# Patient Record
Sex: MALE | Race: WHITE | Employment: OTHER | URBAN - METROPOLITAN AREA
[De-identification: names, ages, dates, MRNs, and addresses within clinical notes are randomized per-mention and may not be internally consistent; named-entity substitution may affect disease eponyms.]

---

## 2021-06-29 NOTE — PROGRESS NOTES
Department of [facility] Oncology  Attending Consult Note    Reason for Visit: Consultation on a patient with Lymphoplasmacytic Lymphoma      Referring Physician: Rachel Willis MD    PCP:  Rachel Willis MD    History of Present Illness:  70year old male initially seen at [facility] hematology for anemia and abnormal TP/albumin ratio. SPEP IgG and IgM kapa paraprotein, M-spike 3.29 g/dL. UIFE: IgM protein. 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   -Extensive involvement by atypitcal CD5-/CD10-B-cell lymphoproliferative disorder, comprising over 70% of marrow cellularity  -Mildly hypercellular marrow for age (30-40%) with trilineage pan hypoplasia  -Normocytic anemia. -IHC staining highlighted extensive CD20+ B cell infiltrate with admixed + plasma cells     Started on Ibrutinib 420 mg/day 12/2020 with good response so far    On 01/06/2021:  SPEP:  Monoclonal protein 1.72 g/dL  Persistent double monoclonal bands in the beta/gamma and gamma globuin regions. Bands previously identified as IgM kappa and IgG kappa (8/12/2020). IgM kappa decreased by 1.22 g/dL and IgG kappa minimally changed since last exam on 10/14/2020. Free kappa light chains: 413.4    (3.3-19. 4)   Free lambda light chains: 2.8      (5.7-26. 3)   K/L ratio:                          147.64 (0.26-1.65)    On 03/25/2021  SPEP:  Monoclonal protein: 1.03 (0-0) g/dL  Persistent double monoclonal bands in the beta/gamma and gamma globuin regions. Bands previously identified as IgM kappa and IgG kappa (8/12/2020). IgM kappa decreased by 0.69 g/dL and IgG kappa unchanged since 01/06/2021. Free kappa light chains: 149.4  (3.3-19. 4)   Free lambda light chains: 2.6    (5.7-26. 3)   K/L ratio:                          57.46 (0.26-1.65)    Patient started on oral iron 08/2020 for LANDON and B12 deficiency but required 2 doses of Feraheme on 2021 and 2021 and again in 2021. Had recurrent iron deficiency with plan for Feraheme q3 months (next one scheduled on 2021). He does IM b12 injections at home. On 2021  Hb: 15.5 Hct 44.9  MCV 90.5    WBC 6.9  BUN 10  Creat 0.9  Ca 9.3  Albumin 3.6    Fe: 94 FeSat: 33% TIBIC: 288  Ferritin: 81    VitB12 370     Ig  (700-1700)   IgA: <8     ()   IgM: 1370 ()    Review of Systems;  CONSTITUTIONAL: No fever, chills. Fair appetite and energy level. ENMT: Eyes: No diplopia; Nose: No epistaxis. Mouth: No sore throat. RESPIRATORY: No hemoptysis, shortness of breath, cough. CARDIOVASCULAR: No chest pain, palpitations. GASTROINTESTINAL: No nausea/vomiting, abdominal pain. GENITOURINARY: No dysuria, urinary frequency, hematuria. NEURO: No syncope, presyncope, headache. Remainder:  ROS NEGATIVE    Past Medical History:      Diagnosis Date    Abscess of right thumb 4/15/2016    Amputation of right thumb 2014    Distal phalynx    Depression     PTSD    Diabetes mellitus ([address]     Hyperlipidemia     Hypertension     Obesity      Past Surgical History:      Procedure Laterality Date    HERNIA REPAIR      NECK SURGERY      PATELLA SURGERY      SHOULDER SURGERY      TONSILLECTOMY       Family History:  No family history on file. Medications:  Reviewed and reconciled.     Social History:  Social History     Socioeconomic History    Marital status:      Spouse name: Not on file    Number of children: Not on file    Years of education: Not on file    Highest education level: Not on file   Occupational History    Not on file   Tobacco Use    Smoking status: Current Every Day Smoker     Packs/day: 2.00     Types: Cigars    Tobacco comment: 2 cigars a day   Substance and Sexual Activity    Alcohol use: Yes     Comment: occasionally    Drug use: No    Sexual activity: Not on file   Other Topics Concern    Not on interim    Thank you for allowing us to participate in the care of Mr. Burke Khan, HUDSON - CNP      The patient was seen and examined, I agree with Dayron Lehman CNP H&P, assessment and plan as outlined.   06/30/2021  Fausto Brice MD

## 2021-06-30 ENCOUNTER — HOSPITAL ENCOUNTER (OUTPATIENT)
Dept: INFUSION THERAPY | Age: 76
Discharge: HOME | End: 2021-06-30
Payer: MEDICARE

## 2021-06-30 ENCOUNTER — OFFICE VISIT (OUTPATIENT)
Dept: ONCOLOGY | Age: 76
End: 2021-06-30
Payer: OTHER GOVERNMENT

## 2021-06-30 VITALS
RESPIRATION RATE: 16 BRPM | BODY MASS INDEX: 37.25 KG/M2 | HEART RATE: 76 BPM | WEIGHT: 275 LBS | DIASTOLIC BLOOD PRESSURE: 78 MMHG | HEIGHT: 72 IN | TEMPERATURE: 97.3 F | OXYGEN SATURATION: 97 % | SYSTOLIC BLOOD PRESSURE: 154 MMHG

## 2021-06-30 DIAGNOSIS — C83.00 MALIGNANT LYMPHOPLASMACYTIC LYMPHOMA (HCC): ICD-10-CM

## 2021-06-30 DIAGNOSIS — C83.00 MALIGNANT LYMPHOPLASMACYTIC LYMPHOMA (HCC): Primary | ICD-10-CM

## 2021-06-30 LAB
ALBUMIN SERPL-MCNC: 3.8 G/DL (ref 3.5–5.2)
ALP BLD-CCNC: 50 U/L (ref 40–129)
ALT SERPL-CCNC: 7 U/L (ref 0–40)
ANION GAP SERPL CALCULATED.3IONS-SCNC: 9 MMOL/L (ref 7–16)
AST SERPL-CCNC: 9 U/L (ref 0–39)
BASOPHILS ABSOLUTE: 0.04 E9/L (ref 0–0.2)
BASOPHILS RELATIVE PERCENT: 0.5 % (ref 0–2)
BILIRUB SERPL-MCNC: 0.6 MG/DL (ref 0–1.2)
BUN BLDV-MCNC: 8 MG/DL (ref 6–23)
CALCIUM SERPL-MCNC: 9.3 MG/DL (ref 8.6–10.2)
CHLORIDE BLD-SCNC: 99 MMOL/L (ref 98–107)
CO2: 30 MMOL/L (ref 22–29)
CREAT SERPL-MCNC: 0.8 MG/DL (ref 0.7–1.2)
EOSINOPHILS ABSOLUTE: 0.09 E9/L (ref 0.05–0.5)
EOSINOPHILS RELATIVE PERCENT: 1.2 % (ref 0–6)
FERRITIN: 73 NG/ML
FOLATE: 13.1 NG/ML (ref 4.8–24.2)
GFR AFRICAN AMERICAN: >60
GFR NON-AFRICAN AMERICAN: >60 ML/MIN/1.73
GLUCOSE BLD-MCNC: 118 MG/DL (ref 74–99)
HCT VFR BLD CALC: 44.4 % (ref 37–54)
HEMOGLOBIN: 14.8 G/DL (ref 12.5–16.5)
IMMATURE GRANULOCYTES #: 0.04 E9/L
IMMATURE GRANULOCYTES %: 0.5 % (ref 0–5)
IRON SATURATION: 24 % (ref 20–55)
IRON: 60 MCG/DL (ref 59–158)
LACTATE DEHYDROGENASE: 92 U/L (ref 135–225)
LYMPHOCYTES ABSOLUTE: 1.65 E9/L (ref 1.5–4)
LYMPHOCYTES RELATIVE PERCENT: 21.9 % (ref 20–42)
MCH RBC QN AUTO: 30.2 PG (ref 26–35)
MCHC RBC AUTO-ENTMCNC: 33.3 % (ref 32–34.5)
MCV RBC AUTO: 90.6 FL (ref 80–99.9)
MONOCYTES ABSOLUTE: 0.56 E9/L (ref 0.1–0.95)
MONOCYTES RELATIVE PERCENT: 7.4 % (ref 2–12)
NEUTROPHILS ABSOLUTE: 5.15 E9/L (ref 1.8–7.3)
NEUTROPHILS RELATIVE PERCENT: 68.5 % (ref 43–80)
PDW BLD-RTO: 13.8 FL (ref 11.5–15)
PLATELET # BLD: 179 E9/L (ref 130–450)
PMV BLD AUTO: 10.4 FL (ref 7–12)
POTASSIUM SERPL-SCNC: 4.3 MMOL/L (ref 3.5–5)
RBC # BLD: 4.9 E12/L (ref 3.8–5.8)
SODIUM BLD-SCNC: 138 MMOL/L (ref 132–146)
TOTAL IRON BINDING CAPACITY: 250 MCG/DL (ref 250–450)
VITAMIN B-12: 451 PG/ML (ref 211–946)
WBC # BLD: 7.5 E9/L (ref 4.5–11.5)

## 2021-06-30 PROCEDURE — G8417 CALC BMI ABV UP PARAM F/U: HCPCS | Performed by: INTERNAL MEDICINE

## 2021-06-30 PROCEDURE — 99214 OFFICE O/P EST MOD 30 MIN: CPT

## 2021-06-30 PROCEDURE — 99205 OFFICE O/P NEW HI 60 MIN: CPT | Performed by: INTERNAL MEDICINE

## 2021-06-30 PROCEDURE — 36415 COLL VENOUS BLD VENIPUNCTURE: CPT

## 2021-06-30 PROCEDURE — G8427 DOCREV CUR MEDS BY ELIG CLIN: HCPCS | Performed by: INTERNAL MEDICINE

## 2021-06-30 RX ORDER — CARBOXYMETHYLCELLULOSE SODIUM 5 MG/ML
SOLUTION/ DROPS OPHTHALMIC
COMMUNITY
Start: 2020-11-23

## 2021-06-30 RX ORDER — POTASSIUM CHLORIDE 20 MEQ/1
TABLET, EXTENDED RELEASE ORAL
COMMUNITY
Start: 2021-06-17

## 2021-06-30 RX ORDER — ALOGLIPTIN 25 MG/1
TABLET, FILM COATED ORAL
COMMUNITY
Start: 2021-06-17

## 2021-06-30 RX ORDER — PSEUDOEPHEDRINE HCL 30 MG
TABLET ORAL
COMMUNITY
Start: 2021-06-17

## 2021-06-30 RX ORDER — CYANOCOBALAMIN 1000 UG/ML
INJECTION INTRAMUSCULAR; SUBCUTANEOUS
COMMUNITY
Start: 2020-08-19

## 2021-06-30 RX ORDER — FERROUS SULFATE 325(65) MG
TABLET ORAL
COMMUNITY
Start: 2021-01-12

## 2021-06-30 RX ORDER — OMEPRAZOLE 20 MG/1
CAPSULE, DELAYED RELEASE ORAL
COMMUNITY
Start: 2020-09-30

## 2021-06-30 NOTE — PROGRESS NOTES
Mari   1945 70 y.o. Referring Physician:     PCP: Tuyet Nicolas MD    Vitals:    21 1317   BP: (!) 154/78   Pulse: 76   Resp: 16   Temp: 97.3 °F (36.3 °C)   SpO2: 97%        Wt Readings from Last 3 Encounters:   21 275 lb (124.7 kg)   04/15/16 285 lb (129.3 kg)   04/10/16 287 lb (130.2 kg)        Body mass index is 37.3 kg/m². Chief Complaint:   Chief Complaint   Patient presents with    New Patient         Cancer Staging  No matching staging information was found for the patient. Prior Radiation Therapy? NO    Concurrent Chemo/radiation? NO    Prior Chemotherapy? NO    Prior Hormonal Therapy? NO    Head and Neck Cancer? No, patient does NOT have HN cancer.       LMP:     Age at first Menses:     :     Para:           Current Outpatient Medications:     alogliptin (NESINA) 25 MG TABS tablet, TAKE ONE TABLET BY MOUTH EVERY DAY, Disp: , Rfl:     carboxymethylcellulose (REFRESH PLUS) 0.5 % SOLN ophthalmic solution, INSTILL ONE DROP IN EACH EYE FOUR TIMES A DAY FOR DRY AND/OR IRRITATED EYE(S), Disp: , Rfl:     vitamin D (CHOLECALCIFEROL) 25 MCG (1000 UT) TABS tablet, TAKE TWO TABLETS BY MOUTH EVERY DAY, Disp: , Rfl:     cyanocobalamin 1000 MCG/ML injection, INJECT 1ML (1000MCG) INTRAMUSCULARLY EVERY 4 WEEKS, Disp: , Rfl:     docusate (COLACE, DULCOLAX) 100 MG CAPS, TAKE ONE CAPSULE BY MOUTH TWICE A DAY -- STOOL SOFTENER, Disp: , Rfl:     ferrous sulfate (IRON 325) 325 (65 Fe) MG tablet, TAKE ONE TABLET BY MOUTH TWICE A DAY (AN HOUR BEFORE OR TWO HOURS AFTER A MEAL; TAKE WITH FOOD IF THIS UPSETS YOUR STOMACH)----  IRON PILL (AN HOUR BEFORE OR TWO HOURS AFTER A MEAL; TAKE WITH FOOD IF THIS UPSETS YOUR STOMACH)----  IRON PILL, Disp: , Rfl:     omeprazole (PRILOSEC) 20 MG delayed release capsule, TAKE 2 CAPSULES BY MOUTH EVERY MORNING, ON AN EMPTY STOMACH, Disp: , Rfl:     ibrutinib (IMBRUVICA) 140 MG chemo capsule, TAKE 3 CAPSULES BY MOUTH EVERY DAY SWALLOW CAPSULES WHOLE WITH A GLASS OF WATER. DO NOT OPEN, CRUSH, BREAK, OR CHEW., Disp: , Rfl:     potassium chloride (KLOR-CON M) 20 MEQ extended release tablet, TAKE ONE TABLET BY MOUTH EVERY DAY WITH FOOD, Disp: , Rfl:     traMADol (ULTRAM) 50 MG tablet, Take 50 mg by mouth every 6 hours as needed for Pain, Disp: , Rfl:     atenolol (TENORMIN) 100 MG tablet, Take 100 mg by mouth daily, Disp: , Rfl:     lisinopril (PRINIVIL;ZESTRIL) 20 MG tablet, Take 20 mg by mouth 2 times daily, Disp: , Rfl:     Potassium Chloride ER 20 MEQ TBCR, Take 1 tablet by mouth daily, Disp: , Rfl:     simvastatin (ZOCOR) 80 MG tablet, Take 80 mg by mouth nightly Patient takes 1/2 tablet at bedtime, Disp: , Rfl:     prazosin (MINIPRESS) 1 MG capsule, Take 1 mg by mouth nightly Patient takes (3) tablets at nighttime, Disp: , Rfl:     aspirin 325 MG tablet, Take 325 mg by mouth daily, Disp: , Rfl:     hydrochlorothiazide (HYDRODIURIL) 25 MG tablet, Take 25 mg by mouth daily. , Disp: , Rfl:     METFORMIN HCL PO, Take 500 mg by mouth 2 times daily Takes 1,000 mg (2 tablets) at breakfast, , & 1000 mg (2 tablets) at dinnertime, Disp: , Rfl:     GLIPIZIDE PO, Take 10 mg by mouth 2 times daily , Disp: , Rfl:     vitamin D (ERGOCALCIFEROL) 06716 UNITS CAPS capsule, Take 50,000 Units by mouth once a week., Disp: , Rfl:        Past Medical History:   Diagnosis Date    Abscess of right thumb 4/15/2016    Amputation of right thumb 6/18/2014    Distal phalynx    Depression     PTSD    Diabetes mellitus ([facility] [address]     Hyperlipidemia     Hypertension     Obesity        Past Surgical History:   Procedure Laterality Date    HERNIA REPAIR      NECK SURGERY      PATELLA SURGERY      SHOULDER SURGERY      TONSILLECTOMY         No family history on file.     Social History     Socioeconomic History    Marital status:      Spouse name: Not on file    Number of children: Not on file    Years of education: Not on file    Highest education incontinence, frequency No - 0       3. Ambulatory: use of assistive devices (walker, cane, off-loading devices), attached to equipment (IV pole, oxygen) No - 0     4. Sensory Limitations: dizziness, vertigo, impaired vision No - 0       5. Age 72 years or greater - 1       10. Medication: diuretics, strong analgesics, hypnotics, sedatives, antihypertensive agents   Yes - 3   7. Falls:  recent history of falls within the last 3 months (not to include slipping or tripping)   No - 0   TOTAL 4      If score of 4 or greater was education given? Yes       TABLE 2   Risk Score Risk Level Plan of Care   0-3 Little or  No Risk 1. Provide assistance as indicated for ambulation activities  2. Reorient confused/cognitively impaired patient  3. Call-light/bell within patient's reach  4. Chair/bed in low position, stretcher/bed with siderails up except when performing patient care activities  5. Educate patient/family/caregiver on falls prevention  6.  Reassess in 12 weeks or with any noted change in patient condition which places them at a risk for a fall   4-6 Moderate Risk 1. Provide assistance as indicated for ambulation activities  2. Reorient confused/cognitively impaired patient  3. Call-light/bell within patient's reach  4. Chair/bed in low position, stretcher/bed with siderails up except when performing patient care activities  5. Educate patient/family/caregiver on falls prevention  6. Falls risk precaution (Yellow sticker Level II) placed on patient chart   7 or   Higher High Risk 1. Place patient in easily observable treatment room  2. Patient attended at all times by family member or staff  3. Provide assistance as indicated for ambulation activities  4. Reorient confused/cognitively impaired patient  5. Call-light/bell within patient's reach  6. Chair/bed in low position, stretcher/bed with siderails up except when performing patient care activities  7.   Educate patient/family/caregiver on falls prevention  8. Falls risk precaution (Yellow sticker Level III) placed on patient chart           MALNUTRITION RISK SCREENING ASSESSMENT    Instructions:  Assess the patient and enter the appropriate indicators that are present for nutrition risk identification. Total the numbers entered and assign a risk score. Follow the appropriate action for total score listed below. Assessment   Date  6/30/2021     1. Have you lost weight without trying? 0- No     2. Have you been eating poorly because of a decreased appetite? 0- No   3. Do you have a diagnosis of head and neck cancer?       0- No                                                                                    TOTAL 0        Score of 0-1: No action  Score 2 or greater:  · For Non-Diabetic Patient: Recommend adding Ensure Enlive 2 x daily and provide patient with Ensure wellness bag with coupons  · For Diabetic Patient: Recommend adding Glucerna Shake 2 x daily and provide patient with Glucerna Wellness bag with coupons  · Route to the dietitian via Ashley Correa RN

## 2021-07-01 LAB — PATHOLOGIST REVIEW: NORMAL

## 2021-07-02 LAB
ALBUMIN SERPL-MCNC: 3.3 G/DL (ref 3.5–4.7)
ALPHA-1-GLOBULIN: 0.2 G/DL (ref 0.2–0.4)
ALPHA-2-GLOBULIN: 0.7 G/FL (ref 0.5–1)
BETA GLOBULIN: 0.7 G/DL (ref 0.8–1.3)
BETA-2 MICROGLOBULIN: 2.2 MG/L (ref 0.6–2.4)
ELECTROPHORESIS: ABNORMAL
GAMMA GLOBULIN: 1.2 G/DL (ref 0.7–1.6)
IGA: 7 MG/DL (ref 70–400)
IGG: 188 MG/DL (ref 700–1600)
IGM: 1295 MG/DL (ref 40–230)
IMMUNOFIXATION RESULT, SERUM: NORMAL
TOTAL PROTEIN: 6.1 G/DL (ref 6.4–8.3)

## 2021-07-03 LAB
KAPPA FREE LIGHT CHAINS QNT: 80.85 MG/L (ref 3.3–19.4)
KAPPA/LAMBDA FREE LIGHT CHAIN RATIO: 25.03 (ref 0.26–1.65)
LAMBDA FREE LIGHT CHAINS QNT: 3.23 MG/L (ref 5.71–26.3)
Lab: NORMAL
REPORT: NORMAL
THIS TEST SENT TO: NORMAL

## 2021-07-14 ENCOUNTER — TELEPHONE (OUTPATIENT)
Dept: INFUSION THERAPY | Age: 76
End: 2021-07-14

## 2021-07-14 PROBLEM — C83.00 MALIGNANT LYMPHOPLASMACYTIC LYMPHOMA (HCC): Status: ACTIVE | Noted: 2021-07-14

## 2021-07-14 RX ORDER — DIPHENHYDRAMINE HYDROCHLORIDE 50 MG/ML
50 INJECTION INTRAMUSCULAR; INTRAVENOUS ONCE
OUTPATIENT
Start: 2021-08-16 | End: 2021-07-19

## 2021-07-14 RX ORDER — HEPARIN SODIUM (PORCINE) LOCK FLUSH IV SOLN 100 UNIT/ML 100 UNIT/ML
500 SOLUTION INTRAVENOUS PRN
OUTPATIENT
Start: 2021-08-16

## 2021-07-14 RX ORDER — SODIUM CHLORIDE 0.9 % (FLUSH) 0.9 %
5-40 SYRINGE (ML) INJECTION PRN
OUTPATIENT
Start: 2021-08-16

## 2021-07-14 RX ORDER — SODIUM CHLORIDE 9 MG/ML
25 INJECTION, SOLUTION INTRAVENOUS PRN
OUTPATIENT
Start: 2021-08-16

## 2021-07-14 RX ORDER — SODIUM CHLORIDE 9 MG/ML
INJECTION, SOLUTION INTRAVENOUS CONTINUOUS
OUTPATIENT
Start: 2021-08-16

## 2021-07-14 RX ORDER — EPINEPHRINE 1 MG/ML
0.3 INJECTION, SOLUTION, CONCENTRATE INTRAVENOUS PRN
OUTPATIENT
Start: 2021-08-16

## 2021-07-14 RX ORDER — METHYLPREDNISOLONE SODIUM SUCCINATE 125 MG/2ML
125 INJECTION, POWDER, LYOPHILIZED, FOR SOLUTION INTRAMUSCULAR; INTRAVENOUS ONCE
OUTPATIENT
Start: 2021-08-16 | End: 2021-07-19

## 2021-07-19 ENCOUNTER — OFFICE VISIT (OUTPATIENT)
Dept: ONCOLOGY | Age: 76
End: 2021-07-19
Payer: OTHER GOVERNMENT

## 2021-07-19 ENCOUNTER — HOSPITAL ENCOUNTER (OUTPATIENT)
Dept: INFUSION THERAPY | Age: 76
Discharge: HOME | End: 2021-07-19
Payer: MEDICARE

## 2021-07-19 VITALS
BODY MASS INDEX: 36.84 KG/M2 | HEART RATE: 76 BPM | WEIGHT: 272 LBS | HEIGHT: 72 IN | OXYGEN SATURATION: 94 % | SYSTOLIC BLOOD PRESSURE: 190 MMHG | TEMPERATURE: 96.7 F | DIASTOLIC BLOOD PRESSURE: 98 MMHG

## 2021-07-19 DIAGNOSIS — C83.00 MALIGNANT LYMPHOPLASMACYTIC LYMPHOMA (HCC): Primary | ICD-10-CM

## 2021-07-19 PROCEDURE — 99214 OFFICE O/P EST MOD 30 MIN: CPT | Performed by: NURSE PRACTITIONER

## 2021-07-19 PROCEDURE — 4040F PNEUMOC VAC/ADMIN/RCVD: CPT | Performed by: NURSE PRACTITIONER

## 2021-07-19 PROCEDURE — 99212 OFFICE O/P EST SF 10 MIN: CPT

## 2021-07-19 PROCEDURE — G8417 CALC BMI ABV UP PARAM F/U: HCPCS | Performed by: NURSE PRACTITIONER

## 2021-07-19 PROCEDURE — 1123F ACP DISCUSS/DSCN MKR DOCD: CPT | Performed by: NURSE PRACTITIONER

## 2021-07-19 PROCEDURE — G8427 DOCREV CUR MEDS BY ELIG CLIN: HCPCS | Performed by: NURSE PRACTITIONER

## 2021-07-19 PROCEDURE — 4004F PT TOBACCO SCREEN RCVD TLK: CPT | Performed by: NURSE PRACTITIONER

## 2021-07-19 PROCEDURE — 3017F COLORECTAL CA SCREEN DOC REV: CPT | Performed by: NURSE PRACTITIONER

## 2021-07-19 NOTE — PROGRESS NOTES
Department of [facility] Oncology  Attending Consult Note    Reason for Visit: Consultation on a patient with Lymphoplasmacytic Lymphoma      Referring Physician: Danny Marie MD    PCP:  Danny Marie MD    History of Present Illness:  70year old male initially seen at [facility] hematology for anemia and abnormal TP/albumin ratio. SPEP IgG and IgM kapa paraprotein, M-spike 3.29 g/dL. UIFE: IgM protein. 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   -Extensive involvement by atypitcal CD5-/CD10-B-cell lymphoproliferative disorder, comprising over 70% of marrow cellularity  -Mildly hypercellular marrow for age (30-40%) with trilineage pan hypoplasia  -Normocytic anemia. -IHC staining highlighted extensive CD20+ B cell infiltrate with admixed + plasma cells     Started on Ibrutinib 420 mg/day 12/2020 with good response so far    On 01/06/2021:  SPEP:  Monoclonal protein 1.72 g/dL  Persistent double monoclonal bands in the beta/gamma and gamma globuin regions. Bands previously identified as IgM kappa and IgG kappa (8/12/2020). IgM kappa decreased by 1.22 g/dL and IgG kappa minimally changed since last exam on 10/14/2020. Free kappa light chains: 413.4    (3.3-19. 4)   Free lambda light chains: 2.8      (5.7-26. 3)   K/L ratio:                          147.64 (0.26-1.65)    On 03/25/2021  SPEP:  Monoclonal protein: 1.03 (0-0) g/dL  Persistent double monoclonal bands in the beta/gamma and gamma globuin regions. Bands previously identified as IgM kappa and IgG kappa (8/12/2020). IgM kappa decreased by 0.69 g/dL and IgG kappa unchanged since 01/06/2021. Free kappa light chains: 149.4  (3.3-19. 4)   Free lambda light chains: 2.6    (5.7-26. 3)   K/L ratio:                          57.46 (0.26-1.65)    Patient started on oral iron 08/2020 for LANDON and B12 deficiency but required 2 doses of Feraheme on 2021 and 2021 and again in 2021. Had recurrent iron deficiency with plan for Feraheme q3 months (next one scheduled on 2021). He does IM b12 injections at home. On 2021  Hb: 15.5 Hct 44.9  MCV 90.5    WBC 6.9  BUN 10  Creat 0.9  Ca 9.3  Albumin 3.6    Fe: 94 FeSat: 33% TIBIC: 288  Ferritin: 81    VitB12 370     Ig  (700-1700)   IgA: <8     ()   IgM: 1370 ()    Review of Systems;  CONSTITUTIONAL: No fever, chills. Fair appetite and energy level. ENMT: Eyes: No diplopia; Nose: No epistaxis. Mouth: No sore throat. RESPIRATORY: No hemoptysis, shortness of breath, cough. CARDIOVASCULAR: No chest pain, palpitations. GASTROINTESTINAL: No nausea/vomiting, abdominal pain. GENITOURINARY: No dysuria, urinary frequency, hematuria. NEURO: No syncope, presyncope, headache. Remainder:  ROS NEGATIVE    Past Medical History:      Diagnosis Date    Abscess of right thumb 4/15/2016    Amputation of right thumb 2014    Distal phalynx    Anemia     Depression     PTSD    Diabetes mellitus ([address]     Hyperlipidemia     Hypertension     Obesity      Past Surgical History:      Procedure Laterality Date    COLONOSCOPY      HERNIA REPAIR      NECK SURGERY      PATELLA SURGERY      SHOULDER SURGERY      TONSILLECTOMY      UPPER GASTROINTESTINAL ENDOSCOPY       Family History:  Family History   Problem Relation Age of Onset    Heart Attack Sister        Medications:  Reviewed and reconciled.     Social History:  Social History     Socioeconomic History    Marital status:      Spouse name: Not on file    Number of children: Not on file    Years of education: Not on file    Highest education level: Not on file   Occupational History    Not on file   Tobacco Use    Smoking status: Current Every Day Smoker     Packs/day: 2.00     Types: Cigars    Smokeless tobacco: Never Used    Tobacco comment: 2 cigars a day   Vaping Use  Vaping Use: Never assessed   Substance and Sexual Activity    Alcohol use: Yes     Comment: occasionally    Drug use: No    Sexual activity: Not Currently   Other Topics Concern    Not on file   Social History Narrative    Not on file     Social Determinants of Health     Financial Resource Strain:     Difficulty of Paying Living Expenses:    Food Insecurity:     Worried About Running Out of Food in the Last Year:     [address] in the Last Year:    Transportation Needs:     Lack of Transportation (Medical):  Lack of Transportation (Non-Medical):    Physical Activity:     Days of Exercise per Week:     Minutes of Exercise per Session:    Stress:     Feeling of Stress :    Social Connections:     Frequency of Communication with Friends and Family:     Frequency of Social Gatherings with Friends and Family:     Attends Church Services:     Active Member of Clubs or Organizations:     Attends Club or Organization Meetings:     Marital Status:    Intimate Partner Violence:     Fear of Current or Ex-Partner:     Emotionally Abused:     Physically Abused:     Sexually Abused: Allergies:  No Known Allergies    Physical Exam:  BP (!) 190/98 Comment: Patient states he did not take BP meds this morning. Pulse 76   Temp 96.7 °F (35.9 °C) (Temporal)   Ht 6' (1.829 m)   Wt 272 lb (123.4 kg)   SpO2 94%   BMI 36.89 kg/m²   GENERAL: Alert, oriented x 3, not in acute distress. HEENT: PERRLA; EOMI. Oropharynx clear. NECK: Supple. Without lymphadenopathy. LUNGS: Good air entry bilaterally. No wheezing, crackles or rhonchi. CARDIOVASCULAR: Regular rate. No murmurs, rubs or gallops. ABDOMEN: Soft. Non-tender, non-distended. Positive bowel sounds. EXTREMITIES: Without clubbing or cyanosis or edema. NEUROLOGIC: No focal deficits.    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 UIFE: IgM protein. 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   -Extensive involvement by atypitcal CD5-/CD10-B-cell lymphoproliferative disorder, comprising over 70% of marrow cellularity  -Mildly hypercellular marrow for age (30-40%) with trilineage pan hypoplasia  -Normocytic anemia. -IHC staining highlighted extensive CD20+ B cell infiltrate with admixed + plasma cells     Started on Ibrutinib 420 mg/day 12/2020 with good response so far    On 01/06/2021:  SPEP:  Monoclonal protein 1.72 g/dL  Persistent double monoclonal bands in the beta/gamma and gamma globuin regions. Bands previously identified as IgM kappa and IgG kappa (8/12/2020). IgM kappa decreased by 1.22 g/dL and IgG kappa minimally changed since last exam on 10/14/2020. Free kappa light chains: 413.4    (3.3-19. 4)   Free lambda light chains: 2.8      (5.7-26. 3)   K/L ratio:                          147.64 (0.26-1.65)    On 03/25/2021  SPEP:  Monoclonal protein: 1.03 (0-0) g/dL  Persistent double monoclonal bands in the beta/gamma and gamma globuin regions. Bands previously identified as IgM kappa and IgG kappa (8/12/2020). IgM kappa decreased by 0.69 g/dL and IgG kappa unchanged since 01/06/2021. Free kappa light chains: 149.4  (3.3-19. 4)   Free lambda light chains: 2.6    (5.7-26. 3)   K/L ratio:                          57.46 (0.26-1.65)    Patient started on oral iron 08/2020 for LANDON and B12 deficiency but required 2 doses of Feraheme on 01/21/2021 and 01/28/2021 and again in April 2021. Had recurrent iron deficiency with plan for Feraheme q3 months (next one scheduled on 07/14/2021). He does IM b12 injections at home (last one on 06/01/2021).      On 06/09/2021  Hb: 15.5 Hct 44.9  MCV 90.5    WBC 6.9  BUN 10  Creat 0.9  Ca 9.3  Albumin 3.6    Fe: 94 FeSat: 33% TIBIC: 288  Ferritin: 81    VitB12 370     Ig  (700-1700)   IgA: <8     ()   IgM: 1370 ()    Labs drawn 2021:    -Flow cytometry with small B-cell population with equivocal mild kappa light chain excess (1% of total cells). The degree of light chain excess is insufficient for definitive diagnosis. B cells show nonspecific immunophenotype. -Peripheral blood smear with all line counts and morphology WNL. Negative for increased/atypical lymphocytes   -Beta-2 microglobulin: 2.2  -SPEP  Prominent zone of restriction in the gamma region with decrease in remaining polyclonal immunoglobulins, suggestive of myeloma paraprotein   M-spike: 0.8 g/dl   --SIFE with IgM kappa monoclonal protein present   -IgA: 7 ( ) Ig (700-1600) IgM: 1295 ()  -Kenefic free light chains: 80.35 (3.3-19.4)  -Lambda free light chains: 3.23 (5.71-26.3)  - K;L ratio: 25.0.3 (0.26-1.65)     -Vitamin B 12: 451  -Folate: 13.1  -Iron: 60 TIBC: 250 Fe%: 24 ferritin: 73    Patient given urine specimen for Immunofixation urine and Protein urine to be obtained before next visit. He went to the [facility] 07/15/2021 for Feraheme infusion but labs were WNL so did not receive infusion. Reports some nausea controlled with Prilosec. Denies vomiting, decreased appetite, new or worsening joint pain, constipation or dizziness. Continue Ibrutinib  RTC in 1 month for labs and follow up.      Thank you for allowing us to participate in the care of Mr. Glynn Healy [address] [facility]   [phone]

## 2021-08-16 ENCOUNTER — OFFICE VISIT (OUTPATIENT)
Dept: ONCOLOGY | Age: 76
End: 2021-08-16
Payer: MEDICARE

## 2021-08-16 ENCOUNTER — HOSPITAL ENCOUNTER (OUTPATIENT)
Dept: INFUSION THERAPY | Age: 76
Discharge: HOME | End: 2021-08-16
Payer: MEDICARE

## 2021-08-16 VITALS
HEIGHT: 72 IN | OXYGEN SATURATION: 97 % | SYSTOLIC BLOOD PRESSURE: 143 MMHG | WEIGHT: 270 LBS | TEMPERATURE: 97 F | DIASTOLIC BLOOD PRESSURE: 65 MMHG | BODY MASS INDEX: 36.57 KG/M2 | HEART RATE: 86 BPM | RESPIRATION RATE: 18 BRPM

## 2021-08-16 DIAGNOSIS — C83.00 MALIGNANT LYMPHOPLASMACYTIC LYMPHOMA (HCC): Primary | ICD-10-CM

## 2021-08-16 DIAGNOSIS — C83.00 MALIGNANT LYMPHOPLASMACYTIC LYMPHOMA (HCC): ICD-10-CM

## 2021-08-16 LAB
ALBUMIN SERPL-MCNC: 4.2 G/DL (ref 3.5–5.2)
ALP BLD-CCNC: 55 U/L (ref 40–129)
ALT SERPL-CCNC: 11 U/L (ref 0–40)
ANION GAP SERPL CALCULATED.3IONS-SCNC: 13 MMOL/L (ref 7–16)
AST SERPL-CCNC: 11 U/L (ref 0–39)
BASOPHILS ABSOLUTE: 0.05 E9/L (ref 0–0.2)
BASOPHILS RELATIVE PERCENT: 0.7 % (ref 0–2)
BILIRUB SERPL-MCNC: 0.8 MG/DL (ref 0–1.2)
BUN BLDV-MCNC: 10 MG/DL (ref 6–23)
CALCIUM SERPL-MCNC: 9.9 MG/DL (ref 8.6–10.2)
CHLORIDE BLD-SCNC: 98 MMOL/L (ref 98–107)
CO2: 27 MMOL/L (ref 22–29)
CREAT SERPL-MCNC: 0.8 MG/DL (ref 0.7–1.2)
EOSINOPHILS ABSOLUTE: 0.08 E9/L (ref 0.05–0.5)
EOSINOPHILS RELATIVE PERCENT: 1 % (ref 0–6)
FERRITIN: 76 NG/ML
GFR AFRICAN AMERICAN: >60
GFR NON-AFRICAN AMERICAN: >60 ML/MIN/1.73
GLUCOSE BLD-MCNC: 199 MG/DL (ref 74–99)
HCT VFR BLD CALC: 47.7 % (ref 37–54)
HEMOGLOBIN: 16.2 G/DL (ref 12.5–16.5)
IMMATURE GRANULOCYTES #: 0.02 E9/L
IMMATURE GRANULOCYTES %: 0.3 % (ref 0–5)
IRON SATURATION: 38 % (ref 20–55)
IRON: 109 MCG/DL (ref 59–158)
LACTATE DEHYDROGENASE: 111 U/L (ref 135–225)
LYMPHOCYTES ABSOLUTE: 1.64 E9/L (ref 1.5–4)
LYMPHOCYTES RELATIVE PERCENT: 21.4 % (ref 20–42)
MCH RBC QN AUTO: 30.2 PG (ref 26–35)
MCHC RBC AUTO-ENTMCNC: 34 % (ref 32–34.5)
MCV RBC AUTO: 88.8 FL (ref 80–99.9)
MONOCYTES ABSOLUTE: 0.54 E9/L (ref 0.1–0.95)
MONOCYTES RELATIVE PERCENT: 7 % (ref 2–12)
NEUTROPHILS ABSOLUTE: 5.35 E9/L (ref 1.8–7.3)
NEUTROPHILS RELATIVE PERCENT: 69.6 % (ref 43–80)
PDW BLD-RTO: 13.5 FL (ref 11.5–15)
PLATELET # BLD: 203 E9/L (ref 130–450)
PMV BLD AUTO: 10.4 FL (ref 7–12)
POTASSIUM SERPL-SCNC: 4.1 MMOL/L (ref 3.5–5)
RBC # BLD: 5.37 E12/L (ref 3.8–5.8)
SODIUM BLD-SCNC: 138 MMOL/L (ref 132–146)
TOTAL IRON BINDING CAPACITY: 290 MCG/DL (ref 250–450)
WBC # BLD: 7.7 E9/L (ref 4.5–11.5)

## 2021-08-16 PROCEDURE — 83883 ASSAY NEPHELOMETRY NOT SPEC: CPT

## 2021-08-16 PROCEDURE — 84165 PROTEIN E-PHORESIS SERUM: CPT

## 2021-08-16 PROCEDURE — 82728 ASSAY OF FERRITIN: CPT

## 2021-08-16 PROCEDURE — 86334 IMMUNOFIX E-PHORESIS SERUM: CPT

## 2021-08-16 PROCEDURE — 83540 ASSAY OF IRON: CPT

## 2021-08-16 PROCEDURE — 99212 OFFICE O/P EST SF 10 MIN: CPT

## 2021-08-16 PROCEDURE — G8427 DOCREV CUR MEDS BY ELIG CLIN: HCPCS | Performed by: INTERNAL MEDICINE

## 2021-08-16 PROCEDURE — 83550 IRON BINDING TEST: CPT

## 2021-08-16 PROCEDURE — 99214 OFFICE O/P EST MOD 30 MIN: CPT | Performed by: INTERNAL MEDICINE

## 2021-08-16 PROCEDURE — 85025 COMPLETE CBC W/AUTO DIFF WBC: CPT

## 2021-08-16 PROCEDURE — 3017F COLORECTAL CA SCREEN DOC REV: CPT | Performed by: INTERNAL MEDICINE

## 2021-08-16 PROCEDURE — 4004F PT TOBACCO SCREEN RCVD TLK: CPT | Performed by: INTERNAL MEDICINE

## 2021-08-16 PROCEDURE — 82232 ASSAY OF BETA-2 PROTEIN: CPT

## 2021-08-16 PROCEDURE — G8417 CALC BMI ABV UP PARAM F/U: HCPCS | Performed by: INTERNAL MEDICINE

## 2021-08-16 PROCEDURE — 1123F ACP DISCUSS/DSCN MKR DOCD: CPT | Performed by: INTERNAL MEDICINE

## 2021-08-16 PROCEDURE — 82784 ASSAY IGA/IGD/IGG/IGM EACH: CPT

## 2021-08-16 PROCEDURE — 36415 COLL VENOUS BLD VENIPUNCTURE: CPT

## 2021-08-16 PROCEDURE — 80053 COMPREHEN METABOLIC PANEL: CPT

## 2021-08-16 PROCEDURE — 83615 LACTATE (LD) (LDH) ENZYME: CPT

## 2021-08-16 PROCEDURE — 4040F PNEUMOC VAC/ADMIN/RCVD: CPT | Performed by: INTERNAL MEDICINE

## 2021-08-16 NOTE — PROGRESS NOTES
Department of [facility] Oncology  Attending Clinic Note    Reason for Visit: Follow-up on a patient with Lymphoplasmacytic Lymphoma      PCP:  Anthony Izquierdo MD    History of Present Illness:  70year old male initially seen at [facility] hematology for anemia and abnormal TP/albumin ratio. SPEP IgG and IgM kapa paraprotein, M-spike 3.29 g/dL. UIFE: IgM protein. 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   -Extensive involvement by atypitcal CD5-/CD10-B-cell lymphoproliferative disorder, comprising over 70% of marrow cellularity  -Mildly hypercellular marrow for age (30-40%) with trilineage pan hypoplasia  -Normocytic anemia. -IHC staining highlighted extensive CD20+ B cell infiltrate with admixed + plasma cells     Started on Ibrutinib 420 mg/day 12/2020 with good response so far    On 01/06/2021:  SPEP:  Monoclonal protein 1.72 g/dL  Persistent double monoclonal bands in the beta/gamma and gamma globuin regions. Bands previously identified as IgM kappa and IgG kappa (8/12/2020). IgM kappa decreased by 1.22 g/dL and IgG kappa minimally changed since last exam on 10/14/2020. Free kappa light chains: 413.4    (3.3-19. 4)   Free lambda light chains: 2.8      (5.7-26. 3)   K/L ratio:                          147.64 (0.26-1.65)    On 03/25/2021  SPEP:  Monoclonal protein: 1.03 (0-0) g/dL  Persistent double monoclonal bands in the beta/gamma and gamma globuin regions. Bands previously identified as IgM kappa and IgG kappa (8/12/2020). IgM kappa decreased by 0.69 g/dL and IgG kappa unchanged since 01/06/2021. Free kappa light chains: 149.4  (3.3-19. 4)   Free lambda light chains: 2.6    (5.7-26. 3)   K/L ratio:                          57.46 (0.26-1.65)    Patient started on oral iron 08/2020 for LANDON and B12 deficiency but required 2 doses of Feraheme on 01/21/2021 and 01/28/2021 and again in 2021. Had recurrent iron deficiency with plan for Feraheme q3 months (next one scheduled on 2021). He does IM b12 injections at home. On 2021  Hb: 15.5 Hct 44.9  MCV 90.5    WBC 6.9  BUN 10  Creat 0.9  Ca 9.3  Albumin 3.6    Fe: 94 FeSat: 33% TIBIC: 288  Ferritin: 81    VitB12 370     Ig  (700-1700)   IgA: <8     ()   IgM: 1370 ()    On 2021:  Hb 14.8  Hct 44.8  MCV 90.6  WBC 7.5    All cell line counts and morphology within normal limits. Patient's history of lymphoplasmacytic lymphoma is noted.  The currently submitted blood smear is negative for circulating plasma cells, and negative for increased/atypical lymphocytes  Fe 60 TIBC 250 FeSat 24% Ferritin 73  BUN 8  Creat 0.8  LFTs wnl  Folate/B12 wnl  Beta-2 microglobulin 2.2    Peripheral blood flow cytometry noted small B-cell population with equivocal mild kappa light chain excess (1% of total cells). SPEP: M-spike 0.8 g/dl  SIFE: IgM kappa monoclonal protein is present  IgM 1295 ()    Free kappa light chains:   80.85  (3.3-19. 4)   Free lambda light chains: 3.23    (5.7-26. 3)   K/L ratio:                           25.03 (0.26-1.65)  Continued Ibrutinib    Review of Systems;  CONSTITUTIONAL: No fever, chills. Fair appetite and energy level. ENMT: Eyes: No diplopia; Nose: No epistaxis. Mouth: No sore throat. RESPIRATORY: No hemoptysis, shortness of breath, cough. CARDIOVASCULAR: No chest pain, palpitations. GASTROINTESTINAL: No nausea/vomiting, abdominal pain. GENITOURINARY: No dysuria, urinary frequency, hematuria. NEURO: No syncope, presyncope, headache.   Remainder:  ROS NEGATIVE    Past Medical History:      Diagnosis Date    Abscess of right thumb 4/15/2016    Amputation of right thumb 2014    Distal phalynx    Anemia     Depression     PTSD    Diabetes mellitus ([address]     Hyperlipidemia     Hypertension     Obesity      Medications:  Reviewed and reconciled. Allergies:  No Known Allergies    Physical Exam:  Ht 6' (1.829 m)   Wt 270 lb (122.5 kg)   BMI 36.62 kg/m²   GENERAL: Alert, oriented x 3, not in acute distress. HEENT: PERRLA; EOMI. Oropharynx clear. NECK: Supple. Without lymphadenopathy. LUNGS: Good air entry bilaterally. No wheezing, crackles or rhonchi. CARDIOVASCULAR: Regular rate. No murmurs, rubs or gallops. ABDOMEN: Soft. Non-tender, non-distended. Positive bowel sounds. EXTREMITIES: Without clubbing or cyanosis or edema. NEUROLOGIC: No focal deficits. 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 UIFE: IgM protein. 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   -Extensive involvement by atypitcal CD5-/CD10-B-cell lymphoproliferative disorder, comprising over 70% of marrow cellularity  -Mildly hypercellular marrow for age (30-40%) with trilineage pan hypoplasia  -Normocytic anemia. -IHC staining highlighted extensive CD20+ B cell infiltrate with admixed + plasma cells     Started on Ibrutinib 420 mg/day 12/2020 with good response so far    On 01/06/2021:  SPEP:  Monoclonal protein 1.72 g/dL  Persistent double monoclonal bands in the beta/gamma and gamma globuin regions. Bands previously identified as IgM kappa and IgG kappa (8/12/2020). IgM kappa decreased by 1.22 g/dL and IgG kappa minimally changed since last exam on 10/14/2020. Free kappa light chains: 413.4    (3.3-19. 4)   Free lambda light chains: 2.8      (5.7-26. 3)   K/L ratio:                          147.64 (0.26-1.65)    On 03/25/2021  SPEP:  Monoclonal protein: 1.03 (0-0) g/dL  Persistent double monoclonal bands in the beta/gamma and gamma globuin regions.    Bands previously identified as IgM kappa and IgG kappa (2020). IgM kappa decreased by 0.69 g/dL and IgG kappa unchanged since 2021. Free kappa light chains: 149.4  (3.3-19. 4)   Free lambda light chains: 2.6    (5.7-26. 3)   K/L ratio:                          57.46 (0.26-1.65)    Patient started on oral iron 2020 for LANDON and B12 deficiency but required 2 doses of Feraheme on 2021 and 2021 and again in 2021. Had recurrent iron deficiency with plan for Feraheme q3 months (next one scheduled on 2021). He does IM b12 injections at home (last one on 2021). On 2021  Hb: 15.5 Hct 44.9  MCV 90.5    WBC 6.9  BUN 10  Creat 0.9  Ca 9.3  Albumin 3.6    Fe: 94 FeSat: 33% TIBIC: 288  Ferritin: 81    VitB12 370     Ig  (700-1700)   IgA: <8     ()   IgM: 1370 ()    On 2021:  Hb 14.8  Hct 44.8  MCV 90.6  WBC 7.5    All cell line counts and morphology within normal limits. Patient's history of lymphoplasmacytic lymphoma is noted.  The currently submitted blood smear is negative for circulating plasma cells, and negative for increased/atypical lymphocytes  Fe 60 TIBC 250 FeSat 24% Ferritin 73  BUN 8  Creat 0.8  LFTs wnl  Folate/B12 wnl  Beta-2 microglobulin 2.2    Peripheral blood flow cytometry noted small B-cell population with equivocal mild kappa light chain excess (1% of total cells). SPEP: M-spike 0.8 g/dl  SIFE: IgM kappa monoclonal protein is present  IgM 1295 ()    Free kappa light chains:   80.85  (3.3-19. 4)   Free lambda light chains: 3.23    (5.7-26. 3)   K/L ratio:                           25.03 (0.26-1.65)    Labs drawn today 2021.    Continue Ibrutinib    RTC 4 weeks with prior labs    2021  Star Hurley MD

## 2021-08-18 LAB
ALBUMIN SERPL-MCNC: 3.3 G/DL (ref 3.5–4.7)
ALPHA-1-GLOBULIN: 0.2 G/DL (ref 0.2–0.4)
ALPHA-2-GLOBULIN: 0.8 G/FL (ref 0.5–1)
BETA GLOBULIN: 0.8 G/DL (ref 0.8–1.3)
BETA-2 MICROGLOBULIN: 2 MG/L (ref 0.6–2.4)
ELECTROPHORESIS: ABNORMAL
GAMMA GLOBULIN: 1.2 G/DL (ref 0.7–1.6)
IGA: 6 MG/DL (ref 70–400)
IGG: 185 MG/DL (ref 700–1600)
IGM: 1267 MG/DL (ref 40–230)
IMMUNOFIXATION RESULT, SERUM: NORMAL
KAPPA FREE LIGHT CHAINS QNT: 88.01 MG/L (ref 3.3–19.4)
KAPPA/LAMBDA FREE LIGHT CHAIN RATIO: 26.43 (ref 0.26–1.65)
LAMBDA FREE LIGHT CHAINS QNT: 3.33 MG/L (ref 5.71–26.3)
TOTAL PROTEIN: 6.7 G/DL (ref 6.4–8.3)

## 2021-09-20 ENCOUNTER — HOSPITAL ENCOUNTER (OUTPATIENT)
Dept: INFUSION THERAPY | Age: 76
Discharge: HOME | End: 2021-09-20
Payer: MEDICARE

## 2021-09-20 ENCOUNTER — OFFICE VISIT (OUTPATIENT)
Dept: ONCOLOGY | Age: 76
End: 2021-09-20
Payer: OTHER GOVERNMENT

## 2021-09-20 VITALS
SYSTOLIC BLOOD PRESSURE: 144 MMHG | DIASTOLIC BLOOD PRESSURE: 80 MMHG | BODY MASS INDEX: 35.92 KG/M2 | TEMPERATURE: 97.2 F | RESPIRATION RATE: 16 BRPM | OXYGEN SATURATION: 96 % | WEIGHT: 271 LBS | HEART RATE: 78 BPM | HEIGHT: 73 IN

## 2021-09-20 DIAGNOSIS — C83.00 MALIGNANT LYMPHOPLASMACYTIC LYMPHOMA (HCC): Primary | ICD-10-CM

## 2021-09-20 DIAGNOSIS — C83.00 MALIGNANT LYMPHOPLASMACYTIC LYMPHOMA (HCC): ICD-10-CM

## 2021-09-20 LAB
ALBUMIN SERPL-MCNC: 3.9 G/DL (ref 3.5–5.2)
ALP BLD-CCNC: 46 U/L (ref 40–129)
ALT SERPL-CCNC: 9 U/L (ref 0–40)
ANION GAP SERPL CALCULATED.3IONS-SCNC: 9 MMOL/L (ref 7–16)
AST SERPL-CCNC: 10 U/L (ref 0–39)
BASOPHILS ABSOLUTE: 0.03 E9/L (ref 0–0.2)
BASOPHILS RELATIVE PERCENT: 0.4 % (ref 0–2)
BILIRUB SERPL-MCNC: 0.8 MG/DL (ref 0–1.2)
BUN BLDV-MCNC: 7 MG/DL (ref 6–23)
CALCIUM SERPL-MCNC: 9.3 MG/DL (ref 8.6–10.2)
CHLORIDE BLD-SCNC: 100 MMOL/L (ref 98–107)
CO2: 29 MMOL/L (ref 22–29)
CREAT SERPL-MCNC: 0.9 MG/DL (ref 0.7–1.2)
EOSINOPHILS ABSOLUTE: 0.09 E9/L (ref 0.05–0.5)
EOSINOPHILS RELATIVE PERCENT: 1.3 % (ref 0–6)
FERRITIN: 109 NG/ML
GFR AFRICAN AMERICAN: >60
GFR NON-AFRICAN AMERICAN: >60 ML/MIN/1.73
GLUCOSE BLD-MCNC: 188 MG/DL (ref 74–99)
HCT VFR BLD CALC: 48.1 % (ref 37–54)
HEMOGLOBIN: 16.4 G/DL (ref 12.5–16.5)
IMMATURE GRANULOCYTES #: 0.03 E9/L
IMMATURE GRANULOCYTES %: 0.4 % (ref 0–5)
IRON SATURATION: 47 % (ref 20–55)
IRON: 132 MCG/DL (ref 59–158)
LACTATE DEHYDROGENASE: 94 U/L (ref 135–225)
LYMPHOCYTES ABSOLUTE: 1.39 E9/L (ref 1.5–4)
LYMPHOCYTES RELATIVE PERCENT: 20.2 % (ref 20–42)
MCH RBC QN AUTO: 29.8 PG (ref 26–35)
MCHC RBC AUTO-ENTMCNC: 34.1 % (ref 32–34.5)
MCV RBC AUTO: 87.5 FL (ref 80–99.9)
MONOCYTES ABSOLUTE: 0.52 E9/L (ref 0.1–0.95)
MONOCYTES RELATIVE PERCENT: 7.6 % (ref 2–12)
NEUTROPHILS ABSOLUTE: 4.82 E9/L (ref 1.8–7.3)
NEUTROPHILS RELATIVE PERCENT: 70.1 % (ref 43–80)
PDW BLD-RTO: 13.1 FL (ref 11.5–15)
PLATELET # BLD: 180 E9/L (ref 130–450)
PMV BLD AUTO: 10.6 FL (ref 7–12)
POTASSIUM SERPL-SCNC: 3.9 MMOL/L (ref 3.5–5)
RBC # BLD: 5.5 E12/L (ref 3.8–5.8)
SODIUM BLD-SCNC: 138 MMOL/L (ref 132–146)
TOTAL IRON BINDING CAPACITY: 281 MCG/DL (ref 250–450)
WBC # BLD: 6.9 E9/L (ref 4.5–11.5)

## 2021-09-20 PROCEDURE — 99212 OFFICE O/P EST SF 10 MIN: CPT

## 2021-09-20 PROCEDURE — 83615 LACTATE (LD) (LDH) ENZYME: CPT

## 2021-09-20 PROCEDURE — 4004F PT TOBACCO SCREEN RCVD TLK: CPT | Performed by: INTERNAL MEDICINE

## 2021-09-20 PROCEDURE — 82232 ASSAY OF BETA-2 PROTEIN: CPT

## 2021-09-20 PROCEDURE — G8427 DOCREV CUR MEDS BY ELIG CLIN: HCPCS | Performed by: INTERNAL MEDICINE

## 2021-09-20 PROCEDURE — 84165 PROTEIN E-PHORESIS SERUM: CPT

## 2021-09-20 PROCEDURE — 85025 COMPLETE CBC W/AUTO DIFF WBC: CPT

## 2021-09-20 PROCEDURE — 80053 COMPREHEN METABOLIC PANEL: CPT

## 2021-09-20 PROCEDURE — 3017F COLORECTAL CA SCREEN DOC REV: CPT | Performed by: INTERNAL MEDICINE

## 2021-09-20 PROCEDURE — 82728 ASSAY OF FERRITIN: CPT

## 2021-09-20 PROCEDURE — 83550 IRON BINDING TEST: CPT

## 2021-09-20 PROCEDURE — G8417 CALC BMI ABV UP PARAM F/U: HCPCS | Performed by: INTERNAL MEDICINE

## 2021-09-20 PROCEDURE — 86334 IMMUNOFIX E-PHORESIS SERUM: CPT

## 2021-09-20 PROCEDURE — 36415 COLL VENOUS BLD VENIPUNCTURE: CPT

## 2021-09-20 PROCEDURE — 4040F PNEUMOC VAC/ADMIN/RCVD: CPT | Performed by: INTERNAL MEDICINE

## 2021-09-20 PROCEDURE — 1123F ACP DISCUSS/DSCN MKR DOCD: CPT | Performed by: INTERNAL MEDICINE

## 2021-09-20 PROCEDURE — 99214 OFFICE O/P EST MOD 30 MIN: CPT | Performed by: INTERNAL MEDICINE

## 2021-09-20 PROCEDURE — 83540 ASSAY OF IRON: CPT

## 2021-09-20 PROCEDURE — 83883 ASSAY NEPHELOMETRY NOT SPEC: CPT

## 2021-09-20 PROCEDURE — 82784 ASSAY IGA/IGD/IGG/IGM EACH: CPT

## 2021-09-20 NOTE — PROGRESS NOTES
and again in 2021. Had recurrent iron deficiency with plan for Feraheme q3 months (next one scheduled on 2021). He does IM b12 injections at home. On 2021  Hb: 15.5 Hct 44.9  MCV 90.5    WBC 6.9  BUN 10  Creat 0.9  Ca 9.3  Albumin 3.6    Fe: 94 FeSat: 33% TIBIC: 288  Ferritin: 81    VitB12 370     Ig  (700-1700)   IgA: <8     ()   IgM: 1370 ()    On 2021:  Hb 14.8  Hct 44.8  MCV 90.6  WBC 7.5    All cell line counts and morphology within normal limits. Patient's history of lymphoplasmacytic lymphoma is noted.  The currently submitted blood smear is negative for circulating plasma cells, and negative for increased/atypical lymphocytes  Fe 60 TIBC 250 FeSat 24% Ferritin 73  BUN 8  Creat 0.8  LFTs wnl  Folate/B12 wnl  Beta-2 microglobulin 2.2    Peripheral blood flow cytometry noted small B-cell population with equivocal mild kappa light chain excess (1% of total cells). SPEP: M-spike 0.8 g/dl  SIFE: IgM kappa monoclonal protein is present  IgM 1295 ()    Free kappa light chains:   80.85  (3.3-19. 4)   Free lambda light chains: 3.23    (5.7-26. 3)   K/L ratio:                           25.03 (0.26-1.65)    On 2021. Hb 16.2  Hct 47.7  MCV 88.8  WBC 7.7     BUN 10 Creat 0.80  LFTs wnl  Beta-2 microglobulin 2.0  SPEP: M-spike 1 = 0.8 g/dl              M-spike 2 = 0.1 g/dl  SIFE: IgM kappa monoclonal protein is present, as previously described. IgM 1267 ()    Free kappa light chains:   88.01  (3.3-19. 4)   Free lambda light chains: 3.33    (5.7-26. 3)   K/L ratio:                           26.43 (0.26-1.65)    Continued Ibrutinib    Review of Systems;  CONSTITUTIONAL: No fever, chills. Fair appetite and energy level. ENMT: Eyes: No diplopia; Nose: No epistaxis. Mouth: No sore throat. RESPIRATORY: No hemoptysis, shortness of breath, cough. CARDIOVASCULAR: No chest pain, palpitations.   GASTROINTESTINAL: No nausea/vomiting, abdominal pain. GENITOURINARY: No dysuria, urinary frequency, hematuria. NEURO: No syncope, presyncope, headache. Remainder:  ROS NEGATIVE    Past Medical History:      Diagnosis Date    Abscess of right thumb 4/15/2016    Amputation of right thumb 6/18/2014    Distal phalynx    Anemia     Depression     PTSD    Diabetes mellitus ([facility] [address]     Hyperlipidemia     Hypertension     Obesity      Medications:  Reviewed and reconciled. Allergies:  No Known Allergies    Physical Exam:  BP (!) 144/80   Pulse 78   Temp 97.2 °F (36.2 °C)   Resp 16   Ht 6' 1\" (1.854 m)   Wt 271 lb (122.9 kg)   SpO2 96%   BMI 35.75 kg/m²   GENERAL: Alert, oriented x 3, not in acute distress. HEENT: PERRLA; EOMI. Oropharynx clear. NECK: Supple. Without lymphadenopathy. LUNGS: Good air entry bilaterally. No wheezing, crackles or rhonchi. CARDIOVASCULAR: Regular rate. No murmurs, rubs or gallops. ABDOMEN: Soft. Non-tender, non-distended. Positive bowel sounds. EXTREMITIES: Without clubbing or cyanosis or edema. easu bruising  NEUROLOGIC: No focal deficits. 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 UIFE: IgM protein. 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   -Extensive involvement by atypitcal CD5-/CD10-B-cell lymphoproliferative disorder, comprising over 70% of marrow cellularity  -Mildly hypercellular marrow for age (30-40%) with trilineage pan hypoplasia  -Normocytic anemia.   -IHC staining highlighted extensive CD20+ B cell infiltrate with admixed + plasma cells     Started on Ibrutinib 420 mg/day 12/2020 with good response so far    On 01/06/2021:  SPEP:  Monoclonal protein 1.72 g/dL  Persistent double monoclonal bands in the beta/gamma and gamma globuin regions. Bands previously identified as IgM kappa and IgG kappa (2020). IgM kappa decreased by 1.22 g/dL and IgG kappa minimally changed since last exam on 10/14/2020. Free kappa light chains: 413.4    (3.3-19. 4)   Free lambda light chains: 2.8      (5.7-26. 3)   K/L ratio:                          147.64 (0.26-1.65)    On 2021  SPEP:  Monoclonal protein: 1.03 (0-0) g/dL  Persistent double monoclonal bands in the beta/gamma and gamma globuin regions. Bands previously identified as IgM kappa and IgG kappa (2020). IgM kappa decreased by 0.69 g/dL and IgG kappa unchanged since 2021. Free kappa light chains: 149.4  (3.3-19. 4)   Free lambda light chains: 2.6    (5.7-26. 3)   K/L ratio:                          57.46 (0.26-1.65)    Patient started on oral iron 2020 for LANDON and B12 deficiency but required 2 doses of Feraheme on 2021 and 2021 and again in 2021. Had recurrent iron deficiency with plan for Feraheme q3 months (next one scheduled on 2021). He does IM b12 injections at home (last one on 2021). On 2021  Hb: 15.5 Hct 44.9  MCV 90.5    WBC 6.9  BUN 10  Creat 0.9  Ca 9.3  Albumin 3.6    Fe: 94 FeSat: 33% TIBIC: 288  Ferritin: 81    VitB12 370     Ig  (700-1700)   IgA: <8     ()   IgM: 1370 ()    On 2021:  Hb 14.8  Hct 44.8  MCV 90.6  WBC 7.5    All cell line counts and morphology within normal limits. Patient's history of lymphoplasmacytic lymphoma is noted.  The currently submitted blood smear is negative for circulating plasma cells, and negative for increased/atypical lymphocytes  Fe 60 TIBC 250 FeSat 24% Ferritin 73  BUN 8  Creat 0.8  LFTs wnl  Folate/B12 wnl  Beta-2 microglobulin 2.2    Peripheral blood flow cytometry noted small B-cell population with equivocal mild kappa light chain excess (1% of total cells).     SPEP: M-spike 0.8 g/dl  SIFE: IgM kappa monoclonal protein is present  IgM 1295 ()    Free kappa light chains:   80.85  (3.3-19. 4)   Free lambda light chains: 3.23    (5.7-26. 3)   K/L ratio:                           25.03 (0.26-1.65)    On 08/16/2021. Hb 16.2  Hct 47.7  MCV 88.8  WBC 7.7     BUN 10 Creat 0.80  LFTs wnl  Beta-2 microglobulin 2.0  SPEP: M-spike 1 = 0.8 g/dl              M-spike 2 = 0.1 g/dl  SIFE: IgM kappa monoclonal protein is present, as previously described. IgM 1267 ()    Free kappa light chains:   88.01  (3.3-19. 4)   Free lambda light chains: 3.33    (5.7-26. 3)   K/L ratio:                           26.43 (0.26-1.65)    On 09/20/2021:  Hb 16.4  Hct 48.1  MCV 87.5  WBC 6.9     BUN 7 Creat 0.90  LFTs wnl  Rest of markers pending    Continue Ibrutinib    RTC 4 weeks with prior labs    09/20/2021  Von George MD

## 2021-09-22 LAB
ALBUMIN SERPL-MCNC: 3.1 G/DL (ref 3.5–4.7)
ALPHA-1-GLOBULIN: 0.2 G/DL (ref 0.2–0.4)
ALPHA-2-GLOBULIN: 0.7 G/FL (ref 0.5–1)
BETA GLOBULIN: 0.7 G/DL (ref 0.8–1.3)
ELECTROPHORESIS: ABNORMAL
GAMMA GLOBULIN: 1 G/DL (ref 0.7–1.6)
IGA: 7 MG/DL (ref 70–400)
IGG: 180 MG/DL (ref 700–1600)
IGM: 1314 MG/DL (ref 40–230)
IMMUNOFIXATION RESULT, SERUM: NORMAL
TOTAL PROTEIN: 6.5 G/DL (ref 6.4–8.3)

## 2021-09-23 LAB
BETA-2 MICROGLOBULIN: 1.9 MG/L (ref 0.6–2.4)
KAPPA FREE LIGHT CHAINS QNT: 80.24 MG/L (ref 3.3–19.4)
KAPPA/LAMBDA FREE LIGHT CHAIN RATIO: 27.86 (ref 0.26–1.65)
LAMBDA FREE LIGHT CHAINS QNT: 2.88 MG/L (ref 5.71–26.3)

## 2021-10-18 ENCOUNTER — HOSPITAL ENCOUNTER (OUTPATIENT)
Dept: INFUSION THERAPY | Age: 76
Discharge: HOME | End: 2021-10-18
Payer: MEDICARE

## 2021-10-18 ENCOUNTER — OFFICE VISIT (OUTPATIENT)
Dept: ONCOLOGY | Age: 76
End: 2021-10-18
Payer: MEDICARE

## 2021-10-18 VITALS
BODY MASS INDEX: 35.75 KG/M2 | DIASTOLIC BLOOD PRESSURE: 72 MMHG | WEIGHT: 271 LBS | HEART RATE: 73 BPM | SYSTOLIC BLOOD PRESSURE: 142 MMHG | RESPIRATION RATE: 17 BRPM | TEMPERATURE: 97 F | OXYGEN SATURATION: 97 %

## 2021-10-18 DIAGNOSIS — C83.00 MALIGNANT LYMPHOPLASMACYTIC LYMPHOMA (HCC): Primary | ICD-10-CM

## 2021-10-18 DIAGNOSIS — C83.00 MALIGNANT LYMPHOPLASMACYTIC LYMPHOMA (HCC): ICD-10-CM

## 2021-10-18 LAB
ALBUMIN SERPL-MCNC: 4 G/DL (ref 3.5–5.2)
ALP BLD-CCNC: 52 U/L (ref 40–129)
ALT SERPL-CCNC: 13 U/L (ref 0–40)
ANION GAP SERPL CALCULATED.3IONS-SCNC: 12 MMOL/L (ref 7–16)
AST SERPL-CCNC: 13 U/L (ref 0–39)
BASOPHILS ABSOLUTE: 0.04 E9/L (ref 0–0.2)
BASOPHILS RELATIVE PERCENT: 0.4 % (ref 0–2)
BILIRUB SERPL-MCNC: 0.8 MG/DL (ref 0–1.2)
BUN BLDV-MCNC: 8 MG/DL (ref 6–23)
CALCIUM SERPL-MCNC: 10.3 MG/DL (ref 8.6–10.2)
CHLORIDE BLD-SCNC: 103 MMOL/L (ref 98–107)
CO2: 28 MMOL/L (ref 22–29)
CREAT SERPL-MCNC: 0.9 MG/DL (ref 0.7–1.2)
EOSINOPHILS ABSOLUTE: 0.07 E9/L (ref 0.05–0.5)
EOSINOPHILS RELATIVE PERCENT: 0.7 % (ref 0–6)
GFR AFRICAN AMERICAN: >60
GFR NON-AFRICAN AMERICAN: >60 ML/MIN/1.73
GLUCOSE BLD-MCNC: 141 MG/DL (ref 74–99)
HCT VFR BLD CALC: 48 % (ref 37–54)
HEMOGLOBIN: 15.9 G/DL (ref 12.5–16.5)
IMMATURE GRANULOCYTES #: 0.04 E9/L
IMMATURE GRANULOCYTES %: 0.4 % (ref 0–5)
LYMPHOCYTES ABSOLUTE: 1.64 E9/L (ref 1.5–4)
LYMPHOCYTES RELATIVE PERCENT: 17.6 % (ref 20–42)
MCH RBC QN AUTO: 29.5 PG (ref 26–35)
MCHC RBC AUTO-ENTMCNC: 33.1 % (ref 32–34.5)
MCV RBC AUTO: 89.1 FL (ref 80–99.9)
MONOCYTES ABSOLUTE: 0.73 E9/L (ref 0.1–0.95)
MONOCYTES RELATIVE PERCENT: 7.8 % (ref 2–12)
NEUTROPHILS ABSOLUTE: 6.82 E9/L (ref 1.8–7.3)
NEUTROPHILS RELATIVE PERCENT: 73.1 % (ref 43–80)
PDW BLD-RTO: 13.3 FL (ref 11.5–15)
PLATELET # BLD: 180 E9/L (ref 130–450)
PMV BLD AUTO: 11.2 FL (ref 7–12)
POTASSIUM SERPL-SCNC: 4.6 MMOL/L (ref 3.5–5)
RBC # BLD: 5.39 E12/L (ref 3.8–5.8)
SODIUM BLD-SCNC: 143 MMOL/L (ref 132–146)
WBC # BLD: 9.3 E9/L (ref 4.5–11.5)

## 2021-10-18 PROCEDURE — 4040F PNEUMOC VAC/ADMIN/RCVD: CPT | Performed by: INTERNAL MEDICINE

## 2021-10-18 PROCEDURE — 99214 OFFICE O/P EST MOD 30 MIN: CPT | Performed by: INTERNAL MEDICINE

## 2021-10-18 PROCEDURE — G8484 FLU IMMUNIZE NO ADMIN: HCPCS | Performed by: INTERNAL MEDICINE

## 2021-10-18 PROCEDURE — 99213 OFFICE O/P EST LOW 20 MIN: CPT

## 2021-10-18 PROCEDURE — G8427 DOCREV CUR MEDS BY ELIG CLIN: HCPCS | Performed by: INTERNAL MEDICINE

## 2021-10-18 PROCEDURE — 3017F COLORECTAL CA SCREEN DOC REV: CPT | Performed by: INTERNAL MEDICINE

## 2021-10-18 PROCEDURE — G8417 CALC BMI ABV UP PARAM F/U: HCPCS | Performed by: INTERNAL MEDICINE

## 2021-10-18 PROCEDURE — 1123F ACP DISCUSS/DSCN MKR DOCD: CPT | Performed by: INTERNAL MEDICINE

## 2021-10-18 PROCEDURE — 4004F PT TOBACCO SCREEN RCVD TLK: CPT | Performed by: INTERNAL MEDICINE

## 2021-10-18 PROCEDURE — 36415 COLL VENOUS BLD VENIPUNCTURE: CPT

## 2021-10-18 NOTE — PROGRESS NOTES
Department of [facility] Oncology  Attending Clinic Note    Reason for Visit: Follow-up on a patient with Lymphoplasmacytic Lymphoma      PCP:  Chris Pryor MD    History of Present Illness:  70year old male initially seen at [facility] hematology for anemia and abnormal TP/albumin ratio. SPEP IgG and IgM kapa paraprotein, M-spike 3.29 g/dL. UIFE: IgM protein. 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   -Extensive involvement by atypitcal CD5-/CD10-B-cell lymphoproliferative disorder, comprising over 70% of marrow cellularity  -Mildly hypercellular marrow for age (30-40%) with trilineage pan hypoplasia  -Normocytic anemia. -IHC staining highlighted extensive CD20+ B cell infiltrate with admixed + plasma cells     Started on Ibrutinib 420 mg/day 12/2020 with good response so far    On 01/06/2021:  SPEP:  Monoclonal protein 1.72 g/dL  Persistent double monoclonal bands in the beta/gamma and gamma globuin regions. Bands previously identified as IgM kappa and IgG kappa (8/12/2020). IgM kappa decreased by 1.22 g/dL and IgG kappa minimally changed since last exam on 10/14/2020. Free kappa light chains: 413.4    (3.3-19. 4)   Free lambda light chains: 2.8      (5.7-26. 3)   K/L ratio:                          147.64 (0.26-1.65)    On 03/25/2021  SPEP:  Monoclonal protein: 1.03 (0-0) g/dL  Persistent double monoclonal bands in the beta/gamma and gamma globuin regions. Bands previously identified as IgM kappa and IgG kappa (8/12/2020). IgM kappa decreased by 0.69 g/dL and IgG kappa unchanged since 01/06/2021. Free kappa light chains: 149.4  (3.3-19. 4)   Free lambda light chains: 2.6    (5.7-26. 3)   K/L ratio:                          57.46 (0.26-1.65)    Patient started on oral iron 08/2020 for LANDON and B12 deficiency but required 2 doses of Feraheme on 01/21/2021 and 01/28/2021 and again in 2021. Had recurrent iron deficiency with plan for Feraheme q3 months (next one scheduled on 2021). He does IM b12 injections at home. On 2021  Hb: 15.5 Hct 44.9  MCV 90.5    WBC 6.9  BUN 10  Creat 0.9  Ca 9.3  Albumin 3.6    Fe: 94 FeSat: 33% TIBIC: 288  Ferritin: 81    VitB12 370     Ig  (700-1700)   IgA: <8     ()   IgM: 1370 ()    On 2021:  Hb 14.8  Hct 44.8  MCV 90.6  WBC 7.5    All cell line counts and morphology within normal limits. Patient's history of lymphoplasmacytic lymphoma is noted.  The currently submitted blood smear is negative for circulating plasma cells, and negative for increased/atypical lymphocytes  Fe 60 TIBC 250 FeSat 24% Ferritin 73  BUN 8  Creat 0.8  LFTs wnl  Folate/B12 wnl  Beta-2 microglobulin 2.2    Peripheral blood flow cytometry noted small B-cell population with equivocal mild kappa light chain excess (1% of total cells). SPEP: M-spike 0.8 g/dl  SIFE: IgM kappa monoclonal protein is present  IgM 1295 ()    Free kappa light chains:   80.85  (3.3-19. 4)   Free lambda light chains: 3.23    (5.7-26. 3)   K/L ratio:                           25.03 (0.26-1.65)    On 2021. Hb 16.2  Hct 47.7  MCV 88.8  WBC 7.7     BUN 10 Creat 0.80  LFTs wnl  Beta-2 microglobulin 2.0  SPEP: M-spike 1 = 0.8 g/dl              M-spike 2 = 0.1 g/dl  SIFE: IgM kappa monoclonal protein is present, as previously described. IgM 1267 ()    Free kappa light chains:   88.01  (3.3-19. 4)   Free lambda light chains: 3.33    (5.7-26. 3)   K/L ratio:                           26.43 (0.26-1.65)    On 2021:  Hb 16.4  Hct 48.1  MCV 87.5  WBC 6.9     BUN 7 Creat 0.90  LFTs wnl  Beta-2 microglobulin 1.9  SPEP: M-spike 1 = 0.7 g/dl              M-spike 2 = 0.1 g/dl  SIFE: IgM kappa monoclonal protein is present, as previously described.    IgG kappa monoclonal protein is present, as previously described    IgM 1314 ()    Free kappa light chains:   80.24  (3.3-19. 4)   Free lambda light chains: 2.88    (5.7-26. 3)   K/L ratio:                           27.86 (0.26-1.65)    Continued Ibrutinib    Review of Systems;  CONSTITUTIONAL: No fever, chills. Fair appetite and energy level. ENMT: Eyes: No diplopia; Nose: No epistaxis. Mouth: No sore throat. RESPIRATORY: No hemoptysis, shortness of breath, cough. CARDIOVASCULAR: No chest pain, palpitations. GASTROINTESTINAL: No nausea/vomiting, abdominal pain. GENITOURINARY: No dysuria, urinary frequency, hematuria. NEURO: No syncope, presyncope, headache. 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 Allergies:  No Known Allergies    Physical Exam:  BP (!) 142/72   Pulse 73   Temp 97 °F (36.1 °C)   Resp 17   Wt 271 lb (122.9 kg)   SpO2 97%   BMI 35.75 kg/m²   GENERAL: Alert, oriented x 3, not in acute distress. HEENT: PERRLA; EOMI. Oropharynx clear. NECK: Supple. Without lymphadenopathy. LUNGS: Good air entry bilaterally. No wheezing, crackles or rhonchi. CARDIOVASCULAR: Regular rate. No murmurs, rubs or gallops. ABDOMEN: Soft. Non-tender, non-distended. Positive bowel sounds. EXTREMITIES: Without clubbing or cyanosis or edema. Skin lesions in both hands  NEUROLOGIC: No focal deficits. 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 UIFE: IgM protein. 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 -Extensive involvement by atypitcal CD5-/CD10-B-cell lymphoproliferative disorder, comprising over 70% of marrow cellularity  -Mildly hypercellular marrow for age (30-40%) with trilineage pan hypoplasia  -Normocytic anemia. -IHC staining highlighted extensive CD20+ B cell infiltrate with admixed + plasma cells     Started on Ibrutinib 420 mg/day 2020 with good response so far    On 2021:  SPEP:  Monoclonal protein 1.72 g/dL  Persistent double monoclonal bands in the beta/gamma and gamma globuin regions. Bands previously identified as IgM kappa and IgG kappa (2020). IgM kappa decreased by 1.22 g/dL and IgG kappa minimally changed since last exam on 10/14/2020. Free kappa light chains: 413.4    (3.3-19. 4)   Free lambda light chains: 2.8      (5.7-26. 3)   K/L ratio:                          147.64 (0.26-1.65)    On 2021  SPEP:  Monoclonal protein: 1.03 (0-0) g/dL  Persistent double monoclonal bands in the beta/gamma and gamma globuin regions. Bands previously identified as IgM kappa and IgG kappa (2020). IgM kappa decreased by 0.69 g/dL and IgG kappa unchanged since 2021. Free kappa light chains: 149.4  (3.3-19. 4)   Free lambda light chains: 2.6    (5.7-26. 3)   K/L ratio:                          57.46 (0.26-1.65)    Patient started on oral iron 2020 for LANDON and B12 deficiency but required 2 doses of Feraheme on 2021 and 2021 and again in 2021. Had recurrent iron deficiency with plan for Feraheme q3 months (next one scheduled on 2021). He does IM b12 injections at home (last one on 2021).      On 2021  Hb: 15.5 Hct 44.9  MCV 90.5    WBC 6.9  BUN 10  Creat 0.9  Ca 9.3  Albumin 3.6    Fe: 94 FeSat: 33% TIBIC: 288  Ferritin: 81    VitB12 370     Ig  (700-1700)   IgA: <8     ()   IgM: 1370 ()    On 2021:  Hb 14.8  Hct 44.8  MCV 90.6  WBC 7.5    All cell line counts and morphology within normal

## 2021-10-20 LAB
ALBUMIN SERPL-MCNC: 3 G/DL (ref 3.5–4.7)
ALPHA-1-GLOBULIN: 0.2 G/DL (ref 0.2–0.4)
ALPHA-2-GLOBULIN: 0.7 G/FL (ref 0.5–1)
BETA GLOBULIN: 0.7 G/DL (ref 0.8–1.3)
ELECTROPHORESIS: ABNORMAL
GAMMA GLOBULIN: 1.1 G/DL (ref 0.7–1.6)
IGA: 7 MG/DL (ref 70–400)
IGG: 192 MG/DL (ref 700–1600)
IGM: 1308 MG/DL (ref 40–230)
IMMUNOFIXATION RESULT, SERUM: NORMAL
TOTAL PROTEIN: 6.6 G/DL (ref 6.4–8.3)

## 2021-10-21 LAB
BETA-2 MICROGLOBULIN: 2 MG/L (ref 0.6–2.4)
KAPPA FREE LIGHT CHAINS QNT: 80.48 MG/L (ref 3.3–19.4)
KAPPA/LAMBDA FREE LIGHT CHAIN RATIO: 28.04 (ref 0.26–1.65)
LAMBDA FREE LIGHT CHAINS QNT: 2.87 MG/L (ref 5.71–26.3)

## 2021-10-29 ENCOUNTER — TELEPHONE (OUTPATIENT)
Dept: ONCOLOGY | Age: 76
End: 2021-10-29

## 2021-10-29 NOTE — TELEPHONE ENCOUNTER
Called Dr Hiram Guzman office for a referral for [facility]. They scheduled him for 11/01/21 @ 10:00 am.  He told me that I would need to cancel that appt as his wife was having surgery and he cannot make it. So I gave him their office number and he said he would call soon to follow up, when he knew his schedule better.

## 2021-11-09 ENCOUNTER — TELEPHONE (OUTPATIENT)
Dept: INFUSION THERAPY | Age: 76
End: 2021-11-09

## 2021-11-09 DIAGNOSIS — C83.00 MALIGNANT LYMPHOPLASMACYTIC LYMPHOMA (HCC): Primary | ICD-10-CM

## 2021-11-09 NOTE — TELEPHONE ENCOUNTER
Received message from Gini BERUMEN [address], stating that patient called needing [facility] authorization for Imbruvica. I stated that he is already approved, so I was not sure if he meant refills. I spoke to [facility], who sent in refills to [facility]. I called patient's cell phone after speaking to his wife and left a message with contact information and instructions to call back.

## 2021-11-10 ENCOUNTER — TELEPHONE (OUTPATIENT)
Dept: INFUSION THERAPY | Age: 76
End: 2021-11-10

## 2021-11-10 NOTE — TELEPHONE ENCOUNTER
Called the [facility] to follow up regarding patient's Imbruvica medication. Spoke to Bong Woodard ([phone]), who stated that they had to order the medication for the patient and that it is expedited and patient should receive within 48 hours. I called Hector Schilling to update what the [place] pharmacy said. Hector Lab stated that he only had today's dose left. Kiana Diaz was appreciative of phone call and will await medication from the [place].

## 2021-11-15 ENCOUNTER — HOSPITAL ENCOUNTER (OUTPATIENT)
Dept: INFUSION THERAPY | Age: 76
End: 2021-11-15
Payer: MEDICARE

## 2021-11-17 ENCOUNTER — OFFICE VISIT (OUTPATIENT)
Dept: ONCOLOGY | Age: 76
End: 2021-11-17
Payer: OTHER GOVERNMENT

## 2021-11-17 ENCOUNTER — HOSPITAL ENCOUNTER (OUTPATIENT)
Dept: INFUSION THERAPY | Age: 76
Discharge: HOME | End: 2021-11-17
Payer: MEDICARE

## 2021-11-17 VITALS
WEIGHT: 267.6 LBS | HEART RATE: 102 BPM | OXYGEN SATURATION: 99 % | DIASTOLIC BLOOD PRESSURE: 60 MMHG | HEIGHT: 73 IN | SYSTOLIC BLOOD PRESSURE: 102 MMHG | BODY MASS INDEX: 35.47 KG/M2 | TEMPERATURE: 97.3 F

## 2021-11-17 DIAGNOSIS — C83.00 MALIGNANT LYMPHOPLASMACYTIC LYMPHOMA (HCC): Primary | ICD-10-CM

## 2021-11-17 DIAGNOSIS — C83.00 MALIGNANT LYMPHOPLASMACYTIC LYMPHOMA (HCC): ICD-10-CM

## 2021-11-17 LAB
ALBUMIN SERPL-MCNC: 3.8 G/DL (ref 3.5–5.2)
ALP BLD-CCNC: 62 U/L (ref 40–129)
ALT SERPL-CCNC: 11 U/L (ref 0–40)
ANION GAP SERPL CALCULATED.3IONS-SCNC: 11 MMOL/L (ref 7–16)
AST SERPL-CCNC: 10 U/L (ref 0–39)
BASOPHILS ABSOLUTE: 0.02 E9/L (ref 0–0.2)
BASOPHILS RELATIVE PERCENT: 0.3 % (ref 0–2)
BILIRUB SERPL-MCNC: 0.5 MG/DL (ref 0–1.2)
BUN BLDV-MCNC: 13 MG/DL (ref 6–23)
CALCIUM SERPL-MCNC: 9.3 MG/DL (ref 8.6–10.2)
CHLORIDE BLD-SCNC: 97 MMOL/L (ref 98–107)
CO2: 27 MMOL/L (ref 22–29)
CREAT SERPL-MCNC: 1.2 MG/DL (ref 0.7–1.2)
EOSINOPHILS ABSOLUTE: 0.02 E9/L (ref 0.05–0.5)
EOSINOPHILS RELATIVE PERCENT: 0.3 % (ref 0–6)
FERRITIN: 178 NG/ML
GFR AFRICAN AMERICAN: >60
GFR NON-AFRICAN AMERICAN: 59 ML/MIN/1.73
GLUCOSE BLD-MCNC: 252 MG/DL (ref 74–99)
HCT VFR BLD CALC: 46.4 % (ref 37–54)
HEMOGLOBIN: 15.5 G/DL (ref 12.5–16.5)
IRON SATURATION: 27 % (ref 20–55)
IRON: 76 MCG/DL (ref 59–158)
LACTATE DEHYDROGENASE: 124 U/L (ref 135–225)
LYMPHOCYTES ABSOLUTE: 0.91 E9/L (ref 1.5–4)
LYMPHOCYTES RELATIVE PERCENT: 12.2 % (ref 20–42)
MCH RBC QN AUTO: 29.2 PG (ref 26–35)
MCHC RBC AUTO-ENTMCNC: 33.4 % (ref 32–34.5)
MCV RBC AUTO: 87.5 FL (ref 80–99.9)
MONOCYTES ABSOLUTE: 0.39 E9/L (ref 0.1–0.95)
MONOCYTES RELATIVE PERCENT: 5.2 % (ref 2–12)
NEUTROPHILS ABSOLUTE: 6.03 E9/L (ref 1.8–7.3)
NEUTROPHILS RELATIVE PERCENT: 81.2 % (ref 43–80)
PDW BLD-RTO: 13.3 FL (ref 11.5–15)
PLATELET # BLD: 219 E9/L (ref 130–450)
PMV BLD AUTO: 11.1 FL (ref 7–12)
POTASSIUM SERPL-SCNC: 3.6 MMOL/L (ref 3.5–5)
RBC # BLD: 5.3 E12/L (ref 3.8–5.8)
SODIUM BLD-SCNC: 135 MMOL/L (ref 132–146)
TOTAL IRON BINDING CAPACITY: 286 MCG/DL (ref 250–450)
WBC # BLD: 7.4 E9/L (ref 4.5–11.5)

## 2021-11-17 PROCEDURE — 83615 LACTATE (LD) (LDH) ENZYME: CPT

## 2021-11-17 PROCEDURE — 99212 OFFICE O/P EST SF 10 MIN: CPT

## 2021-11-17 PROCEDURE — 82784 ASSAY IGA/IGD/IGG/IGM EACH: CPT

## 2021-11-17 PROCEDURE — G8427 DOCREV CUR MEDS BY ELIG CLIN: HCPCS | Performed by: INTERNAL MEDICINE

## 2021-11-17 PROCEDURE — 83540 ASSAY OF IRON: CPT

## 2021-11-17 PROCEDURE — 84165 PROTEIN E-PHORESIS SERUM: CPT

## 2021-11-17 PROCEDURE — 82232 ASSAY OF BETA-2 PROTEIN: CPT

## 2021-11-17 PROCEDURE — 4040F PNEUMOC VAC/ADMIN/RCVD: CPT | Performed by: INTERNAL MEDICINE

## 2021-11-17 PROCEDURE — 80053 COMPREHEN METABOLIC PANEL: CPT

## 2021-11-17 PROCEDURE — 99214 OFFICE O/P EST MOD 30 MIN: CPT | Performed by: INTERNAL MEDICINE

## 2021-11-17 PROCEDURE — 83883 ASSAY NEPHELOMETRY NOT SPEC: CPT

## 2021-11-17 PROCEDURE — 4004F PT TOBACCO SCREEN RCVD TLK: CPT | Performed by: INTERNAL MEDICINE

## 2021-11-17 PROCEDURE — G8484 FLU IMMUNIZE NO ADMIN: HCPCS | Performed by: INTERNAL MEDICINE

## 2021-11-17 PROCEDURE — 85025 COMPLETE CBC W/AUTO DIFF WBC: CPT

## 2021-11-17 PROCEDURE — 86334 IMMUNOFIX E-PHORESIS SERUM: CPT

## 2021-11-17 PROCEDURE — G8417 CALC BMI ABV UP PARAM F/U: HCPCS | Performed by: INTERNAL MEDICINE

## 2021-11-17 PROCEDURE — 82728 ASSAY OF FERRITIN: CPT

## 2021-11-17 PROCEDURE — 3017F COLORECTAL CA SCREEN DOC REV: CPT | Performed by: INTERNAL MEDICINE

## 2021-11-17 PROCEDURE — 83550 IRON BINDING TEST: CPT

## 2021-11-17 PROCEDURE — 1123F ACP DISCUSS/DSCN MKR DOCD: CPT | Performed by: INTERNAL MEDICINE

## 2021-11-17 PROCEDURE — 36415 COLL VENOUS BLD VENIPUNCTURE: CPT

## 2021-11-17 NOTE — PROGRESS NOTES
Department of [address] [place] [address] Oncology  Attending Clinic Note    Reason for Visit: Follow-up on a patient with Lymphoplasmacytic Lymphoma      PCP:  Olga Ceron MD    History of Present Illness:  70year old male initially seen at [facility] hematology for anemia and abnormal TP/albumin ratio. SPEP IgG and IgM kapa paraprotein, M-spike 3.29 g/dL. UIFE: IgM protein. 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   -Extensive involvement by atypitcal CD5-/CD10-B-cell lymphoproliferative disorder, comprising over 70% of marrow cellularity  -Mildly hypercellular marrow for age (30-40%) with trilineage pan hypoplasia  -Normocytic anemia. -IHC staining highlighted extensive CD20+ B cell infiltrate with admixed + plasma cells     Started on Ibrutinib 420 mg/day 12/2020 with good response so far    On 01/06/2021:  SPEP:  Monoclonal protein 1.72 g/dL  Persistent double monoclonal bands in the beta/gamma and gamma globuin regions. Bands previously identified as IgM kappa and IgG kappa (8/12/2020). IgM kappa decreased by 1.22 g/dL and IgG kappa minimally changed since last exam on 10/14/2020. Free kappa light chains: 413.4    (3.3-19. 4)   Free lambda light chains: 2.8      (5.7-26. 3)   K/L ratio:                          147.64 (0.26-1.65)    On 03/25/2021  SPEP:  Monoclonal protein: 1.03 (0-0) g/dL  Persistent double monoclonal bands in the beta/gamma and gamma globuin regions. Bands previously identified as IgM kappa and IgG kappa (8/12/2020). IgM kappa decreased by 0.69 g/dL and IgG kappa unchanged since 01/06/2021. Free kappa light chains: 149.4  (3.3-19. 4)   Free lambda light chains: 2.6    (5.7-26. 3)   K/L ratio:                          57.46 (0.26-1.65)    Patient started on oral iron 08/2020 for LANDON and B12 deficiency but required 2 doses of Feraheme on 01/21/2021 and 01/28/2021 and again in 2021. Had recurrent iron deficiency with plan for Feraheme q3 months (next one scheduled on 2021). He does IM b12 injections at home. On 2021  Hb: 15.5 Hct 44.9  MCV 90.5    WBC 6.9  BUN 10  Creat 0.9  Ca 9.3  Albumin 3.6    Fe: 94 FeSat: 33% TIBIC: 288  Ferritin: 81    VitB12 370     Ig  (700-1700)   IgA: <8     ()   IgM: 1370 ()    On 2021:  Hb 14.8  Hct 44.8  MCV 90.6  WBC 7.5    All cell line counts and morphology within normal limits. Patient's history of lymphoplasmacytic lymphoma is noted.  The currently submitted blood smear is negative for circulating plasma cells, and negative for increased/atypical lymphocytes  Fe 60 TIBC 250 FeSat 24% Ferritin 73  BUN 8  Creat 0.8  LFTs wnl  Folate/B12 wnl  Beta-2 microglobulin 2.2    Peripheral blood flow cytometry noted small B-cell population with equivocal mild kappa light chain excess (1% of total cells). SPEP: M-spike 0.8 g/dl  SIFE: IgM kappa monoclonal protein is present  IgM 1295 ()    Free kappa light chains:   80.85  (3.3-19. 4)   Free lambda light chains: 3.23    (5.7-26. 3)   K/L ratio:                           25.03 (0.26-1.65)    On 2021. Hb 16.2  Hct 47.7  MCV 88.8  WBC 7.7     BUN 10 Creat 0.80  LFTs wnl  Beta-2 microglobulin 2.0  SPEP: M-spike 1 = 0.8 g/dl              M-spike 2 = 0.1 g/dl  SIFE: IgM kappa monoclonal protein is present, as previously described. IgM 1267 ()    Free kappa light chains:   88.01  (3.3-19. 4)   Free lambda light chains: 3.33    (5.7-26. 3)   K/L ratio:                           26.43 (0.26-1.65)    On 2021:  Hb 16.4  Hct 48.1  MCV 87.5  WBC 6.9     BUN 7 Creat 0.90  LFTs wnl  Beta-2 microglobulin 1.9  SPEP: M-spike 1 = 0.7 g/dl              M-spike 2 = 0.1 g/dl  SIFE: IgM kappa monoclonal protein is present, as previously described.    IgG kappa monoclonal protein is present, as previously described    IgM 1314 ()    Free kappa light chains:   80.24  (3.3-19. 4)   Free lambda light chains: 2.88    (5.7-26. 3)   K/L ratio:                           27.86 (0.26-1.65)    On 10/18/2021:  Hb 15.9   Hct 48.0  MCV 89.1  WBC 9.3     BUN 8 Creat 0.9  LFTs wnl  Beta-2 microglobulin 2.0  SPEP: M-spike 1 = 0.7 g/dl              M-spike 2 = 0.1 g/dl  SIFE: IgM kappa + faint IgG kappa monoclonal protein is present, as previously described. IgM 1308 ()    Free kappa light chains:   80.48  (3.3-19. 4)   Free lambda light chains: 2.87    (5.7-26. 3)   K/L ratio:                           28.04 (0.26-1.65)    Continued Ibrutinib    Review of Systems;  CONSTITUTIONAL: No fever, chills. Fair appetite and energy level. ENMT: Eyes: No diplopia; Nose: No epistaxis. Mouth: No sore throat. RESPIRATORY: No hemoptysis, shortness of breath, cough. CARDIOVASCULAR: No chest pain, palpitations. GASTROINTESTINAL: No nausea/vomiting, abdominal pain. GENITOURINARY: No dysuria, urinary frequency, hematuria. NEURO: No syncope, presyncope, headache. 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 Allergies:  No Known Allergies    Physical Exam:  /60   Pulse 102   Temp 97.3 °F (36.3 °C)   Ht 6' 1\" (1.854 m)   Wt 267 lb 9.6 oz (121.4 kg)   SpO2 99%   BMI 35.31 kg/m²   GENERAL: Alert, oriented x 3, not in acute distress. HEENT: PERRLA; EOMI. Oropharynx clear. NECK: Supple. Without lymphadenopathy. LUNGS: Good air entry bilaterally. No wheezing, crackles or rhonchi. CARDIOVASCULAR: Regular rate. No murmurs, rubs or gallops. ABDOMEN: Soft. Non-tender, non-distended. Positive bowel sounds.   EXTREMITIES: Without clubbing or cyanosis or edema. NEUROLOGIC: No focal deficits. 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 UIFE: IgM protein. 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   -Extensive involvement by atypitcal CD5-/CD10-B-cell lymphoproliferative disorder, comprising over 70% of marrow cellularity  -Mildly hypercellular marrow for age (30-40%) with trilineage pan hypoplasia  -Normocytic anemia. -IHC staining highlighted extensive CD20+ B cell infiltrate with admixed + plasma cells     Started on Ibrutinib 420 mg/day 12/2020 with good response so far    On 01/06/2021:  SPEP:  Monoclonal protein 1.72 g/dL  Persistent double monoclonal bands in the beta/gamma and gamma globuin regions. Bands previously identified as IgM kappa and IgG kappa (8/12/2020). IgM kappa decreased by 1.22 g/dL and IgG kappa minimally changed since last exam on 10/14/2020. Free kappa light chains: 413.4    (3.3-19. 4)   Free lambda light chains: 2.8      (5.7-26. 3)   K/L ratio:                          147.64 (0.26-1.65)    On 03/25/2021  SPEP:  Monoclonal protein: 1.03 (0-0) g/dL  Persistent double monoclonal bands in the beta/gamma and gamma globuin regions. Bands previously identified as IgM kappa and IgG kappa (8/12/2020). IgM kappa decreased by 0.69 g/dL and IgG kappa unchanged since 01/06/2021. Free kappa light chains: 149.4  (3.3-19. 4)   Free lambda light chains: 2.6    (5.7-26. 3)   K/L ratio:                          57.46 (0.26-1.65)    Patient started on oral iron 08/2020 for LANDON and B12 deficiency but required 2 doses of Feraheme on 01/21/2021 and 01/28/2021 and again in April 2021.   Had recurrent iron deficiency with plan for Feraheme q3 months (next one scheduled on 2021). He does IM b12 injections at home (last one on 2021). On 2021  Hb: 15.5 Hct 44.9  MCV 90.5    WBC 6.9  BUN 10  Creat 0.9  Ca 9.3  Albumin 3.6    Fe: 94 FeSat: 33% TIBIC: 288  Ferritin: 81    VitB12 370     Ig  (700-1700)   IgA: <8     ()   IgM: 1370 ()    On 2021:  Hb 14.8  Hct 44.8  MCV 90.6  WBC 7.5    All cell line counts and morphology within normal limits. Patient's history of lymphoplasmacytic lymphoma is noted.  The currently submitted blood smear is negative for circulating plasma cells, and negative for increased/atypical lymphocytes  Fe 60 TIBC 250 FeSat 24% Ferritin 73  BUN 8  Creat 0.8  LFTs wnl  Folate/B12 wnl  Beta-2 microglobulin 2.2    Peripheral blood flow cytometry noted small B-cell population with equivocal mild kappa light chain excess (1% of total cells). SPEP: M-spike 0.8 g/dl  SIFE: IgM kappa monoclonal protein is present  IgM 1295 ()    Free kappa light chains:   80.85  (3.3-19. 4)   Free lambda light chains: 3.23    (5.7-26. 3)   K/L ratio:                           25.03 (0.26-1.65)    On 2021. Hb 16.2  Hct 47.7  MCV 88.8  WBC 7.7     BUN 10 Creat 0.80  LFTs wnl  Beta-2 microglobulin 2.0  SPEP: M-spike 1 = 0.8 g/dl              M-spike 2 = 0.1 g/dl  SIFE: IgM kappa monoclonal protein is present, as previously described. IgM 1267 ()    Free kappa light chains:   88.01  (3.3-19. 4)   Free lambda light chains: 3.33    (5.7-26. 3)   K/L ratio:                           26.43 (0.26-1.65)    On 2021:  Hb 16.4  Hct 48.1  MCV 87.5  WBC 6.9     BUN 7 Creat 0.90  LFTs wnl  Beta-2 microglobulin 1.9  SPEP: M-spike 1 = 0.7 g/dl              M-spike 2 = 0.1 g/dl  SIFE: IgM kappa monoclonal protein is present, as previously described. IgG kappa monoclonal protein is present, as previously described    IgM 1314 ()    Free kappa light chains:   80.24  (3.3-19. 4)   Free lambda light chains: 2.88    (5.7-26. 3)   K/L ratio:                           27.86 (0.26-1.65)    On 10/18/2021:  Hb 15.9   Hct 48.0  MCV 89.1  WBC 9.3     BUN 8 Creat 0.9  LFTs wnl  Beta-2 microglobulin 2.0  SPEP: M-spike 1 = 0.7 g/dl              M-spike 2 = 0.1 g/dl  SIFE: IgM kappa + faint IgG kappa monoclonal protein is present, as previously described. IgM 1308 ()    Free kappa light chains:   80.48  (3.3-19. 4)   Free lambda light chains: 2.87    (5.7-26. 3)   K/L ratio:                           28.04 (0.26-1.65)    On 11/17/2021:  Hb 15.5   Hct 46.4  MCV 87.5  WBC 7.4     BUN 13 Creat 1.2  LFTs wnl  Continue Ibrutinib    RTC 4 weeks with prior labs.  Dermatology consult reviewed and appreciated    11/17/2021  Keanu Godoy MD

## 2021-11-19 LAB
ALBUMIN SERPL-MCNC: 3.4 G/DL (ref 3.5–4.7)
ALPHA-1-GLOBULIN: 0.2 G/DL (ref 0.2–0.4)
ALPHA-2-GLOBULIN: 0.8 G/FL (ref 0.5–1)
BETA GLOBULIN: 0.8 G/DL (ref 0.8–1.3)
ELECTROPHORESIS: ABNORMAL
GAMMA GLOBULIN: 1.3 G/DL (ref 0.7–1.6)
IGA: 14 MG/DL (ref 70–400)
IGG: 197 MG/DL (ref 700–1600)
IGM: 1347 MG/DL (ref 40–230)
IMMUNOFIXATION RESULT, SERUM: NORMAL
TOTAL PROTEIN: 6.6 G/DL (ref 6.4–8.3)

## 2021-11-20 LAB
KAPPA FREE LIGHT CHAINS QNT: 78.93 MG/L (ref 3.3–19.4)
KAPPA/LAMBDA FREE LIGHT CHAIN RATIO: 20.14 (ref 0.26–1.65)
LAMBDA FREE LIGHT CHAINS QNT: 3.92 MG/L (ref 5.71–26.3)

## 2021-11-22 LAB — BETA-2 MICROGLOBULIN: 2.3 MG/L (ref 0.6–2.4)

## 2021-12-15 ENCOUNTER — OFFICE VISIT (OUTPATIENT)
Dept: ONCOLOGY | Age: 76
End: 2021-12-15
Payer: MEDICARE

## 2021-12-15 ENCOUNTER — HOSPITAL ENCOUNTER (OUTPATIENT)
Dept: INFUSION THERAPY | Age: 76
Discharge: HOME | End: 2021-12-15
Payer: MEDICARE

## 2021-12-15 VITALS
HEART RATE: 88 BPM | HEIGHT: 73 IN | OXYGEN SATURATION: 95 % | WEIGHT: 272 LBS | SYSTOLIC BLOOD PRESSURE: 98 MMHG | DIASTOLIC BLOOD PRESSURE: 53 MMHG | TEMPERATURE: 97 F | BODY MASS INDEX: 36.05 KG/M2

## 2021-12-15 DIAGNOSIS — C83.00 MALIGNANT LYMPHOPLASMACYTIC LYMPHOMA (HCC): ICD-10-CM

## 2021-12-15 DIAGNOSIS — C83.00 MALIGNANT LYMPHOPLASMACYTIC LYMPHOMA (HCC): Primary | ICD-10-CM

## 2021-12-15 LAB
ALBUMIN SERPL-MCNC: 3.8 G/DL (ref 3.5–5.2)
ALP BLD-CCNC: 56 U/L (ref 40–129)
ALT SERPL-CCNC: 12 U/L (ref 0–40)
ANION GAP SERPL CALCULATED.3IONS-SCNC: 12 MMOL/L (ref 7–16)
AST SERPL-CCNC: 12 U/L (ref 0–39)
BASOPHILS ABSOLUTE: 0.06 E9/L (ref 0–0.2)
BASOPHILS RELATIVE PERCENT: 0.7 % (ref 0–2)
BILIRUB SERPL-MCNC: 0.5 MG/DL (ref 0–1.2)
BUN BLDV-MCNC: 10 MG/DL (ref 6–23)
CALCIUM SERPL-MCNC: 9.8 MG/DL (ref 8.6–10.2)
CHLORIDE BLD-SCNC: 99 MMOL/L (ref 98–107)
CO2: 28 MMOL/L (ref 22–29)
CREAT SERPL-MCNC: 0.9 MG/DL (ref 0.7–1.2)
EOSINOPHILS ABSOLUTE: 0.2 E9/L (ref 0.05–0.5)
EOSINOPHILS RELATIVE PERCENT: 2.2 % (ref 0–6)
FERRITIN: 181 NG/ML
GFR AFRICAN AMERICAN: >60
GFR NON-AFRICAN AMERICAN: >60 ML/MIN/1.73
GLUCOSE BLD-MCNC: 176 MG/DL (ref 74–99)
HCT VFR BLD CALC: 46.5 % (ref 37–54)
HEMOGLOBIN: 15.3 G/DL (ref 12.5–16.5)
IMMATURE GRANULOCYTES #: 0.11 E9/L
IMMATURE GRANULOCYTES %: 1.2 % (ref 0–5)
IRON SATURATION: 31 % (ref 20–55)
IRON: 85 MCG/DL (ref 59–158)
LACTATE DEHYDROGENASE: 109 U/L (ref 135–225)
LYMPHOCYTES ABSOLUTE: 1.74 E9/L (ref 1.5–4)
LYMPHOCYTES RELATIVE PERCENT: 19.1 % (ref 20–42)
MCH RBC QN AUTO: 29.7 PG (ref 26–35)
MCHC RBC AUTO-ENTMCNC: 32.9 % (ref 32–34.5)
MCV RBC AUTO: 90.3 FL (ref 80–99.9)
MONOCYTES ABSOLUTE: 0.68 E9/L (ref 0.1–0.95)
MONOCYTES RELATIVE PERCENT: 7.5 % (ref 2–12)
NEUTROPHILS ABSOLUTE: 6.31 E9/L (ref 1.8–7.3)
NEUTROPHILS RELATIVE PERCENT: 69.3 % (ref 43–80)
PDW BLD-RTO: 13.5 FL (ref 11.5–15)
PLATELET # BLD: 222 E9/L (ref 130–450)
PMV BLD AUTO: 10.8 FL (ref 7–12)
POTASSIUM SERPL-SCNC: 4.1 MMOL/L (ref 3.5–5)
RBC # BLD: 5.15 E12/L (ref 3.8–5.8)
SODIUM BLD-SCNC: 139 MMOL/L (ref 132–146)
TOTAL IRON BINDING CAPACITY: 270 MCG/DL (ref 250–450)
WBC # BLD: 9.1 E9/L (ref 4.5–11.5)

## 2021-12-15 PROCEDURE — G8484 FLU IMMUNIZE NO ADMIN: HCPCS | Performed by: INTERNAL MEDICINE

## 2021-12-15 PROCEDURE — 83615 LACTATE (LD) (LDH) ENZYME: CPT

## 2021-12-15 PROCEDURE — 82784 ASSAY IGA/IGD/IGG/IGM EACH: CPT

## 2021-12-15 PROCEDURE — 99213 OFFICE O/P EST LOW 20 MIN: CPT | Performed by: INTERNAL MEDICINE

## 2021-12-15 PROCEDURE — 82232 ASSAY OF BETA-2 PROTEIN: CPT

## 2021-12-15 PROCEDURE — 86334 IMMUNOFIX E-PHORESIS SERUM: CPT

## 2021-12-15 PROCEDURE — G8417 CALC BMI ABV UP PARAM F/U: HCPCS | Performed by: INTERNAL MEDICINE

## 2021-12-15 PROCEDURE — 83540 ASSAY OF IRON: CPT

## 2021-12-15 PROCEDURE — 36415 COLL VENOUS BLD VENIPUNCTURE: CPT

## 2021-12-15 PROCEDURE — G8427 DOCREV CUR MEDS BY ELIG CLIN: HCPCS | Performed by: INTERNAL MEDICINE

## 2021-12-15 PROCEDURE — 83550 IRON BINDING TEST: CPT

## 2021-12-15 PROCEDURE — 1123F ACP DISCUSS/DSCN MKR DOCD: CPT | Performed by: INTERNAL MEDICINE

## 2021-12-15 PROCEDURE — 3017F COLORECTAL CA SCREEN DOC REV: CPT | Performed by: INTERNAL MEDICINE

## 2021-12-15 PROCEDURE — 99212 OFFICE O/P EST SF 10 MIN: CPT

## 2021-12-15 PROCEDURE — 80053 COMPREHEN METABOLIC PANEL: CPT

## 2021-12-15 PROCEDURE — 82728 ASSAY OF FERRITIN: CPT

## 2021-12-15 PROCEDURE — 83883 ASSAY NEPHELOMETRY NOT SPEC: CPT

## 2021-12-15 PROCEDURE — 4040F PNEUMOC VAC/ADMIN/RCVD: CPT | Performed by: INTERNAL MEDICINE

## 2021-12-15 PROCEDURE — 4004F PT TOBACCO SCREEN RCVD TLK: CPT | Performed by: INTERNAL MEDICINE

## 2021-12-15 PROCEDURE — 84165 PROTEIN E-PHORESIS SERUM: CPT

## 2021-12-15 PROCEDURE — 85025 COMPLETE CBC W/AUTO DIFF WBC: CPT

## 2021-12-15 NOTE — PROGRESS NOTES
Department of [facility] Oncology  Attending Clinic Note    Reason for Visit: Follow-up on a patient with Lymphoplasmacytic Lymphoma      PCP:  Alphonso Parr MD    History of Present Illness:  70year old male initially seen at [facility] hematology for anemia and abnormal TP/albumin ratio. SPEP IgG and IgM kapa paraprotein, M-spike 3.29 g/dL. UIFE: IgM protein. 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   -Extensive involvement by atypitcal CD5-/CD10-B-cell lymphoproliferative disorder, comprising over 70% of marrow cellularity  -Mildly hypercellular marrow for age (30-40%) with trilineage pan hypoplasia  -Normocytic anemia. -IHC staining highlighted extensive CD20+ B cell infiltrate with admixed + plasma cells     Started on Ibrutinib 420 mg/day 12/2020 with good response so far    On 01/06/2021:  SPEP:  Monoclonal protein 1.72 g/dL  Persistent double monoclonal bands in the beta/gamma and gamma globuin regions. Bands previously identified as IgM kappa and IgG kappa (8/12/2020). IgM kappa decreased by 1.22 g/dL and IgG kappa minimally changed since last exam on 10/14/2020. Free kappa light chains: 413.4    (3.3-19. 4)   Free lambda light chains: 2.8      (5.7-26. 3)   K/L ratio:                          147.64 (0.26-1.65)    On 03/25/2021  SPEP:  Monoclonal protein: 1.03 (0-0) g/dL  Persistent double monoclonal bands in the beta/gamma and gamma globuin regions. Bands previously identified as IgM kappa and IgG kappa (8/12/2020). IgM kappa decreased by 0.69 g/dL and IgG kappa unchanged since 01/06/2021. Free kappa light chains: 149.4  (3.3-19. 4)   Free lambda light chains: 2.6    (5.7-26. 3)   K/L ratio:                          57.46 (0.26-1.65)    Patient started on oral iron 08/2020 for LANDON and B12 deficiency but required 2 doses of Feraheme on 01/21/2021 and 01/28/2021 1314 ()    Free kappa light chains:   80.24  (3.3-19. 4)   Free lambda light chains: 2.88    (5.7-26. 3)   K/L ratio:                           27.86 (0.26-1.65)    On 10/18/2021:  Hb 15.9   Hct 48.0  MCV 89.1  WBC 9.3     BUN 8 Creat 0.9  LFTs wnl  Beta-2 microglobulin 2.0  SPEP: M-spike 1 = 0.7 g/dl              M-spike 2 = 0.1 g/dl  SIFE: IgM kappa + faint IgG kappa monoclonal protein is present, as previously described. IgM 1308 ()    Free kappa light chains:   80.48  (3.3-19. 4)   Free lambda light chains: 2.87    (5.7-26. 3)   K/L ratio:                           28.04 (0.26-1.65)    Continued Ibrutinib    On 11/17/2021:  Hb 15.5   Hct 46.4  MCV 87.5  WBC 7.4     BUN 13 Creat 1.2  LFTs wnl  Beta-2 microglobulin 2.3  SPEP: M-spike 1 = 0.9 g/dl              M-spike 2 = 0.1 g/dl  SIFE: IgM kappa monoclonal protein is present, as previously described. A Faint IgG kappa monoclonal protein is present, as previously described. IgM 1347 ()    Free kappa light chains:   78.93  (3.3-19. 4)   Free lambda light chains: 3.92    (5.7-26. 3)   K/L ratio:                           20.14 (0.26-1.65)    Continued Ibrutinib    Review of Systems;  CONSTITUTIONAL: No fever, chills. Fair appetite and energy level. ENMT: Eyes: No diplopia; Nose: No epistaxis. Mouth: No sore throat. RESPIRATORY: No hemoptysis, shortness of breath, cough. CARDIOVASCULAR: No chest pain, palpitations. GASTROINTESTINAL: No nausea/vomiting, abdominal pain. GENITOURINARY: No dysuria, urinary frequency, hematuria. NEURO: No syncope, presyncope, headache.   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 Allergies:  No Known Allergies    Physical Exam:  BP (!) 98/53   Pulse 88   Temp 97 °F (36.1 °C)   Ht 6' 1\" (1.854 m)   Wt 272 lb (123.4 kg)   SpO2 95%   BMI 35.89 kg/m²   GENERAL: Alert, oriented x 3, not in acute distress. HEENT: PERRLA; EOMI. Oropharynx clear. NECK: Supple. Without lymphadenopathy. LUNGS: Good air entry bilaterally. No wheezing, crackles or rhonchi. CARDIOVASCULAR: Regular rate. No murmurs, rubs or gallops. ABDOMEN: Soft. Non-tender, non-distended. Positive bowel sounds. EXTREMITIES: Without clubbing or cyanosis or edema. NEUROLOGIC: No focal deficits. 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 UIFE: IgM protein. 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   -Extensive involvement by atypitcal CD5-/CD10-B-cell lymphoproliferative disorder, comprising over 70% of marrow cellularity  -Mildly hypercellular marrow for age (30-40%) with trilineage pan hypoplasia  -Normocytic anemia. -IHC staining highlighted extensive CD20+ B cell infiltrate with admixed + plasma cells     Started on Ibrutinib 420 mg/day 12/2020 with good response so far    On 01/06/2021:  SPEP:  Monoclonal protein 1.72 g/dL  Persistent double monoclonal bands in the beta/gamma and gamma globuin regions. Bands previously identified as IgM kappa and IgG kappa (8/12/2020). IgM kappa decreased by 1.22 g/dL and IgG kappa minimally changed since last exam on 10/14/2020. Free kappa light chains: 413.4    (3.3-19. 4)   Free lambda light chains: 2.8      (5.7-26. 3)   K/L ratio:                          147.64 (0.26-1.65)    On 03/25/2021  SPEP:  Monoclonal protein: 1.03 (0-0) g/dL  Persistent double monoclonal bands in the beta/gamma and gamma globuin regions. Bands previously identified as IgM kappa and IgG kappa (2020). IgM kappa decreased by 0.69 g/dL and IgG kappa unchanged since 2021. Free kappa light chains: 149.4  (3.3-19. 4)   Free lambda light chains: 2.6    (5.7-26. 3)   K/L ratio:                          57.46 (0.26-1.65)    Patient started on oral iron 2020 for LANDON and B12 deficiency but required 2 doses of Feraheme on 2021 and 2021 and again in 2021. Had recurrent iron deficiency with plan for Feraheme q3 months (next one scheduled on 2021). He does IM b12 injections at home (last one on 2021). On 2021  Hb: 15.5 Hct 44.9  MCV 90.5    WBC 6.9  BUN 10  Creat 0.9  Ca 9.3  Albumin 3.6    Fe: 94 FeSat: 33% TIBIC: 288  Ferritin: 81    VitB12 370     Ig  (700-1700)   IgA: <8     ()   IgM: 1370 ()    On 2021:  Hb 14.8  Hct 44.8  MCV 90.6  WBC 7.5    All cell line counts and morphology within normal limits. Patient's history of lymphoplasmacytic lymphoma is noted.  The currently submitted blood smear is negative for circulating plasma cells, and negative for increased/atypical lymphocytes  Fe 60 TIBC 250 FeSat 24% Ferritin 73  BUN 8  Creat 0.8  LFTs wnl  Folate/B12 wnl  Beta-2 microglobulin 2.2    Peripheral blood flow cytometry noted small B-cell population with equivocal mild kappa light chain excess (1% of total cells). SPEP: M-spike 0.8 g/dl  SIFE: IgM kappa monoclonal protein is present  IgM 1295 ()    Free kappa light chains:   80.85  (3.3-19. 4)   Free lambda light chains: 3.23    (5.7-26. 3)   K/L ratio:                           25.03 (0.26-1.65)    On 2021. Hb 16.2  Hct 47.7  MCV 88.8  WBC 7.7     BUN 10 Creat 0.80  LFTs wnl  Beta-2 microglobulin 2.0  SPEP: M-spike 1 = 0.8 g/dl              M-spike 2 = 0.1 g/dl  SIFE: IgM kappa monoclonal protein is present, as previously described.      IgM 1267 ()    Free kappa light chains:   88.01  (3.3-19. 4)   Free lambda light chains: 3.33    (5.7-26. 3)   K/L ratio:                           26.43 (0.26-1.65)    On 09/20/2021:  Hb 16.4  Hct 48.1  MCV 87.5  WBC 6.9     BUN 7 Creat 0.90  LFTs wnl  Beta-2 microglobulin 1.9  SPEP: M-spike 1 = 0.7 g/dl              M-spike 2 = 0.1 g/dl  SIFE: IgM kappa monoclonal protein is present, as previously described. IgG kappa monoclonal protein is present, as previously described    IgM 1314 ()    Free kappa light chains:   80.24  (3.3-19. 4)   Free lambda light chains: 2.88    (5.7-26. 3)   K/L ratio:                           27.86 (0.26-1.65)    On 10/18/2021:  Hb 15.9   Hct 48.0  MCV 89.1  WBC 9.3     BUN 8 Creat 0.9  LFTs wnl  Beta-2 microglobulin 2.0  SPEP: M-spike 1 = 0.7 g/dl              M-spike 2 = 0.1 g/dl  SIFE: IgM kappa + faint IgG kappa monoclonal protein is present, as previously described. IgM 1308 ()    Free kappa light chains:   80.48  (3.3-19. 4)   Free lambda light chains: 2.87    (5.7-26. 3)   K/L ratio:                           28.04 (0.26-1.65)    On 11/17/2021:  Hb 15.5   Hct 46.4  MCV 87.5  WBC 7.4     BUN 13 Creat 1.2  LFTs wnl  Beta-2 microglobulin 2.3  SPEP: M-spike 1 = 0.9 g/dl              M-spike 2 = 0.1 g/dl  SIFE: IgM kappa monoclonal protein is present, as previously described. A Faint IgG kappa monoclonal protein is present, as previously described. IgM 1347 ()    Free kappa light chains:   78.93  (3.3-19. 4)   Free lambda light chains: 3.92    (5.7-26. 3)   K/L ratio:                           20.14 (0.26-1.65)    Continue Ibrutinib    RTC 4 weeks with prior labs.     12/15/2021  Estela Harris MD

## 2021-12-17 LAB
ALBUMIN SERPL-MCNC: 3.3 G/DL (ref 3.5–4.7)
ALPHA-1-GLOBULIN: 0.2 G/DL (ref 0.2–0.4)
ALPHA-2-GLOBULIN: 0.7 G/DL (ref 0.5–1)
BETA GLOBULIN: 0.8 G/DL (ref 0.8–1.3)
BETA-2 MICROGLOBULIN: 2.5 MG/L (ref 0.6–2.4)
ELECTROPHORESIS: ABNORMAL
GAMMA GLOBULIN: 1.1 G/DL (ref 0.7–1.6)
IGA: 17 MG/DL (ref 70–400)
IGG: 201 MG/DL (ref 700–1600)
IGM: 1365 MG/DL (ref 40–230)
IMMUNOFIXATION RESULT, SERUM: NORMAL
TOTAL PROTEIN: 6.4 G/DL (ref 6.4–8.3)

## 2021-12-19 LAB
KAPPA FREE LIGHT CHAINS QNT: 84.08 MG/L (ref 3.3–19.4)
KAPPA/LAMBDA FREE LIGHT CHAIN RATIO: 18.89 (ref 0.26–1.65)
LAMBDA FREE LIGHT CHAINS QNT: 4.45 MG/L (ref 5.71–26.3)

## 2022-01-05 DIAGNOSIS — C83.00 MALIGNANT LYMPHOPLASMACYTIC LYMPHOMA (HCC): ICD-10-CM

## 2022-01-17 ENCOUNTER — HOSPITAL ENCOUNTER (OUTPATIENT)
Dept: INFUSION THERAPY | Age: 77
End: 2022-01-17

## 2022-01-20 ENCOUNTER — OFFICE VISIT (OUTPATIENT)
Dept: ONCOLOGY | Age: 77
End: 2022-01-20
Payer: OTHER GOVERNMENT

## 2022-01-20 ENCOUNTER — HOSPITAL ENCOUNTER (OUTPATIENT)
Dept: INFUSION THERAPY | Age: 77
Discharge: HOME | End: 2022-01-20
Payer: MEDICARE

## 2022-01-20 VITALS
SYSTOLIC BLOOD PRESSURE: 124 MMHG | BODY MASS INDEX: 36.45 KG/M2 | TEMPERATURE: 97.5 F | OXYGEN SATURATION: 94 % | RESPIRATION RATE: 16 BRPM | HEIGHT: 73 IN | DIASTOLIC BLOOD PRESSURE: 70 MMHG | WEIGHT: 275 LBS | HEART RATE: 88 BPM

## 2022-01-20 DIAGNOSIS — C83.00 MALIGNANT LYMPHOPLASMACYTIC LYMPHOMA (HCC): ICD-10-CM

## 2022-01-20 DIAGNOSIS — C83.00 MALIGNANT LYMPHOPLASMACYTIC LYMPHOMA (HCC): Primary | ICD-10-CM

## 2022-01-20 LAB
ALBUMIN SERPL-MCNC: 3.7 G/DL (ref 3.5–5.2)
ALP BLD-CCNC: 58 U/L (ref 40–129)
ALT SERPL-CCNC: 10 U/L (ref 0–40)
ANION GAP SERPL CALCULATED.3IONS-SCNC: 10 MMOL/L (ref 7–16)
AST SERPL-CCNC: 9 U/L (ref 0–39)
BASOPHILS ABSOLUTE: 0.04 E9/L (ref 0–0.2)
BASOPHILS RELATIVE PERCENT: 0.6 % (ref 0–2)
BILIRUB SERPL-MCNC: 0.8 MG/DL (ref 0–1.2)
BUN BLDV-MCNC: 7 MG/DL (ref 6–23)
CALCIUM SERPL-MCNC: 9.4 MG/DL (ref 8.6–10.2)
CHLORIDE BLD-SCNC: 97 MMOL/L (ref 98–107)
CO2: 28 MMOL/L (ref 22–29)
CREAT SERPL-MCNC: 0.8 MG/DL (ref 0.7–1.2)
EOSINOPHILS ABSOLUTE: 0.12 E9/L (ref 0.05–0.5)
EOSINOPHILS RELATIVE PERCENT: 1.9 % (ref 0–6)
FERRITIN: 254 NG/ML
GFR AFRICAN AMERICAN: >60
GFR NON-AFRICAN AMERICAN: >60 ML/MIN/1.73
GLUCOSE BLD-MCNC: 220 MG/DL (ref 74–99)
HCT VFR BLD CALC: 45.1 % (ref 37–54)
HEMOGLOBIN: 14.9 G/DL (ref 12.5–16.5)
IMMATURE GRANULOCYTES #: 0.07 E9/L
IMMATURE GRANULOCYTES %: 1.1 % (ref 0–5)
IRON SATURATION: 36 % (ref 20–55)
IRON: 99 MCG/DL (ref 59–158)
LACTATE DEHYDROGENASE: 113 U/L (ref 135–225)
LYMPHOCYTES ABSOLUTE: 1.77 E9/L (ref 1.5–4)
LYMPHOCYTES RELATIVE PERCENT: 28.3 % (ref 20–42)
MCH RBC QN AUTO: 29.1 PG (ref 26–35)
MCHC RBC AUTO-ENTMCNC: 33 % (ref 32–34.5)
MCV RBC AUTO: 88.1 FL (ref 80–99.9)
MONOCYTES ABSOLUTE: 0.55 E9/L (ref 0.1–0.95)
MONOCYTES RELATIVE PERCENT: 8.8 % (ref 2–12)
NEUTROPHILS ABSOLUTE: 3.71 E9/L (ref 1.8–7.3)
NEUTROPHILS RELATIVE PERCENT: 59.3 % (ref 43–80)
PDW BLD-RTO: 13.3 FL (ref 11.5–15)
PLATELET # BLD: 202 E9/L (ref 130–450)
PMV BLD AUTO: 10.4 FL (ref 7–12)
POTASSIUM SERPL-SCNC: 4 MMOL/L (ref 3.5–5)
RBC # BLD: 5.12 E12/L (ref 3.8–5.8)
SODIUM BLD-SCNC: 135 MMOL/L (ref 132–146)
TOTAL IRON BINDING CAPACITY: 276 MCG/DL (ref 250–450)
TOTAL PROTEIN: 6.3 G/DL (ref 6.4–8.3)
WBC # BLD: 6.3 E9/L (ref 4.5–11.5)

## 2022-01-20 PROCEDURE — 80053 COMPREHEN METABOLIC PANEL: CPT

## 2022-01-20 PROCEDURE — 4040F PNEUMOC VAC/ADMIN/RCVD: CPT | Performed by: INTERNAL MEDICINE

## 2022-01-20 PROCEDURE — 1123F ACP DISCUSS/DSCN MKR DOCD: CPT | Performed by: INTERNAL MEDICINE

## 2022-01-20 PROCEDURE — 82784 ASSAY IGA/IGD/IGG/IGM EACH: CPT

## 2022-01-20 PROCEDURE — 82728 ASSAY OF FERRITIN: CPT

## 2022-01-20 PROCEDURE — 82232 ASSAY OF BETA-2 PROTEIN: CPT

## 2022-01-20 PROCEDURE — 36415 COLL VENOUS BLD VENIPUNCTURE: CPT

## 2022-01-20 PROCEDURE — 84165 PROTEIN E-PHORESIS SERUM: CPT

## 2022-01-20 PROCEDURE — G8484 FLU IMMUNIZE NO ADMIN: HCPCS | Performed by: INTERNAL MEDICINE

## 2022-01-20 PROCEDURE — 3017F COLORECTAL CA SCREEN DOC REV: CPT | Performed by: INTERNAL MEDICINE

## 2022-01-20 PROCEDURE — G8427 DOCREV CUR MEDS BY ELIG CLIN: HCPCS | Performed by: INTERNAL MEDICINE

## 2022-01-20 PROCEDURE — 4004F PT TOBACCO SCREEN RCVD TLK: CPT | Performed by: INTERNAL MEDICINE

## 2022-01-20 PROCEDURE — 99213 OFFICE O/P EST LOW 20 MIN: CPT | Performed by: INTERNAL MEDICINE

## 2022-01-20 PROCEDURE — 83883 ASSAY NEPHELOMETRY NOT SPEC: CPT

## 2022-01-20 PROCEDURE — 85025 COMPLETE CBC W/AUTO DIFF WBC: CPT

## 2022-01-20 PROCEDURE — 99212 OFFICE O/P EST SF 10 MIN: CPT

## 2022-01-20 PROCEDURE — 83615 LACTATE (LD) (LDH) ENZYME: CPT

## 2022-01-20 PROCEDURE — 83540 ASSAY OF IRON: CPT

## 2022-01-20 PROCEDURE — 86334 IMMUNOFIX E-PHORESIS SERUM: CPT

## 2022-01-20 PROCEDURE — 83550 IRON BINDING TEST: CPT

## 2022-01-20 PROCEDURE — G8417 CALC BMI ABV UP PARAM F/U: HCPCS | Performed by: INTERNAL MEDICINE

## 2022-01-20 NOTE — PROGRESS NOTES
Department of [facility] Oncology  Attending Clinic Note    Reason for Visit: Follow-up on a patient with Lymphoplasmacytic Lymphoma      PCP:  Forrest Coronado MD    History of Present Illness:  78year old male initially seen at [facility] hematology for anemia and abnormal TP/albumin ratio. SPEP IgG and IgM kapa paraprotein, M-spike 3.29 g/dL. UIFE: IgM protein. 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   -Extensive involvement by atypitcal CD5-/CD10-B-cell lymphoproliferative disorder, comprising over 70% of marrow cellularity  -Mildly hypercellular marrow for age (30-40%) with trilineage pan hypoplasia  -Normocytic anemia. -IHC staining highlighted extensive CD20+ B cell infiltrate with admixed + plasma cells     Started on Ibrutinib 420 mg/day 12/2020 with good response so far    On 01/06/2021:  SPEP:  Monoclonal protein 1.72 g/dL  Persistent double monoclonal bands in the beta/gamma and gamma globuin regions. Bands previously identified as IgM kappa and IgG kappa (8/12/2020). IgM kappa decreased by 1.22 g/dL and IgG kappa minimally changed since last exam on 10/14/2020. Free kappa light chains: 413.4    (3.3-19. 4)   Free lambda light chains: 2.8      (5.7-26. 3)   K/L ratio:                          147.64 (0.26-1.65)    On 03/25/2021  SPEP:  Monoclonal protein: 1.03 (0-0) g/dL  Persistent double monoclonal bands in the beta/gamma and gamma globuin regions. Bands previously identified as IgM kappa and IgG kappa (8/12/2020). IgM kappa decreased by 0.69 g/dL and IgG kappa unchanged since 01/06/2021. Free kappa light chains: 149.4  (3.3-19. 4)   Free lambda light chains: 2.6    (5.7-26. 3)   K/L ratio:                          57.46 (0.26-1.65)    Patient started on oral iron 08/2020 for LANDON and B12 deficiency but required 2 doses of Feraheme on 01/21/2021 and 01/28/2021 and again in 2021. Had recurrent iron deficiency with plan for Feraheme q3 months (next one scheduled on 2021). He does IM b12 injections at home. On 2021  Hb: 15.5 Hct 44.9  MCV 90.5    WBC 6.9  BUN 10  Creat 0.9  Ca 9.3  Albumin 3.6    Fe: 94 FeSat: 33% TIBIC: 288  Ferritin: 81    VitB12 370     Ig  (700-1700)   IgA: <8     ()   IgM: 1370 ()    On 2021:  Hb 14.8  Hct 44.8  MCV 90.6  WBC 7.5    All cell line counts and morphology within normal limits. Patient's history of lymphoplasmacytic lymphoma is noted.  The currently submitted blood smear is negative for circulating plasma cells, and negative for increased/atypical lymphocytes  Fe 60 TIBC 250 FeSat 24% Ferritin 73  BUN 8  Creat 0.8  LFTs wnl  Folate/B12 wnl  Beta-2 microglobulin 2.2    Peripheral blood flow cytometry noted small B-cell population with equivocal mild kappa light chain excess (1% of total cells). SPEP: M-spike 0.8 g/dl  SIFE: IgM kappa monoclonal protein is present  IgM 1295 ()    Free kappa light chains:   80.85  (3.3-19. 4)   Free lambda light chains: 3.23    (5.7-26. 3)   K/L ratio:                           25.03 (0.26-1.65)    On 2021. Hb 16.2  Hct 47.7  MCV 88.8  WBC 7.7     BUN 10 Creat 0.80  LFTs wnl  Beta-2 microglobulin 2.0  SPEP: M-spike 1 = 0.8 g/dl              M-spike 2 = 0.1 g/dl  SIFE: IgM kappa monoclonal protein is present, as previously described. IgM 1267 ()    Free kappa light chains:   88.01  (3.3-19. 4)   Free lambda light chains: 3.33    (5.7-26. 3)   K/L ratio:                           26.43 (0.26-1.65)    On 2021:  Hb 16.4  Hct 48.1  MCV 87.5  WBC 6.9     BUN 7 Creat 0.90  LFTs wnl  Beta-2 microglobulin 1.9  SPEP: M-spike 1 = 0.7 g/dl              M-spike 2 = 0.1 g/dl  SIFE: IgM kappa monoclonal protein is present, as previously described.    IgG kappa monoclonal protein is present, as previously described    IgM 1314 ()    Free kappa light chains:   80.24  (3.3-19. 4)   Free lambda light chains: 2.88    (5.7-26. 3)   K/L ratio:                           27.86 (0.26-1.65)    On 10/18/2021:  Hb 15.9   Hct 48.0  MCV 89.1  WBC 9.3     BUN 8 Creat 0.9  LFTs wnl  Beta-2 microglobulin 2.0  SPEP: M-spike 1 = 0.7 g/dl              M-spike 2 = 0.1 g/dl  SIFE: IgM kappa + faint IgG kappa monoclonal protein is present, as previously described. IgM 1308 ()    Free kappa light chains:   80.48  (3.3-19. 4)   Free lambda light chains: 2.87    (5.7-26. 3)   K/L ratio:                           28.04 (0.26-1.65)    Continued Ibrutinib    On 11/17/2021:  Hb 15.5   Hct 46.4  MCV 87.5  WBC 7.4     BUN 13 Creat 1.2  LFTs wnl  Beta-2 microglobulin 2.3  SPEP: M-spike 1 = 0.9 g/dl              M-spike 2 = 0.1 g/dl  SIFE: IgM kappa monoclonal protein is present, as previously described. A Faint IgG kappa monoclonal protein is present, as previously described. IgM 1347 ()    Free kappa light chains:   78.93  (3.3-19. 4)   Free lambda light chains: 3.92    (5.7-26. 3)   K/L ratio:                           20.14 (0.26-1.65)    On 12/15/2021:  Hb 15.3   Hct 46.5  MCV 90.3  WBC 9.1     BUN 10 Creat 0.9  LFTs wnl  Beta-2 microglobulin 2.5  SPEP: M-spike 1 = 0.8 g/dl              M-spike 2 = 0.1 g/dl  SIFE: IgM kappa + IgG kappa monoclonal protein is present, as previously described. IgM 1365 ()    Free kappa light chains:   84.08  (3.3-19. 4)   Free lambda light chains: 4.45    (5.7-26. 3)   K/L ratio:                           18.89 (0.26-1.65)    Continued Ibrutinib  Today 01/20/2022; he continues to do well. No fever. No nausea. No diarrhea. Review of Systems;  CONSTITUTIONAL: No fever, chills. Fair appetite and energy level. ENMT: Eyes: No diplopia; Nose: No epistaxis. Mouth: No sore throat. RESPIRATORY: No hemoptysis, shortness of breath, cough.    CARDIOVASCULAR: No chest pain, palpitations. GASTROINTESTINAL: No nausea/vomiting, abdominal pain. GENITOURINARY: No dysuria, urinary frequency, hematuria. NEURO: No syncope, presyncope, headache. 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 Allergies:  No Known Allergies    Physical Exam:  /70 (Site: Left Upper Arm, Position: Sitting, Cuff Size: Medium Adult)   Pulse 88   Temp 97.5 °F (36.4 °C) (Infrared)   Resp 16   Ht 6' 1\" (1.854 m)   Wt 275 lb (124.7 kg)   SpO2 94%   BMI 36.28 kg/m²   GENERAL: Alert, oriented x 3, not in acute distress. HEENT: PERRLA; EOMI. Oropharynx clear. NECK: Supple. Without lymphadenopathy. LUNGS: Good air entry bilaterally. No wheezing, crackles or rhonchi. CARDIOVASCULAR: Regular rate. No murmurs, rubs or gallops. ABDOMEN: Soft. Non-tender, non-distended. Positive bowel sounds. EXTREMITIES: Without clubbing or cyanosis or edema. SKIN: skin rash  NEUROLOGIC: No focal deficits. ECOG PS 1    Impression/Plan:  79 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 UIFE: IgM protein. 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   -Extensive involvement by atypitcal CD5-/CD10-B-cell lymphoproliferative disorder, comprising over 70% of marrow cellularity  -Mildly hypercellular marrow for age (30-40%) with trilineage pan hypoplasia  -Normocytic anemia.   -IHC staining highlighted extensive CD20+ B cell infiltrate with admixed + plasma cells     Started on Ibrutinib 420 mg/day 2020 with good response so far    On 2021:  SPEP:  Monoclonal protein 1.72 g/dL  Persistent double monoclonal bands in the beta/gamma and gamma globuin regions. Bands previously identified as IgM kappa and IgG kappa (2020). IgM kappa decreased by 1.22 g/dL and IgG kappa minimally changed since last exam on 10/14/2020. Free kappa light chains: 413.4    (3.3-19. 4)   Free lambda light chains: 2.8      (5.7-26. 3)   K/L ratio:                          147.64 (0.26-1.65)    On 2021  SPEP:  Monoclonal protein: 1.03 (0-0) g/dL  Persistent double monoclonal bands in the beta/gamma and gamma globuin regions. Bands previously identified as IgM kappa and IgG kappa (2020). IgM kappa decreased by 0.69 g/dL and IgG kappa unchanged since 2021. Free kappa light chains: 149.4  (3.3-19. 4)   Free lambda light chains: 2.6    (5.7-26. 3)   K/L ratio:                          57.46 (0.26-1.65)    Patient started on oral iron 2020 for LANDON and B12 deficiency but required 2 doses of Feraheme on 2021 and 2021 and again in 2021. Had recurrent iron deficiency with plan for Feraheme q3 months (next one scheduled on 2021). He does IM b12 injections at home (last one on 2021). On 2021  Hb: 15.5 Hct 44.9  MCV 90.5    WBC 6.9  BUN 10  Creat 0.9  Ca 9.3  Albumin 3.6    Fe: 94 FeSat: 33% TIBIC: 288  Ferritin: 81    VitB12 370     Ig  (700-1700)   IgA: <8     ()   IgM: 1370 ()    On 2021:  Hb 14.8  Hct 44.8  MCV 90.6  WBC 7.5    All cell line counts and morphology within normal limits.    Patient's history of lymphoplasmacytic lymphoma is noted.  The currently submitted blood smear is negative for circulating plasma cells, and negative for increased/atypical lymphocytes  Fe 60 TIBC 250 FeSat 24% Ferritin 73  BUN 8  Creat 0.8  LFTs wnl  Folate/B12 wnl  Beta-2 microglobulin 2.2    Peripheral blood flow cytometry noted small B-cell population with equivocal mild kappa light chain excess (1% of total cells). SPEP: M-spike 0.8 g/dl  SIFE: IgM kappa monoclonal protein is present  IgM 1295 ()    Free kappa light chains:   80.85  (3.3-19. 4)   Free lambda light chains: 3.23    (5.7-26. 3)   K/L ratio:                           25.03 (0.26-1.65)    On 08/16/2021. Hb 16.2  Hct 47.7  MCV 88.8  WBC 7.7     BUN 10 Creat 0.80  LFTs wnl  Beta-2 microglobulin 2.0  SPEP: M-spike 1 = 0.8 g/dl              M-spike 2 = 0.1 g/dl  SIFE: IgM kappa monoclonal protein is present, as previously described. IgM 1267 ()    Free kappa light chains:   88.01  (3.3-19. 4)   Free lambda light chains: 3.33    (5.7-26. 3)   K/L ratio:                           26.43 (0.26-1.65)    On 09/20/2021:  Hb 16.4  Hct 48.1  MCV 87.5  WBC 6.9     BUN 7 Creat 0.90  LFTs wnl  Beta-2 microglobulin 1.9  SPEP: M-spike 1 = 0.7 g/dl              M-spike 2 = 0.1 g/dl  SIFE: IgM kappa monoclonal protein is present, as previously described. IgG kappa monoclonal protein is present, as previously described    IgM 1314 ()    Free kappa light chains:   80.24  (3.3-19. 4)   Free lambda light chains: 2.88    (5.7-26. 3)   K/L ratio:                           27.86 (0.26-1.65)    On 10/18/2021:  Hb 15.9   Hct 48.0  MCV 89.1  WBC 9.3     BUN 8 Creat 0.9  LFTs wnl  Beta-2 microglobulin 2.0  SPEP: M-spike 1 = 0.7 g/dl              M-spike 2 = 0.1 g/dl  SIFE: IgM kappa + faint IgG kappa monoclonal protein is present, as previously described. IgM 1308 ()    Free kappa light chains:   80.48  (3.3-19. 4)   Free lambda light chains: 2.87    (5.7-26. 3)   K/L ratio:                           28.04 (0.26-1.65)    On 11/17/2021:  Hb 15.5   Hct 46.4  MCV 87.5  WBC 7.4     BUN 13 Creat 1.2  LFTs wnl  Beta-2 microglobulin 2.3  SPEP: M-spike 1 = 0.9 g/dl              M-spike 2 = 0.1 g/dl  SIFE: IgM kappa monoclonal protein is present, as previously described. A Faint IgG kappa monoclonal protein is present, as previously described. IgM 1347 ()    Free kappa light chains:   78.93  (3.3-19. 4)   Free lambda light chains: 3.92    (5.7-26. 3)   K/L ratio:                           20.14 (0.26-1.65)    On 12/15/2021:  Hb 15.3   Hct 46.5  MCV 90.3  WBC 9.1     BUN 10 Creat 0.9  LFTs wnl  Beta-2 microglobulin 2.5  SPEP: M-spike 1 = 0.8 g/dl              M-spike 2 = 0.1 g/dl  SIFE: IgM kappa + IgG kappa monoclonal protein is present, as previously described. IgM 1365 ()    Free kappa light chains:   84.08  (3.3-19. 4)   Free lambda light chains: 4.45    (5.7-26. 3)   K/L ratio:                           18.89 (0.26-1.65)    On 01/20/2022:   Hb 14.9   Hct 45.1  MCV 88.1  WBC 6.3     BUN 7 Creat 0.8  LFTs wnl    Continue Ibrutinib    RTC 4 weeks with prior labs.     01/20/2022  Jemma Jimenez MD

## 2022-01-21 LAB
ALBUMIN SERPL-MCNC: 3.2 G/DL (ref 3.5–4.7)
ALPHA-1-GLOBULIN: 0.2 G/DL (ref 0.2–0.4)
ALPHA-2-GLOBULIN: 0.8 G/DL (ref 0.5–1)
BETA GLOBULIN: 0.8 G/DL (ref 0.8–1.3)
ELECTROPHORESIS: ABNORMAL
GAMMA GLOBULIN: 1 G/DL (ref 0.7–1.6)
IGA: 21 MG/DL (ref 70–400)
IGG: 242 MG/DL (ref 700–1600)
IGM: 1142 MG/DL (ref 40–230)
IMMUNOFIXATION RESULT, SERUM: NORMAL

## 2022-01-23 LAB
KAPPA FREE LIGHT CHAINS QNT: 136.21 MG/L (ref 3.3–19.4)
KAPPA/LAMBDA FREE LIGHT CHAIN RATIO: 25.75 (ref 0.26–1.65)
LAMBDA FREE LIGHT CHAINS QNT: 5.29 MG/L (ref 5.71–26.3)

## 2022-01-24 LAB — BETA-2 MICROGLOBULIN: 2.3 MG/L (ref 0.6–2.4)

## 2022-02-17 ENCOUNTER — HOSPITAL ENCOUNTER (OUTPATIENT)
Dept: INFUSION THERAPY | Age: 77
End: 2022-02-17

## 2022-02-24 ENCOUNTER — OFFICE VISIT (OUTPATIENT)
Dept: ONCOLOGY | Age: 77
End: 2022-02-24
Payer: MEDICARE

## 2022-02-24 ENCOUNTER — HOSPITAL ENCOUNTER (OUTPATIENT)
Dept: INFUSION THERAPY | Age: 77
Discharge: HOME | End: 2022-02-24
Payer: OTHER GOVERNMENT

## 2022-02-24 VITALS
RESPIRATION RATE: 22 BRPM | TEMPERATURE: 97 F | WEIGHT: 274 LBS | HEIGHT: 73 IN | OXYGEN SATURATION: 94 % | HEART RATE: 91 BPM | BODY MASS INDEX: 36.31 KG/M2 | DIASTOLIC BLOOD PRESSURE: 60 MMHG | SYSTOLIC BLOOD PRESSURE: 126 MMHG

## 2022-02-24 DIAGNOSIS — C83.00 MALIGNANT LYMPHOPLASMACYTIC LYMPHOMA (HCC): Primary | ICD-10-CM

## 2022-02-24 DIAGNOSIS — C83.00 MALIGNANT LYMPHOPLASMACYTIC LYMPHOMA (HCC): ICD-10-CM

## 2022-02-24 DIAGNOSIS — R94.5 ABNORMAL RESULTS OF LIVER FUNCTION STUDIES: ICD-10-CM

## 2022-02-24 LAB
ALBUMIN SERPL-MCNC: 4 G/DL (ref 3.5–5.2)
ALP BLD-CCNC: 50 U/L (ref 40–129)
ALT SERPL-CCNC: 14 U/L (ref 0–40)
ANION GAP SERPL CALCULATED.3IONS-SCNC: 12 MMOL/L (ref 7–16)
AST SERPL-CCNC: 14 U/L (ref 0–39)
BASOPHILS ABSOLUTE: 0.05 E9/L (ref 0–0.2)
BASOPHILS RELATIVE PERCENT: 0.7 % (ref 0–2)
BILIRUB SERPL-MCNC: 0.6 MG/DL (ref 0–1.2)
BUN BLDV-MCNC: 12 MG/DL (ref 6–23)
CALCIUM SERPL-MCNC: 9.7 MG/DL (ref 8.6–10.2)
CHLORIDE BLD-SCNC: 97 MMOL/L (ref 98–107)
CO2: 28 MMOL/L (ref 22–29)
CREAT SERPL-MCNC: 0.9 MG/DL (ref 0.7–1.2)
EOSINOPHILS ABSOLUTE: 0.12 E9/L (ref 0.05–0.5)
EOSINOPHILS RELATIVE PERCENT: 1.6 % (ref 0–6)
FERRITIN: 172 NG/ML
GFR AFRICAN AMERICAN: >60
GFR NON-AFRICAN AMERICAN: >60 ML/MIN/1.73
GLUCOSE BLD-MCNC: 144 MG/DL (ref 74–99)
HAV IGM SER IA-ACNC: NORMAL
HCT VFR BLD CALC: 46.7 % (ref 37–54)
HEMOGLOBIN: 15.4 G/DL (ref 12.5–16.5)
HEPATITIS B CORE IGM ANTIBODY: NORMAL
HEPATITIS B SURFACE ANTIGEN INTERPRETATION: NORMAL
HEPATITIS C ANTIBODY INTERPRETATION: NORMAL
IMMATURE GRANULOCYTES #: 0.07 E9/L
IMMATURE GRANULOCYTES %: 0.9 % (ref 0–5)
IRON SATURATION: 27 % (ref 20–55)
IRON: 80 MCG/DL (ref 59–158)
LACTATE DEHYDROGENASE: 112 U/L (ref 135–225)
LYMPHOCYTES ABSOLUTE: 1.99 E9/L (ref 1.5–4)
LYMPHOCYTES RELATIVE PERCENT: 26.4 % (ref 20–42)
MCH RBC QN AUTO: 29 PG (ref 26–35)
MCHC RBC AUTO-ENTMCNC: 33 % (ref 32–34.5)
MCV RBC AUTO: 87.9 FL (ref 80–99.9)
MONOCYTES ABSOLUTE: 0.63 E9/L (ref 0.1–0.95)
MONOCYTES RELATIVE PERCENT: 8.4 % (ref 2–12)
NEUTROPHILS ABSOLUTE: 4.68 E9/L (ref 1.8–7.3)
NEUTROPHILS RELATIVE PERCENT: 62 % (ref 43–80)
PDW BLD-RTO: 13.4 FL (ref 11.5–15)
PLATELET # BLD: 223 E9/L (ref 130–450)
PMV BLD AUTO: 10.3 FL (ref 7–12)
POTASSIUM SERPL-SCNC: 4 MMOL/L (ref 3.5–5)
RBC # BLD: 5.31 E12/L (ref 3.8–5.8)
SODIUM BLD-SCNC: 137 MMOL/L (ref 132–146)
TOTAL IRON BINDING CAPACITY: 294 MCG/DL (ref 250–450)
WBC # BLD: 7.5 E9/L (ref 4.5–11.5)

## 2022-02-24 PROCEDURE — 4040F PNEUMOC VAC/ADMIN/RCVD: CPT | Performed by: INTERNAL MEDICINE

## 2022-02-24 PROCEDURE — 99214 OFFICE O/P EST MOD 30 MIN: CPT | Performed by: INTERNAL MEDICINE

## 2022-02-24 PROCEDURE — 82784 ASSAY IGA/IGD/IGG/IGM EACH: CPT

## 2022-02-24 PROCEDURE — 80053 COMPREHEN METABOLIC PANEL: CPT

## 2022-02-24 PROCEDURE — 82232 ASSAY OF BETA-2 PROTEIN: CPT

## 2022-02-24 PROCEDURE — G8484 FLU IMMUNIZE NO ADMIN: HCPCS | Performed by: INTERNAL MEDICINE

## 2022-02-24 PROCEDURE — 85025 COMPLETE CBC W/AUTO DIFF WBC: CPT

## 2022-02-24 PROCEDURE — G8427 DOCREV CUR MEDS BY ELIG CLIN: HCPCS | Performed by: INTERNAL MEDICINE

## 2022-02-24 PROCEDURE — 83883 ASSAY NEPHELOMETRY NOT SPEC: CPT

## 2022-02-24 PROCEDURE — 3017F COLORECTAL CA SCREEN DOC REV: CPT | Performed by: INTERNAL MEDICINE

## 2022-02-24 PROCEDURE — 83615 LACTATE (LD) (LDH) ENZYME: CPT

## 2022-02-24 PROCEDURE — G8417 CALC BMI ABV UP PARAM F/U: HCPCS | Performed by: INTERNAL MEDICINE

## 2022-02-24 PROCEDURE — 99212 OFFICE O/P EST SF 10 MIN: CPT

## 2022-02-24 PROCEDURE — 80074 ACUTE HEPATITIS PANEL: CPT

## 2022-02-24 PROCEDURE — 36415 COLL VENOUS BLD VENIPUNCTURE: CPT

## 2022-02-24 PROCEDURE — 84165 PROTEIN E-PHORESIS SERUM: CPT

## 2022-02-24 PROCEDURE — 83540 ASSAY OF IRON: CPT

## 2022-02-24 PROCEDURE — 86334 IMMUNOFIX E-PHORESIS SERUM: CPT

## 2022-02-24 PROCEDURE — 1123F ACP DISCUSS/DSCN MKR DOCD: CPT | Performed by: INTERNAL MEDICINE

## 2022-02-24 PROCEDURE — 83550 IRON BINDING TEST: CPT

## 2022-02-24 PROCEDURE — 4004F PT TOBACCO SCREEN RCVD TLK: CPT | Performed by: INTERNAL MEDICINE

## 2022-02-24 PROCEDURE — 82728 ASSAY OF FERRITIN: CPT

## 2022-02-24 NOTE — PROGRESS NOTES
Department of [facility] Oncology  Attending Clinic Note    Reason for Visit: Follow-up on a patient with Lymphoplasmacytic Lymphoma      PCP:  Tere Pichardo MD    History of Present Illness:  78year old male initially seen at [facility] hematology for anemia and abnormal TP/albumin ratio. SPEP IgG and IgM kapa paraprotein, M-spike 3.29 g/dL. UIFE: IgM protein. 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   -Extensive involvement by atypitcal CD5-/CD10-B-cell lymphoproliferative disorder, comprising over 70% of marrow cellularity  -Mildly hypercellular marrow for age (30-40%) with trilineage pan hypoplasia  -Normocytic anemia. -IHC staining highlighted extensive CD20+ B cell infiltrate with admixed + plasma cells     Started on Ibrutinib 420 mg/day 12/2020 with good response so far    On 01/06/2021:  SPEP:  Monoclonal protein 1.72 g/dL  Persistent double monoclonal bands in the beta/gamma and gamma globuin regions. Bands previously identified as IgM kappa and IgG kappa (8/12/2020). IgM kappa decreased by 1.22 g/dL and IgG kappa minimally changed since last exam on 10/14/2020. Free kappa light chains: 413.4    (3.3-19. 4)   Free lambda light chains: 2.8      (5.7-26. 3)   K/L ratio:                          147.64 (0.26-1.65)    On 03/25/2021  SPEP:  Monoclonal protein: 1.03 (0-0) g/dL  Persistent double monoclonal bands in the beta/gamma and gamma globuin regions. Bands previously identified as IgM kappa and IgG kappa (8/12/2020). IgM kappa decreased by 0.69 g/dL and IgG kappa unchanged since 01/06/2021. Free kappa light chains: 149.4  (3.3-19. 4)   Free lambda light chains: 2.6    (5.7-26. 3)   K/L ratio:                          57.46 (0.26-1.65)    Patient started on oral iron 08/2020 for LANDON and B12 deficiency but required 2 doses of Feraheme on 01/21/2021 and 01/28/2021 and again in 2021. Had recurrent iron deficiency with plan for Feraheme q3 months (next one scheduled on 2021). He does IM b12 injections at home. On 2021  Hb: 15.5 Hct 44.9  MCV 90.5    WBC 6.9  BUN 10  Creat 0.9  Ca 9.3  Albumin 3.6    Fe: 94 FeSat: 33% TIBIC: 288  Ferritin: 81    VitB12 370     Ig  (700-1700)   IgA: <8     ()   IgM: 1370 ()    On 2021:  Hb 14.8  Hct 44.8  MCV 90.6  WBC 7.5    All cell line counts and morphology within normal limits. Patient's history of lymphoplasmacytic lymphoma is noted.  The currently submitted blood smear is negative for circulating plasma cells, and negative for increased/atypical lymphocytes  Fe 60 TIBC 250 FeSat 24% Ferritin 73  BUN 8  Creat 0.8  LFTs wnl  Folate/B12 wnl  Beta-2 microglobulin 2.2    Peripheral blood flow cytometry noted small B-cell population with equivocal mild kappa light chain excess (1% of total cells). SPEP: M-spike 0.8 g/dl  SIFE: IgM kappa monoclonal protein is present  IgM 1295 ()    Free kappa light chains:   80.85  (3.3-19. 4)   Free lambda light chains: 3.23    (5.7-26. 3)   K/L ratio:                           25.03 (0.26-1.65)    On 2021. Hb 16.2  Hct 47.7  MCV 88.8  WBC 7.7     BUN 10 Creat 0.80  LFTs wnl  Beta-2 microglobulin 2.0  SPEP: M-spike 1 = 0.8 g/dl              M-spike 2 = 0.1 g/dl  SIFE: IgM kappa monoclonal protein is present, as previously described. IgM 1267 ()    Free kappa light chains:   88.01  (3.3-19. 4)   Free lambda light chains: 3.33    (5.7-26. 3)   K/L ratio:                           26.43 (0.26-1.65)    On 2021:  Hb 16.4  Hct 48.1  MCV 87.5  WBC 6.9     BUN 7 Creat 0.90  LFTs wnl  Beta-2 microglobulin 1.9  SPEP: M-spike 1 = 0.7 g/dl              M-spike 2 = 0.1 g/dl  SIFE: IgM kappa monoclonal protein is present, as previously described.    IgG kappa monoclonal protein is present, as previously described    IgM 1314 ()    Free kappa light chains:   80.24  (3.3-19. 4)   Free lambda light chains: 2.88    (5.7-26. 3)   K/L ratio:                           27.86 (0.26-1.65)    On 10/18/2021:  Hb 15.9   Hct 48.0  MCV 89.1  WBC 9.3     BUN 8 Creat 0.9  LFTs wnl  Beta-2 microglobulin 2.0  SPEP: M-spike 1 = 0.7 g/dl              M-spike 2 = 0.1 g/dl  SIFE: IgM kappa + faint IgG kappa monoclonal protein is present, as previously described. IgM 1308 ()    Free kappa light chains:   80.48  (3.3-19. 4)   Free lambda light chains: 2.87    (5.7-26. 3)   K/L ratio:                           28.04 (0.26-1.65)    Continued Ibrutinib    On 11/17/2021:  Hb 15.5   Hct 46.4  MCV 87.5  WBC 7.4     BUN 13 Creat 1.2  LFTs wnl  Beta-2 microglobulin 2.3  SPEP: M-spike 1 = 0.9 g/dl              M-spike 2 = 0.1 g/dl  SIFE: IgM kappa monoclonal protein is present, as previously described. A Faint IgG kappa monoclonal protein is present, as previously described. IgM 1347 ()    Free kappa light chains:   78.93  (3.3-19. 4)   Free lambda light chains: 3.92    (5.7-26. 3)   K/L ratio:                           20.14 (0.26-1.65)    On 12/15/2021:  Hb 15.3   Hct 46.5  MCV 90.3  WBC 9.1     BUN 10 Creat 0.9  LFTs wnl  Beta-2 microglobulin 2.5  SPEP: M-spike 1 = 0.8 g/dl              M-spike 2 = 0.1 g/dl  SIFE: IgM kappa + IgG kappa monoclonal protein is present, as previously described. IgM 1365 ()    Free kappa light chains:   84.08  (3.3-19. 4)   Free lambda light chains: 4.45    (5.7-26. 3)   K/L ratio:                           18.89 (0.26-1.65)    Continued Ibrutinib    On 01/20/2022:   Hb 14.9   Hct 45.1  MCV 88.1  WBC 6.3     BUN 7 Creat 0.8  LFTs wnl  Beta-2 microglobulin 2.3  SPEP: M-spike 1 = 0.7 g/dl              M-spike 2 = 0.1 g/dl  SIFE: IgM kappa + IgG kappa monoclonal protein is present, as previously described. IgM 1142 ()    Free kappa light chains:   136.21  (3.3-19. 4) Free lambda light chains: 5.29    (5.7-26. 3)   K/L ratio:                           25.75 (0.26-1.65)    Continued Ibrutinib    Today 02/24/2022; he continues to do well. No fever. No nausea. No diarrhea. Review of Systems;  CONSTITUTIONAL: No fever, chills. Fair appetite and energy level. ENMT: Eyes: No diplopia; Nose: No epistaxis. Mouth: No sore throat. RESPIRATORY: No hemoptysis, shortness of breath, cough. CARDIOVASCULAR: No chest pain, palpitations. GASTROINTESTINAL: No nausea/vomiting, abdominal pain. GENITOURINARY: No dysuria, urinary frequency, hematuria. NEURO: No syncope, presyncope, headache. 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 Allergies:  No Known Allergies    Physical Exam:  /60 (Site: Left Upper Arm, Position: Sitting, Cuff Size: Medium Adult)   Pulse 91   Temp 97 °F (36.1 °C) (Infrared)   Resp 22   Ht 6' 1\" (1.854 m)   Wt 274 lb (124.3 kg)   SpO2 94%   BMI 36.15 kg/m²   GENERAL: Alert, oriented x 3, not in acute distress. HEENT: PERRLA; EOMI. Oropharynx clear. NECK: Supple. Without lymphadenopathy. LUNGS: Good air entry bilaterally. No wheezing, crackles or rhonchi. CARDIOVASCULAR: Regular rate. No murmurs, rubs or gallops. ABDOMEN: Soft. Non-tender, non-distended. Positive bowel sounds. EXTREMITIES: Without clubbing or cyanosis or edema. NEUROLOGIC: No focal deficits.    ECOG PS 1    Lab Results   Component Value Date    WBC 7.5 02/24/2022    HGB 15.4 02/24/2022    HCT 46.7 02/24/2022    MCV 87.9 02/24/2022     02/24/2022     Impression/Plan:  79 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 UIFE: IgM protein. 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   -Extensive involvement by atypitcal CD5-/CD10-B-cell lymphoproliferative disorder, comprising over 70% of marrow cellularity  -Mildly hypercellular marrow for age (30-40%) with trilineage pan hypoplasia  -Normocytic anemia. -IHC staining highlighted extensive CD20+ B cell infiltrate with admixed + plasma cells     Started on Ibrutinib 420 mg/day 12/2020 with good response so far    On 01/06/2021:  SPEP:  Monoclonal protein 1.72 g/dL  Persistent double monoclonal bands in the beta/gamma and gamma globuin regions. Bands previously identified as IgM kappa and IgG kappa (8/12/2020). IgM kappa decreased by 1.22 g/dL and IgG kappa minimally changed since last exam on 10/14/2020. Free kappa light chains: 413.4    (3.3-19. 4)   Free lambda light chains: 2.8      (5.7-26. 3)   K/L ratio:                          147.64 (0.26-1.65)    On 03/25/2021  SPEP:  Monoclonal protein: 1.03 (0-0) g/dL  Persistent double monoclonal bands in the beta/gamma and gamma globuin regions. Bands previously identified as IgM kappa and IgG kappa (8/12/2020). IgM kappa decreased by 0.69 g/dL and IgG kappa unchanged since 01/06/2021. Free kappa light chains: 149.4  (3.3-19. 4)   Free lambda light chains: 2.6    (5.7-26. 3)   K/L ratio:                          57.46 (0.26-1.65)    Patient started on oral iron 08/2020 for LANDON and B12 deficiency but required 2 doses of Feraheme on 01/21/2021 and 01/28/2021 and again in April 2021. Had recurrent iron deficiency with plan for Feraheme q3 months (next one scheduled on 07/14/2021). He does IM b12 injections at home (last one on 06/01/2021).      On 06/09/2021  Hb: 15.5 Hct 44.9  MCV 90.5    WBC 6.9  BUN 10  Creat 0.9  Ca 9.3  Albumin 3.6    Fe: 94 FeSat: 33% TIBIC: 288  Ferritin: 81    VitB12 370     Ig  (700-1700)   IgA: <8     ()   IgM: 1370 ()    On 2021:  Hb 14.8  Hct 44.8  MCV 90.6  WBC 7.5    All cell line counts and morphology within normal limits. Patient's history of lymphoplasmacytic lymphoma is noted.  The currently submitted blood smear is negative for circulating plasma cells, and negative for increased/atypical lymphocytes  Fe 60 TIBC 250 FeSat 24% Ferritin 73  BUN 8  Creat 0.8  LFTs wnl  Folate/B12 wnl  Beta-2 microglobulin 2.2    Peripheral blood flow cytometry noted small B-cell population with equivocal mild kappa light chain excess (1% of total cells). SPEP: M-spike 0.8 g/dl  SIFE: IgM kappa monoclonal protein is present  IgM 1295 ()    Free kappa light chains:   80.85  (3.3-19. 4)   Free lambda light chains: 3.23    (5.7-26. 3)   K/L ratio:                           25.03 (0.26-1.65)    On 2021. Hb 16.2  Hct 47.7  MCV 88.8  WBC 7.7     BUN 10 Creat 0.80  LFTs wnl  Beta-2 microglobulin 2.0  SPEP: M-spike 1 = 0.8 g/dl              M-spike 2 = 0.1 g/dl  SIFE: IgM kappa monoclonal protein is present, as previously described. IgM 1267 ()    Free kappa light chains:   88.01  (3.3-19. 4)   Free lambda light chains: 3.33    (5.7-26. 3)   K/L ratio:                           26.43 (0.26-1.65)    On 2021:  Hb 16.4  Hct 48.1  MCV 87.5  WBC 6.9     BUN 7 Creat 0.90  LFTs wnl  Beta-2 microglobulin 1.9  SPEP: M-spike 1 = 0.7 g/dl              M-spike 2 = 0.1 g/dl  SIFE: IgM kappa monoclonal protein is present, as previously described. IgG kappa monoclonal protein is present, as previously described    IgM 1314 ()    Free kappa light chains:   80.24  (3.3-19. 4)   Free lambda light chains: 2.88    (5.7-26. 3)   K/L ratio:                           27.86 (0.26-1.65)    On 10/18/2021:  Hb 15.9   Hct 48.0  MCV 89.1  WBC 9.3     BUN 8 Creat 0.9  LFTs wnl  Beta-2 microglobulin 2.0  SPEP: M-spike 1 = 0.7 g/dl              M-spike 2 = 0.1 g/dl  SIFE: IgM kappa + faint IgG kappa monoclonal protein is present, as previously described. IgM 1308 ()    Free kappa light chains:   80.48  (3.3-19. 4)   Free lambda light chains: 2.87    (5.7-26. 3)   K/L ratio:                           28.04 (0.26-1.65)    On 11/17/2021:  Hb 15.5   Hct 46.4  MCV 87.5  WBC 7.4     BUN 13 Creat 1.2  LFTs wnl  Beta-2 microglobulin 2.3  SPEP: M-spike 1 = 0.9 g/dl              M-spike 2 = 0.1 g/dl  SIFE: IgM kappa monoclonal protein is present, as previously described. A Faint IgG kappa monoclonal protein is present, as previously described. IgM 1347 ()    Free kappa light chains:   78.93  (3.3-19. 4)   Free lambda light chains: 3.92    (5.7-26. 3)   K/L ratio:                           20.14 (0.26-1.65)    On 12/15/2021:  Hb 15.3   Hct 46.5  MCV 90.3  WBC 9.1     BUN 10 Creat 0.9  LFTs wnl  Beta-2 microglobulin 2.5  SPEP: M-spike 1 = 0.8 g/dl              M-spike 2 = 0.1 g/dl  SIFE: IgM kappa + IgG kappa monoclonal protein is present, as previously described. IgM 1365 ()    Free kappa light chains:   84.08  (3.3-19. 4)   Free lambda light chains: 4.45    (5.7-26. 3)   K/L ratio:                           18.89 (0.26-1.65)    On 01/20/2022:   Hb 14.9   Hct 45.1  MCV 88.1  WBC 6.3     BUN 7 Creat 0.8  LFTs wnl  Beta-2 microglobulin 2.3  SPEP: M-spike 1 = 0.7 g/dl              M-spike 2 = 0.1 g/dl  SIFE: IgM kappa + IgG kappa monoclonal protein is present, as previously described. IgM 1142 ()    Free kappa light chains:   136.21  (3.3-19. 4)   Free lambda light chains: 5.29    (5.7-26. 3)   K/L ratio:                           25.75 (0.26-1.65)    Continued Ibrutinib    On 02/24/2022:   Hb 15.4   Hct 46.7  MCV 87.8  WBC 7.5     BUN 7 Creat 0.8  LFTs wnl  Rest of markers including K/L light chains pending.   I explained to patient if significant increase in kappa light chain is noted then we will add weekly rituximab x4 in addition to ibrutinib. Side effects of rituximab reviewed with patient. Acute hepatitis panel drawn today.     RTC next week to review labs and possible Dose # 1 weekly Rituximab    02/24/2022  Jeramie Beach MD

## 2022-02-25 LAB
ALBUMIN SERPL-MCNC: 3.5 G/DL (ref 3.5–4.7)
ALPHA-1-GLOBULIN: 0.2 G/DL (ref 0.2–0.4)
ALPHA-2-GLOBULIN: 0.9 G/DL (ref 0.5–1)
BETA GLOBULIN: 0.8 G/DL (ref 0.8–1.3)
BETA-2 MICROGLOBULIN: 2 MG/L (ref 0.6–2.4)
ELECTROPHORESIS: NORMAL
GAMMA GLOBULIN: 1.2 G/DL (ref 0.7–1.6)
IGA: 25 MG/DL (ref 70–400)
IGG: 319 MG/DL (ref 700–1600)
IGM: 1279 MG/DL (ref 40–230)
IMMUNOFIXATION RESULT, SERUM: NORMAL
TOTAL PROTEIN: 6.6 G/DL (ref 6.4–8.3)

## 2022-02-26 LAB
KAPPA FREE LIGHT CHAINS QNT: 69.11 MG/L (ref 3.3–19.4)
KAPPA/LAMBDA FREE LIGHT CHAIN RATIO: 15.36 (ref 0.26–1.65)
LAMBDA FREE LIGHT CHAINS QNT: 4.5 MG/L (ref 5.71–26.3)

## 2022-03-02 ENCOUNTER — HOSPITAL ENCOUNTER (OUTPATIENT)
Dept: INFUSION THERAPY | Age: 77
Discharge: HOME | End: 2022-03-02
Payer: OTHER GOVERNMENT

## 2022-03-02 DIAGNOSIS — C83.00 MALIGNANT LYMPHOPLASMACYTIC LYMPHOMA (HCC): ICD-10-CM

## 2022-03-02 DIAGNOSIS — R94.5 ABNORMAL RESULTS OF LIVER FUNCTION STUDIES: ICD-10-CM

## 2022-03-02 LAB
ALBUMIN SERPL-MCNC: 4 G/DL (ref 3.5–5.2)
ALP BLD-CCNC: 58 U/L (ref 40–129)
ALT SERPL-CCNC: 15 U/L (ref 0–40)
ANION GAP SERPL CALCULATED.3IONS-SCNC: 11 MMOL/L (ref 7–16)
AST SERPL-CCNC: 13 U/L (ref 0–39)
BASOPHILS ABSOLUTE: 0.04 E9/L (ref 0–0.2)
BASOPHILS RELATIVE PERCENT: 0.4 % (ref 0–2)
BILIRUB SERPL-MCNC: 0.5 MG/DL (ref 0–1.2)
BUN BLDV-MCNC: 9 MG/DL (ref 6–23)
CALCIUM SERPL-MCNC: 9.9 MG/DL (ref 8.6–10.2)
CHLORIDE BLD-SCNC: 98 MMOL/L (ref 98–107)
CO2: 29 MMOL/L (ref 22–29)
CREAT SERPL-MCNC: 0.7 MG/DL (ref 0.7–1.2)
EOSINOPHILS ABSOLUTE: 0.05 E9/L (ref 0.05–0.5)
EOSINOPHILS RELATIVE PERCENT: 0.5 % (ref 0–6)
FERRITIN: 148 NG/ML
GFR AFRICAN AMERICAN: >60
GFR NON-AFRICAN AMERICAN: >60 ML/MIN/1.73
GLUCOSE BLD-MCNC: 190 MG/DL (ref 74–99)
HCT VFR BLD CALC: 46.9 % (ref 37–54)
HEMOGLOBIN: 15.5 G/DL (ref 12.5–16.5)
IMMATURE GRANULOCYTES #: 0.06 E9/L
IMMATURE GRANULOCYTES %: 0.6 % (ref 0–5)
IRON SATURATION: 31 % (ref 20–55)
IRON: 94 MCG/DL (ref 59–158)
LACTATE DEHYDROGENASE: 120 U/L (ref 135–225)
LYMPHOCYTES ABSOLUTE: 1.58 E9/L (ref 1.5–4)
LYMPHOCYTES RELATIVE PERCENT: 16.1 % (ref 20–42)
MCH RBC QN AUTO: 29.8 PG (ref 26–35)
MCHC RBC AUTO-ENTMCNC: 33 % (ref 32–34.5)
MCV RBC AUTO: 90 FL (ref 80–99.9)
MONOCYTES ABSOLUTE: 0.62 E9/L (ref 0.1–0.95)
MONOCYTES RELATIVE PERCENT: 6.3 % (ref 2–12)
NEUTROPHILS ABSOLUTE: 7.48 E9/L (ref 1.8–7.3)
NEUTROPHILS RELATIVE PERCENT: 76.1 % (ref 43–80)
PDW BLD-RTO: 13.4 FL (ref 11.5–15)
PLATELET # BLD: 224 E9/L (ref 130–450)
PMV BLD AUTO: 10.6 FL (ref 7–12)
POTASSIUM SERPL-SCNC: 4.2 MMOL/L (ref 3.5–5)
RBC # BLD: 5.21 E12/L (ref 3.8–5.8)
SODIUM BLD-SCNC: 138 MMOL/L (ref 132–146)
TOTAL IRON BINDING CAPACITY: 301 MCG/DL (ref 250–450)
WBC # BLD: 9.8 E9/L (ref 4.5–11.5)

## 2022-03-02 PROCEDURE — 85025 COMPLETE CBC W/AUTO DIFF WBC: CPT

## 2022-03-02 PROCEDURE — 83883 ASSAY NEPHELOMETRY NOT SPEC: CPT

## 2022-03-02 PROCEDURE — 83540 ASSAY OF IRON: CPT

## 2022-03-02 PROCEDURE — 84165 PROTEIN E-PHORESIS SERUM: CPT

## 2022-03-02 PROCEDURE — 83615 LACTATE (LD) (LDH) ENZYME: CPT

## 2022-03-02 PROCEDURE — 83550 IRON BINDING TEST: CPT

## 2022-03-02 PROCEDURE — 82728 ASSAY OF FERRITIN: CPT

## 2022-03-02 PROCEDURE — 82232 ASSAY OF BETA-2 PROTEIN: CPT

## 2022-03-02 PROCEDURE — 99213 OFFICE O/P EST LOW 20 MIN: CPT

## 2022-03-02 PROCEDURE — 86334 IMMUNOFIX E-PHORESIS SERUM: CPT

## 2022-03-02 PROCEDURE — 82784 ASSAY IGA/IGD/IGG/IGM EACH: CPT

## 2022-03-02 PROCEDURE — 80053 COMPREHEN METABOLIC PANEL: CPT

## 2022-03-02 PROCEDURE — 80074 ACUTE HEPATITIS PANEL: CPT

## 2022-03-02 NOTE — PROGRESS NOTES
Per [place] treatment is authorized and patient is good for treatment despite question julio next to ruxience order.

## 2022-03-03 ENCOUNTER — HOSPITAL ENCOUNTER (OUTPATIENT)
Dept: INFUSION THERAPY | Age: 77
Discharge: HOME | End: 2022-03-03

## 2022-03-03 ENCOUNTER — OFFICE VISIT (OUTPATIENT)
Dept: ONCOLOGY | Age: 77
End: 2022-03-03
Payer: MEDICARE

## 2022-03-03 VITALS
HEIGHT: 73 IN | SYSTOLIC BLOOD PRESSURE: 170 MMHG | OXYGEN SATURATION: 96 % | DIASTOLIC BLOOD PRESSURE: 80 MMHG | BODY MASS INDEX: 36.18 KG/M2 | TEMPERATURE: 96.6 F | HEART RATE: 81 BPM | WEIGHT: 273 LBS | RESPIRATION RATE: 18 BRPM

## 2022-03-03 DIAGNOSIS — C83.00 MALIGNANT LYMPHOPLASMACYTIC LYMPHOMA (HCC): Primary | ICD-10-CM

## 2022-03-03 LAB
HAV IGM SER IA-ACNC: NORMAL
HEPATITIS B CORE IGM ANTIBODY: NORMAL
HEPATITIS B SURFACE ANTIGEN INTERPRETATION: NORMAL
HEPATITIS C ANTIBODY INTERPRETATION: NORMAL

## 2022-03-03 PROCEDURE — 1123F ACP DISCUSS/DSCN MKR DOCD: CPT | Performed by: INTERNAL MEDICINE

## 2022-03-03 PROCEDURE — 3017F COLORECTAL CA SCREEN DOC REV: CPT | Performed by: INTERNAL MEDICINE

## 2022-03-03 PROCEDURE — 99212 OFFICE O/P EST SF 10 MIN: CPT

## 2022-03-03 PROCEDURE — 4004F PT TOBACCO SCREEN RCVD TLK: CPT | Performed by: INTERNAL MEDICINE

## 2022-03-03 PROCEDURE — G8417 CALC BMI ABV UP PARAM F/U: HCPCS | Performed by: INTERNAL MEDICINE

## 2022-03-03 PROCEDURE — G8484 FLU IMMUNIZE NO ADMIN: HCPCS | Performed by: INTERNAL MEDICINE

## 2022-03-03 PROCEDURE — 99214 OFFICE O/P EST MOD 30 MIN: CPT | Performed by: INTERNAL MEDICINE

## 2022-03-03 PROCEDURE — G8427 DOCREV CUR MEDS BY ELIG CLIN: HCPCS | Performed by: INTERNAL MEDICINE

## 2022-03-03 PROCEDURE — 4040F PNEUMOC VAC/ADMIN/RCVD: CPT | Performed by: INTERNAL MEDICINE

## 2022-03-03 NOTE — PROGRESS NOTES
Department of [facility] Oncology  Attending Clinic Note    Reason for Visit: Follow-up on a patient with Lymphoplasmacytic Lymphoma      PCP:  Rachel Willis MD    History of Present Illness:  78year old male initially seen at [facility] hematology for anemia and abnormal TP/albumin ratio. SPEP IgG and IgM kapa paraprotein, M-spike 3.29 g/dL. UIFE: IgM protein. 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   -Extensive involvement by atypitcal CD5-/CD10-B-cell lymphoproliferative disorder, comprising over 70% of marrow cellularity  -Mildly hypercellular marrow for age (30-40%) with trilineage pan hypoplasia  -Normocytic anemia. -IHC staining highlighted extensive CD20+ B cell infiltrate with admixed + plasma cells     Started on Ibrutinib 420 mg/day 12/2020 with good response so far    On 01/06/2021:  SPEP:  Monoclonal protein 1.72 g/dL  Persistent double monoclonal bands in the beta/gamma and gamma globuin regions. Bands previously identified as IgM kappa and IgG kappa (8/12/2020). IgM kappa decreased by 1.22 g/dL and IgG kappa minimally changed since last exam on 10/14/2020. Free kappa light chains: 413.4    (3.3-19. 4)   Free lambda light chains: 2.8      (5.7-26. 3)   K/L ratio:                          147.64 (0.26-1.65)    On 03/25/2021  SPEP:  Monoclonal protein: 1.03 (0-0) g/dL  Persistent double monoclonal bands in the beta/gamma and gamma globuin regions. Bands previously identified as IgM kappa and IgG kappa (8/12/2020). IgM kappa decreased by 0.69 g/dL and IgG kappa unchanged since 01/06/2021. Free kappa light chains: 149.4  (3.3-19. 4)   Free lambda light chains: 2.6    (5.7-26. 3)   K/L ratio:                          57.46 (0.26-1.65)    Patient started on oral iron 08/2020 for LANDON and B12 deficiency but required 2 doses of Feraheme on 01/21/2021 and 01/28/2021 and again in 2021. Had recurrent iron deficiency with plan for Feraheme q3 months (next one scheduled on 2021). He does IM b12 injections at home. On 2021  Hb: 15.5 Hct 44.9  MCV 90.5    WBC 6.9  BUN 10  Creat 0.9  Ca 9.3  Albumin 3.6    Fe: 94 FeSat: 33% TIBIC: 288  Ferritin: 81    VitB12 370     Ig  (700-1700)   IgA: <8     ()   IgM: 1370 ()    On 2021:  Hb 14.8  Hct 44.8  MCV 90.6  WBC 7.5    All cell line counts and morphology within normal limits. Patient's history of lymphoplasmacytic lymphoma is noted.  The currently submitted blood smear is negative for circulating plasma cells, and negative for increased/atypical lymphocytes  Fe 60 TIBC 250 FeSat 24% Ferritin 73  BUN 8  Creat 0.8  LFTs wnl  Folate/B12 wnl  Beta-2 microglobulin 2.2    Peripheral blood flow cytometry noted small B-cell population with equivocal mild kappa light chain excess (1% of total cells). SPEP: M-spike 0.8 g/dl  SIFE: IgM kappa monoclonal protein is present  IgM 1295 ()    Free kappa light chains:   80.85  (3.3-19. 4)   Free lambda light chains: 3.23    (5.7-26. 3)   K/L ratio:                           25.03 (0.26-1.65)    On 2021. Hb 16.2  Hct 47.7  MCV 88.8  WBC 7.7     BUN 10 Creat 0.80  LFTs wnl  Beta-2 microglobulin 2.0  SPEP: M-spike 1 = 0.8 g/dl              M-spike 2 = 0.1 g/dl  SIFE: IgM kappa monoclonal protein is present, as previously described. IgM 1267 ()    Free kappa light chains:   88.01  (3.3-19. 4)   Free lambda light chains: 3.33    (5.7-26. 3)   K/L ratio:                           26.43 (0.26-1.65)    On 2021:  Hb 16.4  Hct 48.1  MCV 87.5  WBC 6.9     BUN 7 Creat 0.90  LFTs wnl  Beta-2 microglobulin 1.9  SPEP: M-spike 1 = 0.7 g/dl              M-spike 2 = 0.1 g/dl  SIFE: IgM kappa monoclonal protein is present, as previously described.    IgG kappa monoclonal protein is present, as previously described    IgM Free lambda light chains: 5.29    (5.7-26. 3)   K/L ratio:                           25.75 (0.26-1.65)    Continued Ibrutinib    On 02/24/2022:   Hb 15.4   Hct 46.7  MCV 87.8  WBC 7.5     BUN 7 Creat 0.8  LFTs wnl  Beta-2 microglobulin 2.0  SPEP: M-spike = 0.8 g/dl    SIFE: IgM kappa monoclonal protein is present, as previously described  IgM 1279 ()    Free kappa light chains:   69.11  (3.3-19. 4)   Free lambda light chains: 4.50    (5.7-26. 3)   K/L ratio:                           15.36 (0.26-1.65)    Today 03/03/2022; he continues to do well. No fever. No nausea. No diarrhea. Review of Systems;  CONSTITUTIONAL: No fever, chills. Fair appetite and energy level. ENMT: Eyes: No diplopia; Nose: No epistaxis. Mouth: No sore throat. RESPIRATORY: No hemoptysis, shortness of breath, cough. CARDIOVASCULAR: No chest pain, palpitations. GASTROINTESTINAL: No nausea/vomiting, abdominal pain. GENITOURINARY: No dysuria, urinary frequency, hematuria. NEURO: No syncope, presyncope, headache. 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 Allergies:  No Known Allergies    Physical Exam:  BP (!) 170/80 (Site: Left Upper Arm, Position: Sitting, Cuff Size: Medium Adult)   Pulse 81   Temp 96.6 °F (35.9 °C) (Infrared)   Resp 18   Ht 6' 1\" (1.854 m)   Wt 273 lb (123.8 kg)   SpO2 96%   BMI 36.02 kg/m²   GENERAL: Alert, oriented x 3, not in acute distress. HEENT: PERRLA; EOMI. Oropharynx clear. NECK: Supple. Without lymphadenopathy. LUNGS: Good air entry bilaterally. No wheezing, crackles or rhonchi. CARDIOVASCULAR: Regular rate. No murmurs, rubs or gallops. ABDOMEN: Soft.  Non-tender, non-distended. Positive bowel sounds. EXTREMITIES: Without clubbing or cyanosis or edema. NEUROLOGIC: No focal deficits. ECOG PS 1    Lab Results   Component Value Date    WBC 9.8 03/02/2022    HGB 15.5 03/02/2022    HCT 46.9 03/02/2022    MCV 90.0 03/02/2022     03/02/2022     Lab Results   Component Value Date     03/02/2022    K 4.2 03/02/2022    CL 98 03/02/2022    CO2 29 03/02/2022    BUN 9 03/02/2022    CREATININE 0.7 03/02/2022    GLUCOSE 190 (H) 03/02/2022    CALCIUM 9.9 03/02/2022    PROT 7.1 03/02/2022    LABALBU 4.0 03/02/2022    BILITOT 0.5 03/02/2022    ALKPHOS 58 03/02/2022    AST 13 03/02/2022    ALT 15 03/02/2022    LABGLOM >60 03/02/2022    GFRAA >60 03/02/2022     Impression/Plan:  77 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 UIFE: IgM protein. 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   -Extensive involvement by atypitcal CD5-/CD10-B-cell lymphoproliferative disorder, comprising over 70% of marrow cellularity  -Mildly hypercellular marrow for age (30-40%) with trilineage pan hypoplasia  -Normocytic anemia. -IHC staining highlighted extensive CD20+ B cell infiltrate with admixed + plasma cells     Started on Ibrutinib 420 mg/day 12/2020 with good response so far    On 01/06/2021:  SPEP:  Monoclonal protein 1.72 g/dL  Persistent double monoclonal bands in the beta/gamma and gamma globuin regions. Bands previously identified as IgM kappa and IgG kappa (8/12/2020). IgM kappa decreased by 1.22 g/dL and IgG kappa minimally changed since last exam on 10/14/2020. Free kappa light chains: 413.4    (3.3-19. 4)   Free lambda light chains: 2.8      (5.7-26. 3)   K/L ratio:                          147.64 (0.26-1.65)    On 03/25/2021  SPEP:  Monoclonal protein: 1.03 (0-0) g/dL  Persistent double monoclonal bands in the beta/gamma and gamma globuin regions. Bands previously identified as IgM kappa and IgG kappa (2020). IgM kappa decreased by 0.69 g/dL and IgG kappa unchanged since 2021. Free kappa light chains: 149.4  (3.3-19. 4)   Free lambda light chains: 2.6    (5.7-26. 3)   K/L ratio:                          57.46 (0.26-1.65)    Patient started on oral iron 2020 for ALNDON and B12 deficiency but required 2 doses of Feraheme on 2021 and 2021 and again in 2021. Had recurrent iron deficiency with plan for Feraheme q3 months (next one scheduled on 2021). He does IM b12 injections at home (last one on 2021). On 2021  Hb: 15.5 Hct 44.9  MCV 90.5    WBC 6.9  BUN 10  Creat 0.9  Ca 9.3  Albumin 3.6    Fe: 94 FeSat: 33% TIBIC: 288  Ferritin: 81    VitB12 370     Ig  (700-1700)   IgA: <8     ()   IgM: 1370 ()    On 2021:  Hb 14.8  Hct 44.8  MCV 90.6  WBC 7.5    All cell line counts and morphology within normal limits. Patient's history of lymphoplasmacytic lymphoma is noted.  The currently submitted blood smear is negative for circulating plasma cells, and negative for increased/atypical lymphocytes  Fe 60 TIBC 250 FeSat 24% Ferritin 73  BUN 8  Creat 0.8  LFTs wnl  Folate/B12 wnl  Beta-2 microglobulin 2.2    Peripheral blood flow cytometry noted small B-cell population with equivocal mild kappa light chain excess (1% of total cells). SPEP: M-spike 0.8 g/dl  SIFE: IgM kappa monoclonal protein is present  IgM 1295 ()    Free kappa light chains:   80.85  (3.3-19. 4)   Free lambda light chains: 3.23    (5.7-26. 3)   K/L ratio:                           25.03 (0.26-1.65)    On 2021.    Hb 16.2  Hct 47.7  MCV 88.8  WBC 7.7     BUN 10 Creat 0.80  LFTs wnl  Beta-2 microglobulin 2.0  SPEP: M-spike 1 = 0.8 g/dl              M-spike 2 = 0.1 g/dl  SIFE: IgM kappa monoclonal protein is present, as previously described. IgM 1267 ()    Free kappa light chains:   88.01  (3.3-19. 4)   Free lambda light chains: 3.33    (5.7-26. 3)   K/L ratio:                           26.43 (0.26-1.65)    On 09/20/2021:  Hb 16.4  Hct 48.1  MCV 87.5  WBC 6.9     BUN 7 Creat 0.90  LFTs wnl  Beta-2 microglobulin 1.9  SPEP: M-spike 1 = 0.7 g/dl              M-spike 2 = 0.1 g/dl  SIFE: IgM kappa monoclonal protein is present, as previously described. IgG kappa monoclonal protein is present, as previously described    IgM 1314 ()    Free kappa light chains:   80.24  (3.3-19. 4)   Free lambda light chains: 2.88    (5.7-26. 3)   K/L ratio:                           27.86 (0.26-1.65)    On 10/18/2021:  Hb 15.9   Hct 48.0  MCV 89.1  WBC 9.3     BUN 8 Creat 0.9  LFTs wnl  Beta-2 microglobulin 2.0  SPEP: M-spike 1 = 0.7 g/dl              M-spike 2 = 0.1 g/dl  SIFE: IgM kappa + faint IgG kappa monoclonal protein is present, as previously described. IgM 1308 ()    Free kappa light chains:   80.48  (3.3-19. 4)   Free lambda light chains: 2.87    (5.7-26. 3)   K/L ratio:                           28.04 (0.26-1.65)    On 11/17/2021:  Hb 15.5   Hct 46.4  MCV 87.5  WBC 7.4     BUN 13 Creat 1.2  LFTs wnl  Beta-2 microglobulin 2.3  SPEP: M-spike 1 = 0.9 g/dl              M-spike 2 = 0.1 g/dl  SIFE: IgM kappa monoclonal protein is present, as previously described. A Faint IgG kappa monoclonal protein is present, as previously described. IgM 1347 ()    Free kappa light chains:   78.93  (3.3-19. 4)   Free lambda light chains: 3.92    (5.7-26. 3)   K/L ratio:                           20.14 (0.26-1.65)    On 12/15/2021:  Hb 15.3   Hct 46.5  MCV 90.3  WBC 9.1     BUN 10 Creat 0.9  LFTs wnl  Beta-2 microglobulin 2.5  SPEP: M-spike 1 = 0.8 g/dl              M-spike 2 = 0.1 g/dl  SIFE: IgM kappa + IgG kappa monoclonal protein is present, as previously described.     IgM 1365 ()    Free kappa light chains:   84.08  (3.3-19. 4)   Free lambda light chains: 4.45    (5.7-26. 3)   K/L ratio:                           18.89 (0.26-1.65)    On 01/20/2022:   Hb 14.9   Hct 45.1  MCV 88.1  WBC 6.3     BUN 7 Creat 0.8  LFTs wnl  Beta-2 microglobulin 2.3  SPEP: M-spike 1 = 0.7 g/dl              M-spike 2 = 0.1 g/dl  SIFE: IgM kappa + IgG kappa monoclonal protein is present, as previously described. IgM 1142 ()    Free kappa light chains:   136.21  (3.3-19. 4)   Free lambda light chains: 5.29    (5.7-26. 3)   K/L ratio:                           25.75 (0.26-1.65)    Continued Ibrutinib    On 02/24/2022:   Hb 15.4   Hct 46.7  MCV 87.8  WBC 7.5     BUN 7 Creat 0.8  LFTs wnl  Beta-2 microglobulin 2.0  SPEP: M-spike = 0.8 g/dl    SIFE: IgM kappa monoclonal protein is present, as previously described  IgM 1279 ()    Free kappa light chains:   69.11  (3.3-19. 4)   Free lambda light chains: 4.50    (5.7-26. 3)   K/L ratio:                           15.36 (0.26-1.65)    Will hold on weekly Rituximab x4 at this time given decline in [place] free light chains  Continue Ibrutinib only at this time    RTC 1 month with prior labs     03/03/2022  Monisha Seo MD

## 2022-03-03 NOTE — PROGRESS NOTES
Patient arrived to unit for chemo teaching. This nurse provided patient with a chemo basics video. After the video was complete, this nurse explained the typical day on the infusion unit and what his first day would be like. This nurse went over the contents of the education packet, and the after chemo tip sheet. Labs were drawn at this time. Patient was instructed to write down any questions that he may have to be answered on treatment day. Patient verbalized understanding.

## 2022-03-03 NOTE — PROGRESS NOTES
Completed education on rituximab with patient on 3-2-22. Reviewed dosing, side effects, self care tips and precautions. Gave patient [facility] handouts on the medication and answered all questions.   Electronically signed by HORACIO Davis [facility] on 3/3/2022 at 9:15 AM

## 2022-03-04 LAB
ALBUMIN SERPL-MCNC: 3.4 G/DL (ref 3.5–4.7)
ALPHA-1-GLOBULIN: 0.2 G/DL (ref 0.2–0.4)
ALPHA-2-GLOBULIN: 0.8 G/DL (ref 0.5–1)
BETA GLOBULIN: 0.8 G/DL (ref 0.8–1.3)
ELECTROPHORESIS: ABNORMAL
GAMMA GLOBULIN: 1.3 G/DL (ref 0.7–1.6)
IGA: <5 MG/DL (ref 70–400)
IGG: 333 MG/DL (ref 700–1600)
IGM: 1367 MG/DL (ref 40–230)
IMMUNOFIXATION RESULT, SERUM: NORMAL
TOTAL PROTEIN: 7.1 G/DL (ref 6.4–8.3)

## 2022-03-05 LAB
KAPPA FREE LIGHT CHAINS QNT: 55.56 MG/L (ref 3.3–19.4)
KAPPA/LAMBDA FREE LIGHT CHAIN RATIO: 8.35 (ref 0.26–1.65)
LAMBDA FREE LIGHT CHAINS QNT: 6.65 MG/L (ref 5.71–26.3)

## 2022-03-07 LAB — BETA-2 MICROGLOBULIN: 1.6 MG/L (ref 0.6–2.4)

## 2022-03-24 ENCOUNTER — HOSPITAL ENCOUNTER (OUTPATIENT)
Dept: CT IMAGING | Age: 77
Discharge: HOME | End: 2022-03-26
Payer: MEDICARE

## 2022-03-24 DIAGNOSIS — R91.1 SOLITARY PULMONARY NODULE: ICD-10-CM

## 2022-03-24 PROCEDURE — 71250 CT THORAX DX C-: CPT

## 2022-03-31 ENCOUNTER — HOSPITAL ENCOUNTER (OUTPATIENT)
Dept: INFUSION THERAPY | Age: 77
Discharge: HOME | End: 2022-03-31
Payer: OTHER GOVERNMENT

## 2022-03-31 ENCOUNTER — OFFICE VISIT (OUTPATIENT)
Dept: ONCOLOGY | Age: 77
End: 2022-03-31
Payer: MEDICARE

## 2022-03-31 VITALS
TEMPERATURE: 97 F | WEIGHT: 281 LBS | DIASTOLIC BLOOD PRESSURE: 67 MMHG | BODY MASS INDEX: 37.24 KG/M2 | RESPIRATION RATE: 18 BRPM | OXYGEN SATURATION: 95 % | HEIGHT: 73 IN | HEART RATE: 88 BPM | SYSTOLIC BLOOD PRESSURE: 130 MMHG

## 2022-03-31 DIAGNOSIS — C83.00 MALIGNANT LYMPHOPLASMACYTIC LYMPHOMA (HCC): Primary | ICD-10-CM

## 2022-03-31 DIAGNOSIS — C83.00 MALIGNANT LYMPHOPLASMACYTIC LYMPHOMA (HCC): ICD-10-CM

## 2022-03-31 LAB
ALBUMIN SERPL-MCNC: 3.7 G/DL (ref 3.5–5.2)
ALP BLD-CCNC: 54 U/L (ref 40–129)
ALT SERPL-CCNC: 11 U/L (ref 0–40)
ANION GAP SERPL CALCULATED.3IONS-SCNC: 13 MMOL/L (ref 7–16)
AST SERPL-CCNC: 11 U/L (ref 0–39)
BASOPHILS ABSOLUTE: 0.02 E9/L (ref 0–0.2)
BASOPHILS RELATIVE PERCENT: 0.3 % (ref 0–2)
BILIRUB SERPL-MCNC: 0.7 MG/DL (ref 0–1.2)
BUN BLDV-MCNC: 8 MG/DL (ref 6–23)
CALCIUM SERPL-MCNC: 9.6 MG/DL (ref 8.6–10.2)
CHLORIDE BLD-SCNC: 101 MMOL/L (ref 98–107)
CO2: 26 MMOL/L (ref 22–29)
CREAT SERPL-MCNC: 0.7 MG/DL (ref 0.7–1.2)
EOSINOPHILS ABSOLUTE: 0.15 E9/L (ref 0.05–0.5)
EOSINOPHILS RELATIVE PERCENT: 2.4 % (ref 0–6)
FERRITIN: 127 NG/ML
GFR AFRICAN AMERICAN: >60
GFR NON-AFRICAN AMERICAN: >60 ML/MIN/1.73
GLUCOSE BLD-MCNC: 208 MG/DL (ref 74–99)
HCT VFR BLD CALC: 43.8 % (ref 37–54)
HEMOGLOBIN: 14.5 G/DL (ref 12.5–16.5)
IMMATURE GRANULOCYTES #: 0.04 E9/L
IMMATURE GRANULOCYTES %: 0.6 % (ref 0–5)
IRON SATURATION: 37 % (ref 20–55)
IRON: 108 MCG/DL (ref 59–158)
LACTATE DEHYDROGENASE: 120 U/L (ref 135–225)
LYMPHOCYTES ABSOLUTE: 1.83 E9/L (ref 1.5–4)
LYMPHOCYTES RELATIVE PERCENT: 29.7 % (ref 20–42)
MCH RBC QN AUTO: 29.5 PG (ref 26–35)
MCHC RBC AUTO-ENTMCNC: 33.1 % (ref 32–34.5)
MCV RBC AUTO: 89 FL (ref 80–99.9)
MONOCYTES ABSOLUTE: 0.46 E9/L (ref 0.1–0.95)
MONOCYTES RELATIVE PERCENT: 7.5 % (ref 2–12)
NEUTROPHILS ABSOLUTE: 3.67 E9/L (ref 1.8–7.3)
NEUTROPHILS RELATIVE PERCENT: 59.5 % (ref 43–80)
PDW BLD-RTO: 13.6 FL (ref 11.5–15)
PLATELET # BLD: 178 E9/L (ref 130–450)
PMV BLD AUTO: 10.7 FL (ref 7–12)
POTASSIUM SERPL-SCNC: 3.9 MMOL/L (ref 3.5–5)
RBC # BLD: 4.92 E12/L (ref 3.8–5.8)
SODIUM BLD-SCNC: 140 MMOL/L (ref 132–146)
TOTAL IRON BINDING CAPACITY: 289 MCG/DL (ref 250–450)
WBC # BLD: 6.2 E9/L (ref 4.5–11.5)

## 2022-03-31 PROCEDURE — 83550 IRON BINDING TEST: CPT

## 2022-03-31 PROCEDURE — 4040F PNEUMOC VAC/ADMIN/RCVD: CPT | Performed by: INTERNAL MEDICINE

## 2022-03-31 PROCEDURE — G8484 FLU IMMUNIZE NO ADMIN: HCPCS | Performed by: INTERNAL MEDICINE

## 2022-03-31 PROCEDURE — G8427 DOCREV CUR MEDS BY ELIG CLIN: HCPCS | Performed by: INTERNAL MEDICINE

## 2022-03-31 PROCEDURE — G8417 CALC BMI ABV UP PARAM F/U: HCPCS | Performed by: INTERNAL MEDICINE

## 2022-03-31 PROCEDURE — 83615 LACTATE (LD) (LDH) ENZYME: CPT

## 2022-03-31 PROCEDURE — 3017F COLORECTAL CA SCREEN DOC REV: CPT | Performed by: INTERNAL MEDICINE

## 2022-03-31 PROCEDURE — 86334 IMMUNOFIX E-PHORESIS SERUM: CPT

## 2022-03-31 PROCEDURE — 80053 COMPREHEN METABOLIC PANEL: CPT

## 2022-03-31 PROCEDURE — 99212 OFFICE O/P EST SF 10 MIN: CPT

## 2022-03-31 PROCEDURE — 83883 ASSAY NEPHELOMETRY NOT SPEC: CPT

## 2022-03-31 PROCEDURE — 84165 PROTEIN E-PHORESIS SERUM: CPT

## 2022-03-31 PROCEDURE — 82784 ASSAY IGA/IGD/IGG/IGM EACH: CPT

## 2022-03-31 PROCEDURE — 82728 ASSAY OF FERRITIN: CPT

## 2022-03-31 PROCEDURE — 83540 ASSAY OF IRON: CPT

## 2022-03-31 PROCEDURE — 99214 OFFICE O/P EST MOD 30 MIN: CPT | Performed by: INTERNAL MEDICINE

## 2022-03-31 PROCEDURE — 1123F ACP DISCUSS/DSCN MKR DOCD: CPT | Performed by: INTERNAL MEDICINE

## 2022-03-31 PROCEDURE — 4004F PT TOBACCO SCREEN RCVD TLK: CPT | Performed by: INTERNAL MEDICINE

## 2022-03-31 PROCEDURE — 82232 ASSAY OF BETA-2 PROTEIN: CPT

## 2022-03-31 PROCEDURE — 36415 COLL VENOUS BLD VENIPUNCTURE: CPT

## 2022-03-31 PROCEDURE — 85025 COMPLETE CBC W/AUTO DIFF WBC: CPT

## 2022-03-31 NOTE — PROGRESS NOTES
Department of [facility]  Attending Clinic Note    Reason for Visit: Follow-up on a patient with Lymphoplasmacytic Lymphoma      PCP:  Alecia Patino MD    History of Present Illness:  78year old male initially seen at [facility] hematology for anemia and abnormal TP/albumin ratio. SPEP IgG and IgM kapa paraprotein, M-spike 3.29 g/dL. UIFE: IgM protein. 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   -Extensive involvement by atypitcal CD5-/CD10-B-cell lymphoproliferative disorder, comprising over 70% of marrow cellularity  -Mildly hypercellular marrow for age (30-40%) with trilineage pan hypoplasia  -Normocytic anemia. -IHC staining highlighted extensive CD20+ B cell infiltrate with admixed + plasma cells     Started on Ibrutinib 420 mg/day 12/2020 with good response so far    On 01/06/2021:  SPEP:  Monoclonal protein 1.72 g/dL  Persistent double monoclonal bands in the beta/gamma and gamma globuin regions. Bands previously identified as IgM kappa and IgG kappa (8/12/2020). IgM kappa decreased by 1.22 g/dL and IgG kappa minimally changed since last exam on 10/14/2020. Free kappa light chains: 413.4    (3.3-19. 4)   Free lambda light chains: 2.8      (5.7-26. 3)   K/L ratio:                          147.64 (0.26-1.65)    On 03/25/2021  SPEP:  Monoclonal protein: 1.03 (0-0) g/dL  Persistent double monoclonal bands in the beta/gamma and gamma globuin regions. Bands previously identified as IgM kappa and IgG kappa (8/12/2020). IgM kappa decreased by 0.69 g/dL and IgG kappa unchanged since 01/06/2021. Free kappa light chains: 149.4  (3.3-19. 4)   Free lambda light chains: 2.6    (5.7-26. 3)   K/L ratio:                          57.46 (0.26-1.65)    Patient started on oral iron 08/2020 for LANDON and B12 deficiency but required 2 doses of Feraheme on 01/21/2021 and 01/28/2021 and again in 2021. Had recurrent iron deficiency with plan for Feraheme q3 months (next one scheduled on 2021). He does IM b12 injections at home. On 2021  Hb: 15.5 Hct 44.9  MCV 90.5    WBC 6.9  BUN 10  Creat 0.9  Ca 9.3  Albumin 3.6    Fe: 94 FeSat: 33% TIBIC: 288  Ferritin: 81    VitB12 370     Ig  (700-1700)   IgA: <8     ()   IgM: 1370 ()    On 2021:  Hb 14.8  Hct 44.8  MCV 90.6  WBC 7.5    All cell line counts and morphology within normal limits. Patient's history of lymphoplasmacytic lymphoma is noted.  The currently submitted blood smear is negative for circulating plasma cells, and negative for increased/atypical lymphocytes  Fe 60 TIBC 250 FeSat 24% Ferritin 73  BUN 8  Creat 0.8  LFTs wnl  Folate/B12 wnl  Beta-2 microglobulin 2.2    Peripheral blood flow cytometry noted small B-cell population with equivocal mild kappa light chain excess (1% of total cells). SPEP: M-spike 0.8 g/dl  SIFE: IgM kappa monoclonal protein is present  IgM 1295 ()    Free kappa light chains:   80.85  (3.3-19. 4)   Free lambda light chains: 3.23    (5.7-26. 3)   K/L ratio:                           25.03 (0.26-1.65)    On 2021. Hb 16.2  Hct 47.7  MCV 88.8  WBC 7.7     BUN 10 Creat 0.80  LFTs wnl  Beta-2 microglobulin 2.0  SPEP: M-spike 1 = 0.8 g/dl              M-spike 2 = 0.1 g/dl  SIFE: IgM kappa monoclonal protein is present, as previously described. IgM 1267 ()    Free kappa light chains:   88.01  (3.3-19. 4)   Free lambda light chains: 3.33    (5.7-26. 3)   K/L ratio:                           26.43 (0.26-1.65)    On 2021:  Hb 16.4  Hct 48.1  MCV 87.5  WBC 6.9     BUN 7 Creat 0.90  LFTs wnl  Beta-2 microglobulin 1.9  SPEP: M-spike 1 = 0.7 g/dl              M-spike 2 = 0.1 g/dl  SIFE: IgM kappa monoclonal protein is present, as previously described.    IgG kappa monoclonal protein is present, as previously described    IgM 1314 ()    Free kappa light chains:   80.24  (3.3-19. 4)   Free lambda light chains: 2.88    (5.7-26. 3)   K/L ratio:                           27.86 (0.26-1.65)    On 10/18/2021:  Hb 15.9   Hct 48.0  MCV 89.1  WBC 9.3     BUN 8 Creat 0.9  LFTs wnl  Beta-2 microglobulin 2.0  SPEP: M-spike 1 = 0.7 g/dl              M-spike 2 = 0.1 g/dl  SIFE: IgM kappa + faint IgG kappa monoclonal protein is present, as previously described. IgM 1308 ()    Free kappa light chains:   80.48  (3.3-19. 4)   Free lambda light chains: 2.87    (5.7-26. 3)   K/L ratio:                           28.04 (0.26-1.65)    Continued Ibrutinib    On 11/17/2021:  Hb 15.5   Hct 46.4  MCV 87.5  WBC 7.4     BUN 13 Creat 1.2  LFTs wnl  Beta-2 microglobulin 2.3  SPEP: M-spike 1 = 0.9 g/dl              M-spike 2 = 0.1 g/dl  SIFE: IgM kappa monoclonal protein is present, as previously described. A Faint IgG kappa monoclonal protein is present, as previously described. IgM 1347 ()    Free kappa light chains:   78.93  (3.3-19. 4)   Free lambda light chains: 3.92    (5.7-26. 3)   K/L ratio:                           20.14 (0.26-1.65)    On 12/15/2021:  Hb 15.3   Hct 46.5  MCV 90.3  WBC 9.1     BUN 10 Creat 0.9  LFTs wnl  Beta-2 microglobulin 2.5  SPEP: M-spike 1 = 0.8 g/dl              M-spike 2 = 0.1 g/dl  SIFE: IgM kappa + IgG kappa monoclonal protein is present, as previously described. IgM 1365 ()    Free kappa light chains:   84.08  (3.3-19. 4)   Free lambda light chains: 4.45    (5.7-26. 3)   K/L ratio:                           18.89 (0.26-1.65)    Continued Ibrutinib    On 01/20/2022:   Hb 14.9   Hct 45.1  MCV 88.1  WBC 6.3     BUN 7 Creat 0.8  LFTs wnl  Beta-2 microglobulin 2.3  SPEP: M-spike 1 = 0.7 g/dl              M-spike 2 = 0.1 g/dl  SIFE: IgM kappa + IgG kappa monoclonal protein is present, as previously described. IgM 1142 ()    Free kappa light chains:   136.21  (3.3-19. 4) Free lambda light chains: 5.29    (5.7-26. 3)   K/L ratio:                           25.75 (0.26-1.65)    Continued Ibrutinib    On 02/24/2022:   Hb 15.4   Hct 46.7  MCV 87.8  WBC 7.5     BUN 7 Creat 0.8  LFTs wnl  Beta-2 microglobulin 2.0  SPEP: M-spike = 0.8 g/dl    SIFE: IgM kappa monoclonal protein is present, as previously described  IgM 1279 ()    Free kappa light chains:   69.11  (3.3-19. 4)   Free lambda light chains: 4.50    (5.7-26. 3)   K/L ratio:                           15.36 (0.26-1.65)    On 03/02/2022:  Hb 15.5   Hct 46.9   MCV 90.0  WBC 9.8     BUN 9 Creat 0.7  LFTs wnl  Beta-2 microglobulin 1.6  SPEP: M-spike = 0.7 g/dl    SIFE: IgM kappa monoclonal protein is present, as previously described  IgM 1367 ()    Free kappa light chains:   55.56  (3.3-19. 4)   Free lambda light chains: 6.65    (5.7-26. 3)   K/L ratio:                           8.35 (0.26-1.65)    Held weekly Rituximab x4 given decline in [place] free light chains  Continued Ibrutinib     Today 03/31/2022; he continues to do well. No fever. No nausea/vomiting. Review of Systems;  CONSTITUTIONAL: No fever, chills. Fair appetite and energy level. ENMT: Eyes: No diplopia; Nose: No epistaxis. Mouth: No sore throat. RESPIRATORY: No hemoptysis, shortness of breath, cough. CARDIOVASCULAR: No chest pain, palpitations. GASTROINTESTINAL: No nausea/vomiting, abdominal pain. GENITOURINARY: No dysuria, urinary frequency, hematuria. NEURO: No syncope, presyncope, headache. 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     Allergies:  No Known Allergies    Physical Exam:  /67 (Site: Left Upper Arm, Position: Sitting, Cuff Size: Medium Adult)   Pulse 88   Temp 97 °F (36.1 °C) (Infrared)   Resp 18   Ht 6' 1\" (1.854 m)   Wt 281 lb (127.5 kg)   SpO2 95%   BMI 37.07 kg/m²   GENERAL: Alert, oriented x 3, not in acute distress. HEENT: PERRLA; EOMI. Oropharynx clear. NECK: Supple. Without lymphadenopathy. LUNGS: Good air entry bilaterally. No wheezing, crackles or rhonchi. CARDIOVASCULAR: Regular rate. No murmurs, rubs or gallops. ABDOMEN: Soft. Non-tender, non-distended. Positive bowel sounds. EXTREMITIES: Without clubbing or cyanosis or edema. NEUROLOGIC: No focal deficits. ECOG PS 1    Impression/Plan:  79 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 UIFE: IgM protein. 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   -Extensive involvement by atypitcal CD5-/CD10-B-cell lymphoproliferative disorder, comprising over 70% of marrow cellularity  -Mildly hypercellular marrow for age (30-40%) with trilineage pan hypoplasia  -Normocytic anemia. -IHC staining highlighted extensive CD20+ B cell infiltrate with admixed + plasma cells     Started on Ibrutinib 420 mg/day 12/2020 with good response so far    On 01/06/2021:  SPEP:  Monoclonal protein 1.72 g/dL  Persistent double monoclonal bands in the beta/gamma and gamma globuin regions. Bands previously identified as IgM kappa and IgG kappa (8/12/2020). IgM kappa decreased by 1.22 g/dL and IgG kappa minimally changed since last exam on 10/14/2020. Free kappa light chains: 413.4    (3.3-19. 4)   Free lambda light chains: 2.8      (5.7-26. 3)   K/L ratio:                          147.64 (0.26-1.65)    On 03/25/2021  SPEP:  Monoclonal protein: 1.03 (0-0) g/dL  Persistent double monoclonal bands in the beta/gamma and gamma globuin regions.    Bands previously identified as IgM kappa and IgG kappa (2020). IgM kappa decreased by 0.69 g/dL and IgG kappa unchanged since 2021. Free kappa light chains: 149.4  (3.3-19. 4)   Free lambda light chains: 2.6    (5.7-26. 3)   K/L ratio:                          57.46 (0.26-1.65)    Patient started on oral iron 2020 for LANDON and B12 deficiency but required 2 doses of Feraheme on 2021 and 2021 and again in 2021. Had recurrent iron deficiency with plan for Feraheme q3 months (next one scheduled on 2021). He does IM b12 injections at home (last one on 2021). On 2021  Hb: 15.5 Hct 44.9  MCV 90.5    WBC 6.9  BUN 10  Creat 0.9  Ca 9.3  Albumin 3.6    Fe: 94 FeSat: 33% TIBIC: 288  Ferritin: 81    VitB12 370     Ig  (700-1700)   IgA: <8     ()   IgM: 1370 ()    On 2021:  Hb 14.8  Hct 44.8  MCV 90.6  WBC 7.5    All cell line counts and morphology within normal limits. Patient's history of lymphoplasmacytic lymphoma is noted.  The currently submitted blood smear is negative for circulating plasma cells, and negative for increased/atypical lymphocytes  Fe 60 TIBC 250 FeSat 24% Ferritin 73  BUN 8  Creat 0.8  LFTs wnl  Folate/B12 wnl  Beta-2 microglobulin 2.2    Peripheral blood flow cytometry noted small B-cell population with equivocal mild kappa light chain excess (1% of total cells). SPEP: M-spike 0.8 g/dl  SIFE: IgM kappa monoclonal protein is present  IgM 1295 ()    Free kappa light chains:   80.85  (3.3-19. 4)   Free lambda light chains: 3.23    (5.7-26. 3)   K/L ratio:                           25.03 (0.26-1.65)    On 2021. Hb 16.2  Hct 47.7  MCV 88.8  WBC 7.7     BUN 10 Creat 0.80  LFTs wnl  Beta-2 microglobulin 2.0  SPEP: M-spike 1 = 0.8 g/dl              M-spike 2 = 0.1 g/dl  SIFE: IgM kappa monoclonal protein is present, as previously described.      IgM 1267 ()    Free kappa light chains:   88.01 (3.3-19. 4)   Free lambda light chains: 3.33    (5.7-26. 3)   K/L ratio:                           26.43 (0.26-1.65)    On 09/20/2021:  Hb 16.4  Hct 48.1  MCV 87.5  WBC 6.9     BUN 7 Creat 0.90  LFTs wnl  Beta-2 microglobulin 1.9  SPEP: M-spike 1 = 0.7 g/dl              M-spike 2 = 0.1 g/dl  SIFE: IgM kappa monoclonal protein is present, as previously described. IgG kappa monoclonal protein is present, as previously described    IgM 1314 ()    Free kappa light chains:   80.24  (3.3-19. 4)   Free lambda light chains: 2.88    (5.7-26. 3)   K/L ratio:                           27.86 (0.26-1.65)    On 10/18/2021:  Hb 15.9   Hct 48.0  MCV 89.1  WBC 9.3     BUN 8 Creat 0.9  LFTs wnl  Beta-2 microglobulin 2.0  SPEP: M-spike 1 = 0.7 g/dl              M-spike 2 = 0.1 g/dl  SIFE: IgM kappa + faint IgG kappa monoclonal protein is present, as previously described. IgM 1308 ()    Free kappa light chains:   80.48  (3.3-19. 4)   Free lambda light chains: 2.87    (5.7-26. 3)   K/L ratio:                           28.04 (0.26-1.65)    On 11/17/2021:  Hb 15.5   Hct 46.4  MCV 87.5  WBC 7.4     BUN 13 Creat 1.2  LFTs wnl  Beta-2 microglobulin 2.3  SPEP: M-spike 1 = 0.9 g/dl              M-spike 2 = 0.1 g/dl  SIFE: IgM kappa monoclonal protein is present, as previously described. A Faint IgG kappa monoclonal protein is present, as previously described. IgM 1347 ()    Free kappa light chains:   78.93  (3.3-19. 4)   Free lambda light chains: 3.92    (5.7-26. 3)   K/L ratio:                           20.14 (0.26-1.65)    On 12/15/2021:  Hb 15.3   Hct 46.5  MCV 90.3  WBC 9.1     BUN 10 Creat 0.9  LFTs wnl  Beta-2 microglobulin 2.5  SPEP: M-spike 1 = 0.8 g/dl              M-spike 2 = 0.1 g/dl  SIFE: IgM kappa + IgG kappa monoclonal protein is present, as previously described. IgM 1365 ()    Free kappa light chains:   84.08  (3.3-19. 4)   Free lambda light chains: 4.45    (5.7-26. 3) K/L ratio:                           18.89 (0.26-1.65)    On 01/20/2022:   Hb 14.9   Hct 45.1  MCV 88.1  WBC 6.3     BUN 7 Creat 0.8  LFTs wnl  Beta-2 microglobulin 2.3  SPEP: M-spike 1 = 0.7 g/dl              M-spike 2 = 0.1 g/dl  SIFE: IgM kappa + IgG kappa monoclonal protein is present, as previously described. IgM 1142 ()    Free kappa light chains:   136.21  (3.3-19. 4)   Free lambda light chains: 5.29    (5.7-26. 3)   K/L ratio:                           25.75 (0.26-1.65)    Continued Ibrutinib    On 02/24/2022:   Hb 15.4   Hct 46.7  MCV 87.8  WBC 7.5     BUN 7 Creat 0.8  LFTs wnl  Beta-2 microglobulin 2.0  SPEP: M-spike = 0.8 g/dl    SIFE: IgM kappa monoclonal protein is present, as previously described  IgM 1279 ()    Free kappa light chains:   69.11  (3.3-19. 4)   Free lambda light chains: 4.50    (5.7-26. 3)   K/L ratio:                           15.36 (0.26-1.65)    On 03/02/2022:  Hb 15.5   Hct 46.9   MCV 90.0  WBC 9.8     BUN 9 Creat 0.7  LFTs wnl  Beta-2 microglobulin 1.6  SPEP: M-spike = 0.7 g/dl    SIFE: IgM kappa monoclonal protein is present, as previously described  IgM 1367 ()    Free kappa light chains:   55.56  (3.3-19. 4)   Free lambda light chains: 6.65    (5.7-26. 3)   K/L ratio:                           8.35 (0.26-1.65)    Held weekly Rituximab x4 given decline in [place] free light chains  Continue Ibrutinib     RTC 1 month with prior labs     03/31/2022  Jeramie Beach MD

## 2022-04-01 LAB
ALBUMIN SERPL-MCNC: 3.1 G/DL (ref 3.5–4.7)
ALPHA-1-GLOBULIN: 0.2 G/DL (ref 0.2–0.4)
ALPHA-2-GLOBULIN: 0.7 G/DL (ref 0.5–1)
BETA GLOBULIN: 0.7 G/DL (ref 0.8–1.3)
ELECTROPHORESIS: ABNORMAL
GAMMA GLOBULIN: 1 G/DL (ref 0.7–1.6)
IGA: 20 MG/DL (ref 70–400)
IGG: 276 MG/DL (ref 700–1600)
IGM: 1061 MG/DL (ref 40–230)
IMMUNOFIXATION RESULT, SERUM: NORMAL
TOTAL PROTEIN: 6.4 G/DL (ref 6.4–8.3)

## 2022-04-03 LAB
KAPPA FREE LIGHT CHAINS QNT: 59.06 MG/L (ref 3.3–19.4)
KAPPA/LAMBDA FREE LIGHT CHAIN RATIO: 9.84 (ref 0.26–1.65)
LAMBDA FREE LIGHT CHAINS QNT: 6 MG/L (ref 5.71–26.3)

## 2022-04-04 LAB — BETA-2 MICROGLOBULIN: 2.2 MG/L (ref 0.6–2.4)

## 2022-04-28 ENCOUNTER — HOSPITAL ENCOUNTER (OUTPATIENT)
Dept: INFUSION THERAPY | Age: 77
Discharge: HOME | End: 2022-04-28
Payer: MEDICARE

## 2022-04-28 ENCOUNTER — OFFICE VISIT (OUTPATIENT)
Dept: ONCOLOGY | Age: 77
End: 2022-04-28
Payer: MEDICARE

## 2022-04-28 VITALS
DIASTOLIC BLOOD PRESSURE: 77 MMHG | WEIGHT: 282 LBS | HEIGHT: 73 IN | HEART RATE: 86 BPM | TEMPERATURE: 97.2 F | BODY MASS INDEX: 37.37 KG/M2 | RESPIRATION RATE: 18 BRPM | SYSTOLIC BLOOD PRESSURE: 174 MMHG | OXYGEN SATURATION: 97 %

## 2022-04-28 DIAGNOSIS — C83.00 MALIGNANT LYMPHOPLASMACYTIC LYMPHOMA (HCC): Primary | ICD-10-CM

## 2022-04-28 DIAGNOSIS — C83.00 MALIGNANT LYMPHOPLASMACYTIC LYMPHOMA (HCC): ICD-10-CM

## 2022-04-28 LAB
ALBUMIN SERPL-MCNC: 3.8 G/DL (ref 3.5–5.2)
ALP BLD-CCNC: 47 U/L (ref 40–129)
ALT SERPL-CCNC: 13 U/L (ref 0–40)
ANION GAP SERPL CALCULATED.3IONS-SCNC: 12 MMOL/L (ref 7–16)
AST SERPL-CCNC: 13 U/L (ref 0–39)
BASOPHILS ABSOLUTE: 0.04 E9/L (ref 0–0.2)
BASOPHILS RELATIVE PERCENT: 0.7 % (ref 0–2)
BILIRUB SERPL-MCNC: 0.6 MG/DL (ref 0–1.2)
BUN BLDV-MCNC: 10 MG/DL (ref 6–23)
CALCIUM SERPL-MCNC: 9.3 MG/DL (ref 8.6–10.2)
CHLORIDE BLD-SCNC: 101 MMOL/L (ref 98–107)
CO2: 26 MMOL/L (ref 22–29)
CREAT SERPL-MCNC: 0.8 MG/DL (ref 0.7–1.2)
EOSINOPHILS ABSOLUTE: 0.12 E9/L (ref 0.05–0.5)
EOSINOPHILS RELATIVE PERCENT: 2.1 % (ref 0–6)
FERRITIN: 124 NG/ML
GFR AFRICAN AMERICAN: >60
GFR NON-AFRICAN AMERICAN: >60 ML/MIN/1.73
GLUCOSE BLD-MCNC: 241 MG/DL (ref 74–99)
HCT VFR BLD CALC: 45.3 % (ref 37–54)
HEMOGLOBIN: 15.2 G/DL (ref 12.5–16.5)
IMMATURE GRANULOCYTES #: 0.04 E9/L
IMMATURE GRANULOCYTES %: 0.7 % (ref 0–5)
IRON SATURATION: 40 % (ref 20–55)
IRON: 117 MCG/DL (ref 59–158)
LACTATE DEHYDROGENASE: 166 U/L (ref 135–225)
LYMPHOCYTES ABSOLUTE: 1.62 E9/L (ref 1.5–4)
LYMPHOCYTES RELATIVE PERCENT: 28.6 % (ref 20–42)
MCH RBC QN AUTO: 29.3 PG (ref 26–35)
MCHC RBC AUTO-ENTMCNC: 33.6 % (ref 32–34.5)
MCV RBC AUTO: 87.5 FL (ref 80–99.9)
MONOCYTES ABSOLUTE: 0.43 E9/L (ref 0.1–0.95)
MONOCYTES RELATIVE PERCENT: 7.6 % (ref 2–12)
NEUTROPHILS ABSOLUTE: 3.42 E9/L (ref 1.8–7.3)
NEUTROPHILS RELATIVE PERCENT: 60.3 % (ref 43–80)
PDW BLD-RTO: 13.8 FL (ref 11.5–15)
PLATELET # BLD: 173 E9/L (ref 130–450)
PMV BLD AUTO: 10.2 FL (ref 7–12)
POTASSIUM SERPL-SCNC: 3.9 MMOL/L (ref 3.5–5)
RBC # BLD: 5.18 E12/L (ref 3.8–5.8)
SODIUM BLD-SCNC: 139 MMOL/L (ref 132–146)
TOTAL IRON BINDING CAPACITY: 290 MCG/DL (ref 250–450)
WBC # BLD: 5.7 E9/L (ref 4.5–11.5)

## 2022-04-28 PROCEDURE — 83540 ASSAY OF IRON: CPT

## 2022-04-28 PROCEDURE — 86334 IMMUNOFIX E-PHORESIS SERUM: CPT

## 2022-04-28 PROCEDURE — 83550 IRON BINDING TEST: CPT

## 2022-04-28 PROCEDURE — 99212 OFFICE O/P EST SF 10 MIN: CPT

## 2022-04-28 PROCEDURE — G8417 CALC BMI ABV UP PARAM F/U: HCPCS | Performed by: INTERNAL MEDICINE

## 2022-04-28 PROCEDURE — 99214 OFFICE O/P EST MOD 30 MIN: CPT | Performed by: INTERNAL MEDICINE

## 2022-04-28 PROCEDURE — 4040F PNEUMOC VAC/ADMIN/RCVD: CPT | Performed by: INTERNAL MEDICINE

## 2022-04-28 PROCEDURE — 80053 COMPREHEN METABOLIC PANEL: CPT

## 2022-04-28 PROCEDURE — 4004F PT TOBACCO SCREEN RCVD TLK: CPT | Performed by: INTERNAL MEDICINE

## 2022-04-28 PROCEDURE — 82728 ASSAY OF FERRITIN: CPT

## 2022-04-28 PROCEDURE — 82232 ASSAY OF BETA-2 PROTEIN: CPT

## 2022-04-28 PROCEDURE — G8427 DOCREV CUR MEDS BY ELIG CLIN: HCPCS | Performed by: INTERNAL MEDICINE

## 2022-04-28 PROCEDURE — 85025 COMPLETE CBC W/AUTO DIFF WBC: CPT

## 2022-04-28 PROCEDURE — 82784 ASSAY IGA/IGD/IGG/IGM EACH: CPT

## 2022-04-28 PROCEDURE — 36415 COLL VENOUS BLD VENIPUNCTURE: CPT

## 2022-04-28 PROCEDURE — 1123F ACP DISCUSS/DSCN MKR DOCD: CPT | Performed by: INTERNAL MEDICINE

## 2022-04-28 PROCEDURE — 83883 ASSAY NEPHELOMETRY NOT SPEC: CPT

## 2022-04-28 PROCEDURE — 3017F COLORECTAL CA SCREEN DOC REV: CPT | Performed by: INTERNAL MEDICINE

## 2022-04-28 PROCEDURE — 84165 PROTEIN E-PHORESIS SERUM: CPT

## 2022-04-28 PROCEDURE — 83615 LACTATE (LD) (LDH) ENZYME: CPT

## 2022-04-28 NOTE — PROGRESS NOTES
Department of [facility] Oncology  Attending Clinic Note    Reason for Visit: Follow-up on a patient with Lymphoplasmacytic Lymphoma      PCP:  Viola Espinoza MD    History of Present Illness:  78year old male initially seen at [facility] hematology for anemia and abnormal TP/albumin ratio. SPEP IgG and IgM kapa paraprotein, M-spike 3.29 g/dL. UIFE: IgM protein. 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   -Extensive involvement by atypitcal CD5-/CD10-B-cell lymphoproliferative disorder, comprising over 70% of marrow cellularity  -Mildly hypercellular marrow for age (30-40%) with trilineage pan hypoplasia  -Normocytic anemia. -IHC staining highlighted extensive CD20+ B cell infiltrate with admixed + plasma cells     Started on Ibrutinib 420 mg/day 12/2020 with good response so far    On 01/06/2021:  SPEP:  Monoclonal protein 1.72 g/dL  Persistent double monoclonal bands in the beta/gamma and gamma globuin regions. Bands previously identified as IgM kappa and IgG kappa (8/12/2020). IgM kappa decreased by 1.22 g/dL and IgG kappa minimally changed since last exam on 10/14/2020. Free kappa light chains: 413.4    (3.3-19. 4)   Free lambda light chains: 2.8      (5.7-26. 3)   K/L ratio:                          147.64 (0.26-1.65)    On 03/25/2021  SPEP:  Monoclonal protein: 1.03 (0-0) g/dL  Persistent double monoclonal bands in the beta/gamma and gamma globuin regions. Bands previously identified as IgM kappa and IgG kappa (8/12/2020). IgM kappa decreased by 0.69 g/dL and IgG kappa unchanged since 01/06/2021. Free kappa light chains: 149.4  (3.3-19. 4)   Free lambda light chains: 2.6    (5.7-26. 3)   K/L ratio:                          57.46 (0.26-1.65)    Patient started on oral iron 08/2020 for LANDON and B12 deficiency but required 2 doses of Feraheme on 01/21/2021 and 01/28/2021 and again in 2021. Had recurrent iron deficiency with plan for Feraheme q3 months (next one scheduled on 2021). He does IM b12 injections at home. On 2021  Hb: 15.5 Hct 44.9  MCV 90.5    WBC 6.9  BUN 10  Creat 0.9  Ca 9.3  Albumin 3.6    Fe: 94 FeSat: 33% TIBIC: 288  Ferritin: 81    VitB12 370     Ig  (700-1700)   IgA: <8     ()   IgM: 1370 ()    On 2021:  Hb 14.8  Hct 44.8  MCV 90.6  WBC 7.5    All cell line counts and morphology within normal limits. Patient's history of lymphoplasmacytic lymphoma is noted.  The currently submitted blood smear is negative for circulating plasma cells, and negative for increased/atypical lymphocytes  Fe 60 TIBC 250 FeSat 24% Ferritin 73  BUN 8  Creat 0.8  LFTs wnl  Folate/B12 wnl  Beta-2 microglobulin 2.2    Peripheral blood flow cytometry noted small B-cell population with equivocal mild kappa light chain excess (1% of total cells). SPEP: M-spike 0.8 g/dl  SIFE: IgM kappa monoclonal protein is present  IgM 1295 ()    Free kappa light chains:   80.85  (3.3-19. 4)   Free lambda light chains: 3.23    (5.7-26. 3)   K/L ratio:                           25.03 (0.26-1.65)    On 2021. Hb 16.2  Hct 47.7  MCV 88.8  WBC 7.7     BUN 10 Creat 0.80  LFTs wnl  Beta-2 microglobulin 2.0  SPEP: M-spike 1 = 0.8 g/dl              M-spike 2 = 0.1 g/dl  SIFE: IgM kappa monoclonal protein is present, as previously described. IgM 1267 ()    Free kappa light chains:   88.01  (3.3-19. 4)   Free lambda light chains: 3.33    (5.7-26. 3)   K/L ratio:                           26.43 (0.26-1.65)    On 2021:  Hb 16.4  Hct 48.1  MCV 87.5  WBC 6.9     BUN 7 Creat 0.90  LFTs wnl  Beta-2 microglobulin 1.9  SPEP: M-spike 1 = 0.7 g/dl              M-spike 2 = 0.1 g/dl  SIFE: IgM kappa monoclonal protein is present, as previously described.    IgG kappa monoclonal protein is present, as previously described    IgM 1314 ()    Free kappa light chains:   80.24  (3.3-19. 4)   Free lambda light chains: 2.88    (5.7-26. 3)   K/L ratio:                           27.86 (0.26-1.65)    On 10/18/2021:  Hb 15.9   Hct 48.0  MCV 89.1  WBC 9.3     BUN 8 Creat 0.9  LFTs wnl  Beta-2 microglobulin 2.0  SPEP: M-spike 1 = 0.7 g/dl              M-spike 2 = 0.1 g/dl  SIFE: IgM kappa + faint IgG kappa monoclonal protein is present, as previously described. IgM 1308 ()    Free kappa light chains:   80.48  (3.3-19. 4)   Free lambda light chains: 2.87    (5.7-26. 3)   K/L ratio:                           28.04 (0.26-1.65)    Continued Ibrutinib    On 11/17/2021:  Hb 15.5   Hct 46.4  MCV 87.5  WBC 7.4     BUN 13 Creat 1.2  LFTs wnl  Beta-2 microglobulin 2.3  SPEP: M-spike 1 = 0.9 g/dl              M-spike 2 = 0.1 g/dl  SIFE: IgM kappa monoclonal protein is present, as previously described. A Faint IgG kappa monoclonal protein is present, as previously described. IgM 1347 ()    Free kappa light chains:   78.93  (3.3-19. 4)   Free lambda light chains: 3.92    (5.7-26. 3)   K/L ratio:                           20.14 (0.26-1.65)    On 12/15/2021:  Hb 15.3   Hct 46.5  MCV 90.3  WBC 9.1     BUN 10 Creat 0.9  LFTs wnl  Beta-2 microglobulin 2.5  SPEP: M-spike 1 = 0.8 g/dl              M-spike 2 = 0.1 g/dl  SIFE: IgM kappa + IgG kappa monoclonal protein is present, as previously described. IgM 1365 ()    Free kappa light chains:   84.08  (3.3-19. 4)   Free lambda light chains: 4.45    (5.7-26. 3)   K/L ratio:                           18.89 (0.26-1.65)    Continued Ibrutinib    On 01/20/2022:   Hb 14.9   Hct 45.1  MCV 88.1  WBC 6.3     BUN 7 Creat 0.8  LFTs wnl  Beta-2 microglobulin 2.3  SPEP: M-spike 1 = 0.7 g/dl              M-spike 2 = 0.1 g/dl  SIFE: IgM kappa + IgG kappa monoclonal protein is present, as previously described. IgM 1142 ()    Free kappa light chains:   136.21  (3.3-19. 4) Free lambda light chains: 5.29    (5.7-26. 3)   K/L ratio:                           25.75 (0.26-1.65)    Continued Ibrutinib    On 02/24/2022:   Hb 15.4   Hct 46.7  MCV 87.8  WBC 7.5     BUN 7 Creat 0.8  LFTs wnl  Beta-2 microglobulin 2.0  SPEP: M-spike = 0.8 g/dl    SIFE: IgM kappa monoclonal protein is present, as previously described  IgM 1279 ()    Free kappa light chains:   69.11  (3.3-19. 4)   Free lambda light chains: 4.50    (5.7-26. 3)   K/L ratio:                           15.36 (0.26-1.65)    On 03/02/2022:  Hb 15.5   Hct 46.9   MCV 90.0  WBC 9.8     BUN 9 Creat 0.7  LFTs wnl  Beta-2 microglobulin 1.6  SPEP: M-spike = 0.7 g/dl    SIFE: IgM kappa monoclonal protein is present, as previously described  IgM 1367 ()    Free kappa light chains:   55.56  (3.3-19. 4)   Free lambda light chains: 6.65    (5.7-26. 3)   K/L ratio:                           8.35 (0.26-1.65)    On 03/31/2022:  Hb 14.5   Hct 43.8   MCV 89.0  WBC 6.2     BUN 8 Creat 0.7  LFTs wnl  Beta-2 microglobulin 2.2  SPEP: M-spike = 0.6 g/dl    SIFE: IgM kappa monoclonal protein is present, as previously described  IgM 1061 ()    Free kappa light chains:   59.06  (3.3-19. 4)   Free lambda light chains: 6.00    (5.7-26. 3)   K/L ratio:                           9.84 (0.26-1.65)    Held weekly Rituximab x4 given decline in [place] free light chains  Continued Ibrutinib     Today 04/28/2022; he continues to do well. No fever, chills. Fair appetite and energy level. Intermittent nausea and diarrhea. Review of Systems;  CONSTITUTIONAL: No fever, chills. Fair appetite and energy level. ENMT: Eyes: No diplopia; Nose: No epistaxis. Mouth: No sore throat. RESPIRATORY: No hemoptysis, shortness of breath, cough. CARDIOVASCULAR: No chest pain, palpitations. GASTROINTESTINAL: intermittent nausea and diarrhea  GENITOURINARY: No dysuria, urinary frequency, hematuria. NEURO: No syncope, presyncope, headache.   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 Allergies:  No Known Allergies    Physical Exam:  BP (!) 174/77 (Site: Right Upper Arm, Position: Sitting, Cuff Size: Medium Adult)   Pulse 86   Temp 97.2 °F (36.2 °C) (Infrared)   Resp 18   Ht 6' 1\" (1.854 m)   Wt 282 lb (127.9 kg)   SpO2 97%   BMI 37.21 kg/m²   GENERAL: Alert, oriented x 3, not in acute distress. HEENT: PERRLA; EOMI. Oropharynx clear. NECK: Supple. Without lymphadenopathy. LUNGS: Good air entry bilaterally. No wheezing, crackles or rhonchi. CARDIOVASCULAR: Regular rate. No murmurs, rubs or gallops. ABDOMEN: Soft. Non-tender, non-distended. Positive bowel sounds. EXTREMITIES: Without clubbing or cyanosis or edema. NEUROLOGIC: No focal deficits. ECOG PS 1    Impression/Plan:  79 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 UIFE: IgM protein. 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   -Extensive involvement by atypitcal CD5-/CD10-B-cell lymphoproliferative disorder, comprising over 70% of marrow cellularity  -Mildly hypercellular marrow for age (30-40%) with trilineage pan hypoplasia  -Normocytic anemia. -IHC staining highlighted extensive CD20+ B cell infiltrate with admixed + plasma cells     Started on Ibrutinib 420 mg/day 12/2020 with good response so far    On 01/06/2021:  SPEP:  Monoclonal protein 1.72 g/dL  Persistent double monoclonal bands in the beta/gamma and gamma globuin regions.    Bands previously identified as IgM kappa and IgG kappa (2020). IgM kappa decreased by 1.22 g/dL and IgG kappa minimally changed since last exam on 10/14/2020. Free kappa light chains: 413.4    (3.3-19. 4)   Free lambda light chains: 2.8      (5.7-26. 3)   K/L ratio:                          147.64 (0.26-1.65)    On 2021  SPEP:  Monoclonal protein: 1.03 (0-0) g/dL  Persistent double monoclonal bands in the beta/gamma and gamma globuin regions. Bands previously identified as IgM kappa and IgG kappa (2020). IgM kappa decreased by 0.69 g/dL and IgG kappa unchanged since 2021. Free kappa light chains: 149.4  (3.3-19. 4)   Free lambda light chains: 2.6    (5.7-26. 3)   K/L ratio:                          57.46 (0.26-1.65)    Patient started on oral iron 2020 for LANDON and B12 deficiency but required 2 doses of Feraheme on 2021 and 2021 and again in 2021. Had recurrent iron deficiency with plan for Feraheme q3 months (next one scheduled on 2021). He does IM b12 injections at home (last one on 2021). On 2021  Hb: 15.5 Hct 44.9  MCV 90.5    WBC 6.9  BUN 10  Creat 0.9  Ca 9.3  Albumin 3.6    Fe: 94 FeSat: 33% TIBIC: 288  Ferritin: 81    VitB12 370     Ig  (700-1700)   IgA: <8     ()   IgM: 1370 ()    On 2021:  Hb 14.8  Hct 44.8  MCV 90.6  WBC 7.5    All cell line counts and morphology within normal limits. Patient's history of lymphoplasmacytic lymphoma is noted.  The currently submitted blood smear is negative for circulating plasma cells, and negative for increased/atypical lymphocytes  Fe 60 TIBC 250 FeSat 24% Ferritin 73  BUN 8  Creat 0.8  LFTs wnl  Folate/B12 wnl  Beta-2 microglobulin 2.2    Peripheral blood flow cytometry noted small B-cell population with equivocal mild kappa light chain excess (1% of total cells).     SPEP: M-spike 0.8 g/dl  SIFE: IgM kappa monoclonal protein is present  IgM 1295 ()    Free kappa light chains:   80.85  (3.3-19. 4)   Free lambda light chains: 3.23    (5.7-26. 3)   K/L ratio:                           25.03 (0.26-1.65)    On 08/16/2021. Hb 16.2  Hct 47.7  MCV 88.8  WBC 7.7     BUN 10 Creat 0.80  LFTs wnl  Beta-2 microglobulin 2.0  SPEP: M-spike 1 = 0.8 g/dl              M-spike 2 = 0.1 g/dl  SIFE: IgM kappa monoclonal protein is present, as previously described. IgM 1267 ()    Free kappa light chains:   88.01  (3.3-19. 4)   Free lambda light chains: 3.33    (5.7-26. 3)   K/L ratio:                           26.43 (0.26-1.65)    On 09/20/2021:  Hb 16.4  Hct 48.1  MCV 87.5  WBC 6.9     BUN 7 Creat 0.90  LFTs wnl  Beta-2 microglobulin 1.9  SPEP: M-spike 1 = 0.7 g/dl              M-spike 2 = 0.1 g/dl  SIFE: IgM kappa monoclonal protein is present, as previously described. IgG kappa monoclonal protein is present, as previously described    IgM 1314 ()    Free kappa light chains:   80.24  (3.3-19. 4)   Free lambda light chains: 2.88    (5.7-26. 3)   K/L ratio:                           27.86 (0.26-1.65)    On 10/18/2021:  Hb 15.9   Hct 48.0  MCV 89.1  WBC 9.3     BUN 8 Creat 0.9  LFTs wnl  Beta-2 microglobulin 2.0  SPEP: M-spike 1 = 0.7 g/dl              M-spike 2 = 0.1 g/dl  SIFE: IgM kappa + faint IgG kappa monoclonal protein is present, as previously described. IgM 1308 ()    Free kappa light chains:   80.48  (3.3-19. 4)   Free lambda light chains: 2.87    (5.7-26. 3)   K/L ratio:                           28.04 (0.26-1.65)    On 11/17/2021:  Hb 15.5   Hct 46.4  MCV 87.5  WBC 7.4     BUN 13 Creat 1.2  LFTs wnl  Beta-2 microglobulin 2.3  SPEP: M-spike 1 = 0.9 g/dl              M-spike 2 = 0.1 g/dl  SIFE: IgM kappa monoclonal protein is present, as previously described. A Faint IgG kappa monoclonal protein is present, as previously described. IgM 1347 ()    Free kappa light chains:   78.93  (3.3-19. 4)   Free lambda light chains: 3.92 (5.7-26. 3)   K/L ratio:                           20.14 (0.26-1.65)    On 12/15/2021:  Hb 15.3   Hct 46.5  MCV 90.3  WBC 9.1     BUN 10 Creat 0.9  LFTs wnl  Beta-2 microglobulin 2.5  SPEP: M-spike 1 = 0.8 g/dl              M-spike 2 = 0.1 g/dl  SIFE: IgM kappa + IgG kappa monoclonal protein is present, as previously described. IgM 1365 ()    Free kappa light chains:   84.08  (3.3-19. 4)   Free lambda light chains: 4.45    (5.7-26. 3)   K/L ratio:                           18.89 (0.26-1.65)    On 01/20/2022:   Hb 14.9   Hct 45.1  MCV 88.1  WBC 6.3     BUN 7 Creat 0.8  LFTs wnl  Beta-2 microglobulin 2.3  SPEP: M-spike 1 = 0.7 g/dl              M-spike 2 = 0.1 g/dl  SIFE: IgM kappa + IgG kappa monoclonal protein is present, as previously described. IgM 1142 ()    Free kappa light chains:   136.21  (3.3-19. 4)   Free lambda light chains: 5.29    (5.7-26. 3)   K/L ratio:                           25.75 (0.26-1.65)    Continued Ibrutinib    On 02/24/2022:   Hb 15.4   Hct 46.7  MCV 87.8  WBC 7.5     BUN 7 Creat 0.8  LFTs wnl  Beta-2 microglobulin 2.0  SPEP: M-spike = 0.8 g/dl    SIFE: IgM kappa monoclonal protein is present, as previously described  IgM 1279 ()    Free kappa light chains:   69.11  (3.3-19. 4)   Free lambda light chains: 4.50    (5.7-26. 3)   K/L ratio:                           15.36 (0.26-1.65)    On 03/02/2022:  Hb 15.5   Hct 46.9   MCV 90.0  WBC 9.8     BUN 9 Creat 0.7  LFTs wnl  Beta-2 microglobulin 1.6  SPEP: M-spike = 0.7 g/dl    SIFE: IgM kappa monoclonal protein is present, as previously described  IgM 1367 ()    Free kappa light chains:   55.56  (3.3-19. 4)   Free lambda light chains: 6.65    (5.7-26. 3)   K/L ratio:                           8.35 (0.26-1.65)    CT chest 03/24/2022 noted chronic changes of the lungs including emphysema with midlung scarring atelectasis greatest in the lingular segment without a definitive dominant nodule or mass.  No acute intrathoracic process. Imaging reviewed. On 03/31/2022:  Hb 14.5   Hct 43.8   MCV 89.0  WBC 6.2     BUN 8 Creat 0.7  LFTs wnl  Beta-2 microglobulin 2.2  SPEP: M-spike = 0.6 g/dl    SIFE: IgM kappa monoclonal protein is present, as previously described  IgM 1061 ()    Free kappa light chains:   59.06  (3.3-19. 4)   Free lambda light chains: 6.00    (5.7-26. 3)   K/L ratio:                           9.84 (0.26-1.65)    Held weekly Rituximab x4 given decline in [place] free light chains  Labs reviewed.   Continue Ibrutinib   Recommended Zofran prn for nausea  Imodium prn for diarrhea    RTC 1 month with prior labs     04/28/2022  Litzy Skelton MD

## 2022-04-29 LAB
ALBUMIN SERPL-MCNC: 3.2 G/DL (ref 3.5–4.7)
ALPHA-1-GLOBULIN: 0.2 G/DL (ref 0.2–0.4)
ALPHA-2-GLOBULIN: 0.7 G/DL (ref 0.5–1)
BETA GLOBULIN: 0.8 G/DL (ref 0.8–1.3)
BETA-2 MICROGLOBULIN: 1.8 MG/L (ref 0.6–2.4)
ELECTROPHORESIS: ABNORMAL
GAMMA GLOBULIN: 1 G/DL (ref 0.7–1.6)
IGA: 18 MG/DL (ref 70–400)
IGG: 256 MG/DL (ref 700–1600)
IGM: 1176 MG/DL (ref 40–230)
IMMUNOFIXATION RESULT, SERUM: NORMAL
TOTAL PROTEIN: 6.3 G/DL (ref 6.4–8.3)

## 2022-05-01 LAB
KAPPA FREE LIGHT CHAINS QNT: 65.9 MG/L (ref 3.3–19.4)
KAPPA/LAMBDA FREE LIGHT CHAIN RATIO: 17.72 (ref 0.26–1.65)
LAMBDA FREE LIGHT CHAINS QNT: 3.72 MG/L (ref 5.71–26.3)

## 2022-05-04 DIAGNOSIS — C83.00 MALIGNANT LYMPHOPLASMACYTIC LYMPHOMA (HCC): ICD-10-CM

## 2022-05-04 NOTE — PROGRESS NOTES
[address] ANGELITAChirag AsmitaRSensCor[facility]    Date: 05/04/22    Medication is currently being filled at [facility] and has been routed there. Please call us with any questions at [phone] opt.  2.

## 2022-05-26 ENCOUNTER — HOSPITAL ENCOUNTER (OUTPATIENT)
Dept: INFUSION THERAPY | Age: 77
Discharge: HOME | End: 2022-05-26
Payer: OTHER GOVERNMENT

## 2022-05-26 ENCOUNTER — OFFICE VISIT (OUTPATIENT)
Dept: ONCOLOGY | Age: 77
End: 2022-05-26
Payer: OTHER GOVERNMENT

## 2022-05-26 VITALS
HEART RATE: 83 BPM | SYSTOLIC BLOOD PRESSURE: 115 MMHG | HEIGHT: 73 IN | TEMPERATURE: 97.6 F | WEIGHT: 279.1 LBS | OXYGEN SATURATION: 94 % | BODY MASS INDEX: 36.99 KG/M2 | DIASTOLIC BLOOD PRESSURE: 61 MMHG

## 2022-05-26 DIAGNOSIS — C83.00 MALIGNANT LYMPHOPLASMACYTIC LYMPHOMA (HCC): Primary | ICD-10-CM

## 2022-05-26 DIAGNOSIS — C83.00 MALIGNANT LYMPHOPLASMACYTIC LYMPHOMA (HCC): ICD-10-CM

## 2022-05-26 LAB
ALBUMIN SERPL-MCNC: 4 G/DL (ref 3.5–5.2)
ALP BLD-CCNC: 57 U/L (ref 40–129)
ALT SERPL-CCNC: 13 U/L (ref 0–40)
ANION GAP SERPL CALCULATED.3IONS-SCNC: 15 MMOL/L (ref 7–16)
AST SERPL-CCNC: 12 U/L (ref 0–39)
BASOPHILS ABSOLUTE: 0.04 E9/L (ref 0–0.2)
BASOPHILS RELATIVE PERCENT: 0.6 % (ref 0–2)
BILIRUB SERPL-MCNC: 0.6 MG/DL (ref 0–1.2)
BUN BLDV-MCNC: 15 MG/DL (ref 6–23)
CALCIUM SERPL-MCNC: 9.7 MG/DL (ref 8.6–10.2)
CHLORIDE BLD-SCNC: 98 MMOL/L (ref 98–107)
CO2: 25 MMOL/L (ref 22–29)
CREAT SERPL-MCNC: 0.8 MG/DL (ref 0.7–1.2)
EOSINOPHILS ABSOLUTE: 0.11 E9/L (ref 0.05–0.5)
EOSINOPHILS RELATIVE PERCENT: 1.6 % (ref 0–6)
FERRITIN: 145 NG/ML
GFR AFRICAN AMERICAN: >60
GFR NON-AFRICAN AMERICAN: >60 ML/MIN/1.73
GLUCOSE BLD-MCNC: 215 MG/DL (ref 74–99)
HCT VFR BLD CALC: 47 % (ref 37–54)
HEMOGLOBIN: 15.8 G/DL (ref 12.5–16.5)
IMMATURE GRANULOCYTES #: 0.06 E9/L
IMMATURE GRANULOCYTES %: 0.9 % (ref 0–5)
IRON SATURATION: 33 % (ref 20–55)
IRON: 88 MCG/DL (ref 59–158)
LACTATE DEHYDROGENASE: 104 U/L (ref 135–225)
LYMPHOCYTES ABSOLUTE: 1.72 E9/L (ref 1.5–4)
LYMPHOCYTES RELATIVE PERCENT: 24.6 % (ref 20–42)
MCH RBC QN AUTO: 29.8 PG (ref 26–35)
MCHC RBC AUTO-ENTMCNC: 33.6 % (ref 32–34.5)
MCV RBC AUTO: 88.5 FL (ref 80–99.9)
MONOCYTES ABSOLUTE: 0.54 E9/L (ref 0.1–0.95)
MONOCYTES RELATIVE PERCENT: 7.7 % (ref 2–12)
NEUTROPHILS ABSOLUTE: 4.51 E9/L (ref 1.8–7.3)
NEUTROPHILS RELATIVE PERCENT: 64.6 % (ref 43–80)
PDW BLD-RTO: 13.7 FL (ref 11.5–15)
PLATELET # BLD: 206 E9/L (ref 130–450)
PMV BLD AUTO: 10.6 FL (ref 7–12)
POTASSIUM SERPL-SCNC: 4.2 MMOL/L (ref 3.5–5)
RBC # BLD: 5.31 E12/L (ref 3.8–5.8)
SODIUM BLD-SCNC: 138 MMOL/L (ref 132–146)
TOTAL IRON BINDING CAPACITY: 268 MCG/DL (ref 250–450)
WBC # BLD: 7 E9/L (ref 4.5–11.5)

## 2022-05-26 PROCEDURE — G8427 DOCREV CUR MEDS BY ELIG CLIN: HCPCS | Performed by: INTERNAL MEDICINE

## 2022-05-26 PROCEDURE — 86334 IMMUNOFIX E-PHORESIS SERUM: CPT

## 2022-05-26 PROCEDURE — 85025 COMPLETE CBC W/AUTO DIFF WBC: CPT

## 2022-05-26 PROCEDURE — 83883 ASSAY NEPHELOMETRY NOT SPEC: CPT

## 2022-05-26 PROCEDURE — 36415 COLL VENOUS BLD VENIPUNCTURE: CPT

## 2022-05-26 PROCEDURE — 82784 ASSAY IGA/IGD/IGG/IGM EACH: CPT

## 2022-05-26 PROCEDURE — 83550 IRON BINDING TEST: CPT

## 2022-05-26 PROCEDURE — G8417 CALC BMI ABV UP PARAM F/U: HCPCS | Performed by: INTERNAL MEDICINE

## 2022-05-26 PROCEDURE — 3017F COLORECTAL CA SCREEN DOC REV: CPT | Performed by: INTERNAL MEDICINE

## 2022-05-26 PROCEDURE — 82728 ASSAY OF FERRITIN: CPT

## 2022-05-26 PROCEDURE — 83615 LACTATE (LD) (LDH) ENZYME: CPT

## 2022-05-26 PROCEDURE — 99212 OFFICE O/P EST SF 10 MIN: CPT

## 2022-05-26 PROCEDURE — 99214 OFFICE O/P EST MOD 30 MIN: CPT | Performed by: INTERNAL MEDICINE

## 2022-05-26 PROCEDURE — 1123F ACP DISCUSS/DSCN MKR DOCD: CPT | Performed by: INTERNAL MEDICINE

## 2022-05-26 PROCEDURE — 4004F PT TOBACCO SCREEN RCVD TLK: CPT | Performed by: INTERNAL MEDICINE

## 2022-05-26 PROCEDURE — 83540 ASSAY OF IRON: CPT

## 2022-05-26 PROCEDURE — 84165 PROTEIN E-PHORESIS SERUM: CPT

## 2022-05-26 PROCEDURE — 82232 ASSAY OF BETA-2 PROTEIN: CPT

## 2022-05-26 PROCEDURE — 80053 COMPREHEN METABOLIC PANEL: CPT

## 2022-05-26 RX ORDER — ONDANSETRON 4 MG/1
4 TABLET, FILM COATED ORAL EVERY 8 HOURS PRN
Qty: 30 TABLET | Refills: 0 | Status: SHIPPED
Start: 2022-05-26 | End: 2022-07-19 | Stop reason: SDUPTHER

## 2022-05-26 NOTE — PROGRESS NOTES
Department of [facility] Oncology  Attending Clinic Note    Reason for Visit: Follow-up on a patient with Lymphoplasmacytic Lymphoma      PCP:  Rachel Smith MD    History of Present Illness:  78year old male initially seen at [facility] hematology for anemia and abnormal TP/albumin ratio. SPEP IgG and IgM kapa paraprotein, M-spike 3.29 g/dL. UIFE: IgM protein. 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   -Extensive involvement by atypitcal CD5-/CD10-B-cell lymphoproliferative disorder, comprising over 70% of marrow cellularity  -Mildly hypercellular marrow for age (30-40%) with trilineage pan hypoplasia  -Normocytic anemia. -IHC staining highlighted extensive CD20+ B cell infiltrate with admixed + plasma cells     Started on Ibrutinib 420 mg/day 12/2020 with good response so far    On 01/06/2021:  SPEP:  Monoclonal protein 1.72 g/dL  Persistent double monoclonal bands in the beta/gamma and gamma globuin regions. Bands previously identified as IgM kappa and IgG kappa (8/12/2020). IgM kappa decreased by 1.22 g/dL and IgG kappa minimally changed since last exam on 10/14/2020. Free kappa light chains: 413.4    (3.3-19. 4)   Free lambda light chains: 2.8      (5.7-26. 3)   K/L ratio:                          147.64 (0.26-1.65)    On 03/25/2021  SPEP:  Monoclonal protein: 1.03 (0-0) g/dL  Persistent double monoclonal bands in the beta/gamma and gamma globuin regions. Bands previously identified as IgM kappa and IgG kappa (8/12/2020). IgM kappa decreased by 0.69 g/dL and IgG kappa unchanged since 01/06/2021. Free kappa light chains: 149.4  (3.3-19. 4)   Free lambda light chains: 2.6    (5.7-26. 3)   K/L ratio:                          57.46 (0.26-1.65)    Patient started on oral iron 08/2020 for LANDON and B12 deficiency but required 2 doses of Feraheme on 01/21/2021 and 01/28/2021 and again in 2021. Had recurrent iron deficiency with plan for Feraheme q3 months (next one scheduled on 2021). He does IM b12 injections at home. On 2021  Hb: 15.5 Hct 44.9  MCV 90.5    WBC 6.9  BUN 10  Creat 0.9  Ca 9.3  Albumin 3.6    Fe: 94 FeSat: 33% TIBIC: 288  Ferritin: 81    VitB12 370     Ig  (700-1700)   IgA: <8     ()   IgM: 1370 ()    On 2021:  Hb 14.8  Hct 44.8  MCV 90.6  WBC 7.5    All cell line counts and morphology within normal limits. Patient's history of lymphoplasmacytic lymphoma is noted.  The currently submitted blood smear is negative for circulating plasma cells, and negative for increased/atypical lymphocytes  Fe 60 TIBC 250 FeSat 24% Ferritin 73  BUN 8  Creat 0.8  LFTs wnl  Folate/B12 wnl  Beta-2 microglobulin 2.2    Peripheral blood flow cytometry noted small B-cell population with equivocal mild kappa light chain excess (1% of total cells). SPEP: M-spike 0.8 g/dl  SIFE: IgM kappa monoclonal protein is present  IgM 1295 ()    Free kappa light chains:   80.85  (3.3-19. 4)   Free lambda light chains: 3.23    (5.7-26. 3)   K/L ratio:                           25.03 (0.26-1.65)    On 2021. Hb 16.2  Hct 47.7  MCV 88.8  WBC 7.7     BUN 10 Creat 0.80  LFTs wnl  Beta-2 microglobulin 2.0  SPEP: M-spike 1 = 0.8 g/dl              M-spike 2 = 0.1 g/dl  SIFE: IgM kappa monoclonal protein is present, as previously described. IgM 1267 ()    Free kappa light chains:   88.01  (3.3-19. 4)   Free lambda light chains: 3.33    (5.7-26. 3)   K/L ratio:                           26.43 (0.26-1.65)    On 2021:  Hb 16.4  Hct 48.1  MCV 87.5  WBC 6.9     BUN 7 Creat 0.90  LFTs wnl  Beta-2 microglobulin 1.9  SPEP: M-spike 1 = 0.7 g/dl              M-spike 2 = 0.1 g/dl  SIFE: IgM kappa monoclonal protein is present, as previously described.    IgG kappa monoclonal protein is present, as previously described    IgM 1314 ()    Free kappa light chains:   80.24  (3.3-19. 4)   Free lambda light chains: 2.88    (5.7-26. 3)   K/L ratio:                           27.86 (0.26-1.65)    On 10/18/2021:  Hb 15.9   Hct 48.0  MCV 89.1  WBC 9.3     BUN 8 Creat 0.9  LFTs wnl  Beta-2 microglobulin 2.0  SPEP: M-spike 1 = 0.7 g/dl              M-spike 2 = 0.1 g/dl  SIFE: IgM kappa + faint IgG kappa monoclonal protein is present, as previously described. IgM 1308 ()    Free kappa light chains:   80.48  (3.3-19. 4)   Free lambda light chains: 2.87    (5.7-26. 3)   K/L ratio:                           28.04 (0.26-1.65)    Continued Ibrutinib    On 11/17/2021:  Hb 15.5   Hct 46.4  MCV 87.5  WBC 7.4     BUN 13 Creat 1.2  LFTs wnl  Beta-2 microglobulin 2.3  SPEP: M-spike 1 = 0.9 g/dl              M-spike 2 = 0.1 g/dl  SIFE: IgM kappa monoclonal protein is present, as previously described. A Faint IgG kappa monoclonal protein is present, as previously described. IgM 1347 ()    Free kappa light chains:   78.93  (3.3-19. 4)   Free lambda light chains: 3.92    (5.7-26. 3)   K/L ratio:                           20.14 (0.26-1.65)    On 12/15/2021:  Hb 15.3   Hct 46.5  MCV 90.3  WBC 9.1     BUN 10 Creat 0.9  LFTs wnl  Beta-2 microglobulin 2.5  SPEP: M-spike 1 = 0.8 g/dl              M-spike 2 = 0.1 g/dl  SIFE: IgM kappa + IgG kappa monoclonal protein is present, as previously described. IgM 1365 ()    Free kappa light chains:   84.08  (3.3-19. 4)   Free lambda light chains: 4.45    (5.7-26. 3)   K/L ratio:                           18.89 (0.26-1.65)    Continued Ibrutinib    On 01/20/2022:   Hb 14.9   Hct 45.1  MCV 88.1  WBC 6.3     BUN 7 Creat 0.8  LFTs wnl  Beta-2 microglobulin 2.3  SPEP: M-spike 1 = 0.7 g/dl              M-spike 2 = 0.1 g/dl  SIFE: IgM kappa + IgG kappa monoclonal protein is present, as previously described. IgM 1142 ()    Free kappa light chains:   136.21  (3.3-19. 4) Free lambda light chains: 5.29    (5.7-26. 3)   K/L ratio:                           25.75 (0.26-1.65)    Continued Ibrutinib    On 02/24/2022:   Hb 15.4   Hct 46.7  MCV 87.8  WBC 7.5     BUN 7 Creat 0.8  LFTs wnl  Beta-2 microglobulin 2.0  SPEP: M-spike = 0.8 g/dl    SIFE: IgM kappa monoclonal protein is present, as previously described  IgM 1279 ()    Free kappa light chains:   69.11  (3.3-19. 4)   Free lambda light chains: 4.50    (5.7-26. 3)   K/L ratio:                           15.36 (0.26-1.65)    On 03/02/2022:  Hb 15.5   Hct 46.9   MCV 90.0  WBC 9.8     BUN 9 Creat 0.7  LFTs wnl  Beta-2 microglobulin 1.6  SPEP: M-spike = 0.7 g/dl    SIFE: IgM kappa monoclonal protein is present, as previously described  IgM 1367 ()    Free kappa light chains:   55.56  (3.3-19. 4)   Free lambda light chains: 6.65    (5.7-26. 3)   K/L ratio:                           8.35 (0.26-1.65)    On 03/31/2022:  Hb 14.5   Hct 43.8   MCV 89.0  WBC 6.2     BUN 8 Creat 0.7  LFTs wnl  Beta-2 microglobulin 2.2  SPEP: M-spike = 0.6 g/dl    SIFE: IgM kappa monoclonal protein is present, as previously described  IgM 1061 ()    Free kappa light chains:   59.06  (3.3-19. 4)   Free lambda light chains: 6.00    (5.7-26. 3)   K/L ratio:                           9.84 (0.26-1.65)    On 04/28/2022:  Hb 15.2   Hct 45.3   MCV 87.5  WBC 5.7     BUN 10 Creat 0.8  LFTs wnl  Beta-2 microglobulin 1.8  SPEP: M-spike = 0.7 g/dl    SIFE: IgM kappa monoclonal protein is present, as previously described  IgM 1176 ()    Free kappa light chains:   65.90  (3.3-19. 4)   Free lambda light chains: 3.72    (5.7-26. 3)   K/L ratio:                           17.72 (0.26-1.65)    Today 05/26/2022; he continues to do well. No fever, chills. Fair appetite and energy level. Intermittent nausea and diarrhea. Review of Systems;  CONSTITUTIONAL: No fever, chills. Fair appetite and energy level. ENMT: Eyes: No diplopia;  Nose: No epistaxis. Mouth: No sore throat. RESPIRATORY: No hemoptysis, shortness of breath, cough. CARDIOVASCULAR: No chest pain, palpitations. GASTROINTESTINAL: intermittent nausea and diarrhea  GENITOURINARY: No dysuria, urinary frequency, hematuria. NEURO: No syncope, presyncope, headache. 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 Allergies:  No Known Allergies    Physical Exam:  /61   Pulse 83   Temp 97.6 °F (36.4 °C)   Ht 6' 1\" (1.854 m)   Wt 279 lb 1.6 oz (126.6 kg)   SpO2 94%   BMI 36.82 kg/m²   GENERAL: Alert, oriented x 3, not in acute distress. HEENT: PERRLA; EOMI. Oropharynx clear. NECK: Supple. Without lymphadenopathy. LUNGS: Good air entry bilaterally. No wheezing, crackles or rhonchi. CARDIOVASCULAR: Regular rate. No murmurs, rubs or gallops. ABDOMEN: Soft. Non-tender, non-distended. Positive bowel sounds. EXTREMITIES: Without clubbing or cyanosis or edema. NEUROLOGIC: No focal deficits. ECOG PS 1    Lab Results   Component Value Date    WBC 7.0 05/26/2022    HGB 15.8 05/26/2022    HCT 47.0 05/26/2022    MCV 88.5 05/26/2022     05/26/2022     Impression/Plan:  77 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 UIFE: IgM protein.      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   -Extensive involvement by atypitcal CD5-/CD10-B-cell lymphoproliferative disorder, comprising over 70% of marrow cellularity  -Mildly hypercellular marrow for age (30-40%) with trilineage pan hypoplasia  -Normocytic anemia. -IHC staining highlighted extensive CD20+ B cell infiltrate with admixed + plasma cells     Started on Ibrutinib 420 mg/day 2020 with good response so far    On 2021:  SPEP:  Monoclonal protein 1.72 g/dL  Persistent double monoclonal bands in the beta/gamma and gamma globuin regions. Bands previously identified as IgM kappa and IgG kappa (2020). IgM kappa decreased by 1.22 g/dL and IgG kappa minimally changed since last exam on 10/14/2020. Free kappa light chains: 413.4    (3.3-19. 4)   Free lambda light chains: 2.8      (5.7-26. 3)   K/L ratio:                          147.64 (0.26-1.65)    On 2021  SPEP:  Monoclonal protein: 1.03 (0-0) g/dL  Persistent double monoclonal bands in the beta/gamma and gamma globuin regions. Bands previously identified as IgM kappa and IgG kappa (2020). IgM kappa decreased by 0.69 g/dL and IgG kappa unchanged since 2021. Free kappa light chains: 149.4  (3.3-19. 4)   Free lambda light chains: 2.6    (5.7-26. 3)   K/L ratio:                          57.46 (0.26-1.65)    Patient started on oral iron 2020 for LANDON and B12 deficiency but required 2 doses of Feraheme on 2021 and 2021 and again in 2021. Had recurrent iron deficiency with plan for Feraheme q3 months (next one scheduled on 2021). He does IM b12 injections at home (last one on 2021). On 2021  Hb: 15.5 Hct 44.9  MCV 90.5    WBC 6.9  BUN 10  Creat 0.9  Ca 9.3  Albumin 3.6    Fe: 94 FeSat: 33% TIBIC: 288  Ferritin: 81    VitB12 370     Ig  (700-1700)   IgA: <8     ()   IgM: 1370 ()    On 2021:  Hb 14.8  Hct 44.8  MCV 90.6  WBC 7.5    All cell line counts and morphology within normal limits.    Patient's history of lymphoplasmacytic lymphoma is noted.  The currently submitted blood smear is negative for circulating plasma cells, and negative for increased/atypical lymphocytes  Fe 60 TIBC 250 FeSat 24% Ferritin 73  BUN 8  Creat 0.8  LFTs wnl  Folate/B12 wnl  Beta-2 microglobulin 2.2    Peripheral blood flow cytometry noted small B-cell population with equivocal mild kappa light chain excess (1% of total cells). SPEP: M-spike 0.8 g/dl  SIFE: IgM kappa monoclonal protein is present  IgM 1295 ()    Free kappa light chains:   80.85  (3.3-19. 4)   Free lambda light chains: 3.23    (5.7-26. 3)   K/L ratio:                           25.03 (0.26-1.65)    On 08/16/2021. Hb 16.2  Hct 47.7  MCV 88.8  WBC 7.7     BUN 10 Creat 0.80  LFTs wnl  Beta-2 microglobulin 2.0  SPEP: M-spike 1 = 0.8 g/dl              M-spike 2 = 0.1 g/dl  SIFE: IgM kappa monoclonal protein is present, as previously described. IgM 1267 ()    Free kappa light chains:   88.01  (3.3-19. 4)   Free lambda light chains: 3.33    (5.7-26. 3)   K/L ratio:                           26.43 (0.26-1.65)    On 09/20/2021:  Hb 16.4  Hct 48.1  MCV 87.5  WBC 6.9     BUN 7 Creat 0.90  LFTs wnl  Beta-2 microglobulin 1.9  SPEP: M-spike 1 = 0.7 g/dl              M-spike 2 = 0.1 g/dl  SIFE: IgM kappa monoclonal protein is present, as previously described. IgG kappa monoclonal protein is present, as previously described    IgM 1314 ()    Free kappa light chains:   80.24  (3.3-19. 4)   Free lambda light chains: 2.88    (5.7-26. 3)   K/L ratio:                           27.86 (0.26-1.65)    On 10/18/2021:  Hb 15.9   Hct 48.0  MCV 89.1  WBC 9.3     BUN 8 Creat 0.9  LFTs wnl  Beta-2 microglobulin 2.0  SPEP: M-spike 1 = 0.7 g/dl              M-spike 2 = 0.1 g/dl  SIFE: IgM kappa + faint IgG kappa monoclonal protein is present, as previously described. IgM 1308 ()    Free kappa light chains:   80.48  (3.3-19. 4)   Free lambda light chains: 2.87    (5.7-26. 3)   K/L ratio:                           28.04 (0.26-1.65)    On 11/17/2021:  Hb 15.5   Hct 46.4  MCV 87.5  WBC 7.4   PLT 219  BUN 13 Creat 1.2  LFTs wnl  Beta-2 microglobulin 2.3  SPEP: M-spike 1 = 0.9 g/dl              M-spike 2 = 0.1 g/dl  SIFE: IgM kappa monoclonal protein is present, as previously described. A Faint IgG kappa monoclonal protein is present, as previously described. IgM 1347 ()    Free kappa light chains:   78.93  (3.3-19. 4)   Free lambda light chains: 3.92    (5.7-26. 3)   K/L ratio:                           20.14 (0.26-1.65)    On 12/15/2021:  Hb 15.3   Hct 46.5  MCV 90.3  WBC 9.1     BUN 10 Creat 0.9  LFTs wnl  Beta-2 microglobulin 2.5  SPEP: M-spike 1 = 0.8 g/dl              M-spike 2 = 0.1 g/dl  SIFE: IgM kappa + IgG kappa monoclonal protein is present, as previously described. IgM 1365 ()    Free kappa light chains:   84.08  (3.3-19. 4)   Free lambda light chains: 4.45    (5.7-26. 3)   K/L ratio:                           18.89 (0.26-1.65)    On 01/20/2022:   Hb 14.9   Hct 45.1  MCV 88.1  WBC 6.3     BUN 7 Creat 0.8  LFTs wnl  Beta-2 microglobulin 2.3  SPEP: M-spike 1 = 0.7 g/dl              M-spike 2 = 0.1 g/dl  SIFE: IgM kappa + IgG kappa monoclonal protein is present, as previously described. IgM 1142 ()    Free kappa light chains:   136.21  (3.3-19. 4)   Free lambda light chains: 5.29    (5.7-26. 3)   K/L ratio:                           25.75 (0.26-1.65)    Continued Ibrutinib    On 02/24/2022:   Hb 15.4   Hct 46.7  MCV 87.8  WBC 7.5     BUN 7 Creat 0.8  LFTs wnl  Beta-2 microglobulin 2.0  SPEP: M-spike = 0.8 g/dl    SIFE: IgM kappa monoclonal protein is present, as previously described  IgM 1279 ()    Free kappa light chains:   69.11  (3.3-19. 4)   Free lambda light chains: 4.50    (5.7-26. 3)   K/L ratio:                           15.36 (0.26-1.65)    On 03/02/2022:  Hb 15.5   Hct 46.9   MCV 90.0  WBC 9.8     BUN 9 Creat 0.7  LFTs wnl  Beta-2 microglobulin 1.6  SPEP: M-spike = 0.7 g/dl    SIFE: IgM kappa monoclonal protein is present, as previously described  IgM 1367 ()    Free kappa light chains:   55.56  (3.3-19. 4)   Free lambda light chains: 6.65    (5.7-26. 3)   K/L ratio:                           8.35 (0.26-1.65)    CT chest 03/24/2022 noted chronic changes of the lungs including emphysema with midlung scarring atelectasis greatest in the lingular segment without a definitive dominant nodule or mass.  No acute intrathoracic process. Imaging reviewed. On 03/31/2022:  Hb 14.5   Hct 43.8   MCV 89.0  WBC 6.2     BUN 8 Creat 0.7  LFTs wnl  Beta-2 microglobulin 2.2  SPEP: M-spike = 0.6 g/dl    SIFE: IgM kappa monoclonal protein is present, as previously described  IgM 1061 ()    Free kappa light chains:   59.06  (3.3-19. 4)   Free lambda light chains: 6.00    (5.7-26. 3)   K/L ratio:                           9.84 (0.26-1.65)    On 04/28/2022:  Hb 15.2   Hct 45.3   MCV 87.5  WBC 5.7     BUN 10 Creat 0.8  LFTs wnl  Beta-2 microglobulin 1.8  SPEP: M-spike = 0.7 g/dl    SIFE: IgM kappa monoclonal protein is present, as previously described  IgM 1176 ()    Free kappa light chains:   65.90  (3.3-19. 4)   Free lambda light chains: 3.72    (5.7-26. 3)   K/L ratio:                           17.72 (0.26-1.65)    Held weekly Rituximab x4 given decline in [place] free light chains  Labs reviewed.    Continue Ibrutinib   Recommended Zofran prn for nausea; prescribed to pharmacy  Recommended Imodium prn for diarrhea    RTC 1 month with prior labs     05/26/2022  Jeanette Banks MD

## 2022-05-29 LAB
KAPPA FREE LIGHT CHAINS QNT: 64.11 MG/L (ref 3.3–19.4)
KAPPA/LAMBDA FREE LIGHT CHAIN RATIO: 14.98 (ref 0.26–1.65)
LAMBDA FREE LIGHT CHAINS QNT: 4.28 MG/L (ref 5.71–26.3)

## 2022-05-31 LAB
ALBUMIN SERPL-MCNC: 3 G/DL (ref 3.5–4.7)
ALPHA-1-GLOBULIN: 0.2 G/DL (ref 0.2–0.4)
ALPHA-2-GLOBULIN: 0.9 G/DL (ref 0.5–1)
BETA GLOBULIN: 0.9 G/DL (ref 0.8–1.3)
BETA-2 MICROGLOBULIN: 2.4 MG/L (ref 0.6–2.4)
ELECTROPHORESIS: ABNORMAL
GAMMA GLOBULIN: 1.2 G/DL (ref 0.7–1.6)
IGA: 16 MG/DL (ref 70–400)
IGG: 225 MG/DL (ref 700–1600)
IGM: 1161 MG/DL (ref 40–230)
IMMUNOFIXATION RESULT, SERUM: NORMAL
TOTAL PROTEIN: 6.5 G/DL (ref 6.4–8.3)

## 2022-06-23 ENCOUNTER — HOSPITAL ENCOUNTER (OUTPATIENT)
Dept: INFUSION THERAPY | Age: 77
Discharge: HOME | End: 2022-06-23
Payer: OTHER GOVERNMENT

## 2022-06-23 ENCOUNTER — OFFICE VISIT (OUTPATIENT)
Dept: ONCOLOGY | Age: 77
End: 2022-06-23
Payer: MEDICARE

## 2022-06-23 ENCOUNTER — TELEPHONE (OUTPATIENT)
Dept: INFUSION THERAPY | Age: 77
End: 2022-06-23

## 2022-06-23 VITALS
TEMPERATURE: 97.8 F | HEART RATE: 73 BPM | OXYGEN SATURATION: 94 % | SYSTOLIC BLOOD PRESSURE: 181 MMHG | HEIGHT: 73 IN | BODY MASS INDEX: 37.57 KG/M2 | DIASTOLIC BLOOD PRESSURE: 89 MMHG | WEIGHT: 283.5 LBS

## 2022-06-23 DIAGNOSIS — C83.00 MALIGNANT LYMPHOPLASMACYTIC LYMPHOMA (HCC): ICD-10-CM

## 2022-06-23 DIAGNOSIS — C83.00 MALIGNANT LYMPHOPLASMACYTIC LYMPHOMA (HCC): Primary | ICD-10-CM

## 2022-06-23 LAB
ALBUMIN SERPL-MCNC: 4 G/DL (ref 3.5–5.2)
ALP BLD-CCNC: 54 U/L (ref 40–129)
ALT SERPL-CCNC: 12 U/L (ref 0–40)
ANION GAP SERPL CALCULATED.3IONS-SCNC: 11 MMOL/L (ref 7–16)
AST SERPL-CCNC: 13 U/L (ref 0–39)
BASOPHILS ABSOLUTE: 0.04 E9/L (ref 0–0.2)
BASOPHILS RELATIVE PERCENT: 0.5 % (ref 0–2)
BILIRUB SERPL-MCNC: 0.9 MG/DL (ref 0–1.2)
BUN BLDV-MCNC: 10 MG/DL (ref 6–23)
CALCIUM SERPL-MCNC: 9.4 MG/DL (ref 8.6–10.2)
CHLORIDE BLD-SCNC: 97 MMOL/L (ref 98–107)
CO2: 26 MMOL/L (ref 22–29)
CREAT SERPL-MCNC: 0.8 MG/DL (ref 0.7–1.2)
EOSINOPHILS ABSOLUTE: 0.13 E9/L (ref 0.05–0.5)
EOSINOPHILS RELATIVE PERCENT: 1.6 % (ref 0–6)
FERRITIN: 146 NG/ML
GFR AFRICAN AMERICAN: >60
GFR NON-AFRICAN AMERICAN: >60 ML/MIN/1.73
GLUCOSE BLD-MCNC: 248 MG/DL (ref 74–99)
HCT VFR BLD CALC: 47.1 % (ref 37–54)
HEMOGLOBIN: 15.5 G/DL (ref 12.5–16.5)
IMMATURE GRANULOCYTES #: 0.11 E9/L
IMMATURE GRANULOCYTES %: 1.4 % (ref 0–5)
IRON SATURATION: 55 % (ref 20–55)
IRON: 162 MCG/DL (ref 59–158)
LACTATE DEHYDROGENASE: 114 U/L (ref 135–225)
LYMPHOCYTES ABSOLUTE: 1.92 E9/L (ref 1.5–4)
LYMPHOCYTES RELATIVE PERCENT: 24.2 % (ref 20–42)
MCH RBC QN AUTO: 29.4 PG (ref 26–35)
MCHC RBC AUTO-ENTMCNC: 32.9 % (ref 32–34.5)
MCV RBC AUTO: 89.2 FL (ref 80–99.9)
MONOCYTES ABSOLUTE: 0.61 E9/L (ref 0.1–0.95)
MONOCYTES RELATIVE PERCENT: 7.7 % (ref 2–12)
NEUTROPHILS ABSOLUTE: 5.11 E9/L (ref 1.8–7.3)
NEUTROPHILS RELATIVE PERCENT: 64.6 % (ref 43–80)
PDW BLD-RTO: 13.5 FL (ref 11.5–15)
PLATELET # BLD: 203 E9/L (ref 130–450)
PMV BLD AUTO: 10.3 FL (ref 7–12)
POTASSIUM SERPL-SCNC: 3.7 MMOL/L (ref 3.5–5)
RBC # BLD: 5.28 E12/L (ref 3.8–5.8)
SODIUM BLD-SCNC: 134 MMOL/L (ref 132–146)
TOTAL IRON BINDING CAPACITY: 295 MCG/DL (ref 250–450)
WBC # BLD: 7.9 E9/L (ref 4.5–11.5)

## 2022-06-23 PROCEDURE — 83615 LACTATE (LD) (LDH) ENZYME: CPT

## 2022-06-23 PROCEDURE — G8417 CALC BMI ABV UP PARAM F/U: HCPCS | Performed by: INTERNAL MEDICINE

## 2022-06-23 PROCEDURE — 86334 IMMUNOFIX E-PHORESIS SERUM: CPT

## 2022-06-23 PROCEDURE — 83550 IRON BINDING TEST: CPT

## 2022-06-23 PROCEDURE — 82728 ASSAY OF FERRITIN: CPT

## 2022-06-23 PROCEDURE — 4004F PT TOBACCO SCREEN RCVD TLK: CPT | Performed by: INTERNAL MEDICINE

## 2022-06-23 PROCEDURE — 85025 COMPLETE CBC W/AUTO DIFF WBC: CPT

## 2022-06-23 PROCEDURE — 82232 ASSAY OF BETA-2 PROTEIN: CPT

## 2022-06-23 PROCEDURE — 36415 COLL VENOUS BLD VENIPUNCTURE: CPT

## 2022-06-23 PROCEDURE — 83540 ASSAY OF IRON: CPT

## 2022-06-23 PROCEDURE — 1123F ACP DISCUSS/DSCN MKR DOCD: CPT | Performed by: INTERNAL MEDICINE

## 2022-06-23 PROCEDURE — 99212 OFFICE O/P EST SF 10 MIN: CPT

## 2022-06-23 PROCEDURE — 99214 OFFICE O/P EST MOD 30 MIN: CPT | Performed by: INTERNAL MEDICINE

## 2022-06-23 PROCEDURE — 80053 COMPREHEN METABOLIC PANEL: CPT

## 2022-06-23 PROCEDURE — G8427 DOCREV CUR MEDS BY ELIG CLIN: HCPCS | Performed by: INTERNAL MEDICINE

## 2022-06-23 PROCEDURE — 82784 ASSAY IGA/IGD/IGG/IGM EACH: CPT

## 2022-06-23 PROCEDURE — 83883 ASSAY NEPHELOMETRY NOT SPEC: CPT

## 2022-06-23 PROCEDURE — 84165 PROTEIN E-PHORESIS SERUM: CPT

## 2022-06-23 PROCEDURE — 3017F COLORECTAL CA SCREEN DOC REV: CPT | Performed by: INTERNAL MEDICINE

## 2022-06-23 NOTE — PROGRESS NOTES
Department of [facility] Oncology  Attending Clinic Note    Reason for Visit: Follow-up on a patient with Lymphoplasmacytic Lymphoma      PCP:  [place] MD    History of Present Illness:  78year old male initially seen at [facility] hematology for anemia and abnormal TP/albumin ratio. SPEP IgG and IgM kapa paraprotein, M-spike 3.29 g/dL. UIFE: IgM protein. 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   -Extensive involvement by atypitcal CD5-/CD10-B-cell lymphoproliferative disorder, comprising over 70% of marrow cellularity  -Mildly hypercellular marrow for age (30-40%) with trilineage pan hypoplasia  -Normocytic anemia. -IHC staining highlighted extensive CD20+ B cell infiltrate with admixed + plasma cells     Started on Ibrutinib 420 mg/day 12/2020 with good response so far    On 01/06/2021:  SPEP:  Monoclonal protein 1.72 g/dL  Persistent double monoclonal bands in the beta/gamma and gamma globuin regions. Bands previously identified as IgM kappa and IgG kappa (8/12/2020). IgM kappa decreased by 1.22 g/dL and IgG kappa minimally changed since last exam on 10/14/2020. Free kappa light chains: 413.4    (3.3-19. 4)   Free lambda light chains: 2.8      (5.7-26. 3)   K/L ratio:                          147.64 (0.26-1.65)    On 03/25/2021  SPEP:  Monoclonal protein: 1.03 (0-0) g/dL  Persistent double monoclonal bands in the beta/gamma and gamma globuin regions. Bands previously identified as IgM kappa and IgG kappa (8/12/2020). IgM kappa decreased by 0.69 g/dL and IgG kappa unchanged since 01/06/2021. Free kappa light chains: 149.4  (3.3-19. 4)   Free lambda light chains: 2.6    (5.7-26. 3)   K/L ratio:                          57.46 (0.26-1.65)    Patient started on oral iron 08/2020 for LANDON and B12 deficiency but required 2 doses of Feraheme on 01/21/2021 and 01/28/2021 and again in 2021. Had recurrent iron deficiency with plan for Feraheme q3 months (next one scheduled on 2021). He does IM b12 injections at home. On 2021  Hb: 15.5 Hct 44.9  MCV 90.5    WBC 6.9  BUN 10  Creat 0.9  Ca 9.3  Albumin 3.6    Fe: 94 FeSat: 33% TIBIC: 288  Ferritin: 81    VitB12 370     Ig  (700-1700)   IgA: <8     ()   IgM: 1370 ()    On 2021:  Hb 14.8  Hct 44.8  MCV 90.6  WBC 7.5    All cell line counts and morphology within normal limits. Patient's history of lymphoplasmacytic lymphoma is noted.  The currently submitted blood smear is negative for circulating plasma cells, and negative for increased/atypical lymphocytes  Fe 60 TIBC 250 FeSat 24% Ferritin 73  BUN 8  Creat 0.8  LFTs wnl  Folate/B12 wnl  Beta-2 microglobulin 2.2    Peripheral blood flow cytometry noted small B-cell population with equivocal mild kappa light chain excess (1% of total cells). SPEP: M-spike 0.8 g/dl  SIFE: IgM kappa monoclonal protein is present  IgM 1295 ()    Free kappa light chains:   80.85  (3.3-19. 4)   Free lambda light chains: 3.23    (5.7-26. 3)   K/L ratio:                           25.03 (0.26-1.65)    On 2021. Hb 16.2  Hct 47.7  MCV 88.8  WBC 7.7     BUN 10 Creat 0.80  LFTs wnl  Beta-2 microglobulin 2.0  SPEP: M-spike 1 = 0.8 g/dl              M-spike 2 = 0.1 g/dl  SIFE: IgM kappa monoclonal protein is present, as previously described. IgM 1267 ()    Free kappa light chains:   88.01  (3.3-19. 4)   Free lambda light chains: 3.33    (5.7-26. 3)   K/L ratio:                           26.43 (0.26-1.65)    On 2021:  Hb 16.4  Hct 48.1  MCV 87.5  WBC 6.9     BUN 7 Creat 0.90  LFTs wnl  Beta-2 microglobulin 1.9  SPEP: M-spike 1 = 0.7 g/dl              M-spike 2 = 0.1 g/dl  SIFE: IgM kappa monoclonal protein is present, as previously described.    IgG kappa monoclonal protein is present, as previously described    IgM 1314 ()    Free kappa light chains:   80.24  (3.3-19. 4)   Free lambda light chains: 2.88    (5.7-26. 3)   K/L ratio:                           27.86 (0.26-1.65)    On 10/18/2021:  Hb 15.9   Hct 48.0  MCV 89.1  WBC 9.3     BUN 8 Creat 0.9  LFTs wnl  Beta-2 microglobulin 2.0  SPEP: M-spike 1 = 0.7 g/dl              M-spike 2 = 0.1 g/dl  SIFE: IgM kappa + faint IgG kappa monoclonal protein is present, as previously described. IgM 1308 ()    Free kappa light chains:   80.48  (3.3-19. 4)   Free lambda light chains: 2.87    (5.7-26. 3)   K/L ratio:                           28.04 (0.26-1.65)    Continued Ibrutinib    On 11/17/2021:  Hb 15.5   Hct 46.4  MCV 87.5  WBC 7.4     BUN 13 Creat 1.2  LFTs wnl  Beta-2 microglobulin 2.3  SPEP: M-spike 1 = 0.9 g/dl              M-spike 2 = 0.1 g/dl  SIFE: IgM kappa monoclonal protein is present, as previously described. A Faint IgG kappa monoclonal protein is present, as previously described. IgM 1347 ()    Free kappa light chains:   78.93  (3.3-19. 4)   Free lambda light chains: 3.92    (5.7-26. 3)   K/L ratio:                           20.14 (0.26-1.65)    On 12/15/2021:  Hb 15.3   Hct 46.5  MCV 90.3  WBC 9.1     BUN 10 Creat 0.9  LFTs wnl  Beta-2 microglobulin 2.5  SPEP: M-spike 1 = 0.8 g/dl              M-spike 2 = 0.1 g/dl  SIFE: IgM kappa + IgG kappa monoclonal protein is present, as previously described. IgM 1365 ()    Free kappa light chains:   84.08  (3.3-19. 4)   Free lambda light chains: 4.45    (5.7-26. 3)   K/L ratio:                           18.89 (0.26-1.65)    Continued Ibrutinib    On 01/20/2022:   Hb 14.9   Hct 45.1  MCV 88.1  WBC 6.3     BUN 7 Creat 0.8  LFTs wnl  Beta-2 microglobulin 2.3  SPEP: M-spike 1 = 0.7 g/dl              M-spike 2 = 0.1 g/dl  SIFE: IgM kappa + IgG kappa monoclonal protein is present, as previously described. IgM 1142 ()    Free kappa light chains:   136.21  (3.3-19. 4) Free lambda light chains: 5.29    (5.7-26. 3)   K/L ratio:                           25.75 (0.26-1.65)    Continued Ibrutinib    On 02/24/2022:   Hb 15.4   Hct 46.7  MCV 87.8  WBC 7.5     BUN 7 Creat 0.8  LFTs wnl  Beta-2 microglobulin 2.0  SPEP: M-spike = 0.8 g/dl    SIFE: IgM kappa monoclonal protein is present, as previously described  IgM 1279 ()    Free kappa light chains:   69.11  (3.3-19. 4)   Free lambda light chains: 4.50    (5.7-26. 3)   K/L ratio:                           15.36 (0.26-1.65)    On 03/02/2022:  Hb 15.5   Hct 46.9   MCV 90.0  WBC 9.8     BUN 9 Creat 0.7  LFTs wnl  Beta-2 microglobulin 1.6  SPEP: M-spike = 0.7 g/dl    SIFE: IgM kappa monoclonal protein is present, as previously described  IgM 1367 ()    Free kappa light chains:   55.56  (3.3-19. 4)   Free lambda light chains: 6.65    (5.7-26. 3)   K/L ratio:                           8.35 (0.26-1.65)    On 03/31/2022:  Hb 14.5   Hct 43.8   MCV 89.0  WBC 6.2     BUN 8 Creat 0.7  LFTs wnl  Beta-2 microglobulin 2.2  SPEP: M-spike = 0.6 g/dl    SIFE: IgM kappa monoclonal protein is present, as previously described  IgM 1061 ()    Free kappa light chains:   59.06  (3.3-19. 4)   Free lambda light chains: 6.00    (5.7-26. 3)   K/L ratio:                           9.84 (0.26-1.65)    On 04/28/2022:  Hb 15.2   Hct 45.3   MCV 87.5  WBC 5.7     BUN 10 Creat 0.8  LFTs wnl  Beta-2 microglobulin 1.8  SPEP: M-spike = 0.7 g/dl    SIFE: IgM kappa monoclonal protein is present, as previously described  IgM 1176 ()    Free kappa light chains:   65.90  (3.3-19. 4)   Free lambda light chains: 3.72    (5.7-26. 3)   K/L ratio:                           17.72 (0.26-1.65)    On 05/26/2022:  Hb 15.8   Hct 47.0   MCV 88.5  WBC 7.0     BUN 15 Creat 0.8  LFTs wnl  Beta-2 microglobulin 2.4  SPEP: M-spike = 0.8 g/dl    SIFE: IgM kappa monoclonal protein is present, as previously described  IgM 1161 ()    Free kappa light chains:   64.11  (3.3-19. 4)   Free lambda light chains: 4.28    (5.7-26. 3)   K/L ratio:                           14.98 (0.26-1.65)    Today 06/23/2022; He continues to do well. No fever, chills. Fair appetite and energy level. Intermittent nausea. Review of Systems;  CONSTITUTIONAL: No fever, chills. Fair appetite and energy level. ENMT: Eyes: No diplopia; Nose: No epistaxis. Mouth: No sore throat. RESPIRATORY: No hemoptysis, shortness of breath, cough. CARDIOVASCULAR: No chest pain, palpitations. GASTROINTESTINAL: intermittent nausea  GENITOURINARY: No dysuria, urinary frequency, hematuria. NEURO: No syncope, presyncope, headache. 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 Allergies:  No Known Allergies    Physical Exam:  BP (!) 181/89   Pulse 73   Temp 97.8 °F (36.6 °C)   Ht 6' 1\" (1.854 m)   Wt 283 lb 8 oz (128.6 kg)   SpO2 94%   BMI 37.40 kg/m²   GENERAL: Alert, oriented x 3, not in acute distress. HEENT: PERRLA; EOMI. Oropharynx clear. NECK: Supple. Without lymphadenopathy. LUNGS: Good air entry bilaterally. No wheezing, crackles or rhonchi. CARDIOVASCULAR: Regular rate. No murmurs, rubs or gallops. ABDOMEN: Soft. Non-tender, non-distended. EXTREMITIES: Without clubbing or cyanosis or edema. NEUROLOGIC: No focal deficits. ECOG PS 1    Lab Results   Component Value Date    WBC 7.9 06/23/2022    HGB 15.5 06/23/2022    HCT 47.1 06/23/2022    MCV 89.2 06/23/2022     06/23/2022     Impression/Plan:  77 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 UIFE: IgM protein.      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   -Extensive involvement by atypitcal CD5-/CD10-B-cell lymphoproliferative disorder, comprising over 70% of marrow cellularity  -Mildly hypercellular marrow for age (30-40%) with trilineage pan hypoplasia  -Normocytic anemia. -IHC staining highlighted extensive CD20+ B cell infiltrate with admixed + plasma cells     Started on Ibrutinib 420 mg/day 2020 with good response so far    On 2021:  SPEP:  Monoclonal protein 1.72 g/dL  Persistent double monoclonal bands in the beta/gamma and gamma globuin regions. Bands previously identified as IgM kappa and IgG kappa (2020). IgM kappa decreased by 1.22 g/dL and IgG kappa minimally changed since last exam on 10/14/2020. Free kappa light chains: 413.4    (3.3-19. 4)   Free lambda light chains: 2.8      (5.7-26. 3)   K/L ratio:                          147.64 (0.26-1.65)    On 2021  SPEP:  Monoclonal protein: 1.03 (0-0) g/dL  Persistent double monoclonal bands in the beta/gamma and gamma globuin regions. Bands previously identified as IgM kappa and IgG kappa (2020). IgM kappa decreased by 0.69 g/dL and IgG kappa unchanged since 2021. Free kappa light chains: 149.4  (3.3-19. 4)   Free lambda light chains: 2.6    (5.7-26. 3)   K/L ratio:                          57.46 (0.26-1.65)    Patient started on oral iron 2020 for LANDON and B12 deficiency but required 2 doses of Feraheme on 2021 and 2021 and again in 2021. Had recurrent iron deficiency with plan for Feraheme q3 months (next one scheduled on 2021). He does IM b12 injections at home (last one on 2021).      On 2021  Hb: 15.5 Hct 44.9  MCV 90.5    WBC 6.9  BUN 10  Creat 0.9  Ca 9.3  Albumin 3.6    Fe: 94 FeSat: 33% TIBIC: 288  Ferritin: 81    VitB12 370     Ig  (700-1700)   IgA: <8     () IgM: 1370 ()    On 06/30/2021:  Hb 14.8  Hct 44.8  MCV 90.6  WBC 7.5    All cell line counts and morphology within normal limits. Patient's history of lymphoplasmacytic lymphoma is noted.  The currently submitted blood smear is negative for circulating plasma cells, and negative for increased/atypical lymphocytes  Fe 60 TIBC 250 FeSat 24% Ferritin 73  BUN 8  Creat 0.8  LFTs wnl  Folate/B12 wnl  Beta-2 microglobulin 2.2    Peripheral blood flow cytometry noted small B-cell population with equivocal mild kappa light chain excess (1% of total cells). SPEP: M-spike 0.8 g/dl  SIFE: IgM kappa monoclonal protein is present  IgM 1295 ()    Free kappa light chains:   80.85  (3.3-19. 4)   Free lambda light chains: 3.23    (5.7-26. 3)   K/L ratio:                           25.03 (0.26-1.65)    On 08/16/2021. Hb 16.2  Hct 47.7  MCV 88.8  WBC 7.7     BUN 10 Creat 0.80  LFTs wnl  Beta-2 microglobulin 2.0  SPEP: M-spike 1 = 0.8 g/dl              M-spike 2 = 0.1 g/dl  SIFE: IgM kappa monoclonal protein is present, as previously described. IgM 1267 ()    Free kappa light chains:   88.01  (3.3-19. 4)   Free lambda light chains: 3.33    (5.7-26. 3)   K/L ratio:                           26.43 (0.26-1.65)    On 09/20/2021:  Hb 16.4  Hct 48.1  MCV 87.5  WBC 6.9     BUN 7 Creat 0.90  LFTs wnl  Beta-2 microglobulin 1.9  SPEP: M-spike 1 = 0.7 g/dl              M-spike 2 = 0.1 g/dl  SIFE: IgM kappa monoclonal protein is present, as previously described. IgG kappa monoclonal protein is present, as previously described    IgM 1314 ()    Free kappa light chains:   80.24  (3.3-19. 4)   Free lambda light chains: 2.88    (5.7-26. 3)   K/L ratio:                           27.86 (0.26-1.65)    On 10/18/2021:  Hb 15.9   Hct 48.0  MCV 89.1  WBC 9.3     BUN 8 Creat 0.9  LFTs wnl  Beta-2 microglobulin 2.0  SPEP: M-spike 1 = 0.7 g/dl              M-spike 2 = 0.1 g/dl  SIFE: IgM kappa + faint IgG kappa monoclonal protein is present, as previously described. IgM 1308 ()    Free kappa light chains:   80.48  (3.3-19. 4)   Free lambda light chains: 2.87    (5.7-26. 3)   K/L ratio:                           28.04 (0.26-1.65)    On 11/17/2021:  Hb 15.5   Hct 46.4  MCV 87.5  WBC 7.4     BUN 13 Creat 1.2  LFTs wnl  Beta-2 microglobulin 2.3  SPEP: M-spike 1 = 0.9 g/dl              M-spike 2 = 0.1 g/dl  SIFE: IgM kappa monoclonal protein is present, as previously described. A Faint IgG kappa monoclonal protein is present, as previously described. IgM 1347 ()    Free kappa light chains:   78.93  (3.3-19. 4)   Free lambda light chains: 3.92    (5.7-26. 3)   K/L ratio:                           20.14 (0.26-1.65)    On 12/15/2021:  Hb 15.3   Hct 46.5  MCV 90.3  WBC 9.1     BUN 10 Creat 0.9  LFTs wnl  Beta-2 microglobulin 2.5  SPEP: M-spike 1 = 0.8 g/dl              M-spike 2 = 0.1 g/dl  SIFE: IgM kappa + IgG kappa monoclonal protein is present, as previously described. IgM 1365 ()    Free kappa light chains:   84.08  (3.3-19. 4)   Free lambda light chains: 4.45    (5.7-26. 3)   K/L ratio:                           18.89 (0.26-1.65)    On 01/20/2022:   Hb 14.9   Hct 45.1  MCV 88.1  WBC 6.3     BUN 7 Creat 0.8  LFTs wnl  Beta-2 microglobulin 2.3  SPEP: M-spike 1 = 0.7 g/dl              M-spike 2 = 0.1 g/dl  SIFE: IgM kappa + IgG kappa monoclonal protein is present, as previously described. IgM 1142 ()    Free kappa light chains:   136.21  (3.3-19. 4)   Free lambda light chains: 5.29    (5.7-26. 3)   K/L ratio:                           25.75 (0.26-1.65)    Continued Ibrutinib    On 02/24/2022:   Hb 15.4   Hct 46.7  MCV 87.8  WBC 7.5     BUN 7 Creat 0.8  LFTs wnl  Beta-2 microglobulin 2.0  SPEP: M-spike = 0.8 g/dl    SIFE: IgM kappa monoclonal protein is present, as previously described  IgM 1279 ()    Free kappa light chains:   69.11  (3.3-19. 4)   Free lambda light chains: 4.50    (5.7-26. 3)   K/L ratio:                           15.36 (0.26-1.65)    On 03/02/2022:  Hb 15.5   Hct 46.9   MCV 90.0  WBC 9.8     BUN 9 Creat 0.7  LFTs wnl  Beta-2 microglobulin 1.6  SPEP: M-spike = 0.7 g/dl    SIFE: IgM kappa monoclonal protein is present, as previously described  IgM 1367 ()    Free kappa light chains:   55.56  (3.3-19. 4)   Free lambda light chains: 6.65    (5.7-26. 3)   K/L ratio:                           8.35 (0.26-1.65)    CT chest 03/24/2022 noted chronic changes of the lungs including emphysema with midlung scarring atelectasis greatest in the lingular segment without a definitive dominant nodule or mass.  No acute intrathoracic process. Imaging reviewed. On 03/31/2022:  Hb 14.5   Hct 43.8   MCV 89.0  WBC 6.2     BUN 8 Creat 0.7  LFTs wnl  Beta-2 microglobulin 2.2  SPEP: M-spike = 0.6 g/dl    SIFE: IgM kappa monoclonal protein is present, as previously described  IgM 1061 ()    Free kappa light chains:   59.06  (3.3-19. 4)   Free lambda light chains: 6.00    (5.7-26. 3)   K/L ratio:                           9.84 (0.26-1.65)    On 04/28/2022:  Hb 15.2   Hct 45.3   MCV 87.5  WBC 5.7     BUN 10 Creat 0.8  LFTs wnl  Beta-2 microglobulin 1.8  SPEP: M-spike = 0.7 g/dl    SIFE: IgM kappa monoclonal protein is present, as previously described  IgM 1176 ()    Free kappa light chains:   65.90  (3.3-19. 4)   Free lambda light chains: 3.72    (5.7-26. 3)   K/L ratio:                           17.72 (0.26-1.65)    On 05/26/2022:  Hb 15.8   Hct 47.0   MCV 88.5  WBC 7.0     BUN 15 Creat 0.8  LFTs wnl  Beta-2 microglobulin 2.4  SPEP: M-spike = 0.8 g/dl    SIFE: IgM kappa monoclonal protein is present, as previously described  IgM 1161 ()    Free kappa light chains:   64.11  (3.3-19. 4)   Free lambda light chains: 4.28    (5.7-26. 3)   K/L ratio:                           14.98 (0.26-1.65)    Labs reviewed.    Held weekly Rituximab x 4 given decline in kappa free light chains  Continue Ibrutinib  Zofran prn for nausea    RTC 1 month with prior labs     06/23/2022  Gilma Cunningham MD

## 2022-06-23 NOTE — TELEPHONE ENCOUNTER
Multiple phone calls/emails/fax sent to [facility] attempting to receive a renewal for community care [facility] authorization. Spoke with Fred Mcmullen and [place]. Left another message today for Mervin requesting updated [facility] authorization for continuation of care. Previously faxed and also secure emailed recent clinicals.   Awaiting call back/fax renewal.

## 2022-06-25 LAB
ALBUMIN SERPL-MCNC: 3 G/DL (ref 3.5–4.7)
ALPHA-1-GLOBULIN: 0.2 G/DL (ref 0.2–0.4)
ALPHA-2-GLOBULIN: 0.8 G/DL (ref 0.5–1)
BETA GLOBULIN: 1 G/DL (ref 0.8–1.3)
ELECTROPHORESIS: ABNORMAL
GAMMA GLOBULIN: 1.3 G/DL (ref 0.7–1.6)
IGA: 17 MG/DL (ref 70–400)
IGG: 232 MG/DL (ref 700–1600)
IGM: 1089 MG/DL (ref 40–230)
IMMUNOFIXATION RESULT, SERUM: NORMAL
TOTAL PROTEIN: 6.5 G/DL (ref 6.4–8.3)

## 2022-06-26 LAB
KAPPA FREE LIGHT CHAINS QNT: 56.2 MG/L (ref 3.3–19.4)
KAPPA/LAMBDA FREE LIGHT CHAIN RATIO: 12.83 (ref 0.26–1.65)
LAMBDA FREE LIGHT CHAINS QNT: 4.38 MG/L (ref 5.71–26.3)

## 2022-06-27 LAB — BETA-2 MICROGLOBULIN: 2.3 MG/L (ref 0.6–2.4)

## 2022-07-19 RX ORDER — ONDANSETRON 4 MG/1
4 TABLET, FILM COATED ORAL EVERY 8 HOURS PRN
Qty: 30 TABLET | Refills: 1 | Status: SHIPPED | OUTPATIENT
Start: 2022-07-19 | End: 2022-10-31 | Stop reason: SDUPTHER

## 2022-07-21 ENCOUNTER — HOSPITAL ENCOUNTER (OUTPATIENT)
Dept: INFUSION THERAPY | Age: 77
Discharge: HOME | End: 2022-07-21
Payer: OTHER GOVERNMENT

## 2022-07-21 ENCOUNTER — OFFICE VISIT (OUTPATIENT)
Dept: ONCOLOGY | Age: 77
End: 2022-07-21
Payer: MEDICARE

## 2022-07-21 VITALS
TEMPERATURE: 97.2 F | WEIGHT: 284 LBS | HEART RATE: 73 BPM | OXYGEN SATURATION: 95 % | HEIGHT: 73 IN | DIASTOLIC BLOOD PRESSURE: 65 MMHG | BODY MASS INDEX: 37.64 KG/M2 | SYSTOLIC BLOOD PRESSURE: 112 MMHG

## 2022-07-21 DIAGNOSIS — C83.00 MALIGNANT LYMPHOPLASMACYTIC LYMPHOMA (HCC): Primary | ICD-10-CM

## 2022-07-21 DIAGNOSIS — C83.00 MALIGNANT LYMPHOPLASMACYTIC LYMPHOMA (HCC): ICD-10-CM

## 2022-07-21 LAB
ALBUMIN SERPL-MCNC: 3.9 G/DL (ref 3.5–5.2)
ALP BLD-CCNC: 64 U/L (ref 40–129)
ALT SERPL-CCNC: 11 U/L (ref 0–40)
ANION GAP SERPL CALCULATED.3IONS-SCNC: 14 MMOL/L (ref 7–16)
AST SERPL-CCNC: 11 U/L (ref 0–39)
BASOPHILS ABSOLUTE: 0.05 E9/L (ref 0–0.2)
BASOPHILS RELATIVE PERCENT: 0.7 % (ref 0–2)
BILIRUB SERPL-MCNC: 0.6 MG/DL (ref 0–1.2)
BUN BLDV-MCNC: 11 MG/DL (ref 6–23)
CALCIUM SERPL-MCNC: 9.1 MG/DL (ref 8.6–10.2)
CHLORIDE BLD-SCNC: 102 MMOL/L (ref 98–107)
CO2: 24 MMOL/L (ref 22–29)
CREAT SERPL-MCNC: 0.7 MG/DL (ref 0.7–1.2)
EOSINOPHILS ABSOLUTE: 0.11 E9/L (ref 0.05–0.5)
EOSINOPHILS RELATIVE PERCENT: 1.6 % (ref 0–6)
FERRITIN: 127 NG/ML
GFR AFRICAN AMERICAN: >60
GFR NON-AFRICAN AMERICAN: >60 ML/MIN/1.73
GLUCOSE BLD-MCNC: 210 MG/DL (ref 74–99)
HCT VFR BLD CALC: 44.5 % (ref 37–54)
HEMOGLOBIN: 14.7 G/DL (ref 12.5–16.5)
IMMATURE GRANULOCYTES #: 0.08 E9/L
IMMATURE GRANULOCYTES %: 1.1 % (ref 0–5)
IRON SATURATION: 23 % (ref 20–55)
IRON: 62 MCG/DL (ref 59–158)
LACTATE DEHYDROGENASE: 101 U/L (ref 135–225)
LYMPHOCYTES ABSOLUTE: 1.56 E9/L (ref 1.5–4)
LYMPHOCYTES RELATIVE PERCENT: 22.1 % (ref 20–42)
MCH RBC QN AUTO: 29.7 PG (ref 26–35)
MCHC RBC AUTO-ENTMCNC: 33 % (ref 32–34.5)
MCV RBC AUTO: 89.9 FL (ref 80–99.9)
MONOCYTES ABSOLUTE: 0.56 E9/L (ref 0.1–0.95)
MONOCYTES RELATIVE PERCENT: 7.9 % (ref 2–12)
NEUTROPHILS ABSOLUTE: 4.7 E9/L (ref 1.8–7.3)
NEUTROPHILS RELATIVE PERCENT: 66.6 % (ref 43–80)
PDW BLD-RTO: 13.5 FL (ref 11.5–15)
PLATELET # BLD: 181 E9/L (ref 130–450)
PMV BLD AUTO: 10.8 FL (ref 7–12)
POTASSIUM SERPL-SCNC: 3.9 MMOL/L (ref 3.5–5)
RBC # BLD: 4.95 E12/L (ref 3.8–5.8)
SODIUM BLD-SCNC: 140 MMOL/L (ref 132–146)
TOTAL IRON BINDING CAPACITY: 268 MCG/DL (ref 250–450)
WBC # BLD: 7.1 E9/L (ref 4.5–11.5)

## 2022-07-21 PROCEDURE — 83540 ASSAY OF IRON: CPT

## 2022-07-21 PROCEDURE — 85025 COMPLETE CBC W/AUTO DIFF WBC: CPT

## 2022-07-21 PROCEDURE — 3017F COLORECTAL CA SCREEN DOC REV: CPT | Performed by: INTERNAL MEDICINE

## 2022-07-21 PROCEDURE — 99212 OFFICE O/P EST SF 10 MIN: CPT

## 2022-07-21 PROCEDURE — 80053 COMPREHEN METABOLIC PANEL: CPT

## 2022-07-21 PROCEDURE — 84165 PROTEIN E-PHORESIS SERUM: CPT

## 2022-07-21 PROCEDURE — 82784 ASSAY IGA/IGD/IGG/IGM EACH: CPT

## 2022-07-21 PROCEDURE — G8427 DOCREV CUR MEDS BY ELIG CLIN: HCPCS | Performed by: INTERNAL MEDICINE

## 2022-07-21 PROCEDURE — 1123F ACP DISCUSS/DSCN MKR DOCD: CPT | Performed by: INTERNAL MEDICINE

## 2022-07-21 PROCEDURE — 99214 OFFICE O/P EST MOD 30 MIN: CPT | Performed by: INTERNAL MEDICINE

## 2022-07-21 PROCEDURE — G8417 CALC BMI ABV UP PARAM F/U: HCPCS | Performed by: INTERNAL MEDICINE

## 2022-07-21 PROCEDURE — 36415 COLL VENOUS BLD VENIPUNCTURE: CPT

## 2022-07-21 PROCEDURE — 83883 ASSAY NEPHELOMETRY NOT SPEC: CPT

## 2022-07-21 PROCEDURE — 82232 ASSAY OF BETA-2 PROTEIN: CPT

## 2022-07-21 PROCEDURE — 83550 IRON BINDING TEST: CPT

## 2022-07-21 PROCEDURE — 82728 ASSAY OF FERRITIN: CPT

## 2022-07-21 PROCEDURE — 86334 IMMUNOFIX E-PHORESIS SERUM: CPT

## 2022-07-21 PROCEDURE — 4004F PT TOBACCO SCREEN RCVD TLK: CPT | Performed by: INTERNAL MEDICINE

## 2022-07-21 PROCEDURE — 83615 LACTATE (LD) (LDH) ENZYME: CPT

## 2022-07-21 NOTE — PROGRESS NOTES
Department of [facility] Oncology  Attending Clinic Note    Reason for Visit: Follow-up on a patient with Lymphoplasmacytic Lymphoma      PCP:  Jamaal Marie MD    History of Present Illness:  78year old male initially seen at [facility] hematology for anemia and abnormal TP/albumin ratio. SPEP IgG and IgM kapa paraprotein, M-spike 3.29 g/dL. UIFE: IgM protein. 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   -Extensive involvement by atypitcal CD5-/CD10-B-cell lymphoproliferative disorder, comprising over 70% of marrow cellularity  -Mildly hypercellular marrow for age (30-40%) with trilineage pan hypoplasia  -Normocytic anemia. -IHC staining highlighted extensive CD20+ B cell infiltrate with admixed + plasma cells     Started on Ibrutinib 420 mg/day 12/2020 with good response so far    On 01/06/2021:  SPEP:  Monoclonal protein 1.72 g/dL  Persistent double monoclonal bands in the beta/gamma and gamma globuin regions. Bands previously identified as IgM kappa and IgG kappa (8/12/2020). IgM kappa decreased by 1.22 g/dL and IgG kappa minimally changed since last exam on 10/14/2020. Free kappa light chains: 413.4    (3.3-19. 4)   Free lambda light chains: 2.8      (5.7-26. 3)   K/L ratio:                          147.64 (0.26-1.65)    On 03/25/2021  SPEP:  Monoclonal protein: 1.03 (0-0) g/dL  Persistent double monoclonal bands in the beta/gamma and gamma globuin regions. Bands previously identified as IgM kappa and IgG kappa (8/12/2020). IgM kappa decreased by 0.69 g/dL and IgG kappa unchanged since 01/06/2021. Free kappa light chains: 149.4  (3.3-19. 4)   Free lambda light chains: 2.6    (5.7-26. 3)   K/L ratio:                          57.46 (0.26-1.65)    Patient started on oral iron 08/2020 for LANDON and B12 deficiency but required 2 doses of Feraheme on 01/21/2021 and 01/28/2021 and again in 2021. Had recurrent iron deficiency with plan for Feraheme q3 months (next one scheduled on 2021). He does IM b12 injections at home. On 2021  Hb: 15.5 Hct 44.9  MCV 90.5    WBC 6.9  BUN 10  Creat 0.9  Ca 9.3  Albumin 3.6    Fe: 94 FeSat: 33% TIBIC: 288  Ferritin: 81    VitB12 370     Ig  (700-1700)   IgA: <8     ()   IgM: 1370 ()    On 2021:  Hb 14.8  Hct 44.8  MCV 90.6  WBC 7.5    All cell line counts and morphology within normal limits. Patient's history of lymphoplasmacytic lymphoma is noted. The currently submitted blood smear is negative for circulating plasma cells, and negative for increased/atypical lymphocytes  Fe 60 TIBC 250 FeSat 24% Ferritin 73  BUN 8  Creat 0.8  LFTs wnl  Folate/B12 wnl  Beta-2 microglobulin 2.2    Peripheral blood flow cytometry noted small B-cell population with equivocal mild kappa light chain excess (1% of total cells). SPEP: M-spike 0.8 g/dl  SIFE: IgM kappa monoclonal protein is present  IgM 1295 ()    Free kappa light chains:   80.85  (3.3-19. 4)   Free lambda light chains: 3.23    (5.7-26. 3)   K/L ratio:                           25.03 (0.26-1.65)    On 2021. Hb 16.2  Hct 47.7  MCV 88.8  WBC 7.7     BUN 10 Creat 0.80  LFTs wnl  Beta-2 microglobulin 2.0  SPEP: M-spike 1 = 0.8 g/dl              M-spike 2 = 0.1 g/dl  SIFE: IgM kappa monoclonal protein is present, as previously described. IgM 1267 ()    Free kappa light chains:   88.01  (3.3-19. 4)   Free lambda light chains: 3.33    (5.7-26. 3)   K/L ratio:                           26.43 (0.26-1.65)    On 2021:  Hb 16.4  Hct 48.1  MCV 87.5  WBC 6.9     BUN 7 Creat 0.90  LFTs wnl  Beta-2 microglobulin 1.9  SPEP: M-spike 1 = 0.7 g/dl              M-spike 2 = 0.1 g/dl  SIFE: IgM kappa monoclonal protein is present, as previously described.    IgG kappa monoclonal protein is present, as previously described    IgM 1314 ()    Free kappa light chains:   80.24  (3.3-19. 4)   Free lambda light chains: 2.88    (5.7-26. 3)   K/L ratio:                           27.86 (0.26-1.65)    On 10/18/2021:  Hb 15.9   Hct 48.0  MCV 89.1  WBC 9.3     BUN 8 Creat 0.9  LFTs wnl  Beta-2 microglobulin 2.0  SPEP: M-spike 1 = 0.7 g/dl              M-spike 2 = 0.1 g/dl  SIFE: IgM kappa + faint IgG kappa monoclonal protein is present, as previously described. IgM 1308 ()    Free kappa light chains:   80.48  (3.3-19. 4)   Free lambda light chains: 2.87    (5.7-26. 3)   K/L ratio:                           28.04 (0.26-1.65)    Continued Ibrutinib    On 11/17/2021:  Hb 15.5   Hct 46.4  MCV 87.5  WBC 7.4     BUN 13 Creat 1.2  LFTs wnl  Beta-2 microglobulin 2.3  SPEP: M-spike 1 = 0.9 g/dl              M-spike 2 = 0.1 g/dl  SIFE: IgM kappa monoclonal protein is present, as previously described. A Faint IgG kappa monoclonal protein is present, as previously described. IgM 1347 ()    Free kappa light chains:   78.93  (3.3-19. 4)   Free lambda light chains: 3.92    (5.7-26. 3)   K/L ratio:                           20.14 (0.26-1.65)    On 12/15/2021:  Hb 15.3   Hct 46.5  MCV 90.3  WBC 9.1     BUN 10 Creat 0.9  LFTs wnl  Beta-2 microglobulin 2.5  SPEP: M-spike 1 = 0.8 g/dl              M-spike 2 = 0.1 g/dl  SIFE: IgM kappa + IgG kappa monoclonal protein is present, as previously described. IgM 1365 ()    Free kappa light chains:   84.08  (3.3-19. 4)   Free lambda light chains: 4.45    (5.7-26. 3)   K/L ratio:                           18.89 (0.26-1.65)    Continued Ibrutinib    On 01/20/2022:   Hb 14.9   Hct 45.1  MCV 88.1  WBC 6.3     BUN 7 Creat 0.8  LFTs wnl  Beta-2 microglobulin 2.3  SPEP: M-spike 1 = 0.7 g/dl              M-spike 2 = 0.1 g/dl  SIFE: IgM kappa + IgG kappa monoclonal protein is present, as previously described. IgM 1142 ()    Free kappa light chains:   136.21  (3.3-19. 4) Free lambda light chains: 5.29    (5.7-26. 3)   K/L ratio:                           25.75 (0.26-1.65)    Continued Ibrutinib    On 02/24/2022:   Hb 15.4   Hct 46.7  MCV 87.8  WBC 7.5     BUN 7 Creat 0.8  LFTs wnl  Beta-2 microglobulin 2.0  SPEP: M-spike = 0.8 g/dl    SIFE: IgM kappa monoclonal protein is present, as previously described  IgM 1279 ()    Free kappa light chains:   69.11  (3.3-19. 4)   Free lambda light chains: 4.50    (5.7-26. 3)   K/L ratio:                           15.36 (0.26-1.65)    On 03/02/2022:  Hb 15.5   Hct 46.9   MCV 90.0  WBC 9.8     BUN 9 Creat 0.7  LFTs wnl  Beta-2 microglobulin 1.6  SPEP: M-spike = 0.7 g/dl    SIFE: IgM kappa monoclonal protein is present, as previously described  IgM 1367 ()    Free kappa light chains:   55.56  (3.3-19. 4)   Free lambda light chains: 6.65    (5.7-26. 3)   K/L ratio:                           8.35 (0.26-1.65)    On 03/31/2022:  Hb 14.5   Hct 43.8   MCV 89.0  WBC 6.2     BUN 8 Creat 0.7  LFTs wnl  Beta-2 microglobulin 2.2  SPEP: M-spike = 0.6 g/dl    SIFE: IgM kappa monoclonal protein is present, as previously described  IgM 1061 ()    Free kappa light chains:   59.06  (3.3-19. 4)   Free lambda light chains: 6.00    (5.7-26. 3)   K/L ratio:                           9.84 (0.26-1.65)    On 04/28/2022:  Hb 15.2   Hct 45.3   MCV 87.5  WBC 5.7     BUN 10 Creat 0.8  LFTs wnl  Beta-2 microglobulin 1.8  SPEP: M-spike = 0.7 g/dl    SIFE: IgM kappa monoclonal protein is present, as previously described  IgM 1176 ()    Free kappa light chains:   65.90  (3.3-19. 4)   Free lambda light chains: 3.72    (5.7-26. 3)   K/L ratio:                           17.72 (0.26-1.65)    On 05/26/2022:  Hb 15.8   Hct 47.0   MCV 88.5  WBC 7.0     BUN 15 Creat 0.8  LFTs wnl  Beta-2 microglobulin 2.4  SPEP: M-spike = 0.8 g/dl    SIFE: IgM kappa monoclonal protein is present, as previously described  IgM 1161 ()    Free kappa light chains:   64.11  (3.3-19. 4)   Free lambda light chains: 4.28    (5.7-26. 3)   K/L ratio:                           14.98 (0.26-1.65)    On 06/23/2022:  Hb 15.5   Hct 47.1   MCV 89.2  WBC 7.9     BUN 10 Creat 0.8  LFTs wnl  Beta-2 microglobulin 2.3  SPEP: M-spike = 0.8 g/dl    SIFE: IgM kappa monoclonal protein is present, as previously described  IgM 1089 ()    Free kappa light chains:   56.20  (3.3-19. 4)   Free lambda light chains: 4.38    (5.7-26. 3)   K/L ratio:                           12.83 (0.26-1.65)    Today 07/21/2022; He continues to do well. No fever, chills. Fair appetite and energy level. Intermittent nausea improved on Zofran prn. He had a nice trip to [place] [address];  CONSTITUTIONAL: No fever, chills. Fair appetite and energy level. ENMT: Eyes: No diplopia; Nose: No epistaxis. Mouth: No sore throat. RESPIRATORY: No hemoptysis, shortness of breath, cough. CARDIOVASCULAR: No chest pain, palpitations. GASTROINTESTINAL: intermittent nausea  GENITOURINARY: No dysuria, urinary frequency, hematuria. NEURO: No syncope, presyncope, headache. 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 Allergies:  No Known Allergies    Physical Exam:  /65   Pulse 73   Temp 97.2 °F (36.2 °C)   Ht 6' 1\" (1.854 m)   Wt 284 lb (128.8 kg)   SpO2 95%   BMI 37.47 kg/m²   GENERAL: Alert, oriented x 3, not in acute distress. HEENT: PERRLA; EOMI. Oropharynx clear. NECK: Supple. Without lymphadenopathy. LUNGS: Good air entry bilaterally. No wheezing, crackles or rhonchi. CARDIOVASCULAR: Regular rate. No murmurs, rubs or gallops. ABDOMEN: Soft. Non-tender, non-distended.    EXTREMITIES: Without clubbing or cyanosis or edema. NEUROLOGIC: No focal deficits. ECOG PS 1    Lab Results   Component Value Date    WBC 7.1 07/21/2022    HGB 14.7 07/21/2022    HCT 44.5 07/21/2022    MCV 89.9 07/21/2022     07/21/2022     Lab Results   Component Value Date     07/21/2022    K 3.9 07/21/2022     07/21/2022    CO2 24 07/21/2022    BUN 11 07/21/2022    CREATININE 0.7 07/21/2022    GLUCOSE 210 (H) 07/21/2022    CALCIUM 9.1 07/21/2022    PROT 6.5 06/23/2022    LABALBU 3.9 07/21/2022    BILITOT 0.6 07/21/2022    ALKPHOS 64 07/21/2022    AST 11 07/21/2022    ALT 11 07/21/2022    LABGLOM >60 07/21/2022    GFRAA >60 07/21/2022     Impression/Plan:  79 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 UIFE: IgM protein. 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   -Extensive involvement by atypitcal CD5-/CD10-B-cell lymphoproliferative disorder, comprising over 70% of marrow cellularity  -Mildly hypercellular marrow for age (30-40%) with trilineage pan hypoplasia  -Normocytic anemia. -IHC staining highlighted extensive CD20+ B cell infiltrate with admixed + plasma cells     Started on Ibrutinib 420 mg/day 12/2020 with good response so far    On 01/06/2021:  SPEP:  Monoclonal protein 1.72 g/dL  Persistent double monoclonal bands in the beta/gamma and gamma globuin regions. Bands previously identified as IgM kappa and IgG kappa (8/12/2020). IgM kappa decreased by 1.22 g/dL and IgG kappa minimally changed since last exam on 10/14/2020. Free kappa light chains: 413.4    (3.3-19. 4)   Free lambda light chains: 2.8      (5.7-26. 3)   K/L ratio:                          147.64 (0.26-1.65)    On 03/25/2021  SPEP:  Monoclonal protein: 1.03 (0-0) g/dL  Persistent double monoclonal bands in the beta/gamma and gamma globuin regions. Bands previously identified as IgM kappa and IgG kappa (2020). IgM kappa decreased by 0.69 g/dL and IgG kappa unchanged since 2021. Free kappa light chains: 149.4  (3.3-19. 4)   Free lambda light chains: 2.6    (5.7-26. 3)   K/L ratio:                          57.46 (0.26-1.65)    Patient started on oral iron 2020 for LANDON and B12 deficiency but required 2 doses of Feraheme on 2021 and 2021 and again in 2021. Had recurrent iron deficiency with plan for Feraheme q3 months (next one scheduled on 2021). He does IM b12 injections at home (last one on 2021). On 2021  Hb: 15.5 Hct 44.9  MCV 90.5    WBC 6.9  BUN 10  Creat 0.9  Ca 9.3  Albumin 3.6    Fe: 94 FeSat: 33% TIBIC: 288  Ferritin: 81    VitB12 370     Ig  (700-1700)   IgA: <8     ()   IgM: 1370 ()    On 2021:  Hb 14.8  Hct 44.8  MCV 90.6  WBC 7.5    All cell line counts and morphology within normal limits. Patient's history of lymphoplasmacytic lymphoma is noted. The currently submitted blood smear is negative for circulating plasma cells, and negative for increased/atypical lymphocytes  Fe 60 TIBC 250 FeSat 24% Ferritin 73  BUN 8  Creat 0.8  LFTs wnl  Folate/B12 wnl  Beta-2 microglobulin 2.2    Peripheral blood flow cytometry noted small B-cell population with equivocal mild kappa light chain excess (1% of total cells). SPEP: M-spike 0.8 g/dl  SIFE: IgM kappa monoclonal protein is present  IgM 1295 ()    Free kappa light chains:   80.85  (3.3-19. 4)   Free lambda light chains: 3.23    (5.7-26. 3)   K/L ratio:                           25.03 (0.26-1.65)    On 2021. Hb 16.2  Hct 47.7  MCV 88.8  WBC 7.7     BUN 10 Creat 0.80  LFTs wnl  Beta-2 microglobulin 2.0  SPEP: M-spike 1 = 0.8 g/dl              M-spike 2 = 0.1 g/dl  SIFE: IgM kappa monoclonal protein is present, as previously described.      IgM 1267 ()    Free kappa light chains:   88.01  (3.3-19. 4)   Free lambda light chains: 3.33    (5.7-26. 3)   K/L ratio:                           26.43 (0.26-1.65)    On 09/20/2021:  Hb 16.4  Hct 48.1  MCV 87.5  WBC 6.9     BUN 7 Creat 0.90  LFTs wnl  Beta-2 microglobulin 1.9  SPEP: M-spike 1 = 0.7 g/dl              M-spike 2 = 0.1 g/dl  SIFE: IgM kappa monoclonal protein is present, as previously described. IgG kappa monoclonal protein is present, as previously described    IgM 1314 ()    Free kappa light chains:   80.24  (3.3-19. 4)   Free lambda light chains: 2.88    (5.7-26. 3)   K/L ratio:                           27.86 (0.26-1.65)    On 10/18/2021:  Hb 15.9   Hct 48.0  MCV 89.1  WBC 9.3     BUN 8 Creat 0.9  LFTs wnl  Beta-2 microglobulin 2.0  SPEP: M-spike 1 = 0.7 g/dl              M-spike 2 = 0.1 g/dl  SIFE: IgM kappa + faint IgG kappa monoclonal protein is present, as previously described. IgM 1308 ()    Free kappa light chains:   80.48  (3.3-19. 4)   Free lambda light chains: 2.87    (5.7-26. 3)   K/L ratio:                           28.04 (0.26-1.65)    On 11/17/2021:  Hb 15.5   Hct 46.4  MCV 87.5  WBC 7.4     BUN 13 Creat 1.2  LFTs wnl  Beta-2 microglobulin 2.3  SPEP: M-spike 1 = 0.9 g/dl              M-spike 2 = 0.1 g/dl  SIFE: IgM kappa monoclonal protein is present, as previously described. A Faint IgG kappa monoclonal protein is present, as previously described. IgM 1347 ()    Free kappa light chains:   78.93  (3.3-19. 4)   Free lambda light chains: 3.92    (5.7-26. 3)   K/L ratio:                           20.14 (0.26-1.65)    On 12/15/2021:  Hb 15.3   Hct 46.5  MCV 90.3  WBC 9.1     BUN 10 Creat 0.9  LFTs wnl  Beta-2 microglobulin 2.5  SPEP: M-spike 1 = 0.8 g/dl              M-spike 2 = 0.1 g/dl  SIFE: IgM kappa + IgG kappa monoclonal protein is present, as previously described. IgM 1365 ()    Free kappa light chains:   84.08  (3.3-19. 4)   Free lambda light chains: 4.45    (5.7-26. 3)   K/L ratio:                           18.89 (0.26-1.65)    On 01/20/2022:   Hb 14.9   Hct 45.1  MCV 88.1  WBC 6.3     BUN 7 Creat 0.8  LFTs wnl  Beta-2 microglobulin 2.3  SPEP: M-spike 1 = 0.7 g/dl              M-spike 2 = 0.1 g/dl  SIFE: IgM kappa + IgG kappa monoclonal protein is present, as previously described. IgM 1142 ()    Free kappa light chains:   136.21  (3.3-19. 4)   Free lambda light chains: 5.29    (5.7-26. 3)   K/L ratio:                           25.75 (0.26-1.65)    Continued Ibrutinib    On 02/24/2022:   Hb 15.4   Hct 46.7  MCV 87.8  WBC 7.5     BUN 7 Creat 0.8  LFTs wnl  Beta-2 microglobulin 2.0  SPEP: M-spike = 0.8 g/dl    SIFE: IgM kappa monoclonal protein is present, as previously described  IgM 1279 ()    Free kappa light chains:   69.11  (3.3-19. 4)   Free lambda light chains: 4.50    (5.7-26. 3)   K/L ratio:                           15.36 (0.26-1.65)    On 03/02/2022:  Hb 15.5   Hct 46.9   MCV 90.0  WBC 9.8     BUN 9 Creat 0.7  LFTs wnl  Beta-2 microglobulin 1.6  SPEP: M-spike = 0.7 g/dl    SIFE: IgM kappa monoclonal protein is present, as previously described  IgM 1367 ()    Free kappa light chains:   55.56  (3.3-19. 4)   Free lambda light chains: 6.65    (5.7-26. 3)   K/L ratio:                           8.35 (0.26-1.65)    CT chest 03/24/2022 noted chronic changes of the lungs including emphysema with midlung scarring atelectasis greatest in the lingular segment without a definitive dominant nodule or mass. No acute intrathoracic process. Imaging reviewed. On 03/31/2022:  Hb 14.5   Hct 43.8   MCV 89.0  WBC 6.2     BUN 8 Creat 0.7  LFTs wnl  Beta-2 microglobulin 2.2  SPEP: M-spike = 0.6 g/dl    SIFE: IgM kappa monoclonal protein is present, as previously described  IgM 1061 ()    Free kappa light chains:   59.06  (3.3-19. 4)   Free lambda light chains: 6.00    (5.7-26. 3)   K/L ratio:

## 2022-07-22 LAB
ALBUMIN SERPL-MCNC: 2.7 G/DL (ref 3.5–4.7)
ALPHA-1-GLOBULIN: 0.2 G/DL (ref 0.2–0.4)
ALPHA-2-GLOBULIN: 0.9 G/DL (ref 0.5–1)
BETA GLOBULIN: 1 G/DL (ref 0.8–1.3)
ELECTROPHORESIS: ABNORMAL
GAMMA GLOBULIN: 1.3 G/DL (ref 0.7–1.6)
IGA: 15 MG/DL (ref 70–400)
IGG: 193 MG/DL (ref 700–1600)
IGM: 967 MG/DL (ref 40–230)
IMMUNOFIXATION RESULT, SERUM: NORMAL
TOTAL PROTEIN: 6.1 G/DL (ref 6.4–8.3)

## 2022-07-24 LAB
KAPPA FREE LIGHT CHAINS QNT: 52.84 MG/L (ref 3.3–19.4)
KAPPA/LAMBDA FREE LIGHT CHAIN RATIO: 6.03 (ref 0.26–1.65)
LAMBDA FREE LIGHT CHAINS QNT: 8.77 MG/L (ref 5.71–26.3)

## 2022-07-25 LAB — BETA-2 MICROGLOBULIN: 2.1 MG/L (ref 0.6–2.4)

## 2022-08-16 DIAGNOSIS — C83.00 MALIGNANT LYMPHOPLASMACYTIC LYMPHOMA (HCC): Primary | ICD-10-CM

## 2022-08-18 ENCOUNTER — HOSPITAL ENCOUNTER (OUTPATIENT)
Dept: INFUSION THERAPY | Age: 77
Discharge: HOME | End: 2022-08-18
Payer: MEDICARE

## 2022-08-18 ENCOUNTER — OFFICE VISIT (OUTPATIENT)
Dept: ONCOLOGY | Age: 77
End: 2022-08-18
Payer: OTHER GOVERNMENT

## 2022-08-18 VITALS
HEIGHT: 73 IN | TEMPERATURE: 97.1 F | BODY MASS INDEX: 37.51 KG/M2 | WEIGHT: 283 LBS | OXYGEN SATURATION: 94 % | SYSTOLIC BLOOD PRESSURE: 123 MMHG | DIASTOLIC BLOOD PRESSURE: 63 MMHG | HEART RATE: 77 BPM

## 2022-08-18 DIAGNOSIS — C83.00 MALIGNANT LYMPHOPLASMACYTIC LYMPHOMA (HCC): Primary | ICD-10-CM

## 2022-08-18 DIAGNOSIS — C83.00 MALIGNANT LYMPHOPLASMACYTIC LYMPHOMA (HCC): ICD-10-CM

## 2022-08-18 LAB
ALBUMIN SERPL-MCNC: 3.9 G/DL (ref 3.5–5.2)
ALP BLD-CCNC: 46 U/L (ref 40–129)
ALT SERPL-CCNC: 11 U/L (ref 0–40)
ANION GAP SERPL CALCULATED.3IONS-SCNC: 13 MMOL/L (ref 7–16)
AST SERPL-CCNC: 12 U/L (ref 0–39)
BASOPHILS ABSOLUTE: 0.05 E9/L (ref 0–0.2)
BASOPHILS RELATIVE PERCENT: 0.6 % (ref 0–2)
BILIRUB SERPL-MCNC: 0.9 MG/DL (ref 0–1.2)
BUN BLDV-MCNC: 10 MG/DL (ref 6–23)
CALCIUM SERPL-MCNC: 9.4 MG/DL (ref 8.6–10.2)
CHLORIDE BLD-SCNC: 100 MMOL/L (ref 98–107)
CO2: 26 MMOL/L (ref 22–29)
CREAT SERPL-MCNC: 0.8 MG/DL (ref 0.7–1.2)
EOSINOPHILS ABSOLUTE: 0.18 E9/L (ref 0.05–0.5)
EOSINOPHILS RELATIVE PERCENT: 2.2 % (ref 0–6)
FERRITIN: 133 NG/ML
GFR AFRICAN AMERICAN: >60
GFR NON-AFRICAN AMERICAN: >60 ML/MIN/1.73
GLUCOSE BLD-MCNC: 182 MG/DL (ref 74–99)
HCT VFR BLD CALC: 45 % (ref 37–54)
HEMOGLOBIN: 15.3 G/DL (ref 12.5–16.5)
IMMATURE GRANULOCYTES #: 0.09 E9/L
IMMATURE GRANULOCYTES %: 1.1 % (ref 0–5)
IRON SATURATION: 44 % (ref 20–55)
IRON: 114 MCG/DL (ref 59–158)
LACTATE DEHYDROGENASE: 98 U/L (ref 135–225)
LYMPHOCYTES ABSOLUTE: 1.93 E9/L (ref 1.5–4)
LYMPHOCYTES RELATIVE PERCENT: 23.8 % (ref 20–42)
MCH RBC QN AUTO: 30.4 PG (ref 26–35)
MCHC RBC AUTO-ENTMCNC: 34 % (ref 32–34.5)
MCV RBC AUTO: 89.5 FL (ref 80–99.9)
MONOCYTES ABSOLUTE: 0.62 E9/L (ref 0.1–0.95)
MONOCYTES RELATIVE PERCENT: 7.6 % (ref 2–12)
NEUTROPHILS ABSOLUTE: 5.24 E9/L (ref 1.8–7.3)
NEUTROPHILS RELATIVE PERCENT: 64.7 % (ref 43–80)
PDW BLD-RTO: 13.2 FL (ref 11.5–15)
PLATELET # BLD: 182 E9/L (ref 130–450)
PMV BLD AUTO: 10.8 FL (ref 7–12)
POTASSIUM SERPL-SCNC: 3.8 MMOL/L (ref 3.5–5)
RBC # BLD: 5.03 E12/L (ref 3.8–5.8)
SODIUM BLD-SCNC: 139 MMOL/L (ref 132–146)
TOTAL IRON BINDING CAPACITY: 258 MCG/DL (ref 250–450)
WBC # BLD: 8.1 E9/L (ref 4.5–11.5)

## 2022-08-18 PROCEDURE — 4004F PT TOBACCO SCREEN RCVD TLK: CPT | Performed by: INTERNAL MEDICINE

## 2022-08-18 PROCEDURE — 36415 COLL VENOUS BLD VENIPUNCTURE: CPT

## 2022-08-18 PROCEDURE — 3017F COLORECTAL CA SCREEN DOC REV: CPT | Performed by: INTERNAL MEDICINE

## 2022-08-18 PROCEDURE — 99213 OFFICE O/P EST LOW 20 MIN: CPT | Performed by: INTERNAL MEDICINE

## 2022-08-18 PROCEDURE — G8417 CALC BMI ABV UP PARAM F/U: HCPCS | Performed by: INTERNAL MEDICINE

## 2022-08-18 PROCEDURE — 1123F ACP DISCUSS/DSCN MKR DOCD: CPT | Performed by: INTERNAL MEDICINE

## 2022-08-18 PROCEDURE — 99212 OFFICE O/P EST SF 10 MIN: CPT

## 2022-08-18 PROCEDURE — G8427 DOCREV CUR MEDS BY ELIG CLIN: HCPCS | Performed by: INTERNAL MEDICINE

## 2022-08-18 NOTE — PROGRESS NOTES
Department of [facility] On[facility]  Attending Clinic Note    Reason for Visit: Follow-up on a patient with Lymphoplasmacytic Lymphoma      PCP:  Jamaal Marie MD    History of Present Illness:  78year old male initially seen at [facility] hematology for anemia and abnormal TP/albumin ratio. SPEP IgG and IgM kapa paraprotein, M-spike 3.29 g/dL. UIFE: IgM protein. 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   -Extensive involvement by atypitcal CD5-/CD10-B-cell lymphoproliferative disorder, comprising over 70% of marrow cellularity  -Mildly hypercellular marrow for age (30-40%) with trilineage pan hypoplasia  -Normocytic anemia. -IHC staining highlighted extensive CD20+ B cell infiltrate with admixed + plasma cells     Started on Ibrutinib 420 mg/day 12/2020 with good response so far    On 01/06/2021:  SPEP:  Monoclonal protein 1.72 g/dL  Persistent double monoclonal bands in the beta/gamma and gamma globuin regions. Bands previously identified as IgM kappa and IgG kappa (8/12/2020). IgM kappa decreased by 1.22 g/dL and IgG kappa minimally changed since last exam on 10/14/2020. Free kappa light chains: 413.4    (3.3-19. 4)   Free lambda light chains: 2.8      (5.7-26. 3)   K/L ratio:                          147.64 (0.26-1.65)    On 03/25/2021  SPEP:  Monoclonal protein: 1.03 (0-0) g/dL  Persistent double monoclonal bands in the beta/gamma and gamma globuin regions. Bands previously identified as IgM kappa and IgG kappa (8/12/2020). IgM kappa decreased by 0.69 g/dL and IgG kappa unchanged since 01/06/2021. Free kappa light chains: 149.4  (3.3-19. 4)   Free lambda light chains: 2.6    (5.7-26. 3)   K/L ratio:                          57.46 (0.26-1.65)    Patient started on oral iron 08/2020 for LANDON and B12 deficiency but required 2 doses of Feraheme on 01/21/2021 and 01/28/2021 and again in 2021. Had recurrent iron deficiency with plan for Feraheme q3 months (next one scheduled on 2021). He does IM b12 injections at home. On 2021  Hb: 15.5 Hct 44.9  MCV 90.5    WBC 6.9  BUN 10  Creat 0.9  Ca 9.3  Albumin 3.6    Fe: 94 FeSat: 33% TIBIC: 288  Ferritin: 81    VitB12 370     Ig  (700-1700)   IgA: <8     ()   IgM: 1370 ()    On 2021:  Hb 14.8  Hct 44.8  MCV 90.6  WBC 7.5    All cell line counts and morphology within normal limits. Patient's history of lymphoplasmacytic lymphoma is noted. The currently submitted blood smear is negative for circulating plasma cells, and negative for increased/atypical lymphocytes  Fe 60 TIBC 250 FeSat 24% Ferritin 73  BUN 8  Creat 0.8  LFTs wnl  Folate/B12 wnl  Beta-2 microglobulin 2.2    Peripheral blood flow cytometry noted small B-cell population with equivocal mild kappa light chain excess (1% of total cells). SPEP: M-spike 0.8 g/dl  SIFE: IgM kappa monoclonal protein is present  IgM 1295 ()    Free kappa light chains:   80.85  (3.3-19. 4)   Free lambda light chains: 3.23    (5.7-26. 3)   K/L ratio:                           25.03 (0.26-1.65)    On 2021. Hb 16.2  Hct 47.7  MCV 88.8  WBC 7.7     BUN 10 Creat 0.80  LFTs wnl  Beta-2 microglobulin 2.0  SPEP: M-spike 1 = 0.8 g/dl              M-spike 2 = 0.1 g/dl  SIFE: IgM kappa monoclonal protein is present, as previously described. IgM 1267 ()    Free kappa light chains:   88.01  (3.3-19. 4)   Free lambda light chains: 3.33    (5.7-26. 3)   K/L ratio:                           26.43 (0.26-1.65)    On 2021:  Hb 16.4  Hct 48.1  MCV 87.5  WBC 6.9     BUN 7 Creat 0.90  LFTs wnl  Beta-2 microglobulin 1.9  SPEP: M-spike 1 = 0.7 g/dl              M-spike 2 = 0.1 g/dl  SIFE: IgM kappa monoclonal protein is present, as previously described.    IgG kappa monoclonal protein is present, as previously described    IgM 1314 ()    Free kappa light chains:   80.24  (3.3-19. 4)   Free lambda light chains: 2.88    (5.7-26. 3)   K/L ratio:                           27.86 (0.26-1.65)    On 10/18/2021:  Hb 15.9   Hct 48.0  MCV 89.1  WBC 9.3     BUN 8 Creat 0.9  LFTs wnl  Beta-2 microglobulin 2.0  SPEP: M-spike 1 = 0.7 g/dl              M-spike 2 = 0.1 g/dl  SIFE: IgM kappa + faint IgG kappa monoclonal protein is present, as previously described. IgM 1308 ()    Free kappa light chains:   80.48  (3.3-19. 4)   Free lambda light chains: 2.87    (5.7-26. 3)   K/L ratio:                           28.04 (0.26-1.65)    Continued Ibrutinib    On 11/17/2021:  Hb 15.5   Hct 46.4  MCV 87.5  WBC 7.4     BUN 13 Creat 1.2  LFTs wnl  Beta-2 microglobulin 2.3  SPEP: M-spike 1 = 0.9 g/dl              M-spike 2 = 0.1 g/dl  SIFE: IgM kappa monoclonal protein is present, as previously described. A Faint IgG kappa monoclonal protein is present, as previously described. IgM 1347 ()    Free kappa light chains:   78.93  (3.3-19. 4)   Free lambda light chains: 3.92    (5.7-26. 3)   K/L ratio:                           20.14 (0.26-1.65)    On 12/15/2021:  Hb 15.3   Hct 46.5  MCV 90.3  WBC 9.1     BUN 10 Creat 0.9  LFTs wnl  Beta-2 microglobulin 2.5  SPEP: M-spike 1 = 0.8 g/dl              M-spike 2 = 0.1 g/dl  SIFE: IgM kappa + IgG kappa monoclonal protein is present, as previously described. IgM 1365 ()    Free kappa light chains:   84.08  (3.3-19. 4)   Free lambda light chains: 4.45    (5.7-26. 3)   K/L ratio:                           18.89 (0.26-1.65)    Continued Ibrutinib    On 01/20/2022:   Hb 14.9   Hct 45.1  MCV 88.1  WBC 6.3     BUN 7 Creat 0.8  LFTs wnl  Beta-2 microglobulin 2.3  SPEP: M-spike 1 = 0.7 g/dl              M-spike 2 = 0.1 g/dl  SIFE: IgM kappa + IgG kappa monoclonal protein is present, as previously described. IgM 1142 ()    Free kappa light chains:   136.21  (3.3-19. 4) light chains:   64.11  (3.3-19. 4)   Free lambda light chains: 4.28    (5.7-26. 3)   K/L ratio:                           14.98 (0.26-1.65)    On 06/23/2022:  Hb 15.5   Hct 47.1   MCV 89.2  WBC 7.9     BUN 10 Creat 0.8  LFTs wnl  Beta-2 microglobulin 2.3  SPEP: M-spike = 0.8 g/dl    SIFE: IgM kappa monoclonal protein is present, as previously described  IgM 1089 ()    Free kappa light chains:   56.20  (3.3-19. 4)   Free lambda light chains: 4.38    (5.7-26. 3)   K/L ratio:                           12.83 (0.26-1.65)    On 07/21/2022:  Hb 14.7   Hct 44.5   MCV 89.9  WBC 7.1     BUN 11 Creat 0.7  LFTs wnl  Beta-2 microglobulin 2.1  SPEP: M-spike = 0.9 g/dl    SIFE: IgM kappa monoclonal protein is present, as previously described  IgM 967 ()    Free kappa light chains:   52.84  (3.3-19. 4)   Free lambda light chains: 8.77    (5.7-26. 3)   K/L ratio:                           6.03 (0.26-1.65)    Today 08/18/2022; He continues to do well. No fever, chills. Fair appetite and energy level. Intermittent nausea improved on Zofran prn. Review of Systems;  CONSTITUTIONAL: No fever, chills. Fair appetite and energy level. ENMT: Eyes: No diplopia; Nose: No epistaxis. Mouth: No sore throat. RESPIRATORY: No hemoptysis, shortness of breath, cough. CARDIOVASCULAR: No chest pain, palpitations. GASTROINTESTINAL: intermittent nausea  GENITOURINARY: No dysuria, urinary frequency, hematuria. NEURO: No syncope, presyncope, headache. 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     Allergies:  No Known Allergies    Physical Exam:  /63   Pulse 77   Temp 97.1 °F (36.2 °C)   Ht 6' 1\" (1.854 m)   Wt 283 lb (128.4 kg)   SpO2 94%   BMI 37.34 kg/m²   GENERAL: Alert, oriented x 3, not in acute distress. HEENT: PERRLA; EOMI. Oropharynx clear. NECK: Supple. Without lymphadenopathy. LUNGS: Good air entry bilaterally. No wheezing, crackles or rhonchi. CARDIOVASCULAR: Regular rate. No murmurs, rubs or gallops. ABDOMEN: Soft. Non-tender, non-distended. EXTREMITIES: Without clubbing or cyanosis or edema. NEUROLOGIC: No focal deficits. ECOG PS 1    Lab Results   Component Value Date    WBC 8.1 08/18/2022    HGB 15.3 08/18/2022    HCT 45.0 08/18/2022    MCV 89.5 08/18/2022     08/18/2022     Lab Results   Component Value Date     08/18/2022    K 3.8 08/18/2022     08/18/2022    CO2 26 08/18/2022    BUN 10 08/18/2022    CREATININE 0.8 08/18/2022    GLUCOSE 182 (H) 08/18/2022    CALCIUM 9.4 08/18/2022    PROT 6.1 (L) 07/21/2022    LABALBU 3.9 08/18/2022    BILITOT 0.9 08/18/2022    ALKPHOS 46 08/18/2022    AST 12 08/18/2022    ALT 11 08/18/2022    LABGLOM >60 08/18/2022    GFRAA >60 08/18/2022     Impression/Plan:  77 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 UIFE: IgM protein. 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   -Extensive involvement by atypitcal CD5-/CD10-B-cell lymphoproliferative disorder, comprising over 70% of marrow cellularity  -Mildly hypercellular marrow for age (30-40%) with trilineage pan hypoplasia  -Normocytic anemia. -IHC staining highlighted extensive CD20+ B cell infiltrate with admixed + plasma cells     Started on Ibrutinib 420 mg/day 12/2020 with good response so far    On 01/06/2021:  SPEP:  Monoclonal protein 1.72 g/dL  Persistent double monoclonal bands in the beta/gamma and gamma globuin regions. Bands previously identified as IgM kappa and IgG kappa (8/12/2020). IgM kappa decreased by 1.22 g/dL and IgG kappa minimally changed since last exam on 10/14/2020. Free kappa light chains: 413.4    (3.3-19. 4)   Free lambda light chains: 2.8      (5.7-26. 3)   K/L ratio:                          147.64 (0.26-1.65)    On 2021  SPEP:  Monoclonal protein: 1.03 (0-0) g/dL  Persistent double monoclonal bands in the beta/gamma and gamma globuin regions. Bands previously identified as IgM kappa and IgG kappa (2020). IgM kappa decreased by 0.69 g/dL and IgG kappa unchanged since 2021. Free kappa light chains: 149.4  (3.3-19. 4)   Free lambda light chains: 2.6    (5.7-26. 3)   K/L ratio:                          57.46 (0.26-1.65)    Patient started on oral iron 2020 for LANDON and B12 deficiency but required 2 doses of Feraheme on 2021 and 2021 and again in 2021. Had recurrent iron deficiency with plan for Feraheme q3 months (next one scheduled on 2021). He does IM b12 injections at home (last one on 2021). On 2021  Hb: 15.5 Hct 44.9  MCV 90.5    WBC 6.9  BUN 10  Creat 0.9  Ca 9.3  Albumin 3.6    Fe: 94 FeSat: 33% TIBIC: 288  Ferritin: 81    VitB12 370     Ig  (700-1700)   IgA: <8     ()   IgM: 1370 ()    On 2021:  Hb 14.8  Hct 44.8  MCV 90.6  WBC 7.5    All cell line counts and morphology within normal limits. Patient's history of lymphoplasmacytic lymphoma is noted. The currently submitted blood smear is negative for circulating plasma cells, and negative for increased/atypical lymphocytes  Fe 60 TIBC 250 FeSat 24% Ferritin 73  BUN 8  Creat 0.8  LFTs wnl  Folate/B12 wnl  Beta-2 microglobulin 2.2    Peripheral blood flow cytometry noted small B-cell population with equivocal mild kappa light chain excess (1% of total cells). SPEP: M-spike 0.8 g/dl  SIFE: IgM kappa monoclonal protein is present  IgM 1295 ()    Free kappa light chains:   80.85  (3.3-19. 4)   Free lambda light 20.14 (0.26-1.65)    On 12/15/2021:  Hb 15.3   Hct 46.5  MCV 90.3  WBC 9.1     BUN 10 Creat 0.9  LFTs wnl  Beta-2 microglobulin 2.5  SPEP: M-spike 1 = 0.8 g/dl              M-spike 2 = 0.1 g/dl  SIFE: IgM kappa + IgG kappa monoclonal protein is present, as previously described. IgM 1365 ()    Free kappa light chains:   84.08  (3.3-19. 4)   Free lambda light chains: 4.45    (5.7-26. 3)   K/L ratio:                           18.89 (0.26-1.65)    On 01/20/2022:   Hb 14.9   Hct 45.1  MCV 88.1  WBC 6.3     BUN 7 Creat 0.8  LFTs wnl  Beta-2 microglobulin 2.3  SPEP: M-spike 1 = 0.7 g/dl              M-spike 2 = 0.1 g/dl  SIFE: IgM kappa + IgG kappa monoclonal protein is present, as previously described. IgM 1142 ()    Free kappa light chains:   136.21  (3.3-19. 4)   Free lambda light chains: 5.29    (5.7-26. 3)   K/L ratio:                           25.75 (0.26-1.65)    Continued Ibrutinib    On 02/24/2022:   Hb 15.4   Hct 46.7  MCV 87.8  WBC 7.5     BUN 7 Creat 0.8  LFTs wnl  Beta-2 microglobulin 2.0  SPEP: M-spike = 0.8 g/dl    SIFE: IgM kappa monoclonal protein is present, as previously described  IgM 1279 ()    Free kappa light chains:   69.11  (3.3-19. 4)   Free lambda light chains: 4.50    (5.7-26. 3)   K/L ratio:                           15.36 (0.26-1.65)    On 03/02/2022:  Hb 15.5   Hct 46.9   MCV 90.0  WBC 9.8     BUN 9 Creat 0.7  LFTs wnl  Beta-2 microglobulin 1.6  SPEP: M-spike = 0.7 g/dl    SIFE: IgM kappa monoclonal protein is present, as previously described  IgM 1367 ()    Free kappa light chains:   55.56  (3.3-19. 4)   Free lambda light chains: 6.65    (5.7-26. 3)   K/L ratio:                           8.35 (0.26-1.65)    CT chest 03/24/2022 noted chronic changes of the lungs including emphysema with midlung scarring atelectasis greatest in the lingular segment without a definitive dominant nodule or mass. No acute intrathoracic process.    Imaging reviewed. On 03/31/2022:  Hb 14.5   Hct 43.8   MCV 89.0  WBC 6.2     BUN 8 Creat 0.7  LFTs wnl  Beta-2 microglobulin 2.2  SPEP: M-spike = 0.6 g/dl    SIFE: IgM kappa monoclonal protein is present, as previously described  IgM 1061 ()    Free kappa light chains:   59.06  (3.3-19. 4)   Free lambda light chains: 6.00    (5.7-26. 3)   K/L ratio:                           9.84 (0.26-1.65)    On 04/28/2022:  Hb 15.2   Hct 45.3   MCV 87.5  WBC 5.7     BUN 10 Creat 0.8  LFTs wnl  Beta-2 microglobulin 1.8  SPEP: M-spike = 0.7 g/dl    SIFE: IgM kappa monoclonal protein is present, as previously described  IgM 1176 ()    Free kappa light chains:   65.90  (3.3-19. 4)   Free lambda light chains: 3.72    (5.7-26. 3)   K/L ratio:                           17.72 (0.26-1.65)    On 05/26/2022:  Hb 15.8   Hct 47.0   MCV 88.5  WBC 7.0     BUN 15 Creat 0.8  LFTs wnl  Beta-2 microglobulin 2.4  SPEP: M-spike = 0.8 g/dl    SIFE: IgM kappa monoclonal protein is present, as previously described  IgM 1161 ()    Free kappa light chains:   64.11  (3.3-19. 4)   Free lambda light chains: 4.28    (5.7-26. 3)   K/L ratio:                           14.98 (0.26-1.65)    On 06/23/2022:  Hb 15.5   Hct 47.1   MCV 89.2  WBC 7.9     BUN 10 Creat 0.8  LFTs wnl  Beta-2 microglobulin 2.3  SPEP: M-spike = 0.8 g/dl    SIFE: IgM kappa monoclonal protein is present, as previously described  IgM 1089 ()    Free kappa light chains:   56.20  (3.3-19. 4)   Free lambda light chains: 4.38    (5.7-26. 3)   K/L ratio:                           12.83 (0.26-1.65)    On 07/21/2022:  Hb 14.7   Hct 44.5   MCV 89.9  WBC 7.1     BUN 11 Creat 0.7  LFTs wnl  Beta-2 microglobulin 2.1  SPEP: M-spike = 0.9 g/dl    SIFE: IgM kappa monoclonal protein is present, as previously described  IgM 967 ()    Free kappa light chains:   52.84  (3.3-19. 4)   Free lambda light chains: 8.77    (5.7-26. 3)   K/L ratio:

## 2022-08-19 LAB
ALBUMIN SERPL-MCNC: 3.3 G/DL (ref 3.5–4.7)
ALPHA-1-GLOBULIN: 0.2 G/DL (ref 0.2–0.4)
ALPHA-2-GLOBULIN: 0.7 G/DL (ref 0.5–1)
BETA GLOBULIN: 0.8 G/DL (ref 0.8–1.3)
ELECTROPHORESIS: ABNORMAL
GAMMA GLOBULIN: 1 G/DL (ref 0.7–1.6)
IGA: 15 MG/DL (ref 70–400)
IGG: 191 MG/DL (ref 700–1600)
IGM: 1002 MG/DL (ref 40–230)
IMMUNOFIXATION RESULT, SERUM: NORMAL
TOTAL PROTEIN: 5.9 G/DL (ref 6.4–8.3)

## 2022-08-22 LAB — BETA-2 MICROGLOBULIN: 2.2 MG/L (ref 0.6–2.4)

## 2022-09-06 DIAGNOSIS — C83.00 MALIGNANT LYMPHOPLASMACYTIC LYMPHOMA (HCC): ICD-10-CM

## 2022-09-06 NOTE — PROGRESS NOTES
[address] ANGELITAChirag AsmitaWestinghouse Electric CorporationSurgical Hospital [facility] [place] [facility] Update    Date: 09/06/22       Medication is currently being filled at [facility] and has been routed there. Please call us with any questions at [phone] opt.  2.

## 2022-09-15 ENCOUNTER — OFFICE VISIT (OUTPATIENT)
Dept: ONCOLOGY | Age: 77
End: 2022-09-15
Payer: OTHER GOVERNMENT

## 2022-09-15 ENCOUNTER — HOSPITAL ENCOUNTER (OUTPATIENT)
Dept: INFUSION THERAPY | Age: 77
Discharge: HOME | End: 2022-09-15
Payer: MEDICARE

## 2022-09-15 VITALS
HEART RATE: 61 BPM | HEIGHT: 73 IN | OXYGEN SATURATION: 96 % | WEIGHT: 280.3 LBS | DIASTOLIC BLOOD PRESSURE: 84 MMHG | TEMPERATURE: 97 F | BODY MASS INDEX: 37.15 KG/M2 | SYSTOLIC BLOOD PRESSURE: 137 MMHG

## 2022-09-15 DIAGNOSIS — C83.00 MALIGNANT LYMPHOPLASMACYTIC LYMPHOMA (HCC): ICD-10-CM

## 2022-09-15 DIAGNOSIS — C83.00 MALIGNANT LYMPHOPLASMACYTIC LYMPHOMA (HCC): Primary | ICD-10-CM

## 2022-09-15 LAB
ALBUMIN SERPL-MCNC: 3.9 G/DL (ref 3.5–5.2)
ALP BLD-CCNC: 53 U/L (ref 40–129)
ALT SERPL-CCNC: 12 U/L (ref 0–40)
ANION GAP SERPL CALCULATED.3IONS-SCNC: 12 MMOL/L (ref 7–16)
AST SERPL-CCNC: 9 U/L (ref 0–39)
BASOPHILS ABSOLUTE: 0.06 E9/L (ref 0–0.2)
BASOPHILS RELATIVE PERCENT: 0.9 % (ref 0–2)
BILIRUB SERPL-MCNC: 0.6 MG/DL (ref 0–1.2)
BUN BLDV-MCNC: 10 MG/DL (ref 6–23)
CALCIUM SERPL-MCNC: 9.2 MG/DL (ref 8.6–10.2)
CHLORIDE BLD-SCNC: 97 MMOL/L (ref 98–107)
CO2: 26 MMOL/L (ref 22–29)
CREAT SERPL-MCNC: 0.8 MG/DL (ref 0.7–1.2)
EOSINOPHILS ABSOLUTE: 0.16 E9/L (ref 0.05–0.5)
EOSINOPHILS RELATIVE PERCENT: 2.3 % (ref 0–6)
FERRITIN: 143 NG/ML
GFR AFRICAN AMERICAN: >60
GFR NON-AFRICAN AMERICAN: >60 ML/MIN/1.73
GLUCOSE BLD-MCNC: 201 MG/DL (ref 74–99)
HCT VFR BLD CALC: 45.2 % (ref 37–54)
HEMOGLOBIN: 15.2 G/DL (ref 12.5–16.5)
IMMATURE GRANULOCYTES #: 0.07 E9/L
IMMATURE GRANULOCYTES %: 1 % (ref 0–5)
IRON SATURATION: 39 % (ref 20–55)
IRON: 104 MCG/DL (ref 59–158)
LACTATE DEHYDROGENASE: 135 U/L (ref 135–225)
LYMPHOCYTES ABSOLUTE: 2.21 E9/L (ref 1.5–4)
LYMPHOCYTES RELATIVE PERCENT: 31.7 % (ref 20–42)
MCH RBC QN AUTO: 29.1 PG (ref 26–35)
MCHC RBC AUTO-ENTMCNC: 33.6 % (ref 32–34.5)
MCV RBC AUTO: 86.6 FL (ref 80–99.9)
MONOCYTES ABSOLUTE: 0.57 E9/L (ref 0.1–0.95)
MONOCYTES RELATIVE PERCENT: 8.2 % (ref 2–12)
NEUTROPHILS ABSOLUTE: 3.9 E9/L (ref 1.8–7.3)
NEUTROPHILS RELATIVE PERCENT: 55.9 % (ref 43–80)
PDW BLD-RTO: 13.2 FL (ref 11.5–15)
PLATELET # BLD: 185 E9/L (ref 130–450)
PMV BLD AUTO: 10.7 FL (ref 7–12)
POTASSIUM SERPL-SCNC: 3.6 MMOL/L (ref 3.5–5)
RBC # BLD: 5.22 E12/L (ref 3.8–5.8)
SODIUM BLD-SCNC: 135 MMOL/L (ref 132–146)
TOTAL IRON BINDING CAPACITY: 267 MCG/DL (ref 250–450)
WBC # BLD: 7 E9/L (ref 4.5–11.5)

## 2022-09-15 PROCEDURE — 99213 OFFICE O/P EST LOW 20 MIN: CPT | Performed by: INTERNAL MEDICINE

## 2022-09-15 PROCEDURE — 85025 COMPLETE CBC W/AUTO DIFF WBC: CPT

## 2022-09-15 PROCEDURE — 99212 OFFICE O/P EST SF 10 MIN: CPT

## 2022-09-15 PROCEDURE — G8427 DOCREV CUR MEDS BY ELIG CLIN: HCPCS | Performed by: INTERNAL MEDICINE

## 2022-09-15 PROCEDURE — 80053 COMPREHEN METABOLIC PANEL: CPT

## 2022-09-15 PROCEDURE — 1123F ACP DISCUSS/DSCN MKR DOCD: CPT | Performed by: INTERNAL MEDICINE

## 2022-09-15 PROCEDURE — 4004F PT TOBACCO SCREEN RCVD TLK: CPT | Performed by: INTERNAL MEDICINE

## 2022-09-15 PROCEDURE — 3017F COLORECTAL CA SCREEN DOC REV: CPT | Performed by: INTERNAL MEDICINE

## 2022-09-15 PROCEDURE — 82232 ASSAY OF BETA-2 PROTEIN: CPT

## 2022-09-15 PROCEDURE — 82784 ASSAY IGA/IGD/IGG/IGM EACH: CPT

## 2022-09-15 PROCEDURE — 83550 IRON BINDING TEST: CPT

## 2022-09-15 PROCEDURE — 83883 ASSAY NEPHELOMETRY NOT SPEC: CPT

## 2022-09-15 PROCEDURE — G8417 CALC BMI ABV UP PARAM F/U: HCPCS | Performed by: INTERNAL MEDICINE

## 2022-09-15 PROCEDURE — 83540 ASSAY OF IRON: CPT

## 2022-09-15 PROCEDURE — 83615 LACTATE (LD) (LDH) ENZYME: CPT

## 2022-09-15 PROCEDURE — 82728 ASSAY OF FERRITIN: CPT

## 2022-09-15 PROCEDURE — 36415 COLL VENOUS BLD VENIPUNCTURE: CPT

## 2022-09-15 PROCEDURE — 84165 PROTEIN E-PHORESIS SERUM: CPT

## 2022-09-15 PROCEDURE — 86334 IMMUNOFIX E-PHORESIS SERUM: CPT

## 2022-09-15 NOTE — PROGRESS NOTES
Patient provided with discharge instructions, received printed AVS.  All questions answered. Patient understands follow up plan of care. yes

## 2022-09-15 NOTE — PROGRESS NOTES
and again in 2021. Had recurrent iron deficiency with plan for Feraheme q3 months (next one scheduled on 2021). He does IM b12 injections at home. On 2021  Hb: 15.5 Hct 44.9  MCV 90.5    WBC 6.9  BUN 10  Creat 0.9  Ca 9.3  Albumin 3.6    Fe: 94 FeSat: 33% TIBIC: 288  Ferritin: 81    VitB12 370     Ig  (700-1700)   IgA: <8     ()   IgM: 1370 ()    On 2021:  Hb 14.8  Hct 44.8  MCV 90.6  WBC 7.5    All cell line counts and morphology within normal limits. Patient's history of lymphoplasmacytic lymphoma is noted. The currently submitted blood smear is negative for circulating plasma cells, and negative for increased/atypical lymphocytes  Fe 60 TIBC 250 FeSat 24% Ferritin 73  BUN 8  Creat 0.8  LFTs wnl  Folate/B12 wnl  Beta-2 microglobulin 2.2    Peripheral blood flow cytometry noted small B-cell population with equivocal mild kappa light chain excess (1% of total cells). SPEP: M-spike 0.8 g/dl  SIFE: IgM kappa monoclonal protein is present  IgM 1295 ()    Free kappa light chains:   80.85  (3.3-19. 4)   Free lambda light chains: 3.23    (5.7-26. 3)   K/L ratio:                           25.03 (0.26-1.65)    On 2021. Hb 16.2  Hct 47.7  MCV 88.8  WBC 7.7     BUN 10 Creat 0.80  LFTs wnl  Beta-2 microglobulin 2.0  SPEP: M-spike 1 = 0.8 g/dl              M-spike 2 = 0.1 g/dl  SIFE: IgM kappa monoclonal protein is present, as previously described. IgM 1267 ()    Free kappa light chains:   88.01  (3.3-19. 4)   Free lambda light chains: 3.33    (5.7-26. 3)   K/L ratio:                           26.43 (0.26-1.65)    On 2021:  Hb 16.4  Hct 48.1  MCV 87.5  WBC 6.9     BUN 7 Creat 0.90  LFTs wnl  Beta-2 microglobulin 1.9  SPEP: M-spike 1 = 0.7 g/dl              M-spike 2 = 0.1 g/dl  SIFE: IgM kappa monoclonal protein is present, as previously described.    IgG kappa monoclonal protein is present, as previously described    IgM 1314 ()    Free kappa light chains:   80.24  (3.3-19. 4)   Free lambda light chains: 2.88    (5.7-26. 3)   K/L ratio:                           27.86 (0.26-1.65)    On 10/18/2021:  Hb 15.9   Hct 48.0  MCV 89.1  WBC 9.3     BUN 8 Creat 0.9  LFTs wnl  Beta-2 microglobulin 2.0  SPEP: M-spike 1 = 0.7 g/dl              M-spike 2 = 0.1 g/dl  SIFE: IgM kappa + faint IgG kappa monoclonal protein is present, as previously described. IgM 1308 ()    Free kappa light chains:   80.48  (3.3-19. 4)   Free lambda light chains: 2.87    (5.7-26. 3)   K/L ratio:                           28.04 (0.26-1.65)    Continued Ibrutinib    On 11/17/2021:  Hb 15.5   Hct 46.4  MCV 87.5  WBC 7.4     BUN 13 Creat 1.2  LFTs wnl  Beta-2 microglobulin 2.3  SPEP: M-spike 1 = 0.9 g/dl              M-spike 2 = 0.1 g/dl  SIFE: IgM kappa monoclonal protein is present, as previously described. A Faint IgG kappa monoclonal protein is present, as previously described. IgM 1347 ()    Free kappa light chains:   78.93  (3.3-19. 4)   Free lambda light chains: 3.92    (5.7-26. 3)   K/L ratio:                           20.14 (0.26-1.65)    On 12/15/2021:  Hb 15.3   Hct 46.5  MCV 90.3  WBC 9.1     BUN 10 Creat 0.9  LFTs wnl  Beta-2 microglobulin 2.5  SPEP: M-spike 1 = 0.8 g/dl              M-spike 2 = 0.1 g/dl  SIFE: IgM kappa + IgG kappa monoclonal protein is present, as previously described. IgM 1365 ()    Free kappa light chains:   84.08  (3.3-19. 4)   Free lambda light chains: 4.45    (5.7-26. 3)   K/L ratio:                           18.89 (0.26-1.65)    Continued Ibrutinib    On 01/20/2022:   Hb 14.9   Hct 45.1  MCV 88.1  WBC 6.3     BUN 7 Creat 0.8  LFTs wnl  Beta-2 microglobulin 2.3  SPEP: M-spike 1 = 0.7 g/dl              M-spike 2 = 0.1 g/dl  SIFE: IgM kappa + IgG kappa monoclonal protein is present, as previously described. IgM 1142 ()    Free kappa light chains:   136.21  (3.3-19. 4) Free lambda light chains: 5.29    (5.7-26. 3)   K/L ratio:                           25.75 (0.26-1.65)    Continued Ibrutinib    On 02/24/2022:   Hb 15.4   Hct 46.7  MCV 87.8  WBC 7.5     BUN 7 Creat 0.8  LFTs wnl  Beta-2 microglobulin 2.0  SPEP: M-spike = 0.8 g/dl    SIFE: IgM kappa monoclonal protein is present, as previously described  IgM 1279 ()    Free kappa light chains:   69.11  (3.3-19. 4)   Free lambda light chains: 4.50    (5.7-26. 3)   K/L ratio:                           15.36 (0.26-1.65)    On 03/02/2022:  Hb 15.5   Hct 46.9   MCV 90.0  WBC 9.8     BUN 9 Creat 0.7  LFTs wnl  Beta-2 microglobulin 1.6  SPEP: M-spike = 0.7 g/dl    SIFE: IgM kappa monoclonal protein is present, as previously described  IgM 1367 ()    Free kappa light chains:   55.56  (3.3-19. 4)   Free lambda light chains: 6.65    (5.7-26. 3)   K/L ratio:                           8.35 (0.26-1.65)    On 03/31/2022:  Hb 14.5   Hct 43.8   MCV 89.0  WBC 6.2     BUN 8 Creat 0.7  LFTs wnl  Beta-2 microglobulin 2.2  SPEP: M-spike = 0.6 g/dl    SIFE: IgM kappa monoclonal protein is present, as previously described  IgM 1061 ()    Free kappa light chains:   59.06  (3.3-19. 4)   Free lambda light chains: 6.00    (5.7-26. 3)   K/L ratio:                           9.84 (0.26-1.65)    On 04/28/2022:  Hb 15.2   Hct 45.3   MCV 87.5  WBC 5.7     BUN 10 Creat 0.8  LFTs wnl  Beta-2 microglobulin 1.8  SPEP: M-spike = 0.7 g/dl    SIFE: IgM kappa monoclonal protein is present, as previously described  IgM 1176 ()    Free kappa light chains:   65.90  (3.3-19. 4)   Free lambda light chains: 3.72    (5.7-26. 3)   K/L ratio:                           17.72 (0.26-1.65)    On 05/26/2022:  Hb 15.8   Hct 47.0   MCV 88.5  WBC 7.0     BUN 15 Creat 0.8  LFTs wnl  Beta-2 microglobulin 2.4  SPEP: M-spike = 0.8 g/dl    SIFE: IgM kappa monoclonal protein is present, as previously described  IgM 1161 ()    Free kappa light chains:   64.11  (3.3-19. 4)   Free lambda light chains: 4.28    (5.7-26. 3)   K/L ratio:                           14.98 (0.26-1.65)    On 06/23/2022:  Hb 15.5   Hct 47.1   MCV 89.2  WBC 7.9     BUN 10 Creat 0.8  LFTs wnl  Beta-2 microglobulin 2.3  SPEP: M-spike = 0.8 g/dl    SIFE: IgM kappa monoclonal protein is present, as previously described  IgM 1089 ()    Free kappa light chains:   56.20  (3.3-19. 4)   Free lambda light chains: 4.38    (5.7-26. 3)   K/L ratio:                           12.83 (0.26-1.65)    On 07/21/2022:  Hb 14.7   Hct 44.5   MCV 89.9  WBC 7.1     BUN 11 Creat 0.7  LFTs wnl  Beta-2 microglobulin 2.1  SPEP: M-spike = 0.9 g/dl    SIFE: IgM kappa monoclonal protein is present, as previously described  IgM 967 ()    Free kappa light chains:   52.84  (3.3-19. 4)   Free lambda light chains: 8.77    (5.7-26. 3)   K/L ratio:                           6.03 (0.26-1.65)    On 08/18/2022:  Hb 15.3  Hct 45.0  MCV 89.5  WBC 8.1     BUN 10  Creat 0.8  LFTs wnl  Beta-2 microglobulin 2.2  SPEP: M-spike 0.6 g/dL  SIFE: IgM kappa monoclonal protein is present, as previously described. IgM 1002 ()    Today 09/15/2022; He continues to do well. No fever chills. Fair appetite and energy level. Intermittent nausea improved on Zofran as needed    Review of Systems;  CONSTITUTIONAL: No fever, chills. Fair appetite and energy level. ENMT: Eyes: No diplopia; Nose: No epistaxis. Mouth: No sore throat. RESPIRATORY: No hemoptysis, shortness of breath, cough. CARDIOVASCULAR: No chest pain, palpitations. GASTROINTESTINAL: intermittent nausea  GENITOURINARY: No dysuria, urinary frequency, hematuria. NEURO: No syncope, presyncope, headache.   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 Allergies:  No Known Allergies    Physical Exam:  /84   Pulse 61   Temp 97 °F (36.1 °C)   Ht 6' 1\" (1.854 m)   Wt 280 lb 4.8 oz (127.1 kg)   SpO2 96%   BMI 36.98 kg/m²   GENERAL: Alert, oriented x 3, not in acute distress. HEENT: PERRLA; EOMI. Oropharynx clear. NECK: Supple. Without lymphadenopathy. LUNGS: Good air entry bilaterally. No wheezing, crackles or rhonchi. CARDIOVASCULAR: Regular rate. No murmurs, rubs or gallops. ABDOMEN: Soft. Non-tender, non-distended. EXTREMITIES: Without clubbing or cyanosis or edema. NEUROLOGIC: No focal deficits. ECOG PS 1    Lab Results   Component Value Date    WBC 7.0 09/15/2022    HGB 15.2 09/15/2022    HCT 45.2 09/15/2022    MCV 86.6 09/15/2022     09/15/2022     Lab Results   Component Value Date     09/15/2022    K 3.6 09/15/2022    CL 97 (L) 09/15/2022    CO2 26 09/15/2022    BUN 10 09/15/2022    CREATININE 0.8 09/15/2022    GLUCOSE 201 (H) 09/15/2022    CALCIUM 9.2 09/15/2022    PROT 5.9 (L) 08/18/2022    LABALBU 3.9 09/15/2022    BILITOT 0.6 09/15/2022    ALKPHOS 53 09/15/2022    AST 9 09/15/2022    ALT 12 09/15/2022    LABGLOM >60 09/15/2022    GFRAA >60 09/15/2022     Impression/Plan:  79 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 UIFE: IgM protein. 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   -Extensive involvement by atypitcal CD5-/CD10-B-cell lymphoproliferative disorder, comprising over 70% of marrow cellularity  -Mildly hypercellular marrow for age (30-40%) with trilineage pan hypoplasia  -Normocytic anemia.   -IHC staining highlighted extensive CD20+ B cell infiltrate with admixed + plasma cells     Started on Ibrutinib 420 mg/day 2020 with good response so far    On 2021:  SPEP:  Monoclonal protein 1.72 g/dL  Persistent double monoclonal bands in the beta/gamma and gamma globuin regions. Bands previously identified as IgM kappa and IgG kappa (2020). IgM kappa decreased by 1.22 g/dL and IgG kappa minimally changed since last exam on 10/14/2020. Free kappa light chains: 413.4    (3.3-19. 4)   Free lambda light chains: 2.8      (5.7-26. 3)   K/L ratio:                          147.64 (0.26-1.65)    On 2021  SPEP:  Monoclonal protein: 1.03 (0-0) g/dL  Persistent double monoclonal bands in the beta/gamma and gamma globuin regions. Bands previously identified as IgM kappa and IgG kappa (2020). IgM kappa decreased by 0.69 g/dL and IgG kappa unchanged since 2021. Free kappa light chains: 149.4  (3.3-19. 4)   Free lambda light chains: 2.6    (5.7-26. 3)   K/L ratio:                          57.46 (0.26-1.65)    Patient started on oral iron 2020 for LANDON and B12 deficiency but required 2 doses of Feraheme on 2021 and 2021 and again in 2021. Had recurrent iron deficiency with plan for Feraheme q3 months (next one scheduled on 2021). He does IM b12 injections at home (last one on 2021). On 2021  Hb: 15.5 Hct 44.9  MCV 90.5    WBC 6.9  BUN 10  Creat 0.9  Ca 9.3  Albumin 3.6    Fe: 94 FeSat: 33% TIBIC: 288  Ferritin: 81    VitB12 370     Ig  (700-1700)   IgA: <8     ()   IgM: 1370 ()    On 2021:  Hb 14.8  Hct 44.8  MCV 90.6  WBC 7.5    All cell line counts and morphology within normal limits. Patient's history of lymphoplasmacytic lymphoma is noted.   The currently submitted blood smear is negative for circulating plasma cells, and negative for increased/atypical lymphocytes  Fe 60 TIBC 250 FeSat 24% Ferritin 73  BUN 8  Creat 0.8  LFTs wnl  Folate/B12 wnl  Beta-2 microglobulin 2.2    Peripheral blood flow cytometry noted small B-cell population with equivocal mild kappa light chain excess (1% of total cells). SPEP: M-spike 0.8 g/dl  SIFE: IgM kappa monoclonal protein is present  IgM 1295 ()    Free kappa light chains:   80.85  (3.3-19. 4)   Free lambda light chains: 3.23    (5.7-26. 3)   K/L ratio:                           25.03 (0.26-1.65)    On 08/16/2021. Hb 16.2  Hct 47.7  MCV 88.8  WBC 7.7     BUN 10 Creat 0.80  LFTs wnl  Beta-2 microglobulin 2.0  SPEP: M-spike 1 = 0.8 g/dl              M-spike 2 = 0.1 g/dl  SIFE: IgM kappa monoclonal protein is present, as previously described. IgM 1267 ()    Free kappa light chains:   88.01  (3.3-19. 4)   Free lambda light chains: 3.33    (5.7-26. 3)   K/L ratio:                           26.43 (0.26-1.65)    On 09/20/2021:  Hb 16.4  Hct 48.1  MCV 87.5  WBC 6.9     BUN 7 Creat 0.90  LFTs wnl  Beta-2 microglobulin 1.9  SPEP: M-spike 1 = 0.7 g/dl              M-spike 2 = 0.1 g/dl  SIFE: IgM kappa monoclonal protein is present, as previously described. IgG kappa monoclonal protein is present, as previously described    IgM 1314 ()    Free kappa light chains:   80.24  (3.3-19. 4)   Free lambda light chains: 2.88    (5.7-26. 3)   K/L ratio:                           27.86 (0.26-1.65)    On 10/18/2021:  Hb 15.9   Hct 48.0  MCV 89.1  WBC 9.3     BUN 8 Creat 0.9  LFTs wnl  Beta-2 microglobulin 2.0  SPEP: M-spike 1 = 0.7 g/dl              M-spike 2 = 0.1 g/dl  SIFE: IgM kappa + faint IgG kappa monoclonal protein is present, as previously described. IgM 1308 ()    Free kappa light chains:   80.48  (3.3-19. 4)   Free lambda light chains: 2.87    (5.7-26. 3)   K/L ratio:                           28.04 (0.26-1.65)    On 11/17/2021:  Hb 15.5   Hct 46.4  MCV 87.5  WBC 7.4     BUN 13 Creat 1.2  LFTs wnl  Beta-2 microglobulin 2.3  SPEP: M-spike 1 = 0.9 g/dl              M-spike 2 = 0.1 g/dl  SIFE: IgM kappa monoclonal protein is present, as previously described. A Faint IgG kappa monoclonal protein is present, as previously described. IgM 1347 ()    Free kappa light chains:   78.93  (3.3-19. 4)   Free lambda light chains: 3.92    (5.7-26. 3)   K/L ratio:                           20.14 (0.26-1.65)    On 12/15/2021:  Hb 15.3   Hct 46.5  MCV 90.3  WBC 9.1     BUN 10 Creat 0.9  LFTs wnl  Beta-2 microglobulin 2.5  SPEP: M-spike 1 = 0.8 g/dl              M-spike 2 = 0.1 g/dl  SIFE: IgM kappa + IgG kappa monoclonal protein is present, as previously described. IgM 1365 ()    Free kappa light chains:   84.08  (3.3-19. 4)   Free lambda light chains: 4.45    (5.7-26. 3)   K/L ratio:                           18.89 (0.26-1.65)    On 01/20/2022:   Hb 14.9   Hct 45.1  MCV 88.1  WBC 6.3     BUN 7 Creat 0.8  LFTs wnl  Beta-2 microglobulin 2.3  SPEP: M-spike 1 = 0.7 g/dl              M-spike 2 = 0.1 g/dl  SIFE: IgM kappa + IgG kappa monoclonal protein is present, as previously described. IgM 1142 ()    Free kappa light chains:   136.21  (3.3-19. 4)   Free lambda light chains: 5.29    (5.7-26. 3)   K/L ratio:                           25.75 (0.26-1.65)    Continued Ibrutinib    On 02/24/2022:   Hb 15.4   Hct 46.7  MCV 87.8  WBC 7.5     BUN 7 Creat 0.8  LFTs wnl  Beta-2 microglobulin 2.0  SPEP: M-spike = 0.8 g/dl    SIFE: IgM kappa monoclonal protein is present, as previously described  IgM 1279 ()    Free kappa light chains:   69.11  (3.3-19. 4)   Free lambda light chains: 4.50    (5.7-26. 3)   K/L ratio:                           15.36 (0.26-1.65)    On 03/02/2022:  Hb 15.5   Hct 46.9   MCV 90.0  WBC 9.8     BUN 9 Creat 0.7  LFTs wnl  Beta-2 microglobulin 1.6  SPEP: M-spike = 0.7 g/dl    SIFE: IgM kappa monoclonal protein is present, as previously described  IgM 1367 ()    Free kappa light chains:   55.56  (3.3-19. 4)   Free lambda light chains: 6.65    (5.7-26. 3)   K/L ratio: 8.35 (0.26-1.65)    CT chest 03/24/2022 noted chronic changes of the lungs including emphysema with midlung scarring atelectasis greatest in the lingular segment without a definitive dominant nodule or mass. No acute intrathoracic process. Imaging reviewed. On 03/31/2022:  Hb 14.5   Hct 43.8   MCV 89.0  WBC 6.2     BUN 8 Creat 0.7  LFTs wnl  Beta-2 microglobulin 2.2  SPEP: M-spike = 0.6 g/dl    SIFE: IgM kappa monoclonal protein is present, as previously described  IgM 1061 ()    Free kappa light chains:   59.06  (3.3-19. 4)   Free lambda light chains: 6.00    (5.7-26. 3)   K/L ratio:                           9.84 (0.26-1.65)    On 04/28/2022:  Hb 15.2   Hct 45.3   MCV 87.5  WBC 5.7     BUN 10 Creat 0.8  LFTs wnl  Beta-2 microglobulin 1.8  SPEP: M-spike = 0.7 g/dl    SIFE: IgM kappa monoclonal protein is present, as previously described  IgM 1176 ()    Free kappa light chains:   65.90  (3.3-19. 4)   Free lambda light chains: 3.72    (5.7-26. 3)   K/L ratio:                           17.72 (0.26-1.65)    On 05/26/2022:  Hb 15.8   Hct 47.0   MCV 88.5  WBC 7.0     BUN 15 Creat 0.8  LFTs wnl  Beta-2 microglobulin 2.4  SPEP: M-spike = 0.8 g/dl    SIFE: IgM kappa monoclonal protein is present, as previously described  IgM 1161 ()    Free kappa light chains:   64.11  (3.3-19. 4)   Free lambda light chains: 4.28    (5.7-26. 3)   K/L ratio:                           14.98 (0.26-1.65)    On 06/23/2022:  Hb 15.5   Hct 47.1   MCV 89.2  WBC 7.9     BUN 10 Creat 0.8  LFTs wnl  Beta-2 microglobulin 2.3  SPEP: M-spike = 0.8 g/dl    SIFE: IgM kappa monoclonal protein is present, as previously described  IgM 1089 ()    Free kappa light chains:   56.20  (3.3-19. 4)   Free lambda light chains: 4.38    (5.7-26. 3)   K/L ratio:                           12.83 (0.26-1.65)    On 07/21/2022:  Hb 14.7   Hct 44.5   MCV 89.9  WBC 7.1     BUN 11 Creat 0.7  LFTs wnl  Beta-2 microglobulin 2.1  SPEP: M-spike = 0.9 g/dl    SIFE: IgM kappa monoclonal protein is present, as previously described  IgM 967 ()    Free kappa light chains:   52.84  (3.3-19. 4)   Free lambda light chains: 8.77    (5.7-26. 3)   K/L ratio:                           6.03 (0.26-1.65)    On 08/18/2022:  Hb 15.3  Hct 45.0  MCV 89.5  WBC 8.1     BUN 10  Creat 0.8  LFTs wnl  Beta-2 microglobulin 2.2  SPEP: M-spike 0.6 g/dL  SIFE: IgM kappa monoclonal protein is present, as previously described.    IgM 1002 ()    On 09/15/2022:  Hb 15.2  Hct 45.2   MCV 86.6  WBC 7.0    BUN 10  Creat 0.8  Rest of labs pending  Held weekly rituximab x4  Zofran as needed for nausea  Recommended to continue ibrutinib    RTC 1 month with prior labs     09/15/2022  Ronen Trivedi MD

## 2022-09-16 LAB
ALBUMIN SERPL-MCNC: 3.5 G/DL (ref 3.5–4.7)
ALPHA-1-GLOBULIN: 0.2 G/DL (ref 0.2–0.4)
ALPHA-2-GLOBULIN: 0.7 G/DL (ref 0.5–1)
BETA GLOBULIN: 0.8 G/DL (ref 0.8–1.3)
ELECTROPHORESIS: ABNORMAL
GAMMA GLOBULIN: 1 G/DL (ref 0.7–1.6)
IGA: 14 MG/DL (ref 70–400)
IGG: 184 MG/DL (ref 700–1600)
IGM: 968 MG/DL (ref 40–230)
IMMUNOFIXATION RESULT, SERUM: NORMAL
TOTAL PROTEIN: 6.2 G/DL (ref 6.4–8.3)

## 2022-09-18 LAB
KAPPA FREE LIGHT CHAINS QNT: 59.02 MG/L (ref 3.3–19.4)
KAPPA/LAMBDA FREE LIGHT CHAIN RATIO: 13.32 (ref 0.26–1.65)
LAMBDA FREE LIGHT CHAINS QNT: 4.43 MG/L (ref 5.71–26.3)

## 2022-09-19 LAB — BETA-2 MICROGLOBULIN: 1.9 MG/L (ref 0.6–2.4)

## 2022-10-13 ENCOUNTER — HOSPITAL ENCOUNTER (OUTPATIENT)
Age: 77
Discharge: HOME | End: 2022-10-13
Payer: MEDICARE

## 2022-10-13 ENCOUNTER — HOSPITAL ENCOUNTER (OUTPATIENT)
Dept: INFUSION THERAPY | Age: 77
Discharge: HOME | End: 2022-10-13
Payer: MEDICARE

## 2022-10-13 ENCOUNTER — OFFICE VISIT (OUTPATIENT)
Dept: ONCOLOGY | Age: 77
End: 2022-10-13
Payer: MEDICARE

## 2022-10-13 VITALS
DIASTOLIC BLOOD PRESSURE: 70 MMHG | HEIGHT: 73 IN | TEMPERATURE: 97.2 F | HEART RATE: 85 BPM | BODY MASS INDEX: 37.1 KG/M2 | WEIGHT: 279.9 LBS | SYSTOLIC BLOOD PRESSURE: 133 MMHG | OXYGEN SATURATION: 96 %

## 2022-10-13 DIAGNOSIS — C83.00 MALIGNANT LYMPHOPLASMACYTIC LYMPHOMA (HCC): ICD-10-CM

## 2022-10-13 DIAGNOSIS — C83.00 MALIGNANT LYMPHOPLASMACYTIC LYMPHOMA (HCC): Primary | ICD-10-CM

## 2022-10-13 LAB
ALBUMIN SERPL-MCNC: 3.6 G/DL (ref 3.5–5.2)
ALP BLD-CCNC: 47 U/L (ref 40–129)
ALT SERPL-CCNC: 11 U/L (ref 0–40)
ANION GAP SERPL CALCULATED.3IONS-SCNC: 15 MMOL/L (ref 7–16)
AST SERPL-CCNC: 11 U/L (ref 0–39)
BASOPHILS ABSOLUTE: 0.05 E9/L (ref 0–0.2)
BASOPHILS RELATIVE PERCENT: 0.6 % (ref 0–2)
BILIRUB SERPL-MCNC: 0.8 MG/DL (ref 0–1.2)
BUN BLDV-MCNC: 9 MG/DL (ref 6–23)
CALCIUM SERPL-MCNC: 9.3 MG/DL (ref 8.6–10.2)
CHLORIDE BLD-SCNC: 100 MMOL/L (ref 98–107)
CO2: 21 MMOL/L (ref 22–29)
CREAT SERPL-MCNC: 0.8 MG/DL (ref 0.7–1.2)
EOSINOPHILS ABSOLUTE: 0.14 E9/L (ref 0.05–0.5)
EOSINOPHILS RELATIVE PERCENT: 1.5 % (ref 0–6)
FERRITIN: 173 NG/ML
GFR AFRICAN AMERICAN: >60
GFR NON-AFRICAN AMERICAN: >60 ML/MIN/1.73
GLUCOSE BLD-MCNC: 196 MG/DL (ref 74–99)
HCT VFR BLD CALC: 45.9 % (ref 37–54)
HEMOGLOBIN: 15.6 G/DL (ref 12.5–16.5)
IMMATURE GRANULOCYTES #: 0.1 E9/L
IMMATURE GRANULOCYTES %: 1.1 % (ref 0–5)
IRON SATURATION: 36 % (ref 20–55)
IRON: 101 MCG/DL (ref 59–158)
LACTATE DEHYDROGENASE: 154 U/L (ref 135–225)
LYMPHOCYTES ABSOLUTE: 2.25 E9/L (ref 1.5–4)
LYMPHOCYTES RELATIVE PERCENT: 24.9 % (ref 20–42)
MCH RBC QN AUTO: 29.5 PG (ref 26–35)
MCHC RBC AUTO-ENTMCNC: 34 % (ref 32–34.5)
MCV RBC AUTO: 86.9 FL (ref 80–99.9)
MONOCYTES ABSOLUTE: 0.64 E9/L (ref 0.1–0.95)
MONOCYTES RELATIVE PERCENT: 7.1 % (ref 2–12)
NEUTROPHILS ABSOLUTE: 5.86 E9/L (ref 1.8–7.3)
NEUTROPHILS RELATIVE PERCENT: 64.8 % (ref 43–80)
PDW BLD-RTO: 13.5 FL (ref 11.5–15)
PLATELET # BLD: 202 E9/L (ref 130–450)
PMV BLD AUTO: 10.7 FL (ref 7–12)
POTASSIUM SERPL-SCNC: 4.1 MMOL/L (ref 3.5–5)
RBC # BLD: 5.28 E12/L (ref 3.8–5.8)
REASON FOR REJECTION: NORMAL
REJECTED TEST: NORMAL
SODIUM BLD-SCNC: 136 MMOL/L (ref 132–146)
TOTAL IRON BINDING CAPACITY: 283 MCG/DL (ref 250–450)
WBC # BLD: 9 E9/L (ref 4.5–11.5)

## 2022-10-13 PROCEDURE — 82232 ASSAY OF BETA-2 PROTEIN: CPT

## 2022-10-13 PROCEDURE — 83883 ASSAY NEPHELOMETRY NOT SPEC: CPT

## 2022-10-13 PROCEDURE — 1123F ACP DISCUSS/DSCN MKR DOCD: CPT | Performed by: INTERNAL MEDICINE

## 2022-10-13 PROCEDURE — G8417 CALC BMI ABV UP PARAM F/U: HCPCS | Performed by: INTERNAL MEDICINE

## 2022-10-13 PROCEDURE — 36415 COLL VENOUS BLD VENIPUNCTURE: CPT

## 2022-10-13 PROCEDURE — 85025 COMPLETE CBC W/AUTO DIFF WBC: CPT

## 2022-10-13 PROCEDURE — 4004F PT TOBACCO SCREEN RCVD TLK: CPT | Performed by: INTERNAL MEDICINE

## 2022-10-13 PROCEDURE — 83550 IRON BINDING TEST: CPT

## 2022-10-13 PROCEDURE — 3017F COLORECTAL CA SCREEN DOC REV: CPT | Performed by: INTERNAL MEDICINE

## 2022-10-13 PROCEDURE — 83540 ASSAY OF IRON: CPT

## 2022-10-13 PROCEDURE — 99214 OFFICE O/P EST MOD 30 MIN: CPT | Performed by: INTERNAL MEDICINE

## 2022-10-13 PROCEDURE — 80053 COMPREHEN METABOLIC PANEL: CPT

## 2022-10-13 PROCEDURE — G8427 DOCREV CUR MEDS BY ELIG CLIN: HCPCS | Performed by: INTERNAL MEDICINE

## 2022-10-13 PROCEDURE — G8484 FLU IMMUNIZE NO ADMIN: HCPCS | Performed by: INTERNAL MEDICINE

## 2022-10-13 PROCEDURE — 83615 LACTATE (LD) (LDH) ENZYME: CPT

## 2022-10-13 PROCEDURE — 82728 ASSAY OF FERRITIN: CPT

## 2022-10-13 PROCEDURE — 84165 PROTEIN E-PHORESIS SERUM: CPT

## 2022-10-13 PROCEDURE — 86334 IMMUNOFIX E-PHORESIS SERUM: CPT

## 2022-10-13 PROCEDURE — 82784 ASSAY IGA/IGD/IGG/IGM EACH: CPT

## 2022-10-13 NOTE — PROGRESS NOTES
Department of [facility]  Attending Clinic Note    Reason for Visit: Follow-up on a patient with Lymphoplasmacytic Lymphoma      PCP:  Elizabeth Flores MD    History of Present Illness:  78year old male initially seen at [facility] hematology for anemia and abnormal TP/albumin ratio. SPEP IgG and IgM kapa paraprotein, M-spike 3.29 g/dL. UIFE: IgM protein. 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   -Extensive involvement by atypitcal CD5-/CD10-B-cell lymphoproliferative disorder, comprising over 70% of marrow cellularity  -Mildly hypercellular marrow for age (30-40%) with trilineage pan hypoplasia  -Normocytic anemia. -IHC staining highlighted extensive CD20+ B cell infiltrate with admixed + plasma cells     Started on Ibrutinib 420 mg/day 12/2020 with good response so far    On 01/06/2021:  SPEP:  Monoclonal protein 1.72 g/dL  Persistent double monoclonal bands in the beta/gamma and gamma globuin regions. Bands previously identified as IgM kappa and IgG kappa (8/12/2020). IgM kappa decreased by 1.22 g/dL and IgG kappa minimally changed since last exam on 10/14/2020. Free kappa light chains: 413.4    (3.3-19. 4)   Free lambda light chains: 2.8      (5.7-26. 3)   K/L ratio:                          147.64 (0.26-1.65)    On 03/25/2021  SPEP:  Monoclonal protein: 1.03 (0-0) g/dL  Persistent double monoclonal bands in the beta/gamma and gamma globuin regions. Bands previously identified as IgM kappa and IgG kappa (8/12/2020). IgM kappa decreased by 0.69 g/dL and IgG kappa unchanged since 01/06/2021. Free kappa light chains: 149.4  (3.3-19. 4)   Free lambda light chains: 2.6    (5.7-26. 3)   K/L ratio:                          57.46 (0.26-1.65)    Patient started on oral iron 08/2020 for LANDON and B12 deficiency but required 2 doses of Feraheme on 01/21/2021 and 01/28/2021 and again in 2021. Had recurrent iron deficiency with plan for Feraheme q3 months (next one scheduled on 2021). He does IM b12 injections at home. On 2021  Hb: 15.5 Hct 44.9  MCV 90.5    WBC 6.9  BUN 10  Creat 0.9  Ca 9.3  Albumin 3.6    Fe: 94 FeSat: 33% TIBIC: 288  Ferritin: 81    VitB12 370     Ig  (700-1700)   IgA: <8     ()   IgM: 1370 ()    On 2021:  Hb 14.8  Hct 44.8  MCV 90.6  WBC 7.5    All cell line counts and morphology within normal limits. Patient's history of lymphoplasmacytic lymphoma is noted. The currently submitted blood smear is negative for circulating plasma cells, and negative for increased/atypical lymphocytes  Fe 60 TIBC 250 FeSat 24% Ferritin 73  BUN 8  Creat 0.8  LFTs wnl  Folate/B12 wnl  Beta-2 microglobulin 2.2    Peripheral blood flow cytometry noted small B-cell population with equivocal mild kappa light chain excess (1% of total cells). SPEP: M-spike 0.8 g/dl  SIFE: IgM kappa monoclonal protein is present  IgM 1295 ()    Free kappa light chains:   80.85  (3.3-19. 4)   Free lambda light chains: 3.23    (5.7-26. 3)   K/L ratio:                           25.03 (0.26-1.65)    On 2021. Hb 16.2  Hct 47.7  MCV 88.8  WBC 7.7     BUN 10 Creat 0.80  LFTs wnl  Beta-2 microglobulin 2.0  SPEP: M-spike 1 = 0.8 g/dl              M-spike 2 = 0.1 g/dl  SIFE: IgM kappa monoclonal protein is present, as previously described. IgM 1267 ()    Free kappa light chains:   88.01  (3.3-19. 4)   Free lambda light chains: 3.33    (5.7-26. 3)   K/L ratio:                           26.43 (0.26-1.65)    On 2021:  Hb 16.4  Hct 48.1  MCV 87.5  WBC 6.9     BUN 7 Creat 0.90  LFTs wnl  Beta-2 microglobulin 1.9  SPEP: M-spike 1 = 0.7 g/dl              M-spike 2 = 0.1 g/dl  SIFE: IgM kappa monoclonal protein is present, as previously described.    IgG kappa monoclonal protein is present, as previously described    IgM 1314 ()    Free kappa light chains:   80.24  (3.3-19. 4)   Free lambda light chains: 2.88    (5.7-26. 3)   K/L ratio:                           27.86 (0.26-1.65)    On 10/18/2021:  Hb 15.9   Hct 48.0  MCV 89.1  WBC 9.3     BUN 8 Creat 0.9  LFTs wnl  Beta-2 microglobulin 2.0  SPEP: M-spike 1 = 0.7 g/dl              M-spike 2 = 0.1 g/dl  SIFE: IgM kappa + faint IgG kappa monoclonal protein is present, as previously described. IgM 1308 ()    Free kappa light chains:   80.48  (3.3-19. 4)   Free lambda light chains: 2.87    (5.7-26. 3)   K/L ratio:                           28.04 (0.26-1.65)    Continued Ibrutinib    On 11/17/2021:  Hb 15.5   Hct 46.4  MCV 87.5  WBC 7.4     BUN 13 Creat 1.2  LFTs wnl  Beta-2 microglobulin 2.3  SPEP: M-spike 1 = 0.9 g/dl              M-spike 2 = 0.1 g/dl  SIFE: IgM kappa monoclonal protein is present, as previously described. A Faint IgG kappa monoclonal protein is present, as previously described. IgM 1347 ()    Free kappa light chains:   78.93  (3.3-19. 4)   Free lambda light chains: 3.92    (5.7-26. 3)   K/L ratio:                           20.14 (0.26-1.65)    On 12/15/2021:  Hb 15.3   Hct 46.5  MCV 90.3  WBC 9.1     BUN 10 Creat 0.9  LFTs wnl  Beta-2 microglobulin 2.5  SPEP: M-spike 1 = 0.8 g/dl              M-spike 2 = 0.1 g/dl  SIFE: IgM kappa + IgG kappa monoclonal protein is present, as previously described. IgM 1365 ()    Free kappa light chains:   84.08  (3.3-19. 4)   Free lambda light chains: 4.45    (5.7-26. 3)   K/L ratio:                           18.89 (0.26-1.65)    Continued Ibrutinib    On 01/20/2022:   Hb 14.9   Hct 45.1  MCV 88.1  WBC 6.3     BUN 7 Creat 0.8  LFTs wnl  Beta-2 microglobulin 2.3  SPEP: M-spike 1 = 0.7 g/dl              M-spike 2 = 0.1 g/dl  SIFE: IgM kappa + IgG kappa monoclonal protein is present, as previously described. IgM 1142 ()    Free kappa light chains:   136.21  (3.3-19. 4) Free lambda light chains: 5.29    (5.7-26. 3)   K/L ratio:                           25.75 (0.26-1.65)    Continued Ibrutinib    On 02/24/2022:   Hb 15.4   Hct 46.7  MCV 87.8  WBC 7.5     BUN 7 Creat 0.8  LFTs wnl  Beta-2 microglobulin 2.0  SPEP: M-spike = 0.8 g/dl    SIFE: IgM kappa monoclonal protein is present, as previously described  IgM 1279 ()    Free kappa light chains:   69.11  (3.3-19. 4)   Free lambda light chains: 4.50    (5.7-26. 3)   K/L ratio:                           15.36 (0.26-1.65)    On 03/02/2022:  Hb 15.5   Hct 46.9   MCV 90.0  WBC 9.8     BUN 9 Creat 0.7  LFTs wnl  Beta-2 microglobulin 1.6  SPEP: M-spike = 0.7 g/dl    SIFE: IgM kappa monoclonal protein is present, as previously described  IgM 1367 ()    Free kappa light chains:   55.56  (3.3-19. 4)   Free lambda light chains: 6.65    (5.7-26. 3)   K/L ratio:                           8.35 (0.26-1.65)    On 03/31/2022:  Hb 14.5   Hct 43.8   MCV 89.0  WBC 6.2     BUN 8 Creat 0.7  LFTs wnl  Beta-2 microglobulin 2.2  SPEP: M-spike = 0.6 g/dl    SIFE: IgM kappa monoclonal protein is present, as previously described  IgM 1061 ()    Free kappa light chains:   59.06  (3.3-19. 4)   Free lambda light chains: 6.00    (5.7-26. 3)   K/L ratio:                           9.84 (0.26-1.65)    On 04/28/2022:  Hb 15.2   Hct 45.3   MCV 87.5  WBC 5.7     BUN 10 Creat 0.8  LFTs wnl  Beta-2 microglobulin 1.8  SPEP: M-spike = 0.7 g/dl    SIFE: IgM kappa monoclonal protein is present, as previously described  IgM 1176 ()    Free kappa light chains:   65.90  (3.3-19. 4)   Free lambda light chains: 3.72    (5.7-26. 3)   K/L ratio:                           17.72 (0.26-1.65)    On 05/26/2022:  Hb 15.8   Hct 47.0   MCV 88.5  WBC 7.0     BUN 15 Creat 0.8  LFTs wnl  Beta-2 microglobulin 2.4  SPEP: M-spike = 0.8 g/dl    SIFE: IgM kappa monoclonal protein is present, as previously described  IgM 1161 ()    Free kappa chest pain, palpitations. GASTROINTESTINAL: intermittent nausea and diarrhea  GENITOURINARY: No dysuria, urinary frequency, hematuria. NEURO: No syncope, presyncope, headache. 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 Allergies:  No Known Allergies    Physical Exam:  /70   Pulse 85   Temp 97.2 °F (36.2 °C)   Ht 6' 1\" (1.854 m)   Wt 279 lb 14.4 oz (127 kg)   SpO2 96%   BMI 36.93 kg/m²   GENERAL: Alert, oriented x 3, not in acute distress. HEENT: PERRLA; EOMI. Oropharynx clear. NECK: Supple. Without lymphadenopathy. LUNGS: Good air entry bilaterally. No wheezing, crackles or rhonchi. CARDIOVASCULAR: Regular rate. No murmurs, rubs or gallops. ABDOMEN: Soft. Non-tender, non-distended. EXTREMITIES: Without clubbing or cyanosis or edema. NEUROLOGIC: No focal deficits. ECOG PS 1    Impression/Plan:  79 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 UIFE: IgM protein. 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   -Extensive involvement by atypitcal CD5-/CD10-B-cell lymphoproliferative disorder, comprising over 70% of marrow cellularity  -Mildly hypercellular marrow for age (30-40%) with trilineage pan hypoplasia  -Normocytic anemia.   -IHC staining highlighted extensive CD20+ B cell infiltrate with admixed + plasma cells     Started on Ibrutinib 420 mg/day 12/2020 with good response so far    On 01/06/2021:  SPEP:  Monoclonal protein 1.72 g/dL  Persistent double monoclonal bands in the beta/gamma and gamma globuin regions. Bands previously identified as IgM kappa and IgG kappa (2020). IgM kappa decreased by 1.22 g/dL and IgG kappa minimally changed since last exam on 10/14/2020. Free kappa light chains: 413.4    (3.3-19. 4)   Free lambda light chains: 2.8      (5.7-26. 3)   K/L ratio:                          147.64 (0.26-1.65)    On 2021  SPEP:  Monoclonal protein: 1.03 (0-0) g/dL  Persistent double monoclonal bands in the beta/gamma and gamma globuin regions. Bands previously identified as IgM kappa and IgG kappa (2020). IgM kappa decreased by 0.69 g/dL and IgG kappa unchanged since 2021. Free kappa light chains: 149.4  (3.3-19. 4)   Free lambda light chains: 2.6    (5.7-26. 3)   K/L ratio:                          57.46 (0.26-1.65)    Patient started on oral iron 2020 for LANDON and B12 deficiency but required 2 doses of Feraheme on 2021 and 2021 and again in 2021. Had recurrent iron deficiency with plan for Feraheme q3 months (next one scheduled on 2021). He does IM b12 injections at home (last one on 2021). On 2021  Hb: 15.5 Hct 44.9  MCV 90.5    WBC 6.9  BUN 10  Creat 0.9  Ca 9.3  Albumin 3.6    Fe: 94 FeSat: 33% TIBIC: 288  Ferritin: 81    VitB12 370     Ig  (700-1700)   IgA: <8     ()   IgM: 1370 ()    On 2021:  Hb 14.8  Hct 44.8  MCV 90.6  WBC 7.5    All cell line counts and morphology within normal limits. Patient's history of lymphoplasmacytic lymphoma is noted. The currently submitted blood smear is negative for circulating plasma cells, and negative for increased/atypical lymphocytes  Fe 60 TIBC 250 FeSat 24% Ferritin 73  BUN 8  Creat 0.8  LFTs wnl  Folate/B12 wnl  Beta-2 microglobulin 2.2    Peripheral blood flow cytometry noted small B-cell population with equivocal mild kappa light chain excess (1% of total cells).     SPEP: M-spike 0.8 g/dl  SIFE: IgM kappa monoclonal protein is present  IgM 1295 ()    Free kappa light chains:   80.85  (3.3-19. 4)   Free lambda light chains: 3.23    (5.7-26. 3)   K/L ratio:                           25.03 (0.26-1.65)    On 08/16/2021. Hb 16.2  Hct 47.7  MCV 88.8  WBC 7.7     BUN 10 Creat 0.80  LFTs wnl  Beta-2 microglobulin 2.0  SPEP: M-spike 1 = 0.8 g/dl              M-spike 2 = 0.1 g/dl  SIFE: IgM kappa monoclonal protein is present, as previously described. IgM 1267 ()    Free kappa light chains:   88.01  (3.3-19. 4)   Free lambda light chains: 3.33    (5.7-26. 3)   K/L ratio:                           26.43 (0.26-1.65)    On 09/20/2021:  Hb 16.4  Hct 48.1  MCV 87.5  WBC 6.9     BUN 7 Creat 0.90  LFTs wnl  Beta-2 microglobulin 1.9  SPEP: M-spike 1 = 0.7 g/dl              M-spike 2 = 0.1 g/dl  SIFE: IgM kappa monoclonal protein is present, as previously described. IgG kappa monoclonal protein is present, as previously described    IgM 1314 ()    Free kappa light chains:   80.24  (3.3-19. 4)   Free lambda light chains: 2.88    (5.7-26. 3)   K/L ratio:                           27.86 (0.26-1.65)    On 10/18/2021:  Hb 15.9   Hct 48.0  MCV 89.1  WBC 9.3     BUN 8 Creat 0.9  LFTs wnl  Beta-2 microglobulin 2.0  SPEP: M-spike 1 = 0.7 g/dl              M-spike 2 = 0.1 g/dl  SIFE: IgM kappa + faint IgG kappa monoclonal protein is present, as previously described. IgM 1308 ()    Free kappa light chains:   80.48  (3.3-19. 4)   Free lambda light chains: 2.87    (5.7-26. 3)   K/L ratio:                           28.04 (0.26-1.65)    On 11/17/2021:  Hb 15.5   Hct 46.4  MCV 87.5  WBC 7.4     BUN 13 Creat 1.2  LFTs wnl  Beta-2 microglobulin 2.3  SPEP: M-spike 1 = 0.9 g/dl              M-spike 2 = 0.1 g/dl  SIFE: IgM kappa monoclonal protein is present, as previously described. A Faint IgG kappa monoclonal protein is present, as previously described.      IgM 1347 ()    Free kappa light chains:   78.93 (3.3-19. 4)   Free lambda light chains: 3.92    (5.7-26. 3)   K/L ratio:                           20.14 (0.26-1.65)    On 12/15/2021:  Hb 15.3   Hct 46.5  MCV 90.3  WBC 9.1     BUN 10 Creat 0.9  LFTs wnl  Beta-2 microglobulin 2.5  SPEP: M-spike 1 = 0.8 g/dl              M-spike 2 = 0.1 g/dl  SIFE: IgM kappa + IgG kappa monoclonal protein is present, as previously described. IgM 1365 ()    Free kappa light chains:   84.08  (3.3-19. 4)   Free lambda light chains: 4.45    (5.7-26. 3)   K/L ratio:                           18.89 (0.26-1.65)    On 01/20/2022:   Hb 14.9   Hct 45.1  MCV 88.1  WBC 6.3     BUN 7 Creat 0.8  LFTs wnl  Beta-2 microglobulin 2.3  SPEP: M-spike 1 = 0.7 g/dl              M-spike 2 = 0.1 g/dl  SIFE: IgM kappa + IgG kappa monoclonal protein is present, as previously described. IgM 1142 ()    Free kappa light chains:   136.21  (3.3-19. 4)   Free lambda light chains: 5.29    (5.7-26. 3)   K/L ratio:                           25.75 (0.26-1.65)    Continued Ibrutinib    On 02/24/2022:   Hb 15.4   Hct 46.7  MCV 87.8  WBC 7.5     BUN 7 Creat 0.8  LFTs wnl  Beta-2 microglobulin 2.0  SPEP: M-spike = 0.8 g/dl    SIFE: IgM kappa monoclonal protein is present, as previously described  IgM 1279 ()    Free kappa light chains:   69.11  (3.3-19. 4)   Free lambda light chains: 4.50    (5.7-26. 3)   K/L ratio:                           15.36 (0.26-1.65)    On 03/02/2022:  Hb 15.5   Hct 46.9   MCV 90.0  WBC 9.8     BUN 9 Creat 0.7  LFTs wnl  Beta-2 microglobulin 1.6  SPEP: M-spike = 0.7 g/dl    SIFE: IgM kappa monoclonal protein is present, as previously described  IgM 1367 ()    Free kappa light chains:   55.56  (3.3-19. 4)   Free lambda light chains: 6.65    (5.7-26. 3)   K/L ratio:                           8.35 (0.26-1.65)    CT chest 03/24/2022 noted chronic changes of the lungs including emphysema with midlung scarring atelectasis greatest in the lingular segment without a definitive dominant nodule or mass. No acute intrathoracic process. Imaging reviewed. On 03/31/2022:  Hb 14.5   Hct 43.8   MCV 89.0  WBC 6.2     BUN 8 Creat 0.7  LFTs wnl  Beta-2 microglobulin 2.2  SPEP: M-spike = 0.6 g/dl    SIFE: IgM kappa monoclonal protein is present, as previously described  IgM 1061 ()    Free kappa light chains:   59.06  (3.3-19. 4)   Free lambda light chains: 6.00    (5.7-26. 3)   K/L ratio:                           9.84 (0.26-1.65)    On 04/28/2022:  Hb 15.2   Hct 45.3   MCV 87.5  WBC 5.7     BUN 10 Creat 0.8  LFTs wnl  Beta-2 microglobulin 1.8  SPEP: M-spike = 0.7 g/dl    SIFE: IgM kappa monoclonal protein is present, as previously described  IgM 1176 ()    Free kappa light chains:   65.90  (3.3-19. 4)   Free lambda light chains: 3.72    (5.7-26. 3)   K/L ratio:                           17.72 (0.26-1.65)    On 05/26/2022:  Hb 15.8   Hct 47.0   MCV 88.5  WBC 7.0     BUN 15 Creat 0.8  LFTs wnl  Beta-2 microglobulin 2.4  SPEP: M-spike = 0.8 g/dl    SIFE: IgM kappa monoclonal protein is present, as previously described  IgM 1161 ()    Free kappa light chains:   64.11  (3.3-19. 4)   Free lambda light chains: 4.28    (5.7-26. 3)   K/L ratio:                           14.98 (0.26-1.65)    On 06/23/2022:  Hb 15.5   Hct 47.1   MCV 89.2  WBC 7.9     BUN 10 Creat 0.8  LFTs wnl  Beta-2 microglobulin 2.3  SPEP: M-spike = 0.8 g/dl    SIFE: IgM kappa monoclonal protein is present, as previously described  IgM 1089 ()    Free kappa light chains:   56.20  (3.3-19. 4)   Free lambda light chains: 4.38    (5.7-26. 3)   K/L ratio:                           12.83 (0.26-1.65)    On 07/21/2022:  Hb 14.7   Hct 44.5   MCV 89.9  WBC 7.1     BUN 11 Creat 0.7  LFTs wnl  Beta-2 microglobulin 2.1  SPEP: M-spike = 0.9 g/dl    SIFE: IgM kappa monoclonal protein is present, as previously described  IgM 967 ()    Free kappa light chains:   52.84 (3.3-19. 4)   Free lambda light chains: 8.77    (5.7-26. 3)   K/L ratio:                           6.03 (0.26-1.65)    On 08/18/2022:  Hb 15.3  Hct 45.0  MCV 89.5  WBC 8.1     BUN 10  Creat 0.8  LFTs wnl  Beta-2 microglobulin 2.2  SPEP: M-spike 0.6 g/dL  SIFE: IgM kappa monoclonal protein is present, as previously described. IgM 1002 ()    On 09/15/2022:  Hb 15.2  Hct 45.2   MCV 86.6  WBC 7.0    BUN 10  Creat 0.8  Beta-2 microglobulin 1.9  SPEP: M-spike 0.6 g/dL  SIFE: IgM kappa monoclonal protein is present, as previously described. IgM 968 ()  Free kappa light chains:   59.02  (3.3-19. 4)   Free lambda light chains: 4.43    (5.7-26. 3)   K/L ratio:                           13.32 (0.26-1.65)    On 10/13/2022:  BUN 9 Creat 0.8  Rest of markers pending  Labs reviewed.      Held weekly Rituximab x 4  Recommended Zofran as needed for nausea  Recommended Imodium as needed for diarrhea  Recommended to continue Ibrutinib    RTC 1 month with prior labs     10/13/2022  Milena Alas MD

## 2022-10-14 LAB
KAPPA FREE LIGHT CHAINS QNT: 52.17 MG/L (ref 3.3–19.4)
KAPPA/LAMBDA FREE LIGHT CHAIN RATIO: 12.48 (ref 0.26–1.65)
LAMBDA FREE LIGHT CHAINS QNT: 4.18 MG/L (ref 5.71–26.3)

## 2022-10-17 LAB
ALBUMIN SERPL-MCNC: 3.1 G/DL (ref 3.5–4.7)
ALPHA-1-GLOBULIN: 0.2 G/DL (ref 0.2–0.4)
ALPHA-2-GLOBULIN: 0.8 G/DL (ref 0.5–1)
BETA GLOBULIN: 0.8 G/DL (ref 0.8–1.3)
BETA-2 MICROGLOBULIN: 2.2 MG/L (ref 0.6–2.4)
ELECTROPHORESIS: ABNORMAL
GAMMA GLOBULIN: 1 G/DL (ref 0.7–1.6)
IGA: 14 MG/DL (ref 70–400)
IGG: 185 MG/DL (ref 700–1600)
IGM: 1001 MG/DL (ref 40–230)
IMMUNOFIXATION RESULT, SERUM: NORMAL
TOTAL PROTEIN: 5.8 G/DL (ref 6.4–8.3)

## 2022-10-31 RX ORDER — ONDANSETRON 4 MG/1
4 TABLET, FILM COATED ORAL EVERY 8 HOURS PRN
Qty: 30 TABLET | Refills: 1 | Status: SHIPPED | OUTPATIENT
Start: 2022-10-31

## 2022-11-17 ENCOUNTER — HOSPITAL ENCOUNTER (OUTPATIENT)
Dept: INFUSION THERAPY | Age: 77
Discharge: HOME | End: 2022-11-17
Payer: MEDICARE

## 2022-11-17 ENCOUNTER — OFFICE VISIT (OUTPATIENT)
Dept: ONCOLOGY | Age: 77
End: 2022-11-17
Payer: MEDICARE

## 2022-11-17 VITALS
TEMPERATURE: 97.1 F | SYSTOLIC BLOOD PRESSURE: 167 MMHG | HEIGHT: 73 IN | OXYGEN SATURATION: 96 % | WEIGHT: 282 LBS | DIASTOLIC BLOOD PRESSURE: 88 MMHG | RESPIRATION RATE: 16 BRPM | BODY MASS INDEX: 37.37 KG/M2 | HEART RATE: 81 BPM

## 2022-11-17 DIAGNOSIS — C83.00 MALIGNANT LYMPHOPLASMACYTIC LYMPHOMA (HCC): ICD-10-CM

## 2022-11-17 DIAGNOSIS — C83.00 MALIGNANT LYMPHOPLASMACYTIC LYMPHOMA (HCC): Primary | ICD-10-CM

## 2022-11-17 LAB
ALBUMIN SERPL-MCNC: 3.8 G/DL (ref 3.5–5.2)
ALP BLD-CCNC: 46 U/L (ref 40–129)
ALT SERPL-CCNC: 15 U/L (ref 0–40)
ANION GAP SERPL CALCULATED.3IONS-SCNC: 8 MMOL/L (ref 7–16)
AST SERPL-CCNC: 11 U/L (ref 0–39)
BASOPHILS ABSOLUTE: 0.06 E9/L (ref 0–0.2)
BASOPHILS RELATIVE PERCENT: 0.9 % (ref 0–2)
BILIRUB SERPL-MCNC: 0.9 MG/DL (ref 0–1.2)
BUN BLDV-MCNC: 9 MG/DL (ref 6–23)
CALCIUM SERPL-MCNC: 9.5 MG/DL (ref 8.6–10.2)
CHLORIDE BLD-SCNC: 100 MMOL/L (ref 98–107)
CO2: 30 MMOL/L (ref 22–29)
CREAT SERPL-MCNC: 0.8 MG/DL (ref 0.7–1.2)
EOSINOPHILS ABSOLUTE: 0.12 E9/L (ref 0.05–0.5)
EOSINOPHILS RELATIVE PERCENT: 1.8 % (ref 0–6)
FERRITIN: 163 NG/ML
GFR SERPL CREATININE-BSD FRML MDRD: >60 ML/MIN/1.73
GLUCOSE BLD-MCNC: 204 MG/DL (ref 74–99)
HCT VFR BLD CALC: 45.9 % (ref 37–54)
HEMOGLOBIN: 15.3 G/DL (ref 12.5–16.5)
IMMATURE GRANULOCYTES #: 0.09 E9/L
IMMATURE GRANULOCYTES %: 1.3 % (ref 0–5)
IRON SATURATION: 57 % (ref 20–55)
IRON: 161 MCG/DL (ref 59–158)
LACTATE DEHYDROGENASE: 118 U/L (ref 135–225)
LYMPHOCYTES ABSOLUTE: 2.03 E9/L (ref 1.5–4)
LYMPHOCYTES RELATIVE PERCENT: 30.2 % (ref 20–42)
MCH RBC QN AUTO: 29.9 PG (ref 26–35)
MCHC RBC AUTO-ENTMCNC: 33.3 % (ref 32–34.5)
MCV RBC AUTO: 89.6 FL (ref 80–99.9)
MONOCYTES ABSOLUTE: 0.55 E9/L (ref 0.1–0.95)
MONOCYTES RELATIVE PERCENT: 8.2 % (ref 2–12)
NEUTROPHILS ABSOLUTE: 3.88 E9/L (ref 1.8–7.3)
NEUTROPHILS RELATIVE PERCENT: 57.6 % (ref 43–80)
PDW BLD-RTO: 13.5 FL (ref 11.5–15)
PLATELET # BLD: 189 E9/L (ref 130–450)
PMV BLD AUTO: 10.7 FL (ref 7–12)
POTASSIUM SERPL-SCNC: 3.9 MMOL/L (ref 3.5–5)
RBC # BLD: 5.12 E12/L (ref 3.8–5.8)
SODIUM BLD-SCNC: 138 MMOL/L (ref 132–146)
TOTAL IRON BINDING CAPACITY: 282 MCG/DL (ref 250–450)
WBC # BLD: 6.7 E9/L (ref 4.5–11.5)

## 2022-11-17 PROCEDURE — 85025 COMPLETE CBC W/AUTO DIFF WBC: CPT

## 2022-11-17 PROCEDURE — 84165 PROTEIN E-PHORESIS SERUM: CPT

## 2022-11-17 PROCEDURE — 86334 IMMUNOFIX E-PHORESIS SERUM: CPT

## 2022-11-17 PROCEDURE — 83550 IRON BINDING TEST: CPT

## 2022-11-17 PROCEDURE — 99213 OFFICE O/P EST LOW 20 MIN: CPT | Performed by: INTERNAL MEDICINE

## 2022-11-17 PROCEDURE — G8484 FLU IMMUNIZE NO ADMIN: HCPCS | Performed by: INTERNAL MEDICINE

## 2022-11-17 PROCEDURE — G8417 CALC BMI ABV UP PARAM F/U: HCPCS | Performed by: INTERNAL MEDICINE

## 2022-11-17 PROCEDURE — 80053 COMPREHEN METABOLIC PANEL: CPT

## 2022-11-17 PROCEDURE — 82784 ASSAY IGA/IGD/IGG/IGM EACH: CPT

## 2022-11-17 PROCEDURE — 82728 ASSAY OF FERRITIN: CPT

## 2022-11-17 PROCEDURE — 83615 LACTATE (LD) (LDH) ENZYME: CPT

## 2022-11-17 PROCEDURE — 1123F ACP DISCUSS/DSCN MKR DOCD: CPT | Performed by: INTERNAL MEDICINE

## 2022-11-17 PROCEDURE — 83883 ASSAY NEPHELOMETRY NOT SPEC: CPT

## 2022-11-17 PROCEDURE — 82232 ASSAY OF BETA-2 PROTEIN: CPT

## 2022-11-17 PROCEDURE — 4004F PT TOBACCO SCREEN RCVD TLK: CPT | Performed by: INTERNAL MEDICINE

## 2022-11-17 PROCEDURE — 3017F COLORECTAL CA SCREEN DOC REV: CPT | Performed by: INTERNAL MEDICINE

## 2022-11-17 PROCEDURE — G8427 DOCREV CUR MEDS BY ELIG CLIN: HCPCS | Performed by: INTERNAL MEDICINE

## 2022-11-17 PROCEDURE — 99212 OFFICE O/P EST SF 10 MIN: CPT

## 2022-11-17 PROCEDURE — 83540 ASSAY OF IRON: CPT

## 2022-11-17 NOTE — PROGRESS NOTES
Department of [facility] Oncology  Attending Clinic Note    Reason for Visit: Follow-up on a patient with Lymphoplasmacytic Lymphoma      PCP:  Suhas Sears MD    History of Present Illness:  78year old male initially seen at [facility] hematology for anemia and abnormal TP/albumin ratio. SPEP IgG and IgM kappa paraprotein, M-spike 3.29 g/dL. UIFE: IgM protein. 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   -Extensive involvement by atypitcal CD5-/CD10-B-cell lymphoproliferative disorder, comprising over 70% of marrow cellularity  -Mildly hypercellular marrow for age (30-40%) with trilineage pan hypoplasia  -Normocytic anemia. -IHC staining highlighted extensive CD20+ B cell infiltrate with admixed + plasma cells     Started on Ibrutinib 420 mg/day 12/2020 with good response so far    On 01/06/2021:  SPEP:  Monoclonal protein 1.72 g/dL  Persistent double monoclonal bands in the beta/gamma and gamma globuin regions. Bands previously identified as IgM kappa and IgG kappa (8/12/2020). IgM kappa decreased by 1.22 g/dL and IgG kappa minimally changed since last exam on 10/14/2020. Free kappa light chains: 413.4    (3.3-19. 4)   Free lambda light chains: 2.8      (5.7-26. 3)   K/L ratio:                          147.64 (0.26-1.65)    On 03/25/2021  SPEP:  Monoclonal protein: 1.03 (0-0) g/dL  Persistent double monoclonal bands in the beta/gamma and gamma globuin regions. Bands previously identified as IgM kappa and IgG kappa (8/12/2020). IgM kappa decreased by 0.69 g/dL and IgG kappa unchanged since 01/06/2021. Free kappa light chains: 149.4  (3.3-19. 4)   Free lambda light chains: 2.6    (5.7-26. 3)   K/L ratio:                          57.46 (0.26-1.65)    Patient started on oral iron 08/2020 for LANDON and B12 deficiency but required 2 doses of Feraheme on 01/21/2021 and 2021 and again in 2021. Had recurrent iron deficiency with plan for Feraheme q3 months (next one scheduled on 2021). He does IM b12 injections at home. On 2021  Hb: 15.5 Hct 44.9  MCV 90.5    WBC 6.9  BUN 10  Creat 0.9  Ca 9.3  Albumin 3.6    Fe: 94 FeSat: 33% TIBIC: 288  Ferritin: 81    VitB12 370     Ig  (700-1700)   IgA: <8     ()   IgM: 1370 ()    On 2021:  Hb 14.8  Hct 44.8  MCV 90.6  WBC 7.5    All cell line counts and morphology within normal limits. Patient's history of lymphoplasmacytic lymphoma is noted. The currently submitted blood smear is negative for circulating plasma cells, and negative for increased/atypical lymphocytes  Fe 60 TIBC 250 FeSat 24% Ferritin 73  BUN 8  Creat 0.8  LFTs wnl  Folate/B12 wnl  Beta-2 microglobulin 2.2    Peripheral blood flow cytometry noted small B-cell population with equivocal mild kappa light chain excess (1% of total cells). SPEP: M-spike 0.8 g/dl  SIFE: IgM kappa monoclonal protein is present  IgM 1295 ()    Free kappa light chains:   80.85  (3.3-19. 4)   Free lambda light chains: 3.23    (5.7-26. 3)   K/L ratio:                           25.03 (0.26-1.65)    On 2021. Hb 16.2  Hct 47.7  MCV 88.8  WBC 7.7     BUN 10 Creat 0.80  LFTs wnl  Beta-2 microglobulin 2.0  SPEP: M-spike 1 = 0.8 g/dl              M-spike 2 = 0.1 g/dl  SIFE: IgM kappa monoclonal protein is present, as previously described. IgM 1267 ()    Free kappa light chains:   88.01  (3.3-19. 4)   Free lambda light chains: 3.33    (5.7-26. 3)   K/L ratio:                           26.43 (0.26-1.65)    On 2021:  Hb 16.4  Hct 48.1  MCV 87.5  WBC 6.9     BUN 7 Creat 0.90  LFTs wnl  Beta-2 microglobulin 1.9  SPEP: M-spike 1 = 0.7 g/dl              M-spike 2 = 0.1 g/dl  SIFE: IgM kappa monoclonal protein is present, as previously described.    IgG kappa monoclonal protein is present, as previously described    IgM 1314 ()    Free kappa light chains:   80.24  (3.3-19. 4)   Free lambda light chains: 2.88    (5.7-26. 3)   K/L ratio:                           27.86 (0.26-1.65)    On 10/18/2021:  Hb 15.9   Hct 48.0  MCV 89.1  WBC 9.3     BUN 8 Creat 0.9  LFTs wnl  Beta-2 microglobulin 2.0  SPEP: M-spike 1 = 0.7 g/dl              M-spike 2 = 0.1 g/dl  SIFE: IgM kappa + faint IgG kappa monoclonal protein is present, as previously described. IgM 1308 ()    Free kappa light chains:   80.48  (3.3-19. 4)   Free lambda light chains: 2.87    (5.7-26. 3)   K/L ratio:                           28.04 (0.26-1.65)    Continued Ibrutinib    On 11/17/2021:  Hb 15.5   Hct 46.4  MCV 87.5  WBC 7.4     BUN 13 Creat 1.2  LFTs wnl  Beta-2 microglobulin 2.3  SPEP: M-spike 1 = 0.9 g/dl              M-spike 2 = 0.1 g/dl  SIFE: IgM kappa monoclonal protein is present, as previously described. A Faint IgG kappa monoclonal protein is present, as previously described. IgM 1347 ()    Free kappa light chains:   78.93  (3.3-19. 4)   Free lambda light chains: 3.92    (5.7-26. 3)   K/L ratio:                           20.14 (0.26-1.65)    On 12/15/2021:  Hb 15.3   Hct 46.5  MCV 90.3  WBC 9.1     BUN 10 Creat 0.9  LFTs wnl  Beta-2 microglobulin 2.5  SPEP: M-spike 1 = 0.8 g/dl              M-spike 2 = 0.1 g/dl  SIFE: IgM kappa + IgG kappa monoclonal protein is present, as previously described. IgM 1365 ()    Free kappa light chains:   84.08  (3.3-19. 4)   Free lambda light chains: 4.45    (5.7-26. 3)   K/L ratio:                           18.89 (0.26-1.65)    Continued Ibrutinib    On 01/20/2022:   Hb 14.9   Hct 45.1  MCV 88.1  WBC 6.3     BUN 7 Creat 0.8  LFTs wnl  Beta-2 microglobulin 2.3  SPEP: M-spike 1 = 0.7 g/dl              M-spike 2 = 0.1 g/dl  SIFE: IgM kappa + IgG kappa monoclonal protein is present, as previously described.     IgM 1142 ()    Free kappa light chains:   136.21 (3.3-19. 4)   Free lambda light chains: 5.29    (5.7-26. 3)   K/L ratio:                           25.75 (0.26-1.65)    Continued Ibrutinib    On 02/24/2022:   Hb 15.4   Hct 46.7  MCV 87.8  WBC 7.5     BUN 7 Creat 0.8  LFTs wnl  Beta-2 microglobulin 2.0  SPEP: M-spike = 0.8 g/dl    SIFE: IgM kappa monoclonal protein is present, as previously described  IgM 1279 ()    Free kappa light chains:   69.11  (3.3-19. 4)   Free lambda light chains: 4.50    (5.7-26. 3)   K/L ratio:                           15.36 (0.26-1.65)    On 03/02/2022:  Hb 15.5   Hct 46.9   MCV 90.0  WBC 9.8     BUN 9 Creat 0.7  LFTs wnl  Beta-2 microglobulin 1.6  SPEP: M-spike = 0.7 g/dl    SIFE: IgM kappa monoclonal protein is present, as previously described  IgM 1367 ()    Free kappa light chains:   55.56  (3.3-19. 4)   Free lambda light chains: 6.65    (5.7-26. 3)   K/L ratio:                           8.35 (0.26-1.65)    On 03/31/2022:  Hb 14.5   Hct 43.8   MCV 89.0  WBC 6.2     BUN 8 Creat 0.7  LFTs wnl  Beta-2 microglobulin 2.2  SPEP: M-spike = 0.6 g/dl    SIFE: IgM kappa monoclonal protein is present, as previously described  IgM 1061 ()    Free kappa light chains:   59.06  (3.3-19. 4)   Free lambda light chains: 6.00    (5.7-26. 3)   K/L ratio:                           9.84 (0.26-1.65)    On 04/28/2022:  Hb 15.2   Hct 45.3   MCV 87.5  WBC 5.7     BUN 10 Creat 0.8  LFTs wnl  Beta-2 microglobulin 1.8  SPEP: M-spike = 0.7 g/dl    SIFE: IgM kappa monoclonal protein is present, as previously described  IgM 1176 ()    Free kappa light chains:   65.90  (3.3-19. 4)   Free lambda light chains: 3.72    (5.7-26. 3)   K/L ratio:                           17.72 (0.26-1.65)    On 05/26/2022:  Hb 15.8   Hct 47.0   MCV 88.5  WBC 7.0     BUN 15 Creat 0.8  LFTs wnl  Beta-2 microglobulin 2.4  SPEP: M-spike = 0.8 g/dl    SIFE: IgM kappa monoclonal protein is present, as previously described  IgM 1161 ()    Free kappa light chains:   64.11  (3.3-19. 4)   Free lambda light chains: 4.28    (5.7-26. 3)   K/L ratio:                           14.98 (0.26-1.65)    On 06/23/2022:  Hb 15.5   Hct 47.1   MCV 89.2  WBC 7.9     BUN 10 Creat 0.8  LFTs wnl  Beta-2 microglobulin 2.3  SPEP: M-spike = 0.8 g/dl    SIFE: IgM kappa monoclonal protein is present, as previously described  IgM 1089 ()    Free kappa light chains:   56.20  (3.3-19. 4)   Free lambda light chains: 4.38    (5.7-26. 3)   K/L ratio:                           12.83 (0.26-1.65)    On 07/21/2022:  Hb 14.7   Hct 44.5   MCV 89.9  WBC 7.1     BUN 11 Creat 0.7  LFTs wnl  Beta-2 microglobulin 2.1  SPEP: M-spike = 0.9 g/dl    SIFE: IgM kappa monoclonal protein is present, as previously described  IgM 967 ()    Free kappa light chains:   52.84  (3.3-19. 4)   Free lambda light chains: 8.77    (5.7-26. 3)   K/L ratio:                           6.03 (0.26-1.65)    On 08/18/2022:  Hb 15.3  Hct 45.0  MCV 89.5  WBC 8.1     BUN 10  Creat 0.8  LFTs wnl  Beta-2 microglobulin 2.2  SPEP: M-spike 0.6 g/dL  SIFE: IgM kappa monoclonal protein is present, as previously described. IgM 1002 ()    On 09/15/2022:  Hb 15.2  Hct 45.2   MCV 86.6  WBC 7.0    BUN 10  Creat 0.8  Beta-2 microglobulin 1.9  SPEP: M-spike 0.6 g/dL  SIFE: IgM kappa monoclonal protein is present, as previously described. IgM 968 ()  Free kappa light chains:   59.02  (3.3-19. 4)   Free lambda light chains: 4.43    (5.7-26. 3)   K/L ratio:                           13.32 (0.26-1.65)    On 10/13/2022:  Hb 15.6  Hct 45.9  MCV 86.9    WBC 9.0  BUN 9 Creat 0.8  Beta-2 microglobulin 2.2  SPEP: M-spike 0.7 g/dL  SIFE: IgM kappa monoclonal protein is present, as previously described. IgM 1001 ()  Free kappa light chains:   52.17  (3.3-19. 4)   Free lambda light chains: 4.18    (5.7-26. 3)   K/L ratio:                           12.48 (0.26-1.65)    Today 11/17/2022; No fever, chills. Fair appetite and energy level. Intermittent nausea improved on Zofran as needed. Intermittent diarrhea. Tolerating Ibrutinib well    Review of Systems;  CONSTITUTIONAL: No fever, chills. Fair appetite and energy level. ENMT: Eyes: No diplopia; Nose: No epistaxis. Mouth: No sore throat. RESPIRATORY: No hemoptysis, shortness of breath, cough. CARDIOVASCULAR: No chest pain, palpitations. GASTROINTESTINAL: intermittent nausea and diarrhea  GENITOURINARY: No dysuria, urinary frequency, hematuria. NEURO: No syncope, presyncope, headache. 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 [address]     Hyperlipidemia     Hypertension     Obesity      Medications:  Reviewed and reconciled. Allergies:  No Known Allergies    Physical Exam:  BP (!) 167/88   Pulse 81   Temp 97.1 °F (36.2 °C) (Infrared)   Resp 16   Ht 6' 1\" (1.854 m)   Wt 282 lb (127.9 kg)   SpO2 96%   BMI 37.21 kg/m²   GENERAL: Alert, oriented x 3, not in acute distress. HEENT: PERRLA; EOMI. Oropharynx clear. NECK: Supple. Without lymphadenopathy. LUNGS: Good air entry bilaterally. No wheezing, crackles or rhonchi. CARDIOVASCULAR: Regular rate. No murmurs, rubs or gallops. ABDOMEN: Soft. Non-tender, non-distended. EXTREMITIES: Without clubbing or cyanosis or edema. NEUROLOGIC: No focal deficits.    ECOG PS 1    Lab Results   Component Value Date    WBC 6.7 11/17/2022    HGB 15.3 11/17/2022    HCT 45.9 11/17/2022    MCV 89.6 11/17/2022     11/17/2022     Lab Results   Component Value Date     11/17/2022    K 3.9 11/17/2022     11/17/2022    CO2 30 (H) 11/17/2022    BUN 9 11/17/2022    CREATININE 0.8 11/17/2022    GLUCOSE 204 (H) 11/17/2022    CALCIUM 9.5 11/17/2022    PROT 5.9 (L) 11/17/2022    LABALBU 3.8 11/17/2022 BILITOT 0.9 11/17/2022    ALKPHOS 46 11/17/2022    AST 11 11/17/2022    ALT 15 11/17/2022    LABGLOM >60 11/17/2022    GFRAA >60 10/13/2022     Impression/Plan:  79 y/o male with 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 UIFE: IgM protein. 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   -Extensive involvement by atypitcal CD5-/CD10-B-cell lymphoproliferative disorder, comprising over 70% of marrow cellularity  -Mildly hypercellular marrow for age (30-40%) with trilineage pan hypoplasia  -Normocytic anemia. -IHC staining highlighted extensive CD20+ B cell infiltrate with admixed + plasma cells     Started on Ibrutinib 420 mg/day 12/2020 with good response so far    On 01/06/2021:  SPEP:  Monoclonal protein 1.72 g/dL  Persistent double monoclonal bands in the beta/gamma and gamma globuin regions. Bands previously identified as IgM kappa and IgG kappa (8/12/2020). IgM kappa decreased by 1.22 g/dL and IgG kappa minimally changed since last exam on 10/14/2020. Free kappa light chains: 413.4    (3.3-19. 4)   Free lambda light chains: 2.8      (5.7-26. 3)   K/L ratio:                          147.64 (0.26-1.65)    On 03/25/2021  SPEP:  Monoclonal protein: 1.03 (0-0) g/dL  Persistent double monoclonal bands in the beta/gamma and gamma globuin regions. Bands previously identified as IgM kappa and IgG kappa (8/12/2020). IgM kappa decreased by 0.69 g/dL and IgG kappa unchanged since 01/06/2021. Free kappa light chains: 149.4  (3.3-19. 4)   Free lambda light chains: 2.6    (5.7-26. 3)   K/L ratio:                          57.46 (0.26-1.65)    Patient started on oral iron 08/2020 for LANDON and B12 deficiency but required 2 doses of Feraheme on 01/21/2021 and 01/28/2021 and again in April 2021.   Had recurrent iron deficiency with plan for Feraheme q3 months (next one scheduled on 2021). He does IM b12 injections at home (last one on 2021). On 2021  Hb: 15.5 Hct 44.9  MCV 90.5    WBC 6.9  BUN 10  Creat 0.9  Ca 9.3  Albumin 3.6    Fe: 94 FeSat: 33% TIBIC: 288  Ferritin: 81    VitB12 370     Ig  (700-1700)   IgA: <8     ()   IgM: 1370 ()    On 2021:  Hb 14.8  Hct 44.8  MCV 90.6  WBC 7.5    All cell line counts and morphology within normal limits. Patient's history of lymphoplasmacytic lymphoma is noted. The currently submitted blood smear is negative for circulating plasma cells, and negative for increased/atypical lymphocytes  Fe 60 TIBC 250 FeSat 24% Ferritin 73  BUN 8  Creat 0.8  LFTs wnl  Folate/B12 wnl  Beta-2 microglobulin 2.2    Peripheral blood flow cytometry noted small B-cell population with equivocal mild kappa light chain excess (1% of total cells). SPEP: M-spike 0.8 g/dl  SIFE: IgM kappa monoclonal protein is present  IgM 1295 ()    Free kappa light chains:   80.85  (3.3-19. 4)   Free lambda light chains: 3.23    (5.7-26. 3)   K/L ratio:                           25.03 (0.26-1.65)    On 2021. Hb 16.2  Hct 47.7  MCV 88.8  WBC 7.7     BUN 10 Creat 0.80  LFTs wnl  Beta-2 microglobulin 2.0  SPEP: M-spike 1 = 0.8 g/dl              M-spike 2 = 0.1 g/dl  SIFE: IgM kappa monoclonal protein is present, as previously described. IgM 1267 ()    Free kappa light chains:   88.01  (3.3-19. 4)   Free lambda light chains: 3.33    (5.7-26. 3)   K/L ratio:                           26.43 (0.26-1.65)    On 2021:  Hb 16.4  Hct 48.1  MCV 87.5  WBC 6.9     BUN 7 Creat 0.90  LFTs wnl  Beta-2 microglobulin 1.9  SPEP: M-spike 1 = 0.7 g/dl              M-spike 2 = 0.1 g/dl  SIFE: IgM kappa monoclonal protein is present, as previously described.    IgG kappa monoclonal protein is present, as previously described    IgM 1314 ()    Free kappa light chains:   80.24  (3.3-19. 4)   Free lambda light chains: 2.88    (5.7-26. 3)   K/L ratio:                           27.86 (0.26-1.65)    On 10/18/2021:  Hb 15.9   Hct 48.0  MCV 89.1  WBC 9.3     BUN 8 Creat 0.9  LFTs wnl  Beta-2 microglobulin 2.0  SPEP: M-spike 1 = 0.7 g/dl              M-spike 2 = 0.1 g/dl  SIFE: IgM kappa + faint IgG kappa monoclonal protein is present, as previously described. IgM 1308 ()    Free kappa light chains:   80.48  (3.3-19. 4)   Free lambda light chains: 2.87    (5.7-26. 3)   K/L ratio:                           28.04 (0.26-1.65)    On 11/17/2021:  Hb 15.5   Hct 46.4  MCV 87.5  WBC 7.4     BUN 13 Creat 1.2  LFTs wnl  Beta-2 microglobulin 2.3  SPEP: M-spike 1 = 0.9 g/dl              M-spike 2 = 0.1 g/dl  SIFE: IgM kappa monoclonal protein is present, as previously described. A Faint IgG kappa monoclonal protein is present, as previously described. IgM 1347 ()    Free kappa light chains:   78.93  (3.3-19. 4)   Free lambda light chains: 3.92    (5.7-26. 3)   K/L ratio:                           20.14 (0.26-1.65)    On 12/15/2021:  Hb 15.3   Hct 46.5  MCV 90.3  WBC 9.1     BUN 10 Creat 0.9  LFTs wnl  Beta-2 microglobulin 2.5  SPEP: M-spike 1 = 0.8 g/dl              M-spike 2 = 0.1 g/dl  SIFE: IgM kappa + IgG kappa monoclonal protein is present, as previously described. IgM 1365 ()    Free kappa light chains:   84.08  (3.3-19. 4)   Free lambda light chains: 4.45    (5.7-26. 3)   K/L ratio:                           18.89 (0.26-1.65)    On 01/20/2022:   Hb 14.9   Hct 45.1  MCV 88.1  WBC 6.3     BUN 7 Creat 0.8  LFTs wnl  Beta-2 microglobulin 2.3  SPEP: M-spike 1 = 0.7 g/dl              M-spike 2 = 0.1 g/dl  SIFE: IgM kappa + IgG kappa monoclonal protein is present, as previously described. IgM 1142 ()    Free kappa light chains:   136.21  (3.3-19. 4)   Free lambda light chains: 5.29    (5.7-26. 3)   K/L ratio:                           25.75 (0.26-1.65)    Continued Ibrutinib    On 02/24/2022:   Hb 15.4   Hct 46.7  MCV 87.8  WBC 7.5     BUN 7 Creat 0.8  LFTs wnl  Beta-2 microglobulin 2.0  SPEP: M-spike = 0.8 g/dl    SIFE: IgM kappa monoclonal protein is present, as previously described  IgM 1279 ()    Free kappa light chains:   69.11  (3.3-19. 4)   Free lambda light chains: 4.50    (5.7-26. 3)   K/L ratio:                           15.36 (0.26-1.65)    On 03/02/2022:  Hb 15.5   Hct 46.9   MCV 90.0  WBC 9.8     BUN 9 Creat 0.7  LFTs wnl  Beta-2 microglobulin 1.6  SPEP: M-spike = 0.7 g/dl    SIFE: IgM kappa monoclonal protein is present, as previously described  IgM 1367 ()    Free kappa light chains:   55.56  (3.3-19. 4)   Free lambda light chains: 6.65    (5.7-26. 3)   K/L ratio:                           8.35 (0.26-1.65)    CT chest 03/24/2022 noted chronic changes of the lungs including emphysema with midlung scarring atelectasis greatest in the lingular segment without a definitive dominant nodule or mass. No acute intrathoracic process. Imaging reviewed. On 03/31/2022:  Hb 14.5   Hct 43.8   MCV 89.0  WBC 6.2     BUN 8 Creat 0.7  LFTs wnl  Beta-2 microglobulin 2.2  SPEP: M-spike = 0.6 g/dl    SIFE: IgM kappa monoclonal protein is present, as previously described  IgM 1061 ()    Free kappa light chains:   59.06  (3.3-19. 4)   Free lambda light chains: 6.00    (5.7-26. 3)   K/L ratio:                           9.84 (0.26-1.65)    On 04/28/2022:  Hb 15.2   Hct 45.3   MCV 87.5  WBC 5.7     BUN 10 Creat 0.8  LFTs wnl  Beta-2 microglobulin 1.8  SPEP: M-spike = 0.7 g/dl    SIFE: IgM kappa monoclonal protein is present, as previously described  IgM 1176 ()    Free kappa light chains:   65.90  (3.3-19. 4)   Free lambda light chains: 3.72    (5.7-26. 3)   K/L ratio:                           17.72 (0.26-1.65)    On 05/26/2022:  Hb 15.8   Hct 47.0   MCV 88.5  WBC 7.0   PLT 206  BUN 15 Creat 0.8  LFTs wnl  Beta-2 microglobulin 2.4  SPEP: M-spike = 0.8 g/dl    SIFE: IgM kappa monoclonal protein is present, as previously described  IgM 1161 ()    Free kappa light chains:   64.11  (3.3-19. 4)   Free lambda light chains: 4.28    (5.7-26. 3)   K/L ratio:                           14.98 (0.26-1.65)    On 06/23/2022:  Hb 15.5   Hct 47.1   MCV 89.2  WBC 7.9     BUN 10 Creat 0.8  LFTs wnl  Beta-2 microglobulin 2.3  SPEP: M-spike = 0.8 g/dl    SIFE: IgM kappa monoclonal protein is present, as previously described  IgM 1089 ()    Free kappa light chains:   56.20  (3.3-19. 4)   Free lambda light chains: 4.38    (5.7-26. 3)   K/L ratio:                           12.83 (0.26-1.65)    On 07/21/2022:  Hb 14.7   Hct 44.5   MCV 89.9  WBC 7.1     BUN 11 Creat 0.7  LFTs wnl  Beta-2 microglobulin 2.1  SPEP: M-spike = 0.9 g/dl    SIFE: IgM kappa monoclonal protein is present, as previously described  IgM 967 ()    Free kappa light chains:   52.84  (3.3-19. 4)   Free lambda light chains: 8.77    (5.7-26. 3)   K/L ratio:                           6.03 (0.26-1.65)    On 08/18/2022:  Hb 15.3  Hct 45.0  MCV 89.5  WBC 8.1     BUN 10  Creat 0.8  LFTs wnl  Beta-2 microglobulin 2.2  SPEP: M-spike 0.6 g/dL  SIFE: IgM kappa monoclonal protein is present, as previously described. IgM 1002 ()    On 09/15/2022:  Hb 15.2  Hct 45.2   MCV 86.6  WBC 7.0    BUN 10  Creat 0.8  Beta-2 microglobulin 1.9  SPEP: M-spike 0.6 g/dL  SIFE: IgM kappa monoclonal protein is present, as previously described. IgM 968 ()  Free kappa light chains:   59.02  (3.3-19. 4)   Free lambda light chains: 4.43    (5.7-26. 3)   K/L ratio:                           13.32 (0.26-1.65)    On 10/13/2022:  Hb 15.6  Hct 45.9  MCV 86.9    WBC 9.0  BUN 9 Creat 0.8  Beta-2 microglobulin 2.2  SPEP: M-spike 0.7 g/dL  SIFE: IgM kappa monoclonal protein is present, as previously described.    IgM 1001 ()  Free kappa light chains:   52.17  (3.3-19. 4)   Free lambda light chains: 4.18    (5.7-26. 3)   K/L ratio:                           12.48 (0.26-1.65)    On 11/17/2022:  Hb 15.3   Hct 45.9  MCV 89.6      WBC 6.7  BUN 9 Creat 0.8  Rest of markers pending. Labs reviewed.    Held weekly Rituximab x4  Recommended Zofran as needed for nausea  Recommended Imodium as needed for diarrhea  Recommended to continue Ibrutinib    RTC 1 months with prior labs    11/17/2022  Madeline Guy MD

## 2022-11-18 LAB
ALBUMIN SERPL-MCNC: 3 G/DL (ref 3.5–4.7)
ALPHA-1-GLOBULIN: 0.2 G/DL (ref 0.2–0.4)
ALPHA-2-GLOBULIN: 0.8 G/DL (ref 0.5–1)
BETA GLOBULIN: 0.8 G/DL (ref 0.8–1.3)
ELECTROPHORESIS: ABNORMAL
GAMMA GLOBULIN: 1 G/DL (ref 0.7–1.6)
IGA: 14 MG/DL (ref 70–400)
IGG: 178 MG/DL (ref 700–1600)
IGM: 944 MG/DL (ref 40–230)
IMMUNOFIXATION RESULT, SERUM: NORMAL
TOTAL PROTEIN: 5.9 G/DL (ref 6.4–8.3)

## 2022-11-19 LAB
KAPPA FREE LIGHT CHAINS QNT: 57.27 MG/L (ref 3.3–19.4)
KAPPA/LAMBDA FREE LIGHT CHAIN RATIO: 15.15 (ref 0.26–1.65)
LAMBDA FREE LIGHT CHAINS QNT: 3.78 MG/L (ref 5.71–26.3)

## 2022-11-21 ENCOUNTER — HOSPITAL ENCOUNTER (INPATIENT)
Age: 77
LOS: 1 days | Discharge: HOME | DRG: 309 | End: 2022-11-23
Attending: EMERGENCY MEDICINE | Admitting: FAMILY MEDICINE
Payer: OTHER GOVERNMENT

## 2022-11-21 ENCOUNTER — APPOINTMENT (OUTPATIENT)
Dept: CT IMAGING | Age: 77
DRG: 309 | End: 2022-11-21
Payer: OTHER GOVERNMENT

## 2022-11-21 ENCOUNTER — APPOINTMENT (OUTPATIENT)
Dept: GENERAL RADIOLOGY | Age: 77
DRG: 309 | End: 2022-11-21
Payer: OTHER GOVERNMENT

## 2022-11-21 DIAGNOSIS — J44.1 COPD EXACERBATION (HCC): ICD-10-CM

## 2022-11-21 DIAGNOSIS — I48.91 NEW ONSET ATRIAL FIBRILLATION (HCC): Primary | ICD-10-CM

## 2022-11-21 DIAGNOSIS — E83.42 HYPOMAGNESEMIA: ICD-10-CM

## 2022-11-21 DIAGNOSIS — I25.10 CORONARY ARTERY DISEASE INVOLVING NATIVE CORONARY ARTERY OF NATIVE HEART, UNSPECIFIED WHETHER ANGINA PRESENT: ICD-10-CM

## 2022-11-21 LAB
ALBUMIN SERPL-MCNC: 4 G/DL (ref 3.5–5.2)
ALP BLD-CCNC: 55 U/L (ref 40–129)
ALT SERPL-CCNC: 15 U/L (ref 0–40)
ANION GAP SERPL CALCULATED.3IONS-SCNC: 14 MMOL/L (ref 7–16)
AST SERPL-CCNC: 16 U/L (ref 0–39)
BASOPHILS ABSOLUTE: 0.06 E9/L (ref 0–0.2)
BASOPHILS RELATIVE PERCENT: 0.6 % (ref 0–2)
BETA-2 MICROGLOBULIN: 2.2 MG/L (ref 0.6–2.4)
BILIRUB SERPL-MCNC: 0.5 MG/DL (ref 0–1.2)
BUN BLDV-MCNC: 10 MG/DL (ref 6–23)
CALCIUM SERPL-MCNC: 9.9 MG/DL (ref 8.6–10.2)
CHLORIDE BLD-SCNC: 97 MMOL/L (ref 98–107)
CO2: 27 MMOL/L (ref 22–29)
CREAT SERPL-MCNC: 0.8 MG/DL (ref 0.7–1.2)
D DIMER: 277 NG/ML DDU
EOSINOPHILS ABSOLUTE: 0.1 E9/L (ref 0.05–0.5)
EOSINOPHILS RELATIVE PERCENT: 1 % (ref 0–6)
GFR SERPL CREATININE-BSD FRML MDRD: >60 ML/MIN/1.73
GLUCOSE BLD-MCNC: 134 MG/DL (ref 74–99)
HCT VFR BLD CALC: 46.7 % (ref 37–54)
HEMOGLOBIN: 15.6 G/DL (ref 12.5–16.5)
IMMATURE GRANULOCYTES #: 0.13 E9/L
IMMATURE GRANULOCYTES %: 1.3 % (ref 0–5)
INFLUENZA A: NOT DETECTED
INFLUENZA B: NOT DETECTED
LYMPHOCYTES ABSOLUTE: 1.94 E9/L (ref 1.5–4)
LYMPHOCYTES RELATIVE PERCENT: 19.4 % (ref 20–42)
MAGNESIUM: 1.2 MG/DL (ref 1.6–2.6)
MCH RBC QN AUTO: 28.8 PG (ref 26–35)
MCHC RBC AUTO-ENTMCNC: 33.4 % (ref 32–34.5)
MCV RBC AUTO: 86.3 FL (ref 80–99.9)
MONOCYTES ABSOLUTE: 0.78 E9/L (ref 0.1–0.95)
MONOCYTES RELATIVE PERCENT: 7.8 % (ref 2–12)
NEUTROPHILS ABSOLUTE: 6.99 E9/L (ref 1.8–7.3)
NEUTROPHILS RELATIVE PERCENT: 69.9 % (ref 43–80)
PDW BLD-RTO: 14 FL (ref 11.5–15)
PLATELET # BLD: 220 E9/L (ref 130–450)
PMV BLD AUTO: 10.5 FL (ref 7–12)
POTASSIUM SERPL-SCNC: 3.7 MMOL/L (ref 3.5–5)
PRO-BNP: 528 PG/ML (ref 0–450)
RBC # BLD: 5.41 E12/L (ref 3.8–5.8)
SARS-COV-2 RNA, RT PCR: NOT DETECTED
SODIUM BLD-SCNC: 138 MMOL/L (ref 132–146)
TOTAL PROTEIN: 6.5 G/DL (ref 6.4–8.3)
TROPONIN, HIGH SENSITIVITY: 12 NG/L (ref 0–11)
TROPONIN, HIGH SENSITIVITY: 13 NG/L (ref 0–11)
WBC # BLD: 10 E9/L (ref 4.5–11.5)

## 2022-11-21 PROCEDURE — 71045 X-RAY EXAM CHEST 1 VIEW: CPT

## 2022-11-21 PROCEDURE — 99285 EMERGENCY DEPT VISIT HI MDM: CPT

## 2022-11-21 PROCEDURE — 6360000002 HC RX W HCPCS: Performed by: EMERGENCY MEDICINE

## 2022-11-21 PROCEDURE — 84484 ASSAY OF TROPONIN QUANT: CPT

## 2022-11-21 PROCEDURE — 6370000000 HC RX 637 (ALT 250 FOR IP): Performed by: EMERGENCY MEDICINE

## 2022-11-21 PROCEDURE — 71275 CT ANGIOGRAPHY CHEST: CPT

## 2022-11-21 PROCEDURE — 96376 TX/PRO/DX INJ SAME DRUG ADON: CPT

## 2022-11-21 PROCEDURE — 93005 ELECTROCARDIOGRAM TRACING: CPT | Performed by: EMERGENCY MEDICINE

## 2022-11-21 PROCEDURE — 87636 SARSCOV2 & INF A&B AMP PRB: CPT

## 2022-11-21 PROCEDURE — 85025 COMPLETE CBC W/AUTO DIFF WBC: CPT

## 2022-11-21 PROCEDURE — 36415 COLL VENOUS BLD VENIPUNCTURE: CPT

## 2022-11-21 PROCEDURE — 84443 ASSAY THYROID STIM HORMONE: CPT

## 2022-11-21 PROCEDURE — 2580000003 HC RX 258: Performed by: RADIOLOGY

## 2022-11-21 PROCEDURE — 96366 THER/PROPH/DIAG IV INF ADDON: CPT

## 2022-11-21 PROCEDURE — 6360000004 HC RX CONTRAST MEDICATION: Performed by: RADIOLOGY

## 2022-11-21 PROCEDURE — 74177 CT ABD & PELVIS W/CONTRAST: CPT

## 2022-11-21 PROCEDURE — 96365 THER/PROPH/DIAG IV INF INIT: CPT

## 2022-11-21 PROCEDURE — 83880 ASSAY OF NATRIURETIC PEPTIDE: CPT

## 2022-11-21 PROCEDURE — 85378 FIBRIN DEGRADE SEMIQUANT: CPT

## 2022-11-21 PROCEDURE — 83735 ASSAY OF MAGNESIUM: CPT

## 2022-11-21 PROCEDURE — 80053 COMPREHEN METABOLIC PANEL: CPT

## 2022-11-21 RX ORDER — TRAMADOL HYDROCHLORIDE 50 MG/1
50 TABLET ORAL EVERY 6 HOURS PRN
Status: DISCONTINUED | OUTPATIENT
Start: 2022-11-21 | End: 2022-11-23 | Stop reason: HOSPADM

## 2022-11-21 RX ORDER — MORPHINE SULFATE 4 MG/ML
4 INJECTION, SOLUTION INTRAMUSCULAR; INTRAVENOUS ONCE
Status: COMPLETED | OUTPATIENT
Start: 2022-11-21 | End: 2022-11-21

## 2022-11-21 RX ORDER — MAGNESIUM SULFATE IN WATER 40 MG/ML
4000 INJECTION, SOLUTION INTRAVENOUS ONCE
Status: COMPLETED | OUTPATIENT
Start: 2022-11-21 | End: 2022-11-22

## 2022-11-21 RX ORDER — SODIUM CHLORIDE 0.9 % (FLUSH) 0.9 %
10 SYRINGE (ML) INJECTION ONCE
Status: COMPLETED | OUTPATIENT
Start: 2022-11-21 | End: 2022-11-21

## 2022-11-21 RX ORDER — IPRATROPIUM BROMIDE AND ALBUTEROL SULFATE 2.5; .5 MG/3ML; MG/3ML
1 SOLUTION RESPIRATORY (INHALATION) ONCE
Status: COMPLETED | OUTPATIENT
Start: 2022-11-21 | End: 2022-11-21

## 2022-11-21 RX ORDER — GLIPIZIDE 5 MG/1
10 TABLET ORAL
Status: DISCONTINUED | OUTPATIENT
Start: 2022-11-22 | End: 2022-11-23 | Stop reason: HOSPADM

## 2022-11-21 RX ADMIN — MAGNESIUM SULFATE HEPTAHYDRATE 4000 MG: 40 INJECTION, SOLUTION INTRAVENOUS at 19:46

## 2022-11-21 RX ADMIN — IOPAMIDOL 75 ML: 755 INJECTION, SOLUTION INTRAVENOUS at 20:02

## 2022-11-21 RX ADMIN — MORPHINE SULFATE 4 MG: 4 INJECTION, SOLUTION INTRAMUSCULAR; INTRAVENOUS at 18:51

## 2022-11-21 RX ADMIN — SODIUM CHLORIDE, PRESERVATIVE FREE 10 ML: 5 INJECTION INTRAVENOUS at 20:02

## 2022-11-21 RX ADMIN — IPRATROPIUM BROMIDE AND ALBUTEROL SULFATE 1 AMPULE: .5; 2.5 SOLUTION RESPIRATORY (INHALATION) at 18:52

## 2022-11-21 ASSESSMENT — ENCOUNTER SYMPTOMS
DIARRHEA: 0
NAUSEA: 0
VOMITING: 0
SHORTNESS OF BREATH: 1
BACK PAIN: 1
COUGH: 0
TROUBLE SWALLOWING: 0
BLOOD IN STOOL: 0
ABDOMINAL PAIN: 0
COLOR CHANGE: 0
RHINORRHEA: 0
WHEEZING: 1

## 2022-11-21 ASSESSMENT — PAIN DESCRIPTION - ONSET: ONSET: ON-GOING

## 2022-11-21 ASSESSMENT — PAIN DESCRIPTION - LOCATION: LOCATION: GENERALIZED

## 2022-11-21 ASSESSMENT — PAIN SCALES - GENERAL
PAINLEVEL_OUTOF10: 5
PAINLEVEL_OUTOF10: 10
PAINLEVEL_OUTOF10: 10

## 2022-11-21 ASSESSMENT — PAIN DESCRIPTION - FREQUENCY: FREQUENCY: CONTINUOUS

## 2022-11-21 ASSESSMENT — PAIN - FUNCTIONAL ASSESSMENT
PAIN_FUNCTIONAL_ASSESSMENT: 0-10
PAIN_FUNCTIONAL_ASSESSMENT: 0-10

## 2022-11-21 ASSESSMENT — LIFESTYLE VARIABLES: HOW OFTEN DO YOU HAVE A DRINK CONTAINING ALCOHOL: NEVER

## 2022-11-21 ASSESSMENT — PAIN DESCRIPTION - PAIN TYPE: TYPE: ACUTE PAIN

## 2022-11-21 ASSESSMENT — PAIN DESCRIPTION - DESCRIPTORS: DESCRIPTORS: ACHING

## 2022-11-21 NOTE — ED PROVIDER NOTES
ED PROVIDER NOTE    Chief Complaint   Patient presents with    Shortness of Breath     Sob with wheezing, ongoing for a week, coughing       HPI:  11/21/22,   Time: 6:31 PM CARLENE Joy is a 78 y.o. male presenting to the ED for shortness of breath. Gradual onset 1 week ago, progressively worsening, moderate in severity. Worse w/ exertion. Can walk about 20 feet and then gets very short of breath and has to stop. Dyspnea improves w/ rest. No orthopnea or leg swelling. No associated fever, chills, cough, chest pain, nausea, vomiting, diaphoresis, abdominal pain, black/bloody stools. Normal po intake and urine output. Also having worsening of right low back pain w/ sciatica, worsening over the past 2 months, worse w/ movement, pain in right low back radiates down the right leg. No associated numbness or weakness. No known hx of cardiac disease. No known hx of lung disease. Former smoker, quit 1 year ago. Also has hx of lymphoma, on ibrutinib oral chemo. Chart review: hx of HTN, HLD, DM, malignant lymphoplasmacytic lymphoma    Review of Systems:     Review of Systems   Constitutional:  Negative for appetite change, chills, diaphoresis and fever. HENT:  Negative for congestion, rhinorrhea and trouble swallowing. Eyes:  Negative for visual disturbance. Respiratory:  Positive for shortness of breath and wheezing. Negative for cough. Cardiovascular:  Negative for chest pain and leg swelling. Gastrointestinal:  Negative for abdominal pain, blood in stool, diarrhea, nausea and vomiting. Genitourinary:  Negative for decreased urine volume, difficulty urinating, dysuria, frequency, hematuria and urgency. Musculoskeletal:  Positive for back pain. Negative for myalgias, neck pain and neck stiffness. Skin:  Negative for color change.    Neurological:  Negative for dizziness, syncope, weakness, light-headedness, numbness and headaches.       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    Distal phalynx    Anemia     Depression     PTSD    Diabetes mellitus ([address]     Hyperlipidemia     Hypertension     Obesity        Past Surgical History:   Past Surgical History:   Procedure Laterality Date    COLONOSCOPY      HERNIA REPAIR      NECK SURGERY      PATELLA SURGERY      SHOULDER SURGERY      TONSILLECTOMY      UPPER GASTROINTESTINAL ENDOSCOPY         Social History:   Social History     Socioeconomic History    Marital status:      Spouse name: None    Number of children: None    Years of education: None    Highest education level: None   Tobacco Use    Smoking status: Every Day     Packs/day: 2.00     Types: Cigars, Cigarettes    Smokeless tobacco: Never    Tobacco comments:     2 cigars a day   Substance and Sexual Activity    Alcohol use: Yes     Comment: occasionally    Drug use: No    Sexual activity: Not Currently       Family History:   Family History   Problem Relation Age of Onset    Heart Attack Sister        The patients home medications have been reviewed. Allergies:   No Known Allergies        ---------------------------------------------------PHYSICAL EXAM--------------------------------------    BP (!) 188/102   Pulse 89   Temp 97.8 °F (36.6 °C)   Resp 18   SpO2 95%     Physical Exam  Vitals and nursing note reviewed. Constitutional:       General: He is not in acute distress. Appearance: He is not toxic-appearing. HENT:      Mouth/Throat:      Mouth: Mucous membranes are moist.   Eyes:      General: No scleral icterus. Extraocular Movements: Extraocular movements intact. Pupils: Pupils are equal, round, and reactive to light. Neck:      Comments: JVP elevated to mid-neck  Cardiovascular:      Rate and Rhythm: Normal rate. Rhythm irregular.       Pulses: Normal pulses. Heart sounds: Normal heart sounds. No murmur heard. Pulmonary:      Effort: Pulmonary effort is normal. No respiratory distress. Breath sounds: Normal breath sounds. No wheezing or rales. Abdominal:      General: There is no distension. Palpations: Abdomen is soft. Tenderness: There is no abdominal tenderness. There is no guarding or rebound. Musculoskeletal:         General: No swelling or tenderness. Normal range of motion. Cervical back: Normal range of motion and neck supple. No rigidity. No muscular tenderness. Comments: Radial, DP, and PT pulses 2+ bilaterally. No midline spinal ttp.  +lumbar mass, firm, nontender, states it has been there for years and evaluated  +right paraspinal lumbar ttp. Skin:     General: Skin is warm and dry. Neurological:      Mental Status: He is alert and oriented to person, place, and time. Comments: Strength 5/5 and sensation grossly intact to light touch and equal bilaterally throughout all extremities          -------------------------------------------------- RESULTS -------------------------------------------------  I have personally reviewed all laboratory and imaging results for this patient. Results are listed below.      LABS:  Labs Reviewed   COMPREHENSIVE METABOLIC PANEL - Abnormal; Notable for the following components:       Result Value    Chloride 97 (*)     Glucose 134 (*)     All other components within normal limits   CBC WITH AUTO DIFFERENTIAL - Abnormal; Notable for the following components:    Lymphocytes % 19.4 (*)     All other components within normal limits   MAGNESIUM - Abnormal; Notable for the following components:    Magnesium 1.2 (*)     All other components within normal limits   TROPONIN - Abnormal; Notable for the following components:    Troponin, High Sensitivity 12 (*)     All other components within normal limits   BRAIN NATRIURETIC PEPTIDE - Abnormal; Notable for the following components: Pro- (*)     All other components within normal limits   COVID-19 & INFLUENZA COMBO   D-DIMER, QUANTITATIVE   TSH       RADIOLOGY:  Interpreted personally and by Radiologist.  CTA PULMONARY W CONTRAST   Final Result   No evidence of pulmonary embolism or acute pulmonary abnormality. No lymphadenopathy in the chest.      Severe coronary artery calcification and moderate centrilobular emphysema. CT ABDOMEN PELVIS W IV CONTRAST Additional Contrast? None   Final Result   Soft tissue density structure is with rim calcifications in the right flank   subcutaneous fat, likely fat necrosis/posttraumatic change. XR CHEST PORTABLE   Final Result   No acute process. EKG:  This EKG is signed and interpreted by the EP. Afib, vent rate 84bpm, normal axis, no acute injury pattern, no prior EKG on file for comparison       ------------------------- NURSING NOTES AND VITALS REVIEWED ---------------------------   The nursing notes within the ED encounter and vital signs as below have been reviewed by myself. BP (!) 188/102   Pulse 89   Temp 97.8 °F (36.6 °C)   Resp 18   SpO2 95%   Oxygen Saturation Interpretation: Normal    The patients available past medical records and past encounters were reviewed. ------------------------------ ED COURSE/MEDICAL DECISION MAKING----------------------  Medications   magnesium sulfate 4000 mg in 100 mL IVPB premix (4,000 mg IntraVENous New Bag 11/21/22 1946)   morphine sulfate (PF) injection 4 mg (4 mg IntraVENous Given 11/21/22 1851)   ipratropium-albuterol (DUONEB) nebulizer solution 1 ampule (1 ampule Inhalation Given 11/21/22 1852)   iopamidol (ISOVUE-370) 76 % injection 75 mL (75 mLs IntraVENous Given 11/21/22 2002)   sodium chloride flush 0.9 % injection 10 mL (10 mLs IntraVENous Given 11/21/22 2002)     Counseling:    The emergency provider has spoken with the patient and discussed todays results, in addition to providing specific details for the plan of care and counseling regarding the diagnosis and prognosis. Questions are answered at this time and they are agreeable with the plan. ED Course/Medical Decision Makin y.o. male here with shortness of breath with exertion for the past 1 week. Non-toxic appearing, afebrile, hemodynamically stable, and in no acute distress. Breathing comfortably on room air at rest, however reports severely reduced exercise tolerance due to his symptoms. Workup notable for new onset afib, rate controlled, hypomagnesemia, and CT findings of emphysema and severe CAD, both of which are previously unknown to patient. Treated w/ nebs, pain control, IV magnesium, and admitted in stable condition for further management.       --------------------------------- IMPRESSION AND DISPOSITION ---------------------------------    IMPRESSION  1. New onset atrial fibrillation ([facility] [address]    2. Coronary artery disease involving native coronary artery of native heart, unspecified whether angina present    3. COPD exacerbation ([facility] [address]    4. Hypomagnesemia        DISPOSITION  Disposition: Admit to telemetry  Patient condition is stable    NOTE: This report was transcribed using voice recognition software.  Every effort was made to ensure accuracy; however, inadvertent computerized transcription errors may be present    Suzan Crawley MD  Attending Emergency Physician         Suzan Crawley MD  22 [id number]

## 2022-11-22 PROBLEM — I48.91 A-FIB (HCC): Status: ACTIVE | Noted: 2022-11-22

## 2022-11-22 PROBLEM — I48.91 ATRIAL FIBRILLATION (HCC): Status: ACTIVE | Noted: 2022-11-22

## 2022-11-22 LAB
ALBUMIN SERPL-MCNC: 4.1 G/DL (ref 3.5–5.2)
ALP BLD-CCNC: 52 U/L (ref 40–129)
ALT SERPL-CCNC: 14 U/L (ref 0–40)
ANION GAP SERPL CALCULATED.3IONS-SCNC: 11 MMOL/L (ref 7–16)
APTT: 35.8 SEC (ref 24.5–35.1)
AST SERPL-CCNC: 11 U/L (ref 0–39)
BASOPHILS ABSOLUTE: 0.05 E9/L (ref 0–0.2)
BASOPHILS RELATIVE PERCENT: 0.5 % (ref 0–2)
BILIRUB SERPL-MCNC: 0.8 MG/DL (ref 0–1.2)
BUN BLDV-MCNC: 8 MG/DL (ref 6–23)
CALCIUM SERPL-MCNC: 10.1 MG/DL (ref 8.6–10.2)
CHLORIDE BLD-SCNC: 99 MMOL/L (ref 98–107)
CHOLESTEROL, FASTING: 139 MG/DL (ref 0–199)
CO2: 27 MMOL/L (ref 22–29)
CREAT SERPL-MCNC: 0.8 MG/DL (ref 0.7–1.2)
EKG ATRIAL RATE: 66 BPM
EKG Q-T INTERVAL: 378 MS
EKG QRS DURATION: 104 MS
EKG QTC CALCULATION (BAZETT): 446 MS
EKG R AXIS: 23 DEGREES
EKG T AXIS: 13 DEGREES
EKG VENTRICULAR RATE: 84 BPM
EOSINOPHILS ABSOLUTE: 0.1 E9/L (ref 0.05–0.5)
EOSINOPHILS RELATIVE PERCENT: 0.9 % (ref 0–6)
GFR SERPL CREATININE-BSD FRML MDRD: >60 ML/MIN/1.73
GLUCOSE BLD-MCNC: 166 MG/DL (ref 74–99)
HCT VFR BLD CALC: 47.4 % (ref 37–54)
HDLC SERPL-MCNC: 46 MG/DL
HEMOGLOBIN: 16 G/DL (ref 12.5–16.5)
IMMATURE GRANULOCYTES #: 0.13 E9/L
IMMATURE GRANULOCYTES %: 1.2 % (ref 0–5)
LDL CHOLESTEROL CALCULATED: 71 MG/DL (ref 0–99)
LYMPHOCYTES ABSOLUTE: 2.48 E9/L (ref 1.5–4)
LYMPHOCYTES RELATIVE PERCENT: 23.4 % (ref 20–42)
MAGNESIUM: 1.8 MG/DL (ref 1.6–2.6)
MCH RBC QN AUTO: 29.3 PG (ref 26–35)
MCHC RBC AUTO-ENTMCNC: 33.8 % (ref 32–34.5)
MCV RBC AUTO: 86.8 FL (ref 80–99.9)
METER GLUCOSE: 210 MG/DL (ref 74–99)
MONOCYTES ABSOLUTE: 0.96 E9/L (ref 0.1–0.95)
MONOCYTES RELATIVE PERCENT: 9 % (ref 2–12)
NEUTROPHILS ABSOLUTE: 6.9 E9/L (ref 1.8–7.3)
NEUTROPHILS RELATIVE PERCENT: 65 % (ref 43–80)
PDW BLD-RTO: 13.6 FL (ref 11.5–15)
PLATELET # BLD: 220 E9/L (ref 130–450)
PMV BLD AUTO: 10.5 FL (ref 7–12)
POTASSIUM REFLEX MAGNESIUM: 3.5 MMOL/L (ref 3.5–5)
RBC # BLD: 5.46 E12/L (ref 3.8–5.8)
SODIUM BLD-SCNC: 137 MMOL/L (ref 132–146)
TOTAL PROTEIN: 6.5 G/DL (ref 6.4–8.3)
TRIGLYCERIDE, FASTING: 110 MG/DL (ref 0–149)
TROPONIN, HIGH SENSITIVITY: 14 NG/L (ref 0–11)
TSH SERPL DL<=0.05 MIU/L-ACNC: 0.03 UIU/ML (ref 0.27–4.2)
TSH SERPL DL<=0.05 MIU/L-ACNC: 3.76 UIU/ML (ref 0.27–4.2)
VLDLC SERPL CALC-MCNC: 22 MG/DL
WBC # BLD: 10.6 E9/L (ref 4.5–11.5)

## 2022-11-22 PROCEDURE — 82962 GLUCOSE BLOOD TEST: CPT

## 2022-11-22 PROCEDURE — 6360000002 HC RX W HCPCS: Performed by: FAMILY MEDICINE

## 2022-11-22 PROCEDURE — 85730 THROMBOPLASTIN TIME PARTIAL: CPT

## 2022-11-22 PROCEDURE — 6370000000 HC RX 637 (ALT 250 FOR IP): Performed by: FAMILY MEDICINE

## 2022-11-22 PROCEDURE — 83735 ASSAY OF MAGNESIUM: CPT

## 2022-11-22 PROCEDURE — 80053 COMPREHEN METABOLIC PANEL: CPT

## 2022-11-22 PROCEDURE — 6370000000 HC RX 637 (ALT 250 FOR IP)

## 2022-11-22 PROCEDURE — 84484 ASSAY OF TROPONIN QUANT: CPT

## 2022-11-22 PROCEDURE — 93010 ELECTROCARDIOGRAM REPORT: CPT | Performed by: INTERNAL MEDICINE

## 2022-11-22 PROCEDURE — 94640 AIRWAY INHALATION TREATMENT: CPT

## 2022-11-22 PROCEDURE — 80061 LIPID PANEL: CPT

## 2022-11-22 PROCEDURE — 36415 COLL VENOUS BLD VENIPUNCTURE: CPT

## 2022-11-22 PROCEDURE — 94664 DEMO&/EVAL PT USE INHALER: CPT

## 2022-11-22 PROCEDURE — 84443 ASSAY THYROID STIM HORMONE: CPT

## 2022-11-22 PROCEDURE — 2580000003 HC RX 258: Performed by: FAMILY MEDICINE

## 2022-11-22 PROCEDURE — APPSS60 APP SPLIT SHARED TIME 46-60 MINUTES

## 2022-11-22 PROCEDURE — 99233 SBSQ HOSP IP/OBS HIGH 50: CPT | Performed by: INTERNAL MEDICINE

## 2022-11-22 PROCEDURE — 6370000000 HC RX 637 (ALT 250 FOR IP): Performed by: EMERGENCY MEDICINE

## 2022-11-22 PROCEDURE — 2140000000 HC CCU INTERMEDIATE R&B

## 2022-11-22 PROCEDURE — 85025 COMPLETE CBC W/AUTO DIFF WBC: CPT

## 2022-11-22 RX ORDER — FERROUS SULFATE 325(65) MG
325 TABLET ORAL
Status: DISCONTINUED | OUTPATIENT
Start: 2022-11-22 | End: 2022-11-23 | Stop reason: HOSPADM

## 2022-11-22 RX ORDER — ATORVASTATIN CALCIUM 40 MG/1
40 TABLET, FILM COATED ORAL DAILY
Status: DISCONTINUED | OUTPATIENT
Start: 2022-11-22 | End: 2022-11-23 | Stop reason: HOSPADM

## 2022-11-22 RX ORDER — ASPIRIN 81 MG/1
81 TABLET, CHEWABLE ORAL DAILY
Status: DISCONTINUED | OUTPATIENT
Start: 2022-11-23 | End: 2022-11-23 | Stop reason: HOSPADM

## 2022-11-22 RX ORDER — DEXTROSE MONOHYDRATE 100 MG/ML
INJECTION, SOLUTION INTRAVENOUS CONTINUOUS PRN
Status: DISCONTINUED | OUTPATIENT
Start: 2022-11-22 | End: 2022-11-23 | Stop reason: HOSPADM

## 2022-11-22 RX ORDER — SODIUM CHLORIDE 0.9 % (FLUSH) 0.9 %
10 SYRINGE (ML) INJECTION EVERY 12 HOURS SCHEDULED
Status: DISCONTINUED | OUTPATIENT
Start: 2022-11-22 | End: 2022-11-23 | Stop reason: HOSPADM

## 2022-11-22 RX ORDER — ALBUTEROL SULFATE 2.5 MG/3ML
2.5 SOLUTION RESPIRATORY (INHALATION) EVERY 6 HOURS PRN
Status: DISCONTINUED | OUTPATIENT
Start: 2022-11-22 | End: 2022-11-23 | Stop reason: HOSPADM

## 2022-11-22 RX ORDER — AMMONIUM LACTATE 12 G/100G
CREAM TOPICAL 2 TIMES DAILY
COMMUNITY

## 2022-11-22 RX ORDER — SODIUM CHLORIDE 0.9 % (FLUSH) 0.9 %
10 SYRINGE (ML) INJECTION PRN
Status: DISCONTINUED | OUTPATIENT
Start: 2022-11-22 | End: 2022-11-23 | Stop reason: HOSPADM

## 2022-11-22 RX ORDER — PANTOPRAZOLE SODIUM 40 MG/1
40 TABLET, DELAYED RELEASE ORAL
Status: DISCONTINUED | OUTPATIENT
Start: 2022-11-22 | End: 2022-11-23 | Stop reason: HOSPADM

## 2022-11-22 RX ORDER — LISINOPRIL 20 MG/1
20 TABLET ORAL 2 TIMES DAILY
Status: DISCONTINUED | OUTPATIENT
Start: 2022-11-22 | End: 2022-11-23 | Stop reason: HOSPADM

## 2022-11-22 RX ORDER — PRAZOSIN HYDROCHLORIDE 1 MG/1
1 CAPSULE ORAL NIGHTLY
Status: DISCONTINUED | OUTPATIENT
Start: 2022-11-22 | End: 2022-11-23 | Stop reason: HOSPADM

## 2022-11-22 RX ORDER — PROMETHAZINE HYDROCHLORIDE 12.5 MG/1
12.5 TABLET ORAL EVERY 6 HOURS PRN
Status: DISCONTINUED | OUTPATIENT
Start: 2022-11-22 | End: 2022-11-23 | Stop reason: HOSPADM

## 2022-11-22 RX ORDER — HYDROCHLOROTHIAZIDE 12.5 MG/1
25 TABLET ORAL DAILY
Status: DISCONTINUED | OUTPATIENT
Start: 2022-11-22 | End: 2022-11-23 | Stop reason: HOSPADM

## 2022-11-22 RX ORDER — POTASSIUM CHLORIDE 20 MEQ/1
40 TABLET, EXTENDED RELEASE ORAL PRN
Status: DISCONTINUED | OUTPATIENT
Start: 2022-11-22 | End: 2022-11-23 | Stop reason: HOSPADM

## 2022-11-22 RX ORDER — SODIUM CHLORIDE 9 MG/ML
INJECTION, SOLUTION INTRAVENOUS PRN
Status: DISCONTINUED | OUTPATIENT
Start: 2022-11-22 | End: 2022-11-23 | Stop reason: HOSPADM

## 2022-11-22 RX ORDER — POTASSIUM CHLORIDE 7.45 MG/ML
10 INJECTION INTRAVENOUS PRN
Status: DISCONTINUED | OUTPATIENT
Start: 2022-11-22 | End: 2022-11-23 | Stop reason: HOSPADM

## 2022-11-22 RX ORDER — HYDRALAZINE HYDROCHLORIDE 20 MG/ML
10 INJECTION INTRAMUSCULAR; INTRAVENOUS EVERY 4 HOURS PRN
Status: DISCONTINUED | OUTPATIENT
Start: 2022-11-22 | End: 2022-11-23 | Stop reason: HOSPADM

## 2022-11-22 RX ORDER — ONDANSETRON 2 MG/ML
4 INJECTION INTRAMUSCULAR; INTRAVENOUS EVERY 6 HOURS PRN
Status: DISCONTINUED | OUTPATIENT
Start: 2022-11-22 | End: 2022-11-23 | Stop reason: HOSPADM

## 2022-11-22 RX ORDER — POLYETHYLENE GLYCOL 3350 17 G/17G
17 POWDER, FOR SOLUTION ORAL DAILY PRN
Status: DISCONTINUED | OUTPATIENT
Start: 2022-11-22 | End: 2022-11-23 | Stop reason: HOSPADM

## 2022-11-22 RX ORDER — ACETAMINOPHEN 650 MG/1
650 SUPPOSITORY RECTAL EVERY 6 HOURS PRN
Status: DISCONTINUED | OUTPATIENT
Start: 2022-11-22 | End: 2022-11-23 | Stop reason: HOSPADM

## 2022-11-22 RX ORDER — MAGNESIUM SULFATE IN WATER 40 MG/ML
2000 INJECTION, SOLUTION INTRAVENOUS PRN
Status: DISCONTINUED | OUTPATIENT
Start: 2022-11-22 | End: 2022-11-23 | Stop reason: HOSPADM

## 2022-11-22 RX ORDER — ASPIRIN 325 MG
325 TABLET ORAL DAILY
Status: DISCONTINUED | OUTPATIENT
Start: 2022-11-22 | End: 2022-11-22

## 2022-11-22 RX ORDER — ACETAMINOPHEN 325 MG/1
650 TABLET ORAL EVERY 6 HOURS PRN
Status: DISCONTINUED | OUTPATIENT
Start: 2022-11-22 | End: 2022-11-23 | Stop reason: HOSPADM

## 2022-11-22 RX ORDER — METHYLPREDNISOLONE SODIUM SUCCINATE 40 MG/ML
40 INJECTION, POWDER, LYOPHILIZED, FOR SOLUTION INTRAMUSCULAR; INTRAVENOUS DAILY
Status: DISCONTINUED | OUTPATIENT
Start: 2022-11-22 | End: 2022-11-23 | Stop reason: HOSPADM

## 2022-11-22 RX ORDER — ATENOLOL 50 MG/1
100 TABLET ORAL DAILY
Status: DISCONTINUED | OUTPATIENT
Start: 2022-11-22 | End: 2022-11-22

## 2022-11-22 RX ORDER — ENOXAPARIN SODIUM 150 MG/ML
1 INJECTION SUBCUTANEOUS 2 TIMES DAILY
Status: DISCONTINUED | OUTPATIENT
Start: 2022-11-22 | End: 2022-11-22

## 2022-11-22 RX ORDER — IPRATROPIUM BROMIDE AND ALBUTEROL SULFATE 2.5; .5 MG/3ML; MG/3ML
1 SOLUTION RESPIRATORY (INHALATION)
Status: DISCONTINUED | OUTPATIENT
Start: 2022-11-22 | End: 2022-11-23 | Stop reason: HOSPADM

## 2022-11-22 RX ORDER — BUDESONIDE 0.5 MG/2ML
500 INHALANT ORAL 2 TIMES DAILY
Status: DISCONTINUED | OUTPATIENT
Start: 2022-11-22 | End: 2022-11-23 | Stop reason: HOSPADM

## 2022-11-22 RX ORDER — ALOGLIPTIN 25 MG/1
25 TABLET, FILM COATED ORAL DAILY
Status: DISCONTINUED | OUTPATIENT
Start: 2022-11-22 | End: 2022-11-23 | Stop reason: HOSPADM

## 2022-11-22 RX ADMIN — LISINOPRIL 20 MG: 20 TABLET ORAL at 10:08

## 2022-11-22 RX ADMIN — ASPIRIN 325 MG: 325 TABLET ORAL at 10:09

## 2022-11-22 RX ADMIN — FERROUS SULFATE TAB 325 MG (65 MG ELEMENTAL FE) 325 MG: 325 (65 FE) TAB at 10:08

## 2022-11-22 RX ADMIN — IPRATROPIUM BROMIDE AND ALBUTEROL SULFATE 1 AMPULE: 2.5; .5 SOLUTION RESPIRATORY (INHALATION) at 20:45

## 2022-11-22 RX ADMIN — SODIUM CHLORIDE, PRESERVATIVE FREE 10 ML: 5 INJECTION INTRAVENOUS at 10:05

## 2022-11-22 RX ADMIN — SODIUM CHLORIDE, PRESERVATIVE FREE 10 ML: 5 INJECTION INTRAVENOUS at 21:01

## 2022-11-22 RX ADMIN — METFORMIN HYDROCHLORIDE 500 MG: 500 TABLET ORAL at 12:56

## 2022-11-22 RX ADMIN — PANTOPRAZOLE SODIUM 40 MG: 40 TABLET, DELAYED RELEASE ORAL at 06:28

## 2022-11-22 RX ADMIN — TRAMADOL HYDROCHLORIDE 50 MG: 50 TABLET, COATED ORAL at 10:15

## 2022-11-22 RX ADMIN — IPRATROPIUM BROMIDE AND ALBUTEROL SULFATE 1 AMPULE: 2.5; .5 SOLUTION RESPIRATORY (INHALATION) at 08:37

## 2022-11-22 RX ADMIN — BUDESONIDE 500 MCG: 0.5 SUSPENSION RESPIRATORY (INHALATION) at 08:37

## 2022-11-22 RX ADMIN — APIXABAN 5 MG: 5 TABLET, FILM COATED ORAL at 21:01

## 2022-11-22 RX ADMIN — LISINOPRIL 20 MG: 20 TABLET ORAL at 01:53

## 2022-11-22 RX ADMIN — ENOXAPARIN SODIUM 120 MG: 150 INJECTION SUBCUTANEOUS at 02:12

## 2022-11-22 RX ADMIN — HYDRALAZINE HYDROCHLORIDE 10 MG: 20 INJECTION INTRAMUSCULAR; INTRAVENOUS at 12:57

## 2022-11-22 RX ADMIN — BUDESONIDE 500 MCG: 0.5 SUSPENSION RESPIRATORY (INHALATION) at 20:45

## 2022-11-22 RX ADMIN — METHYLPREDNISOLONE SODIUM SUCCINATE 40 MG: 40 INJECTION, POWDER, FOR SOLUTION INTRAMUSCULAR; INTRAVENOUS at 10:08

## 2022-11-22 RX ADMIN — IPRATROPIUM BROMIDE AND ALBUTEROL SULFATE 1 AMPULE: 2.5; .5 SOLUTION RESPIRATORY (INHALATION) at 15:41

## 2022-11-22 RX ADMIN — PRAZOSIN HYDROCHLORIDE 1 MG: 1 CAPSULE ORAL at 21:01

## 2022-11-22 RX ADMIN — METOPROLOL SUCCINATE 75 MG: 50 TABLET, EXTENDED RELEASE ORAL at 18:08

## 2022-11-22 RX ADMIN — ENOXAPARIN SODIUM 120 MG: 150 INJECTION SUBCUTANEOUS at 10:09

## 2022-11-22 RX ADMIN — ATORVASTATIN CALCIUM 40 MG: 40 TABLET, FILM COATED ORAL at 10:08

## 2022-11-22 RX ADMIN — LISINOPRIL 20 MG: 20 TABLET ORAL at 21:01

## 2022-11-22 RX ADMIN — ALOGLIPTIN 25 MG: 25 TABLET, FILM COATED ORAL at 10:09

## 2022-11-22 RX ADMIN — IPRATROPIUM BROMIDE AND ALBUTEROL SULFATE 1 AMPULE: 2.5; .5 SOLUTION RESPIRATORY (INHALATION) at 11:28

## 2022-11-22 RX ADMIN — ATENOLOL 100 MG: 50 TABLET ORAL at 10:09

## 2022-11-22 RX ADMIN — HYDROCHLOROTHIAZIDE 25 MG: 12.5 TABLET ORAL at 10:08

## 2022-11-22 ASSESSMENT — PAIN SCALES - GENERAL
PAINLEVEL_OUTOF10: 0
PAINLEVEL_OUTOF10: 6
PAINLEVEL_OUTOF10: 0
PAINLEVEL_OUTOF10: 0

## 2022-11-22 ASSESSMENT — PAIN DESCRIPTION - LOCATION: LOCATION: BACK

## 2022-11-22 ASSESSMENT — PAIN - FUNCTIONAL ASSESSMENT: PAIN_FUNCTIONAL_ASSESSMENT: ACTIVITIES ARE NOT PREVENTED

## 2022-11-22 ASSESSMENT — PAIN DESCRIPTION - ORIENTATION: ORIENTATION: MID

## 2022-11-22 ASSESSMENT — PAIN DESCRIPTION - DESCRIPTORS: DESCRIPTORS: ACHING;DISCOMFORT;SORE

## 2022-11-22 NOTE — CARE COORDINATION
11/22/22 Transition of Care: Patient is here due to worsening shortness of breath/cough and wheezing with activity. He was found to be in atrial fibrillation. He is on oral chemotherapy thru the [facility]. He is alert and oriented. He resides with his wife in a home. He is independent. He does not use assistive devices but does own a walker. He is a  and 100% service connected. He follows with Dr Becca Duron at [facility] and fills his scripts at the clinic. He has been to [address] [place] [address] in the past but does not go if not needed. He states he also has medicare a/b. His plan is to return home with his wife at discharge. He does not feel he will have any home going needs. He is requesting his scripts be efaxed to the [place] [facility] if he has any that will need filled.  Electronically signed by Poli Diallo RN CM on 11/22/2022 at 1:19 PM

## 2022-11-22 NOTE — CONSULTS
Inpatient Cardiology Consultation      Reason for Consult: New onset A. fib    Consulting Physician: Dr Scout Beckham    Requesting Physician:  Jessica Reed DO    Date of Consultation: 11/22/2022    HISTORY OF PRESENT ILLNESS:   Patient is a 37-year-old male who is not known to [facility] cardiology previously. 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 Patient reporting worse with exertion and shortness of breath that improves with rest.  Denies orthopnea or leg swelling. Patient also complaining of worsening right-sided sciatica. Patient was also found to be in new atrial fibrillation with controlled ventricular rate. He was also found to be hypomagnesemic. He was given duo nebs, pain control, and IV magnesium. VS upon arrival 97.8, 18 respirations, 89 pulse, 188/102, 95% room air. Labs: Potassium 3.5, BUN 8, creatinine 0.8, magnesium 1.2> 1.8, glucose 166, serum calcium 10.1, total protein 6.5, proBNP 528, troponin 12> 13, albumin 4.1, WBC 10.6, H&H 16/47.4, platelet 807, D-dimer 277, negative influenza/COVID  CTA pulm: No PE or acute abnormality. Severe coronary artery calcification and moderate centrilobular emphysema. CT abdomen: Soft tissue density structure is with rim calcifications right flank subcutaneous fat likely fat necrosis/posttraumatic change  CXR: No acute process    Upon assessment today 11/22/2022 patient is walking back from the bathroom to sit in his chair next to his bed on room air. Patient is alert and oriented, speaking full sentences, is no apparent distress at this time. Patient reports over the last 2 weeks he has noticed dyspnea on exertion when walking 20 feet or more and having to take a rest.  He denies any chest pain during these events.   He also has noticed some PND when trying to fall asleep without orthopnea. He reports he had a negative sleep study 2 years ago. He reports he mainly came to the hospital for right-sided sciatica pain as well as some September. Patient was educated on atrial fibrillation and reports he has never been diagnosed with A. fib in the past.  He reports he did see a cardiologist greater than 10 years ago for EKG changes with indicated possible MI in the past.  He reports he thinks he had a left heart cath at that time that required no stenting and showed 40% blockage somewhere in the heart. He reports he has not seen a cardiologist since. Patient denies chest pain, at rest SOB, cough, fever, orthopnea, anorexia, early satiety, palpitations, dizziness, nausea, diaphoresis, syncope. Currently is A. fib RVR with heart rates 102-110. He reports he is asymptomatic at this time. Patient is a former smoker quit 1 year ago, does not use alcohol or drugs. Most recent VS 97.5, 18 respirations, 85 pulse, 177/83, 95% room air. Please note: past medical records were reviewed per electronic medical record (EMR) - see detailed reports under Past Medical/ Surgical History. Past Medical History:    Hypertension  Hyperlipidemia  COPD  NIDDM  Anemia  Malignant lymphoplasmacytic lymphoma on ibrutinib  Depression  PTSD  Amputation of right thumb  Obesity 38 BMI  Hernia repair, neck surgery, patellar surgery, shoulder surgery, tonsillectomy, colonoscopy/EGD    Medications Prior to admit:  Prior to Admission medications    Medication Sig Start Date End Date Taking?  Authorizing Provider   ibrutinib (IMBRUVICA) 140 MG chemo capsule Take 420 mg by mouth nightly 3 -140 mg capsules daily   Yes Historical Provider, MD   ammonium lactate (AMLACTIN) 12 % cream Apply topically in the morning and at bedtime Apply topically to feet bid   Yes Historical Provider, MD   metFORMIN (GLUCOPHAGE) 500 MG tablet Take 500 mg by mouth Daily with lunch   Yes Historical Provider, MD   ondansetron [facility] 4 MG tablet Take 1 tablet by mouth every 8 hours as needed for Nausea or Vomiting 10/31/22   Thuan Ferguson, MD   alogliptin (NESINA) 25 MG TABS tablet TAKE ONE TABLET BY MOUTH EVERY DAY 6/17/21   Historical Provider, MD   carboxymethylcellulose (REFRESH PLUS) 0.5 % SOLN ophthalmic solution INSTILL ONE DROP IN [facility] EYE FOUR TIMES A DAY FOR DRY AND/OR IRRITATED EYE(S) 11/23/20   Historical Provider, MD   vitamin D (CHOLECALCIFEROL) 25 MCG (1000 UT) TABS tablet TAKE TWO TABLETS BY MOUTH EVERY DAY 6/21/21   Historical Provider, MD   cyanocobalamin 1000 MCG/ML injection 1,000 mcg every 28 days Patient was due Nov. Ist.    Did not take   Last dose Oct 1st 2022 8/19/20   Historical Provider, MD   docusate (COLACE, DULCOLAX) 100 MG CAPS TAKE ONE CAPSULE BY MOUTH TWICE A DAY -- STOOL SOFTENER 6/17/21   Historical Provider, MD   ferrous sulfate (IRON 325) 325 (65 Fe) MG tablet TAKE ONE TABLET BY MOUTH TWICE A DAY (AN HOUR BEFORE OR TWO HOURS AFTER A MEAL; TAKE WITH FOOD IF THIS UPSETS YOUR STOMACH)----  IRON PILL (AN HOUR BEFORE OR TWO HOURS AFTER A MEAL; TAKE WITH FOOD IF THIS UPSETS YOUR STOMACH)----  IRON PILL 1/12/21   Historical Provider, MD   omeprazole (PRILOSEC) 20 MG delayed release capsule TAKE 2 CAPSULES BY MOUTH EVERY MORNING, ON AN EMPTY STOMACH 9/30/20   Historical Provider, MD   potassium chloride (KLOR-CON M) 20 MEQ extended release tablet TAKE ONE TABLET BY MOUTH EVERY DAY WITH FOOD 6/17/21   Historical Provider, MD   traMADol (ULTRAM) 50 MG tablet Take 50 mg by mouth every 6 hours as needed for Pain    Historical Provider, MD   atenolol (TENORMIN) 100 MG tablet Take 100 mg by mouth daily    Historical Provider, MD   lisinopril (PRINIVIL;ZESTRIL) 20 MG tablet Take 20 mg by mouth 2 times daily    Historical Provider, MD   Potassium Chloride ER 20 MEQ TBCR Take 1 tablet by mouth daily    Historical Provider, MD   simvastatin (ZOCOR) 80 MG tablet Take 80 mg by mouth nightly Take 1/2 tablet    Historical Provider, MD   prazosin (MINIPRESS) 1 MG capsule Take 1 mg by mouth nightly Patient takes (3) tablets at nighttime    Historical Provider, MD   aspirin 325 MG tablet Take 325 mg by mouth daily    Historical Provider, MD   hydrochlorothiazide (HYDRODIURIL) 25 MG tablet Take 25 mg by mouth daily.     Historical Provider, MD   METFORMIN HCL PO Take 500 mg by mouth 2 times daily Takes 1,000 mg (2 tablets) at breakfast, , & 1000 mg (2 tablets) at dinnertime    Historical Provider, MD       Current Medications:    Current Facility-Administered Medications: alogliptin (NESINA) tablet 25 mg, 25 mg, Oral, Daily  aspirin tablet 325 mg, 325 mg, Oral, Daily  atenolol (TENORMIN) tablet 100 mg, 100 mg, Oral, Daily  ferrous sulfate (IRON 325) tablet 325 mg, 325 mg, Oral, Daily with breakfast  hydroCHLOROthiazide (HYDRODIURIL) tablet 25 mg, 25 mg, Oral, Daily  ibrutinib (IMBRUVICA) chemo capsule 420 mg (Patient Supplied), 420 mg, Oral, Nightly  lisinopril (PRINIVIL;ZESTRIL) tablet 20 mg, 20 mg, Oral, BID  metFORMIN (GLUCOPHAGE) tablet 500 mg, 500 mg, Oral, Lunch  pantoprazole (PROTONIX) tablet 40 mg, 40 mg, Oral, QAM AC  prazosin (MINIPRESS) capsule 1 mg, 1 mg, Oral, Nightly  atorvastatin (LIPITOR) tablet 40 mg, 40 mg, Oral, Daily  sodium chloride flush 0.9 % injection 10 mL, 10 mL, IntraVENous, 2 times per day  sodium chloride flush 0.9 % injection 10 mL, 10 mL, IntraVENous, PRN  0.9 % sodium chloride infusion, , IntraVENous, PRN  promethazine (PHENERGAN) tablet 12.5 mg, 12.5 mg, Oral, Q6H PRN **OR** ondansetron (ZOFRAN) injection 4 mg, 4 mg, IntraVENous, Q6H PRN  polyethylene glycol (GLYCOLAX) packet 17 g, 17 g, Oral, Daily PRN  acetaminophen (TYLENOL) tablet 650 mg, 650 mg, Oral, Q6H PRN **OR** acetaminophen (TYLENOL) suppository 650 mg, 650 mg, Rectal, Q6H PRN  budesonide (PULMICORT) nebulizer suspension 500 mcg, 500 mcg, Nebulization, BID  methylPREDNISolone sodium (SOLU-MEDROL) injection 40 mg, 40 mg, IntraVENous, Daily  ipratropium-albuterol (DUONEB) nebulizer solution 1 ampule, 1 ampule, Inhalation, Q4H WA  albuterol (PROVENTIL) nebulizer solution 2.5 mg, 2.5 mg, Nebulization, Q6H PRN  hydrALAZINE (APRESOLINE) injection 10 mg, 10 mg, IntraVENous, Q4H PRN  potassium chloride (KLOR-CON M) extended release tablet 40 mEq, 40 mEq, Oral, PRN **OR** potassium bicarb-citric acid (EFFER-K) effervescent tablet 40 mEq, 40 mEq, Oral, PRN **OR** potassium chloride 10 mEq/100 mL IVPB (Peripheral Line), 10 mEq, IntraVENous, PRN  magnesium sulfate 2000 mg in 50 mL IVPB premix, 2,000 mg, IntraVENous, PRN  enoxaparin (LOVENOX) injection 120 mg, 1 mg/kg, SubCUTAneous, BID  glucose chewable tablet 16 g, 4 tablet, Oral, PRN  dextrose bolus 10% 125 mL, 125 mL, IntraVENous, PRN **OR** dextrose bolus 10% 250 mL, 250 mL, IntraVENous, PRN  glucagon (rDNA) injection 1 mg, 1 mg, SubCUTAneous, PRN  dextrose 10 % infusion, , IntraVENous, Continuous PRN  glipiZIDE (GLUCOTROL) tablet 10 mg, 10 mg, Oral, QAM AC  metFORMIN (GLUCOPHAGE) tablet 500 mg, 500 mg, Oral, Once  traMADol (ULTRAM) tablet 50 mg, 50 mg, Oral, Q6H PRN    Allergies:  Patient has no known allergies.     Social History:    Social History     Socioeconomic History    Marital status:      Spouse name: Not on file    Number of children: Not on file    Years of education: Not on file    Highest education level: Not on file   Occupational History    Not on file   Tobacco Use    Smoking status: Every Day     Packs/day: 2.00     Types: Cigars, Cigarettes    Smokeless tobacco: Never    Tobacco comments:     2 cigars a day   Vaping Use    Vaping Use: Not on file   Substance and Sexual Activity    Alcohol use: Yes     Comment: occasionally    Drug use: No    Sexual activity: Not Currently   Other Topics Concern    Not on file   Social History Narrative    Not on file     Social Determinants of Health     Financial Resource Strain: Not on file   Food Insecurity: Not on file   Transportation Needs: Not on file   Physical Activity: Not on file   Stress: Not on file   Social Connections: Not on file   Intimate Partner Violence: Not on file   Housing Stability: Not on file       Family History:   Family History   Problem Relation Age of Onset    Heart Attack Sister          REVIEW OF SYSTEMS:     Constitutional: Denies fevers, chills, night sweats, and fatigue  HEENT: Denies headaches, nose bleeds, and blurred vision,oral pain, abscess or lesion. Musculoskeletal: Denies falls, pain to BLE with ambulation and edema to BLE. Neurological: Denies dizziness and lightheadedness, numbness and tingling  Cardiovascular: Denies chest pain, palpitations, and feelings of heart racing. Respiratory: Denies orthopnea or at rest SOB. WALLACE and PND  Gastrointestinal: Denies heartburn, nausea/vomiting, diarrhea and constipation, black/bloody, and tarry stools. Genitourinary: Denies dysuria and hematuria  Hematologic: Denies excessive bruising or bleeding  Lymphatic: Denies lumps and bumps to neck, axilla, breast, and groin  Endocrine: Denies excessive thirst. Denies intolerance to hot and cold  Psychiatric: Denies anxiety and depression. PHYSICAL EXAM:   BP (!) 177/83   Pulse 85   Temp 97.5 °F (36.4 °C) (Temporal)   Resp 18   Ht 5' 11\" (1.803 m)   Wt 274 lb 9.6 oz (124.6 kg)   SpO2 95%   BMI 38.30 kg/m²   CONST:  Well developed, well nourished obese 82-year-old  male who appears stated age. Awake, alert, cooperative, no apparent distress  HEENT:   Head- Normocephalic, atraumatic   Eyes- Conjunctivae pink, anicteric  Throat- Oral mucosa pink and moist  Neck-  No stridor, trachea midline, no jugular venous distention. No adenopathy   CHEST: Chest symmetrical and non-tender to palpation.  No accessory muscle use or intercostal retractions  RESPIRATORY: Lung sounds - clear throughout fields   CARDIOVASCULAR:     No carotid bruit  Heart Inspection- shows no noted pulsations  Heart Palpation- no heaves or thrills; PMI is non-displaced   Heart Ausculation-irregularly irregular rate and rhythm, no murmur. No s3, s4 or rub   PV: +1 bilateral lateral lower extremity edema. No varicosities. Pedal pulses palpable, no clubbing or cyanosis   ABDOMEN: Soft, non-tender to light palpation. Bowel sounds present. No palpable masses no organomegaly; no abdominal bruit  MS: Good muscle strength and tone. No atrophy or abnormal movements. : Deferred  SKIN: Warm and dry no statis dermatitis or ulcers   NEURO / PSYCH: Oriented to person, place and time. Speech clear and appropriate. Follows all commands. Pleasant affect     DATA:    ECG: Atrial fibrillation, vent rate 84, QRS duration 104, QTc 446. Tele strips: AF with RVR, 102-110  Diagnostic:      Intake/Output Summary (Last 24 hours) at 11/22/2022 0746  Last data filed at 11/22/2022 0458  Gross per 24 hour   Intake --   Output 600 ml   Net -600 ml       Labs:   CBC:   Recent Labs     11/21/22 1851 11/22/22  0437   WBC 10.0 10.6   HGB 15.6 16.0   HCT 46.7 47.4    220     BMP:   Recent Labs     11/21/22 1851 11/22/22  0437    137   K 3.7 3.5   CO2 27 27   BUN 10 8   CREATININE 0.8 0.8   LABGLOM >60 >60   CALCIUM 9.9 10.1     Mag:   Recent Labs     11/21/22 1851 11/22/22  0437   MG 1.2* 1.8       LIVER PROFILE:  Recent Labs     11/21/22 1851 11/22/22  0437   AST 16 11   ALT 15 14   LABALBU 4.0 4.1      Latest Reference Range & Units 11/21/22 18:51 11/21/22 21:43   Pro-BNP 0 - 450 pg/mL 528 (H)    Troponin, High Sensitivity 0 - 11 ng/L 12 (H) 13 (H)   (H): Data is abnormally high     Latest Reference Range & Units 11/21/22 18:51 11/22/22 04:37   TSH 0.270 - 4.200 uIU/mL 0.025 (L) 3.760   (L): Data is abnormally low    CXR:  Impression   No acute process. CTA pulm: Impression   No evidence of pulmonary embolism or acute pulmonary abnormality. No lymphadenopathy in the chest.       Severe coronary artery calcification and moderate centrilobular emphysema. CT abdomen:  Impression   Soft tissue density structure is with rim calcifications in the right flank   subcutaneous fat, likely fat necrosis/posttraumatic change. [id number]: 3 points (hypertension, age 78, diabetes), 3.2% risk of CVA      Assessment and plan to follow as per Dr Ninfa Soria. Electronically signed by HUDSON Amaro Do, CNP on 11/22/2022 at 7:46 AM      I independently interviewed and examined the patient. I have reviewed the above documentation completed by the SILVERIO. Please see my additional contributions to the HPI, physical exam, and assessment / medical decision making. I contributed more than 51% of the patient care. Briefly, [de-identified]79-year-old male with history of hypertension, hyperlipidemia, COPD, diabetes, anemia, malignant lymphohistiocytic lymphoma, depression, PTSD, amputation of right thumb, obesity with BMI of 38, prior hip hernia repair who presented with dyspnea on exertion and cough for about a week also and found to be in new onset A. fib with RVR. Cardiology consulted. Review of Systems:  Cardiac: As per HPI  General: No fever, chills  Pulmonary: As per HPI  GI: No nausea, vomiting  Musculoskeletal: ELIAS x 4, no focal motor deficits  Skin: Intact, no rashes  Neuro/Psych: No headache or seizures    Physical Exam:  BP (!) 181/93   Pulse 82   Temp 97.5 °F (36.4 °C) (Temporal)   Resp 16   Ht 5' 11\" (1.803 m)   Wt 274 lb 9.6 oz (124.6 kg)   SpO2 94%   BMI 38.30 kg/m²   Appearance: Awake, alert, no acute respiratory distress  Skin: Intact, no rash  Head: Normocephalic, atraumatic  ENMT: MMM, no rhinorrhea  Neck: Supple, no carotid bruits  Lungs: Clear to auscultation bilaterally. No wheezes, rales, or rhonchi.   Cardiac: Regular rate and rhythm, +S1S2, no murmurs apparent  Abdomen: Soft, +bowel sounds  Extremities: Moves all extremities x 4, no lower extremity edema  Neurologic: No focal motor deficits apparent, normal mood and affect      Assessment/Plan:   New onset A. fib with RVR  Currently on anticoagulation and [id number] score is high (3). Once ready for discharge please initiate Eliquis 5 mg p.o. twice a day  N.p.o. after midnight for CAM guided cardioversion if patient remains in A. fib by tomorrow to see if his symptoms will improve  Echocardiogram to guide therapy  We will discuss an ischemic evaluation with possible stress echocardiogram once A. fib management is complete  Change atenolol to metoprolol succinate 75 twice a day  TSH is normal     Severe coronary artery calcification seen on CT chest  Continue on aspirin 81 mg a day, beta-blocker and statin therapy for treatment of atherosclerosis coronary artery disease based on CT chest with severe coronary artery calcification. Dyspnea on exertion  Echo to guide therapy  If echo is fine we will discuss possible outpatient Lexiscan myocardial perfusion stress test since currently we do not have agents for pharmacologic stress test currently.     Diabetes  Management per primary service    Hyperlipidemia  Continue statin therapy    Hypertension  Change atenolol to metoprolol succinate 75 twice a day    PTSD    Obesity  Weight loss is 100    History of COPD  Management per primary service    History of malignant lymphocytic lymphoma on ibrutinib  Monitor closely on telemetry            Lucia Parra MD  [facility] Cardiology

## 2022-11-22 NOTE — CARE COORDINATION
11/22/22 EVA Note: [facility] [employer] contacted and notified of admission.  Electronically signed by Ludy Truong RN CM on 11/22/2022 at 1:07 PM

## 2022-11-22 NOTE — ED NOTES
[employer] meal, diet ginger ale, sugar free pudding, and mehul crackers given to pt.   [facility] here for transport     Catherine Brian RN  11/21/22 9157

## 2022-11-22 NOTE — PLAN OF CARE
Problem: Discharge Planning  Goal: Discharge to home or other facility with appropriate resources  Outcome: Progressing  Flowsheets (Taken 11/22/2022 0047 by Jordan Piper RN)  Discharge to home or other facility with appropriate resources: Identify barriers to discharge with patient and caregiver     Problem: Pain  Goal: Verbalizes/displays adequate comfort level or baseline comfort level  Outcome: Progressing     Problem: Safety - Adult  Goal: Free from fall injury  Outcome: Progressing     Problem: ABCDS Injury Assessment  Goal: Absence of physical injury  Outcome: Progressing     Problem: Respiratory - Adult  Goal: Achieves optimal ventilation and oxygenation  11/22/2022 1408 by Elvia Armenta RN  Outcome: Progressing  11/22/2022 0058 by Jordan Piper RN  Outcome: Progressing     Problem: Cardiovascular - Adult  Goal: Maintains optimal cardiac output and hemodynamic stability  11/22/2022 1408 by Elvia Armenta RN  Outcome: Progressing  11/22/2022 0058 by Jordan Piper RN  Outcome: Progressing  Flowsheets (Taken 11/22/2022 0036)  Maintains optimal cardiac output and hemodynamic stability: Monitor blood pressure and heart rate  Goal: Absence of cardiac dysrhythmias or at baseline  11/22/2022 1408 by Elvia Armenta RN  Outcome: Progressing  11/22/2022 0058 by Jordan Piper RN  Outcome: Progressing  Flowsheets (Taken 11/22/2022 0036)  Absence of cardiac dysrhythmias or at baseline: Monitor cardiac rate and rhythm

## 2022-11-22 NOTE — H&P
Hospitalist History & Physical      PCP: Elda Lang MD    Date of Service: Pt seen/examined on 11/22/2022     Chief Complaint:  had concerns including Shortness of Breath (Sob with wheezing, ongoing for a week, coughing). History Of Present Illness:    Mr. Ana Son, a 78y.o. year old male  who  has a past medical history of Abscess of right thumb, Amputation of right thumb, Anemia, Depression, Diabetes mellitus ([address] Hyperlipidemia, Hypertension, and Obesity. Patient presented to the [facility] emergency department with complaints of shortness of breath and wheezing this been happening for the past week. Patient with exertional dyspnea. Can walk about 20 feet then becomes very short of breath. Vital signs show the patient be hypertensive with pressures of 188/90. Patient is afebrile. Laboratory studies largely unremarkable with exception of magnesium 1.2. Patient was found to be in atrial fibrillation. This is a new diagnosis for him. Past Medical History:   Diagnosis Date    Abscess of right thumb 4/15/2016    Amputation of right thumb 6/18/2014    Distal phalynx    Anemia     Depression     PTSD    Diabetes mellitus ([address]     Hyperlipidemia     Hypertension     Obesity        Past Surgical History:   Procedure Laterality Date    COLONOSCOPY      HERNIA REPAIR      NECK SURGERY      PATELLA SURGERY      SHOULDER SURGERY      TONSILLECTOMY      UPPER GASTROINTESTINAL ENDOSCOPY         Prior to Admission medications    Medication Sig Start Date End Date Taking?  Authorizing Provider   ibrutinib (IMBRUVICA) 140 MG chemo capsule Take 420 mg by mouth nightly 3 -140 mg capsules daily   Yes Historical Provider, MD   ammonium lactate (AMLACTIN) 12 % cream Apply topically in the morning and at bedtime Apply topically to feet bid   Yes Historical Provider, MD   metFORMIN (GLUCOPHAGE) 500 MG tablet Take 500 mg by mouth Daily with lunch   Yes Historical Provider, MD   ondansetron (ZOFRAN) 4 MG tablet Take 1 tablet by mouth every 8 hours as needed for Nausea or Vomiting 10/31/22   Tenzin Ramey MD   alogliptin (NESINA) 25 MG TABS tablet TAKE ONE TABLET BY MOUTH EVERY DAY 6/17/21   Historical Provider, MD   carboxymethylcellulose (REFRESH PLUS) 0.5 % SOLN ophthalmic solution INSTILL ONE DROP IN [facility] EYE FOUR TIMES A DAY FOR DRY AND/OR IRRITATED EYE(S) 11/23/20   Historical Provider, MD   vitamin D (CHOLECALCIFEROL) 25 MCG (1000 UT) TABS tablet TAKE TWO TABLETS BY MOUTH EVERY DAY 6/21/21   Historical Provider, MD   cyanocobalamin 1000 MCG/ML injection 1,000 mcg every 28 days Patient was due Nov. Ist.    Did not take   Last dose Oct 1st 2022 8/19/20   Historical Provider, MD   docusate (COLACE, DULCOLAX) 100 MG CAPS TAKE ONE CAPSULE BY MOUTH TWICE A DAY -- STOOL SOFTENER 6/17/21   Historical Provider, MD   ferrous sulfate (IRON 325) 325 (65 Fe) MG tablet TAKE ONE TABLET BY MOUTH TWICE A DAY (AN HOUR BEFORE OR TWO HOURS AFTER A MEAL; TAKE WITH FOOD IF THIS UPSETS YOUR STOMACH)----  IRON PILL (AN HOUR BEFORE OR TWO HOURS AFTER A MEAL; TAKE WITH FOOD IF THIS UPSETS YOUR STOMACH)----  IRON PILL 1/12/21   Historical Provider, MD   omeprazole (PRILOSEC) 20 MG delayed release capsule TAKE 2 CAPSULES BY MOUTH EVERY MORNING, ON AN EMPTY STOMACH 9/30/20   Historical Provider, MD   potassium chloride (KLOR-CON M) 20 MEQ extended release tablet TAKE ONE TABLET BY MOUTH EVERY DAY WITH FOOD 6/17/21   Historical Provider, MD   traMADol (ULTRAM) 50 MG tablet Take 50 mg by mouth every 6 hours as needed for Pain    Historical Provider, MD   atenolol (TENORMIN) 100 MG tablet Take 100 mg by mouth daily    Historical Provider, MD   lisinopril (PRINIVIL;ZESTRIL) 20 MG tablet Take 20 mg by mouth 2 times daily    Historical Provider, MD   Potassium Chloride ER 20 MEQ TBCR Take 1 tablet by mouth daily    Historical Provider, MD   simvastatin (ZOCOR) 80 MG tablet Take 80 mg by mouth nightly Take 1/2 tablet    Historical Provider, MD   prazosin (MINIPRESS) 1 MG capsule Take 1 mg by mouth nightly Patient takes (3) tablets at nighttime    Historical Provider, MD   aspirin 325 MG tablet Take 325 mg by mouth daily    Historical Provider, MD   hydrochlorothiazide (HYDRODIURIL) 25 MG tablet Take 25 mg by mouth daily. Historical Provider, MD   METFORMIN HCL PO Take 500 mg by mouth 2 times daily Takes 1,000 mg (2 tablets) at breakfast, , & 1000 mg (2 tablets) at dinnertime    Historical Provider, MD         Allergies:  Patient has no known allergies. Social History:    TOBACCO:   reports that he has been smoking cigars. He has been smoking an average of 2 packs per day. He has never used smokeless tobacco.  ETOH:   reports current alcohol use. Family History:    Reviewed in detail and negative for DM, CAD, Cancer, CVA. Positive as follows\"      Problem Relation Age of Onset    Heart Attack Sister        REVIEW OF SYSTEMS:   Pertinent positives as noted in the HPI. All other systems reviewed and negative. PHYSICAL EXAM:  BP (!) 188/90   Pulse 90   Temp 97.9 °F (36.6 °C) (Temporal)   Resp 20   Ht 5' 11\" (1.803 m)   Wt 274 lb 9.6 oz (124.6 kg)   SpO2 94%   BMI 38.30 kg/m²   General appearance: No apparent distress, appears stated age and cooperative. HEENT: Normal cephalic, atraumatic without obvious deformity. Pupils equal, round, and reactive to light. Extra ocular muscles intact. Conjunctivae/corneas clear. Neck: Supple, with full range of motion. No jugular venous distention. Trachea midline. Respiratory: Guided wheezes bilaterally  Cardiovascular: Regular rate and irregular rhythm  Abdomen: Soft, nontender, nondistended  Musculoskeletal: No clubbing, cyanosis, edema of bilateral lower extremities. Brisk capillary refill. Skin: Normal skin color. No rashes or lesions. Neurologic:  Neurovascularly intact without any focal sensory/motor deficits.  Cranial nerves: II-XII intact, grossly non-focal.  Psychiatric: Alert and oriented, thought content appropriate, normal insight    Reviewed EKG and CXR personally      CBC:   Recent Labs     11/21/22 1851   WBC 10.0   RBC 5.41   HGB 15.6   HCT 46.7   MCV 86.3   RDW 14.0        BMP:   Recent Labs     11/21/22 1851      K 3.7   CL 97*   CO2 27   BUN 10   CREATININE 0.8   MG 1.2*     LFT:  Recent Labs     11/21/22 1851   PROT 6.5   ALKPHOS 55   ALT 15   AST 16   BILITOT 0.5     CE:  No results for input(s): Angela Fuse in the last 72 hours. PT/INR: No results for input(s): INR, APTT in the last 72 hours. BNP: No results for input(s): BNP in the last 72 hours. ESR: No results found for: SEDRATE  CRP: No results found for: CRP  D Dimer:   Lab Results   Component Value Date    DDIMER 277 11/21/2022      Folate and B12:   Lab Results   Component Value Date    IDFZXDSD91 451 06/30/2021   ,   Lab Results   Component Value Date    FOLATE 13.1 06/30/2021     Lactic Acid:   Lab Results   Component Value Date    LACTA 2.9 (H) 04/10/2016     Thyroid Studies: No results found for: TSH, U3BKQGB, Y4DMOJJ, THYROIDAB    Oupatient labs:  No results found for: CHOL, TRIG, HDL, LDLCALC, TSH, PSA, INR, LABA1C    Urinalysis:    Lab Results   Component Value Date/Time    NITRU NEGATIVE 12/01/2010 09:30 AM    WBCUA 0-1 12/01/2010 09:30 AM    BACTERIA RARE 12/01/2010 09:30 AM    RBCUA NONE 12/01/2010 09:30 AM    BLOODU NEGATIVE 12/01/2010 09:30 AM    SPECGRAV >=1.030 12/01/2010 09:30 AM    GLUCOSEU NEGATIVE 12/01/2010 09:30 AM       Imaging:  CT ABDOMEN PELVIS W IV CONTRAST Additional Contrast? None    Result Date: 11/21/2022  EXAMINATION: CT OF THE ABDOMEN AND PELVIS WITH CONTRAST 11/21/2022 7:45 pm TECHNIQUE: CT of the abdomen and pelvis was performed with the administration of intravenous contrast. Multiplanar reformatted images are provided for review.  Automated exposure control, iterative reconstruction, and/or weight based adjustment of the mA/kV was utilized to reduce the radiation dose to as low as reasonably achievable. COMPARISON: None. HISTORY: ORDERING SYSTEM PROVIDED HISTORY: low back pain and right flank pain/ttp, right paraspinal lumbar mass TECHNOLOGIST PROVIDED HISTORY: Additional Contrast?->None Reason for exam:->low back pain and right flank pain/ttp, right paraspinal lumbar mass Decision Support Exception - unselect if not a suspected or confirmed emergency medical condition->Emergency Medical Condition (MA) FINDINGS: Lower Chest: Please see separately dictated report for findings above the diaphragm. Organs: Liver is not cirrhotic in configuration. There is a hypoattenuating lesion within the posterosuperior hepatic segment, measuring 2.2 x 1.5 cm. The there is cholelithiasis without CT evidence of acute cholecystitis. Cystic lesions within the spleen, indeterminate but possibly representing pseudocyst versus hemangiomas. The pancreas, adrenals, and right kidney are unremarkable. There are nonobstructing left renal calculi. GI/Bowel: There is no evidence of bowel obstruction. No evidence of abnormal bowel wall thickening or distension. The appendix is normal.  There is diverticulosis. Pelvis: The urinary bladder is partially filled. There are layering calcific densities within the bladder, compatible with bladder stones. The prostate is enlarged. Peritoneum/Retroperitoneum: No evidence of ascites or free air. No evidence of lymphadenopathy. Aorta is normal in caliber. Bones/Soft Tissues:  No acute abnormality of the visualized osseous structures. There are lobulated soft tissue densities with rim calcifications in the right flank subcutaneous fat. The dominant structure measures 8.0 x 3 4 cm. Soft tissue density structure is with rim calcifications in the right flank subcutaneous fat, likely fat necrosis/posttraumatic change. XR CHEST PORTABLE    Result Date: 11/21/2022  EXAMINATION: ONE XRAY VIEW OF THE CHEST 11/21/2022 6:26 pm COMPARISON: None.  HISTORY: ORDERING SYSTEM PROVIDED HISTORY: shortness of breath TECHNOLOGIST PROVIDED HISTORY: Reason for exam:->shortness of breath FINDINGS: The lungs are without acute focal process. There is no effusion or pneumothorax. Cardiomegaly. The osseous structures are without acute process. Probable atelectasis left upper lobe. No acute process. CTA PULMONARY W CONTRAST    Result Date: 11/21/2022  EXAMINATION: CTA OF THE CHEST 11/21/2022 5:45 pm TECHNIQUE: CTA of the chest was performed after the administration of intravenous contrast.  Multiplanar reformatted images are provided for review. MIP images are provided for review. Automated exposure control, iterative reconstruction, and/or weight based adjustment of the mA/kV was utilized to reduce the radiation dose to as low as reasonably achievable. COMPARISON: CT chest without 03/24/2022 HISTORY: ORDERING SYSTEM PROVIDED HISTORY: shortness of breath, new onset afib, hx of lymphoma on chemo TECHNOLOGIST PROVIDED HISTORY: Reason for exam:->shortness of breath, new onset afib, hx of lymphoma on chemo Decision Support Exception - unselect if not a suspected or confirmed emergency medical condition->Emergency Medical Condition (MA) FINDINGS: Pulmonary Arteries: Pulmonary arteries are adequately opacified for evaluation. No evidence of intraluminal filling defect to suggest pulmonary embolism. Main pulmonary artery is normal in caliber. Mediastinum: No evidence of mediastinal lymphadenopathy. The heart and pericardium demonstrate no acute abnormality. There is no acute abnormality of the thoracic aorta. Severe coronary artery calcification is seen. Lungs/pleura: The lungs are without acute process. No focal consolidation or pulmonary edema. Moderate centrilobular emphysema is seen. No evidence of pleural effusion or pneumothorax. Upper Abdomen: Limited images of the upper abdomen are unremarkable. Soft Tissues/Bones: No acute bone or soft tissue abnormality. No evidence of pulmonary embolism or acute pulmonary abnormality. No lymphadenopathy in the chest. Severe coronary artery calcification and moderate centrilobular emphysema. ASSESSMENT:  -New onset atrial fibrillation  -COPD exacerbation  -Hypomagnesemia  -Hypertension  -Hyperlipidemia  -Diabetes mellitus type 2      PLAN:  -Admit to medicine  -Consult cardiology  -Telemetry  -Lovenox 1 mg/kg twice daily  -DuoNebs every 4 hours  -Albuterol as needed  -Pulmicort twice daily  -Methylprednisolone 40 mg IV daily  -Monitor serum electrolytes replete as needed  -Continue home medications        Diet: ADULT DIET; Regular  Code Status: Full Code  Surrogate decision maker confirmed with patient:   Extended Emergency Contact Information  Primary Emergency Contact: Diane Charlton  Address: [address] Phone: [phone]  Relation: Spouse    DVT Prophylaxis: []Lovenox []Heparin []PCD [] [address] []Encouraged ambulation  Disposition: []Med/Surg [] Intermediate [] ICU/CCU  Admit status: [] Observation [] Inpatient     +++++++++++++++++++++++++++++++++++++++++++++++++  Marnia Yessica, DO  +++++++++++++++++++++++++++++++++++++++++++++++++  NOTE: This report was transcribed using voice recognition software. Every effort was made to ensure accuracy; however, inadvertent computerized transcription errors may be present.

## 2022-11-22 NOTE — PLAN OF CARE
Problem: Respiratory - Adult  Goal: Achieves optimal ventilation and oxygenation  Outcome: Progressing     Problem: Cardiovascular - Adult  Goal: Maintains optimal cardiac output and hemodynamic stability  Outcome: Progressing

## 2022-11-22 NOTE — PROGRESS NOTES
Odalys Rashid was ordered ibrutinib which is a nonformulary medication. This medication will need to be supplied by the patient as the pharmacy does not carry this non-formulary medication. If the medication has not been administered by 1400 on the following day from the time the order was placed, a pharmacist will follow-up with the nurse of the patient to assess the capability of the patient to bring in the medication. If it is determined that the patient cannot supply the medication and it is not available to be dispensed from the pharmacy, the provider will be notified.     Odin Darnell PharmD, [facility], BCPS 11/22/2022 5:14 AM

## 2022-11-23 ENCOUNTER — APPOINTMENT (OUTPATIENT)
Dept: CARDIAC CATH/INVASIVE PROCEDURES | Age: 77
DRG: 309 | End: 2022-11-23
Payer: OTHER GOVERNMENT

## 2022-11-23 VITALS
RESPIRATION RATE: 18 BRPM | WEIGHT: 274.6 LBS | HEART RATE: 88 BPM | SYSTOLIC BLOOD PRESSURE: 154 MMHG | HEIGHT: 71 IN | BODY MASS INDEX: 38.44 KG/M2 | TEMPERATURE: 98 F | OXYGEN SATURATION: 95 % | DIASTOLIC BLOOD PRESSURE: 77 MMHG

## 2022-11-23 LAB
ANION GAP SERPL CALCULATED.3IONS-SCNC: 13 MMOL/L (ref 7–16)
BASOPHILS ABSOLUTE: 0.04 E9/L (ref 0–0.2)
BASOPHILS RELATIVE PERCENT: 0.5 % (ref 0–2)
BUN BLDV-MCNC: 14 MG/DL (ref 6–23)
CALCIUM SERPL-MCNC: 10.4 MG/DL (ref 8.6–10.2)
CHLORIDE BLD-SCNC: 99 MMOL/L (ref 98–107)
CO2: 26 MMOL/L (ref 22–29)
CREAT SERPL-MCNC: 0.8 MG/DL (ref 0.7–1.2)
EKG ATRIAL RATE: 67 BPM
EKG P AXIS: 52 DEGREES
EKG P-R INTERVAL: 188 MS
EKG Q-T INTERVAL: 442 MS
EKG QRS DURATION: 102 MS
EKG QTC CALCULATION (BAZETT): 467 MS
EKG R AXIS: 2 DEGREES
EKG T AXIS: 41 DEGREES
EKG VENTRICULAR RATE: 67 BPM
EOSINOPHILS ABSOLUTE: 0.01 E9/L (ref 0.05–0.5)
EOSINOPHILS RELATIVE PERCENT: 0.1 % (ref 0–6)
GFR SERPL CREATININE-BSD FRML MDRD: >60 ML/MIN/1.73
GLUCOSE BLD-MCNC: 282 MG/DL (ref 74–99)
HCT VFR BLD CALC: 44 % (ref 37–54)
HEMOGLOBIN: 15.1 G/DL (ref 12.5–16.5)
IMMATURE GRANULOCYTES #: 0.12 E9/L
IMMATURE GRANULOCYTES %: 1.4 % (ref 0–5)
LYMPHOCYTES ABSOLUTE: 1.89 E9/L (ref 1.5–4)
LYMPHOCYTES RELATIVE PERCENT: 21.4 % (ref 20–42)
MCH RBC QN AUTO: 29.7 PG (ref 26–35)
MCHC RBC AUTO-ENTMCNC: 34.3 % (ref 32–34.5)
MCV RBC AUTO: 86.4 FL (ref 80–99.9)
MONOCYTES ABSOLUTE: 0.84 E9/L (ref 0.1–0.95)
MONOCYTES RELATIVE PERCENT: 9.5 % (ref 2–12)
NEUTROPHILS ABSOLUTE: 5.95 E9/L (ref 1.8–7.3)
NEUTROPHILS RELATIVE PERCENT: 67.1 % (ref 43–80)
PDW BLD-RTO: 13.5 FL (ref 11.5–15)
PLATELET # BLD: 209 E9/L (ref 130–450)
PMV BLD AUTO: 10.4 FL (ref 7–12)
POTASSIUM SERPL-SCNC: 3.5 MMOL/L (ref 3.5–5)
RBC # BLD: 5.09 E12/L (ref 3.8–5.8)
SODIUM BLD-SCNC: 138 MMOL/L (ref 132–146)
WBC # BLD: 8.9 E9/L (ref 4.5–11.5)

## 2022-11-23 PROCEDURE — 92960 CARDIOVERSION ELECTRIC EXT: CPT | Performed by: INTERNAL MEDICINE

## 2022-11-23 PROCEDURE — 5A2204Z RESTORATION OF CARDIAC RHYTHM, SINGLE: ICD-10-PCS | Performed by: INTERNAL MEDICINE

## 2022-11-23 PROCEDURE — 2580000003 HC RX 258: Performed by: FAMILY MEDICINE

## 2022-11-23 PROCEDURE — 6370000000 HC RX 637 (ALT 250 FOR IP): Performed by: EMERGENCY MEDICINE

## 2022-11-23 PROCEDURE — 6370000000 HC RX 637 (ALT 250 FOR IP): Performed by: FAMILY MEDICINE

## 2022-11-23 PROCEDURE — 93005 ELECTROCARDIOGRAM TRACING: CPT | Performed by: FAMILY MEDICINE

## 2022-11-23 PROCEDURE — 93321 DOPPLER ECHO F-UP/LMTD STD: CPT

## 2022-11-23 PROCEDURE — 2709999900 HC NON-CHARGEABLE SUPPLY

## 2022-11-23 PROCEDURE — 93306 TTE W/DOPPLER COMPLETE: CPT

## 2022-11-23 PROCEDURE — 93312 ECHO TRANSESOPHAGEAL: CPT

## 2022-11-23 PROCEDURE — 6360000002 HC RX W HCPCS: Performed by: NURSE ANESTHETIST, CERTIFIED REGISTERED

## 2022-11-23 PROCEDURE — 3700000000 HC ANESTHESIA ATTENDED CARE

## 2022-11-23 PROCEDURE — 92960 CARDIOVERSION ELECTRIC EXT: CPT

## 2022-11-23 PROCEDURE — B24BZZ4 ULTRASONOGRAPHY OF HEART WITH AORTA, TRANSESOPHAGEAL: ICD-10-PCS | Performed by: INTERNAL MEDICINE

## 2022-11-23 PROCEDURE — 99233 SBSQ HOSP IP/OBS HIGH 50: CPT | Performed by: INTERNAL MEDICINE

## 2022-11-23 PROCEDURE — 6370000000 HC RX 637 (ALT 250 FOR IP)

## 2022-11-23 PROCEDURE — 6360000002 HC RX W HCPCS: Performed by: FAMILY MEDICINE

## 2022-11-23 PROCEDURE — 36415 COLL VENOUS BLD VENIPUNCTURE: CPT

## 2022-11-23 PROCEDURE — 93320 DOPPLER ECHO COMPLETE: CPT | Performed by: INTERNAL MEDICINE

## 2022-11-23 PROCEDURE — 93325 DOPPLER ECHO COLOR FLOW MAPG: CPT

## 2022-11-23 PROCEDURE — 3700000001 HC ADD 15 MINUTES (ANESTHESIA)

## 2022-11-23 PROCEDURE — 94640 AIRWAY INHALATION TREATMENT: CPT

## 2022-11-23 PROCEDURE — 85025 COMPLETE CBC W/AUTO DIFF WBC: CPT

## 2022-11-23 PROCEDURE — 93010 ELECTROCARDIOGRAM REPORT: CPT | Performed by: INTERNAL MEDICINE

## 2022-11-23 PROCEDURE — 93325 DOPPLER ECHO COLOR FLOW MAPG: CPT | Performed by: INTERNAL MEDICINE

## 2022-11-23 PROCEDURE — 2580000003 HC RX 258: Performed by: NURSE ANESTHETIST, CERTIFIED REGISTERED

## 2022-11-23 PROCEDURE — 80048 BASIC METABOLIC PNL TOTAL CA: CPT

## 2022-11-23 PROCEDURE — 93312 ECHO TRANSESOPHAGEAL: CPT | Performed by: INTERNAL MEDICINE

## 2022-11-23 RX ORDER — PROPOFOL 10 MG/ML
INJECTION, EMULSION INTRAVENOUS PRN
Status: DISCONTINUED | OUTPATIENT
Start: 2022-11-23 | End: 2022-11-23 | Stop reason: SDUPTHER

## 2022-11-23 RX ORDER — METOPROLOL SUCCINATE 25 MG/1
75 TABLET, EXTENDED RELEASE ORAL 2 TIMES DAILY
Qty: 30 TABLET | Refills: 3 | Status: SHIPPED | OUTPATIENT
Start: 2022-11-23

## 2022-11-23 RX ORDER — GLIPIZIDE 10 MG/1
10 TABLET ORAL
Qty: 60 TABLET | Refills: 3 | Status: SHIPPED | OUTPATIENT
Start: 2022-11-24 | End: 2022-11-23 | Stop reason: HOSPADM

## 2022-11-23 RX ORDER — METOPROLOL SUCCINATE 25 MG/1
75 TABLET, EXTENDED RELEASE ORAL 2 TIMES DAILY
Qty: 30 TABLET | Refills: 3 | Status: SHIPPED | OUTPATIENT
Start: 2022-11-23 | End: 2022-11-23 | Stop reason: SDUPTHER

## 2022-11-23 RX ORDER — ASPIRIN 81 MG/1
81 TABLET, CHEWABLE ORAL DAILY
Qty: 30 TABLET | Refills: 3 | Status: SHIPPED | OUTPATIENT
Start: 2022-11-24 | End: 2022-11-23 | Stop reason: SDUPTHER

## 2022-11-23 RX ORDER — GLIPIZIDE 10 MG/1
10 TABLET ORAL
Qty: 60 TABLET | Refills: 3 | Status: SHIPPED | OUTPATIENT
Start: 2022-11-24 | End: 2022-11-23 | Stop reason: SDUPTHER

## 2022-11-23 RX ORDER — SODIUM CHLORIDE 9 MG/ML
INJECTION, SOLUTION INTRAVENOUS CONTINUOUS PRN
Status: DISCONTINUED | OUTPATIENT
Start: 2022-11-23 | End: 2022-11-23 | Stop reason: SDUPTHER

## 2022-11-23 RX ORDER — PREDNISONE 50 MG/1
50 TABLET ORAL DAILY
Qty: 5 TABLET | Refills: 0 | Status: SHIPPED | OUTPATIENT
Start: 2022-11-23 | End: 2022-11-28

## 2022-11-23 RX ORDER — ASPIRIN 81 MG/1
81 TABLET, CHEWABLE ORAL DAILY
Qty: 30 TABLET | Refills: 3 | Status: SHIPPED | OUTPATIENT
Start: 2022-11-24

## 2022-11-23 RX ORDER — PREDNISONE 50 MG/1
50 TABLET ORAL DAILY
Qty: 5 TABLET | Refills: 0 | Status: SHIPPED | OUTPATIENT
Start: 2022-11-23 | End: 2022-11-23 | Stop reason: SDUPTHER

## 2022-11-23 RX ADMIN — POTASSIUM CHLORIDE 40 MEQ: 20 TABLET, EXTENDED RELEASE ORAL at 08:49

## 2022-11-23 RX ADMIN — IPRATROPIUM BROMIDE AND ALBUTEROL SULFATE 1 AMPULE: 2.5; .5 SOLUTION RESPIRATORY (INHALATION) at 07:47

## 2022-11-23 RX ADMIN — METOPROLOL SUCCINATE 75 MG: 50 TABLET, EXTENDED RELEASE ORAL at 08:47

## 2022-11-23 RX ADMIN — PROPOFOL 170 MG: 10 INJECTION, EMULSION INTRAVENOUS at 09:40

## 2022-11-23 RX ADMIN — IPRATROPIUM BROMIDE AND ALBUTEROL SULFATE 1 AMPULE: 2.5; .5 SOLUTION RESPIRATORY (INHALATION) at 15:24

## 2022-11-23 RX ADMIN — ASPIRIN 81 MG CHEWABLE TABLET 81 MG: 81 TABLET CHEWABLE at 08:46

## 2022-11-23 RX ADMIN — BUDESONIDE 500 MCG: 0.5 SUSPENSION RESPIRATORY (INHALATION) at 07:47

## 2022-11-23 RX ADMIN — TRAMADOL HYDROCHLORIDE 50 MG: 50 TABLET, COATED ORAL at 00:42

## 2022-11-23 RX ADMIN — IPRATROPIUM BROMIDE AND ALBUTEROL SULFATE 1 AMPULE: 2.5; .5 SOLUTION RESPIRATORY (INHALATION) at 11:35

## 2022-11-23 RX ADMIN — SODIUM CHLORIDE, PRESERVATIVE FREE 10 ML: 5 INJECTION INTRAVENOUS at 13:50

## 2022-11-23 RX ADMIN — HYDROCHLOROTHIAZIDE 25 MG: 12.5 TABLET ORAL at 13:49

## 2022-11-23 RX ADMIN — SODIUM CHLORIDE: 9 INJECTION, SOLUTION INTRAVENOUS at 09:35

## 2022-11-23 RX ADMIN — METFORMIN HYDROCHLORIDE 500 MG: 500 TABLET ORAL at 13:49

## 2022-11-23 RX ADMIN — FERROUS SULFATE TAB 325 MG (65 MG ELEMENTAL FE) 325 MG: 325 (65 FE) TAB at 13:49

## 2022-11-23 RX ADMIN — ALOGLIPTIN 25 MG: 25 TABLET, FILM COATED ORAL at 13:49

## 2022-11-23 RX ADMIN — ATORVASTATIN CALCIUM 40 MG: 40 TABLET, FILM COATED ORAL at 08:46

## 2022-11-23 RX ADMIN — METHYLPREDNISOLONE SODIUM SUCCINATE 40 MG: 40 INJECTION, POWDER, FOR SOLUTION INTRAMUSCULAR; INTRAVENOUS at 13:49

## 2022-11-23 RX ADMIN — SODIUM CHLORIDE, PRESERVATIVE FREE 10 ML: 5 INJECTION INTRAVENOUS at 08:46

## 2022-11-23 RX ADMIN — APIXABAN 5 MG: 5 TABLET, FILM COATED ORAL at 08:46

## 2022-11-23 RX ADMIN — PANTOPRAZOLE SODIUM 40 MG: 40 TABLET, DELAYED RELEASE ORAL at 07:48

## 2022-11-23 RX ADMIN — LISINOPRIL 20 MG: 20 TABLET ORAL at 08:46

## 2022-11-23 RX ADMIN — TRAMADOL HYDROCHLORIDE 50 MG: 50 TABLET, COATED ORAL at 08:48

## 2022-11-23 ASSESSMENT — PAIN DESCRIPTION - FREQUENCY
FREQUENCY: INTERMITTENT
FREQUENCY: CONTINUOUS

## 2022-11-23 ASSESSMENT — PAIN - FUNCTIONAL ASSESSMENT
PAIN_FUNCTIONAL_ASSESSMENT: ACTIVITIES ARE NOT PREVENTED
PAIN_FUNCTIONAL_ASSESSMENT: PREVENTS OR INTERFERES SOME ACTIVE ACTIVITIES AND ADLS

## 2022-11-23 ASSESSMENT — PAIN DESCRIPTION - LOCATION
LOCATION: BACK
LOCATION: BACK

## 2022-11-23 ASSESSMENT — PAIN DESCRIPTION - DESCRIPTORS
DESCRIPTORS: ACHING
DESCRIPTORS: ACHING

## 2022-11-23 ASSESSMENT — PAIN DESCRIPTION - ORIENTATION: ORIENTATION: RIGHT;LOWER

## 2022-11-23 ASSESSMENT — PAIN SCALES - GENERAL
PAINLEVEL_OUTOF10: 7
PAINLEVEL_OUTOF10: 0
PAINLEVEL_OUTOF10: 9

## 2022-11-23 ASSESSMENT — PAIN DESCRIPTION - PAIN TYPE
TYPE: ACUTE PAIN;CHRONIC PAIN
TYPE: CHRONIC PAIN

## 2022-11-23 ASSESSMENT — PAIN DESCRIPTION - ONSET
ONSET: ON-GOING
ONSET: ON-GOING

## 2022-11-23 ASSESSMENT — LIFESTYLE VARIABLES: SMOKING_STATUS: 1

## 2022-11-23 NOTE — PROGRESS NOTES
Hospitalist Progress Note      SYNOPSIS: Patient admitted on 2022 for A-fib [facility])      SUBJECTIVE:  Patient is seen sitting comfortably in the chair, post CAM cardioversion. Temp (24hrs), Av.9 °F (36.6 °C), Min:96.8 °F (36 °C), Max:98.4 °F (36.9 °C)    DIET: ADULT DIET; Regular; Low Fat/Low Chol/High Fiber/2 gm Na  CODE: Full Code    Intake/Output Summary (Last 24 hours) at 2022 1325  Last data filed at 2022 0956  Gross per 24 hour   Intake 350 ml   Output 1300 ml   Net -950 ml       OBJECTIVE:    /76   Pulse 80   Temp 98.1 °F (36.7 °C) (Temporal)   Resp 18   Ht 5' 11\" (1.803 m)   Wt 274 lb 9.6 oz (124.6 kg)   SpO2 97%   BMI 38.30 kg/m²     General appearance: No apparent distress, appears stated age and cooperative. HEENT:  Conjunctivae/corneas clear. Neck: Supple. No jugular venous distention. Respiratory: Clear to auscultation bilaterally, normal respiratory effort  Cardiovascular: Irregular rhythm, normal rate, normal S1-S2  Abdomen: Soft, nontender, nondistended  Musculoskeletal: No clubbing, cyanosis, no bilateral lower extremity edema. Brisk capillary refill. Skin:  No rashes  on visible skin  Neurologic: awake, alert and following commands     ASSESSMENT:  -New onset atrial fibrillation  -COPD exacerbation  -Hypomagnesemia  -Hypertension  -Hyperlipidemia  -Diabetes mellitus type 2    PLAN:  Patient is being followed by cardiology. Patient is status post CAM guided cardioversion today. Patient has converted back to atrial fibrillation. We will follow cardiology recommendations. Continue anticoagulation with Eliquis. Continue aspirin, metoprolol, Lipitor and hydrochlorothiazide. Patient is on IV Solu-Medrol for COPD exacerbation. Continue scheduled DuoNebs. Wean oxygen to baseline. DISPOSITION: Pending improvement in respiratory status and arrhythmia.     Medications:  REVIEWED DAILY    Infusion Medications    sodium chloride      dextrose Scheduled Medications    alogliptin  25 mg Oral Daily    ferrous sulfate  325 mg Oral Daily with breakfast    hydroCHLOROthiazide  25 mg Oral Daily    ibrutinib  420 mg Oral Nightly    lisinopril  20 mg Oral BID    metFORMIN  500 mg Oral Lunch    pantoprazole  40 mg Oral QAM AC    prazosin  1 mg Oral Nightly    atorvastatin  40 mg Oral Daily    sodium chloride flush  10 mL IntraVENous 2 times per day    budesonide  500 mcg Nebulization BID    methylPREDNISolone  40 mg IntraVENous Daily    ipratropium-albuterol  1 ampule Inhalation Q4H WA    metoprolol succinate  75 mg Oral BID    apixaban  5 mg Oral BID    aspirin  81 mg Oral Daily    glipiZIDE  10 mg Oral QAM AC    metFORMIN  500 mg Oral Once     PRN Meds: sodium chloride flush, sodium chloride, promethazine **OR** ondansetron, polyethylene glycol, acetaminophen **OR** acetaminophen, albuterol, hydrALAZINE, potassium chloride **OR** potassium alternative oral replacement **OR** potassium chloride, magnesium sulfate, glucose, dextrose bolus **OR** dextrose bolus, glucagon (rDNA), dextrose, perflutren lipid microspheres, traMADol    Labs:     Recent Labs     11/21/22 1851 11/22/22 0437 11/23/22 0442   WBC 10.0 10.6 8.9   HGB 15.6 16.0 15.1   HCT 46.7 47.4 44.0    220 209       Recent Labs     11/21/22 1851 11/22/22 0437 11/23/22 0442    137 138   K 3.7 3.5 3.5   CL 97* 99 99   CO2 27 27 26   BUN 10 8 14   CREATININE 0.8 0.8 0.8   CALCIUM 9.9 10.1 10.4*       Recent Labs     11/21/22 1851 11/22/22 0437   PROT 6.5 6.5   ALKPHOS 55 52   ALT 15 14   AST 16 11   BILITOT 0.5 0.8       No results for input(s): INR in the last 72 hours. No results for input(s): Reinier Mejía in the last 72 hours.     Chronic labs:    Lab Results   Component Value Date    HDL 46 11/22/2022    LDLCALC 71 11/22/2022    TSH 3.760 11/22/2022       Radiology: REVIEWED DAILY    +++++++++++++++++++++++++++++++++++++++++++++++++  Marcelo MD Farida  [facility] Emergency Department Physician - [address] [facility], [place]  +++++++++++++++++++++++++++++++++++++++++++++++++  NOTE: This report was transcribed using voice recognition software. Every effort was made to ensure accuracy; however, inadvertent computerized transcription errors may be present.

## 2022-11-23 NOTE — PROGRESS NOTES
Inpatient Cardiology Consultation follow-up visit    Reason for Consult: New onset A. fib    Consulting Physician: Dr Kristen Kerns    Requesting Physician:  Mir Méndez DO    Date of Consultation: 11/23/2022    Interim:  Ligia Villa successful CAM guided cardioversion and now maintaining sinus rhythm. He reported being well after CAM guided cardioversion and no symptoms and wished to be discharged and follow-up with outpatient ischemic evaluation with Lexiscan myocardial perfusion stress test.  Please arrange for outpatient Lexiscan myocardial perfusion stress test and follow-up with cardiology in the outpatient. Cardiology will sign off. Please call with questions. Continue rate control and anticoagulation. 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  Authorizing Provider   apixaban (ELIQUIS) 5 MG TABS tablet Take 1 tablet by mouth 2 times daily 11/23/22 1/22/23 Yes Mounika Post MD   aspirin 81 MG chewable tablet Take 1 tablet by mouth daily 11/24/22  Yes Mounika Post MD   glipiZIDE (GLUCOTROL) 10 MG tablet Take 1 tablet by mouth every morning (before breakfast) 11/24/22  Yes Mounika Post MD   metoprolol succinate (TOPROL XL) 25 MG extended release tablet Take 3 tablets by mouth in the morning and at bedtime 11/23/22  Yes Mounika Post MD   predniSONE (DELTASONE) 50 MG tablet Take 1 tablet by mouth daily for 5 days 11/23/22 11/28/22 Yes Amy Jiménez MD   ibrutinib (IMBRUVICA) 140 MG chemo capsule Take 420 mg by mouth nightly 3 -140 mg capsules daily   Yes Historical Provider, MD   ammonium lactate (AMLACTIN) 12 % cream Apply topically in the morning and at bedtime Apply topically to feet bid   Yes Historical Provider, MD   ondansetron (ZOFRAN) 4 MG tablet Take 1 tablet by mouth every 8 hours as needed for Nausea or Vomiting 10/31/22   Mary Bowman MD   alogliptin (NESINA) 25 MG TABS tablet TAKE ONE TABLET BY MOUTH EVERY DAY 6/17/21   Historical Provider, MD   carboxymethylcellulose (REFRESH PLUS) 0.5 % SOLN ophthalmic solution INSTILL ONE DROP IN [facility] EYE FOUR TIMES A DAY FOR DRY AND/OR IRRITATED EYE(S) 11/23/20   Historical Provider, MD   vitamin D (CHOLECALCIFEROL) 25 MCG (1000 UT) TABS tablet TAKE TWO TABLETS BY MOUTH EVERY DAY 6/21/21   Historical Provider, MD   cyanocobalamin 1000 MCG/ML injection 1,000 mcg every 28 days Patient was due Nov. Ist.    Did not take   Last dose Oct 1st 2022 8/19/20   Historical Provider, MD   docusate (COLACE, DULCOLAX) 100 MG CAPS TAKE ONE CAPSULE BY MOUTH TWICE A DAY -- STOOL SOFTENER 6/17/21   Historical Provider, MD   ferrous sulfate (IRON 325) 325 (65 Fe) MG tablet TAKE ONE TABLET BY MOUTH TWICE A DAY (AN HOUR BEFORE OR TWO HOURS AFTER A MEAL; TAKE WITH FOOD IF THIS UPSETS YOUR STOMACH)----  IRON PILL (AN HOUR BEFORE OR TWO HOURS AFTER A MEAL; TAKE WITH FOOD IF THIS UPSETS YOUR STOMACH)----  IRON PILL 1/12/21   Historical Provider, MD   omeprazole (PRILOSEC) 20 MG delayed release capsule TAKE 2 CAPSULES BY MOUTH EVERY MORNING, ON AN EMPTY STOMACH 9/30/20   Historical Provider, MD   potassium chloride (KLOR-CON M) 20 MEQ extended release tablet TAKE ONE TABLET BY MOUTH EVERY DAY WITH FOOD 6/17/21   Historical Provider, MD   traMADol (ULTRAM) 50 MG tablet Take 50 mg by mouth every 6 hours as needed for Pain    Historical Provider, MD   lisinopril (PRINIVIL;ZESTRIL) 20 MG tablet Take 20 mg by mouth 2 times daily    Historical Provider, MD   simvastatin (ZOCOR) 80 MG tablet Take 80 mg by mouth nightly Take 1/2 tablet    Historical Provider, MD   prazosin (MINIPRESS) 1 MG capsule Take 1 mg by mouth nightly Patient takes (3) tablets at nighttime    Historical Provider, MD   hydrochlorothiazide (HYDRODIURIL) 25 MG tablet Take 25 mg by mouth daily.     Historical Provider, MD   METFORMIN HCL PO Take 500 mg by mouth 2 times daily Takes 1,000 mg (2 tablets) at breakfast, , & 1000 mg (2 tablets) at dinnertime    Historical Provider, MD       Current Medications:    Current Facility-Administered Medications: alogliptin (NESINA) tablet 25 mg, 25 mg, Oral, Daily  ferrous sulfate (IRON 325) tablet 325 mg, 325 mg, Oral, Daily with breakfast  hydroCHLOROthiazide (HYDRODIURIL) tablet 25 mg, 25 mg, Oral, Daily  ibrutinib (IMBRUVICA) chemo capsule 420 mg (Patient Supplied), 420 mg, Oral, Nightly  lisinopril (PRINIVIL;ZESTRIL) tablet 20 mg, 20 mg, Oral, BID  metFORMIN (GLUCOPHAGE) tablet 500 mg, 500 mg, Oral, Lunch  pantoprazole (PROTONIX) tablet 40 mg, 40 mg, Oral, QAM AC  prazosin (MINIPRESS) capsule 1 mg, 1 mg, Oral, Nightly  atorvastatin (LIPITOR) tablet 40 mg, 40 mg, Oral, Daily  sodium chloride flush 0.9 % injection 10 mL, 10 mL, IntraVENous, 2 times per day  sodium chloride flush 0.9 % injection 10 mL, 10 mL, IntraVENous, PRN  0.9 % sodium chloride infusion, , IntraVENous, PRN  promethazine (PHENERGAN) tablet 12.5 mg, 12.5 mg, Oral, Q6H PRN **OR** ondansetron (ZOFRAN) injection 4 mg, 4 mg, IntraVENous, Q6H PRN  polyethylene glycol (GLYCOLAX) packet 17 g, 17 g, Oral, Daily PRN  acetaminophen (TYLENOL) tablet 650 mg, 650 mg, Oral, Q6H PRN **OR** acetaminophen (TYLENOL) suppository 650 mg, 650 mg, Rectal, Q6H PRN  budesonide (PULMICORT) nebulizer suspension 500 mcg, 500 mcg, Nebulization, BID  methylPREDNISolone sodium (SOLU-MEDROL) injection 40 mg, 40 mg, IntraVENous, Daily  ipratropium-albuterol (DUONEB) nebulizer solution 1 ampule, 1 ampule, Inhalation, Q4H WA  albuterol (PROVENTIL) nebulizer solution 2.5 mg, 2.5 mg, Nebulization, Q6H PRN  hydrALAZINE (APRESOLINE) injection 10 mg, 10 mg, IntraVENous, Q4H PRN  potassium chloride (KLOR-CON M) extended release tablet 40 mEq, 40 mEq, Oral, PRN **OR** potassium bicarb-citric acid (EFFER-K) effervescent tablet 40 mEq, 40 mEq, Oral, PRN **OR** potassium chloride 10 mEq/100 mL IVPB (Peripheral Line), 10 mEq, IntraVENous, PRN  magnesium sulfate 2000 mg in 50 mL IVPB premix, 2,000 mg, IntraVENous, PRN  glucose chewable tablet 16 g, 4 tablet, Oral, PRN  dextrose bolus 10% 125 mL, 125 mL, IntraVENous, PRN **OR** dextrose bolus 10% 250 mL, 250 mL, IntraVENous, PRN  glucagon (rDNA) injection 1 mg, 1 mg, SubCUTAneous, PRN  dextrose 10 % infusion, , IntraVENous, Continuous PRN  perflutren lipid microspheres (DEFINITY) injection 1.5 mL, 1.5 mL, IntraVENous, ONCE PRN  metoprolol succinate (TOPROL XL) extended release tablet 75 mg, 75 mg, Oral, BID  apixaban (ELIQUIS) tablet 5 mg, 5 mg, Oral, BID  aspirin chewable tablet 81 mg, 81 mg, Oral, Daily  glipiZIDE (GLUCOTROL) tablet 10 mg, 10 mg, Oral, QAM AC  metFORMIN (GLUCOPHAGE) tablet 500 mg, 500 mg, Oral, Once  traMADol (ULTRAM) tablet 50 mg, 50 mg, Oral, Q6H PRN    Allergies:  Patient has no known allergies.     Social History:    Social History     Socioeconomic History    Marital status:      Spouse name: Not on file    Number of children: Not on file    Years of education: Not on file    Highest education level: Not on file   Occupational History    Not on file   Tobacco Use    Smoking status: Every Day     Packs/day: 2.00     Types: Cigars, Cigarettes    Smokeless tobacco: Never    Tobacco comments:     2 cigars a day   Vaping Use    Vaping Use: Not on file   Substance and Sexual Activity    Alcohol use: Yes     Comment: occasionally    Drug use: No    Sexual activity: Not Currently Other Topics Concern    Not on file   Social History Narrative    Not on file     Social Determinants of Health     Financial Resource Strain: Not on file   Food Insecurity: Not on file   Transportation Needs: Not on file   Physical Activity: Not on file   Stress: Not on file   Social Connections: Not on file   Intimate Partner Violence: Not on file   Housing Stability: Not on file       Family History:   Family History   Problem Relation Age of Onset    Heart Attack Sister          REVIEW OF SYSTEMS:     Constitutional: Denies fevers, chills, night sweats, and fatigue  HEENT: Denies headaches, nose bleeds, and blurred vision,oral pain, abscess or lesion. Musculoskeletal: Denies falls, pain to BLE with ambulation and edema to BLE. Neurological: Denies dizziness and lightheadedness, numbness and tingling  Cardiovascular: Denies chest pain, palpitations, and feelings of heart racing. Respiratory: Denies orthopnea or at rest SOB. WALLACE and PND  Gastrointestinal: Denies heartburn, nausea/vomiting, diarrhea and constipation, black/bloody, and tarry stools. Genitourinary: Denies dysuria and hematuria  Hematologic: Denies excessive bruising or bleeding  Lymphatic: Denies lumps and bumps to neck, axilla, breast, and groin  Endocrine: Denies excessive thirst. Denies intolerance to hot and cold  Psychiatric: Denies anxiety and depression. PHYSICAL EXAM:   BP (!) 154/77   Pulse 88   Temp 98 °F (36.7 °C) (Temporal)   Resp 18   Ht 5' 11\" (1.803 m)   Wt 274 lb 9.6 oz (124.6 kg)   SpO2 95%   BMI 38.30 kg/m²   CONST:  Well developed, well nourished obese 74-year-old  male who appears stated age. Awake, alert, cooperative, no apparent distress  HEENT:   Head- Normocephalic, atraumatic   Eyes- Conjunctivae pink, anicteric  Throat- Oral mucosa pink and moist  Neck-  No stridor, trachea midline, no jugular venous distention. No adenopathy   CHEST: Chest symmetrical and non-tender to palpation.  No accessory muscle use or intercostal retractions  RESPIRATORY: Lung sounds - clear throughout fields   CARDIOVASCULAR:     No carotid bruit  Heart Inspection- shows no noted pulsations  Heart Palpation- no heaves or thrills; PMI is non-displaced   Heart Ausculation-irregularly irregular rate and rhythm, no murmur. No s3, s4 or rub   PV: +1 bilateral lateral lower extremity edema. No varicosities. Pedal pulses palpable, no clubbing or cyanosis   ABDOMEN: Soft, non-tender to light palpation. Bowel sounds present. No palpable masses no organomegaly; no abdominal bruit  MS: Good muscle strength and tone. No atrophy or abnormal movements. : Deferred  SKIN: Warm and dry no statis dermatitis or ulcers   NEURO / PSYCH: Oriented to person, place and time. Speech clear and appropriate. Follows all commands. Pleasant affect     DATA:    ECG: Atrial fibrillation, vent rate 84, QRS duration 104, QTc 446. Tele strips: AF with RVR, 102-110  Diagnostic:      Intake/Output Summary (Last 24 hours) at 11/23/2022 1744  Last data filed at 11/23/2022 1538  Gross per 24 hour   Intake 470 ml   Output 1300 ml   Net -830 ml       Labs:   CBC:   Recent Labs     11/22/22 0437 11/23/22 0442   WBC 10.6 8.9   HGB 16.0 15.1   HCT 47.4 44.0    209     BMP:   Recent Labs     11/22/22 0437 11/23/22 0442    138   K 3.5 3.5   CO2 27 26   BUN 8 14   CREATININE 0.8 0.8   LABGLOM >60 >60   CALCIUM 10.1 10.4*     Mag:   Recent Labs     11/21/22 1851 11/22/22 0437   MG 1.2* 1.8       LIVER PROFILE:  Recent Labs     11/21/22 1851 11/22/22  0437   AST 16 11   ALT 15 14   LABALBU 4.0 4.1      Latest Reference Range & Units 11/21/22 18:51 11/21/22 21:43   Pro-BNP 0 - 450 pg/mL 528 (H)    Troponin, High Sensitivity 0 - 11 ng/L 12 (H) 13 (H)   (H): Data is abnormally high     Latest Reference Range & Units 11/21/22 18:51 11/22/22 04:37   TSH 0.270 - 4.200 uIU/mL 0.025 (L) 3.760   (L): Data is abnormally low    CXR:  Impression   No acute process. Also, arrange for Zio patch heart monitor for 14 days to determine rate response and to guide therapy    Severe coronary artery calcification seen on CT chest  Continue on aspirin 81 mg a day, beta-blocker and statin therapy for treatment of atherosclerosis coronary artery disease based on CT chest with severe coronary artery calcification. Please arrange for outpatient [facility] myocardial perfusion stress test as mentioned above  Follow-up with cardiology in the outpatient    Dyspnea on exertion  Resolved after cardioversion    Diabetes  Management per primary service    Hyperlipidemia  Continue statin therapy    Hypertension  Management per primary service    PTSD    Obesity  Weight loss is 100    History of COPD  Management per primary service    History of malignant lymphocytic lymphoma on ibrutinib  Monitor closely on telemetry        Cardiology will sign off. Please call with questions.     Capri Pereira MD  [facility] ([facility]) Cardiology

## 2022-11-23 NOTE — PLAN OF CARE
Problem: Discharge Planning  Goal: Discharge to home or other facility with appropriate resources  11/23/2022 1850 by Maxwell Haas RN  Outcome: Adequate for Discharge  11/23/2022 1850 by Maxwell Haas RN  Outcome: Adequate for Discharge     Problem: Pain  Goal: Verbalizes/displays adequate comfort level or baseline comfort level  11/23/2022 1850 by Maxwell Haas RN  Outcome: Adequate for Discharge  11/23/2022 1850 by Maxwell Haas RN  Outcome: Adequate for Discharge     Problem: Safety - Adult  Goal: Free from fall injury  11/23/2022 1850 by Maxwell Haas RN  Outcome: Adequate for Discharge  11/23/2022 1850 by Maxwell Haas RN  Outcome: Adequate for Discharge     Problem: ABCDS Injury Assessment  Goal: Absence of physical injury  11/23/2022 1850 by Maxwell Haas RN  Outcome: Adequate for Discharge  11/23/2022 1850 by Maxwell Haas RN  Outcome: Adequate for Discharge     Problem: Respiratory - Adult  Goal: Achieves optimal ventilation and oxygenation  11/23/2022 1850 by Maxwell Haas RN  Outcome: Adequate for Discharge  11/23/2022 1850 by Maxwell Haas RN  Outcome: Adequate for Discharge     Problem: Cardiovascular - Adult  Goal: Maintains optimal cardiac output and hemodynamic stability  11/23/2022 1850 by Maxwell Haas RN  Outcome: Adequate for Discharge  11/23/2022 1850 by Maxwell Haas RN  Outcome: Adequate for Discharge  Goal: Absence of cardiac dysrhythmias or at baseline  11/23/2022 1850 by Maxwell Haas RN  Outcome: Adequate for Discharge  11/23/2022 1850 by Maxwell Haas RN  Outcome: Adequate for Discharge

## 2022-11-23 NOTE — PROCEDURES
CARDIOLOGIST: Arlet Javier MD    PROCEDURE: CAM and Cardioversion    DATE OF PROCEDURE: 11/23/2022 10:41 AM    HISTORY: Symptomatic atrial fibrillation    CAM findings:   No evidence of left atrial or left atrial appendage thrombus noted. Bubble study revealed no evidence of PFO or intracardiac shunt. Proceed with cardioversion. Please see CAM report for details.     Arlet Javier MD  [facility]) Cardiology

## 2022-11-23 NOTE — DISCHARGE INSTRUCTIONS
YOU ARE TO GET OUTPATIENT STRESS TEST PER DR AVERY         Atrial Fibrillation: Care Instructions  Your Care Instructions     Atrial fibrillation is an irregular and often fast heartbeat. Treating this condition is important for several reasons. It can cause blood clots, which can travel from your heart to your brain and cause a stroke. If you have a fast heartbeat, you may feel lightheaded, dizzy, and weak. An irregular heartbeat can also increase your risk for heart failure. Atrial fibrillation is often the result of another heart condition, such as high blood pressure or coronary artery disease. Making changes to improve your heart condition will help you stay healthy and active. Follow-up care is a key part of your treatment and safety. Be sure to make and go to all appointments, and call your doctor if you are having problems. It's also a good idea to know your test results and keep a list of the medicines you take. How can you care for yourself at home? Medicines    Take your medicines exactly as prescribed. Call your doctor if you think you are having a problem with your medicine. You will get more details on the specific medicines your doctor prescribes. If your doctor has given you a blood thinner to prevent a stroke, be sure you get instructions about how to take your medicine safely. Blood thinners can cause serious bleeding problems. Do not take any vitamins, over-the-counter drugs, or herbal products without talking to your doctor first.   Lifestyle changes    Do not smoke. Smoking can increase your chance of a stroke and heart attack. If you need help quitting, talk to your doctor about stop-smoking programs and medicines. These can increase your chances of quitting for good. Eat a heart-healthy diet. Stay at a healthy weight. Lose weight if you need to. Limit alcohol to 2 drinks a day for men and 1 drink a day for women. Too much alcohol can cause health problems.      Avoid colds and flu. Get a pneumococcal vaccine shot. If you have had one before, ask your doctor whether you need another dose. Get a flu shot every year. If you must be around people with colds or flu, wash your hands often. Activity    If your doctor recommends it, get more exercise. Walking is a good choice. Bit by bit, increase the amount you walk every day. Try for at least 30 minutes on most days of the week. You also may want to swim, bike, or do other activities. Your doctor may suggest that you join a cardiac rehabilitation program so that you can have help increasing your physical activity safely. Start light exercise if your doctor says it is okay. Even a small amount will help you get stronger, have more energy, and manage stress. Walking is an easy way to get exercise. Start out by walking a little more than you did in the hospital. Gradually increase the amount you walk. When you exercise, watch for signs that your heart is working too hard. You are pushing too hard if you cannot talk while you are exercising. If you become short of breath or dizzy or have chest pain, sit down and rest immediately. Check your pulse regularly. Place two fingers on the artery at the palm side of your wrist, in line with your thumb. If your heartbeat seems uneven or fast, talk to your doctor. When should you call for help? Call 911 anytime you think you may need emergency care. For example, call if:    You have symptoms of a heart attack. These may include:  Chest pain or pressure, or a strange feeling in the chest.  Sweating. Shortness of breath. Nausea or vomiting. Pain, pressure, or a strange feeling in the back, neck, jaw, or upper belly or in one or both shoulders or arms. Lightheadedness or sudden weakness. A fast or irregular heartbeat. After you call 911, the  may tell you to chew 1 adult-strength or 2 to 4 low-dose aspirin. Wait for an ambulance. Do not try to drive yourself.      You have symptoms of a stroke. These may include:  Sudden numbness, tingling, weakness, or loss of movement in your face, arm, or leg, especially on only one side of your body. Sudden vision changes. Sudden trouble speaking. Sudden confusion or trouble understanding simple statements. Sudden problems with walking or balance. A sudden, severe headache that is different from past headaches. You passed out (lost consciousness). Call your doctor now or seek immediate medical care if:    You have new or increased shortness of breath. You feel dizzy or lightheaded, or you feel like you may faint. Your heart rate becomes irregular. You can feel your heart flutter in your chest or skip heartbeats. Tell your doctor if these symptoms are new or worse. Watch closely for changes in your health, and be sure to contact your doctor if you have any problems. Where can you learn more? Go to [email or web address] [email or web address]. org and sign in to your Spyra account. Enter U020 in the [facility] box to learn more about \"Atrial Fibrillation: Care Instructions. \"     If you do not have an account, please click on the \"Sign Up Now\" link. Current as of: January 10, 2022               Content Version: 13.4  © 2006-2022 [employer]. Care instructions adapted under license by [facility] ([facility]). If you have questions about a medical condition or this instruction, always ask your healthcare professional. [place] [address] any warranty or liability for your use of this information. Deciding Between Electrical Cardioversion and Rate Control Medicines for Atrial Fibrillation  How can you decide between electrical cardioversion and rate control medicines for atrial fibrillation? What is atrial fibrillation? Atrial fibrillation (say \"AY-tree-madalyn euu-eawd-HIF-shun\") is a kind of uneven heartbeat. It can make you feel lightheaded and dizzy. You may feel weak.  It also can make you more likely to have a stroke. Electrical cardioversion can return your heart to a normal rhythm. First you'll get medicines to make you sleepy and control pain. Then your doctor will use patches to send an electric current to your heart. This resets the rhythm of your heart. Not everyone with atrial fibrillation needs this treatment. For some people, taking medicines may be better. Most people can live with an uneven heartbeat. It just has to be kept under control so the heart does not beat too fast.  Use this information to help you and your doctor decide which treatment to choose for atrial fibrillation. What are olivo points about this decision? Electrical cardioversion can return your heart to a normal rhythm. But the problem can come back. The longer you have had atrial fibrillation, the more likely it is to come back after this treatment. Cardioversion may not work as well when an uneven heartbeat is caused by another heart disease, such as heart failure. If your symptoms bother you a lot, you may want to try cardioversion. But even if it works, you may still need to take blood thinners to prevent a stroke. If you don't have symptoms, or if they don't bother you much, you can try medicines to slow your heart rate. And you can take blood thinners to prevent a stroke. Cardioversion does have risks, such as stroke. Discuss the risks with your doctor. Make sure you understand them. You may have more than one heart problem. Cardioversion doesn't work as well if you have more than one heart problem. Why might you choose electrical cardioversion? It restores the normal heart rhythm for most people. The idea of having an electric shock does not bother you. Your symptoms bother you a lot. You have had atrial fibrillation just one time. You do not have other heart problems. You may not have to take as many medicines. Or you may not need to take them as long.   Why might you choose rate-control medicines? These medicines keep many people from having symptoms. You prefer to take medicines rather than have an electric shock. Your symptoms don't bother you much. If these medicines don't work, you can still try electrical cardioversion. Your decision  Thinking about the facts and your feelings can help you make a decision that is right for you. Be sure you understand the benefits and risks of your options. And think about what else you need to do before you make the decision. Where can you learn more? Go to https://SPD Control SystemshaileAirSig Technology.BOKU. org and sign in to your Spry account. Enter W528 in the [facility] box to learn more about \"Deciding Between Electrical Cardioversion and Rate Control Medicines for Atrial Fibrillation. \"     If you do not have an account, please click on the \"Sign Up Now\" link. Current as of: January 10, 2022               Content Version: 13.4  © 2006-2022 Healthwise, [employer]. Care instructions adapted under license by [facility] ([facility]). If you have questions about a medical condition or this instruction, always ask your healthcare professional. [address] any warranty or liability for your use of this information.

## 2022-11-23 NOTE — PROGRESS NOTES
Dr Bryanna Patterson said pt can be discharged with john patch and will come back as outpatient for stress test. Notified Dr Rogelio Kruse.

## 2022-11-23 NOTE — ANESTHESIA POSTPROCEDURE EVALUATION
Department of Anesthesiology  Postprocedure Note    Patient: Cindy Lott  MRN: [id number]  YOB: 1945  Date of evaluation: 11/23/2022      Procedure Summary     Date: 11/23/22 Room / Location[place] [facility] [place] [facility] [facility]; [facility] [place] ECHO    Anesthesia Start: 2194 Anesthesia Stop: 9304    Procedure: SEY CAM CARDIOVERSION W/ ANES Diagnosis:     Scheduled Providers: HUDSON Baltazar - CRNA; Michelle Lauren MD Responsible Provider: Michelle Lauren MD    Anesthesia Type: MAC ASA Status: 4          Anesthesia Type: No value filed.     Kera Phase I:      Kera Phase II:        Anesthesia Post Evaluation    Patient location during evaluation: PACU  Patient participation: complete - patient participated  Level of consciousness: awake  Pain score: 0  Airway patency: patent  Nausea & Vomiting: no nausea  Complications: no  Cardiovascular status: hemodynamically stable  Respiratory status: acceptable  Hydration status: stable

## 2022-11-23 NOTE — PROGRESS NOTES
Pt went back into a-fib after returning from cardioversion. Dr Angie Pavon aware. I notified Hillburn Him, CNP with cards.

## 2022-11-23 NOTE — DISCHARGE SUMMARY
Discharge instructions given to pt and wife, who verbalized understanding. Discharged home with wife via car. Zio patch on. Was sinus rhythm on monitor prior to monitor prior to discharge.

## 2022-11-23 NOTE — PROCEDURES
: Tram Tenorio MD     Procedure Date: 11/23/22      PROCEDURE(S): Synchronized direct-current cardioversion. INDICATION(S): Atrial fibrillation. CLINICAL SUMMARY:  Arely Sánchez is a 78 y.o. male with history of a fib who presents for CAM/DCC. The risks and benefits of synchronized direct current cardioversion and moderate sedation were discussed with the patient today to include but not limited skin burn, stroke/CVA, allergic reaction,breathing problems that require a breathing tube, infection, embolus, arrhythmias, MI, external pacing, temporary and or permanent pacemaker use of ACLS, death. It was discussed that there is a <1% risk of significant complication associated with the synchronized direct current cardioversion and moderate sedation. The patient verbalizes the  Understanding of these and other unnamed risks and wishes to proceed. The patient is on anticoagulation with Eliquis and status of anticoagulant agent was confirmed. DESCRIPTION OF PROCEDURE(S):  After taking written informed consent, the patient was brought to the procedure room and the pads were placed appropriately. NPO status was confirmed with the patient. A proper time-out was performed. The patient was sedated by the anesthesia service. CAM was performed which revealed no evidence of left atrial or left atrial appendage clot. Patient then underwent synchronized biphasic cardioversion initially with 200 J which was unsuccessful. A second synchronized biphasic cardioversion with 300 J successfully converted the patient to sinus rhythm. The patient tolerated the procedure very well without any complication. PLAN:   Successful synchronized biphasic cardioversion with 300 J  from A fib to normal sinus rhythm.     The patient tolerated the procedure very well without any complication  Continue anticoagulation and cardiac meds for rate control  Follow up with your cardiologist          Sara Mandel

## 2022-11-23 NOTE — ANESTHESIA PRE PROCEDURE
Department of Anesthesiology  Preprocedure Note       Name:  Charu Gonzalez   Age:  78 y.o.  :  1945                                          MRN:  [id number]         Date:  2022      Surgeon: Liseth Baires    Procedure: CAM/ DCCV    Medications prior to admission:   Prior to Admission medications    Medication Sig Start Date End Date Taking?  Authorizing Provider   ibrutinib (IMBRUVICA) 140 MG chemo capsule Take 420 mg by mouth nightly 3 -140 mg capsules daily   Yes Historical Provider, MD   ammonium lactate (AMLACTIN) 12 % cream Apply topically in the morning and at bedtime Apply topically to feet bid   Yes Historical Provider, MD   metFORMIN (GLUCOPHAGE) 500 MG tablet Take 500 mg by mouth Daily with lunch   Yes Historical Provider, MD   ondansetron (ZOFRAN) 4 MG tablet Take 1 tablet by mouth every 8 hours as needed for Nausea or Vomiting 10/31/22   Tenzin Ramey MD   alogliptin (NESINA) 25 MG TABS tablet TAKE ONE TABLET BY MOUTH EVERY DAY 21   Historical Provider, MD   carboxymethylcellulose (REFRESH PLUS) 0.5 % SOLN ophthalmic solution INSTILL ONE DROP IN [facility] EYE FOUR TIMES A DAY FOR DRY AND/OR IRRITATED EYE(S) 20   Historical Provider, MD   vitamin D (CHOLECALCIFEROL) 25 MCG (1000 UT) TABS tablet TAKE TWO TABLETS BY MOUTH EVERY DAY 21   Historical Provider, MD   cyanocobalamin 1000 MCG/ML injection 1,000 mcg every 28 days Patient was due Nov. Ist.    Did not take   Last dose Oct 1st 2022 8/19/20   Historical Provider, MD   docusate (COLACE, DULCOLAX) 100 MG CAPS TAKE ONE CAPSULE BY MOUTH TWICE A DAY -- STOOL SOFTENER 21   Historical Provider, MD   ferrous sulfate (IRON 325) 325 (65 Fe) MG tablet TAKE ONE TABLET BY MOUTH TWICE A DAY (AN HOUR BEFORE OR TWO HOURS AFTER A MEAL; TAKE WITH FOOD IF THIS UPSETS YOUR STOMACH)----  IRON PILL (AN HOUR BEFORE OR TWO HOURS AFTER A MEAL; TAKE WITH FOOD IF THIS UPSETS YOUR STOMACH)----  IRON PILL 21   Historical Provider, MD   omeprazole (PRILOSEC) 20 MG delayed release capsule TAKE 2 CAPSULES BY MOUTH EVERY MORNING, ON AN EMPTY STOMACH 9/30/20   Historical Provider, MD   potassium chloride (KLOR-CON M) 20 MEQ extended release tablet TAKE ONE TABLET BY MOUTH EVERY DAY WITH FOOD 6/17/21   Historical Provider, MD   traMADol (ULTRAM) 50 MG tablet Take 50 mg by mouth every 6 hours as needed for Pain    Historical Provider, MD   atenolol (TENORMIN) 100 MG tablet Take 100 mg by mouth daily    Historical Provider, MD   lisinopril (PRINIVIL;ZESTRIL) 20 MG tablet Take 20 mg by mouth 2 times daily    Historical Provider, MD   Potassium Chloride ER 20 MEQ TBCR Take 1 tablet by mouth daily    Historical Provider, MD   simvastatin (ZOCOR) 80 MG tablet Take 80 mg by mouth nightly Take 1/2 tablet    Historical Provider, MD   prazosin (MINIPRESS) 1 MG capsule Take 1 mg by mouth nightly Patient takes (3) tablets at nighttime    Historical Provider, MD   aspirin 325 MG tablet Take 325 mg by mouth daily    Historical Provider, MD   hydrochlorothiazide (HYDRODIURIL) 25 MG tablet Take 25 mg by mouth daily.     Historical Provider, MD   METFORMIN HCL PO Take 500 mg by mouth 2 times daily Takes 1,000 mg (2 tablets) at breakfast, , & 1000 mg (2 tablets) at dinnertime    Historical Provider, MD       Current medications:    Current Facility-Administered Medications   Medication Dose Route Frequency Provider Last Rate Last Admin    alogliptin (NESINA) tablet 25 mg  25 mg Oral Daily Cody Square, DO   25 mg at 11/22/22 1009    ferrous sulfate (IRON 325) tablet 325 mg  325 mg Oral Daily with breakfast Cody Square, DO   325 mg at 11/22/22 1008    hydroCHLOROthiazide (HYDRODIURIL) tablet 25 mg  25 mg Oral Daily Cody Square, DO   25 mg at 11/22/22 1008    ibrutinib (IMBRUVICA) chemo capsule 420 mg (Patient Supplied)  420 mg Oral Nightly Cody Square, DO   420 mg at 11/22/22 2102    lisinopril (PRINIVIL;ZESTRIL) tablet 20 mg  20 mg Oral BID Cody Square, DO   20 mg at 11/23/22 0846    metFORMIN (GLUCOPHAGE) tablet 500 mg  500 mg Oral Lunch Diania Shorts, DO   500 mg at 11/22/22 1256    pantoprazole (PROTONIX) tablet 40 mg  40 mg Oral QAM AC Diania Shorts, DO   40 mg at 11/23/22 0748    prazosin (MINIPRESS) capsule 1 mg  1 mg Oral Nightly Diania Shorts, DO   1 mg at 11/22/22 2101    atorvastatin (LIPITOR) tablet 40 mg  40 mg Oral Daily Diania Shorts, DO   40 mg at 11/23/22 0846    sodium chloride flush 0.9 % injection 10 mL  10 mL IntraVENous 2 times per day Diania Shorts, DO   10 mL at 11/23/22 0846    sodium chloride flush 0.9 % injection 10 mL  10 mL IntraVENous PRN Diania Shorts, DO        0.9 % sodium chloride infusion   IntraVENous PRN Diania Shorts, DO        promethazine (PHENERGAN) tablet 12.5 mg  12.5 mg Oral Q6H PRN Asmitania Shorts, DO        Or    ondansetron [facility]) injection 4 mg  4 mg IntraVENous Q6H PRN Diania Shorts, DO        polyethylene glycol (GLYCOLAX) packet 17 g  17 g Oral Daily PRN Diania Shorts, DO        acetaminophen (TYLENOL) tablet 650 mg  650 mg Oral Q6H PRN Diania Shorts, DO        Or    acetaminophen (TYLENOL) suppository 650 mg  650 mg Rectal Q6H PRN Diania Shorts, DO        budesonide (PULMICORT) nebulizer suspension 500 mcg  500 mcg Nebulization BID Diania Shorts, DO   500 mcg at 11/23/22 0747    methylPREDNISolone sodium (SOLU-MEDROL) injection 40 mg  40 mg IntraVENous Daily Diania Shorts, DO   40 mg at 11/22/22 1008    ipratropium-albuterol (DUONEB) nebulizer solution 1 ampule  1 ampule Inhalation Q4H WA Diania Shorts, DO   1 ampule at 11/23/22 0747    albuterol (PROVENTIL) nebulizer solution 2.5 mg  2.5 mg Nebulization Q6H PRN Diania Shorts, DO        hydrALAZINE (APRESOLINE) injection 10 mg  10 mg IntraVENous Q4H PRN Diania Shorts, DO   10 mg at 11/22/22 1257    potassium chloride (KLOR-CON M) extended release tablet 40 mEq  40 mEq Oral PRN Diania Shorts, DO   40 mEq at 11/23/22 0849    Or    potassium bicarb-citric acid (EFFER-K) effervescent tablet 40 mEq  40 mEq Oral PRN Roberta Sniff, DO        Or    potassium chloride 10 mEq/100 mL IVPB (Peripheral Line)  10 mEq IntraVENous PRN Roberta Sniff, DO        magnesium sulfate 2000 mg in 50 mL IVPB premix  2,000 mg IntraVENous PRN Roberta Sniff, DO        glucose chewable tablet 16 g  4 tablet Oral PRN Roberta Sniff, DO        dextrose bolus 10% 125 mL  125 mL IntraVENous PRN Roberta Sniff, DO        Or    dextrose bolus 10% 250 mL  250 mL IntraVENous PRN Roberta Sniff, DO        glucagon (rDNA) injection 1 mg  1 mg SubCUTAneous PRN Roberta Sniff, DO        dextrose 10 % infusion   IntraVENous Continuous PRN Roberta Sniff, DO        perflutren lipid microspheres (DEFINITY) injection 1.5 mL  1.5 mL IntraVENous ONCE PRN Tia Lev, APRN - CNP        metoprolol succinate (TOPROL XL) extended release tablet 75 mg  75 mg Oral BID Tia Lev, APRN - CNP   75 mg at 11/23/22 0847    apixaban (ELIQUIS) tablet 5 mg  5 mg Oral BID Tia Lev, APRN - CNP   5 mg at 11/23/22 9829    aspirin chewable tablet 81 mg  81 mg Oral Daily Tia Lev, APRN - CNP   81 mg at 11/23/22 0846    glipiZIDE (GLUCOTROL) tablet 10 mg  10 mg Oral QAM AC Ricardo Plummer MD        metFORMIN (GLUCOPHAGE) tablet 500 mg  500 mg Oral Once Ricardo Plummer MD        traMADol Oddis Folk) tablet 50 mg  50 mg Oral Q6H PRN Ricardo Plummer MD   50 mg at 11/23/22 0848       Allergies:  No Known Allergies    Problem List:    Patient Active Problem List   Diagnosis Code    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    Abscess of right thumb L02.511    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    A-fib ([address] I48.91    New onset atrial fibrillation ([address] I48.91       Past Medical History:        Diagnosis Date    Abscess of right thumb 4/15/2016    Amputation of right thumb 6/18/2014    Distal phalynx    Anemia     Depression     PTSD    Diabetes mellitus ([address]     Hyperlipidemia     Hypertension     Obesity Past Surgical History:        Procedure Laterality Date    COLONOSCOPY      HERNIA REPAIR      NECK SURGERY      PATELLA SURGERY      SHOULDER SURGERY      TONSILLECTOMY      UPPER GASTROINTESTINAL ENDOSCOPY         Social History:    Social History     Tobacco Use    Smoking status: Every Day     Packs/day: 2.00     Types: Cigars, Cigarettes    Smokeless tobacco: Never    Tobacco comments:     2 cigars a day   Substance Use Topics    Alcohol use: Yes     Comment: occasionally                                Ready to quit: Not Answered  Counseling given: Not Answered  Tobacco comments: 2 cigars a day      Vital Signs (Current):   Vitals:    11/22/22 2046 11/22/22 2317 11/23/22 0355 11/23/22 0716   BP:  113/67 (!) 156/81 (!) 144/71   Pulse: 96 93 81 89   Resp: 18 18 18 18   Temp:  36.7 °C (98.1 °F) 36.7 °C (98.1 °F) 36.7 °C (98 °F)   TempSrc:  Temporal Temporal Temporal   SpO2: 99% 93% 95% 94%   Weight:       Height:                                                  BP Readings from Last 3 Encounters:   11/23/22 (!) 144/71   11/17/22 (!) 167/88   10/13/22 133/70       NPO Status:  2000                                                                               BMI:   Wt Readings from Last 3 Encounters:   11/22/22 274 lb 9.6 oz (124.6 kg)   11/17/22 282 lb (127.9 kg)   10/13/22 279 lb 14.4 oz (127 kg)     Body mass index is 38.3 kg/m².     CBC:   Lab Results   Component Value Date/Time    WBC 8.9 11/23/2022 04:42 AM    RBC 5.09 11/23/2022 04:42 AM    HGB 15.1 11/23/2022 04:42 AM    HCT 44.0 11/23/2022 04:42 AM    MCV 86.4 11/23/2022 04:42 AM    RDW 13.5 11/23/2022 04:42 AM     11/23/2022 04:42 AM       CMP:   Lab Results   Component Value Date/Time     11/23/2022 04:42 AM    K 3.5 11/23/2022 04:42 AM    K 3.5 11/22/2022 04:37 AM    CL 99 11/23/2022 04:42 AM    CO2 26 11/23/2022 04:42 AM    BUN 14 11/23/2022 04:42 AM    CREATININE 0.8 11/23/2022 04:42 AM    GFRAA >60 10/13/2022 08:41 AM LABGLOM >60 11/23/2022 04:42 AM    GLUCOSE 282 11/23/2022 04:42 AM    GLUCOSE 199 12/01/2010 09:40 AM    PROT 6.5 11/22/2022 04:37 AM    CALCIUM 10.4 11/23/2022 04:42 AM    BILITOT 0.8 11/22/2022 04:37 AM    ALKPHOS 52 11/22/2022 04:37 AM    AST 11 11/22/2022 04:37 AM    ALT 14 11/22/2022 04:37 AM       POC Tests: No results for input(s): POCGLU, POCNA, POCK, POCCL, POCBUN, POCHEMO, POCHCT in the last 72 hours. Coags:   Lab Results   Component Value Date/Time    APTT 35.8 11/22/2022 03:27 PM       HCG (If Applicable): No results found for: PREGTESTUR, PREGSERUM, HCG, HCGQUANT     ABGs: No results found for: PHART, PO2ART, FDI9RZT, XYK2IIJ, BEART, S1VFOPWQ     Type & Screen (If Applicable):  No results found for: LABABO, LABRH    Drug/Infectious Status (If Applicable):  No results found for: HIV, HEPCAB    COVID-19 Screening (If Applicable):   Lab Results   Component Value Date/Time    COVID19 NOT DETECTED 11/21/2022 06:57 PM           Anesthesia Evaluation  Patient summary reviewed and Nursing notes reviewed  Airway: Mallampati: III  TM distance: >3 FB   Neck ROM: limited  Comment: Cervical fusion C3,4,5; sideways motion limited    Full beard  Mouth opening: > = 3 FB   Dental:    (+) edentulous      Pulmonary:   (+) COPD:  current smoker          Patient did not smoke on day of surgery. ROS comment: Quit smoking 1 yr ago   Cardiovascular:    (+) hypertension:, dysrhythmias: atrial fibrillation,          Beta Blocker:  Dose within 24 Hrs         Neuro/Psych:   (+) psychiatric history (PTSD):depression/anxiety             GI/Hepatic/Renal:             Endo/Other:    (+) DiabetesType II DM, , blood dyscrasia: anticoagulation therapy:., malignancy/cancer (malignant lymphoma). ROS comment:     On Eliquis Abdominal:             Vascular: negative vascular ROS. Other Findings:           Anesthesia Plan      MAC     ASA 4       Induction: intravenous.       Anesthetic plan and risks discussed with patient.                         Jacquelin Díaz, APRN - CRNA   11/23/2022

## 2022-11-25 ENCOUNTER — CARE COORDINATION (OUTPATIENT)
Dept: CASE MANAGEMENT | Age: 77
End: 2022-11-25

## 2022-11-25 NOTE — CARE COORDINATION
NON [facility] PCP     Call within 2 business days of discharge: Yes    Care Transition Nurse contacted the patient by telephone to perform post hospital discharge assessment. Verified name and  with patient as identifiers. Provided introduction to self, and explanation of the Care Transition Nurse role. Patient: Temi New Patient : 1945   MRN: [id number]  Reason for Admission:  New onset atrial fibrillation   Discharge Date: 22 RARS: Readmission Risk Score: 9.3      Last Discharge  [address]       Date Complaint Diagnosis Description Type Department Provider    22 Shortness of Breath New onset atrial fibrillation ([address] . .. ED to Hosp-Admission (Discharged) (TRANSFER) [facility] Christopher Gomez MD; Jeremy Dubose... Spoke with Meka Adam for [facility] PCP care transition call post hospital discharge. He reports that he is feeling \"very good\". He continues to wear the Zio patch as instructed. He denies any SOB, CP, palpitations, or feeling as though his heart is racing. He is getting around without issue Discussed bleeding and bruising risks with Eliquis, he verbalized understanding. Meka Adam denies any needs, questions, or concerns at this time. Care Transition Nurse reviewed discharge instructions, medical action plan, and red flags with patient who verbalized understanding. The patient was given an opportunity to ask questions and does not have any further questions or concerns at this time. Were discharge instructions available to patient? Yes. Reviewed appropriate site of care based on symptoms and resources available to patient including: PCP  Specialist  Urgent care clinics  [address]   When to call 911. The patient agrees to contact the PCP office for questions related to their healthcare. Medication reconciliation was performed with patient, who verbalizes understanding of administration of home medications.  Medications reviewed, 1111F entered: N/A    Was patient discharged with a pulse oximeter? no    Non-face-to-face services provided:  Scheduled appointment with PCP-Josef stated he scheduled his HFU with his PCP at the [facility] on 12/15/22, which was the soonest available. Scheduled appointment with Lawrance Cogan stated he plans to establish with [facility], he denies needing assistance with scheduling. Obtained and reviewed discharge summary and/or continuity of care documents    Offered patient enrollment in the Remote Patient Monitoring (RPM) program for in-home monitoring: Patient is not eligible for RPM program-Non [facility] PCP    Care Transitions 24 Hour Call    Do you have a copy of your discharge instructions?: Yes  Do you have all of your prescriptions and are they filled?: Yes  Have you been contacted by a [facility]?: No  Have you scheduled your follow up appointment?: Yes  How are you going to get to your appointment?: Car - drive self  Do you feel like you have everything you need to keep you well at home?: Yes  Care Transitions Interventions  No Identified Needs         Follow Up  Future Appointments   Date Time Provider Sandy Headley   12/15/2022  9:00 AM [facility] FAST TRACK 1 [address] [facility]   12/15/2022  9:15 AM Jono Clayton MD MED ONC [facility]   Will sign off as the patient [facility] and has no concerns at this time.      Viola Aquino RN

## 2022-11-25 NOTE — DISCHARGE SUMMARY
Hospital Medicine Discharge Summary    Patient ID: Ruby Mascorroable      Patient's PCP: Nidhi Power MD    Admit Date: 11/21/2022     Discharge Date: 11/23/2022      Admitting Physician: Bienvenido Pruitt DO     Discharge Physician: Cinthia Burt MD     Discharge Diagnoses: Active Hospital Problems    Diagnosis Date Noted    A-fib [facility]) [I48.91] 11/22/2022     Priority: Medium    New onset atrial fibrillation [facility]) [I48.91] 11/22/2022     Priority: Medium       The patient was seen and examined on day of discharge and this discharge summary is in conjunction with any daily progress note from day of discharge. Hospital Course:     Patient presented to the [facility] emergency department with complaints of shortness of breath and wheezing this been happening for the past week. Patient with exertional dyspnea. Can walk about 20 feet then becomes very short of breath. Patient was found to have copd exacerbation and new onset afib. Patient was treated with steroids and nebs and will continue 5 more days of oral prednisone on discharge. Cardiology was consulted and patient had a successful CAM guided cardioversion. Patient has been cleared for discharge    Patient will be discharged home in stable condition  Follow up with pcp within 1-2 weeks. Exam:     BP (!) 154/77   Pulse 88   Temp 98 °F (36.7 °C) (Temporal)   Resp 18   Ht 5' 11\" (1.803 m)   Wt 274 lb 9.6 oz (124.6 kg)   SpO2 95%   BMI 38.30 kg/m²     General appearance: No apparent distress, appears stated age and cooperative. HEENT: Pupils equal, round, and reactive to light. Conjunctivae/corneas clear. Neck: Supple, with full range of motion. No jugular venous distention. Trachea midline. Respiratory:  Normal respiratory effort. Clear to auscultation, bilaterally without Rales/Wheezes/Rhonchi. Cardiovascular: Regular rate and rhythm with normal S1/S2 without murmurs, rubs or gallops.   Abdomen: Soft, non-tender, non-distended with normal bowel sounds. Musculoskeletal: No clubbing, cyanosis or edema bilaterally. Full range of motion without deformity. Skin: Skin color, texture, turgor normal.  No rashes or lesions. Neurologic:  Neurovascularly intact without any focal sensory/motor deficits. Cranial nerves: II-XII intact, grossly non-focal.  Psychiatric: Alert and oriented, thought content appropriate, normal insight      Consults:     IP CONSULT TO CARDIOLOGY        Labs:  For convenience and continuity at follow-up the following most recent labs are provided:      CBC:    Lab Results   Component Value Date/Time    WBC 8.9 11/23/2022 04:42 AM    HGB 15.1 11/23/2022 04:42 AM    HCT 44.0 11/23/2022 04:42 AM     11/23/2022 04:42 AM       Renal:    Lab Results   Component Value Date/Time     11/23/2022 04:42 AM    K 3.5 11/23/2022 04:42 AM    K 3.5 11/22/2022 04:37 AM    CL 99 11/23/2022 04:42 AM    CO2 26 11/23/2022 04:42 AM    BUN 14 11/23/2022 04:42 AM    CREATININE 0.8 11/23/2022 04:42 AM    CALCIUM 10.4 11/23/2022 04:42 AM       Discharge Medications:     Discharge Medication List as of 11/23/2022  6:30 PM             Details   aspirin 81 MG chewable tablet Take 1 tablet by mouth daily, Disp-30 tablet, R-3Normal      apixaban (ELIQUIS) 5 MG TABS tablet Take 1 tablet by mouth 2 times daily, Disp-120 tablet, R-0Normal      metoprolol succinate (TOPROL XL) 25 MG extended release tablet Take 3 tablets by mouth in the morning and at bedtime, Disp-30 tablet, R-3Normal      predniSONE (DELTASONE) 50 MG tablet Take 1 tablet by mouth daily for 5 days, Disp-5 tablet, R-0Normal                Details   ibrutinib (IMBRUVICA) 140 MG chemo capsule Take 420 mg by mouth nightly 3 -140 mg capsules dailyHistorical Med      ammonium lactate (AMLACTIN) 12 % cream Apply topically in the morning and at bedtime Apply topically to feet bid, Topical, 2 times daily, Historical Med      ondansetron (ZOFRAN) 4 MG tablet Take 1 tablet by mouth every 8 hours as needed for Nausea or Vomiting, Disp-30 tablet, R-1Normal      alogliptin (NESINA) 25 MG TABS tablet TAKE ONE TABLET BY MOUTH EVERY DAYHistorical Med      carboxymethylcellulose (REFRESH PLUS) 0.5 % SOLN ophthalmic solution INSTILL ONE DROP IN EACH EYE FOUR TIMES A DAY FOR DRY AND/OR IRRITATED EYE(S)Historical Med      vitamin D (CHOLECALCIFEROL) 25 MCG (1000 UT) TABS tablet TAKE TWO TABLETS BY MOUTH EVERY DAYHistorical Med      cyanocobalamin 1000 MCG/ML injection 1,000 mcg every 28 days Patient was due Nov. Ist.    Did not take   Last dose Oct 1st 2022Historical Med      docusate (COLACE, DULCOLAX) 100 MG CAPS TAKE ONE CAPSULE BY MOUTH TWICE A DAY -- STOOL SOFTENERHistorical Med      ferrous sulfate (IRON 325) 325 (65 Fe) MG tablet TAKE ONE TABLET BY MOUTH TWICE A DAY (AN HOUR BEFORE OR TWO HOURS AFTER A MEAL; TAKE WITH FOOD IF THIS UPSETS YOUR STOMACH)----  IRON PILL (AN HOUR BEFORE OR TWO HOURS AFTER A MEAL; TAKE WITH FOOD IF THIS UPSETS YOUR STOMACH)----  IRON PILLHistorical Me d      omeprazole (PRILOSEC) 20 MG delayed release capsule TAKE 2 CAPSULES BY MOUTH EVERY MORNING, ON AN EMPTY STOMACHHistorical Med      potassium chloride (KLOR-CON M) 20 MEQ extended release tablet TAKE ONE TABLET BY MOUTH EVERY DAY WITH FOODHistorical Med      traMADol (ULTRAM) 50 MG tablet Take 50 mg by mouth every 6 hours as needed for Pain      lisinopril (PRINIVIL;ZESTRIL) 20 MG tablet Take 20 mg by mouth 2 times daily      simvastatin (ZOCOR) 80 MG tablet Take 80 mg by mouth nightly Take 1/2 tabletHistorical Med      prazosin (MINIPRESS) 1 MG capsule Take 1 mg by mouth nightly Patient takes (3) tablets at nighttime      hydrochlorothiazide (HYDRODIURIL) 25 MG tablet Take 25 mg by mouth daily.       METFORMIN HCL PO Take 500 mg by mouth 2 times daily Takes 1,000 mg (2 tablets) at breakfast, , & 1000 mg (2 tablets) at dinnertime             Time Spent on discharge is more than 20 minutes in the examination,

## 2022-11-29 ENCOUNTER — TELEPHONE (OUTPATIENT)
Dept: CARDIOLOGY CLINIC | Age: 77
End: 2022-11-29

## 2022-12-15 ENCOUNTER — OFFICE VISIT (OUTPATIENT)
Dept: ONCOLOGY | Age: 77
End: 2022-12-15
Payer: MEDICARE

## 2022-12-15 ENCOUNTER — HOSPITAL ENCOUNTER (OUTPATIENT)
Dept: INFUSION THERAPY | Age: 77
Discharge: HOME | End: 2022-12-15
Payer: MEDICARE

## 2022-12-15 VITALS
HEIGHT: 71 IN | TEMPERATURE: 96.5 F | OXYGEN SATURATION: 98 % | HEART RATE: 101 BPM | SYSTOLIC BLOOD PRESSURE: 141 MMHG | BODY MASS INDEX: 38.77 KG/M2 | DIASTOLIC BLOOD PRESSURE: 86 MMHG | WEIGHT: 276.9 LBS

## 2022-12-15 DIAGNOSIS — C83.00 MALIGNANT LYMPHOPLASMACYTIC LYMPHOMA (HCC): ICD-10-CM

## 2022-12-15 DIAGNOSIS — C83.00 MALIGNANT LYMPHOPLASMACYTIC LYMPHOMA (HCC): Primary | ICD-10-CM

## 2022-12-15 LAB
ALBUMIN SERPL-MCNC: 3.8 G/DL (ref 3.5–5.2)
ALP BLD-CCNC: 77 U/L (ref 40–129)
ALT SERPL-CCNC: 26 U/L (ref 0–40)
ANION GAP SERPL CALCULATED.3IONS-SCNC: 13 MMOL/L (ref 7–16)
AST SERPL-CCNC: 13 U/L (ref 0–39)
BASOPHILS ABSOLUTE: 0.06 E9/L (ref 0–0.2)
BASOPHILS RELATIVE PERCENT: 0.7 % (ref 0–2)
BILIRUB SERPL-MCNC: 0.6 MG/DL (ref 0–1.2)
BUN BLDV-MCNC: 9 MG/DL (ref 6–23)
CALCIUM SERPL-MCNC: 10.1 MG/DL (ref 8.6–10.2)
CHLORIDE BLD-SCNC: 97 MMOL/L (ref 98–107)
CO2: 24 MMOL/L (ref 22–29)
CREAT SERPL-MCNC: 0.9 MG/DL (ref 0.7–1.2)
EOSINOPHILS ABSOLUTE: 0.13 E9/L (ref 0.05–0.5)
EOSINOPHILS RELATIVE PERCENT: 1.5 % (ref 0–6)
FERRITIN: 258 NG/ML
GFR SERPL CREATININE-BSD FRML MDRD: >60 ML/MIN/1.73
GLUCOSE BLD-MCNC: 194 MG/DL (ref 74–99)
HCT VFR BLD CALC: 45.6 % (ref 37–54)
HEMOGLOBIN: 15.1 G/DL (ref 12.5–16.5)
IMMATURE GRANULOCYTES #: 0.15 E9/L
IMMATURE GRANULOCYTES %: 1.7 % (ref 0–5)
IRON SATURATION: 42 % (ref 20–55)
IRON: 121 MCG/DL (ref 59–158)
LACTATE DEHYDROGENASE: 129 U/L (ref 135–225)
LYMPHOCYTES ABSOLUTE: 2.53 E9/L (ref 1.5–4)
LYMPHOCYTES RELATIVE PERCENT: 29.2 % (ref 20–42)
MCH RBC QN AUTO: 29.3 PG (ref 26–35)
MCHC RBC AUTO-ENTMCNC: 33.1 % (ref 32–34.5)
MCV RBC AUTO: 88.5 FL (ref 80–99.9)
MONOCYTES ABSOLUTE: 0.67 E9/L (ref 0.1–0.95)
MONOCYTES RELATIVE PERCENT: 7.7 % (ref 2–12)
NEUTROPHILS ABSOLUTE: 5.13 E9/L (ref 1.8–7.3)
NEUTROPHILS RELATIVE PERCENT: 59.2 % (ref 43–80)
PDW BLD-RTO: 13.4 FL (ref 11.5–15)
PLATELET # BLD: 250 E9/L (ref 130–450)
PMV BLD AUTO: 10.6 FL (ref 7–12)
POTASSIUM SERPL-SCNC: 4.1 MMOL/L (ref 3.5–5)
RBC # BLD: 5.15 E12/L (ref 3.8–5.8)
SODIUM BLD-SCNC: 134 MMOL/L (ref 132–146)
TOTAL IRON BINDING CAPACITY: 290 MCG/DL (ref 250–450)
WBC # BLD: 8.7 E9/L (ref 4.5–11.5)

## 2022-12-15 PROCEDURE — 82728 ASSAY OF FERRITIN: CPT

## 2022-12-15 PROCEDURE — 83540 ASSAY OF IRON: CPT

## 2022-12-15 PROCEDURE — 83550 IRON BINDING TEST: CPT

## 2022-12-15 PROCEDURE — 85025 COMPLETE CBC W/AUTO DIFF WBC: CPT

## 2022-12-15 PROCEDURE — 82232 ASSAY OF BETA-2 PROTEIN: CPT

## 2022-12-15 PROCEDURE — 80053 COMPREHEN METABOLIC PANEL: CPT

## 2022-12-15 PROCEDURE — 83615 LACTATE (LD) (LDH) ENZYME: CPT

## 2022-12-15 PROCEDURE — 83883 ASSAY NEPHELOMETRY NOT SPEC: CPT

## 2022-12-15 PROCEDURE — 82784 ASSAY IGA/IGD/IGG/IGM EACH: CPT

## 2022-12-15 PROCEDURE — 99212 OFFICE O/P EST SF 10 MIN: CPT

## 2022-12-15 PROCEDURE — 84165 PROTEIN E-PHORESIS SERUM: CPT

## 2022-12-15 PROCEDURE — 86334 IMMUNOFIX E-PHORESIS SERUM: CPT

## 2022-12-15 PROCEDURE — 36415 COLL VENOUS BLD VENIPUNCTURE: CPT

## 2022-12-15 RX ORDER — GLIPIZIDE 10 MG/1
10 TABLET ORAL
COMMUNITY

## 2022-12-15 NOTE — PROGRESS NOTES
Department of [facility] Oncology  Attending Clinic Note    Reason for Visit: Follow-up on a patient with Lymphoplasmacytic Lymphoma      PCP:  Shannon Elmore MD    History of Present Illness:  78year old male initially seen at [facility] hematology for anemia and abnormal TP/albumin ratio. SPEP IgG and IgM kappa paraprotein, M-spike 3.29 g/dL. UIFE: IgM protein. 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   -Extensive involvement by atypitcal CD5-/CD10-B-cell lymphoproliferative disorder, comprising over 70% of marrow cellularity  -Mildly hypercellular marrow for age (30-40%) with trilineage pan hypoplasia  -Normocytic anemia. -IHC staining highlighted extensive CD20+ B cell infiltrate with admixed + plasma cells     Started on Ibrutinib 420 mg/day 12/2020 with good response so far    On 01/06/2021:  SPEP:  Monoclonal protein 1.72 g/dL  Persistent double monoclonal bands in the beta/gamma and gamma globuin regions. Bands previously identified as IgM kappa and IgG kappa (8/12/2020). IgM kappa decreased by 1.22 g/dL and IgG kappa minimally changed since last exam on 10/14/2020. Free kappa light chains: 413.4    (3.3-19. 4)   Free lambda light chains: 2.8      (5.7-26. 3)   K/L ratio:                          147.64 (0.26-1.65)    On 03/25/2021  SPEP:  Monoclonal protein: 1.03 (0-0) g/dL  Persistent double monoclonal bands in the beta/gamma and gamma globuin regions. Bands previously identified as IgM kappa and IgG kappa (8/12/2020). IgM kappa decreased by 0.69 g/dL and IgG kappa unchanged since 01/06/2021. Free kappa light chains: 149.4  (3.3-19. 4)   Free lambda light chains: 2.6    (5.7-26. 3)   K/L ratio:                          57.46 (0.26-1.65)    Patient started on oral iron 08/2020 for LANDON and B12 deficiency but required 2 doses of Feraheme on 01/21/2021 and 2021 and again in 2021. Had recurrent iron deficiency with plan for Feraheme q3 months (next one scheduled on 2021). He does IM b12 injections at home. On 2021  Hb: 15.5 Hct 44.9  MCV 90.5    WBC 6.9  BUN 10  Creat 0.9  Ca 9.3  Albumin 3.6    Fe: 94 FeSat: 33% TIBIC: 288  Ferritin: 81    VitB12 370     Ig  (700-1700)   IgA: <8     ()   IgM: 1370 ()    On 2021:  Hb 14.8  Hct 44.8  MCV 90.6  WBC 7.5    All cell line counts and morphology within normal limits. Patient's history of lymphoplasmacytic lymphoma is noted. The currently submitted blood smear is negative for circulating plasma cells, and negative for increased/atypical lymphocytes  Fe 60 TIBC 250 FeSat 24% Ferritin 73  BUN 8  Creat 0.8  LFTs wnl  Folate/B12 wnl  Beta-2 microglobulin 2.2    Peripheral blood flow cytometry noted small B-cell population with equivocal mild kappa light chain excess (1% of total cells). SPEP: M-spike 0.8 g/dl  SIFE: IgM kappa monoclonal protein is present  IgM 1295 ()    Free kappa light chains:   80.85  (3.3-19. 4)   Free lambda light chains: 3.23    (5.7-26. 3)   K/L ratio:                           25.03 (0.26-1.65)    On 2021. Hb 16.2  Hct 47.7  MCV 88.8  WBC 7.7     BUN 10 Creat 0.80  LFTs wnl  Beta-2 microglobulin 2.0  SPEP: M-spike 1 = 0.8 g/dl              M-spike 2 = 0.1 g/dl  SIFE: IgM kappa monoclonal protein is present, as previously described. IgM 1267 ()    Free kappa light chains:   88.01  (3.3-19. 4)   Free lambda light chains: 3.33    (5.7-26. 3)   K/L ratio:                           26.43 (0.26-1.65)    On 2021:  Hb 16.4  Hct 48.1  MCV 87.5  WBC 6.9     BUN 7 Creat 0.90  LFTs wnl  Beta-2 microglobulin 1.9  SPEP: M-spike 1 = 0.7 g/dl              M-spike 2 = 0.1 g/dl  SIFE: IgM kappa monoclonal protein is present, as previously described.    IgG kappa monoclonal protein is present, as previously described    IgM 1314 ()    Free kappa light chains:   80.24  (3.3-19. 4)   Free lambda light chains: 2.88    (5.7-26. 3)   K/L ratio:                           27.86 (0.26-1.65)    On 10/18/2021:  Hb 15.9   Hct 48.0  MCV 89.1  WBC 9.3     BUN 8 Creat 0.9  LFTs wnl  Beta-2 microglobulin 2.0  SPEP: M-spike 1 = 0.7 g/dl              M-spike 2 = 0.1 g/dl  SIFE: IgM kappa + faint IgG kappa monoclonal protein is present, as previously described. IgM 1308 ()    Free kappa light chains:   80.48  (3.3-19. 4)   Free lambda light chains: 2.87    (5.7-26. 3)   K/L ratio:                           28.04 (0.26-1.65)    Continued Ibrutinib    On 11/17/2021:  Hb 15.5   Hct 46.4  MCV 87.5  WBC 7.4     BUN 13 Creat 1.2  LFTs wnl  Beta-2 microglobulin 2.3  SPEP: M-spike 1 = 0.9 g/dl              M-spike 2 = 0.1 g/dl  SIFE: IgM kappa monoclonal protein is present, as previously described. A Faint IgG kappa monoclonal protein is present, as previously described. IgM 1347 ()    Free kappa light chains:   78.93  (3.3-19. 4)   Free lambda light chains: 3.92    (5.7-26. 3)   K/L ratio:                           20.14 (0.26-1.65)    On 12/15/2021:  Hb 15.3   Hct 46.5  MCV 90.3  WBC 9.1     BUN 10 Creat 0.9  LFTs wnl  Beta-2 microglobulin 2.5  SPEP: M-spike 1 = 0.8 g/dl              M-spike 2 = 0.1 g/dl  SIFE: IgM kappa + IgG kappa monoclonal protein is present, as previously described. IgM 1365 ()    Free kappa light chains:   84.08  (3.3-19. 4)   Free lambda light chains: 4.45    (5.7-26. 3)   K/L ratio:                           18.89 (0.26-1.65)    Continued Ibrutinib    On 01/20/2022:   Hb 14.9   Hct 45.1  MCV 88.1  WBC 6.3     BUN 7 Creat 0.8  LFTs wnl  Beta-2 microglobulin 2.3  SPEP: M-spike 1 = 0.7 g/dl              M-spike 2 = 0.1 g/dl  SIFE: IgM kappa + IgG kappa monoclonal protein is present, as previously described.     IgM 1142 ()    Free kappa light chains:   136.21 (3.3-19. 4)   Free lambda light chains: 5.29    (5.7-26. 3)   K/L ratio:                           25.75 (0.26-1.65)    Continued Ibrutinib    On 02/24/2022:   Hb 15.4   Hct 46.7  MCV 87.8  WBC 7.5     BUN 7 Creat 0.8  LFTs wnl  Beta-2 microglobulin 2.0  SPEP: M-spike = 0.8 g/dl    SIFE: IgM kappa monoclonal protein is present, as previously described  IgM 1279 ()    Free kappa light chains:   69.11  (3.3-19. 4)   Free lambda light chains: 4.50    (5.7-26. 3)   K/L ratio:                           15.36 (0.26-1.65)    On 03/02/2022:  Hb 15.5   Hct 46.9   MCV 90.0  WBC 9.8     BUN 9 Creat 0.7  LFTs wnl  Beta-2 microglobulin 1.6  SPEP: M-spike = 0.7 g/dl    SIFE: IgM kappa monoclonal protein is present, as previously described  IgM 1367 ()    Free kappa light chains:   55.56  (3.3-19. 4)   Free lambda light chains: 6.65    (5.7-26. 3)   K/L ratio:                           8.35 (0.26-1.65)    On 03/31/2022:  Hb 14.5   Hct 43.8   MCV 89.0  WBC 6.2     BUN 8 Creat 0.7  LFTs wnl  Beta-2 microglobulin 2.2  SPEP: M-spike = 0.6 g/dl    SIFE: IgM kappa monoclonal protein is present, as previously described  IgM 1061 ()    Free kappa light chains:   59.06  (3.3-19. 4)   Free lambda light chains: 6.00    (5.7-26. 3)   K/L ratio:                           9.84 (0.26-1.65)    On 04/28/2022:  Hb 15.2   Hct 45.3   MCV 87.5  WBC 5.7     BUN 10 Creat 0.8  LFTs wnl  Beta-2 microglobulin 1.8  SPEP: M-spike = 0.7 g/dl    SIFE: IgM kappa monoclonal protein is present, as previously described  IgM 1176 ()    Free kappa light chains:   65.90  (3.3-19. 4)   Free lambda light chains: 3.72    (5.7-26. 3)   K/L ratio:                           17.72 (0.26-1.65)    On 05/26/2022:  Hb 15.8   Hct 47.0   MCV 88.5  WBC 7.0     BUN 15 Creat 0.8  LFTs wnl  Beta-2 microglobulin 2.4  SPEP: M-spike = 0.8 g/dl    SIFE: IgM kappa monoclonal protein is present, as previously described  IgM 1161 ()    Free kappa light chains:   64.11  (3.3-19. 4)   Free lambda light chains: 4.28    (5.7-26. 3)   K/L ratio:                           14.98 (0.26-1.65)    On 06/23/2022:  Hb 15.5   Hct 47.1   MCV 89.2  WBC 7.9     BUN 10 Creat 0.8  LFTs wnl  Beta-2 microglobulin 2.3  SPEP: M-spike = 0.8 g/dl    SIFE: IgM kappa monoclonal protein is present, as previously described  IgM 1089 ()    Free kappa light chains:   56.20  (3.3-19. 4)   Free lambda light chains: 4.38    (5.7-26. 3)   K/L ratio:                           12.83 (0.26-1.65)    On 07/21/2022:  Hb 14.7   Hct 44.5   MCV 89.9  WBC 7.1     BUN 11 Creat 0.7  LFTs wnl  Beta-2 microglobulin 2.1  SPEP: M-spike = 0.9 g/dl    SIFE: IgM kappa monoclonal protein is present, as previously described  IgM 967 ()    Free kappa light chains:   52.84  (3.3-19. 4)   Free lambda light chains: 8.77    (5.7-26. 3)   K/L ratio:                           6.03 (0.26-1.65)    On 08/18/2022:  Hb 15.3  Hct 45.0  MCV 89.5  WBC 8.1     BUN 10  Creat 0.8  LFTs wnl  Beta-2 microglobulin 2.2  SPEP: M-spike 0.6 g/dL  SIFE: IgM kappa monoclonal protein is present, as previously described. IgM 1002 ()    On 09/15/2022:  Hb 15.2  Hct 45.2   MCV 86.6  WBC 7.0    BUN 10  Creat 0.8  Beta-2 microglobulin 1.9  SPEP: M-spike 0.6 g/dL  SIFE: IgM kappa monoclonal protein is present, as previously described. IgM 968 ()  Free kappa light chains:   59.02  (3.3-19. 4)   Free lambda light chains: 4.43    (5.7-26. 3)   K/L ratio:                           13.32 (0.26-1.65)    On 10/13/2022:  Hb 15.6  Hct 45.9  MCV 86.9    WBC 9.0  BUN 9 Creat 0.8  Beta-2 microglobulin 2.2  SPEP: M-spike 0.7 g/dL  SIFE: IgM kappa monoclonal protein is present, as previously described. IgM 1001 ()  Free kappa light chains:   52.17  (3.3-19. 4)   Free lambda light chains: 4.18    (5.7-26. 3)   K/L ratio:                           12.48 (0.26-1.65)    On 11/17/2022:  Hb 15.3   Hct 45.9  MCV 89.6      WBC 6.7  BUN 9 Creat 0.8  Beta-2 microglobulin 2.2  SPEP: M-spike 0.7 g/dL  SIFE: IgM kappa monoclonal protein is present, as previously described. IgM 944 ()  Free kappa light chains:   57.27  (3.3-19. 4)   Free lambda light chains: 3.78    (5.7-26. 3)   K/L ratio:                           15.15 (0.26-1.65)    Today 12/15/2022; No fever chills. Fair appetite and energy level. Intermittent diarrhea. Tolerating ibrutinib well. Dry skin    Review of Systems;  CONSTITUTIONAL: No fever, chills. Fair appetite and energy level. ENMT: Eyes: No diplopia; Nose: No epistaxis. Mouth: No sore throat. RESPIRATORY: No hemoptysis, shortness of breath, cough. CARDIOVASCULAR: No chest pain, palpitations. GASTROINTESTINAL: intermittent diarrhea  GENITOURINARY: No dysuria, urinary frequency, hematuria. NEURO: No syncope, presyncope, headache. 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 Allergies:  No Known Allergies    Physical Exam:  BP (!) 141/86   Pulse (!) 101   Temp (!) 96.5 °F (35.8 °C)   Ht 5' 11\" (1.803 m)   Wt 276 lb 14.4 oz (125.6 kg)   SpO2 98%   BMI 38.62 kg/m²   GENERAL: Alert, oriented x 3, not in acute distress. HEENT: PERRLA; EOMI. Oropharynx clear. NECK: Supple. Without lymphadenopathy. LUNGS: Good air entry bilaterally. No wheezing, crackles or rhonchi. CARDIOVASCULAR: Regular rate. No murmurs, rubs or gallops. ABDOMEN: Soft. Non-tender, non-distended. EXTREMITIES: Without clubbing or cyanosis or edema. Dry skin   NEUROLOGIC: No focal deficits.    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 UIFE: IgM protein. 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   -Extensive involvement by atypitcal CD5-/CD10-B-cell lymphoproliferative disorder, comprising over 70% of marrow cellularity  -Mildly hypercellular marrow for age (30-40%) with trilineage pan hypoplasia  -Normocytic anemia. -IHC staining highlighted extensive CD20+ B cell infiltrate with admixed + plasma cells     Started on Ibrutinib 420 mg/day 12/2020 with good response so far    On 01/06/2021:  SPEP:  Monoclonal protein 1.72 g/dL  Persistent double monoclonal bands in the beta/gamma and gamma globuin regions. Bands previously identified as IgM kappa and IgG kappa (8/12/2020). IgM kappa decreased by 1.22 g/dL and IgG kappa minimally changed since last exam on 10/14/2020. Free kappa light chains: 413.4    (3.3-19. 4)   Free lambda light chains: 2.8      (5.7-26. 3)   K/L ratio:                          147.64 (0.26-1.65)    On 03/25/2021  SPEP:  Monoclonal protein: 1.03 (0-0) g/dL  Persistent double monoclonal bands in the beta/gamma and gamma globuin regions. Bands previously identified as IgM kappa and IgG kappa (8/12/2020). IgM kappa decreased by 0.69 g/dL and IgG kappa unchanged since 01/06/2021. Free kappa light chains: 149.4  (3.3-19. 4)   Free lambda light chains: 2.6    (5.7-26. 3)   K/L ratio:                          57.46 (0.26-1.65)    Patient started on oral iron 08/2020 for LANDON and B12 deficiency but required 2 doses of Feraheme on 01/21/2021 and 01/28/2021 and again in April 2021. Had recurrent iron deficiency with plan for Feraheme q3 months (next one scheduled on 07/14/2021). He does IM b12 injections at home (last one on 2021). On 2021  Hb: 15.5 Hct 44.9  MCV 90.5    WBC 6.9  BUN 10  Creat 0.9  Ca 9.3  Albumin 3.6    Fe: 94 FeSat: 33% TIBIC: 288  Ferritin: 81    VitB12 370     Ig  (700-1700)   IgA: <8     ()   IgM: 1370 ()    On 2021:  Hb 14.8  Hct 44.8  MCV 90.6  WBC 7.5    All cell line counts and morphology within normal limits. Patient's history of lymphoplasmacytic lymphoma is noted. The currently submitted blood smear is negative for circulating plasma cells, and negative for increased/atypical lymphocytes  Fe 60 TIBC 250 FeSat 24% Ferritin 73  BUN 8  Creat 0.8  LFTs wnl  Folate/B12 wnl  Beta-2 microglobulin 2.2    Peripheral blood flow cytometry noted small B-cell population with equivocal mild kappa light chain excess (1% of total cells). SPEP: M-spike 0.8 g/dl  SIFE: IgM kappa monoclonal protein is present  IgM 1295 ()    Free kappa light chains:   80.85  (3.3-19. 4)   Free lambda light chains: 3.23    (5.7-26. 3)   K/L ratio:                           25.03 (0.26-1.65)    On 2021. Hb 16.2  Hct 47.7  MCV 88.8  WBC 7.7     BUN 10 Creat 0.80  LFTs wnl  Beta-2 microglobulin 2.0  SPEP: M-spike 1 = 0.8 g/dl              M-spike 2 = 0.1 g/dl  SIFE: IgM kappa monoclonal protein is present, as previously described. IgM 1267 ()    Free kappa light chains:   88.01  (3.3-19. 4)   Free lambda light chains: 3.33    (5.7-26. 3)   K/L ratio:                           26.43 (0.26-1.65)    On 2021:  Hb 16.4  Hct 48.1  MCV 87.5  WBC 6.9     BUN 7 Creat 0.90  LFTs wnl  Beta-2 microglobulin 1.9  SPEP: M-spike 1 = 0.7 g/dl              M-spike 2 = 0.1 g/dl  SIFE: IgM kappa monoclonal protein is present, as previously described. IgG kappa monoclonal protein is present, as previously described    IgM 1314 ()    Free kappa light chains:   80.24  (3.3-19. 4)   Free lambda light chains: 2.88 (5.7-26. 3)   K/L ratio:                           27.86 (0.26-1.65)    On 10/18/2021:  Hb 15.9   Hct 48.0  MCV 89.1  WBC 9.3     BUN 8 Creat 0.9  LFTs wnl  Beta-2 microglobulin 2.0  SPEP: M-spike 1 = 0.7 g/dl              M-spike 2 = 0.1 g/dl  SIFE: IgM kappa + faint IgG kappa monoclonal protein is present, as previously described. IgM 1308 ()    Free kappa light chains:   80.48  (3.3-19. 4)   Free lambda light chains: 2.87    (5.7-26. 3)   K/L ratio:                           28.04 (0.26-1.65)    On 11/17/2021:  Hb 15.5   Hct 46.4  MCV 87.5  WBC 7.4     BUN 13 Creat 1.2  LFTs wnl  Beta-2 microglobulin 2.3  SPEP: M-spike 1 = 0.9 g/dl              M-spike 2 = 0.1 g/dl  SIFE: IgM kappa monoclonal protein is present, as previously described. A Faint IgG kappa monoclonal protein is present, as previously described. IgM 1347 ()    Free kappa light chains:   78.93  (3.3-19. 4)   Free lambda light chains: 3.92    (5.7-26. 3)   K/L ratio:                           20.14 (0.26-1.65)    On 12/15/2021:  Hb 15.3   Hct 46.5  MCV 90.3  WBC 9.1     BUN 10 Creat 0.9  LFTs wnl  Beta-2 microglobulin 2.5  SPEP: M-spike 1 = 0.8 g/dl              M-spike 2 = 0.1 g/dl  SIFE: IgM kappa + IgG kappa monoclonal protein is present, as previously described. IgM 1365 ()    Free kappa light chains:   84.08  (3.3-19. 4)   Free lambda light chains: 4.45    (5.7-26. 3)   K/L ratio:                           18.89 (0.26-1.65)    On 01/20/2022:   Hb 14.9   Hct 45.1  MCV 88.1  WBC 6.3     BUN 7 Creat 0.8  LFTs wnl  Beta-2 microglobulin 2.3  SPEP: M-spike 1 = 0.7 g/dl              M-spike 2 = 0.1 g/dl  SIFE: IgM kappa + IgG kappa monoclonal protein is present, as previously described. IgM 1142 ()    Free kappa light chains:   136.21  (3.3-19. 4)   Free lambda light chains: 5.29    (5.7-26. 3)   K/L ratio:                           25.75 (0.26-1.65)    Continued Ibrutinib    On 02/24/2022:    Hb 15.4   Hct 46.7  MCV 87.8  WBC 7.5     BUN 7 Creat 0.8  LFTs wnl  Beta-2 microglobulin 2.0  SPEP: M-spike = 0.8 g/dl    SIFE: IgM kappa monoclonal protein is present, as previously described  IgM 1279 ()    Free kappa light chains:   69.11  (3.3-19. 4)   Free lambda light chains: 4.50    (5.7-26. 3)   K/L ratio:                           15.36 (0.26-1.65)    On 03/02/2022:  Hb 15.5   Hct 46.9   MCV 90.0  WBC 9.8     BUN 9 Creat 0.7  LFTs wnl  Beta-2 microglobulin 1.6  SPEP: M-spike = 0.7 g/dl    SIFE: IgM kappa monoclonal protein is present, as previously described  IgM 1367 ()    Free kappa light chains:   55.56  (3.3-19. 4)   Free lambda light chains: 6.65    (5.7-26. 3)   K/L ratio:                           8.35 (0.26-1.65)    CT chest 03/24/2022 noted chronic changes of the lungs including emphysema with midlung scarring atelectasis greatest in the lingular segment without a definitive dominant nodule or mass. No acute intrathoracic process. Imaging reviewed. On 03/31/2022:  Hb 14.5   Hct 43.8   MCV 89.0  WBC 6.2     BUN 8 Creat 0.7  LFTs wnl  Beta-2 microglobulin 2.2  SPEP: M-spike = 0.6 g/dl    SIFE: IgM kappa monoclonal protein is present, as previously described  IgM 1061 ()    Free kappa light chains:   59.06  (3.3-19. 4)   Free lambda light chains: 6.00    (5.7-26. 3)   K/L ratio:                           9.84 (0.26-1.65)    On 04/28/2022:  Hb 15.2   Hct 45.3   MCV 87.5  WBC 5.7     BUN 10 Creat 0.8  LFTs wnl  Beta-2 microglobulin 1.8  SPEP: M-spike = 0.7 g/dl    SIFE: IgM kappa monoclonal protein is present, as previously described  IgM 1176 ()    Free kappa light chains:   65.90  (3.3-19. 4)   Free lambda light chains: 3.72    (5.7-26. 3)   K/L ratio:                           17.72 (0.26-1.65)    On 05/26/2022:  Hb 15.8   Hct 47.0   MCV 88.5  WBC 7.0     BUN 15 Creat 0.8  LFTs wnl  Beta-2 microglobulin 2.4  SPEP: M-spike = 0.8 g/dl    SIFE: IgM kappa monoclonal protein is present, as previously described  IgM 1161 ()    Free kappa light chains:   64.11  (3.3-19. 4)   Free lambda light chains: 4.28    (5.7-26. 3)   K/L ratio:                           14.98 (0.26-1.65)    On 06/23/2022:  Hb 15.5   Hct 47.1   MCV 89.2  WBC 7.9     BUN 10 Creat 0.8  LFTs wnl  Beta-2 microglobulin 2.3  SPEP: M-spike = 0.8 g/dl    SIFE: IgM kappa monoclonal protein is present, as previously described  IgM 1089 ()    Free kappa light chains:   56.20  (3.3-19. 4)   Free lambda light chains: 4.38    (5.7-26. 3)   K/L ratio:                           12.83 (0.26-1.65)    On 07/21/2022:  Hb 14.7   Hct 44.5   MCV 89.9  WBC 7.1     BUN 11 Creat 0.7  LFTs wnl  Beta-2 microglobulin 2.1  SPEP: M-spike = 0.9 g/dl    SIFE: IgM kappa monoclonal protein is present, as previously described  IgM 967 ()    Free kappa light chains:   52.84  (3.3-19. 4)   Free lambda light chains: 8.77    (5.7-26. 3)   K/L ratio:                           6.03 (0.26-1.65)    On 08/18/2022:  Hb 15.3  Hct 45.0  MCV 89.5  WBC 8.1     BUN 10  Creat 0.8  LFTs wnl  Beta-2 microglobulin 2.2  SPEP: M-spike 0.6 g/dL  SIFE: IgM kappa monoclonal protein is present, as previously described. IgM 1002 ()    On 09/15/2022:  Hb 15.2  Hct 45.2   MCV 86.6  WBC 7.0    BUN 10  Creat 0.8  Beta-2 microglobulin 1.9  SPEP: M-spike 0.6 g/dL  SIFE: IgM kappa monoclonal protein is present, as previously described. IgM 968 ()  Free kappa light chains:   59.02  (3.3-19. 4)   Free lambda light chains: 4.43    (5.7-26. 3)   K/L ratio:                           13.32 (0.26-1.65)    On 10/13/2022:  Hb 15.6  Hct 45.9  MCV 86.9    WBC 9.0  BUN 9 Creat 0.8  Beta-2 microglobulin 2.2  SPEP: M-spike 0.7 g/dL  SIFE: IgM kappa monoclonal protein is present, as previously described. IgM 1001 ()  Free kappa light chains:   52.17  (3.3-19. 4)   Free lambda light chains: 4.18    (5.7-26. 3) K/L ratio:                           12.48 (0.26-1.65)    On 11/17/2022:  Hb 15.3   Hct 45.9  MCV 89.6      WBC 6.7  BUN 9 Creat 0.8  Beta-2 microglobulin 2.2  SPEP: M-spike 0.7 g/dL  SIFE: IgM kappa monoclonal protein is present, as previously described. IgM 944 ()  Free kappa light chains:   57.27  (3.3-19. 4)   Free lambda light chains: 3.78    (5.7-26. 3)   K/L ratio:                           15.15 (0.26-1.65)    On 12/15/2022:  Hb 15.1   Hct 45.6   MCV 88.5      WBC 8.7  Labs reviewed.    Held weekly Rituximab x4  Recommended Imodium as needed for diarrhea  Dry skin; recommended Eucerin and to increase p.o. fluid intake  Continue Ibrutinib    RTC 1 month with prior labs    12/15/2022  Jono Clayton MD  I spent 32 minutes with the patients care and >50% of this time on counseling or coordinating care

## 2022-12-16 LAB
ALBUMIN SERPL-MCNC: 2.9 G/DL (ref 3.5–4.7)
ALPHA-1-GLOBULIN: 0.2 G/DL (ref 0.2–0.4)
ALPHA-2-GLOBULIN: 1 G/DL (ref 0.5–1)
BETA GLOBULIN: 1 G/DL (ref 0.8–1.3)
ELECTROPHORESIS: ABNORMAL
GAMMA GLOBULIN: 1.2 G/DL (ref 0.7–1.6)
IGA: 15 MG/DL (ref 70–400)
IGG: 202 MG/DL (ref 700–1600)
IGM: 1097 MG/DL (ref 40–230)
IMMUNOFIXATION RESULT, SERUM: NORMAL
TOTAL PROTEIN: 6.6 G/DL (ref 6.4–8.3)

## 2022-12-17 LAB
KAPPA FREE LIGHT CHAINS QNT: 48.7 MG/L (ref 3.3–19.4)
KAPPA/LAMBDA FREE LIGHT CHAIN RATIO: 12.68 (ref 0.26–1.65)
LAMBDA FREE LIGHT CHAINS QNT: 3.84 MG/L (ref 5.71–26.3)

## 2022-12-19 LAB — BETA-2 MICROGLOBULIN: 2.5 MG/L (ref 0.6–2.4)

## 2023-01-04 NOTE — PROGRESS NOTES
OUTPATIENT CARDIOLOGY FOLLOW-UP    Name: Arely Sánchez    Age: 78 y.o. Primary Care Physician: Brianda Wang MD    Date of Service: 1/12/2023. Chief Complaint: Persistent atrial fibrillation. Coronary artery calcifications. Interim History:    Mr. Lucy Duarte is a 78year old gentleman who is being seen today in post hospital follow up. He presented to the ED on November 21 with a one week history of exertional dyspnea and cough. He was found to be in AF with controlled rate. He was treated with Duoneb and magnesium (magnesium level was 1.2), and seen in consultation by Dr. Juan Ramon Jose the following day. He underwent successful CAM guided cardioversion and was discharged on Eliquis and with Zio patch (results below). He was seen at  the [address] [place] [address] and EKG there confirmed AF. He denies palpitations, dyspnea, or chest discomfort . He is often fatigued, but doesn't sleep well; he typically sleeps for about two hours, then wakes for a few hours before returning back to sleep. He used to work night turn. He reports compliance with his medications. Notably, pulmonary CTA performed in the ED showed severe coronary artery calcifications. Review of Systems:   Cardiac: No chest pain, palpitations, or lower extremity edema. General: No fever, chills, or significant weight changes. Fatigue as above. Pulmonary: No dyspnea, orthopnea, or PND. He had a home sleep apnea test about one year ago which was negative. HEENT: No visual disturbances, epistaxis, or bleeding gums. GI: No nausea, vomiting, or dark/bloody stools. : No dysuria, hematuria  Endocrine: No temperature intolerance or excessive thirst. His fasting blood sugar is typically 110s to 130s. Musculoskeletal: No falls, myalgias or arthralgias. Skin:  No rash or ulcerations. Neuro: No dizziness or syncope  Psych: Currently with no depression, anxiety    Past Medical History:  Persistent atrial fibrillation  A. Diagnosed in 11/2022  B. [id number] score of 4: on Eliquis   C. 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  Depression  PTSD  Amputation of right thumb  Obesity  Hernia repair, neck surgery, patellar surgery, shoulder surgery, tonsillectomy, colonoscopy/EGD      Past Surgical History:  Past Surgical History:   Procedure Laterality Date    COLONOSCOPY      HERNIA REPAIR      NECK SURGERY      PATELLA SURGERY      SHOULDER SURGERY      TONSILLECTOMY      UPPER GASTROINTESTINAL ENDOSCOPY         Family History:  Family History   Problem Relation Age of Onset    Heart Attack Sister        Social History:  Social History     Socioeconomic History    Marital status:      Spouse name: Not on file    Number of children: Not on file    Years of education: Not on file    Highest education level: Not on file   Occupational History    Not on file   Tobacco Use    Smoking status: Every Day     Packs/day: 2.00     Types: Cigars, Cigarettes    Smokeless tobacco: Never    Tobacco comments:     2 cigars a day   Vaping Use    Vaping Use: Not on file   Substance and Sexual Activity    Alcohol use: Yes     Comment: occasionally    Drug use: No    Sexual activity: Not Currently   Other Topics Concern    Not on file   Social History Narrative    Not on file     Social Determinants of Health     Financial Resource Strain: Not on file   Food Insecurity: Not on file   Transportation Needs: Not on file   Physical Activity: Not on file   Stress: Not on file   Social Connections: Not on file   Intimate Partner Violence: Not on file   Housing Stability: Not on file       Allergies:  No Known Allergies    Current Medications:  Current Outpatient Medications   Medication Sig Dispense Refill    tamsulosin (FLOMAX) 0.4 MG capsule 0.4 mg      ibrutinib (IMBRUVICA) 140 MG chemo capsule Take 3 capsules by mouth nightly 3 -140 mg capsules daily 30 capsule 3    glipiZIDE (GLUCOTROL) 10 MG tablet Take 10 mg by mouth 1 PER DAY BEFORE BREAKFAST      aspirin 81 MG chewable tablet Take 1 tablet by mouth daily 30 tablet 3    apixaban (ELIQUIS) 5 MG TABS tablet Take 1 tablet by mouth 2 times daily 120 tablet 0    metoprolol succinate (TOPROL XL) 25 MG extended release tablet Take 3 tablets by mouth in the morning and at bedtime (Patient taking differently: Take 37.5 mg by mouth in the morning and at bedtime) 30 tablet 3    ammonium lactate (AMLACTIN) 12 % cream Apply topically in the morning and at bedtime Apply topically to feet bid      ondansetron (ZOFRAN) 4 MG tablet Take 1 tablet by mouth every 8 hours as needed for Nausea or Vomiting 30 tablet 1    alogliptin (NESINA) 25 MG TABS tablet TAKE ONE TABLET BY MOUTH EVERY DAY      carboxymethylcellulose (REFRESH PLUS) 0.5 % SOLN ophthalmic solution       vitamin D (CHOLECALCIFEROL) 25 MCG (1000 UT) TABS tablet TAKE TWO TABLETS BY MOUTH EVERY DAY      cyanocobalamin 1000 MCG/ML injection 1,000 mcg every 28 days Patient was due Nov. Ist.    Did not take   Last dose Oct 1st 2022      ferrous sulfate (IRON 325) 325 (65 Fe) MG tablet TAKE ONE TABLET BY MOUTH TWICE A DAY (AN HOUR BEFORE OR TWO HOURS AFTER A MEAL; TAKE WITH FOOD IF THIS UPSETS YOUR STOMACH)----  IRON PILL (AN HOUR BEFORE OR TWO HOURS AFTER A MEAL; TAKE WITH FOOD IF THIS UPSETS YOUR STOMACH)----  IRON PILL      omeprazole (PRILOSEC) 20 MG delayed release capsule TAKE 2 CAPSULES BY MOUTH EVERY MORNING, ON AN EMPTY STOMACH      POTASSIUM CHLORIDE TING ER PO 40 mEq daily      traMADol (ULTRAM) 50 MG tablet Take 50 mg by mouth every 6 hours as needed for Pain      lisinopril (PRINIVIL;ZESTRIL) 20 MG tablet Take 20 mg by mouth 2 times daily      simvastatin (ZOCOR) 80 MG tablet Take 80 mg by mouth nightly Take 1/2 tablet      prazosin (MINIPRESS) 1 MG capsule Take 1 mg by mouth nightly Patient takes (3) tablets at nighttime      hydrochlorothiazide (HYDRODIURIL) 25 MG tablet Take 25 mg by mouth daily. METFORMIN HCL PO Take 500 mg by mouth 2 times daily Takes 1,000 mg (2 tablets) at breakfast, , & 1000 mg (2 tablets) at dinnertime      docusate (COLACE, DULCOLAX) 100 MG CAPS TAKE ONE CAPSULE BY MOUTH TWICE A DAY -- STOOL SOFTENER (Patient not taking: No sig reported)       No current facility-administered medications for this visit. Physical Exam:  /80   Pulse 88   Resp 18   Ht 6' (1.829 m)   Wt 289 lb 9.6 oz (131.4 kg)   BMI 39.28 kg/m²   Wt Readings from Last 3 Encounters:   01/12/23 286 lb (129.7 kg)   01/12/23 289 lb 9.6 oz (131.4 kg)   12/15/22 276 lb 14.4 oz (125.6 kg)     Appearance: Awake, alert and oriented x 3, no apparent acute distress  Skin: Warm and dry   Head: Normocephalic, atraumatic  ENMT: No pharyngeal erythema, MMM, no rhinorrhea  Neck: Supple, no elevated JVP or carotid bruits  Lungs: Clear to auscultation bilaterally. No wheezes, rales, or rhonchi. Cardiac: Regular rate and rhythm, +S1S2, no murmurs apparent  Abdomen: Soft, protuberant, nontender, +bowel sounds  Extremities: Moves all extremities x 4. Trace pretibial edema   Neurologic: No focal motor deficits apparent, normal mood and affect, alert and oriented x 3  Peripheral Pulses: Intact posterior tibial pulses bilaterally        Cardiac Tests:  EKG today showed AF, 88 bpm with low QRS voltage     TTE 11/23/2022 (Dr. Mayra Whitten):   Summary   Ejection fraction is visually estimated at 55%. Severely dilated left atrium. No evidence of thrombus within left atrium. No thrombus in left atrial appendage. Mildly dilated right ventricle with normal right ventricular systolic  function. Moderately enlarged right atrium size. Agitated saline injected for shunt evaluation shows no evidence of shunt. CAM 11/23/2022 (Dr. Mayra Whitten):   Summary   Ejection fraction is visually estimated at 65 to 70%. Moderate asymmetric septal hypertrophy. Left ventricular diastolic filling is elevated .    The left atrium is severely dilated. Mildly dilated right ventricle with normal right ventricular systolic function. Moderately enlarged right atrium size. Mild mitral regurgitation is present. Mild tricuspid regurgitation. RVSP is 44 mmHg. Mild pulmonic regurgitation present. Zio 11/23/2022 to 12/07/2022:                  Assessment:   Persistent arial fibrillation   S/p CAM/DCCV 11/23/2022 >> AF documented on Zio beginning 11/24  Severely dilated left atrium   [id number] score of at least 4: on Eliquis   2. Coronary artery calcifications on CTA of chest. No symptoms of angina   3. Hypertension : well controlled   4. Hyperlipidemia  5. Diabetes mellitus type II  6. BMI 38.79 kg/m2  7. History of COPD  8. History of lymphocytic lymphoma    Treatment Plan:   Zio results reviewed with him; will refer to EP for further AF management  Lexiscan stress to evaluate for ischemia : chest CT results and rationale for stress discussed with him. Continue current cardiac medications  4. Aggressive risk factor modification   5. Tentative follow up with Dr. Colin Torres in three months pending results of above. The patient's current medication list, allergies, problem list and results of all previously ordered testing were reviewed at today's visit.     HUDSON Mitchell-CNS  [facility]) Cardiology

## 2023-01-12 ENCOUNTER — OFFICE VISIT (OUTPATIENT)
Dept: CARDIOLOGY CLINIC | Age: 78
End: 2023-01-12
Payer: OTHER GOVERNMENT

## 2023-01-12 ENCOUNTER — HOSPITAL ENCOUNTER (OUTPATIENT)
Dept: INFUSION THERAPY | Age: 78
Discharge: HOME | End: 2023-01-12
Payer: OTHER GOVERNMENT

## 2023-01-12 ENCOUNTER — OFFICE VISIT (OUTPATIENT)
Dept: ONCOLOGY | Age: 78
End: 2023-01-12
Payer: MEDICARE

## 2023-01-12 VITALS
OXYGEN SATURATION: 96 % | RESPIRATION RATE: 16 BRPM | TEMPERATURE: 97.1 F | BODY MASS INDEX: 38.74 KG/M2 | SYSTOLIC BLOOD PRESSURE: 109 MMHG | DIASTOLIC BLOOD PRESSURE: 65 MMHG | HEIGHT: 72 IN | WEIGHT: 286 LBS | HEART RATE: 87 BPM

## 2023-01-12 VITALS
SYSTOLIC BLOOD PRESSURE: 128 MMHG | HEIGHT: 72 IN | WEIGHT: 289.6 LBS | DIASTOLIC BLOOD PRESSURE: 80 MMHG | HEART RATE: 88 BPM | RESPIRATION RATE: 18 BRPM | BODY MASS INDEX: 39.22 KG/M2

## 2023-01-12 DIAGNOSIS — Z09 HOSPITAL DISCHARGE FOLLOW-UP: ICD-10-CM

## 2023-01-12 DIAGNOSIS — C83.00 MALIGNANT LYMPHOPLASMACYTIC LYMPHOMA (HCC): Primary | ICD-10-CM

## 2023-01-12 DIAGNOSIS — I48.91 ATRIAL FIBRILLATION, UNSPECIFIED TYPE (HCC): Primary | ICD-10-CM

## 2023-01-12 DIAGNOSIS — I25.84 CORONARY ARTERY CALCIFICATION: ICD-10-CM

## 2023-01-12 DIAGNOSIS — I25.10 CORONARY ARTERY CALCIFICATION: ICD-10-CM

## 2023-01-12 DIAGNOSIS — C83.00 MALIGNANT LYMPHOPLASMACYTIC LYMPHOMA (HCC): ICD-10-CM

## 2023-01-12 LAB
ALBUMIN SERPL-MCNC: 3.7 G/DL (ref 3.5–5.2)
ALP BLD-CCNC: 57 U/L (ref 40–129)
ALT SERPL-CCNC: 15 U/L (ref 0–40)
ANION GAP SERPL CALCULATED.3IONS-SCNC: 14 MMOL/L (ref 7–16)
AST SERPL-CCNC: 13 U/L (ref 0–39)
BASOPHILS ABSOLUTE: 0.06 E9/L (ref 0–0.2)
BASOPHILS RELATIVE PERCENT: 0.7 % (ref 0–2)
BILIRUB SERPL-MCNC: 0.5 MG/DL (ref 0–1.2)
BUN BLDV-MCNC: 10 MG/DL (ref 6–23)
CALCIUM SERPL-MCNC: 9 MG/DL (ref 8.6–10.2)
CHLORIDE BLD-SCNC: 103 MMOL/L (ref 98–107)
CO2: 24 MMOL/L (ref 22–29)
CREAT SERPL-MCNC: 0.8 MG/DL (ref 0.7–1.2)
EOSINOPHILS ABSOLUTE: 0.14 E9/L (ref 0.05–0.5)
EOSINOPHILS RELATIVE PERCENT: 1.5 % (ref 0–6)
FERRITIN: 172 NG/ML
GFR SERPL CREATININE-BSD FRML MDRD: >60 ML/MIN/1.73
GLUCOSE BLD-MCNC: 233 MG/DL (ref 74–99)
HCT VFR BLD CALC: 43 % (ref 37–54)
HEMOGLOBIN: 14.4 G/DL (ref 12.5–16.5)
IMMATURE GRANULOCYTES #: 0.15 E9/L
IMMATURE GRANULOCYTES %: 1.7 % (ref 0–5)
IRON SATURATION: 34 % (ref 20–55)
IRON: 94 MCG/DL (ref 59–158)
LACTATE DEHYDROGENASE: 145 U/L (ref 135–225)
LYMPHOCYTES ABSOLUTE: 2.07 E9/L (ref 1.5–4)
LYMPHOCYTES RELATIVE PERCENT: 22.8 % (ref 20–42)
MCH RBC QN AUTO: 28.9 PG (ref 26–35)
MCHC RBC AUTO-ENTMCNC: 33.5 % (ref 32–34.5)
MCV RBC AUTO: 86.2 FL (ref 80–99.9)
MONOCYTES ABSOLUTE: 0.62 E9/L (ref 0.1–0.95)
MONOCYTES RELATIVE PERCENT: 6.8 % (ref 2–12)
NEUTROPHILS ABSOLUTE: 6.02 E9/L (ref 1.8–7.3)
NEUTROPHILS RELATIVE PERCENT: 66.5 % (ref 43–80)
PDW BLD-RTO: 13.9 FL (ref 11.5–15)
PLATELET # BLD: 225 E9/L (ref 130–450)
PMV BLD AUTO: 10.3 FL (ref 7–12)
POTASSIUM SERPL-SCNC: 3.9 MMOL/L (ref 3.5–5)
RBC # BLD: 4.99 E12/L (ref 3.8–5.8)
SODIUM BLD-SCNC: 141 MMOL/L (ref 132–146)
TOTAL IRON BINDING CAPACITY: 273 MCG/DL (ref 250–450)
WBC # BLD: 9.1 E9/L (ref 4.5–11.5)

## 2023-01-12 PROCEDURE — 36415 COLL VENOUS BLD VENIPUNCTURE: CPT

## 2023-01-12 PROCEDURE — 83883 ASSAY NEPHELOMETRY NOT SPEC: CPT

## 2023-01-12 PROCEDURE — 1111F DSCHRG MED/CURRENT MED MERGE: CPT | Performed by: CLINICAL NURSE SPECIALIST

## 2023-01-12 PROCEDURE — 82784 ASSAY IGA/IGD/IGG/IGM EACH: CPT

## 2023-01-12 PROCEDURE — 3017F COLORECTAL CA SCREEN DOC REV: CPT | Performed by: INTERNAL MEDICINE

## 2023-01-12 PROCEDURE — 1123F ACP DISCUSS/DSCN MKR DOCD: CPT | Performed by: INTERNAL MEDICINE

## 2023-01-12 PROCEDURE — 4004F PT TOBACCO SCREEN RCVD TLK: CPT | Performed by: INTERNAL MEDICINE

## 2023-01-12 PROCEDURE — 83540 ASSAY OF IRON: CPT

## 2023-01-12 PROCEDURE — 99214 OFFICE O/P EST MOD 30 MIN: CPT | Performed by: CLINICAL NURSE SPECIALIST

## 2023-01-12 PROCEDURE — 84165 PROTEIN E-PHORESIS SERUM: CPT

## 2023-01-12 PROCEDURE — 99213 OFFICE O/P EST LOW 20 MIN: CPT | Performed by: INTERNAL MEDICINE

## 2023-01-12 PROCEDURE — G8484 FLU IMMUNIZE NO ADMIN: HCPCS | Performed by: CLINICAL NURSE SPECIALIST

## 2023-01-12 PROCEDURE — 3017F COLORECTAL CA SCREEN DOC REV: CPT | Performed by: CLINICAL NURSE SPECIALIST

## 2023-01-12 PROCEDURE — 82728 ASSAY OF FERRITIN: CPT

## 2023-01-12 PROCEDURE — 1123F ACP DISCUSS/DSCN MKR DOCD: CPT | Performed by: CLINICAL NURSE SPECIALIST

## 2023-01-12 PROCEDURE — 83550 IRON BINDING TEST: CPT

## 2023-01-12 PROCEDURE — 82232 ASSAY OF BETA-2 PROTEIN: CPT

## 2023-01-12 PROCEDURE — G8417 CALC BMI ABV UP PARAM F/U: HCPCS | Performed by: INTERNAL MEDICINE

## 2023-01-12 PROCEDURE — 86334 IMMUNOFIX E-PHORESIS SERUM: CPT

## 2023-01-12 PROCEDURE — G8427 DOCREV CUR MEDS BY ELIG CLIN: HCPCS | Performed by: CLINICAL NURSE SPECIALIST

## 2023-01-12 PROCEDURE — 80053 COMPREHEN METABOLIC PANEL: CPT

## 2023-01-12 PROCEDURE — G8484 FLU IMMUNIZE NO ADMIN: HCPCS | Performed by: INTERNAL MEDICINE

## 2023-01-12 PROCEDURE — 93000 ELECTROCARDIOGRAM COMPLETE: CPT | Performed by: INTERNAL MEDICINE

## 2023-01-12 PROCEDURE — G8417 CALC BMI ABV UP PARAM F/U: HCPCS | Performed by: CLINICAL NURSE SPECIALIST

## 2023-01-12 PROCEDURE — 85025 COMPLETE CBC W/AUTO DIFF WBC: CPT

## 2023-01-12 PROCEDURE — 4004F PT TOBACCO SCREEN RCVD TLK: CPT | Performed by: CLINICAL NURSE SPECIALIST

## 2023-01-12 PROCEDURE — G8427 DOCREV CUR MEDS BY ELIG CLIN: HCPCS | Performed by: INTERNAL MEDICINE

## 2023-01-12 PROCEDURE — 83615 LACTATE (LD) (LDH) ENZYME: CPT

## 2023-01-12 RX ORDER — TAMSULOSIN HYDROCHLORIDE 0.4 MG/1
0.4 CAPSULE ORAL
COMMUNITY
Start: 2022-12-15

## 2023-01-12 NOTE — PROGRESS NOTES
Department of [facility] Oncology  Attending Clinic Note    Reason for Visit: Follow-up on a patient with Lymphoplasmacytic Lymphoma      PCP:  Shannon Elmore MD    History of Present Illness:  78year old male initially seen at [facility] hematology for anemia and abnormal TP/albumin ratio. SPEP IgG and IgM kappa paraprotein, M-spike 3.29 g/dL. UIFE: IgM protein. 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   -Extensive involvement by atypitcal CD5-/CD10-B-cell lymphoproliferative disorder, comprising over 70% of marrow cellularity  -Mildly hypercellular marrow for age (30-40%) with trilineage pan hypoplasia  -Normocytic anemia. -IHC staining highlighted extensive CD20+ B cell infiltrate with admixed + plasma cells     Started on Ibrutinib 420 mg/day 12/2020 with good response so far    On 01/06/2021:  SPEP:  Monoclonal protein 1.72 g/dL  Persistent double monoclonal bands in the beta/gamma and gamma globuin regions. Bands previously identified as IgM kappa and IgG kappa (8/12/2020). IgM kappa decreased by 1.22 g/dL and IgG kappa minimally changed since last exam on 10/14/2020. Free kappa light chains: 413.4    (3.3-19. 4)   Free lambda light chains: 2.8      (5.7-26. 3)   K/L ratio:                          147.64 (0.26-1.65)    On 03/25/2021  SPEP:  Monoclonal protein: 1.03 (0-0) g/dL  Persistent double monoclonal bands in the beta/gamma and gamma globuin regions. Bands previously identified as IgM kappa and IgG kappa (8/12/2020). IgM kappa decreased by 0.69 g/dL and IgG kappa unchanged since 01/06/2021. Free kappa light chains: 149.4  (3.3-19. 4)   Free lambda light chains: 2.6    (5.7-26. 3)   K/L ratio:                          57.46 (0.26-1.65)    Patient started on oral iron 08/2020 for LANDON and B12 deficiency but required 2 doses of Feraheme on 01/21/2021 and 2021 and again in 2021. Had recurrent iron deficiency with plan for Feraheme q3 months (next one scheduled on 2021). He does IM b12 injections at home. On 2021  Hb: 15.5 Hct 44.9  MCV 90.5    WBC 6.9  BUN 10  Creat 0.9  Ca 9.3  Albumin 3.6    Fe: 94 FeSat: 33% TIBIC: 288  Ferritin: 81    VitB12 370     Ig  (700-1700)   IgA: <8     ()   IgM: 1370 ()    On 2021:  Hb 14.8  Hct 44.8  MCV 90.6  WBC 7.5    All cell line counts and morphology within normal limits. Patient's history of lymphoplasmacytic lymphoma is noted. The currently submitted blood smear is negative for circulating plasma cells, and negative for increased/atypical lymphocytes  Fe 60 TIBC 250 FeSat 24% Ferritin 73  BUN 8  Creat 0.8  LFTs wnl  Folate/B12 wnl  Beta-2 microglobulin 2.2    Peripheral blood flow cytometry noted small B-cell population with equivocal mild kappa light chain excess (1% of total cells). SPEP: M-spike 0.8 g/dl  SIFE: IgM kappa monoclonal protein is present  IgM 1295 ()    Free kappa light chains:   80.85  (3.3-19. 4)   Free lambda light chains: 3.23    (5.7-26. 3)   K/L ratio:                           25.03 (0.26-1.65)    On 2021. Hb 16.2  Hct 47.7  MCV 88.8  WBC 7.7     BUN 10 Creat 0.80  LFTs wnl  Beta-2 microglobulin 2.0  SPEP: M-spike 1 = 0.8 g/dl              M-spike 2 = 0.1 g/dl  SIFE: IgM kappa monoclonal protein is present, as previously described. IgM 1267 ()    Free kappa light chains:   88.01  (3.3-19. 4)   Free lambda light chains: 3.33    (5.7-26. 3)   K/L ratio:                           26.43 (0.26-1.65)    On 2021:  Hb 16.4  Hct 48.1  MCV 87.5  WBC 6.9     BUN 7 Creat 0.90  LFTs wnl  Beta-2 microglobulin 1.9  SPEP: M-spike 1 = 0.7 g/dl              M-spike 2 = 0.1 g/dl  SIFE: IgM kappa monoclonal protein is present, as previously described.    IgG kappa monoclonal protein is present, as previously described    IgM 1314 ()    Free kappa light chains:   80.24  (3.3-19. 4)   Free lambda light chains: 2.88    (5.7-26. 3)   K/L ratio:                           27.86 (0.26-1.65)    On 10/18/2021:  Hb 15.9   Hct 48.0  MCV 89.1  WBC 9.3     BUN 8 Creat 0.9  LFTs wnl  Beta-2 microglobulin 2.0  SPEP: M-spike 1 = 0.7 g/dl              M-spike 2 = 0.1 g/dl  SIFE: IgM kappa + faint IgG kappa monoclonal protein is present, as previously described. IgM 1308 ()    Free kappa light chains:   80.48  (3.3-19. 4)   Free lambda light chains: 2.87    (5.7-26. 3)   K/L ratio:                           28.04 (0.26-1.65)    Continued Ibrutinib    On 11/17/2021:  Hb 15.5   Hct 46.4  MCV 87.5  WBC 7.4     BUN 13 Creat 1.2  LFTs wnl  Beta-2 microglobulin 2.3  SPEP: M-spike 1 = 0.9 g/dl              M-spike 2 = 0.1 g/dl  SIFE: IgM kappa monoclonal protein is present, as previously described. A Faint IgG kappa monoclonal protein is present, as previously described. IgM 1347 ()    Free kappa light chains:   78.93  (3.3-19. 4)   Free lambda light chains: 3.92    (5.7-26. 3)   K/L ratio:                           20.14 (0.26-1.65)    On 12/15/2021:  Hb 15.3   Hct 46.5  MCV 90.3  WBC 9.1     BUN 10 Creat 0.9  LFTs wnl  Beta-2 microglobulin 2.5  SPEP: M-spike 1 = 0.8 g/dl              M-spike 2 = 0.1 g/dl  SIFE: IgM kappa + IgG kappa monoclonal protein is present, as previously described. IgM 1365 ()    Free kappa light chains:   84.08  (3.3-19. 4)   Free lambda light chains: 4.45    (5.7-26. 3)   K/L ratio:                           18.89 (0.26-1.65)    Continued Ibrutinib    On 01/20/2022:   Hb 14.9   Hct 45.1  MCV 88.1  WBC 6.3     BUN 7 Creat 0.8  LFTs wnl  Beta-2 microglobulin 2.3  SPEP: M-spike 1 = 0.7 g/dl              M-spike 2 = 0.1 g/dl  SIFE: IgM kappa + IgG kappa monoclonal protein is present, as previously described.     IgM 1142 ()    Free kappa light chains:   136.21 (3.3-19. 4)   Free lambda light chains: 5.29    (5.7-26. 3)   K/L ratio:                           25.75 (0.26-1.65)    Continued Ibrutinib    On 02/24/2022:   Hb 15.4   Hct 46.7  MCV 87.8  WBC 7.5     BUN 7 Creat 0.8  LFTs wnl  Beta-2 microglobulin 2.0  SPEP: M-spike = 0.8 g/dl    SIFE: IgM kappa monoclonal protein is present, as previously described  IgM 1279 ()    Free kappa light chains:   69.11  (3.3-19. 4)   Free lambda light chains: 4.50    (5.7-26. 3)   K/L ratio:                           15.36 (0.26-1.65)    On 03/02/2022:  Hb 15.5   Hct 46.9   MCV 90.0  WBC 9.8     BUN 9 Creat 0.7  LFTs wnl  Beta-2 microglobulin 1.6  SPEP: M-spike = 0.7 g/dl    SIFE: IgM kappa monoclonal protein is present, as previously described  IgM 1367 ()    Free kappa light chains:   55.56  (3.3-19. 4)   Free lambda light chains: 6.65    (5.7-26. 3)   K/L ratio:                           8.35 (0.26-1.65)    On 03/31/2022:  Hb 14.5   Hct 43.8   MCV 89.0  WBC 6.2     BUN 8 Creat 0.7  LFTs wnl  Beta-2 microglobulin 2.2  SPEP: M-spike = 0.6 g/dl    SIFE: IgM kappa monoclonal protein is present, as previously described  IgM 1061 ()    Free kappa light chains:   59.06  (3.3-19. 4)   Free lambda light chains: 6.00    (5.7-26. 3)   K/L ratio:                           9.84 (0.26-1.65)    On 04/28/2022:  Hb 15.2   Hct 45.3   MCV 87.5  WBC 5.7     BUN 10 Creat 0.8  LFTs wnl  Beta-2 microglobulin 1.8  SPEP: M-spike = 0.7 g/dl    SIFE: IgM kappa monoclonal protein is present, as previously described  IgM 1176 ()    Free kappa light chains:   65.90  (3.3-19. 4)   Free lambda light chains: 3.72    (5.7-26. 3)   K/L ratio:                           17.72 (0.26-1.65)    On 05/26/2022:  Hb 15.8   Hct 47.0   MCV 88.5  WBC 7.0     BUN 15 Creat 0.8  LFTs wnl  Beta-2 microglobulin 2.4  SPEP: M-spike = 0.8 g/dl    SIFE: IgM kappa monoclonal protein is present, as previously described  IgM 1161 ()    Free kappa light chains:   64.11  (3.3-19. 4)   Free lambda light chains: 4.28    (5.7-26. 3)   K/L ratio:                           14.98 (0.26-1.65)    On 06/23/2022:  Hb 15.5   Hct 47.1   MCV 89.2  WBC 7.9     BUN 10 Creat 0.8  LFTs wnl  Beta-2 microglobulin 2.3  SPEP: M-spike = 0.8 g/dl    SIFE: IgM kappa monoclonal protein is present, as previously described  IgM 1089 ()    Free kappa light chains:   56.20  (3.3-19. 4)   Free lambda light chains: 4.38    (5.7-26. 3)   K/L ratio:                           12.83 (0.26-1.65)    On 07/21/2022:  Hb 14.7   Hct 44.5   MCV 89.9  WBC 7.1     BUN 11 Creat 0.7  LFTs wnl  Beta-2 microglobulin 2.1  SPEP: M-spike = 0.9 g/dl    SIFE: IgM kappa monoclonal protein is present, as previously described  IgM 967 ()    Free kappa light chains:   52.84  (3.3-19. 4)   Free lambda light chains: 8.77    (5.7-26. 3)   K/L ratio:                           6.03 (0.26-1.65)    On 08/18/2022:  Hb 15.3  Hct 45.0  MCV 89.5  WBC 8.1     BUN 10  Creat 0.8  LFTs wnl  Beta-2 microglobulin 2.2  SPEP: M-spike 0.6 g/dL  SIFE: IgM kappa monoclonal protein is present, as previously described. IgM 1002 ()    On 09/15/2022:  Hb 15.2  Hct 45.2   MCV 86.6  WBC 7.0    BUN 10  Creat 0.8  Beta-2 microglobulin 1.9  SPEP: M-spike 0.6 g/dL  SIFE: IgM kappa monoclonal protein is present, as previously described. IgM 968 ()  Free kappa light chains:   59.02  (3.3-19. 4)   Free lambda light chains: 4.43    (5.7-26. 3)   K/L ratio:                           13.32 (0.26-1.65)    On 10/13/2022:  Hb 15.6  Hct 45.9  MCV 86.9    WBC 9.0  BUN 9 Creat 0.8  Beta-2 microglobulin 2.2  SPEP: M-spike 0.7 g/dL  SIFE: IgM kappa monoclonal protein is present, as previously described. IgM 1001 ()  Free kappa light chains:   52.17  (3.3-19. 4)   Free lambda light chains: 4.18    (5.7-26. 3)   K/L ratio:                           12.48 (0.26-1.65)    On 11/17/2022:  Hb 15.3   Hct 45.9  MCV 89.6      WBC 6.7  BUN 9 Creat 0.8  Beta-2 microglobulin 2.2  SPEP: M-spike 0.7 g/dL  SIFE: IgM kappa monoclonal protein is present, as previously described. IgM 944 ()  Free kappa light chains:   57.27  (3.3-19. 4)   Free lambda light chains: 3.78    (5.7-26. 3)   K/L ratio:                           15.15 (0.26-1.65)    On 12/15/2022:  Hb 15.1   Hct 45.6   MCV 88.5      WBC 8.7  BUN 9 Creat 0.9  Beta-2 microglobulin 2.5  SPEP: M-spike 0.8 g/dL  SIFE: IgM kappa monoclonal protein is present, as previously described. IgM 1097 ()  Free kappa light chains:   48.70  (3.3-19. 4)   Free lambda light chains: 3.84    (5.7-26. 3)   K/L ratio:                           12.68 (0.26-1.65)    Today 01/12/2023: No fever chills. Fair appetite and energy level. He is tolerating Ibrutinib well. Dry skin improved. Intermittent diarrhea    Review of Systems;  CONSTITUTIONAL: No fever, chills. Fair appetite and energy level. ENMT: Eyes: No diplopia; Nose: No epistaxis. Mouth: No sore throat. RESPIRATORY: No hemoptysis, shortness of breath, cough. CARDIOVASCULAR: No chest pain, palpitations. GASTROINTESTINAL: intermittent diarrhea  GENITOURINARY: No dysuria, urinary frequency, hematuria. NEURO: No syncope, presyncope, headache. 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 Allergies:  No Known Allergies    Physical Exam:  /65   Pulse 87   Temp 97.1 °F (36.2 °C) (Infrared)   Resp 16   Ht 6' (1.829 m)   Wt 286 lb (129.7 kg)   SpO2 96%   BMI 38.79 kg/m²   GENERAL: Alert, oriented x 3, not in acute distress. HEENT: PERRLA; EOMI. Oropharynx clear. NECK: Supple. Without lymphadenopathy. LUNGS: Good air entry bilaterally. No wheezing, crackles or rhonchi. CARDIOVASCULAR: Regular rate. No murmurs, rubs or gallops. ABDOMEN: Soft. Non-tender, non-distended. EXTREMITIES: Without clubbing or cyanosis or edema. Dry skin improved  NEUROLOGIC: No focal deficits. ECOG PS 1    Lab Results   Component Value Date    WBC 9.1 01/12/2023    HGB 14.4 01/12/2023    HCT 43.0 01/12/2023    MCV 86.2 01/12/2023     01/12/2023     Lab Results   Component Value Date     01/12/2023    K 3.9 01/12/2023     01/12/2023    CO2 24 01/12/2023    BUN 10 01/12/2023    CREATININE 0.8 01/12/2023    GLUCOSE 233 (H) 01/12/2023    CALCIUM 9.0 01/12/2023    PROT 6.6 12/15/2022    LABALBU 3.7 01/12/2023    BILITOT 0.5 01/12/2023    ALKPHOS 57 01/12/2023    AST 13 01/12/2023    ALT 15 01/12/2023    LABGLOM >60 01/12/2023    GFRAA >60 10/13/2022     Impression/Plan:  79 y/o male with 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 UIFE: IgM protein. 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   -Extensive involvement by atypitcal CD5-/CD10-B-cell lymphoproliferative disorder, comprising over 70% of marrow cellularity  -Mildly hypercellular marrow for age (30-40%) with trilineage pan hypoplasia  -Normocytic anemia. -IHC staining highlighted extensive CD20+ B cell infiltrate with admixed + plasma cells     Started on Ibrutinib 420 mg/day 12/2020 with good response so far    On 01/06/2021:  SPEP:  Monoclonal protein 1.72 g/dL  Persistent double monoclonal bands in the beta/gamma and gamma globuin regions. Bands previously identified as IgM kappa and IgG kappa (8/12/2020). IgM kappa decreased by 1.22 g/dL and IgG kappa minimally changed since last exam on 10/14/2020.     Free kappa light chains: 413.4    (3.3-19. 4)   Free lambda light chains: 2.8      (5.7-26. 3)   K/L ratio:                          147.64 (0.26-1.65)    On 2021  SPEP:  Monoclonal protein: 1.03 (0-0) g/dL  Persistent double monoclonal bands in the beta/gamma and gamma globuin regions. Bands previously identified as IgM kappa and IgG kappa (2020). IgM kappa decreased by 0.69 g/dL and IgG kappa unchanged since 2021. Free kappa light chains: 149.4  (3.3-19. 4)   Free lambda light chains: 2.6    (5.7-26. 3)   K/L ratio:                          57.46 (0.26-1.65)    Patient started on oral iron 2020 for LANDON and B12 deficiency but required 2 doses of Feraheme on 2021 and 2021 and again in 2021. Had recurrent iron deficiency with plan for Feraheme q3 months (next one scheduled on 2021). He does IM b12 injections at home (last one on 2021). On 2021  Hb: 15.5 Hct 44.9  MCV 90.5    WBC 6.9  BUN 10  Creat 0.9  Ca 9.3  Albumin 3.6    Fe: 94 FeSat: 33% TIBIC: 288  Ferritin: 81    VitB12 370     Ig  (700-1700)   IgA: <8     ()   IgM: 1370 ()    On 2021:  Hb 14.8  Hct 44.8  MCV 90.6  WBC 7.5    All cell line counts and morphology within normal limits. Patient's history of lymphoplasmacytic lymphoma is noted. The currently submitted blood smear is negative for circulating plasma cells, and negative for increased/atypical lymphocytes  Fe 60 TIBC 250 FeSat 24% Ferritin 73  BUN 8  Creat 0.8  LFTs wnl  Folate/B12 wnl  Beta-2 microglobulin 2.2    Peripheral blood flow cytometry noted small B-cell population with equivocal mild kappa light chain excess (1% of total cells). SPEP: M-spike 0.8 g/dl  SIFE: IgM kappa monoclonal protein is present  IgM 1295 ()    Free kappa light chains:   80.85  (3.3-19. 4)   Free lambda light chains: 3.23    (5.7-26. 3)   K/L ratio:                           25.03 (0.26-1.65)    On 2021.    Hb 16.2 Hct 47.7  MCV 88.8  WBC 7.7     BUN 10 Creat 0.80  LFTs wnl  Beta-2 microglobulin 2.0  SPEP: M-spike 1 = 0.8 g/dl              M-spike 2 = 0.1 g/dl  SIFE: IgM kappa monoclonal protein is present, as previously described. IgM 1267 ()    Free kappa light chains:   88.01  (3.3-19. 4)   Free lambda light chains: 3.33    (5.7-26. 3)   K/L ratio:                           26.43 (0.26-1.65)    On 09/20/2021:  Hb 16.4  Hct 48.1  MCV 87.5  WBC 6.9     BUN 7 Creat 0.90  LFTs wnl  Beta-2 microglobulin 1.9  SPEP: M-spike 1 = 0.7 g/dl              M-spike 2 = 0.1 g/dl  SIFE: IgM kappa monoclonal protein is present, as previously described. IgG kappa monoclonal protein is present, as previously described    IgM 1314 ()    Free kappa light chains:   80.24  (3.3-19. 4)   Free lambda light chains: 2.88    (5.7-26. 3)   K/L ratio:                           27.86 (0.26-1.65)    On 10/18/2021:  Hb 15.9   Hct 48.0  MCV 89.1  WBC 9.3     BUN 8 Creat 0.9  LFTs wnl  Beta-2 microglobulin 2.0  SPEP: M-spike 1 = 0.7 g/dl              M-spike 2 = 0.1 g/dl  SIFE: IgM kappa + faint IgG kappa monoclonal protein is present, as previously described. IgM 1308 ()    Free kappa light chains:   80.48  (3.3-19. 4)   Free lambda light chains: 2.87    (5.7-26. 3)   K/L ratio:                           28.04 (0.26-1.65)    On 11/17/2021:  Hb 15.5   Hct 46.4  MCV 87.5  WBC 7.4     BUN 13 Creat 1.2  LFTs wnl  Beta-2 microglobulin 2.3  SPEP: M-spike 1 = 0.9 g/dl              M-spike 2 = 0.1 g/dl  SIFE: IgM kappa monoclonal protein is present, as previously described. A Faint IgG kappa monoclonal protein is present, as previously described. IgM 1347 ()    Free kappa light chains:   78.93  (3.3-19. 4)   Free lambda light chains: 3.92    (5.7-26. 3)   K/L ratio:                           20.14 (0.26-1.65)    On 12/15/2021:  Hb 15.3   Hct 46.5  MCV 90.3  WBC 9.1     BUN 10 Creat 0.9  LFTs wnl  Beta-2 microglobulin 2.5  SPEP: M-spike 1 = 0.8 g/dl              M-spike 2 = 0.1 g/dl  SIFE: IgM kappa + IgG kappa monoclonal protein is present, as previously described. IgM 1365 ()    Free kappa light chains:   84.08  (3.3-19. 4)   Free lambda light chains: 4.45    (5.7-26. 3)   K/L ratio:                           18.89 (0.26-1.65)    On 01/20/2022:   Hb 14.9   Hct 45.1  MCV 88.1  WBC 6.3     BUN 7 Creat 0.8  LFTs wnl  Beta-2 microglobulin 2.3  SPEP: M-spike 1 = 0.7 g/dl              M-spike 2 = 0.1 g/dl  SIFE: IgM kappa + IgG kappa monoclonal protein is present, as previously described. IgM 1142 ()    Free kappa light chains:   136.21  (3.3-19. 4)   Free lambda light chains: 5.29    (5.7-26. 3)   K/L ratio:                           25.75 (0.26-1.65)    Continued Ibrutinib    On 02/24/2022:   Hb 15.4   Hct 46.7  MCV 87.8  WBC 7.5     BUN 7 Creat 0.8  LFTs wnl  Beta-2 microglobulin 2.0  SPEP: M-spike = 0.8 g/dl    SIFE: IgM kappa monoclonal protein is present, as previously described  IgM 1279 ()    Free kappa light chains:   69.11  (3.3-19. 4)   Free lambda light chains: 4.50    (5.7-26. 3)   K/L ratio:                           15.36 (0.26-1.65)    On 03/02/2022:  Hb 15.5   Hct 46.9   MCV 90.0  WBC 9.8     BUN 9 Creat 0.7  LFTs wnl  Beta-2 microglobulin 1.6  SPEP: M-spike = 0.7 g/dl    SIFE: IgM kappa monoclonal protein is present, as previously described  IgM 1367 ()    Free kappa light chains:   55.56  (3.3-19. 4)   Free lambda light chains: 6.65    (5.7-26. 3)   K/L ratio:                           8.35 (0.26-1.65)    CT chest 03/24/2022 noted chronic changes of the lungs including emphysema with midlung scarring atelectasis greatest in the lingular segment without a definitive dominant nodule or mass. No acute intrathoracic process. Imaging reviewed.     On 03/31/2022:  Hb 14.5   Hct 43.8   MCV 89.0  WBC 6.2     BUN 8 Creat 0.7  LFTs wnl  Beta-2 microglobulin 2.2  SPEP: M-spike = 0.6 g/dl    SIFE: IgM kappa monoclonal protein is present, as previously described  IgM 1061 ()    Free kappa light chains:   59.06  (3.3-19. 4)   Free lambda light chains: 6.00    (5.7-26. 3)   K/L ratio:                           9.84 (0.26-1.65)    On 04/28/2022:  Hb 15.2   Hct 45.3   MCV 87.5  WBC 5.7     BUN 10 Creat 0.8  LFTs wnl  Beta-2 microglobulin 1.8  SPEP: M-spike = 0.7 g/dl    SIFE: IgM kappa monoclonal protein is present, as previously described  IgM 1176 ()    Free kappa light chains:   65.90  (3.3-19. 4)   Free lambda light chains: 3.72    (5.7-26. 3)   K/L ratio:                           17.72 (0.26-1.65)    On 05/26/2022:  Hb 15.8   Hct 47.0   MCV 88.5  WBC 7.0     BUN 15 Creat 0.8  LFTs wnl  Beta-2 microglobulin 2.4  SPEP: M-spike = 0.8 g/dl    SIFE: IgM kappa monoclonal protein is present, as previously described  IgM 1161 ()    Free kappa light chains:   64.11  (3.3-19. 4)   Free lambda light chains: 4.28    (5.7-26. 3)   K/L ratio:                           14.98 (0.26-1.65)    On 06/23/2022:  Hb 15.5   Hct 47.1   MCV 89.2  WBC 7.9     BUN 10 Creat 0.8  LFTs wnl  Beta-2 microglobulin 2.3  SPEP: M-spike = 0.8 g/dl    SIFE: IgM kappa monoclonal protein is present, as previously described  IgM 1089 ()    Free kappa light chains:   56.20  (3.3-19. 4)   Free lambda light chains: 4.38    (5.7-26. 3)   K/L ratio:                           12.83 (0.26-1.65)    On 07/21/2022:  Hb 14.7   Hct 44.5   MCV 89.9  WBC 7.1     BUN 11 Creat 0.7  LFTs wnl  Beta-2 microglobulin 2.1  SPEP: M-spike = 0.9 g/dl    SIFE: IgM kappa monoclonal protein is present, as previously described  IgM 967 ()    Free kappa light chains:   52.84  (3.3-19. 4)   Free lambda light chains: 8.77    (5.7-26. 3)   K/L ratio:                           6.03 (0.26-1.65)    On 08/18/2022:  Hb 15.3  Hct 45.0  MCV 89.5  WBC 8.1     BUN 10  Creat 0.8  LFTs wnl  Beta-2 microglobulin 2.2  SPEP: M-spike 0.6 g/dL  SIFE: IgM kappa monoclonal protein is present, as previously described. IgM 1002 ()    On 09/15/2022:  Hb 15.2  Hct 45.2   MCV 86.6  WBC 7.0    BUN 10  Creat 0.8  Beta-2 microglobulin 1.9  SPEP: M-spike 0.6 g/dL  SIFE: IgM kappa monoclonal protein is present, as previously described. IgM 968 ()  Free kappa light chains:   59.02  (3.3-19. 4)   Free lambda light chains: 4.43    (5.7-26. 3)   K/L ratio:                           13.32 (0.26-1.65)    On 10/13/2022:  Hb 15.6  Hct 45.9  MCV 86.9    WBC 9.0  BUN 9 Creat 0.8  Beta-2 microglobulin 2.2  SPEP: M-spike 0.7 g/dL  SIFE: IgM kappa monoclonal protein is present, as previously described. IgM 1001 ()  Free kappa light chains:   52.17  (3.3-19. 4)   Free lambda light chains: 4.18    (5.7-26. 3)   K/L ratio:                           12.48 (0.26-1.65)    On 11/17/2022:  Hb 15.3   Hct 45.9  MCV 89.6      WBC 6.7  BUN 9 Creat 0.8  Beta-2 microglobulin 2.2  SPEP: M-spike 0.7 g/dL  SIFE: IgM kappa monoclonal protein is present, as previously described. IgM 944 ()  Free kappa light chains:   57.27  (3.3-19. 4)   Free lambda light chains: 3.78    (5.7-26. 3)   K/L ratio:                           15.15 (0.26-1.65)    On 12/15/2022:  Hb 15.1   Hct 45.6   MCV 88.5      WBC 8.7  BUN 9 Creat 0.9  Beta-2 microglobulin 2.5  SPEP: M-spike 0.8 g/dL  SIFE: IgM kappa monoclonal protein is present, as previously described. IgM 1097 ()  Free kappa light chains:   48.70  (3.3-19. 4)   Free lambda light chains: 3.84    (5.7-26. 3)   K/L ratio:                           12.68 (0.26-1.65)    On 01/12/2023:  Hb 14.4   Hct 43.0   MCV 86.2     WBC 9.1  BUN 10  Creat 0.8  Rest of markers pending  Held weekly Rituximab x 4  Recommended Imodium as needed for diarrhea  Continue Eucerin for dry skin. Increase p.o. fluid intake.    Continue Ibrutinib    RTC 1 month with prior labs    01/12/2023  Madeline Guy MD

## 2023-01-13 LAB
ALBUMIN SERPL-MCNC: 2.9 G/DL (ref 3.5–4.7)
ALPHA-1-GLOBULIN: 0.2 G/DL (ref 0.2–0.4)
ALPHA-2-GLOBULIN: 0.8 G/DL (ref 0.5–1)
BETA GLOBULIN: 0.9 G/DL (ref 0.8–1.3)
ELECTROPHORESIS: ABNORMAL
GAMMA GLOBULIN: 1 G/DL (ref 0.7–1.6)
IGA: 17 MG/DL (ref 70–400)
IGG: 197 MG/DL (ref 700–1600)
IGM: 897 MG/DL (ref 40–230)
IMMUNOFIXATION RESULT, SERUM: NORMAL
TOTAL PROTEIN: 5.9 G/DL (ref 6.4–8.3)

## 2023-01-15 LAB
KAPPA FREE LIGHT CHAINS QNT: 49.51 MG/L (ref 3.3–19.4)
KAPPA/LAMBDA FREE LIGHT CHAIN RATIO: 11.99 (ref 0.26–1.65)
LAMBDA FREE LIGHT CHAINS QNT: 4.13 MG/L (ref 5.71–26.3)

## 2023-01-16 LAB — BETA-2 MICROGLOBULIN: 2.2 MG/L (ref 0.6–2.4)

## 2023-01-18 ENCOUNTER — TELEPHONE (OUTPATIENT)
Dept: CARDIOLOGY | Age: 78
End: 2023-01-18

## 2023-01-20 ENCOUNTER — TELEPHONE (OUTPATIENT)
Dept: CARDIOLOGY | Age: 78
End: 2023-01-20

## 2023-01-20 NOTE — TELEPHONE ENCOUNTER
CALLED PATIENT AND ASKED ABOUT [facility] LETTER OF APPROVAL TO HAVE THE TEST DONE.  PATIENT STATES THAT HE IS WAITING ON THE LETTER AND WANTED TO CANCEL UNTIL A LATER DATE    Electronically signed by Vincent Velazquez on 1/20/2023 at 11:03 AM

## 2023-01-24 ENCOUNTER — HOSPITAL ENCOUNTER (OUTPATIENT)
Dept: CARDIOLOGY | Age: 78
Discharge: HOME | End: 2023-01-24

## 2023-02-09 ENCOUNTER — HOSPITAL ENCOUNTER (OUTPATIENT)
Dept: INFUSION THERAPY | Age: 78
Discharge: HOME | End: 2023-02-09
Payer: MEDICARE

## 2023-02-09 ENCOUNTER — OFFICE VISIT (OUTPATIENT)
Dept: ONCOLOGY | Age: 78
End: 2023-02-09
Payer: MEDICARE

## 2023-02-09 VITALS
BODY MASS INDEX: 39.01 KG/M2 | SYSTOLIC BLOOD PRESSURE: 139 MMHG | DIASTOLIC BLOOD PRESSURE: 73 MMHG | TEMPERATURE: 97.2 F | HEIGHT: 72 IN | WEIGHT: 288 LBS | HEART RATE: 90 BPM | RESPIRATION RATE: 16 BRPM | OXYGEN SATURATION: 97 %

## 2023-02-09 DIAGNOSIS — C83.00 MALIGNANT LYMPHOPLASMACYTIC LYMPHOMA (HCC): Primary | ICD-10-CM

## 2023-02-09 DIAGNOSIS — C83.00 MALIGNANT LYMPHOPLASMACYTIC LYMPHOMA (HCC): ICD-10-CM

## 2023-02-09 LAB
ALBUMIN SERPL-MCNC: 3.8 G/DL (ref 3.5–5.2)
ALP BLD-CCNC: 51 U/L (ref 40–129)
ALT SERPL-CCNC: 11 U/L (ref 0–40)
ANION GAP SERPL CALCULATED.3IONS-SCNC: 12 MMOL/L (ref 7–16)
AST SERPL-CCNC: 10 U/L (ref 0–39)
BASOPHILS ABSOLUTE: 0.05 E9/L (ref 0–0.2)
BASOPHILS RELATIVE PERCENT: 0.8 % (ref 0–2)
BILIRUB SERPL-MCNC: 0.7 MG/DL (ref 0–1.2)
BUN BLDV-MCNC: 12 MG/DL (ref 6–23)
CALCIUM SERPL-MCNC: 9.6 MG/DL (ref 8.6–10.2)
CHLORIDE BLD-SCNC: 100 MMOL/L (ref 98–107)
CO2: 25 MMOL/L (ref 22–29)
CREAT SERPL-MCNC: 0.8 MG/DL (ref 0.7–1.2)
EOSINOPHILS ABSOLUTE: 0.12 E9/L (ref 0.05–0.5)
EOSINOPHILS RELATIVE PERCENT: 1.9 % (ref 0–6)
FERRITIN: 153 NG/ML
GFR SERPL CREATININE-BSD FRML MDRD: >60 ML/MIN/1.73
GLUCOSE BLD-MCNC: 186 MG/DL (ref 74–99)
HCT VFR BLD CALC: 43.5 % (ref 37–54)
HEMOGLOBIN: 14.4 G/DL (ref 12.5–16.5)
IMMATURE GRANULOCYTES #: 0.09 E9/L
IMMATURE GRANULOCYTES %: 1.4 % (ref 0–5)
IRON SATURATION: 29 % (ref 20–55)
IRON: 80 MCG/DL (ref 59–158)
LACTATE DEHYDROGENASE: 120 U/L (ref 135–225)
LYMPHOCYTES ABSOLUTE: 1.93 E9/L (ref 1.5–4)
LYMPHOCYTES RELATIVE PERCENT: 30 % (ref 20–42)
MCH RBC QN AUTO: 29.4 PG (ref 26–35)
MCHC RBC AUTO-ENTMCNC: 33.1 % (ref 32–34.5)
MCV RBC AUTO: 88.8 FL (ref 80–99.9)
MONOCYTES ABSOLUTE: 0.53 E9/L (ref 0.1–0.95)
MONOCYTES RELATIVE PERCENT: 8.2 % (ref 2–12)
NEUTROPHILS ABSOLUTE: 3.71 E9/L (ref 1.8–7.3)
NEUTROPHILS RELATIVE PERCENT: 57.7 % (ref 43–80)
PDW BLD-RTO: 13.7 FL (ref 11.5–15)
PLATELET # BLD: 189 E9/L (ref 130–450)
PMV BLD AUTO: 10.5 FL (ref 7–12)
POTASSIUM SERPL-SCNC: 4 MMOL/L (ref 3.5–5)
RBC # BLD: 4.9 E12/L (ref 3.8–5.8)
SODIUM BLD-SCNC: 137 MMOL/L (ref 132–146)
TOTAL IRON BINDING CAPACITY: 278 MCG/DL (ref 250–450)
WBC # BLD: 6.4 E9/L (ref 4.5–11.5)

## 2023-02-09 PROCEDURE — 80053 COMPREHEN METABOLIC PANEL: CPT

## 2023-02-09 PROCEDURE — 36415 COLL VENOUS BLD VENIPUNCTURE: CPT

## 2023-02-09 PROCEDURE — 83550 IRON BINDING TEST: CPT

## 2023-02-09 PROCEDURE — 83540 ASSAY OF IRON: CPT

## 2023-02-09 PROCEDURE — 99212 OFFICE O/P EST SF 10 MIN: CPT

## 2023-02-09 PROCEDURE — 83615 LACTATE (LD) (LDH) ENZYME: CPT

## 2023-02-09 PROCEDURE — 83883 ASSAY NEPHELOMETRY NOT SPEC: CPT

## 2023-02-09 PROCEDURE — 86334 IMMUNOFIX E-PHORESIS SERUM: CPT

## 2023-02-09 PROCEDURE — 4004F PT TOBACCO SCREEN RCVD TLK: CPT | Performed by: INTERNAL MEDICINE

## 2023-02-09 PROCEDURE — 82728 ASSAY OF FERRITIN: CPT

## 2023-02-09 PROCEDURE — G8417 CALC BMI ABV UP PARAM F/U: HCPCS | Performed by: INTERNAL MEDICINE

## 2023-02-09 PROCEDURE — 82232 ASSAY OF BETA-2 PROTEIN: CPT

## 2023-02-09 PROCEDURE — 1123F ACP DISCUSS/DSCN MKR DOCD: CPT | Performed by: INTERNAL MEDICINE

## 2023-02-09 PROCEDURE — 84165 PROTEIN E-PHORESIS SERUM: CPT

## 2023-02-09 PROCEDURE — G8484 FLU IMMUNIZE NO ADMIN: HCPCS | Performed by: INTERNAL MEDICINE

## 2023-02-09 PROCEDURE — G8428 CUR MEDS NOT DOCUMENT: HCPCS | Performed by: INTERNAL MEDICINE

## 2023-02-09 PROCEDURE — 99213 OFFICE O/P EST LOW 20 MIN: CPT | Performed by: INTERNAL MEDICINE

## 2023-02-09 PROCEDURE — 3017F COLORECTAL CA SCREEN DOC REV: CPT | Performed by: INTERNAL MEDICINE

## 2023-02-09 PROCEDURE — 85025 COMPLETE CBC W/AUTO DIFF WBC: CPT

## 2023-02-09 PROCEDURE — 82784 ASSAY IGA/IGD/IGG/IGM EACH: CPT

## 2023-02-09 RX ORDER — LANOLIN ALCOHOL/MO/W.PET/CERES
60 CREAM (GRAM) TOPICAL DAILY
COMMUNITY

## 2023-02-09 NOTE — PROGRESS NOTES
Department of [facility] Oncology  Attending Clinic Note    Reason for Visit: Follow-up on a patient with Lymphoplasmacytic Lymphoma      PCP:  Rony Covington MD    History of Present Illness:  78year old male initially seen at [facility] hematology for anemia and abnormal TP/albumin ratio. SPEP IgG and IgM kappa paraprotein, M-spike 3.29 g/dL. UIFE: IgM protein. 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   -Extensive involvement by atypitcal CD5-/CD10-B-cell lymphoproliferative disorder, comprising over 70% of marrow cellularity  -Mildly hypercellular marrow for age (30-40%) with trilineage pan hypoplasia  -Normocytic anemia. -IHC staining highlighted extensive CD20+ B cell infiltrate with admixed + plasma cells     Started on Ibrutinib 420 mg/day 12/2020 with good response so far    On 01/06/2021:  SPEP:  Monoclonal protein 1.72 g/dL  Persistent double monoclonal bands in the beta/gamma and gamma globuin regions. Bands previously identified as IgM kappa and IgG kappa (8/12/2020). IgM kappa decreased by 1.22 g/dL and IgG kappa minimally changed since last exam on 10/14/2020. Free kappa light chains: 413.4    (3.3-19. 4)   Free lambda light chains: 2.8      (5.7-26. 3)   K/L ratio:                          147.64 (0.26-1.65)    On 03/25/2021  SPEP:  Monoclonal protein: 1.03 (0-0) g/dL  Persistent double monoclonal bands in the beta/gamma and gamma globuin regions. Bands previously identified as IgM kappa and IgG kappa (8/12/2020). IgM kappa decreased by 0.69 g/dL and IgG kappa unchanged since 01/06/2021. Free kappa light chains: 149.4  (3.3-19. 4)   Free lambda light chains: 2.6    (5.7-26. 3)   K/L ratio:                          57.46 (0.26-1.65)    Patient started on oral iron 08/2020 for LANDON and B12 deficiency but required 2 doses of Feraheme on 01/21/2021 and 2021 and again in 2021. Had recurrent iron deficiency with plan for Feraheme q3 months (next one scheduled on 2021). He does IM b12 injections at home. On 2021  Hb: 15.5 Hct 44.9  MCV 90.5    WBC 6.9  BUN 10  Creat 0.9  Ca 9.3  Albumin 3.6    Fe: 94 FeSat: 33% TIBIC: 288  Ferritin: 81    VitB12 370     Ig  (700-1700)   IgA: <8     ()   IgM: 1370 ()    On 2021:  Hb 14.8  Hct 44.8  MCV 90.6  WBC 7.5    All cell line counts and morphology within normal limits. Patient's history of lymphoplasmacytic lymphoma is noted. The currently submitted blood smear is negative for circulating plasma cells, and negative for increased/atypical lymphocytes  Fe 60 TIBC 250 FeSat 24% Ferritin 73  BUN 8  Creat 0.8  LFTs wnl  Folate/B12 wnl  Beta-2 microglobulin 2.2    Peripheral blood flow cytometry noted small B-cell population with equivocal mild kappa light chain excess (1% of total cells). SPEP: M-spike 0.8 g/dl  SIFE: IgM kappa monoclonal protein is present  IgM 1295 ()    Free kappa light chains:   80.85  (3.3-19. 4)   Free lambda light chains: 3.23    (5.7-26. 3)   K/L ratio:                           25.03 (0.26-1.65)    On 2021. Hb 16.2  Hct 47.7  MCV 88.8  WBC 7.7     BUN 10 Creat 0.80  LFTs wnl  Beta-2 microglobulin 2.0  SPEP: M-spike 1 = 0.8 g/dl              M-spike 2 = 0.1 g/dl  SIFE: IgM kappa monoclonal protein is present, as previously described. IgM 1267 ()    Free kappa light chains:   88.01  (3.3-19. 4)   Free lambda light chains: 3.33    (5.7-26. 3)   K/L ratio:                           26.43 (0.26-1.65)    On 2021:  Hb 16.4  Hct 48.1  MCV 87.5  WBC 6.9     BUN 7 Creat 0.90  LFTs wnl  Beta-2 microglobulin 1.9  SPEP: M-spike 1 = 0.7 g/dl              M-spike 2 = 0.1 g/dl  SIFE: IgM kappa monoclonal protein is present, as previously described.    IgG kappa monoclonal protein is present, as previously described    IgM 1314 ()    Free kappa light chains:   80.24  (3.3-19. 4)   Free lambda light chains: 2.88    (5.7-26. 3)   K/L ratio:                           27.86 (0.26-1.65)    On 10/18/2021:  Hb 15.9   Hct 48.0  MCV 89.1  WBC 9.3     BUN 8 Creat 0.9  LFTs wnl  Beta-2 microglobulin 2.0  SPEP: M-spike 1 = 0.7 g/dl              M-spike 2 = 0.1 g/dl  SIFE: IgM kappa + faint IgG kappa monoclonal protein is present, as previously described. IgM 1308 ()    Free kappa light chains:   80.48  (3.3-19. 4)   Free lambda light chains: 2.87    (5.7-26. 3)   K/L ratio:                           28.04 (0.26-1.65)    Continued Ibrutinib    On 11/17/2021:  Hb 15.5   Hct 46.4  MCV 87.5  WBC 7.4     BUN 13 Creat 1.2  LFTs wnl  Beta-2 microglobulin 2.3  SPEP: M-spike 1 = 0.9 g/dl              M-spike 2 = 0.1 g/dl  SIFE: IgM kappa monoclonal protein is present, as previously described. A Faint IgG kappa monoclonal protein is present, as previously described. IgM 1347 ()    Free kappa light chains:   78.93  (3.3-19. 4)   Free lambda light chains: 3.92    (5.7-26. 3)   K/L ratio:                           20.14 (0.26-1.65)    On 12/15/2021:  Hb 15.3   Hct 46.5  MCV 90.3  WBC 9.1     BUN 10 Creat 0.9  LFTs wnl  Beta-2 microglobulin 2.5  SPEP: M-spike 1 = 0.8 g/dl              M-spike 2 = 0.1 g/dl  SIFE: IgM kappa + IgG kappa monoclonal protein is present, as previously described. IgM 1365 ()    Free kappa light chains:   84.08  (3.3-19. 4)   Free lambda light chains: 4.45    (5.7-26. 3)   K/L ratio:                           18.89 (0.26-1.65)    Continued Ibrutinib    On 01/20/2022:   Hb 14.9   Hct 45.1  MCV 88.1  WBC 6.3     BUN 7 Creat 0.8  LFTs wnl  Beta-2 microglobulin 2.3  SPEP: M-spike 1 = 0.7 g/dl              M-spike 2 = 0.1 g/dl  SIFE: IgM kappa + IgG kappa monoclonal protein is present, as previously described.     IgM 1142 ()    Free kappa light chains:   136.21 (3.3-19. 4)   Free lambda light chains: 5.29    (5.7-26. 3)   K/L ratio:                           25.75 (0.26-1.65)    Continued Ibrutinib    On 02/24/2022:   Hb 15.4   Hct 46.7  MCV 87.8  WBC 7.5     BUN 7 Creat 0.8  LFTs wnl  Beta-2 microglobulin 2.0  SPEP: M-spike = 0.8 g/dl    SIFE: IgM kappa monoclonal protein is present, as previously described  IgM 1279 ()    Free kappa light chains:   69.11  (3.3-19. 4)   Free lambda light chains: 4.50    (5.7-26. 3)   K/L ratio:                           15.36 (0.26-1.65)    On 03/02/2022:  Hb 15.5   Hct 46.9   MCV 90.0  WBC 9.8     BUN 9 Creat 0.7  LFTs wnl  Beta-2 microglobulin 1.6  SPEP: M-spike = 0.7 g/dl    SIFE: IgM kappa monoclonal protein is present, as previously described  IgM 1367 ()    Free kappa light chains:   55.56  (3.3-19. 4)   Free lambda light chains: 6.65    (5.7-26. 3)   K/L ratio:                           8.35 (0.26-1.65)    On 03/31/2022:  Hb 14.5   Hct 43.8   MCV 89.0  WBC 6.2     BUN 8 Creat 0.7  LFTs wnl  Beta-2 microglobulin 2.2  SPEP: M-spike = 0.6 g/dl    SIFE: IgM kappa monoclonal protein is present, as previously described  IgM 1061 ()    Free kappa light chains:   59.06  (3.3-19. 4)   Free lambda light chains: 6.00    (5.7-26. 3)   K/L ratio:                           9.84 (0.26-1.65)    On 04/28/2022:  Hb 15.2   Hct 45.3   MCV 87.5  WBC 5.7     BUN 10 Creat 0.8  LFTs wnl  Beta-2 microglobulin 1.8  SPEP: M-spike = 0.7 g/dl    SIFE: IgM kappa monoclonal protein is present, as previously described  IgM 1176 ()    Free kappa light chains:   65.90  (3.3-19. 4)   Free lambda light chains: 3.72    (5.7-26. 3)   K/L ratio:                           17.72 (0.26-1.65)    On 05/26/2022:  Hb 15.8   Hct 47.0   MCV 88.5  WBC 7.0     BUN 15 Creat 0.8  LFTs wnl  Beta-2 microglobulin 2.4  SPEP: M-spike = 0.8 g/dl    SIFE: IgM kappa monoclonal protein is present, as previously described  IgM 1161 ()    Free kappa light chains:   64.11  (3.3-19. 4)   Free lambda light chains: 4.28    (5.7-26. 3)   K/L ratio:                           14.98 (0.26-1.65)    On 06/23/2022:  Hb 15.5   Hct 47.1   MCV 89.2  WBC 7.9     BUN 10 Creat 0.8  LFTs wnl  Beta-2 microglobulin 2.3  SPEP: M-spike = 0.8 g/dl    SIFE: IgM kappa monoclonal protein is present, as previously described  IgM 1089 ()    Free kappa light chains:   56.20  (3.3-19. 4)   Free lambda light chains: 4.38    (5.7-26. 3)   K/L ratio:                           12.83 (0.26-1.65)    On 07/21/2022:  Hb 14.7   Hct 44.5   MCV 89.9  WBC 7.1     BUN 11 Creat 0.7  LFTs wnl  Beta-2 microglobulin 2.1  SPEP: M-spike = 0.9 g/dl    SIFE: IgM kappa monoclonal protein is present, as previously described  IgM 967 ()    Free kappa light chains:   52.84  (3.3-19. 4)   Free lambda light chains: 8.77    (5.7-26. 3)   K/L ratio:                           6.03 (0.26-1.65)    On 08/18/2022:  Hb 15.3  Hct 45.0  MCV 89.5  WBC 8.1     BUN 10  Creat 0.8  LFTs wnl  Beta-2 microglobulin 2.2  SPEP: M-spike 0.6 g/dL  SIFE: IgM kappa monoclonal protein is present, as previously described. IgM 1002 ()    On 09/15/2022:  Hb 15.2  Hct 45.2   MCV 86.6  WBC 7.0    BUN 10  Creat 0.8  Beta-2 microglobulin 1.9  SPEP: M-spike 0.6 g/dL  SIFE: IgM kappa monoclonal protein is present, as previously described. IgM 968 ()  Free kappa light chains:   59.02  (3.3-19. 4)   Free lambda light chains: 4.43    (5.7-26. 3)   K/L ratio:                           13.32 (0.26-1.65)    On 10/13/2022:  Hb 15.6  Hct 45.9  MCV 86.9    WBC 9.0  BUN 9 Creat 0.8  Beta-2 microglobulin 2.2  SPEP: M-spike 0.7 g/dL  SIFE: IgM kappa monoclonal protein is present, as previously described. IgM 1001 ()  Free kappa light chains:   52.17  (3.3-19. 4)   Free lambda light chains: 4.18    (5.7-26. 3)   K/L ratio:                           12.48 (0.26-1.65)    On 11/17/2022:  Hb 15.3   Hct 45.9  MCV 89.6      WBC 6.7  BUN 9 Creat 0.8  Beta-2 microglobulin 2.2  SPEP: M-spike 0.7 g/dL  SIFE: IgM kappa monoclonal protein is present, as previously described. IgM 944 ()  Free kappa light chains:   57.27  (3.3-19. 4)   Free lambda light chains: 3.78    (5.7-26. 3)   K/L ratio:                           15.15 (0.26-1.65)    On 12/15/2022:  Hb 15.1   Hct 45.6   MCV 88.5      WBC 8.7  BUN 9 Creat 0.9  Beta-2 microglobulin 2.5  SPEP: M-spike 0.8 g/dL  SIFE: IgM kappa monoclonal protein is present, as previously described. IgM 1097 ()  Free kappa light chains:   48.70  (3.3-19. 4)   Free lambda light chains: 3.84    (5.7-26. 3)   K/L ratio:                           12.68 (0.26-1.65)    On 01/12/2023:  Hb 14.4   Hct 43.0   MCV 86.2     WBC 9.1  BUN 10  Creat 0.8  Beta-2 microglobulin 2.2  SPEP: M-spike 0.7 g/dL  SIFE: IgM kappa monoclonal protein is present, as previously described. IgM 897 ()  Free kappa light chains:   49.51  (3.3-19. 4)   Free lambda light chains: 4.13    (5.7-26. 3)   K/L ratio:                           11.99 (0.26-1.65)    Today 02/09/2023; No fever or chills. Fair appetite and energy level. He is tolerating Ibrutinib well. Intermittent diarrhea    Review of Systems;  CONSTITUTIONAL: No fever, chills. Fair appetite and energy level. ENMT: Eyes: No diplopia; Nose: No epistaxis. Mouth: No sore throat. RESPIRATORY: No hemoptysis, shortness of breath, cough. CARDIOVASCULAR: No chest pain, palpitations. GASTROINTESTINAL: Intermittent diarrhea  GENITOURINARY: No dysuria, urinary frequency, hematuria. NEURO: No syncope, presyncope, headache.   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 Allergies:  No Known Allergies    Physical Exam:  /73 (Site: Right Upper Arm, Position: Sitting, Cuff Size: Large Adult)   Pulse 90   Temp 97.2 °F (36.2 °C) (Infrared)   Resp 16   Ht 6' (1.829 m)   Wt 288 lb (130.6 kg)   SpO2 97%   BMI 39.06 kg/m²   GENERAL: Alert, oriented x 3, not in acute distress. HEENT: PERRLA; EOMI. Oropharynx clear. NECK: Supple. Without lymphadenopathy. LUNGS: Good air entry bilaterally. No wheezing, crackles or rhonchi. CARDIOVASCULAR: Regular rate. No murmurs, rubs or gallops. ABDOMEN: Soft. Non-tender, non-distended. EXTREMITIES: Without clubbing or cyanosis or edema. NEUROLOGIC: No focal deficits. 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 UIFE: IgM protein. 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   -Extensive involvement by atypitcal CD5-/CD10-B-cell lymphoproliferative disorder, comprising over 70% of marrow cellularity  -Mildly hypercellular marrow for age (30-40%) with trilineage pan hypoplasia  -Normocytic anemia. -IHC staining highlighted extensive CD20+ B cell infiltrate with admixed + plasma cells     Started on Ibrutinib 420 mg/day 12/2020 with good response so far    On 01/06/2021:  SPEP:  Monoclonal protein 1.72 g/dL  Persistent double monoclonal bands in the beta/gamma and gamma globuin regions. Bands previously identified as IgM kappa and IgG kappa (8/12/2020). IgM kappa decreased by 1.22 g/dL and IgG kappa minimally changed since last exam on 10/14/2020.     Free kappa light chains: 413.4    (3.3-19. 4)   Free lambda light chains: 2.8      (5.7-26. 3)   K/L ratio:                          147.64 (0.26-1.65)    On 2021  SPEP:  Monoclonal protein: 1.03 (0-0) g/dL  Persistent double monoclonal bands in the beta/gamma and gamma globuin regions. Bands previously identified as IgM kappa and IgG kappa (2020). IgM kappa decreased by 0.69 g/dL and IgG kappa unchanged since 2021. Free kappa light chains: 149.4  (3.3-19. 4)   Free lambda light chains: 2.6    (5.7-26. 3)   K/L ratio:                          57.46 (0.26-1.65)    Patient started on oral iron 2020 for LANDON and B12 deficiency but required 2 doses of Feraheme on 2021 and 2021 and again in 2021. Had recurrent iron deficiency with plan for Feraheme q3 months (next one scheduled on 2021). He does IM b12 injections at home (last one on 2021). On 2021  Hb: 15.5 Hct 44.9  MCV 90.5    WBC 6.9  BUN 10  Creat 0.9  Ca 9.3  Albumin 3.6    Fe: 94 FeSat: 33% TIBIC: 288  Ferritin: 81    VitB12 370     Ig  (700-1700)   IgA: <8     ()   IgM: 1370 ()    On 2021:  Hb 14.8  Hct 44.8  MCV 90.6  WBC 7.5    All cell line counts and morphology within normal limits. Patient's history of lymphoplasmacytic lymphoma is noted. The currently submitted blood smear is negative for circulating plasma cells, and negative for increased/atypical lymphocytes  Fe 60 TIBC 250 FeSat 24% Ferritin 73  BUN 8  Creat 0.8  LFTs wnl  Folate/B12 wnl  Beta-2 microglobulin 2.2    Peripheral blood flow cytometry noted small B-cell population with equivocal mild kappa light chain excess (1% of total cells). SPEP: M-spike 0.8 g/dl  SIFE: IgM kappa monoclonal protein is present  IgM 1295 ()    Free kappa light chains:   80.85  (3.3-19. 4)   Free lambda light chains: 3.23    (5.7-26. 3)   K/L ratio:                           25.03 (0.26-1.65)    On 2021.    Hb 16.2  Hct 47.7  MCV 88. 8  WBC 7.7     BUN 10 Creat 0.80  LFTs wnl  Beta-2 microglobulin 2.0  SPEP: M-spike 1 = 0.8 g/dl              M-spike 2 = 0.1 g/dl  SIFE: IgM kappa monoclonal protein is present, as previously described. IgM 1267 ()    Free kappa light chains:   88.01  (3.3-19. 4)   Free lambda light chains: 3.33    (5.7-26. 3)   K/L ratio:                           26.43 (0.26-1.65)    On 09/20/2021:  Hb 16.4  Hct 48.1  MCV 87.5  WBC 6.9     BUN 7 Creat 0.90  LFTs wnl  Beta-2 microglobulin 1.9  SPEP: M-spike 1 = 0.7 g/dl              M-spike 2 = 0.1 g/dl  SIFE: IgM kappa monoclonal protein is present, as previously described. IgG kappa monoclonal protein is present, as previously described    IgM 1314 ()    Free kappa light chains:   80.24  (3.3-19. 4)   Free lambda light chains: 2.88    (5.7-26. 3)   K/L ratio:                           27.86 (0.26-1.65)    On 10/18/2021:  Hb 15.9   Hct 48.0  MCV 89.1  WBC 9.3     BUN 8 Creat 0.9  LFTs wnl  Beta-2 microglobulin 2.0  SPEP: M-spike 1 = 0.7 g/dl              M-spike 2 = 0.1 g/dl  SIFE: IgM kappa + faint IgG kappa monoclonal protein is present, as previously described. IgM 1308 ()    Free kappa light chains:   80.48  (3.3-19. 4)   Free lambda light chains: 2.87    (5.7-26. 3)   K/L ratio:                           28.04 (0.26-1.65)    On 11/17/2021:  Hb 15.5   Hct 46.4  MCV 87.5  WBC 7.4     BUN 13 Creat 1.2  LFTs wnl  Beta-2 microglobulin 2.3  SPEP: M-spike 1 = 0.9 g/dl              M-spike 2 = 0.1 g/dl  SIFE: IgM kappa monoclonal protein is present, as previously described. A Faint IgG kappa monoclonal protein is present, as previously described. IgM 1347 ()    Free kappa light chains:   78.93  (3.3-19. 4)   Free lambda light chains: 3.92    (5.7-26. 3)   K/L ratio:                           20.14 (0.26-1.65)    On 12/15/2021:  Hb 15.3   Hct 46.5  MCV 90.3  WBC 9.1     BUN 10 Creat 0.9  LFTs wnl  Beta-2 microglobulin 2.5  SPEP: M-spike 1 = 0.8 g/dl              M-spike 2 = 0.1 g/dl  SIFE: IgM kappa + IgG kappa monoclonal protein is present, as previously described. IgM 1365 ()    Free kappa light chains:   84.08  (3.3-19. 4)   Free lambda light chains: 4.45    (5.7-26. 3)   K/L ratio:                           18.89 (0.26-1.65)    On 01/20/2022:   Hb 14.9   Hct 45.1  MCV 88.1  WBC 6.3     BUN 7 Creat 0.8  LFTs wnl  Beta-2 microglobulin 2.3  SPEP: M-spike 1 = 0.7 g/dl              M-spike 2 = 0.1 g/dl  SIFE: IgM kappa + IgG kappa monoclonal protein is present, as previously described. IgM 1142 ()    Free kappa light chains:   136.21  (3.3-19. 4)   Free lambda light chains: 5.29    (5.7-26. 3)   K/L ratio:                           25.75 (0.26-1.65)    Continued Ibrutinib    On 02/24/2022:   Hb 15.4   Hct 46.7  MCV 87.8  WBC 7.5     BUN 7 Creat 0.8  LFTs wnl  Beta-2 microglobulin 2.0  SPEP: M-spike = 0.8 g/dl    SIFE: IgM kappa monoclonal protein is present, as previously described  IgM 1279 ()    Free kappa light chains:   69.11  (3.3-19. 4)   Free lambda light chains: 4.50    (5.7-26. 3)   K/L ratio:                           15.36 (0.26-1.65)    On 03/02/2022:  Hb 15.5   Hct 46.9   MCV 90.0  WBC 9.8     BUN 9 Creat 0.7  LFTs wnl  Beta-2 microglobulin 1.6  SPEP: M-spike = 0.7 g/dl    SIFE: IgM kappa monoclonal protein is present, as previously described  IgM 1367 ()    Free kappa light chains:   55.56  (3.3-19. 4)   Free lambda light chains: 6.65    (5.7-26. 3)   K/L ratio:                           8.35 (0.26-1.65)    CT chest 03/24/2022 noted chronic changes of the lungs including emphysema with midlung scarring atelectasis greatest in the lingular segment without a definitive dominant nodule or mass. No acute intrathoracic process. Imaging reviewed.     On 03/31/2022:  Hb 14.5   Hct 43.8   MCV 89.0  WBC 6.2     BUN 8 Creat 0.7  LFTs wnl  Beta-2 microglobulin 2. 2  SPEP: M-spike = 0.6 g/dl    SIFE: IgM kappa monoclonal protein is present, as previously described  IgM 1061 ()    Free kappa light chains:   59.06  (3.3-19. 4)   Free lambda light chains: 6.00    (5.7-26. 3)   K/L ratio:                           9.84 (0.26-1.65)    On 04/28/2022:  Hb 15.2   Hct 45.3   MCV 87.5  WBC 5.7     BUN 10 Creat 0.8  LFTs wnl  Beta-2 microglobulin 1.8  SPEP: M-spike = 0.7 g/dl    SIFE: IgM kappa monoclonal protein is present, as previously described  IgM 1176 ()    Free kappa light chains:   65.90  (3.3-19. 4)   Free lambda light chains: 3.72    (5.7-26. 3)   K/L ratio:                           17.72 (0.26-1.65)    On 05/26/2022:  Hb 15.8   Hct 47.0   MCV 88.5  WBC 7.0     BUN 15 Creat 0.8  LFTs wnl  Beta-2 microglobulin 2.4  SPEP: M-spike = 0.8 g/dl    SIFE: IgM kappa monoclonal protein is present, as previously described  IgM 1161 ()    Free kappa light chains:   64.11  (3.3-19. 4)   Free lambda light chains: 4.28    (5.7-26. 3)   K/L ratio:                           14.98 (0.26-1.65)    On 06/23/2022:  Hb 15.5   Hct 47.1   MCV 89.2  WBC 7.9     BUN 10 Creat 0.8  LFTs wnl  Beta-2 microglobulin 2.3  SPEP: M-spike = 0.8 g/dl    SIFE: IgM kappa monoclonal protein is present, as previously described  IgM 1089 ()    Free kappa light chains:   56.20  (3.3-19. 4)   Free lambda light chains: 4.38    (5.7-26. 3)   K/L ratio:                           12.83 (0.26-1.65)    On 07/21/2022:  Hb 14.7   Hct 44.5   MCV 89.9  WBC 7.1     BUN 11 Creat 0.7  LFTs wnl  Beta-2 microglobulin 2.1  SPEP: M-spike = 0.9 g/dl    SIFE: IgM kappa monoclonal protein is present, as previously described  IgM 967 ()    Free kappa light chains:   52.84  (3.3-19. 4)   Free lambda light chains: 8.77    (5.7-26. 3)   K/L ratio:                           6.03 (0.26-1.65)    On 08/18/2022:  Hb 15.3  Hct 45.0  MCV 89.5  WBC 8.1     BUN 10  Creat 0.8  LFTs wnl  Beta-2 microglobulin 2.2  SPEP: M-spike 0.6 g/dL  SIFE: IgM kappa monoclonal protein is present, as previously described. IgM 1002 ()    On 09/15/2022:  Hb 15.2  Hct 45.2   MCV 86.6  WBC 7.0    BUN 10  Creat 0.8  Beta-2 microglobulin 1.9  SPEP: M-spike 0.6 g/dL  SIFE: IgM kappa monoclonal protein is present, as previously described. IgM 968 ()  Free kappa light chains:   59.02  (3.3-19. 4)   Free lambda light chains: 4.43    (5.7-26. 3)   K/L ratio:                           13.32 (0.26-1.65)    On 10/13/2022:  Hb 15.6  Hct 45.9  MCV 86.9    WBC 9.0  BUN 9 Creat 0.8  Beta-2 microglobulin 2.2  SPEP: M-spike 0.7 g/dL  SIFE: IgM kappa monoclonal protein is present, as previously described. IgM 1001 ()  Free kappa light chains:   52.17  (3.3-19. 4)   Free lambda light chains: 4.18    (5.7-26. 3)   K/L ratio:                           12.48 (0.26-1.65)    On 11/17/2022:  Hb 15.3   Hct 45.9  MCV 89.6      WBC 6.7  BUN 9 Creat 0.8  Beta-2 microglobulin 2.2  SPEP: M-spike 0.7 g/dL  SIFE: IgM kappa monoclonal protein is present, as previously described. IgM 944 ()  Free kappa light chains:   57.27  (3.3-19. 4)   Free lambda light chains: 3.78    (5.7-26. 3)   K/L ratio:                           15.15 (0.26-1.65)    On 12/15/2022:  Hb 15.1   Hct 45.6   MCV 88.5      WBC 8.7  BUN 9 Creat 0.9  Beta-2 microglobulin 2.5  SPEP: M-spike 0.8 g/dL  SIFE: IgM kappa monoclonal protein is present, as previously described. IgM 1097 ()  Free kappa light chains:   48.70  (3.3-19. 4)   Free lambda light chains: 3.84    (5.7-26. 3)   K/L ratio:                           12.68 (0.26-1.65)    On 01/12/2023:  Hb 14.4   Hct 43.0   MCV 86.2     WBC 9.1  BUN 10  Creat 0.8  Beta-2 microglobulin 2.2  SPEP: M-spike 0.7 g/dL  SIFE: IgM kappa monoclonal protein is present, as previously described. IgM 897 ()  Free kappa light chains:   49.51  (3.3-19. 4)   Free lambda light chains: 4.13    (5.7-26. 3)   K/L ratio:                           11.99 (0.26-1.65)    On 02/09/2023:  Hb 14.4   Hct 43.5   MCV 88.8     WBC 6.4  Labs reviewed. Myeloma markers pending  Held weekly Rituximab x 4  Recommended Imodium as needed for diarrhea.   Continue Ibrutinib    RTC 1 month with prior labs     02/09/2023  Timothy Rain MD

## 2023-02-10 LAB
ALBUMIN SERPL-MCNC: 2.5 G/DL (ref 3.5–4.7)
ALPHA-1-GLOBULIN: 0.3 G/DL (ref 0.2–0.4)
ALPHA-2-GLOBULIN: 1 G/DL (ref 0.5–1)
BETA GLOBULIN: 1.1 G/DL (ref 0.8–1.3)
ELECTROPHORESIS: ABNORMAL
GAMMA GLOBULIN: 1.1 G/DL (ref 0.7–1.6)
IGA: 15 MG/DL (ref 70–400)
IGG: 190 MG/DL (ref 700–1600)
IGM: 837 MG/DL (ref 40–230)
IMMUNOFIXATION RESULT, SERUM: NORMAL
TOTAL PROTEIN: 6 G/DL (ref 6.4–8.3)

## 2023-02-12 LAB
KAPPA FREE LIGHT CHAINS QNT: 43.89 MG/L (ref 3.3–19.4)
KAPPA/LAMBDA FREE LIGHT CHAIN RATIO: 11.86 (ref 0.26–1.65)
LAMBDA FREE LIGHT CHAINS QNT: 3.7 MG/L (ref 5.71–26.3)

## 2023-02-13 LAB — BETA-2 MICROGLOBULIN: 2.1 MG/L (ref 0.6–2.4)

## 2023-02-27 ENCOUNTER — HOSPITAL ENCOUNTER (OUTPATIENT)
Dept: NON INVASIVE DIAGNOSTICS | Age: 78
Discharge: HOME | End: 2023-02-27
Payer: MEDICARE

## 2023-02-27 ENCOUNTER — HOSPITAL ENCOUNTER (OUTPATIENT)
Dept: NUCLEAR MEDICINE | Age: 78
Discharge: HOME | End: 2023-02-27
Payer: MEDICARE

## 2023-02-27 VITALS — WEIGHT: 283 LBS | HEIGHT: 72 IN | BODY MASS INDEX: 38.33 KG/M2

## 2023-02-27 DIAGNOSIS — I25.84 CORONARY ARTERY CALCIFICATION: ICD-10-CM

## 2023-02-27 DIAGNOSIS — I25.10 CORONARY ARTERY CALCIFICATION: ICD-10-CM

## 2023-02-27 DIAGNOSIS — I48.91 ATRIAL FIBRILLATION, UNSPECIFIED TYPE (HCC): ICD-10-CM

## 2023-02-27 LAB
LV EF: 51 %
LVEF MODALITY: NORMAL

## 2023-02-27 PROCEDURE — 93018 CV STRESS TEST I&R ONLY: CPT | Performed by: INTERNAL MEDICINE

## 2023-02-27 PROCEDURE — A9500 TC99M SESTAMIBI: HCPCS | Performed by: RADIOLOGY

## 2023-02-27 PROCEDURE — 6360000002 HC RX W HCPCS: Performed by: CLINICAL NURSE SPECIALIST

## 2023-02-27 PROCEDURE — 93016 CV STRESS TEST SUPVJ ONLY: CPT | Performed by: INTERNAL MEDICINE

## 2023-02-27 PROCEDURE — G1010 CDSM STANSON: HCPCS

## 2023-02-27 PROCEDURE — 3430000000 HC RX DIAGNOSTIC RADIOPHARMACEUTICAL: Performed by: RADIOLOGY

## 2023-02-27 PROCEDURE — 78452 HT MUSCLE IMAGE SPECT MULT: CPT | Performed by: INTERNAL MEDICINE

## 2023-02-27 PROCEDURE — 93017 CV STRESS TEST TRACING ONLY: CPT

## 2023-02-27 PROCEDURE — G1010 CDSM STANSON: HCPCS | Performed by: INTERNAL MEDICINE

## 2023-02-27 RX ORDER — TECHNETIUM TC-99M SESTAMIBI 1 MG/10ML
10 INJECTION INTRAVENOUS
Status: COMPLETED | OUTPATIENT
Start: 2023-02-27 | End: 2023-02-27

## 2023-02-27 RX ORDER — TECHNETIUM TC-99M SESTAMIBI 1 MG/10ML
30 INJECTION INTRAVENOUS
Status: COMPLETED | OUTPATIENT
Start: 2023-02-27 | End: 2023-02-27

## 2023-02-27 RX ADMIN — REGADENOSON 0.4 MG: 0.08 INJECTION, SOLUTION INTRAVENOUS at 09:25

## 2023-02-27 RX ADMIN — Medication 30 MILLICURIE: at 08:02

## 2023-02-27 RX ADMIN — Medication 10 MILLICURIE: at 08:02

## 2023-02-27 NOTE — PROCEDURES
Following placement of an intravenous catheter 10 millicuries of technetium 99m sestamibi was administered followed by a saline flush. Approximately 45 minutes later resting SPECT images were obtained. After completion of resting images a regadenoson stress test was performed via a bolus injection of 0.4 milligrams of regadenason followed by a saline flush. Following regadenoson administration 30 millicuries of technetium 99m sestamibi was injected followed by repeat post stress SPECT imaging approximately 60 minutes post completion of administration. Baseline tracing demonstrates atrial fibrillation with nonspecific ST changes. The patient experienced no anginal symptoms and remained hemodynamically stable during administration and no electrocardiographic changes were noted. Perfusion images pending.

## 2023-03-07 ENCOUNTER — TELEPHONE (OUTPATIENT)
Dept: CARDIOLOGY CLINIC | Age: 78
End: 2023-03-07

## 2023-03-07 NOTE — TELEPHONE ENCOUNTER
Contacted patient with stress results. He verbalized understanding.    ----- Message from HUDSON Chopra - CNS sent at 3/6/2023  3:57 PM EST -----  Hi,     Can you please let Mr. Junior Dove know that his stress test was low risk, with no evidence of ischemia. He should follow up with Dr. Jodi Mcgowan as scheduled. Thank you!

## 2023-03-09 ENCOUNTER — OFFICE VISIT (OUTPATIENT)
Dept: ONCOLOGY | Age: 78
End: 2023-03-09
Payer: MEDICARE

## 2023-03-09 ENCOUNTER — HOSPITAL ENCOUNTER (OUTPATIENT)
Dept: INFUSION THERAPY | Age: 78
Discharge: HOME | End: 2023-03-09
Payer: MEDICARE

## 2023-03-09 VITALS
DIASTOLIC BLOOD PRESSURE: 64 MMHG | BODY MASS INDEX: 38.47 KG/M2 | SYSTOLIC BLOOD PRESSURE: 130 MMHG | HEIGHT: 72 IN | TEMPERATURE: 97.8 F | HEART RATE: 92 BPM | OXYGEN SATURATION: 93 % | WEIGHT: 284 LBS

## 2023-03-09 DIAGNOSIS — C83.00 MALIGNANT LYMPHOPLASMACYTIC LYMPHOMA (HCC): Primary | ICD-10-CM

## 2023-03-09 DIAGNOSIS — C83.00 MALIGNANT LYMPHOPLASMACYTIC LYMPHOMA (HCC): ICD-10-CM

## 2023-03-09 LAB
ALBUMIN SERPL-MCNC: 3.8 G/DL (ref 3.5–5.2)
ALP BLD-CCNC: 60 U/L (ref 40–129)
ALT SERPL-CCNC: 12 U/L (ref 0–40)
ANION GAP SERPL CALCULATED.3IONS-SCNC: 12 MMOL/L (ref 7–16)
AST SERPL-CCNC: 11 U/L (ref 0–39)
BASOPHILS ABSOLUTE: 0.08 E9/L (ref 0–0.2)
BASOPHILS RELATIVE PERCENT: 0.8 % (ref 0–2)
BILIRUB SERPL-MCNC: 0.8 MG/DL (ref 0–1.2)
BUN BLDV-MCNC: 11 MG/DL (ref 6–23)
CALCIUM SERPL-MCNC: 9.7 MG/DL (ref 8.6–10.2)
CHLORIDE BLD-SCNC: 98 MMOL/L (ref 98–107)
CO2: 26 MMOL/L (ref 22–29)
CREAT SERPL-MCNC: 0.8 MG/DL (ref 0.7–1.2)
EOSINOPHILS ABSOLUTE: 0.15 E9/L (ref 0.05–0.5)
EOSINOPHILS RELATIVE PERCENT: 1.6 % (ref 0–6)
FERRITIN: 155 NG/ML
GFR SERPL CREATININE-BSD FRML MDRD: >60 ML/MIN/1.73
GLUCOSE BLD-MCNC: 199 MG/DL (ref 74–99)
HCT VFR BLD CALC: 46.3 % (ref 37–54)
HEMOGLOBIN: 14.9 G/DL (ref 12.5–16.5)
IMMATURE GRANULOCYTES #: 0.23 E9/L
IMMATURE GRANULOCYTES %: 2.4 % (ref 0–5)
IRON SATURATION: 41 % (ref 20–55)
IRON: 112 MCG/DL (ref 59–158)
LACTATE DEHYDROGENASE: 146 U/L (ref 135–225)
LYMPHOCYTES ABSOLUTE: 2.04 E9/L (ref 1.5–4)
LYMPHOCYTES RELATIVE PERCENT: 21.4 % (ref 20–42)
MCH RBC QN AUTO: 29.1 PG (ref 26–35)
MCHC RBC AUTO-ENTMCNC: 32.2 % (ref 32–34.5)
MCV RBC AUTO: 90.4 FL (ref 80–99.9)
MONOCYTES ABSOLUTE: 0.63 E9/L (ref 0.1–0.95)
MONOCYTES RELATIVE PERCENT: 6.6 % (ref 2–12)
NEUTROPHILS ABSOLUTE: 6.41 E9/L (ref 1.8–7.3)
NEUTROPHILS RELATIVE PERCENT: 67.2 % (ref 43–80)
PDW BLD-RTO: 13.2 FL (ref 11.5–15)
PLATELET # BLD: 197 E9/L (ref 130–450)
PMV BLD AUTO: 10.1 FL (ref 7–12)
POTASSIUM SERPL-SCNC: 3.6 MMOL/L (ref 3.5–5)
RBC # BLD: 5.12 E12/L (ref 3.8–5.8)
SODIUM BLD-SCNC: 136 MMOL/L (ref 132–146)
TOTAL IRON BINDING CAPACITY: 274 MCG/DL (ref 250–450)
TOTAL PROTEIN: 6.2 G/DL (ref 6.4–8.3)
WBC # BLD: 9.5 E9/L (ref 4.5–11.5)

## 2023-03-09 PROCEDURE — 83550 IRON BINDING TEST: CPT

## 2023-03-09 PROCEDURE — G8417 CALC BMI ABV UP PARAM F/U: HCPCS | Performed by: INTERNAL MEDICINE

## 2023-03-09 PROCEDURE — G8484 FLU IMMUNIZE NO ADMIN: HCPCS | Performed by: INTERNAL MEDICINE

## 2023-03-09 PROCEDURE — 80053 COMPREHEN METABOLIC PANEL: CPT

## 2023-03-09 PROCEDURE — 83540 ASSAY OF IRON: CPT

## 2023-03-09 PROCEDURE — 36415 COLL VENOUS BLD VENIPUNCTURE: CPT

## 2023-03-09 PROCEDURE — 85025 COMPLETE CBC W/AUTO DIFF WBC: CPT

## 2023-03-09 PROCEDURE — 4004F PT TOBACCO SCREEN RCVD TLK: CPT | Performed by: INTERNAL MEDICINE

## 2023-03-09 PROCEDURE — 82232 ASSAY OF BETA-2 PROTEIN: CPT

## 2023-03-09 PROCEDURE — G8427 DOCREV CUR MEDS BY ELIG CLIN: HCPCS | Performed by: INTERNAL MEDICINE

## 2023-03-09 PROCEDURE — 83615 LACTATE (LD) (LDH) ENZYME: CPT

## 2023-03-09 PROCEDURE — 86334 IMMUNOFIX E-PHORESIS SERUM: CPT

## 2023-03-09 PROCEDURE — 3017F COLORECTAL CA SCREEN DOC REV: CPT | Performed by: INTERNAL MEDICINE

## 2023-03-09 PROCEDURE — 99212 OFFICE O/P EST SF 10 MIN: CPT

## 2023-03-09 PROCEDURE — 1123F ACP DISCUSS/DSCN MKR DOCD: CPT | Performed by: INTERNAL MEDICINE

## 2023-03-09 PROCEDURE — 99213 OFFICE O/P EST LOW 20 MIN: CPT | Performed by: INTERNAL MEDICINE

## 2023-03-09 PROCEDURE — 83883 ASSAY NEPHELOMETRY NOT SPEC: CPT

## 2023-03-09 PROCEDURE — 82728 ASSAY OF FERRITIN: CPT

## 2023-03-09 PROCEDURE — 82784 ASSAY IGA/IGD/IGG/IGM EACH: CPT

## 2023-03-09 PROCEDURE — 84165 PROTEIN E-PHORESIS SERUM: CPT

## 2023-03-09 NOTE — PROGRESS NOTES
Department of [facility] Oncology  Attending Clinic Note    Reason for Visit: Follow-up on a patient with Lymphoplasmacytic Lymphoma      PCP:  Gerry Acevedo MD    History of Present Illness:  78year old male initially seen at [facility] hematology for anemia and abnormal TP/albumin ratio. SPEP IgG and IgM kappa paraprotein, M-spike 3.29 g/dL. UIFE: IgM protein. 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   -Extensive involvement by atypitcal CD5-/CD10-B-cell lymphoproliferative disorder, comprising over 70% of marrow cellularity  -Mildly hypercellular marrow for age (30-40%) with trilineage pan hypoplasia  -Normocytic anemia. -IHC staining highlighted extensive CD20+ B cell infiltrate with admixed + plasma cells     Started on Ibrutinib 420 mg/day 12/2020 with good response so far    On 01/06/2021:  SPEP:  Monoclonal protein 1.72 g/dL  Persistent double monoclonal bands in the beta/gamma and gamma globuin regions. Bands previously identified as IgM kappa and IgG kappa (8/12/2020). IgM kappa decreased by 1.22 g/dL and IgG kappa minimally changed since last exam on 10/14/2020. Free kappa light chains: 413.4    (3.3-19. 4)   Free lambda light chains: 2.8      (5.7-26. 3)   K/L ratio:                          147.64 (0.26-1.65)    On 03/25/2021  SPEP:  Monoclonal protein: 1.03 (0-0) g/dL  Persistent double monoclonal bands in the beta/gamma and gamma globuin regions. Bands previously identified as IgM kappa and IgG kappa (8/12/2020). IgM kappa decreased by 0.69 g/dL and IgG kappa unchanged since 01/06/2021. Free kappa light chains: 149.4  (3.3-19. 4)   Free lambda light chains: 2.6    (5.7-26. 3)   K/L ratio:                          57.46 (0.26-1.65)    Patient started on oral iron 08/2020 for LANDON and B12 deficiency but required 2 doses of Feraheme on 01/21/2021 and 2021 and again in 2021.  Had recurrent iron deficiency with plan for Feraheme q3 months (next one scheduled on 2021). He does IM b12 injections at home.     On 2021  Hb: 15.5 Hct 44.9  MCV 90.5    WBC 6.9  BUN 10  Creat 0.9  Ca 9.3  Albumin 3.6    Fe: 94 FeSat: 33% TIBIC: 288  Ferritin: 81    VitB12 370     Ig  (700-1700)   IgA: <8     ()   IgM: 1370 ()    On 2021:  Hb 14.8  Hct 44.8  MCV 90.6  WBC 7.5    All cell line counts and morphology within normal limits.   Patient's history of lymphoplasmacytic lymphoma is noted.  The currently submitted blood smear is negative for circulating plasma cells, and negative for increased/atypical lymphocytes  Fe 60 TIBC 250 FeSat 24% Ferritin 73  BUN 8  Creat 0.8  LFTs wnl  Folate/B12 wnl  Beta-2 microglobulin 2.2    Peripheral blood flow cytometry noted small B-cell population with equivocal mild kappa light chain excess (1% of total cells).    SPEP: M-spike 0.8 g/dl  SIFE: IgM kappa monoclonal protein is present  IgM 1295 ()    Free kappa light chains:   80.85  (3.3-19.4)   Free lambda light chains: 3.23    (5.7-26.3)   K/L ratio:                           25.03 (0.26-1.65)    On 2021.   Hb 16.2  Hct 47.7  MCV 88.8  WBC 7.7     BUN 10 Creat 0.80  LFTs wnl  Beta-2 microglobulin 2.0  SPEP: M-spike 1 = 0.8 g/dl              M-spike 2 = 0.1 g/dl  SIFE: IgM kappa monoclonal protein is present, as previously described.     IgM 1267 ()    Free kappa light chains:   88.01  (3.3-19.4)   Free lambda light chains: 3.33    (5.7-26.3)   K/L ratio:                           26.43 (0.26-1.65)    On 2021:  Hb 16.4  Hct 48.1  MCV 87.5  WBC 6.9     BUN 7 Creat 0.90  LFTs wnl  Beta-2 microglobulin 1.9  SPEP: M-spike 1 = 0.7 g/dl              M-spike 2 = 0.1 g/dl  SIFE: IgM kappa monoclonal protein is present, as previously described.   IgG kappa monoclonal protein is present, as previously  described    IgM 1314 ()    Free kappa light chains:   80.24  (3.3-19. 4)   Free lambda light chains: 2.88    (5.7-26. 3)   K/L ratio:                           27.86 (0.26-1.65)    On 10/18/2021:  Hb 15.9   Hct 48.0  MCV 89.1  WBC 9.3     BUN 8 Creat 0.9  LFTs wnl  Beta-2 microglobulin 2.0  SPEP: M-spike 1 = 0.7 g/dl              M-spike 2 = 0.1 g/dl  SIFE: IgM kappa + faint IgG kappa monoclonal protein is present, as previously described. IgM 1308 ()    Free kappa light chains:   80.48  (3.3-19. 4)   Free lambda light chains: 2.87    (5.7-26. 3)   K/L ratio:                           28.04 (0.26-1.65)    Continued Ibrutinib    On 11/17/2021:  Hb 15.5   Hct 46.4  MCV 87.5  WBC 7.4     BUN 13 Creat 1.2  LFTs wnl  Beta-2 microglobulin 2.3  SPEP: M-spike 1 = 0.9 g/dl              M-spike 2 = 0.1 g/dl  SIFE: IgM kappa monoclonal protein is present, as previously described. A Faint IgG kappa monoclonal protein is present, as previously described. IgM 1347 ()    Free kappa light chains:   78.93  (3.3-19. 4)   Free lambda light chains: 3.92    (5.7-26. 3)   K/L ratio:                           20.14 (0.26-1.65)    On 12/15/2021:  Hb 15.3   Hct 46.5  MCV 90.3  WBC 9.1     BUN 10 Creat 0.9  LFTs wnl  Beta-2 microglobulin 2.5  SPEP: M-spike 1 = 0.8 g/dl              M-spike 2 = 0.1 g/dl  SIFE: IgM kappa + IgG kappa monoclonal protein is present, as previously described. IgM 1365 ()    Free kappa light chains:   84.08  (3.3-19. 4)   Free lambda light chains: 4.45    (5.7-26. 3)   K/L ratio:                           18.89 (0.26-1.65)    Continued Ibrutinib    On 01/20/2022:   Hb 14.9   Hct 45.1  MCV 88.1  WBC 6.3     BUN 7 Creat 0.8  LFTs wnl  Beta-2 microglobulin 2.3  SPEP: M-spike 1 = 0.7 g/dl              M-spike 2 = 0.1 g/dl  SIFE: IgM kappa + IgG kappa monoclonal protein is present, as previously described.     IgM 1142 ()    Free kappa light chains:   136.21 (3.3-19. 4)   Free lambda light chains: 5.29    (5.7-26. 3)   K/L ratio:                           25.75 (0.26-1.65)    Continued Ibrutinib    On 02/24/2022:   Hb 15.4   Hct 46.7  MCV 87.8  WBC 7.5     BUN 7 Creat 0.8  LFTs wnl  Beta-2 microglobulin 2.0  SPEP: M-spike = 0.8 g/dl    SIFE: IgM kappa monoclonal protein is present, as previously described  IgM 1279 ()    Free kappa light chains:   69.11  (3.3-19. 4)   Free lambda light chains: 4.50    (5.7-26. 3)   K/L ratio:                           15.36 (0.26-1.65)    On 03/02/2022:  Hb 15.5   Hct 46.9   MCV 90.0  WBC 9.8     BUN 9 Creat 0.7  LFTs wnl  Beta-2 microglobulin 1.6  SPEP: M-spike = 0.7 g/dl    SIFE: IgM kappa monoclonal protein is present, as previously described  IgM 1367 ()    Free kappa light chains:   55.56  (3.3-19. 4)   Free lambda light chains: 6.65    (5.7-26. 3)   K/L ratio:                           8.35 (0.26-1.65)    On 03/31/2022:  Hb 14.5   Hct 43.8   MCV 89.0  WBC 6.2     BUN 8 Creat 0.7  LFTs wnl  Beta-2 microglobulin 2.2  SPEP: M-spike = 0.6 g/dl    SIFE: IgM kappa monoclonal protein is present, as previously described  IgM 1061 ()    Free kappa light chains:   59.06  (3.3-19. 4)   Free lambda light chains: 6.00    (5.7-26. 3)   K/L ratio:                           9.84 (0.26-1.65)    On 04/28/2022:  Hb 15.2   Hct 45.3   MCV 87.5  WBC 5.7     BUN 10 Creat 0.8  LFTs wnl  Beta-2 microglobulin 1.8  SPEP: M-spike = 0.7 g/dl    SIFE: IgM kappa monoclonal protein is present, as previously described  IgM 1176 ()    Free kappa light chains:   65.90  (3.3-19. 4)   Free lambda light chains: 3.72    (5.7-26. 3)   K/L ratio:                           17.72 (0.26-1.65)    On 05/26/2022:  Hb 15.8   Hct 47.0   MCV 88.5  WBC 7.0     BUN 15 Creat 0.8  LFTs wnl  Beta-2 microglobulin 2.4  SPEP: M-spike = 0.8 g/dl    SIFE: IgM kappa monoclonal protein is present, as previously described  IgM 1161 ()    Free kappa light chains:   64.11  (3.3-19. 4)   Free lambda light chains: 4.28    (5.7-26. 3)   K/L ratio:                           14.98 (0.26-1.65)    On 06/23/2022:  Hb 15.5   Hct 47.1   MCV 89.2  WBC 7.9     BUN 10 Creat 0.8  LFTs wnl  Beta-2 microglobulin 2.3  SPEP: M-spike = 0.8 g/dl    SIFE: IgM kappa monoclonal protein is present, as previously described  IgM 1089 ()    Free kappa light chains:   56.20  (3.3-19. 4)   Free lambda light chains: 4.38    (5.7-26. 3)   K/L ratio:                           12.83 (0.26-1.65)    On 07/21/2022:  Hb 14.7   Hct 44.5   MCV 89.9  WBC 7.1     BUN 11 Creat 0.7  LFTs wnl  Beta-2 microglobulin 2.1  SPEP: M-spike = 0.9 g/dl    SIFE: IgM kappa monoclonal protein is present, as previously described  IgM 967 ()    Free kappa light chains:   52.84  (3.3-19. 4)   Free lambda light chains: 8.77    (5.7-26. 3)   K/L ratio:                           6.03 (0.26-1.65)    On 08/18/2022:  Hb 15.3  Hct 45.0  MCV 89.5  WBC 8.1     BUN 10  Creat 0.8  LFTs wnl  Beta-2 microglobulin 2.2  SPEP: M-spike 0.6 g/dL  SIFE: IgM kappa monoclonal protein is present, as previously described. IgM 1002 ()    On 09/15/2022:  Hb 15.2  Hct 45.2   MCV 86.6  WBC 7.0    BUN 10  Creat 0.8  Beta-2 microglobulin 1.9  SPEP: M-spike 0.6 g/dL  SIFE: IgM kappa monoclonal protein is present, as previously described. IgM 968 ()  Free kappa light chains:   59.02  (3.3-19. 4)   Free lambda light chains: 4.43    (5.7-26. 3)   K/L ratio:                           13.32 (0.26-1.65)    On 10/13/2022:  Hb 15.6  Hct 45.9  MCV 86.9    WBC 9.0  BUN 9 Creat 0.8  Beta-2 microglobulin 2.2  SPEP: M-spike 0.7 g/dL  SIFE: IgM kappa monoclonal protein is present, as previously described. IgM 1001 ()  Free kappa light chains:   52.17  (3.3-19. 4)   Free lambda light chains: 4.18    (5.7-26. 3)   K/L ratio:                           12.48 (0.26-1.65)    On 11/17/2022:  Hb 15.3   Hct 45.9  MCV 89.6      WBC 6.7  BUN 9 Creat 0.8  Beta-2 microglobulin 2.2  SPEP: M-spike 0.7 g/dL  SIFE: IgM kappa monoclonal protein is present, as previously described. IgM 944 ()  Free kappa light chains:   57.27  (3.3-19. 4)   Free lambda light chains: 3.78    (5.7-26. 3)   K/L ratio:                           15.15 (0.26-1.65)    On 12/15/2022:  Hb 15.1   Hct 45.6   MCV 88.5      WBC 8.7  BUN 9 Creat 0.9  Beta-2 microglobulin 2.5  SPEP: M-spike 0.8 g/dL  SIFE: IgM kappa monoclonal protein is present, as previously described. IgM 1097 ()  Free kappa light chains:   48.70  (3.3-19. 4)   Free lambda light chains: 3.84    (5.7-26. 3)   K/L ratio:                           12.68 (0.26-1.65)    On 01/12/2023:  Hb 14.4   Hct 43.0   MCV 86.2     WBC 9.1  BUN 10  Creat 0.8  Beta-2 microglobulin 2.2  SPEP: M-spike 0.7 g/dL  SIFE: IgM kappa monoclonal protein is present, as previously described. IgM 897 ()  Free kappa light chains:   49.51  (3.3-19. 4)   Free lambda light chains: 4.13    (5.7-26. 3)   K/L ratio:                           11.99 (0.26-1.65)    On 02/09/2023:  Hb 14.4   Hct 43.5   MCV 88.8     WBC 6.4  BUN 12  Creat 0.8  Beta-2 microglobulin 2.1  SPEP: M-spike 0.8 g/dL  SIFE: IgM kappa monoclonal protein is present, as previously described. IgM 837 ()  Free kappa light chains:   43.89  (3.3-19. 4)   Free lambda light chains: 3.70    (5.7-26. 3)   K/L ratio:                           11.86 (0.26-1.65)    Today 03/09/2023; No fever, chills. Fair appetite and energy level. Intermittent diarrhea. He is tolerating Ibrutinib well. Review of Systems;  CONSTITUTIONAL: No fever, chills. Fair appetite and energy level. ENMT: Eyes: No diplopia; Nose: No epistaxis. Mouth: No sore throat. RESPIRATORY: No hemoptysis, shortness of breath, cough. CARDIOVASCULAR: No chest pain, palpitations.   GASTROINTESTINAL: Intermittent diarrhea  GENITOURINARY: No dysuria, urinary frequency, hematuria. NEURO: No syncope, presyncope, headache. 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 Allergies:  No Known Allergies    Physical Exam:  /64   Pulse 92   Temp 97.8 °F (36.6 °C)   Ht 6' (1.829 m)   Wt 284 lb (128.8 kg)   SpO2 93%   BMI 38.52 kg/m²   GENERAL: Alert, oriented x 3, not in acute distress. HEENT: PERRLA; EOMI. Oropharynx clear. EXTREMITIES: Without clubbing or cyanosis or edema. NEUROLOGIC: No focal deficits. ECOG PS 1    Lab Results   Component Value Date    WBC 9.5 03/09/2023    HGB 14.9 03/09/2023    HCT 46.3 03/09/2023    MCV 90.4 03/09/2023     03/09/2023     Lab Results   Component Value Date     03/09/2023    K 3.6 03/09/2023    CL 98 03/09/2023    CO2 26 03/09/2023    BUN 11 03/09/2023    CREATININE 0.8 03/09/2023    GLUCOSE 199 (H) 03/09/2023    CALCIUM 9.7 03/09/2023    PROT 6.2 (L) 03/09/2023    LABALBU 3.8 03/09/2023    BILITOT 0.8 03/09/2023    ALKPHOS 60 03/09/2023    AST 11 03/09/2023    ALT 12 03/09/2023    LABGLOM >60 03/09/2023    GFRAA >60 10/13/2022     Impression/Plan:  77 y/o male with 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 UIFE: IgM protein.      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   -Extensive involvement by atypitcal CD5-/CD10-B-cell lymphoproliferative disorder, comprising over 70% of marrow cellularity  -Mildly hypercellular marrow for age (30-40%) with trilineage pan hypoplasia  -Normocytic anemia. -IHC staining highlighted extensive CD20+ B cell infiltrate with admixed + plasma cells     Started on Ibrutinib 420 mg/day 2020 with good response so far    On 2021:  SPEP:  Monoclonal protein 1.72 g/dL  Persistent double monoclonal bands in the beta/gamma and gamma globuin regions. Bands previously identified as IgM kappa and IgG kappa (2020). IgM kappa decreased by 1.22 g/dL and IgG kappa minimally changed since last exam on 10/14/2020. Free kappa light chains: 413.4    (3.3-19. 4)   Free lambda light chains: 2.8      (5.7-26. 3)   K/L ratio:                          147.64 (0.26-1.65)    On 2021  SPEP:  Monoclonal protein: 1.03 (0-0) g/dL  Persistent double monoclonal bands in the beta/gamma and gamma globuin regions. Bands previously identified as IgM kappa and IgG kappa (2020). IgM kappa decreased by 0.69 g/dL and IgG kappa unchanged since 2021. Free kappa light chains: 149.4  (3.3-19. 4)   Free lambda light chains: 2.6    (5.7-26. 3)   K/L ratio:                          57.46 (0.26-1.65)    Patient started on oral iron 2020 for LANDON and B12 deficiency but required 2 doses of Feraheme on 2021 and 2021 and again in 2021. Had recurrent iron deficiency with plan for Feraheme q3 months (next one scheduled on 2021). He does IM b12 injections at home (last one on 2021). On 2021  Hb: 15.5 Hct 44.9  MCV 90.5    WBC 6.9  BUN 10  Creat 0.9  Ca 9.3  Albumin 3.6    Fe: 94 FeSat: 33% TIBIC: 288  Ferritin: 81    VitB12 370     Ig  (700-1700)   IgA: <8     ()   IgM: 1370 ()    On 2021:  Hb 14.8  Hct 44.8  MCV 90.6  WBC 7.5    All cell line counts and morphology within normal limits. Patient's history of lymphoplasmacytic lymphoma is noted.   The currently submitted blood smear is negative for circulating plasma cells, and negative for increased/atypical lymphocytes  Fe 60 TIBC 250 FeSat 24% Ferritin 73  BUN 8  Creat 0.8  LFTs wnl  Folate/B12 wnl  Beta-2 microglobulin 2.2    Peripheral blood flow cytometry noted small B-cell population with equivocal mild kappa light chain excess (1% of total cells). SPEP: M-spike 0.8 g/dl  SIFE: IgM kappa monoclonal protein is present  IgM 1295 ()    Free kappa light chains:   80.85  (3.3-19. 4)   Free lambda light chains: 3.23    (5.7-26. 3)   K/L ratio:                           25.03 (0.26-1.65)    On 08/16/2021. Hb 16.2  Hct 47.7  MCV 88.8  WBC 7.7     BUN 10 Creat 0.80  LFTs wnl  Beta-2 microglobulin 2.0  SPEP: M-spike 1 = 0.8 g/dl              M-spike 2 = 0.1 g/dl  SIFE: IgM kappa monoclonal protein is present, as previously described. IgM 1267 ()    Free kappa light chains:   88.01  (3.3-19. 4)   Free lambda light chains: 3.33    (5.7-26. 3)   K/L ratio:                           26.43 (0.26-1.65)    On 09/20/2021:  Hb 16.4  Hct 48.1  MCV 87.5  WBC 6.9     BUN 7 Creat 0.90  LFTs wnl  Beta-2 microglobulin 1.9  SPEP: M-spike 1 = 0.7 g/dl              M-spike 2 = 0.1 g/dl  SIFE: IgM kappa monoclonal protein is present, as previously described. IgG kappa monoclonal protein is present, as previously described    IgM 1314 ()    Free kappa light chains:   80.24  (3.3-19. 4)   Free lambda light chains: 2.88    (5.7-26. 3)   K/L ratio:                           27.86 (0.26-1.65)    On 10/18/2021:  Hb 15.9   Hct 48.0  MCV 89.1  WBC 9.3     BUN 8 Creat 0.9  LFTs wnl  Beta-2 microglobulin 2.0  SPEP: M-spike 1 = 0.7 g/dl              M-spike 2 = 0.1 g/dl  SIFE: IgM kappa + faint IgG kappa monoclonal protein is present, as previously described. IgM 1308 ()    Free kappa light chains:   80.48  (3.3-19. 4)   Free lambda light chains: 2.87    (5.7-26. 3)   K/L ratio:                           28.04 (0.26-1.65)    On 11/17/2021:  Hb 15.5   Hct 46.4  MCV 87.5  WBC 7.4     BUN 13 Creat 1.2  LFTs wnl  Beta-2 microglobulin 2.3  SPEP: M-spike 1 = 0.9 g/dl              M-spike 2 = 0.1 g/dl  SIFE: IgM kappa monoclonal protein is present, as previously described. A Faint IgG kappa monoclonal protein is present, as previously described. IgM 1347 ()    Free kappa light chains:   78.93  (3.3-19. 4)   Free lambda light chains: 3.92    (5.7-26. 3)   K/L ratio:                           20.14 (0.26-1.65)    On 12/15/2021:  Hb 15.3   Hct 46.5  MCV 90.3  WBC 9.1     BUN 10 Creat 0.9  LFTs wnl  Beta-2 microglobulin 2.5  SPEP: M-spike 1 = 0.8 g/dl              M-spike 2 = 0.1 g/dl  SIFE: IgM kappa + IgG kappa monoclonal protein is present, as previously described. IgM 1365 ()    Free kappa light chains:   84.08  (3.3-19. 4)   Free lambda light chains: 4.45    (5.7-26. 3)   K/L ratio:                           18.89 (0.26-1.65)    On 01/20/2022:   Hb 14.9   Hct 45.1  MCV 88.1  WBC 6.3     BUN 7 Creat 0.8  LFTs wnl  Beta-2 microglobulin 2.3  SPEP: M-spike 1 = 0.7 g/dl              M-spike 2 = 0.1 g/dl  SIFE: IgM kappa + IgG kappa monoclonal protein is present, as previously described. IgM 1142 ()    Free kappa light chains:   136.21  (3.3-19. 4)   Free lambda light chains: 5.29    (5.7-26. 3)   K/L ratio:                           25.75 (0.26-1.65)    Continued Ibrutinib    On 02/24/2022:   Hb 15.4   Hct 46.7  MCV 87.8  WBC 7.5     BUN 7 Creat 0.8  LFTs wnl  Beta-2 microglobulin 2.0  SPEP: M-spike = 0.8 g/dl    SIFE: IgM kappa monoclonal protein is present, as previously described  IgM 1279 ()    Free kappa light chains:   69.11  (3.3-19. 4)   Free lambda light chains: 4.50    (5.7-26. 3)   K/L ratio:                           15.36 (0.26-1.65)    On 03/02/2022:  Hb 15.5   Hct 46.9   MCV 90.0  WBC 9.8     BUN 9 Creat 0.7  LFTs wnl  Beta-2 microglobulin 1.6  SPEP: M-spike = 0.7 g/dl    SIFE: IgM kappa monoclonal protein is present, as previously described  IgM 1367 ()    Free kappa light chains:   55.56  (3.3-19. 4)   Free lambda light chains: 6.65    (5.7-26. 3)   K/L ratio:                           8.35 (0.26-1.65)    CT chest 03/24/2022 noted chronic changes of the lungs including emphysema with midlung scarring atelectasis greatest in the lingular segment without a definitive dominant nodule or mass. No acute intrathoracic process. Imaging reviewed. On 03/31/2022:  Hb 14.5   Hct 43.8   MCV 89.0  WBC 6.2     BUN 8 Creat 0.7  LFTs wnl  Beta-2 microglobulin 2.2  SPEP: M-spike = 0.6 g/dl    SIFE: IgM kappa monoclonal protein is present, as previously described  IgM 1061 ()    Free kappa light chains:   59.06  (3.3-19. 4)   Free lambda light chains: 6.00    (5.7-26. 3)   K/L ratio:                           9.84 (0.26-1.65)    On 04/28/2022:  Hb 15.2   Hct 45.3   MCV 87.5  WBC 5.7     BUN 10 Creat 0.8  LFTs wnl  Beta-2 microglobulin 1.8  SPEP: M-spike = 0.7 g/dl    SIFE: IgM kappa monoclonal protein is present, as previously described  IgM 1176 ()    Free kappa light chains:   65.90  (3.3-19. 4)   Free lambda light chains: 3.72    (5.7-26. 3)   K/L ratio:                           17.72 (0.26-1.65)    On 05/26/2022:  Hb 15.8   Hct 47.0   MCV 88.5  WBC 7.0     BUN 15 Creat 0.8  LFTs wnl  Beta-2 microglobulin 2.4  SPEP: M-spike = 0.8 g/dl    SIFE: IgM kappa monoclonal protein is present, as previously described  IgM 1161 ()    Free kappa light chains:   64.11  (3.3-19. 4)   Free lambda light chains: 4.28    (5.7-26. 3)   K/L ratio:                           14.98 (0.26-1.65)    On 06/23/2022:  Hb 15.5   Hct 47.1   MCV 89.2  WBC 7.9     BUN 10 Creat 0.8  LFTs wnl  Beta-2 microglobulin 2.3  SPEP: M-spike = 0.8 g/dl    SIFE: IgM kappa monoclonal protein is present, as previously described  IgM 1089 ()    Free kappa light chains:   56.20  (3.3-19. 4)   Free lambda light chains: 4.38    (5.7-26. 3)   K/L ratio:                           12.83 (0. 26-1.65)    On 07/21/2022:  Hb 14.7   Hct 44.5   MCV 89.9  WBC 7.1     BUN 11 Creat 0.7  LFTs wnl  Beta-2 microglobulin 2.1  SPEP: M-spike = 0.9 g/dl    SIFE: IgM kappa monoclonal protein is present, as previously described  IgM 967 ()    Free kappa light chains:   52.84  (3.3-19. 4)   Free lambda light chains: 8.77    (5.7-26. 3)   K/L ratio:                           6.03 (0.26-1.65)    On 08/18/2022:  Hb 15.3  Hct 45.0  MCV 89.5  WBC 8.1     BUN 10  Creat 0.8  LFTs wnl  Beta-2 microglobulin 2.2  SPEP: M-spike 0.6 g/dL  SIFE: IgM kappa monoclonal protein is present, as previously described. IgM 1002 ()    On 09/15/2022:  Hb 15.2  Hct 45.2   MCV 86.6  WBC 7.0    BUN 10  Creat 0.8  Beta-2 microglobulin 1.9  SPEP: M-spike 0.6 g/dL  SIFE: IgM kappa monoclonal protein is present, as previously described. IgM 968 ()  Free kappa light chains:   59.02  (3.3-19. 4)   Free lambda light chains: 4.43    (5.7-26. 3)   K/L ratio:                           13.32 (0.26-1.65)    On 10/13/2022:  Hb 15.6  Hct 45.9  MCV 86.9    WBC 9.0  BUN 9 Creat 0.8  Beta-2 microglobulin 2.2  SPEP: M-spike 0.7 g/dL  SIFE: IgM kappa monoclonal protein is present, as previously described. IgM 1001 ()  Free kappa light chains:   52.17  (3.3-19. 4)   Free lambda light chains: 4.18    (5.7-26. 3)   K/L ratio:                           12.48 (0.26-1.65)    On 11/17/2022:  Hb 15.3   Hct 45.9  MCV 89.6      WBC 6.7  BUN 9 Creat 0.8  Beta-2 microglobulin 2.2  SPEP: M-spike 0.7 g/dL  SIFE: IgM kappa monoclonal protein is present, as previously described. IgM 944 ()  Free kappa light chains:   57.27  (3.3-19. 4)   Free lambda light chains: 3.78    (5.7-26. 3)   K/L ratio:                           15.15 (0.26-1.65)    On 12/15/2022:  Hb 15.1   Hct 45.6   MCV 88.5      WBC 8.7  BUN 9 Creat 0.9  Beta-2 microglobulin 2.5  SPEP: M-spike 0.8 g/dL  SIFE: IgM kappa monoclonal protein is present, as previously described. IgM 1097 ()  Free kappa light chains:   48.70  (3.3-19. 4)   Free lambda light chains: 3.84    (5.7-26. 3)   K/L ratio:                           12.68 (0.26-1.65)    On 01/12/2023:  Hb 14.4   Hct 43.0   MCV 86.2     WBC 9.1  BUN 10  Creat 0.8  Beta-2 microglobulin 2.2  SPEP: M-spike 0.7 g/dL  SIFE: IgM kappa monoclonal protein is present, as previously described. IgM 897 ()  Free kappa light chains:   49.51  (3.3-19. 4)   Free lambda light chains: 4.13    (5.7-26. 3)   K/L ratio:                           11.99 (0.26-1.65)    On 02/09/2023:  Hb 14.4   Hct 43.5   MCV 88.8     WBC 6.4  BUN 12  Creat 0.8  Beta-2 microglobulin 2.1  SPEP: M-spike 0.8 g/dL  SIFE: IgM kappa monoclonal protein is present, as previously described. IgM 837 ()  Free kappa light chains:   43.89  (3.3-19. 4)   Free lambda light chains: 3.70    (5.7-26. 3)   K/L ratio:                           11.86 (0.26-1.65)    On 03/09/2023:  Hb 14.9   Hct 46.3    MCV 90.4      WBC 9.5  BUN 11 Creat 0.8  Myeloma markers pending.    Held weekly Rituximab x 4  Continue Ibrutinib    RTC 1 month with labs     03/09/2023  Delgado Monsalve MD

## 2023-03-10 ENCOUNTER — HOSPITAL ENCOUNTER (EMERGENCY)
Age: 78
Discharge: HOME | End: 2023-03-10
Attending: EMERGENCY MEDICINE
Payer: OTHER GOVERNMENT

## 2023-03-10 ENCOUNTER — APPOINTMENT (OUTPATIENT)
Dept: CT IMAGING | Age: 78
End: 2023-03-10
Payer: OTHER GOVERNMENT

## 2023-03-10 VITALS
OXYGEN SATURATION: 96 % | DIASTOLIC BLOOD PRESSURE: 77 MMHG | BODY MASS INDEX: 38.33 KG/M2 | HEIGHT: 72 IN | WEIGHT: 283 LBS | RESPIRATION RATE: 16 BRPM | TEMPERATURE: 98.4 F | SYSTOLIC BLOOD PRESSURE: 152 MMHG | HEART RATE: 92 BPM

## 2023-03-10 DIAGNOSIS — N30.91 CYSTITIS WITH HEMATURIA: Primary | ICD-10-CM

## 2023-03-10 DIAGNOSIS — N20.0 NEPHROLITHIASIS: ICD-10-CM

## 2023-03-10 DIAGNOSIS — R03.0 ELEVATED BLOOD PRESSURE READING: ICD-10-CM

## 2023-03-10 LAB
ALBUMIN SERPL-MCNC: 2.4 G/DL (ref 3.5–4.7)
ALPHA-1-GLOBULIN: 0.3 G/DL (ref 0.2–0.4)
ALPHA-2-GLOBULIN: 0.9 G/DL (ref 0.5–1)
AMORPHOUS: ABNORMAL
ANION GAP SERPL CALCULATED.3IONS-SCNC: 18 MMOL/L (ref 7–16)
BACTERIA: ABNORMAL /HPF
BETA GLOBULIN: 0.9 G/DL (ref 0.8–1.3)
BILIRUBIN URINE: NEGATIVE
BLOOD, URINE: ABNORMAL
BUN BLDV-MCNC: 14 MG/DL (ref 6–23)
CALCIUM SERPL-MCNC: 10 MG/DL (ref 8.6–10.2)
CHLORIDE BLD-SCNC: 96 MMOL/L (ref 98–107)
CLARITY: CLEAR
CO2: 24 MMOL/L (ref 22–29)
COLOR: YELLOW
CREAT SERPL-MCNC: 0.9 MG/DL (ref 0.7–1.2)
ELECTROPHORESIS: ABNORMAL
GAMMA GLOBULIN: 1.1 G/DL (ref 0.7–1.6)
GFR SERPL CREATININE-BSD FRML MDRD: >60 ML/MIN/1.73
GLUCOSE BLD-MCNC: 195 MG/DL (ref 74–99)
GLUCOSE URINE: 100 MG/DL
IGA: 17 MG/DL (ref 70–400)
IGG: 194 MG/DL (ref 700–1600)
IGM: 848 MG/DL (ref 40–230)
IMMUNOFIXATION RESULT, SERUM: NORMAL
KAPPA FREE LIGHT CHAINS QNT: 41.48 MG/L (ref 3.3–19.4)
KAPPA/LAMBDA FREE LIGHT CHAIN RATIO: 10.14 (ref 0.26–1.65)
KETONES, URINE: ABNORMAL MG/DL
LAMBDA FREE LIGHT CHAINS QNT: 4.09 MG/L (ref 5.71–26.3)
LEUKOCYTE ESTERASE, URINE: ABNORMAL
NITRITE, URINE: POSITIVE
PH UA: 5 (ref 5–9)
POTASSIUM SERPL-SCNC: 4.1 MMOL/L (ref 3.5–5)
PROTEIN UA: >=300 MG/DL
RBC UA: ABNORMAL /HPF (ref 0–2)
SODIUM BLD-SCNC: 138 MMOL/L (ref 132–146)
SPECIFIC GRAVITY UA: 1.02 (ref 1–1.03)
UROBILINOGEN, URINE: 1 E.U./DL
WBC UA: ABNORMAL /HPF (ref 0–5)

## 2023-03-10 PROCEDURE — 36415 COLL VENOUS BLD VENIPUNCTURE: CPT

## 2023-03-10 PROCEDURE — 6370000000 HC RX 637 (ALT 250 FOR IP): Performed by: EMERGENCY MEDICINE

## 2023-03-10 PROCEDURE — 99284 EMERGENCY DEPT VISIT MOD MDM: CPT

## 2023-03-10 PROCEDURE — 74176 CT ABD & PELVIS W/O CONTRAST: CPT

## 2023-03-10 PROCEDURE — 87088 URINE BACTERIA CULTURE: CPT

## 2023-03-10 PROCEDURE — 80048 BASIC METABOLIC PNL TOTAL CA: CPT

## 2023-03-10 PROCEDURE — 81001 URINALYSIS AUTO W/SCOPE: CPT

## 2023-03-10 RX ORDER — CEFDINIR 300 MG/1
300 CAPSULE ORAL 2 TIMES DAILY
Qty: 14 CAPSULE | Refills: 0 | Status: SHIPPED | OUTPATIENT
Start: 2023-03-10 | End: 2023-03-17

## 2023-03-10 RX ORDER — CEFDINIR 300 MG/1
300 CAPSULE ORAL ONCE
Status: COMPLETED | OUTPATIENT
Start: 2023-03-10 | End: 2023-03-10

## 2023-03-10 RX ADMIN — CEFDINIR 300 MG: 300 CAPSULE ORAL at 23:17

## 2023-03-11 NOTE — ED PROVIDER NOTES
HPI:  3/10/23, Time: 10:46 PM CARLENE Davison is a 78 y.o. male presenting to the ED for gross hematuria, beginning 3-4 days ago. The complaint has been intermittent, mild in severity, and worsened by nothing. Patient's not had any fever/chills, no nausea/vomiting, no abdominal pain/flank pain, no burning with urination, no urinary frequency nor hesitancy associated. Patient does have history of kidney stones diagnosed previously November 2022. Patient states he does not have a urologist to follow-up with as he was not previously referred to urology. Patient does have a history of chronic anticoagulation with Eliquis due to history of atrial fibrillation. No relieving factors are reported. Patient has not had any bleeding from any other sources--no hemoptysis, no gingival bleeding, no melena, no hematochezia. No other complaints. Review of Systems:   A complete review of systems was performed and pertinent positives and negatives are stated within HPI, all other systems reviewed and are negative.    --------------------------------------------- PAST HISTORY ---------------------------------------------  Past Medical History:  has a past medical history of Abscess of right thumb, Amputation of right thumb, Anemia, Depression, Diabetes mellitus ([address] Hyperlipidemia, Hypertension, and Obesity. Past Surgical History:  has a past surgical history that includes Neck surgery; Patella surgery; hernia repair; shoulder surgery; Tonsillectomy; Upper gastrointestinal endoscopy; and Colonoscopy. Social History:  reports that he has quit smoking. His smoking use included cigars and cigarettes. He smoked an average of 2 packs per day. He has never used smokeless tobacco. He reports that he does not currently use alcohol. He reports that he does not use drugs. Family History: family history includes Heart Attack in his sister. The patients home medications have been reviewed.     Allergies: Patient has no known allergies.    -------------------------------------------------- RESULTS -------------------------------------------------  All laboratory and radiology results have been personally reviewed by myself   LABS:  Results for orders placed or performed during the hospital encounter of 03/10/23   Basic Metabolic Panel   Result Value Ref Range    Sodium 138 132 - 146 mmol/L    Potassium 4.1 3.5 - 5.0 mmol/L    Chloride 96 (L) 98 - 107 mmol/L    CO2 24 22 - 29 mmol/L    Anion Gap 18 (H) 7 - 16 mmol/L    Glucose 195 (H) 74 - 99 mg/dL    BUN 14 6 - 23 mg/dL    Creatinine 0.9 0.7 - 1.2 mg/dL    Est, Glom Filt Rate >60 >=60 mL/min/1.73    Calcium 10.0 8.6 - 10.2 mg/dL   Urinalysis   Result Value Ref Range    Color, UA Yellow Straw/Yellow    Clarity, UA Clear Clear    Glucose, Ur 100 (A) Negative mg/dL    Bilirubin Urine Negative Negative    Ketones, Urine TRACE (A) Negative mg/dL    Specific Gravity, UA 1.025 1.005 - 1.030    Blood, Urine LARGE (A) Negative    pH, UA 5.0 5.0 - 9.0    Protein, UA >=300 (A) Negative mg/dL    Urobilinogen, Urine 1.0 <2.0 E.U./dL    Nitrite, Urine POSITIVE (A) Negative    Leukocyte Esterase, Urine TRACE (A) Negative   Microscopic Urinalysis   Result Value Ref Range    WBC, UA 1-3 0 - 5 /HPF    RBC, UA PACKED 0 - 2 /HPF    Bacteria, UA FEW (A) None Seen /HPF    Amorphous, UA MANY        RADIOLOGY:  Interpreted by Radiologist.  CT ABDOMEN PELVIS WO CONTRAST Additional Contrast? None   Final Result   1. No acute disease.   2. No obstructive uropathy.  Unchanged left nephrolithiasis measuring up to   13 mm and unchanged nonobstructing right distal ureterolithiasis and left   bladder calculus at the left internal UVJ.   3. Cholelithiasis/sludge noted.      RECOMMENDATIONS:   Careful clinical correlation and follow up recommended.             ------------------------- NURSING NOTES AND VITALS REVIEWED ---------------------------    The nursing notes within the ED encounter and vital  signs as below have been reviewed. BP (!) 152/77   Pulse 92   Temp 98.4 °F (36.9 °C) (Oral)   Resp 16   Ht 6' (1.829 m)   Wt 283 lb (128.4 kg)   SpO2 96%   BMI 38.38 kg/m²   Oxygen Saturation Interpretation: Normal    ---------------------------------------------------PHYSICAL EXAM--------------------------------------    Constitutional/General: Alert and oriented x3, well appearing, non toxic in NAD  Head: Normocephalic and atraumatic  Eyes: PERRL, EOMI  Mouth: Oropharynx clear, handling secretions, no trismus  Neck: Supple, full ROM, no JVD, trachea midline  Pulmonary: Lungs clear to auscultation bilaterally, no wheezes, rales, or rhonchi. Not in respiratory distress  Cardiovascular:  Regular rate and rhythm, no murmurs, gallops, or rubs. 2+ distal pulses  Abdomen: Soft, non tender, non distended, no organomegaly no masses no guarding no rigidity normal bowel sounds  Extremities: Moves all extremities x 4. Warm and well perfused  Skin: warm and dry without rash  Neurologic: GCS 15, cranial nerves II through XII intact. No focal deficits. Psych: Normal Affect    ------------------------------ ED COURSE/MEDICAL DECISION MAKING----------------------  Medications   cefdinir (OMNICEF) capsule 300 mg (300 mg Oral Given 3/10/23 2317)       ED COURSE:     Medical Decision Making:   Differential diagnoses: UTI/cystitis, ureteral calculus, acute renal injury, to name a few. Patient did have positive nitrites with packed RBCs. Few bacteria. Urine culture was sent but the patient was started on antibiotics with Omnicef 300 mg p.o. Patient has not had any flank pain to suggest an obstructing ureteral calculus. CT abdomen pelvis without contrast showed, per radiologist report \"no acute disease no obstructive uropathy unchanged left nephrolithiasis and unchanged nonobstructing right distal ureteral lithiasis. \"  Patient's metabolic profile showed a glucose of 195 but with a normal BUN/creatinine.   Patient did have a mildly elevated anion gap of 18; BMP was otherwise normal.  Patient was advised of his mildly elevated blood glucose and will follow-up with his primary care physician for reevaluation of this and for reevaluation of his blood pressure. [employer] He is given referral to urology for follow-up within 3 days for reevaluation of his hematuria. Shared decision-making was involved and the patient is in complete agreement with the plan of care. Counseling: The emergency provider has spoken with the patient and discussed todays results, in addition to providing specific details for the plan of care and counseling regarding the diagnosis and prognosis. Questions are answered at this time and they are agreeable with the plan.    --------------------------------- IMPRESSION AND DISPOSITION ---------------------------------    IMPRESSION  1. Cystitis with hematuria    2. Nephrolithiasis    3. Elevated blood pressure reading        DISPOSITION  Disposition: Discharge to home  Patient condition is stable      NOTE: This report was transcribed using voice recognition software.  Every effort was made to ensure accuracy; however, inadvertent computerized transcription errors may be present        Polina Henriquez MD  03/11/23 [id number]

## 2023-03-12 LAB — URINE CULTURE, ROUTINE: NORMAL

## 2023-03-13 LAB — BETA-2 MICROGLOBULIN: 2.8 MG/L (ref 0.6–2.4)

## 2023-04-06 ENCOUNTER — HOSPITAL ENCOUNTER (OUTPATIENT)
Dept: INFUSION THERAPY | Age: 78
Discharge: HOME | End: 2023-04-06
Payer: OTHER GOVERNMENT

## 2023-04-06 ENCOUNTER — OFFICE VISIT (OUTPATIENT)
Dept: ONCOLOGY | Age: 78
End: 2023-04-06
Payer: MEDICARE

## 2023-04-06 VITALS
HEIGHT: 72 IN | TEMPERATURE: 97.8 F | WEIGHT: 285.9 LBS | DIASTOLIC BLOOD PRESSURE: 74 MMHG | OXYGEN SATURATION: 96 % | HEART RATE: 65 BPM | BODY MASS INDEX: 38.72 KG/M2 | SYSTOLIC BLOOD PRESSURE: 172 MMHG

## 2023-04-06 DIAGNOSIS — C83.00 MALIGNANT LYMPHOPLASMACYTIC LYMPHOMA (HCC): Primary | ICD-10-CM

## 2023-04-06 DIAGNOSIS — C83.00 MALIGNANT LYMPHOPLASMACYTIC LYMPHOMA (HCC): ICD-10-CM

## 2023-04-06 LAB
ALBUMIN SERPL-MCNC: 3.7 G/DL (ref 3.5–5.2)
ALP SERPL-CCNC: 58 U/L (ref 40–129)
ALT SERPL-CCNC: 25 U/L (ref 0–40)
ANION GAP SERPL CALCULATED.3IONS-SCNC: 13 MMOL/L (ref 7–16)
AST SERPL-CCNC: 20 U/L (ref 0–39)
BASOPHILS # BLD: 0.06 E9/L (ref 0–0.2)
BASOPHILS NFR BLD: 0.8 % (ref 0–2)
BILIRUB SERPL-MCNC: 0.8 MG/DL (ref 0–1.2)
BUN SERPL-MCNC: 13 MG/DL (ref 6–23)
CALCIUM SERPL-MCNC: 9.3 MG/DL (ref 8.6–10.2)
CHLORIDE SERPL-SCNC: 99 MMOL/L (ref 98–107)
CO2 SERPL-SCNC: 25 MMOL/L (ref 22–29)
CREAT SERPL-MCNC: 0.9 MG/DL (ref 0.7–1.2)
EOSINOPHIL # BLD: 0.2 E9/L (ref 0.05–0.5)
EOSINOPHIL NFR BLD: 2.8 % (ref 0–6)
ERYTHROCYTE [DISTWIDTH] IN BLOOD BY AUTOMATED COUNT: 13.5 FL (ref 11.5–15)
FERRITIN SERPL-MCNC: 150 NG/ML
GLUCOSE SERPL-MCNC: 173 MG/DL (ref 74–99)
HCT VFR BLD AUTO: 44 % (ref 37–54)
HGB BLD-MCNC: 14 G/DL (ref 12.5–16.5)
IMM GRANULOCYTES # BLD: 0.1 E9/L
IMM GRANULOCYTES NFR BLD: 1.4 % (ref 0–5)
IRON SATN MFR SERPL: 44 % (ref 20–55)
IRON SERPL-MCNC: 123 MCG/DL (ref 59–158)
LDH SERPL-CCNC: 114 U/L (ref 135–225)
LYMPHOCYTES # BLD: 1.82 E9/L (ref 1.5–4)
LYMPHOCYTES NFR BLD: 25.4 % (ref 20–42)
MCH RBC QN AUTO: 29.2 PG (ref 26–35)
MCHC RBC AUTO-ENTMCNC: 31.8 % (ref 32–34.5)
MCV RBC AUTO: 91.9 FL (ref 80–99.9)
MONOCYTES # BLD: 0.57 E9/L (ref 0.1–0.95)
MONOCYTES NFR BLD: 7.9 % (ref 2–12)
NEUTROPHILS # BLD: 4.42 E9/L (ref 1.8–7.3)
NEUTS SEG NFR BLD: 61.7 % (ref 43–80)
PLATELET # BLD AUTO: 176 E9/L (ref 130–450)
PMV BLD AUTO: 10.9 FL (ref 7–12)
POTASSIUM SERPL-SCNC: 3.5 MMOL/L (ref 3.5–5)
PROT SERPL-MCNC: 5.7 G/DL (ref 6.4–8.3)
RBC # BLD AUTO: 4.79 E12/L (ref 3.8–5.8)
SODIUM SERPL-SCNC: 137 MMOL/L (ref 132–146)
TIBC SERPL-MCNC: 280 MCG/DL (ref 250–450)
WBC # BLD: 7.2 E9/L (ref 4.5–11.5)

## 2023-04-06 PROCEDURE — 83615 LACTATE (LD) (LDH) ENZYME: CPT

## 2023-04-06 PROCEDURE — 86334 IMMUNOFIX E-PHORESIS SERUM: CPT

## 2023-04-06 PROCEDURE — 83550 IRON BINDING TEST: CPT

## 2023-04-06 PROCEDURE — 99214 OFFICE O/P EST MOD 30 MIN: CPT | Performed by: INTERNAL MEDICINE

## 2023-04-06 PROCEDURE — 82728 ASSAY OF FERRITIN: CPT

## 2023-04-06 PROCEDURE — 85025 COMPLETE CBC W/AUTO DIFF WBC: CPT

## 2023-04-06 PROCEDURE — 80053 COMPREHEN METABOLIC PANEL: CPT

## 2023-04-06 PROCEDURE — G8417 CALC BMI ABV UP PARAM F/U: HCPCS | Performed by: INTERNAL MEDICINE

## 2023-04-06 PROCEDURE — 82232 ASSAY OF BETA-2 PROTEIN: CPT

## 2023-04-06 PROCEDURE — 83540 ASSAY OF IRON: CPT

## 2023-04-06 PROCEDURE — 36415 COLL VENOUS BLD VENIPUNCTURE: CPT

## 2023-04-06 PROCEDURE — 1036F TOBACCO NON-USER: CPT | Performed by: INTERNAL MEDICINE

## 2023-04-06 PROCEDURE — 82784 ASSAY IGA/IGD/IGG/IGM EACH: CPT

## 2023-04-06 PROCEDURE — 3017F COLORECTAL CA SCREEN DOC REV: CPT | Performed by: INTERNAL MEDICINE

## 2023-04-06 PROCEDURE — 1123F ACP DISCUSS/DSCN MKR DOCD: CPT | Performed by: INTERNAL MEDICINE

## 2023-04-06 PROCEDURE — G8427 DOCREV CUR MEDS BY ELIG CLIN: HCPCS | Performed by: INTERNAL MEDICINE

## 2023-04-06 PROCEDURE — 99212 OFFICE O/P EST SF 10 MIN: CPT

## 2023-04-06 PROCEDURE — 84165 PROTEIN E-PHORESIS SERUM: CPT

## 2023-04-06 PROCEDURE — 83883 ASSAY NEPHELOMETRY NOT SPEC: CPT

## 2023-04-06 NOTE — PROGRESS NOTES
Patient provided with discharge instructions, received printed AVS.  All questions answered. Patient understands follow up plan of care.
lambda light chains: 4.50    (5.7-26. 3)   K/L ratio:                           15.36 (0.26-1.65)    On 03/02/2022:  Hb 15.5   Hct 46.9   MCV 90.0  WBC 9.8     BUN 9 Creat 0.7  LFTs wnl  Beta-2 microglobulin 1.6  SPEP: M-spike = 0.7 g/dl    SIFE: IgM kappa monoclonal protein is present, as previously described  IgM 1367 ()    Free kappa light chains:   55.56  (3.3-19. 4)   Free lambda light chains: 6.65    (5.7-26. 3)   K/L ratio:                           8.35 (0.26-1.65)    CT chest 03/24/2022 noted chronic changes of the lungs including emphysema with midlung scarring atelectasis greatest in the lingular segment without a definitive dominant nodule or mass. No acute intrathoracic process. Imaging reviewed. On 03/31/2022:  Hb 14.5   Hct 43.8   MCV 89.0  WBC 6.2     BUN 8 Creat 0.7  LFTs wnl  Beta-2 microglobulin 2.2  SPEP: M-spike = 0.6 g/dl    SIFE: IgM kappa monoclonal protein is present, as previously described  IgM 1061 ()    Free kappa light chains:   59.06  (3.3-19. 4)   Free lambda light chains: 6.00    (5.7-26. 3)   K/L ratio:                           9.84 (0.26-1.65)    On 04/28/2022:  Hb 15.2   Hct 45.3   MCV 87.5  WBC 5.7     BUN 10 Creat 0.8  LFTs wnl  Beta-2 microglobulin 1.8  SPEP: M-spike = 0.7 g/dl    SIFE: IgM kappa monoclonal protein is present, as previously described  IgM 1176 ()    Free kappa light chains:   65.90  (3.3-19. 4)   Free lambda light chains: 3.72    (5.7-26. 3)   K/L ratio:                           17.72 (0.26-1.65)    On 05/26/2022:  Hb 15.8   Hct 47.0   MCV 88.5  WBC 7.0     BUN 15 Creat 0.8  LFTs wnl  Beta-2 microglobulin 2.4  SPEP: M-spike = 0.8 g/dl    SIFE: IgM kappa monoclonal protein is present, as previously described  IgM 1161 ()    Free kappa light chains:   64.11  (3.3-19. 4)   Free lambda light chains: 4.28    (5.7-26. 3)   K/L ratio:                           14.98 (0.26-1.65)    On 06/23/2022:  Hb 15.5   Hct 47.1

## 2023-04-07 LAB
ALBUMIN SERPL-MCNC: 2.3 G/DL (ref 3.5–4.7)
ALPHA1 GLOB SERPL ELPH-MCNC: 0.3 G/DL (ref 0.2–0.4)
ALPHA2 GLOB SERPL ELPH-MCNC: 0.9 G/DL (ref 0.5–1)
B-GLOBULIN SERPL ELPH-MCNC: 1.1 G/DL (ref 0.8–1.3)
B2 MICROGLOB SERPL IA-MCNC: 2.4 MG/L (ref 0.6–2.4)
ELECTROPHORESIS: ABNORMAL
GAMMA GLOB SERPL ELPH-MCNC: 1.2 G/DL (ref 0.7–1.6)
IGA SERPL-MCNC: 15 MG/DL (ref 70–400)
IGG SERPL-MCNC: 195 MG/DL (ref 700–1600)
IGM SERPL-MCNC: 715 MG/DL (ref 40–230)
IMMUNOFIXATION RESULT, SERUM: NORMAL

## 2023-04-08 LAB
DEPRECATED KAPPA LC FREE/LAMBDA SER: 11.55 {RATIO} (ref 0.26–1.65)
KAPPA LC FREE SER-MCNC: 52.2 MG/L (ref 3.3–19.4)
LAMBDA LC FREE SERPL-MCNC: 4.52 MG/L (ref 5.71–26.3)

## 2023-05-04 ENCOUNTER — HOSPITAL ENCOUNTER (OUTPATIENT)
Dept: INFUSION THERAPY | Age: 78
Discharge: HOME | End: 2023-05-04
Payer: OTHER GOVERNMENT

## 2023-05-04 ENCOUNTER — OFFICE VISIT (OUTPATIENT)
Dept: ONCOLOGY | Age: 78
End: 2023-05-04
Payer: OTHER GOVERNMENT

## 2023-05-04 VITALS
WEIGHT: 277.9 LBS | SYSTOLIC BLOOD PRESSURE: 180 MMHG | BODY MASS INDEX: 37.64 KG/M2 | DIASTOLIC BLOOD PRESSURE: 77 MMHG | OXYGEN SATURATION: 95 % | TEMPERATURE: 97.3 F | HEIGHT: 72 IN | HEART RATE: 81 BPM

## 2023-05-04 DIAGNOSIS — T45.1X5A CHEMOTHERAPY-INDUCED NAUSEA: ICD-10-CM

## 2023-05-04 DIAGNOSIS — C83.00 MALIGNANT LYMPHOPLASMACYTIC LYMPHOMA (HCC): Primary | ICD-10-CM

## 2023-05-04 DIAGNOSIS — C83.00 MALIGNANT LYMPHOPLASMACYTIC LYMPHOMA (HCC): ICD-10-CM

## 2023-05-04 DIAGNOSIS — R11.0 CHEMOTHERAPY-INDUCED NAUSEA: ICD-10-CM

## 2023-05-04 LAB
ALBUMIN SERPL-MCNC: 3.7 G/DL (ref 3.5–5.2)
ALP SERPL-CCNC: 71 U/L (ref 40–129)
ALT SERPL-CCNC: 24 U/L (ref 0–40)
ANION GAP SERPL CALCULATED.3IONS-SCNC: 16 MMOL/L (ref 7–16)
AST SERPL-CCNC: 23 U/L (ref 0–39)
BASOPHILS # BLD: 0.05 E9/L (ref 0–0.2)
BASOPHILS NFR BLD: 0.7 % (ref 0–2)
BILIRUB SERPL-MCNC: 0.9 MG/DL (ref 0–1.2)
BUN SERPL-MCNC: 13 MG/DL (ref 6–23)
CALCIUM SERPL-MCNC: 9 MG/DL (ref 8.6–10.2)
CHLORIDE SERPL-SCNC: 98 MMOL/L (ref 98–107)
CO2 SERPL-SCNC: 25 MMOL/L (ref 22–29)
CREAT SERPL-MCNC: 0.9 MG/DL (ref 0.7–1.2)
EOSINOPHIL # BLD: 0.04 E9/L (ref 0.05–0.5)
EOSINOPHIL NFR BLD: 0.6 % (ref 0–6)
ERYTHROCYTE [DISTWIDTH] IN BLOOD BY AUTOMATED COUNT: 13.5 FL (ref 11.5–15)
FERRITIN SERPL-MCNC: 346 NG/ML
GLUCOSE SERPL-MCNC: 187 MG/DL (ref 74–99)
HCT VFR BLD AUTO: 46.2 % (ref 37–54)
HGB BLD-MCNC: 15.1 G/DL (ref 12.5–16.5)
IMM GRANULOCYTES # BLD: 0.1 E9/L
IMM GRANULOCYTES NFR BLD: 1.5 % (ref 0–5)
IRON SATN MFR SERPL: 29 % (ref 20–55)
IRON SERPL-MCNC: 78 MCG/DL (ref 59–158)
LDH SERPL-CCNC: 149 U/L (ref 135–225)
LYMPHOCYTES # BLD: 1.96 E9/L (ref 1.5–4)
LYMPHOCYTES NFR BLD: 28.7 % (ref 20–42)
MCH RBC QN AUTO: 29.1 PG (ref 26–35)
MCHC RBC AUTO-ENTMCNC: 32.7 % (ref 32–34.5)
MCV RBC AUTO: 89 FL (ref 80–99.9)
MONOCYTES # BLD: 0.63 E9/L (ref 0.1–0.95)
MONOCYTES NFR BLD: 9.2 % (ref 2–12)
NEUTROPHILS # BLD: 4.04 E9/L (ref 1.8–7.3)
NEUTS SEG NFR BLD: 59.3 % (ref 43–80)
PLATELET # BLD AUTO: 176 E9/L (ref 130–450)
PMV BLD AUTO: 11 FL (ref 7–12)
POTASSIUM SERPL-SCNC: 3.6 MMOL/L (ref 3.5–5)
PROT SERPL-MCNC: 6.5 G/DL (ref 6.4–8.3)
RBC # BLD AUTO: 5.19 E12/L (ref 3.8–5.8)
SODIUM SERPL-SCNC: 139 MMOL/L (ref 132–146)
TIBC SERPL-MCNC: 272 MCG/DL (ref 250–450)
WBC # BLD: 6.8 E9/L (ref 4.5–11.5)

## 2023-05-04 PROCEDURE — 84165 PROTEIN E-PHORESIS SERUM: CPT

## 2023-05-04 PROCEDURE — 1036F TOBACCO NON-USER: CPT | Performed by: INTERNAL MEDICINE

## 2023-05-04 PROCEDURE — 80053 COMPREHEN METABOLIC PANEL: CPT

## 2023-05-04 PROCEDURE — 86334 IMMUNOFIX E-PHORESIS SERUM: CPT

## 2023-05-04 PROCEDURE — 82232 ASSAY OF BETA-2 PROTEIN: CPT

## 2023-05-04 PROCEDURE — 83883 ASSAY NEPHELOMETRY NOT SPEC: CPT

## 2023-05-04 PROCEDURE — G8417 CALC BMI ABV UP PARAM F/U: HCPCS | Performed by: INTERNAL MEDICINE

## 2023-05-04 PROCEDURE — 82784 ASSAY IGA/IGD/IGG/IGM EACH: CPT

## 2023-05-04 PROCEDURE — 3017F COLORECTAL CA SCREEN DOC REV: CPT | Performed by: INTERNAL MEDICINE

## 2023-05-04 PROCEDURE — 85025 COMPLETE CBC W/AUTO DIFF WBC: CPT

## 2023-05-04 PROCEDURE — 83540 ASSAY OF IRON: CPT

## 2023-05-04 PROCEDURE — 83550 IRON BINDING TEST: CPT

## 2023-05-04 PROCEDURE — G8427 DOCREV CUR MEDS BY ELIG CLIN: HCPCS | Performed by: INTERNAL MEDICINE

## 2023-05-04 PROCEDURE — 82728 ASSAY OF FERRITIN: CPT

## 2023-05-04 PROCEDURE — 83615 LACTATE (LD) (LDH) ENZYME: CPT

## 2023-05-04 PROCEDURE — 99212 OFFICE O/P EST SF 10 MIN: CPT

## 2023-05-04 PROCEDURE — 99214 OFFICE O/P EST MOD 30 MIN: CPT | Performed by: INTERNAL MEDICINE

## 2023-05-04 PROCEDURE — 1123F ACP DISCUSS/DSCN MKR DOCD: CPT | Performed by: INTERNAL MEDICINE

## 2023-05-04 PROCEDURE — 36415 COLL VENOUS BLD VENIPUNCTURE: CPT

## 2023-05-04 RX ORDER — ONDANSETRON 4 MG/1
4 TABLET, FILM COATED ORAL EVERY 8 HOURS PRN
Qty: 30 TABLET | Refills: 1 | Status: SHIPPED | OUTPATIENT
Start: 2023-05-04

## 2023-05-04 NOTE — PROGRESS NOTES
Department of [facility] Oncology  Attending Clinic Note    Reason for Visit: Follow-up on a patient with Lymphoplasmacytic Lymphoma      PCP:  William Daniels MD    History of Present Illness:  78year old male initially seen at [facility] hematology for anemia and abnormal TP/albumin ratio. SPEP IgG and IgM kappa paraprotein, M-spike 3.29 g/dL. UIFE: IgM protein. 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   -Extensive involvement by atypitcal CD5-/CD10-B-cell lymphoproliferative disorder, comprising over 70% of marrow cellularity  -Mildly hypercellular marrow for age (30-40%) with trilineage pan hypoplasia  -Normocytic anemia. -IHC staining highlighted extensive CD20+ B cell infiltrate with admixed + plasma cells     Started on Ibrutinib 420 mg/day 12/2020 with good response so far    On 01/06/2021:  SPEP:  Monoclonal protein 1.72 g/dL  Persistent double monoclonal bands in the beta/gamma and gamma globuin regions. Bands previously identified as IgM kappa and IgG kappa (8/12/2020). IgM kappa decreased by 1.22 g/dL and IgG kappa minimally changed since last exam on 10/14/2020. Free kappa light chains: 413.4    (3.3-19. 4)   Free lambda light chains: 2.8      (5.7-26. 3)   K/L ratio:                          147.64 (0.26-1.65)    On 03/25/2021  SPEP:  Monoclonal protein: 1.03 (0-0) g/dL  Persistent double monoclonal bands in the beta/gamma and gamma globuin regions. Bands previously identified as IgM kappa and IgG kappa (8/12/2020). IgM kappa decreased by 0.69 g/dL and IgG kappa unchanged since 01/06/2021. Free kappa light chains: 149.4  (3.3-19. 4)   Free lambda light chains: 2.6    (5.7-26. 3)   K/L ratio:                          57.46 (0.26-1.65)    Patient started on oral iron 08/2020 for LANDON and B12 deficiency but required 2 doses of Feraheme on 01/21/2021 and

## 2023-05-05 LAB
ALBUMIN SERPL-MCNC: 3.3 G/DL (ref 3.5–4.7)
ALPHA1 GLOB SERPL ELPH-MCNC: 0.3 G/DL (ref 0.2–0.4)
ALPHA2 GLOB SERPL ELPH-MCNC: 1 G/DL (ref 0.5–1)
B-GLOBULIN SERPL ELPH-MCNC: 0.8 G/DL (ref 0.8–1.3)
B2 MICROGLOB SERPL IA-MCNC: 4.3 MG/L (ref 0.6–2.4)
ELECTROPHORESIS: ABNORMAL
GAMMA GLOB SERPL ELPH-MCNC: 0.8 G/DL (ref 0.7–1.6)
IGA SERPL-MCNC: 16 MG/DL (ref 70–400)
IGG SERPL-MCNC: 209 MG/DL (ref 700–1600)
IGM SERPL-MCNC: 913 MG/DL (ref 40–230)
IMMUNOFIXATION RESULT, SERUM: NORMAL

## 2023-05-07 LAB
DEPRECATED KAPPA LC FREE/LAMBDA SER: 15.03 {RATIO} (ref 0.26–1.65)
KAPPA LC FREE SER-MCNC: 102.05 MG/L (ref 3.3–19.4)
LAMBDA LC FREE SERPL-MCNC: 6.79 MG/L (ref 5.71–26.3)

## 2023-06-01 ENCOUNTER — OFFICE VISIT (OUTPATIENT)
Dept: ONCOLOGY | Age: 78
End: 2023-06-01
Payer: MEDICARE

## 2023-06-01 ENCOUNTER — HOSPITAL ENCOUNTER (OUTPATIENT)
Dept: INFUSION THERAPY | Age: 78
Discharge: HOME | End: 2023-06-01
Payer: OTHER GOVERNMENT

## 2023-06-01 VITALS
TEMPERATURE: 97.9 F | OXYGEN SATURATION: 95 % | DIASTOLIC BLOOD PRESSURE: 67 MMHG | BODY MASS INDEX: 37.46 KG/M2 | SYSTOLIC BLOOD PRESSURE: 141 MMHG | HEIGHT: 72 IN | HEART RATE: 67 BPM | WEIGHT: 276.6 LBS

## 2023-06-01 DIAGNOSIS — T45.1X5A CHEMOTHERAPY-INDUCED NAUSEA: ICD-10-CM

## 2023-06-01 DIAGNOSIS — C83.00 MALIGNANT LYMPHOPLASMACYTIC LYMPHOMA (HCC): Primary | ICD-10-CM

## 2023-06-01 DIAGNOSIS — R11.0 CHEMOTHERAPY-INDUCED NAUSEA: ICD-10-CM

## 2023-06-01 DIAGNOSIS — C83.00 MALIGNANT LYMPHOPLASMACYTIC LYMPHOMA (HCC): ICD-10-CM

## 2023-06-01 LAB
ALBUMIN SERPL-MCNC: 3.8 G/DL (ref 3.5–5.2)
ALP SERPL-CCNC: 57 U/L (ref 40–129)
ALT SERPL-CCNC: 18 U/L (ref 0–40)
ANION GAP SERPL CALCULATED.3IONS-SCNC: 10 MMOL/L (ref 7–16)
AST SERPL-CCNC: 15 U/L (ref 0–39)
BASOPHILS # BLD: 0.06 E9/L (ref 0–0.2)
BASOPHILS NFR BLD: 0.7 % (ref 0–2)
BILIRUB SERPL-MCNC: 0.8 MG/DL (ref 0–1.2)
BUN SERPL-MCNC: 10 MG/DL (ref 6–23)
CALCIUM SERPL-MCNC: 9.7 MG/DL (ref 8.6–10.2)
CHLORIDE SERPL-SCNC: 99 MMOL/L (ref 98–107)
CO2 SERPL-SCNC: 27 MMOL/L (ref 22–29)
CREAT SERPL-MCNC: 0.9 MG/DL (ref 0.7–1.2)
EOSINOPHIL # BLD: 0.13 E9/L (ref 0.05–0.5)
EOSINOPHIL NFR BLD: 1.6 % (ref 0–6)
ERYTHROCYTE [DISTWIDTH] IN BLOOD BY AUTOMATED COUNT: 13.8 FL (ref 11.5–15)
FERRITIN SERPL-MCNC: 222 NG/ML
GLUCOSE SERPL-MCNC: 144 MG/DL (ref 74–99)
HCT VFR BLD AUTO: 44 % (ref 37–54)
HGB BLD-MCNC: 14.4 G/DL (ref 12.5–16.5)
IMM GRANULOCYTES # BLD: 0.13 E9/L
IMM GRANULOCYTES NFR BLD: 1.6 % (ref 0–5)
IRON SATN MFR SERPL: 51 % (ref 20–55)
IRON SERPL-MCNC: 140 MCG/DL (ref 59–158)
LDH SERPL-CCNC: 127 U/L (ref 135–225)
LYMPHOCYTES # BLD: 2.13 E9/L (ref 1.5–4)
LYMPHOCYTES NFR BLD: 26 % (ref 20–42)
MCH RBC QN AUTO: 29.1 PG (ref 26–35)
MCHC RBC AUTO-ENTMCNC: 32.7 % (ref 32–34.5)
MCV RBC AUTO: 88.9 FL (ref 80–99.9)
MONOCYTES # BLD: 0.64 E9/L (ref 0.1–0.95)
MONOCYTES NFR BLD: 7.8 % (ref 2–12)
NEUTROPHILS # BLD: 5.1 E9/L (ref 1.8–7.3)
NEUTS SEG NFR BLD: 62.3 % (ref 43–80)
PLATELET # BLD AUTO: 174 E9/L (ref 130–450)
PMV BLD AUTO: 11.4 FL (ref 7–12)
POTASSIUM SERPL-SCNC: 3.8 MMOL/L (ref 3.5–5)
PROT SERPL-MCNC: 6.1 G/DL (ref 6.4–8.3)
RBC # BLD AUTO: 4.95 E12/L (ref 3.8–5.8)
SODIUM SERPL-SCNC: 136 MMOL/L (ref 132–146)
TIBC SERPL-MCNC: 273 MCG/DL (ref 250–450)
WBC # BLD: 8.2 E9/L (ref 4.5–11.5)

## 2023-06-01 PROCEDURE — 84165 PROTEIN E-PHORESIS SERUM: CPT

## 2023-06-01 PROCEDURE — G8427 DOCREV CUR MEDS BY ELIG CLIN: HCPCS | Performed by: INTERNAL MEDICINE

## 2023-06-01 PROCEDURE — 82232 ASSAY OF BETA-2 PROTEIN: CPT

## 2023-06-01 PROCEDURE — 85025 COMPLETE CBC W/AUTO DIFF WBC: CPT

## 2023-06-01 PROCEDURE — 80053 COMPREHEN METABOLIC PANEL: CPT

## 2023-06-01 PROCEDURE — 36415 COLL VENOUS BLD VENIPUNCTURE: CPT

## 2023-06-01 PROCEDURE — 83883 ASSAY NEPHELOMETRY NOT SPEC: CPT

## 2023-06-01 PROCEDURE — 1036F TOBACCO NON-USER: CPT | Performed by: INTERNAL MEDICINE

## 2023-06-01 PROCEDURE — 83540 ASSAY OF IRON: CPT

## 2023-06-01 PROCEDURE — 83615 LACTATE (LD) (LDH) ENZYME: CPT

## 2023-06-01 PROCEDURE — 3017F COLORECTAL CA SCREEN DOC REV: CPT | Performed by: INTERNAL MEDICINE

## 2023-06-01 PROCEDURE — 99212 OFFICE O/P EST SF 10 MIN: CPT

## 2023-06-01 PROCEDURE — 82784 ASSAY IGA/IGD/IGG/IGM EACH: CPT

## 2023-06-01 PROCEDURE — G8417 CALC BMI ABV UP PARAM F/U: HCPCS | Performed by: INTERNAL MEDICINE

## 2023-06-01 PROCEDURE — 82728 ASSAY OF FERRITIN: CPT

## 2023-06-01 PROCEDURE — 83550 IRON BINDING TEST: CPT

## 2023-06-01 PROCEDURE — 99213 OFFICE O/P EST LOW 20 MIN: CPT | Performed by: INTERNAL MEDICINE

## 2023-06-01 PROCEDURE — 1123F ACP DISCUSS/DSCN MKR DOCD: CPT | Performed by: INTERNAL MEDICINE

## 2023-06-01 PROCEDURE — 86334 IMMUNOFIX E-PHORESIS SERUM: CPT

## 2023-06-01 NOTE — PROGRESS NOTES
Notified Doctor Karen Manuel of abnormal vital signs 141/67 on paper as requested.
kappa light chains:   59.02  (3.3-19. 4)   Free lambda light chains: 4.43    (5.7-26. 3)   K/L ratio:                           13.32 (0.26-1.65)    On 10/13/2022:  Hb 15.6  Hct 45.9  MCV 86.9    WBC 9.0  BUN 9 Creat 0.8  Beta-2 microglobulin 2.2  SPEP: M-spike 0.7 g/dL  SIFE: IgM kappa monoclonal protein is present, as previously described. IgM 1001 ()  Free kappa light chains:   52.17  (3.3-19. 4)   Free lambda light chains: 4.18    (5.7-26. 3)   K/L ratio:                           12.48 (0.26-1.65)    On 11/17/2022:  Hb 15.3   Hct 45.9  MCV 89.6      WBC 6.7  BUN 9 Creat 0.8  Beta-2 microglobulin 2.2  SPEP: M-spike 0.7 g/dL  SIFE: IgM kappa monoclonal protein is present, as previously described. IgM 944 ()  Free kappa light chains:   57.27  (3.3-19. 4)   Free lambda light chains: 3.78    (5.7-26. 3)   K/L ratio:                           15.15 (0.26-1.65)    On 12/15/2022:  Hb 15.1   Hct 45.6   MCV 88.5      WBC 8.7  BUN 9 Creat 0.9  Beta-2 microglobulin 2.5  SPEP: M-spike 0.8 g/dL  SIFE: IgM kappa monoclonal protein is present, as previously described. IgM 1097 ()  Free kappa light chains:   48.70  (3.3-19. 4)   Free lambda light chains: 3.84    (5.7-26. 3)   K/L ratio:                           12.68 (0.26-1.65)    On 01/12/2023:  Hb 14.4   Hct 43.0   MCV 86.2     WBC 9.1  BUN 10  Creat 0.8  Beta-2 microglobulin 2.2  SPEP: M-spike 0.7 g/dL  SIFE: IgM kappa monoclonal protein is present, as previously described. IgM 897 ()  Free kappa light chains:   49.51  (3.3-19. 4)   Free lambda light chains: 4.13    (5.7-26. 3)   K/L ratio:                           11.99 (0.26-1.65)    On 02/09/2023:  Hb 14.4   Hct 43.5   MCV 88.8     WBC 6.4  BUN 12  Creat 0.8  Beta-2 microglobulin 2.1  SPEP: M-spike 0.8 g/dL  SIFE: IgM kappa monoclonal protein is present, as previously described. IgM 837 ()  Free kappa light chains:   43.89  (3.3-19. 4)   Free lambda light

## 2023-06-02 LAB
ALBUMIN SERPL-MCNC: 3.1 G/DL (ref 3.5–4.7)
ALPHA1 GLOB SERPL ELPH-MCNC: 0.2 G/DL (ref 0.2–0.4)
ALPHA2 GLOB SERPL ELPH-MCNC: 0.8 G/DL (ref 0.5–1)
B-GLOBULIN SERPL ELPH-MCNC: 0.8 G/DL (ref 0.8–1.3)
ELECTROPHORESIS: ABNORMAL
GAMMA GLOB SERPL ELPH-MCNC: 0.8 G/DL (ref 0.7–1.6)
IGA SERPL-MCNC: 16 MG/DL (ref 70–400)
IGG SERPL-MCNC: 206 MG/DL (ref 700–1600)
IGM SERPL-MCNC: 831 MG/DL (ref 40–230)
IMMUNOFIXATION RESULT, SERUM: NORMAL

## 2023-06-03 LAB
DEPRECATED KAPPA LC FREE/LAMBDA SER: 10.94 {RATIO} (ref 0.26–1.65)
KAPPA LC FREE SER-MCNC: 52.49 MG/L (ref 3.3–19.4)
LAMBDA LC FREE SERPL-MCNC: 4.8 MG/L (ref 5.71–26.3)

## 2023-06-05 LAB — B2 MICROGLOB SERPL IA-MCNC: 2.2 MG/L (ref 0.6–2.4)

## 2023-06-08 ENCOUNTER — HOSPITAL ENCOUNTER (OUTPATIENT)
Dept: INFUSION THERAPY | Age: 78
Discharge: HOME | End: 2023-06-08
Payer: OTHER GOVERNMENT

## 2023-06-08 ENCOUNTER — OFFICE VISIT (OUTPATIENT)
Dept: ONCOLOGY | Age: 78
End: 2023-06-08
Payer: MEDICARE

## 2023-06-08 VITALS
BODY MASS INDEX: 37.93 KG/M2 | HEIGHT: 72 IN | DIASTOLIC BLOOD PRESSURE: 64 MMHG | TEMPERATURE: 97.1 F | OXYGEN SATURATION: 94 % | WEIGHT: 280 LBS | SYSTOLIC BLOOD PRESSURE: 142 MMHG | HEART RATE: 69 BPM

## 2023-06-08 DIAGNOSIS — T45.1X5A CHEMOTHERAPY-INDUCED NAUSEA: ICD-10-CM

## 2023-06-08 DIAGNOSIS — R11.0 CHEMOTHERAPY-INDUCED NAUSEA: ICD-10-CM

## 2023-06-08 DIAGNOSIS — C83.00 MALIGNANT LYMPHOPLASMACYTIC LYMPHOMA (HCC): Primary | ICD-10-CM

## 2023-06-08 DIAGNOSIS — C83.00 MALIGNANT LYMPHOPLASMACYTIC LYMPHOMA (HCC): ICD-10-CM

## 2023-06-08 LAB
ALBUMIN SERPL-MCNC: 3.7 G/DL (ref 3.5–5.2)
ALP SERPL-CCNC: 59 U/L (ref 40–129)
ALT SERPL-CCNC: 20 U/L (ref 0–40)
ANION GAP SERPL CALCULATED.3IONS-SCNC: 12 MMOL/L (ref 7–16)
AST SERPL-CCNC: 15 U/L (ref 0–39)
BASOPHILS # BLD: 0.07 E9/L (ref 0–0.2)
BASOPHILS NFR BLD: 0.7 % (ref 0–2)
BILIRUB SERPL-MCNC: 0.7 MG/DL (ref 0–1.2)
BUN SERPL-MCNC: 14 MG/DL (ref 6–23)
CALCIUM SERPL-MCNC: 9.1 MG/DL (ref 8.6–10.2)
CHLORIDE SERPL-SCNC: 96 MMOL/L (ref 98–107)
CO2 SERPL-SCNC: 27 MMOL/L (ref 22–29)
CREAT SERPL-MCNC: 0.8 MG/DL (ref 0.7–1.2)
EOSINOPHIL # BLD: 0.16 E9/L (ref 0.05–0.5)
EOSINOPHIL NFR BLD: 1.6 % (ref 0–6)
ERYTHROCYTE [DISTWIDTH] IN BLOOD BY AUTOMATED COUNT: 13.8 FL (ref 11.5–15)
FERRITIN SERPL-MCNC: 236 NG/ML
GLUCOSE SERPL-MCNC: 187 MG/DL (ref 74–99)
HCT VFR BLD AUTO: 43.3 % (ref 37–54)
HGB BLD-MCNC: 14.5 G/DL (ref 12.5–16.5)
IMM GRANULOCYTES # BLD: 0.19 E9/L
IMM GRANULOCYTES NFR BLD: 2 % (ref 0–5)
IRON SATN MFR SERPL: 44 % (ref 20–55)
IRON SERPL-MCNC: 109 MCG/DL (ref 59–158)
LDH SERPL-CCNC: 176 U/L (ref 135–225)
LYMPHOCYTES # BLD: 2.13 E9/L (ref 1.5–4)
LYMPHOCYTES NFR BLD: 21.9 % (ref 20–42)
MCH RBC QN AUTO: 29.5 PG (ref 26–35)
MCHC RBC AUTO-ENTMCNC: 33.5 % (ref 32–34.5)
MCV RBC AUTO: 88 FL (ref 80–99.9)
MONOCYTES # BLD: 0.77 E9/L (ref 0.1–0.95)
MONOCYTES NFR BLD: 7.9 % (ref 2–12)
NEUTROPHILS # BLD: 6.41 E9/L (ref 1.8–7.3)
NEUTS SEG NFR BLD: 65.9 % (ref 43–80)
PLATELET # BLD AUTO: 198 E9/L (ref 130–450)
PMV BLD AUTO: 10.4 FL (ref 7–12)
POTASSIUM SERPL-SCNC: 4.3 MMOL/L (ref 3.5–5)
PROT SERPL-MCNC: 6.1 G/DL (ref 6.4–8.3)
RBC # BLD AUTO: 4.92 E12/L (ref 3.8–5.8)
SODIUM SERPL-SCNC: 135 MMOL/L (ref 132–146)
TIBC SERPL-MCNC: 245 MCG/DL (ref 250–450)
WBC # BLD: 9.7 E9/L (ref 4.5–11.5)

## 2023-06-08 PROCEDURE — 3017F COLORECTAL CA SCREEN DOC REV: CPT | Performed by: INTERNAL MEDICINE

## 2023-06-08 PROCEDURE — 85025 COMPLETE CBC W/AUTO DIFF WBC: CPT

## 2023-06-08 PROCEDURE — 36415 COLL VENOUS BLD VENIPUNCTURE: CPT

## 2023-06-08 PROCEDURE — 99212 OFFICE O/P EST SF 10 MIN: CPT

## 2023-06-08 PROCEDURE — 83550 IRON BINDING TEST: CPT

## 2023-06-08 PROCEDURE — 82784 ASSAY IGA/IGD/IGG/IGM EACH: CPT

## 2023-06-08 PROCEDURE — 83540 ASSAY OF IRON: CPT

## 2023-06-08 PROCEDURE — 1123F ACP DISCUSS/DSCN MKR DOCD: CPT | Performed by: INTERNAL MEDICINE

## 2023-06-08 PROCEDURE — 84165 PROTEIN E-PHORESIS SERUM: CPT

## 2023-06-08 PROCEDURE — 80053 COMPREHEN METABOLIC PANEL: CPT

## 2023-06-08 PROCEDURE — G8427 DOCREV CUR MEDS BY ELIG CLIN: HCPCS | Performed by: INTERNAL MEDICINE

## 2023-06-08 PROCEDURE — 83883 ASSAY NEPHELOMETRY NOT SPEC: CPT

## 2023-06-08 PROCEDURE — 86334 IMMUNOFIX E-PHORESIS SERUM: CPT

## 2023-06-08 PROCEDURE — G8417 CALC BMI ABV UP PARAM F/U: HCPCS | Performed by: INTERNAL MEDICINE

## 2023-06-08 PROCEDURE — 1036F TOBACCO NON-USER: CPT | Performed by: INTERNAL MEDICINE

## 2023-06-08 PROCEDURE — 82728 ASSAY OF FERRITIN: CPT

## 2023-06-08 PROCEDURE — 83615 LACTATE (LD) (LDH) ENZYME: CPT

## 2023-06-08 PROCEDURE — 82232 ASSAY OF BETA-2 PROTEIN: CPT

## 2023-06-08 PROCEDURE — 99213 OFFICE O/P EST LOW 20 MIN: CPT | Performed by: INTERNAL MEDICINE

## 2023-06-08 NOTE — PROGRESS NOTES
Patient provided with discharge instructions, received printed AVS.  All questions answered. Patient understands follow up plan of care.
2021 and again in 2021. Had recurrent iron deficiency with plan for Feraheme q3 months (next one scheduled on 2021). He does IM b12 injections at home. On 2021  Hb: 15.5 Hct 44.9  MCV 90.5    WBC 6.9  BUN 10  Creat 0.9  Ca 9.3  Albumin 3.6    Fe: 94 FeSat: 33% TIBIC: 288  Ferritin: 81    VitB12 370     Ig  (700-1700)   IgA: <8     ()   IgM: 1370 ()    On 2021:  Hb 14.8  Hct 44.8  MCV 90.6  WBC 7.5    All cell line counts and morphology within normal limits. Patient's history of lymphoplasmacytic lymphoma is noted. The currently submitted blood smear is negative for circulating plasma cells, and negative for increased/atypical lymphocytes  Fe 60 TIBC 250 FeSat 24% Ferritin 73  BUN 8  Creat 0.8  LFTs wnl  Folate/B12 wnl  Beta-2 microglobulin 2.2    Peripheral blood flow cytometry noted small B-cell population with equivocal mild kappa light chain excess (1% of total cells). SPEP: M-spike 0.8 g/dl  SIFE: IgM kappa monoclonal protein is present  IgM 1295 ()    Free kappa light chains:   80.85  (3.3-19. 4)   Free lambda light chains: 3.23    (5.7-26. 3)   K/L ratio:                           25.03 (0.26-1.65)    On 2021. Hb 16.2  Hct 47.7  MCV 88.8  WBC 7.7     BUN 10 Creat 0.80  LFTs wnl  Beta-2 microglobulin 2.0  SPEP: M-spike 1 = 0.8 g/dl              M-spike 2 = 0.1 g/dl  SIFE: IgM kappa monoclonal protein is present, as previously described. IgM 1267 ()    Free kappa light chains:   88.01  (3.3-19. 4)   Free lambda light chains: 3.33    (5.7-26. 3)   K/L ratio:                           26.43 (0.26-1.65)    On 2021:  Hb 16.4  Hct 48.1  MCV 87.5  WBC 6.9     BUN 7 Creat 0.90  LFTs wnl  Beta-2 microglobulin 1.9  SPEP: M-spike 1 = 0.7 g/dl              M-spike 2 = 0.1 g/dl  SIFE: IgM kappa monoclonal protein is present, as previously described.    IgG kappa monoclonal protein is present, as previously

## 2023-06-09 LAB
ALBUMIN SERPL-MCNC: 3.2 G/DL (ref 3.5–4.7)
ALPHA1 GLOB SERPL ELPH-MCNC: 0.2 G/DL (ref 0.2–0.4)
ALPHA2 GLOB SERPL ELPH-MCNC: 0.7 G/DL (ref 0.5–1)
B-GLOBULIN SERPL ELPH-MCNC: 0.7 G/DL (ref 0.8–1.3)
ELECTROPHORESIS: ABNORMAL
GAMMA GLOB SERPL ELPH-MCNC: 0.8 G/DL (ref 0.7–1.6)
IGA SERPL-MCNC: 15 MG/DL (ref 70–400)
IGG SERPL-MCNC: 197 MG/DL (ref 700–1600)
IGM SERPL-MCNC: 783 MG/DL (ref 40–230)
IMMUNOFIXATION RESULT, SERUM: NORMAL

## 2023-06-10 LAB
DEPRECATED KAPPA LC FREE/LAMBDA SER: 9.88 {RATIO} (ref 0.26–1.65)
KAPPA LC FREE SER-MCNC: 45.94 MG/L (ref 3.3–19.4)
LAMBDA LC FREE SERPL-MCNC: 4.65 MG/L (ref 5.71–26.3)

## 2023-07-03 ENCOUNTER — APPOINTMENT (OUTPATIENT)
Dept: CT IMAGING | Age: 78
End: 2023-07-03
Payer: OTHER GOVERNMENT

## 2023-07-03 ENCOUNTER — HOSPITAL ENCOUNTER (EMERGENCY)
Age: 78
Discharge: LEFT BEFORE DISCHARGE | End: 2023-07-03
Attending: EMERGENCY MEDICINE
Payer: OTHER GOVERNMENT

## 2023-07-03 VITALS
WEIGHT: 280 LBS | BODY MASS INDEX: 37.97 KG/M2 | TEMPERATURE: 97.7 F | DIASTOLIC BLOOD PRESSURE: 68 MMHG | SYSTOLIC BLOOD PRESSURE: 155 MMHG | RESPIRATION RATE: 19 BRPM | HEART RATE: 90 BPM | OXYGEN SATURATION: 98 %

## 2023-07-03 DIAGNOSIS — I25.10 CORONARY ARTERY CALCIFICATION: ICD-10-CM

## 2023-07-03 DIAGNOSIS — E87.1 HYPONATREMIA: ICD-10-CM

## 2023-07-03 DIAGNOSIS — R31.9 URINARY TRACT INFECTION WITH HEMATURIA, SITE UNSPECIFIED: Primary | ICD-10-CM

## 2023-07-03 DIAGNOSIS — I25.84 CORONARY ARTERY CALCIFICATION: ICD-10-CM

## 2023-07-03 DIAGNOSIS — Z79.01 ANTICOAGULATED: ICD-10-CM

## 2023-07-03 DIAGNOSIS — N20.0 KIDNEY STONE: ICD-10-CM

## 2023-07-03 DIAGNOSIS — N39.0 URINARY TRACT INFECTION WITH HEMATURIA, SITE UNSPECIFIED: Primary | ICD-10-CM

## 2023-07-03 DIAGNOSIS — I48.20 CHRONIC A-FIB (HCC): ICD-10-CM

## 2023-07-03 LAB
ALBUMIN SERPL-MCNC: 3.4 G/DL (ref 3.5–5.2)
ALP SERPL-CCNC: 85 U/L (ref 40–129)
ALT SERPL-CCNC: 12 U/L (ref 0–40)
ANION GAP SERPL CALCULATED.3IONS-SCNC: 13 MMOL/L (ref 7–16)
AST SERPL-CCNC: 15 U/L (ref 0–39)
BACTERIA URNS QL MICRO: ABNORMAL /HPF
BASOPHILS # BLD: 0.03 E9/L (ref 0–0.2)
BASOPHILS NFR BLD: 0.3 % (ref 0–2)
BILIRUB SERPL-MCNC: 1 MG/DL (ref 0–1.2)
BILIRUB UR QL STRIP: NEGATIVE
BUN SERPL-MCNC: 14 MG/DL (ref 6–23)
CALCIUM SERPL-MCNC: 8.9 MG/DL (ref 8.6–10.2)
CHLORIDE SERPL-SCNC: 88 MMOL/L (ref 98–107)
CLARITY UR: ABNORMAL
CO2 SERPL-SCNC: 25 MMOL/L (ref 22–29)
COLOR UR: YELLOW
CREAT SERPL-MCNC: 1 MG/DL (ref 0.7–1.2)
EKG ATRIAL RATE: 86 BPM
EKG Q-T INTERVAL: 412 MS
EKG QRS DURATION: 120 MS
EKG QTC CALCULATION (BAZETT): 486 MS
EKG R AXIS: 5 DEGREES
EKG T AXIS: 73 DEGREES
EKG VENTRICULAR RATE: 84 BPM
EOSINOPHIL # BLD: 0.06 E9/L (ref 0.05–0.5)
EOSINOPHIL NFR BLD: 0.6 % (ref 0–6)
EPI CELLS #/AREA URNS HPF: ABNORMAL /HPF
ERYTHROCYTE [DISTWIDTH] IN BLOOD BY AUTOMATED COUNT: 13.5 FL (ref 11.5–15)
GLUCOSE SERPL-MCNC: 228 MG/DL (ref 74–99)
GLUCOSE UR STRIP-MCNC: 100 MG/DL
HCT VFR BLD AUTO: 41.2 % (ref 37–54)
HGB BLD-MCNC: 14.6 G/DL (ref 12.5–16.5)
HGB UR QL STRIP: ABNORMAL
IMM GRANULOCYTES # BLD: 0.16 E9/L
IMM GRANULOCYTES NFR BLD: 1.6 % (ref 0–5)
KETONES UR STRIP-MCNC: NEGATIVE MG/DL
LACTATE BLDV-SCNC: 2 MMOL/L (ref 0.5–2.2)
LEUKOCYTE ESTERASE UR QL STRIP: ABNORMAL
LYMPHOCYTES # BLD: 1.02 E9/L (ref 1.5–4)
LYMPHOCYTES NFR BLD: 10.1 % (ref 20–42)
MCH RBC QN AUTO: 29.4 PG (ref 26–35)
MCHC RBC AUTO-ENTMCNC: 35.4 % (ref 32–34.5)
MCV RBC AUTO: 82.9 FL (ref 80–99.9)
METER GLUCOSE: 167 MG/DL (ref 74–99)
MONOCYTES # BLD: 1.04 E9/L (ref 0.1–0.95)
MONOCYTES NFR BLD: 10.3 % (ref 2–12)
NEUTROPHILS # BLD: 7.79 E9/L (ref 1.8–7.3)
NEUTS SEG NFR BLD: 77.1 % (ref 43–80)
NITRITE UR QL STRIP: POSITIVE
PH UR STRIP: 5.5 [PH] (ref 5–9)
PLATELET # BLD AUTO: 222 E9/L (ref 130–450)
PMV BLD AUTO: 10.1 FL (ref 7–12)
POTASSIUM SERPL-SCNC: 4.3 MMOL/L (ref 3.5–5)
PROT SERPL-MCNC: 6.4 G/DL (ref 6.4–8.3)
PROT UR STRIP-MCNC: ABNORMAL MG/DL
RBC # BLD AUTO: 4.97 E12/L (ref 3.8–5.8)
RBC #/AREA URNS HPF: ABNORMAL /HPF (ref 0–2)
SODIUM SERPL-SCNC: 126 MMOL/L (ref 132–146)
SP GR UR STRIP: <=1.005 (ref 1–1.03)
UROBILINOGEN UR STRIP-ACNC: 0.2 E.U./DL
WBC # BLD: 10.1 E9/L (ref 4.5–11.5)
WBC #/AREA URNS HPF: >20 /HPF (ref 0–5)

## 2023-07-03 PROCEDURE — 96361 HYDRATE IV INFUSION ADD-ON: CPT

## 2023-07-03 PROCEDURE — 2580000003 HC RX 258: Performed by: EMERGENCY MEDICINE

## 2023-07-03 PROCEDURE — 83605 ASSAY OF LACTIC ACID: CPT

## 2023-07-03 PROCEDURE — 87040 BLOOD CULTURE FOR BACTERIA: CPT

## 2023-07-03 PROCEDURE — 93005 ELECTROCARDIOGRAM TRACING: CPT | Performed by: EMERGENCY MEDICINE

## 2023-07-03 PROCEDURE — 87077 CULTURE AEROBIC IDENTIFY: CPT

## 2023-07-03 PROCEDURE — 74176 CT ABD & PELVIS W/O CONTRAST: CPT

## 2023-07-03 PROCEDURE — 85025 COMPLETE CBC W/AUTO DIFF WBC: CPT

## 2023-07-03 PROCEDURE — 80053 COMPREHEN METABOLIC PANEL: CPT

## 2023-07-03 PROCEDURE — 99284 EMERGENCY DEPT VISIT MOD MDM: CPT

## 2023-07-03 PROCEDURE — 82962 GLUCOSE BLOOD TEST: CPT

## 2023-07-03 PROCEDURE — 93010 ELECTROCARDIOGRAM REPORT: CPT | Performed by: INTERNAL MEDICINE

## 2023-07-03 PROCEDURE — 87088 URINE BACTERIA CULTURE: CPT

## 2023-07-03 PROCEDURE — 81001 URINALYSIS AUTO W/SCOPE: CPT

## 2023-07-03 PROCEDURE — 96374 THER/PROPH/DIAG INJ IV PUSH: CPT

## 2023-07-03 PROCEDURE — 36415 COLL VENOUS BLD VENIPUNCTURE: CPT

## 2023-07-03 PROCEDURE — 87186 SC STD MICRODIL/AGAR DIL: CPT

## 2023-07-03 PROCEDURE — 6360000002 HC RX W HCPCS: Performed by: EMERGENCY MEDICINE

## 2023-07-03 RX ORDER — 0.9 % SODIUM CHLORIDE 0.9 %
1000 INTRAVENOUS SOLUTION INTRAVENOUS ONCE
Status: COMPLETED | OUTPATIENT
Start: 2023-07-03 | End: 2023-07-03

## 2023-07-03 RX ORDER — CEFDINIR 300 MG/1
300 CAPSULE ORAL 2 TIMES DAILY
Qty: 20 CAPSULE | Refills: 0 | Status: ON HOLD | OUTPATIENT
Start: 2023-07-03 | End: 2023-07-08 | Stop reason: HOSPADM

## 2023-07-03 RX ADMIN — WATER 1000 MG: 1 INJECTION INTRAMUSCULAR; INTRAVENOUS; SUBCUTANEOUS at 10:12

## 2023-07-03 RX ADMIN — SODIUM CHLORIDE 1000 ML: 9 INJECTION, SOLUTION INTRAVENOUS at 08:30

## 2023-07-03 NOTE — DISCHARGE INSTRUCTIONS
Leaving Against Medical Advice    Discharge against medical advice means that you choose to leave the hospital before your caregiver recommends that you do. Your caregiver may still need to diagnose or treat your condition or your treatment may not be complete. INSTRUCTIONS: contact your doctor or the provider assigned on your instructions as soon as possible to arrange follow-up. Risks:  Risks of leaving the hospital before your caregivers recommend include the following: Your condition may cause other health problems if not treated properly including disability or death. .        You may need to be admitted to the hospital again for the same condition. Your condition could become life-threatening.

## 2023-07-03 NOTE — ED PROVIDER NOTES
urine.  His urinalysis is concerning for UTI. He was given a dose of IV Rocephin here. He is afebrile and white blood cell count normal.  Reviewed with our urologist on-call who advised that patient should be taken care of by his urologist at the [facility] and he can be safely discharged home. I attempted to call the patient's urologist at the [facility] and after waiting quite some time for a return phone call the patient decided to leave [address] [place] [address]. He was advised to return if he develops fevers, rigors, chills or worsening symptoms. He understands and agrees with this plan. This patient has chosen to leave against medical advice. I have personally explained to them that choosing to do so may result in permanent bodily harm or death. I discussed at length that without further evaluation and monitoring there may be unforeseen circumstances and deterioration resulting in permanent bodily harm or death as a result of their choice. They are alert, oriented, and competent at this time. They state that they are aware of the serious risks as explained, but they continue to wish to leave against medical advice. In light of their decision to leave against medical advice, follow-up has been arranged and they are aware of the importance of following up as instructed. They have been advised that they should return to the ED immediately if they change their mind at any time, or if their condition begins to change or worsen. EKG is ordered to evaluate patient's current cardiac rhythm, and to interpret QT interval prior to administering medications. POCT glucose ordered to rule out acute hyperglycemia or hypoglycemia. CBC is ordered to evaluate for any signs of infection or inflammation by obtaining a WBC count, or any signs of acute anemia by interpreting hemoglobin. CMP was ordered to evaluate for any electrolyte imbalances, kidney function, hepatic injury or any elevations in anion gap.  Urinalysis ordered

## 2023-07-04 ENCOUNTER — HOSPITAL ENCOUNTER (INPATIENT)
Age: 78
LOS: 4 days | Discharge: HOME | DRG: 660 | End: 2023-07-08
Attending: EMERGENCY MEDICINE | Admitting: INTERNAL MEDICINE
Payer: OTHER GOVERNMENT

## 2023-07-04 DIAGNOSIS — N20.0 KIDNEY STONE: ICD-10-CM

## 2023-07-04 DIAGNOSIS — E87.1 HYPONATREMIA: ICD-10-CM

## 2023-07-04 DIAGNOSIS — N13.2 HYDRONEPHROSIS WITH URINARY OBSTRUCTION DUE TO URETERAL CALCULUS: Primary | ICD-10-CM

## 2023-07-04 DIAGNOSIS — R78.81 BACTEREMIA: ICD-10-CM

## 2023-07-04 LAB
A BAUMANNII DNA BLD POS QL NAA+NON-PROBE: NOT DETECTED
ALBUMIN SERPL-MCNC: 3.5 G/DL (ref 3.5–5.2)
ALP SERPL-CCNC: 101 U/L (ref 40–129)
ALT SERPL-CCNC: 29 U/L (ref 0–40)
ANION GAP SERPL CALCULATED.3IONS-SCNC: 12 MMOL/L (ref 7–16)
AST SERPL-CCNC: 44 U/L (ref 0–39)
BASOPHILS # BLD: 0.07 E9/L (ref 0–0.2)
BASOPHILS NFR BLD: 0.9 % (ref 0–2)
BILIRUB SERPL-MCNC: 0.8 MG/DL (ref 0–1.2)
BLACTX-M ISLT/SPM QL: NOT DETECTED
BLAIMP ISLT/SPM QL: NOT DETECTED
BLAKPC ISLT/SPM QL: NOT DETECTED
BLAOXA-48 ISLT/SPM QL: NOT DETECTED
BLAVIM ISLT/SPM QL: NOT DETECTED
BOTTLE TYPE: ABNORMAL
BUN SERPL-MCNC: 7 MG/DL (ref 6–23)
C ALBICANS DNA BLD POS QL NAA+NON-PROBE: NOT DETECTED
C AURIS DNA BLD POS QL NAA+PROBE: NOT DETECTED
C GLABRATA DNA BLD POS QL NAA+NON-PROBE: NOT DETECTED
C KRUSEI DNA BLD POS QL NAA+NON-PROBE: NOT DETECTED
C PARAP DNA BLD POS QL NAA+NON-PROBE: NOT DETECTED
C TROPICLS DNA BLD POS QL NAA+NON-PROBE: NOT DETECTED
CALCIUM SERPL-MCNC: 9.2 MG/DL (ref 8.6–10.2)
CARBAPENEM RESISTANCE NDM GENE BY PCR: NOT DETECTED
CHLORIDE SERPL-SCNC: 92 MMOL/L (ref 98–107)
CO2 SERPL-SCNC: 25 MMOL/L (ref 22–29)
COLISTIN RES MCR-1 ISLT/SPM QL: NOT DETECTED
CREAT SERPL-MCNC: 0.8 MG/DL (ref 0.7–1.2)
CRYPTOCOCCUS NEOFORMANS/GATTII BY PCR: NOT DETECTED
E CLOAC COMP DNA BLD POS NAA+NON-PROBE: NOT DETECTED
E COLI DNA BLD POS QL NAA+NON-PROBE: NOT DETECTED
E FAECALIS DNA BLD POS QL NAA+PROBE: NOT DETECTED
E FAECIUM DNA BLD POS QL NAA+PROBE: NOT DETECTED
ENTEROBACT DNA BLD POS QL NAA+NON-PROBE: DETECTED
ENTEROCOC DNA BLD POS QL NAA+NON-PROBE: NOT DETECTED
EOSINOPHIL # BLD: 0.06 E9/L (ref 0.05–0.5)
EOSINOPHIL NFR BLD: 0.8 % (ref 0–6)
ERYTHROCYTE [DISTWIDTH] IN BLOOD BY AUTOMATED COUNT: 13.6 FL (ref 11.5–15)
GLUCOSE SERPL-MCNC: 198 MG/DL (ref 74–99)
GN BLD CULTURE PNL BLD POS NAA+PROBE: NOT DETECTED
HCT VFR BLD AUTO: 39.5 % (ref 37–54)
HGB BLD-MCNC: 13.9 G/DL (ref 12.5–16.5)
IMM GRANULOCYTES # BLD: 0.31 E9/L
IMM GRANULOCYTES NFR BLD: 4 % (ref 0–5)
K OXYTOCA DNA BLD POS QL NAA+NON-PROBE: NOT DETECTED
K PNEUMON DNA SPEC QL NAA+PROBE: DETECTED
K. AEROGENES DNA SPEC QL NAA+PROBE: NOT DETECTED
L MONOCYTOG DNA BLD POS QL NAA+NON-PROBE: NOT DETECTED
LYMPHOCYTES # BLD: 1.15 E9/L (ref 1.5–4)
LYMPHOCYTES NFR BLD: 15 % (ref 20–42)
MCH RBC QN AUTO: 29.1 PG (ref 26–35)
MCHC RBC AUTO-ENTMCNC: 35.2 % (ref 32–34.5)
MCV RBC AUTO: 82.8 FL (ref 80–99.9)
METER GLUCOSE: 158 MG/DL (ref 74–99)
METER GLUCOSE: 189 MG/DL (ref 74–99)
MONOCYTES # BLD: 1.2 E9/L (ref 0.1–0.95)
MONOCYTES NFR BLD: 15.6 % (ref 2–12)
N MEN DNA BLD POS QL NAA+NON-PROBE: NOT DETECTED
NEUTROPHILS # BLD: 4.88 E9/L (ref 1.8–7.3)
NEUTS SEG NFR BLD: 63.7 % (ref 43–80)
ORDER NUMBER: ABNORMAL
P AERUGINOSA DNA BLD POS NAA+NON-PROBE: NOT DETECTED
PLATELET # BLD AUTO: 229 E9/L (ref 130–450)
PMV BLD AUTO: 10.2 FL (ref 7–12)
POTASSIUM SERPL-SCNC: 3.6 MMOL/L (ref 3.5–5)
PROT SERPL-MCNC: 6.1 G/DL (ref 6.4–8.3)
PROTEUS SP DNA BLD POS QL NAA+NON-PROBE: NOT DETECTED
RBC # BLD AUTO: 4.77 E12/L (ref 3.8–5.8)
S AUREUS DNA BLD POS QL NAA+NON-PROBE: NOT DETECTED
S AUREUS+CONS DNA BLD POS NAA+NON-PROBE: NOT DETECTED
S EPIDERMIDIS DNA BLD POS QL NAA+PROBE: NOT DETECTED
S LUGDUNENSIS DNA BLD POS QL NAA+PROBE: NOT DETECTED
S MALTOPH DNA BLD POS QL NAA+PROBE: NOT DETECTED
S MARCESCENS DNA BLD POS NAA+NON-PROBE: NOT DETECTED
S PNEUM DNA BLD POS QL NAA+NON-PROBE: NOT DETECTED
S PYO DNA BLD POS QL NAA+NON-PROBE: NOT DETECTED
SALMONELLA DNA BLD POS QL NAA+PROBE: NOT DETECTED
SODIUM SERPL-SCNC: 129 MMOL/L (ref 132–146)
SOURCE OF BLOOD CULTURE: ABNORMAL
STREPTOCOCCUS AGALACTIAE BY PCR: NOT DETECTED
STREPTOCOCCUS DNA BLD POS NAA+NON-PROBE: NOT DETECTED
WBC # BLD: 7.7 E9/L (ref 4.5–11.5)

## 2023-07-04 PROCEDURE — 6370000000 HC RX 637 (ALT 250 FOR IP): Performed by: INTERNAL MEDICINE

## 2023-07-04 PROCEDURE — 87324 CLOSTRIDIUM AG IA: CPT

## 2023-07-04 PROCEDURE — 82962 GLUCOSE BLOOD TEST: CPT

## 2023-07-04 PROCEDURE — 6360000002 HC RX W HCPCS: Performed by: EMERGENCY MEDICINE

## 2023-07-04 PROCEDURE — 2580000003 HC RX 258: Performed by: EMERGENCY MEDICINE

## 2023-07-04 PROCEDURE — 85025 COMPLETE CBC W/AUTO DIFF WBC: CPT

## 2023-07-04 PROCEDURE — 80053 COMPREHEN METABOLIC PANEL: CPT

## 2023-07-04 PROCEDURE — 99223 1ST HOSP IP/OBS HIGH 75: CPT | Performed by: INTERNAL MEDICINE

## 2023-07-04 PROCEDURE — 93005 ELECTROCARDIOGRAM TRACING: CPT | Performed by: EMERGENCY MEDICINE

## 2023-07-04 PROCEDURE — 87449 NOS EACH ORGANISM AG IA: CPT

## 2023-07-04 PROCEDURE — 99285 EMERGENCY DEPT VISIT HI MDM: CPT

## 2023-07-04 PROCEDURE — 6360000002 HC RX W HCPCS: Performed by: INTERNAL MEDICINE

## 2023-07-04 PROCEDURE — 1200000000 HC SEMI PRIVATE

## 2023-07-04 PROCEDURE — 2580000003 HC RX 258: Performed by: INTERNAL MEDICINE

## 2023-07-04 PROCEDURE — 96374 THER/PROPH/DIAG INJ IV PUSH: CPT

## 2023-07-04 RX ORDER — SODIUM CHLORIDE 0.9 % (FLUSH) 0.9 %
5-40 SYRINGE (ML) INJECTION PRN
Status: DISCONTINUED | OUTPATIENT
Start: 2023-07-04 | End: 2023-07-08 | Stop reason: HOSPADM

## 2023-07-04 RX ORDER — SODIUM CHLORIDE 0.9 % (FLUSH) 0.9 %
5-40 SYRINGE (ML) INJECTION EVERY 12 HOURS SCHEDULED
Status: DISCONTINUED | OUTPATIENT
Start: 2023-07-04 | End: 2023-07-08 | Stop reason: HOSPADM

## 2023-07-04 RX ORDER — HYDROCHLOROTHIAZIDE 25 MG/1
25 TABLET ORAL DAILY
Status: DISCONTINUED | OUTPATIENT
Start: 2023-07-04 | End: 2023-07-08 | Stop reason: HOSPADM

## 2023-07-04 RX ORDER — INSULIN LISPRO 100 [IU]/ML
0-8 INJECTION, SOLUTION INTRAVENOUS; SUBCUTANEOUS
Status: DISCONTINUED | OUTPATIENT
Start: 2023-07-04 | End: 2023-07-08 | Stop reason: HOSPADM

## 2023-07-04 RX ORDER — POLYETHYLENE GLYCOL 3350 17 G/17G
17 POWDER, FOR SOLUTION ORAL DAILY PRN
Status: DISCONTINUED | OUTPATIENT
Start: 2023-07-04 | End: 2023-07-08 | Stop reason: HOSPADM

## 2023-07-04 RX ORDER — SODIUM CHLORIDE 9 MG/ML
INJECTION, SOLUTION INTRAVENOUS PRN
Status: DISCONTINUED | OUTPATIENT
Start: 2023-07-04 | End: 2023-07-08 | Stop reason: HOSPADM

## 2023-07-04 RX ORDER — DEXTROSE MONOHYDRATE 100 MG/ML
INJECTION, SOLUTION INTRAVENOUS CONTINUOUS PRN
Status: DISCONTINUED | OUTPATIENT
Start: 2023-07-04 | End: 2023-07-08 | Stop reason: HOSPADM

## 2023-07-04 RX ORDER — PRAZOSIN HYDROCHLORIDE 1 MG/1
1 CAPSULE ORAL NIGHTLY
Status: DISCONTINUED | OUTPATIENT
Start: 2023-07-04 | End: 2023-07-08 | Stop reason: HOSPADM

## 2023-07-04 RX ORDER — POTASSIUM CHLORIDE 20 MEQ/1
40 TABLET, EXTENDED RELEASE ORAL DAILY
Status: DISCONTINUED | OUTPATIENT
Start: 2023-07-04 | End: 2023-07-08 | Stop reason: HOSPADM

## 2023-07-04 RX ORDER — ONDANSETRON 4 MG/1
4 TABLET, ORALLY DISINTEGRATING ORAL EVERY 8 HOURS PRN
Status: DISCONTINUED | OUTPATIENT
Start: 2023-07-04 | End: 2023-07-08 | Stop reason: HOSPADM

## 2023-07-04 RX ORDER — ATORVASTATIN CALCIUM 20 MG/1
20 TABLET, FILM COATED ORAL DAILY
Status: DISCONTINUED | OUTPATIENT
Start: 2023-07-04 | End: 2023-07-08 | Stop reason: HOSPADM

## 2023-07-04 RX ORDER — ONDANSETRON 2 MG/ML
4 INJECTION INTRAMUSCULAR; INTRAVENOUS EVERY 6 HOURS PRN
Status: DISCONTINUED | OUTPATIENT
Start: 2023-07-04 | End: 2023-07-08 | Stop reason: HOSPADM

## 2023-07-04 RX ORDER — SODIUM CHLORIDE 9 MG/ML
INJECTION, SOLUTION INTRAVENOUS CONTINUOUS
Status: DISCONTINUED | OUTPATIENT
Start: 2023-07-04 | End: 2023-07-08 | Stop reason: HOSPADM

## 2023-07-04 RX ORDER — ALOGLIPTIN 25 MG/1
25 TABLET, FILM COATED ORAL DAILY
Status: DISCONTINUED | OUTPATIENT
Start: 2023-07-04 | End: 2023-07-08 | Stop reason: HOSPADM

## 2023-07-04 RX ORDER — ACETAMINOPHEN 650 MG/1
650 SUPPOSITORY RECTAL EVERY 6 HOURS PRN
Status: DISCONTINUED | OUTPATIENT
Start: 2023-07-04 | End: 2023-07-08 | Stop reason: HOSPADM

## 2023-07-04 RX ORDER — LANOLIN ALCOHOL/MO/W.PET/CERES
6 CREAM (GRAM) TOPICAL NIGHTLY
Status: DISCONTINUED | OUTPATIENT
Start: 2023-07-04 | End: 2023-07-08 | Stop reason: HOSPADM

## 2023-07-04 RX ORDER — CHOLECALCIFEROL (VITAMIN D3) 50 MCG
2000 TABLET ORAL DAILY
Status: DISCONTINUED | OUTPATIENT
Start: 2023-07-04 | End: 2023-07-08 | Stop reason: HOSPADM

## 2023-07-04 RX ORDER — TRAMADOL HYDROCHLORIDE 50 MG/1
50 TABLET ORAL EVERY 6 HOURS PRN
Status: DISCONTINUED | OUTPATIENT
Start: 2023-07-04 | End: 2023-07-08 | Stop reason: HOSPADM

## 2023-07-04 RX ORDER — LISINOPRIL 20 MG/1
20 TABLET ORAL 2 TIMES DAILY
Status: DISCONTINUED | OUTPATIENT
Start: 2023-07-04 | End: 2023-07-08 | Stop reason: HOSPADM

## 2023-07-04 RX ORDER — TAMSULOSIN HYDROCHLORIDE 0.4 MG/1
0.4 CAPSULE ORAL DAILY
Status: DISCONTINUED | OUTPATIENT
Start: 2023-07-04 | End: 2023-07-08 | Stop reason: HOSPADM

## 2023-07-04 RX ORDER — PANTOPRAZOLE SODIUM 40 MG/1
40 TABLET, DELAYED RELEASE ORAL
Status: DISCONTINUED | OUTPATIENT
Start: 2023-07-04 | End: 2023-07-08 | Stop reason: HOSPADM

## 2023-07-04 RX ORDER — ASPIRIN 81 MG/1
81 TABLET, CHEWABLE ORAL DAILY
Status: DISCONTINUED | OUTPATIENT
Start: 2023-07-04 | End: 2023-07-08 | Stop reason: HOSPADM

## 2023-07-04 RX ORDER — INSULIN LISPRO 100 [IU]/ML
0-4 INJECTION, SOLUTION INTRAVENOUS; SUBCUTANEOUS NIGHTLY
Status: DISCONTINUED | OUTPATIENT
Start: 2023-07-04 | End: 2023-07-08 | Stop reason: HOSPADM

## 2023-07-04 RX ORDER — ACETAMINOPHEN 325 MG/1
650 TABLET ORAL EVERY 6 HOURS PRN
Status: DISCONTINUED | OUTPATIENT
Start: 2023-07-04 | End: 2023-07-08 | Stop reason: HOSPADM

## 2023-07-04 RX ORDER — FERROUS SULFATE 325(65) MG
325 TABLET ORAL 2 TIMES DAILY WITH MEALS
Status: DISCONTINUED | OUTPATIENT
Start: 2023-07-04 | End: 2023-07-08 | Stop reason: HOSPADM

## 2023-07-04 RX ADMIN — LISINOPRIL 20 MG: 20 TABLET ORAL at 22:00

## 2023-07-04 RX ADMIN — Medication 2000 UNITS: at 17:31

## 2023-07-04 RX ADMIN — POTASSIUM CHLORIDE 40 MEQ: 1500 TABLET, EXTENDED RELEASE ORAL at 17:31

## 2023-07-04 RX ADMIN — TAMSULOSIN HYDROCHLORIDE 0.4 MG: 0.4 CAPSULE ORAL at 17:31

## 2023-07-04 RX ADMIN — SODIUM CHLORIDE, PRESERVATIVE FREE 10 ML: 5 INJECTION INTRAVENOUS at 22:01

## 2023-07-04 RX ADMIN — Medication 6 MG: at 22:00

## 2023-07-04 RX ADMIN — METOPROLOL SUCCINATE 75 MG: 50 TABLET, EXTENDED RELEASE ORAL at 22:00

## 2023-07-04 RX ADMIN — PANTOPRAZOLE SODIUM 40 MG: 40 TABLET, DELAYED RELEASE ORAL at 17:31

## 2023-07-04 RX ADMIN — TRAMADOL HYDROCHLORIDE 50 MG: 50 TABLET ORAL at 17:30

## 2023-07-04 RX ADMIN — SODIUM CHLORIDE: 9 INJECTION, SOLUTION INTRAVENOUS at 17:13

## 2023-07-04 RX ADMIN — PRAZOSIN HYDROCHLORIDE 1 MG: 1 CAPSULE ORAL at 23:21

## 2023-07-04 RX ADMIN — MEROPENEM 1000 MG: 1 INJECTION, POWDER, FOR SOLUTION INTRAVENOUS at 14:34

## 2023-07-04 RX ADMIN — FERROUS SULFATE TAB 325 MG (65 MG ELEMENTAL FE) 325 MG: 325 (65 FE) TAB at 17:31

## 2023-07-04 RX ADMIN — WATER 2000 MG: 1 INJECTION INTRAMUSCULAR; INTRAVENOUS; SUBCUTANEOUS at 17:30

## 2023-07-04 ASSESSMENT — PAIN - FUNCTIONAL ASSESSMENT: PAIN_FUNCTIONAL_ASSESSMENT: 0-10

## 2023-07-04 ASSESSMENT — PAIN DESCRIPTION - DESCRIPTORS: DESCRIPTORS: ACHING

## 2023-07-04 ASSESSMENT — PAIN SCALES - GENERAL
PAINLEVEL_OUTOF10: 0
PAINLEVEL_OUTOF10: 5

## 2023-07-04 ASSESSMENT — PAIN DESCRIPTION - LOCATION: LOCATION: ABDOMEN

## 2023-07-04 ASSESSMENT — LIFESTYLE VARIABLES
HOW OFTEN DO YOU HAVE A DRINK CONTAINING ALCOHOL: NEVER
HOW MANY STANDARD DRINKS CONTAINING ALCOHOL DO YOU HAVE ON A TYPICAL DAY: PATIENT DOES NOT DRINK

## 2023-07-04 NOTE — ED NOTES
Called patient as instructed by Dr Rosa Gaucher due to positive blood cultures. Instructed pt to go to the hospital to be admitted.    Pt voiced understanding and states he will do so     Elva Strickland RN  07/04/23 [id number]

## 2023-07-04 NOTE — H&P
[facility] History and Physical    CHIEF COMPLAINT:  urinary frequency, diarrhea     History of Present Illness: This is a 78year old male with history of lymphoplasmacytic lymphoma on chemo, HTN, Atrial fibrillation, kidney stones, type 2 DM, HLD, BPH, and chronic pain. He notes a history of kidney stones for which he sees Urology at [place] in [place] and was set to have a procedure August 24. However, over the last 5-6 days he felt increased burning and frequency along with diarrhea over the past 2 days. He presented to [facility] ER yesterday but left AMA and was called to return due to blood and urine cultures + Klebsiella. Patient given dose of Merrem in ER. Urology consult called, patient will have stent placement tomorrow. CT in ER showed 7 mm calculus in the distal right ureter leading to mild right-sided hydroureteronephrosis. Several nonobstructing left renal calculi are present along with multiple calculi within the urinary bladder, measuring up to 6 mm. Decision made to admit for complicated UTI with Klebsiella and kidney stones. Informant(s) for H&P: patient and self     REVIEW OF SYSTEMS:  A comprehensive review of systems was negative except for: what is in the HPI      PMH:  Past Medical History:   Diagnosis Date    Abscess of right thumb 4/15/2016    Amputation of right thumb 6/18/2014    Distal phalynx    Anemia     Depression     PTSD    Diabetes mellitus ([address])     Hyperlipidemia     Hypertension     Obesity        Surgical History:  Past Surgical History:   Procedure Laterality Date    COLONOSCOPY      HERNIA REPAIR      NECK SURGERY      PATELLA SURGERY      SHOULDER SURGERY      TONSILLECTOMY      UPPER GASTROINTESTINAL ENDOSCOPY         Medications Prior to Admission:    Prior to Admission medications    Medication Sig Start Date End Date Taking?  Authorizing Provider   cefdinir (OMNICEF) 300 MG capsule Take 1 capsule by mouth 2 times daily for 10 days 7/3/23

## 2023-07-05 ENCOUNTER — ANESTHESIA EVENT (OUTPATIENT)
Dept: OPERATING ROOM | Age: 78
End: 2023-07-05
Payer: OTHER GOVERNMENT

## 2023-07-05 PROBLEM — N13.2 HYDRONEPHROSIS WITH URINARY OBSTRUCTION DUE TO URETERAL CALCULUS: Status: ACTIVE | Noted: 2023-07-05

## 2023-07-05 LAB
ALBUMIN SERPL-MCNC: 2.8 G/DL (ref 3.5–5.2)
ALP SERPL-CCNC: 88 U/L (ref 40–129)
ALT SERPL-CCNC: 22 U/L (ref 0–40)
ANION GAP SERPL CALCULATED.3IONS-SCNC: 9 MMOL/L (ref 7–16)
AST SERPL-CCNC: 23 U/L (ref 0–39)
BACTERIA UR CULT: ABNORMAL
BASOPHILS # BLD: 0.07 E9/L (ref 0–0.2)
BASOPHILS NFR BLD: 0.9 % (ref 0–2)
BILIRUB SERPL-MCNC: 0.5 MG/DL (ref 0–1.2)
BNP BLD-MCNC: 384 PG/ML (ref 0–450)
BUN SERPL-MCNC: 8 MG/DL (ref 6–23)
BURR CELLS: ABNORMAL
C DIFF TOXIN/ANTIGEN: NORMAL
CALCIUM SERPL-MCNC: 9 MG/DL (ref 8.6–10.2)
CHLORIDE SERPL-SCNC: 98 MMOL/L (ref 98–107)
CO2 SERPL-SCNC: 26 MMOL/L (ref 22–29)
CREAT SERPL-MCNC: 0.8 MG/DL (ref 0.7–1.2)
EKG ATRIAL RATE: 80 BPM
EKG P AXIS: 17 DEGREES
EKG P-R INTERVAL: 172 MS
EKG Q-T INTERVAL: 398 MS
EKG QRS DURATION: 108 MS
EKG QTC CALCULATION (BAZETT): 459 MS
EKG R AXIS: 5 DEGREES
EKG T AXIS: 35 DEGREES
EKG VENTRICULAR RATE: 80 BPM
EOSINOPHIL # BLD: 0.14 E9/L (ref 0.05–0.5)
EOSINOPHIL NFR BLD: 1.7 % (ref 0–6)
ERYTHROCYTE [DISTWIDTH] IN BLOOD BY AUTOMATED COUNT: 13.8 FL (ref 11.5–15)
GLUCOSE SERPL-MCNC: 210 MG/DL (ref 74–99)
HCT VFR BLD AUTO: 35.7 % (ref 37–54)
HGB BLD-MCNC: 12.2 G/DL (ref 12.5–16.5)
LYMPHOCYTES # BLD: 1.13 E9/L (ref 1.5–4)
LYMPHOCYTES NFR BLD: 13.9 % (ref 20–42)
MAGNESIUM SERPL-MCNC: 1.4 MG/DL (ref 1.6–2.6)
MCH RBC QN AUTO: 29.1 PG (ref 26–35)
MCHC RBC AUTO-ENTMCNC: 34.2 % (ref 32–34.5)
MCV RBC AUTO: 85.2 FL (ref 80–99.9)
METAMYELOCYTES NFR BLD MANUAL: 0.9 % (ref 0–1)
METER GLUCOSE: 201 MG/DL (ref 74–99)
METER GLUCOSE: 237 MG/DL (ref 74–99)
METER GLUCOSE: 309 MG/DL (ref 74–99)
MONOCYTES # BLD: 1.38 E9/L (ref 0.1–0.95)
MONOCYTES NFR BLD: 17.4 % (ref 2–12)
MYELOCYTES NFR BLD MANUAL: 4.3 % (ref 0–0)
NEUTROPHILS # BLD: 5.35 E9/L (ref 1.8–7.3)
NEUTS SEG NFR BLD: 60.9 % (ref 43–80)
NRBC BLD-RTO: 0 /100 WBC
ORGANISM: ABNORMAL
PLATELET # BLD AUTO: 215 E9/L (ref 130–450)
PMV BLD AUTO: 10.3 FL (ref 7–12)
POIKILOCYTES: ABNORMAL
POTASSIUM SERPL-SCNC: 3.3 MMOL/L (ref 3.5–5)
PROT SERPL-MCNC: 5.2 G/DL (ref 6.4–8.3)
RBC # BLD AUTO: 4.19 E12/L (ref 3.8–5.8)
SODIUM SERPL-SCNC: 133 MMOL/L (ref 132–146)
WBC # BLD: 8.1 E9/L (ref 4.5–11.5)

## 2023-07-05 PROCEDURE — 85025 COMPLETE CBC W/AUTO DIFF WBC: CPT

## 2023-07-05 PROCEDURE — 36415 COLL VENOUS BLD VENIPUNCTURE: CPT

## 2023-07-05 PROCEDURE — 82962 GLUCOSE BLOOD TEST: CPT

## 2023-07-05 PROCEDURE — 6370000000 HC RX 637 (ALT 250 FOR IP): Performed by: NURSE PRACTITIONER

## 2023-07-05 PROCEDURE — 83735 ASSAY OF MAGNESIUM: CPT

## 2023-07-05 PROCEDURE — 99231 SBSQ HOSP IP/OBS SF/LOW 25: CPT | Performed by: STUDENT IN AN ORGANIZED HEALTH CARE EDUCATION/TRAINING PROGRAM

## 2023-07-05 PROCEDURE — 83880 ASSAY OF NATRIURETIC PEPTIDE: CPT

## 2023-07-05 PROCEDURE — 99231 SBSQ HOSP IP/OBS SF/LOW 25: CPT | Performed by: NURSE PRACTITIONER

## 2023-07-05 PROCEDURE — 6360000002 HC RX W HCPCS: Performed by: STUDENT IN AN ORGANIZED HEALTH CARE EDUCATION/TRAINING PROGRAM

## 2023-07-05 PROCEDURE — 6360000002 HC RX W HCPCS: Performed by: INTERNAL MEDICINE

## 2023-07-05 PROCEDURE — 6370000000 HC RX 637 (ALT 250 FOR IP): Performed by: INTERNAL MEDICINE

## 2023-07-05 PROCEDURE — 80053 COMPREHEN METABOLIC PANEL: CPT

## 2023-07-05 PROCEDURE — 1200000000 HC SEMI PRIVATE

## 2023-07-05 PROCEDURE — 2580000003 HC RX 258: Performed by: INTERNAL MEDICINE

## 2023-07-05 PROCEDURE — 93010 ELECTROCARDIOGRAM REPORT: CPT | Performed by: INTERNAL MEDICINE

## 2023-07-05 RX ORDER — POTASSIUM CHLORIDE 7.45 MG/ML
10 INJECTION INTRAVENOUS
Status: COMPLETED | OUTPATIENT
Start: 2023-07-05 | End: 2023-07-05

## 2023-07-05 RX ORDER — MAGNESIUM SULFATE IN WATER 40 MG/ML
2000 INJECTION, SOLUTION INTRAVENOUS ONCE
Status: COMPLETED | OUTPATIENT
Start: 2023-07-05 | End: 2023-07-05

## 2023-07-05 RX ORDER — DIPHENOXYLATE HYDROCHLORIDE AND ATROPINE SULFATE 2.5; .025 MG/1; MG/1
1 TABLET ORAL 4 TIMES DAILY PRN
Status: DISCONTINUED | OUTPATIENT
Start: 2023-07-05 | End: 2023-07-08 | Stop reason: HOSPADM

## 2023-07-05 RX ADMIN — PRAZOSIN HYDROCHLORIDE 1 MG: 1 CAPSULE ORAL at 21:25

## 2023-07-05 RX ADMIN — TRAMADOL HYDROCHLORIDE 50 MG: 50 TABLET ORAL at 17:34

## 2023-07-05 RX ADMIN — DIPHENOXYLATE HYDROCHLORIDE AND ATROPINE SULFATE 1 TABLET: 2.5; .025 TABLET ORAL at 11:44

## 2023-07-05 RX ADMIN — POTASSIUM CHLORIDE 40 MEQ: 1500 TABLET, EXTENDED RELEASE ORAL at 11:41

## 2023-07-05 RX ADMIN — WATER 2000 MG: 1 INJECTION INTRAMUSCULAR; INTRAVENOUS; SUBCUTANEOUS at 17:26

## 2023-07-05 RX ADMIN — LISINOPRIL 20 MG: 20 TABLET ORAL at 11:41

## 2023-07-05 RX ADMIN — PANTOPRAZOLE SODIUM 40 MG: 40 TABLET, DELAYED RELEASE ORAL at 06:21

## 2023-07-05 RX ADMIN — POTASSIUM CHLORIDE 10 MEQ: 7.45 INJECTION INTRAVENOUS at 12:35

## 2023-07-05 RX ADMIN — Medication 2000 UNITS: at 11:41

## 2023-07-05 RX ADMIN — SODIUM CHLORIDE: 9 INJECTION, SOLUTION INTRAVENOUS at 07:13

## 2023-07-05 RX ADMIN — HYDROCHLOROTHIAZIDE 25 MG: 25 TABLET ORAL at 11:41

## 2023-07-05 RX ADMIN — FERROUS SULFATE TAB 325 MG (65 MG ELEMENTAL FE) 325 MG: 325 (65 FE) TAB at 17:26

## 2023-07-05 RX ADMIN — PANTOPRAZOLE SODIUM 40 MG: 40 TABLET, DELAYED RELEASE ORAL at 17:26

## 2023-07-05 RX ADMIN — METOPROLOL SUCCINATE 75 MG: 50 TABLET, EXTENDED RELEASE ORAL at 11:40

## 2023-07-05 RX ADMIN — POTASSIUM CHLORIDE 10 MEQ: 7.45 INJECTION INTRAVENOUS at 09:32

## 2023-07-05 RX ADMIN — MAGNESIUM SULFATE HEPTAHYDRATE 2000 MG: 40 INJECTION, SOLUTION INTRAVENOUS at 11:49

## 2023-07-05 RX ADMIN — ATORVASTATIN CALCIUM 20 MG: 20 TABLET, FILM COATED ORAL at 11:44

## 2023-07-05 RX ADMIN — METOPROLOL SUCCINATE 75 MG: 50 TABLET, EXTENDED RELEASE ORAL at 21:25

## 2023-07-05 RX ADMIN — INSULIN LISPRO 6 UNITS: 100 INJECTION, SOLUTION INTRAVENOUS; SUBCUTANEOUS at 17:28

## 2023-07-05 RX ADMIN — INSULIN LISPRO 2 UNITS: 100 INJECTION, SOLUTION INTRAVENOUS; SUBCUTANEOUS at 12:13

## 2023-07-05 RX ADMIN — POTASSIUM CHLORIDE 10 MEQ: 7.45 INJECTION INTRAVENOUS at 13:54

## 2023-07-05 RX ADMIN — POTASSIUM CHLORIDE 10 MEQ: 7.45 INJECTION INTRAVENOUS at 10:57

## 2023-07-05 RX ADMIN — ALOGLIPTIN 25 MG: 25 TABLET, FILM COATED ORAL at 12:13

## 2023-07-05 RX ADMIN — TRAMADOL HYDROCHLORIDE 50 MG: 50 TABLET ORAL at 23:31

## 2023-07-05 RX ADMIN — FERROUS SULFATE TAB 325 MG (65 MG ELEMENTAL FE) 325 MG: 325 (65 FE) TAB at 11:41

## 2023-07-05 RX ADMIN — TAMSULOSIN HYDROCHLORIDE 0.4 MG: 0.4 CAPSULE ORAL at 11:41

## 2023-07-05 RX ADMIN — Medication 6 MG: at 21:25

## 2023-07-05 RX ADMIN — SODIUM CHLORIDE, PRESERVATIVE FREE 10 ML: 5 INJECTION INTRAVENOUS at 21:28

## 2023-07-05 RX ADMIN — LISINOPRIL 20 MG: 20 TABLET ORAL at 21:25

## 2023-07-05 ASSESSMENT — PAIN SCALES - GENERAL
PAINLEVEL_OUTOF10: 6
PAINLEVEL_OUTOF10: 0
PAINLEVEL_OUTOF10: 7

## 2023-07-05 ASSESSMENT — PAIN DESCRIPTION - LOCATION
LOCATION: BACK
LOCATION: GENERALIZED

## 2023-07-05 ASSESSMENT — PAIN DESCRIPTION - DESCRIPTORS: DESCRIPTORS: ACHING

## 2023-07-05 NOTE — ACP (ADVANCE CARE PLANNING)
7/5/2023  Advance Care Planning   Healthcare Decision Maker:  Norman Atkins  Click here to complete Healthcare Decision Makers including selection of the Healthcare Decision Maker Relationship (ie \"Primary\").

## 2023-07-05 NOTE — CONSULTS
7/5/2023 8:56 AM  Service: Urology  Group: [facility] ([facility])    Chai Fernandes  [id number]     Chief Complaint:    Right flank pain    History of Present Illness: The patient is a 78 y.o. male patient who presents with dysuria and suprapubic pain. Has a history of 7 mm stone on the right with urinary tract infection. The patient was apparently seen in [facility] emergency room yesterday. The patient refused admission. He was discharged on Ancef. His blood cultures are growing gram-negative rods. He was called back and advised to come to the hospital for admission. Temperature was 99  Yesterday. BUN/creatinine are normal.  White count is normal.  Blood cultures are pending. But his urine culture did show Klebsiella sensitivity and sensitive to nitrofuradantoin. His CAT scan shows a 7 mm stone in the right distal ureter. There is no obstructing stones on the left side midpole kidney there is 1 x 10 mm and 8 to 3 mm stone lower pole left with nonobstructive. He is on HydroDIURIL 25 mg daily for management of hypertension  He says he is passed 5 stones in the past the last 185 years ago was never instrumentation for this. Recently has had gross hematuria urgency and sensation residual only with the current illness. The patient has been on Eliquis which makes it to his risk of bleeding. 3 to 4 cups of coffee a day is a non-smoker.   Family history of prostate cancer not aware of elevated PSA    Past Medical History:   Diagnosis Date    Abscess of right thumb 4/15/2016    Amputation of right thumb 6/18/2014    Distal phalynx    Anemia     Depression     PTSD    Diabetes mellitus ([address])     Hyperlipidemia     Hypertension     Obesity          Past Surgical History:   Procedure Laterality Date    COLONOSCOPY      HERNIA REPAIR      NECK SURGERY      PATELLA SURGERY      SHOULDER SURGERY      TONSILLECTOMY      UPPER GASTROINTESTINAL ENDOSCOPY         Medications Prior to Admission:    Medications

## 2023-07-05 NOTE — ANESTHESIA PRE PROCEDURE
(PTSD):depression/anxiety             GI/Hepatic/Renal:   (+) renal disease: kidney stones,           Endo/Other:    (+) DiabetesType II DM, no interval change, , blood dyscrasia (12.2/35. 7): anemia, arthritis: OA., electrolyte abnormalities (Hypokalemia (K+ 3.3 mmol/L)), malignancy/cancer (Malignant lymphoplasmacytic lymphoma on chemo currently). Pt had no PAT visit       Abdominal:             Vascular: negative vascular ROS. Other Findings:           Anesthesia Plan      MAC     ASA 4       Induction: intravenous. MIPS: Postoperative opioids intended and Prophylactic antiemetics administered. Anesthetic plan and risks discussed with patient. Plan discussed with CRNA.                   Kristy Flower DO  Staff Anesthesiologist  July 6, 2023  9:46 AM

## 2023-07-06 ENCOUNTER — APPOINTMENT (OUTPATIENT)
Dept: GENERAL RADIOLOGY | Age: 78
DRG: 660 | End: 2023-07-06
Payer: OTHER GOVERNMENT

## 2023-07-06 ENCOUNTER — HOSPITAL ENCOUNTER (OUTPATIENT)
Dept: INFUSION THERAPY | Age: 78
Discharge: HOME | End: 2023-07-06

## 2023-07-06 ENCOUNTER — ANESTHESIA (OUTPATIENT)
Dept: OPERATING ROOM | Age: 78
End: 2023-07-06
Payer: OTHER GOVERNMENT

## 2023-07-06 LAB
ANION GAP SERPL CALCULATED.3IONS-SCNC: 11 MMOL/L (ref 7–16)
BUN SERPL-MCNC: 8 MG/DL (ref 6–23)
CALCIUM SERPL-MCNC: 9.4 MG/DL (ref 8.6–10.2)
CHLORIDE SERPL-SCNC: 95 MMOL/L (ref 98–107)
CO2 SERPL-SCNC: 24 MMOL/L (ref 22–29)
CREAT SERPL-MCNC: 0.8 MG/DL (ref 0.7–1.2)
ERYTHROCYTE [DISTWIDTH] IN BLOOD BY AUTOMATED COUNT: 14 FL (ref 11.5–15)
GLUCOSE SERPL-MCNC: 265 MG/DL (ref 74–99)
HCT VFR BLD AUTO: 40.4 % (ref 37–54)
HGB BLD-MCNC: 13.3 G/DL (ref 12.5–16.5)
MAGNESIUM SERPL-MCNC: 1.7 MG/DL (ref 1.6–2.6)
MCH RBC QN AUTO: 28.9 PG (ref 26–35)
MCHC RBC AUTO-ENTMCNC: 32.9 % (ref 32–34.5)
MCV RBC AUTO: 87.6 FL (ref 80–99.9)
METER GLUCOSE: 208 MG/DL (ref 74–99)
METER GLUCOSE: 244 MG/DL (ref 74–99)
METER GLUCOSE: 246 MG/DL (ref 74–99)
METER GLUCOSE: 255 MG/DL (ref 74–99)
METER GLUCOSE: 263 MG/DL (ref 74–99)
METER GLUCOSE: 298 MG/DL (ref 74–99)
PLATELET # BLD AUTO: 261 E9/L (ref 130–450)
PMV BLD AUTO: 10 FL (ref 7–12)
POTASSIUM SERPL-SCNC: 3.7 MMOL/L (ref 3.5–5)
RBC # BLD AUTO: 4.61 E12/L (ref 3.8–5.8)
SODIUM SERPL-SCNC: 130 MMOL/L (ref 132–146)
WBC # BLD: 10.4 E9/L (ref 4.5–11.5)

## 2023-07-06 PROCEDURE — 36415 COLL VENOUS BLD VENIPUNCTURE: CPT

## 2023-07-06 PROCEDURE — 0TC68ZZ EXTIRPATION OF MATTER FROM RIGHT URETER, VIA NATURAL OR ARTIFICIAL OPENING ENDOSCOPIC: ICD-10-PCS | Performed by: UROLOGY

## 2023-07-06 PROCEDURE — 6370000000 HC RX 637 (ALT 250 FOR IP): Performed by: UROLOGY

## 2023-07-06 PROCEDURE — 74420 UROGRAPHY RTRGR +-KUB: CPT

## 2023-07-06 PROCEDURE — C1769 GUIDE WIRE: HCPCS | Performed by: UROLOGY

## 2023-07-06 PROCEDURE — 87088 URINE BACTERIA CULTURE: CPT

## 2023-07-06 PROCEDURE — 82365 CALCULUS SPECTROSCOPY: CPT

## 2023-07-06 PROCEDURE — 6360000002 HC RX W HCPCS

## 2023-07-06 PROCEDURE — 80048 BASIC METABOLIC PNL TOTAL CA: CPT

## 2023-07-06 PROCEDURE — 3700000000 HC ANESTHESIA ATTENDED CARE: Performed by: UROLOGY

## 2023-07-06 PROCEDURE — 2580000003 HC RX 258: Performed by: UROLOGY

## 2023-07-06 PROCEDURE — 3700000001 HC ADD 15 MINUTES (ANESTHESIA): Performed by: UROLOGY

## 2023-07-06 PROCEDURE — 83735 ASSAY OF MAGNESIUM: CPT

## 2023-07-06 PROCEDURE — 3600000003 HC SURGERY LEVEL 3 BASE: Performed by: UROLOGY

## 2023-07-06 PROCEDURE — 6360000002 HC RX W HCPCS: Performed by: UROLOGY

## 2023-07-06 PROCEDURE — 85027 COMPLETE CBC AUTOMATED: CPT

## 2023-07-06 PROCEDURE — 0T768DZ DILATION OF RIGHT URETER WITH INTRALUMINAL DEVICE, VIA NATURAL OR ARTIFICIAL OPENING ENDOSCOPIC: ICD-10-PCS | Performed by: UROLOGY

## 2023-07-06 PROCEDURE — 1200000000 HC SEMI PRIVATE

## 2023-07-06 PROCEDURE — 7100000001 HC PACU RECOVERY - ADDTL 15 MIN: Performed by: UROLOGY

## 2023-07-06 PROCEDURE — 2580000003 HC RX 258: Performed by: INTERNAL MEDICINE

## 2023-07-06 PROCEDURE — C1894 INTRO/SHEATH, NON-LASER: HCPCS | Performed by: UROLOGY

## 2023-07-06 PROCEDURE — 3600000013 HC SURGERY LEVEL 3 ADDTL 15MIN: Performed by: UROLOGY

## 2023-07-06 PROCEDURE — C1758 CATHETER, URETERAL: HCPCS | Performed by: UROLOGY

## 2023-07-06 PROCEDURE — C2617 STENT, NON-COR, TEM W/O DEL: HCPCS | Performed by: UROLOGY

## 2023-07-06 PROCEDURE — 6360000004 HC RX CONTRAST MEDICATION: Performed by: UROLOGY

## 2023-07-06 PROCEDURE — 2720000010 HC SURG SUPPLY STERILE: Performed by: UROLOGY

## 2023-07-06 PROCEDURE — 2500000003 HC RX 250 WO HCPCS

## 2023-07-06 PROCEDURE — 2709999900 HC NON-CHARGEABLE SUPPLY: Performed by: UROLOGY

## 2023-07-06 PROCEDURE — 7100000000 HC PACU RECOVERY - FIRST 15 MIN: Performed by: UROLOGY

## 2023-07-06 PROCEDURE — 82962 GLUCOSE BLOOD TEST: CPT

## 2023-07-06 PROCEDURE — 88300 SURGICAL PATH GROSS: CPT

## 2023-07-06 PROCEDURE — 99232 SBSQ HOSP IP/OBS MODERATE 35: CPT | Performed by: STUDENT IN AN ORGANIZED HEALTH CARE EDUCATION/TRAINING PROGRAM

## 2023-07-06 PROCEDURE — 6370000000 HC RX 637 (ALT 250 FOR IP): Performed by: INTERNAL MEDICINE

## 2023-07-06 DEVICE — UNIVERSA FIRM URETERAL STENT AND POSITIONER WITH HYDROPHILIC COATING
Type: IMPLANTABLE DEVICE | Site: URETER | Status: FUNCTIONAL
Brand: UNIVERSA

## 2023-07-06 RX ORDER — ONDANSETRON 2 MG/ML
INJECTION INTRAMUSCULAR; INTRAVENOUS PRN
Status: DISCONTINUED | OUTPATIENT
Start: 2023-07-06 | End: 2023-07-06 | Stop reason: SDUPTHER

## 2023-07-06 RX ORDER — GLYCOPYRROLATE 0.2 MG/ML
INJECTION INTRAMUSCULAR; INTRAVENOUS PRN
Status: DISCONTINUED | OUTPATIENT
Start: 2023-07-06 | End: 2023-07-06 | Stop reason: SDUPTHER

## 2023-07-06 RX ORDER — PROPOFOL 10 MG/ML
INJECTION, EMULSION INTRAVENOUS PRN
Status: DISCONTINUED | OUTPATIENT
Start: 2023-07-06 | End: 2023-07-06 | Stop reason: SDUPTHER

## 2023-07-06 RX ORDER — PHENYLEPHRINE HCL IN 0.9% NACL 1 MG/10 ML
SYRINGE (ML) INTRAVENOUS PRN
Status: DISCONTINUED | OUTPATIENT
Start: 2023-07-06 | End: 2023-07-06 | Stop reason: SDUPTHER

## 2023-07-06 RX ORDER — KETAMINE HYDROCHLORIDE 10 MG/ML
INJECTION INTRAMUSCULAR; INTRAVENOUS PRN
Status: DISCONTINUED | OUTPATIENT
Start: 2023-07-06 | End: 2023-07-06 | Stop reason: SDUPTHER

## 2023-07-06 RX ORDER — FENTANYL CITRATE 50 UG/ML
INJECTION, SOLUTION INTRAMUSCULAR; INTRAVENOUS PRN
Status: DISCONTINUED | OUTPATIENT
Start: 2023-07-06 | End: 2023-07-06 | Stop reason: SDUPTHER

## 2023-07-06 RX ORDER — LIDOCAINE HYDROCHLORIDE 20 MG/ML
INJECTION, SOLUTION EPIDURAL; INFILTRATION; INTRACAUDAL; PERINEURAL PRN
Status: DISCONTINUED | OUTPATIENT
Start: 2023-07-06 | End: 2023-07-06 | Stop reason: SDUPTHER

## 2023-07-06 RX ADMIN — FERROUS SULFATE TAB 325 MG (65 MG ELEMENTAL FE) 325 MG: 325 (65 FE) TAB at 16:47

## 2023-07-06 RX ADMIN — Medication 100 MCG: at 10:59

## 2023-07-06 RX ADMIN — FENTANYL CITRATE 25 MCG: 50 INJECTION, SOLUTION INTRAMUSCULAR; INTRAVENOUS at 10:28

## 2023-07-06 RX ADMIN — PROPOFOL 75 MCG/KG/MIN: 10 INJECTION, EMULSION INTRAVENOUS at 10:24

## 2023-07-06 RX ADMIN — Medication 100 MCG: at 11:05

## 2023-07-06 RX ADMIN — ONDANSETRON 4 MG: 2 INJECTION INTRAMUSCULAR; INTRAVENOUS at 10:23

## 2023-07-06 RX ADMIN — SODIUM CHLORIDE, PRESERVATIVE FREE 10 ML: 5 INJECTION INTRAVENOUS at 21:24

## 2023-07-06 RX ADMIN — Medication 6 MG: at 21:23

## 2023-07-06 RX ADMIN — PROPOFOL 40 MG: 10 INJECTION, EMULSION INTRAVENOUS at 10:35

## 2023-07-06 RX ADMIN — METOPROLOL SUCCINATE 75 MG: 50 TABLET, EXTENDED RELEASE ORAL at 21:23

## 2023-07-06 RX ADMIN — FENTANYL CITRATE 25 MCG: 50 INJECTION, SOLUTION INTRAMUSCULAR; INTRAVENOUS at 10:23

## 2023-07-06 RX ADMIN — FENTANYL CITRATE 50 MCG: 50 INJECTION, SOLUTION INTRAMUSCULAR; INTRAVENOUS at 10:36

## 2023-07-06 RX ADMIN — LISINOPRIL 20 MG: 20 TABLET ORAL at 07:52

## 2023-07-06 RX ADMIN — PRAZOSIN HYDROCHLORIDE 1 MG: 1 CAPSULE ORAL at 21:23

## 2023-07-06 RX ADMIN — Medication 50 MCG: at 10:52

## 2023-07-06 RX ADMIN — PANTOPRAZOLE SODIUM 40 MG: 40 TABLET, DELAYED RELEASE ORAL at 16:47

## 2023-07-06 RX ADMIN — INSULIN LISPRO 2 UNITS: 100 INJECTION, SOLUTION INTRAVENOUS; SUBCUTANEOUS at 17:20

## 2023-07-06 RX ADMIN — GLYCOPYRROLATE 0.2 MG: 0.2 INJECTION, SOLUTION INTRAMUSCULAR; INTRAVENOUS at 10:23

## 2023-07-06 RX ADMIN — TRAMADOL HYDROCHLORIDE 50 MG: 50 TABLET ORAL at 21:23

## 2023-07-06 RX ADMIN — PROPOFOL 50 MG: 10 INJECTION, EMULSION INTRAVENOUS at 10:23

## 2023-07-06 RX ADMIN — INSULIN LISPRO 2 UNITS: 100 INJECTION, SOLUTION INTRAVENOUS; SUBCUTANEOUS at 14:23

## 2023-07-06 RX ADMIN — WATER 2000 MG: 1 INJECTION INTRAMUSCULAR; INTRAVENOUS; SUBCUTANEOUS at 16:47

## 2023-07-06 RX ADMIN — HYDROCHLOROTHIAZIDE 25 MG: 25 TABLET ORAL at 07:53

## 2023-07-06 RX ADMIN — TAMSULOSIN HYDROCHLORIDE 0.4 MG: 0.4 CAPSULE ORAL at 14:20

## 2023-07-06 RX ADMIN — METOPROLOL SUCCINATE 75 MG: 50 TABLET, EXTENDED RELEASE ORAL at 07:53

## 2023-07-06 RX ADMIN — ALOGLIPTIN 25 MG: 25 TABLET, FILM COATED ORAL at 14:20

## 2023-07-06 RX ADMIN — SODIUM CHLORIDE: 9 INJECTION, SOLUTION INTRAVENOUS at 10:59

## 2023-07-06 RX ADMIN — Medication 100 MCG: at 10:56

## 2023-07-06 RX ADMIN — LISINOPRIL 20 MG: 20 TABLET ORAL at 21:23

## 2023-07-06 RX ADMIN — Medication 2000 UNITS: at 14:20

## 2023-07-06 RX ADMIN — POTASSIUM CHLORIDE 40 MEQ: 1500 TABLET, EXTENDED RELEASE ORAL at 14:20

## 2023-07-06 RX ADMIN — LIDOCAINE HYDROCHLORIDE 60 MG: 20 INJECTION, SOLUTION EPIDURAL; INFILTRATION; INTRACAUDAL; PERINEURAL at 10:23

## 2023-07-06 RX ADMIN — Medication 50 MCG: at 10:54

## 2023-07-06 RX ADMIN — KETAMINE HYDROCHLORIDE 20 MG: 10 INJECTION INTRAMUSCULAR; INTRAVENOUS at 10:34

## 2023-07-06 RX ADMIN — TRAMADOL HYDROCHLORIDE 50 MG: 50 TABLET ORAL at 12:45

## 2023-07-06 RX ADMIN — ATORVASTATIN CALCIUM 20 MG: 20 TABLET, FILM COATED ORAL at 14:20

## 2023-07-06 RX ADMIN — KETAMINE HYDROCHLORIDE 30 MG: 10 INJECTION INTRAMUSCULAR; INTRAVENOUS at 10:23

## 2023-07-06 RX ADMIN — SODIUM CHLORIDE: 9 INJECTION, SOLUTION INTRAVENOUS at 12:35

## 2023-07-06 ASSESSMENT — PAIN SCALES - GENERAL
PAINLEVEL_OUTOF10: 9
PAINLEVEL_OUTOF10: 0

## 2023-07-06 ASSESSMENT — LIFESTYLE VARIABLES: SMOKING_STATUS: 0

## 2023-07-06 ASSESSMENT — PAIN DESCRIPTION - DESCRIPTORS
DESCRIPTORS: ACHING
DESCRIPTORS: DISCOMFORT

## 2023-07-06 ASSESSMENT — PAIN - FUNCTIONAL ASSESSMENT
PAIN_FUNCTIONAL_ASSESSMENT: ACTIVITIES ARE NOT PREVENTED
PAIN_FUNCTIONAL_ASSESSMENT: 0-10

## 2023-07-06 ASSESSMENT — PAIN DESCRIPTION - LOCATION
LOCATION: BACK
LOCATION: PENIS

## 2023-07-06 NOTE — BRIEF OP NOTE
Brief Postoperative Note      Patient: Terri Hoffmann  YOB: 1945  MRN: [id number]    Date of Procedure: 7/6/2023    Pre-Op Diagnosis Codes:     * Kidney stone [N20.0] right distal ureteral calculus with hydronephrosis pain    Post-Op Diagnosis: 7 mm monohydrate irregular contour stone in the right  bulbous ureter with hydronephrosis/nonobstructing stones on the left     Hemorrhagic cystitis  Procedure: Cystoscopy retrograde pyelograms bilateral  right ureteroscopy laser lithotripsy stent insertion stone extraction Petty catheter insertion  Surgeon(s):  Jasmyn Douglass MD    Assistant:  None  Anesthesia: Monitor Anesthesia Care    Estimated Blood Loss (mL): Minimal less than 10 cc    Complications: None    Specimens:   Urine for culture from the bladder/stone fragments    Implants:  None      Drains: 6 x 28 universa stent right    Findings: As above      Electronically signed by Jasmyn Douglass MD on 7/6/2023 at 10:26 AM

## 2023-07-06 NOTE — ANESTHESIA POSTPROCEDURE EVALUATION
Department of Anesthesiology  Postprocedure Note    Patient: Mariam Lam  MRN: [id number]  YOB: 1945  Date of evaluation: 7/6/2023      Procedure Summary     Date: 07/06/23 Room / Location: [facility] 06 / [address] [facility] [facility]    Anesthesia Start: 6607 Anesthesia Stop: 1139    Procedure: CYSTOSCOPY BILATERAL RETROGRADE PYELOGRAM LASER LITHOTRIPSY, RIGHT URETEROSCOPY, RIGHT STONE EXTRACTION, RIGHT STENT INSERTION, CASTILLO INSERTION (Bilateral) Diagnosis:       Kidney stone      (Kidney stone [N20.0])    Surgeons: Kelsey De Leon MD Responsible Provider: Jimi Rg DO    Anesthesia Type: MAC ASA Status: 4          Anesthesia Type: No value filed.     Kera Phase I: Kera Score: 10    Kera Phase II:        Anesthesia Post Evaluation    Patient location during evaluation: PACU  Patient participation: complete - patient participated  Level of consciousness: awake and alert  Pain score: 0  Airway patency: patent  Nausea & Vomiting: no nausea and no vomiting  Complications: no  Cardiovascular status: blood pressure returned to baseline  Respiratory status: acceptable  Hydration status: euvolemic

## 2023-07-06 NOTE — CARE COORDINATION
7/6/2023  Social Work Discharge Planning:Cysto. Room air. IV ATB. Pt is independent at home and has a ww but hasnt needed to use it. Plan is home with spouse. Await ATB plan.  Electronically signed by DIMPLE Torres on 7/6/2023 at 9:58 AM

## 2023-07-06 NOTE — PLAN OF CARE
Problem: Discharge Planning  Goal: Discharge to home or other facility with appropriate resources  Outcome: Progressing  Flowsheets (Taken 7/5/2023 2150 by Catrina Cortez RN)  Discharge to home or other facility with appropriate resources: Identify discharge learning needs (meds, wound care, etc)     Problem: Pain  Goal: Verbalizes/displays adequate comfort level or baseline comfort level  7/6/2023 0219 by Jace Aviles RN  Outcome: Progressing  Flowsheets (Taken 7/5/2023 1853 by Catrina Cortez RN)  Verbalizes/displays adequate comfort level or baseline comfort level: Encourage patient to monitor pain and request assistance  7/5/2023 1821 by Maria De Jesus Carranza RN  Outcome: Progressing     Problem: Safety - Adult  Goal: Free from fall injury  7/6/2023 0219 by Jace Aviles RN  Outcome: Progressing  7/5/2023 1821 by Maria De Jesus Carranza RN  Outcome: Progressing     Problem: ABCDS Injury Assessment  Goal: Absence of physical injury  Outcome: Progressing     Problem: Chronic Conditions and Co-morbidities  Goal: Patient's chronic conditions and co-morbidity symptoms are monitored and maintained or improved  Outcome: Progressing

## 2023-07-07 LAB
ANION GAP SERPL CALCULATED.3IONS-SCNC: 9 MMOL/L (ref 7–16)
BUN SERPL-MCNC: 5 MG/DL (ref 6–23)
CALCIUM SERPL-MCNC: 9.4 MG/DL (ref 8.6–10.2)
CHLORIDE SERPL-SCNC: 98 MMOL/L (ref 98–107)
CO2 SERPL-SCNC: 26 MMOL/L (ref 22–29)
CREAT SERPL-MCNC: 0.8 MG/DL (ref 0.7–1.2)
ERYTHROCYTE [DISTWIDTH] IN BLOOD BY AUTOMATED COUNT: 13.9 FL (ref 11.5–15)
GLUCOSE SERPL-MCNC: 252 MG/DL (ref 74–99)
HCT VFR BLD AUTO: 36.7 % (ref 37–54)
HGB BLD-MCNC: 12.3 G/DL (ref 12.5–16.5)
MAGNESIUM SERPL-MCNC: 1.3 MG/DL (ref 1.6–2.6)
MCH RBC QN AUTO: 28.9 PG (ref 26–35)
MCHC RBC AUTO-ENTMCNC: 33.5 % (ref 32–34.5)
MCV RBC AUTO: 86.2 FL (ref 80–99.9)
METER GLUCOSE: 230 MG/DL (ref 74–99)
METER GLUCOSE: 243 MG/DL (ref 74–99)
METER GLUCOSE: 245 MG/DL (ref 74–99)
METER GLUCOSE: 300 MG/DL (ref 74–99)
PLATELET # BLD AUTO: 271 E9/L (ref 130–450)
PMV BLD AUTO: 9.6 FL (ref 7–12)
POTASSIUM SERPL-SCNC: 3.9 MMOL/L (ref 3.5–5)
RBC # BLD AUTO: 4.26 E12/L (ref 3.8–5.8)
SODIUM SERPL-SCNC: 133 MMOL/L (ref 132–146)
WBC # BLD: 12.5 E9/L (ref 4.5–11.5)

## 2023-07-07 PROCEDURE — 6370000000 HC RX 637 (ALT 250 FOR IP): Performed by: UROLOGY

## 2023-07-07 PROCEDURE — 83735 ASSAY OF MAGNESIUM: CPT

## 2023-07-07 PROCEDURE — 80048 BASIC METABOLIC PNL TOTAL CA: CPT

## 2023-07-07 PROCEDURE — 2580000003 HC RX 258: Performed by: UROLOGY

## 2023-07-07 PROCEDURE — 99231 SBSQ HOSP IP/OBS SF/LOW 25: CPT | Performed by: STUDENT IN AN ORGANIZED HEALTH CARE EDUCATION/TRAINING PROGRAM

## 2023-07-07 PROCEDURE — 85027 COMPLETE CBC AUTOMATED: CPT

## 2023-07-07 PROCEDURE — 6360000002 HC RX W HCPCS: Performed by: UROLOGY

## 2023-07-07 PROCEDURE — 36415 COLL VENOUS BLD VENIPUNCTURE: CPT

## 2023-07-07 PROCEDURE — 82962 GLUCOSE BLOOD TEST: CPT

## 2023-07-07 PROCEDURE — 6370000000 HC RX 637 (ALT 250 FOR IP): Performed by: STUDENT IN AN ORGANIZED HEALTH CARE EDUCATION/TRAINING PROGRAM

## 2023-07-07 PROCEDURE — 1200000000 HC SEMI PRIVATE

## 2023-07-07 RX ORDER — LANOLIN ALCOHOL/MO/W.PET/CERES
400 CREAM (GRAM) TOPICAL 2 TIMES DAILY
Status: DISCONTINUED | OUTPATIENT
Start: 2023-07-07 | End: 2023-07-08 | Stop reason: HOSPADM

## 2023-07-07 RX ORDER — INSULIN GLARGINE 100 [IU]/ML
10 INJECTION, SOLUTION SUBCUTANEOUS NIGHTLY
Status: DISCONTINUED | OUTPATIENT
Start: 2023-07-07 | End: 2023-07-08 | Stop reason: HOSPADM

## 2023-07-07 RX ADMIN — INSULIN GLARGINE 10 UNITS: 100 INJECTION, SOLUTION SUBCUTANEOUS at 21:09

## 2023-07-07 RX ADMIN — FERROUS SULFATE TAB 325 MG (65 MG ELEMENTAL FE) 325 MG: 325 (65 FE) TAB at 16:39

## 2023-07-07 RX ADMIN — Medication 2000 UNITS: at 08:39

## 2023-07-07 RX ADMIN — TRAMADOL HYDROCHLORIDE 50 MG: 50 TABLET ORAL at 22:40

## 2023-07-07 RX ADMIN — HYDROCHLOROTHIAZIDE 25 MG: 25 TABLET ORAL at 08:39

## 2023-07-07 RX ADMIN — ASPIRIN 81 MG CHEWABLE TABLET 81 MG: 81 TABLET CHEWABLE at 08:39

## 2023-07-07 RX ADMIN — WATER 2000 MG: 1 INJECTION INTRAMUSCULAR; INTRAVENOUS; SUBCUTANEOUS at 16:38

## 2023-07-07 RX ADMIN — TRAMADOL HYDROCHLORIDE 50 MG: 50 TABLET ORAL at 08:38

## 2023-07-07 RX ADMIN — Medication 400 MG: at 14:07

## 2023-07-07 RX ADMIN — SODIUM CHLORIDE, PRESERVATIVE FREE 10 ML: 5 INJECTION INTRAVENOUS at 19:26

## 2023-07-07 RX ADMIN — POTASSIUM CHLORIDE 40 MEQ: 1500 TABLET, EXTENDED RELEASE ORAL at 08:39

## 2023-07-07 RX ADMIN — PANTOPRAZOLE SODIUM 40 MG: 40 TABLET, DELAYED RELEASE ORAL at 08:39

## 2023-07-07 RX ADMIN — METOPROLOL SUCCINATE 75 MG: 50 TABLET, EXTENDED RELEASE ORAL at 19:26

## 2023-07-07 RX ADMIN — ACETAMINOPHEN 650 MG: 325 TABLET ORAL at 14:07

## 2023-07-07 RX ADMIN — INSULIN LISPRO 2 UNITS: 100 INJECTION, SOLUTION INTRAVENOUS; SUBCUTANEOUS at 11:53

## 2023-07-07 RX ADMIN — INSULIN LISPRO 6 UNITS: 100 INJECTION, SOLUTION INTRAVENOUS; SUBCUTANEOUS at 16:39

## 2023-07-07 RX ADMIN — METOPROLOL SUCCINATE 75 MG: 50 TABLET, EXTENDED RELEASE ORAL at 08:39

## 2023-07-07 RX ADMIN — PRAZOSIN HYDROCHLORIDE 1 MG: 1 CAPSULE ORAL at 19:26

## 2023-07-07 RX ADMIN — LISINOPRIL 20 MG: 20 TABLET ORAL at 19:26

## 2023-07-07 RX ADMIN — ALOGLIPTIN 25 MG: 25 TABLET, FILM COATED ORAL at 08:41

## 2023-07-07 RX ADMIN — LISINOPRIL 20 MG: 20 TABLET ORAL at 08:38

## 2023-07-07 RX ADMIN — PANTOPRAZOLE SODIUM 40 MG: 40 TABLET, DELAYED RELEASE ORAL at 16:39

## 2023-07-07 RX ADMIN — INSULIN LISPRO 2 UNITS: 100 INJECTION, SOLUTION INTRAVENOUS; SUBCUTANEOUS at 08:42

## 2023-07-07 RX ADMIN — TAMSULOSIN HYDROCHLORIDE 0.4 MG: 0.4 CAPSULE ORAL at 08:39

## 2023-07-07 RX ADMIN — FERROUS SULFATE TAB 325 MG (65 MG ELEMENTAL FE) 325 MG: 325 (65 FE) TAB at 08:40

## 2023-07-07 RX ADMIN — SODIUM CHLORIDE: 9 INJECTION, SOLUTION INTRAVENOUS at 01:21

## 2023-07-07 RX ADMIN — Medication 400 MG: at 19:26

## 2023-07-07 RX ADMIN — ATORVASTATIN CALCIUM 20 MG: 20 TABLET, FILM COATED ORAL at 08:39

## 2023-07-07 ASSESSMENT — PAIN SCALES - GENERAL
PAINLEVEL_OUTOF10: 2
PAINLEVEL_OUTOF10: 5
PAINLEVEL_OUTOF10: 3
PAINLEVEL_OUTOF10: 6

## 2023-07-07 ASSESSMENT — PAIN DESCRIPTION - LOCATION
LOCATION: PELVIS

## 2023-07-07 ASSESSMENT — PAIN DESCRIPTION - DESCRIPTORS: DESCRIPTORS: TENDER;DISCOMFORT

## 2023-07-07 NOTE — PLAN OF CARE
Problem: Pain  Goal: Verbalizes/displays adequate comfort level or baseline comfort level  7/6/2023 2351 by Lio Kerns RN  Outcome: Progressing  7/6/2023 1742 by Mike Gaitan RN  Outcome: Progressing     Problem: Safety - Adult  Goal: Free from fall injury  7/6/2023 2351 by Lio Kerns RN  Outcome: Progressing  7/6/2023 1742 by Mike Gaitan RN  Outcome: Progressing     Problem: ABCDS Injury Assessment  Goal: Absence of physical injury  Outcome: Progressing

## 2023-07-07 NOTE — OP NOTE
[address]                  [address] [facility], [address] [place] [address]                                OPERATIVE REPORT    PATIENT NAME: Sean Mittal                     :        1945  MED REC NO:   [id number]                            ROOM:       05  ACCOUNT NO:   [de-identified]                           ADMIT DATE: 2023  PROVIDER:     Anabel Coleman MD    DATE OF PROCEDURE:  2023    PREOPERATIVE DIAGNOSES:  A right distal ureteral calculus 7 mm and there  were some nonobstructing stones on the left. POSTOPERATIVE DIAGNOSES:  A 7-mm irregular stone in the bulbous portion  of the ureter on the right and there were nonobstructing stones on the  left. OPERATION PERFORMED:  Cystopanendoscopy; retrograde pyelograms,  bilateral; right ureteroscopy; laser lithotripsy; stone extractions;  stent insertion without a string; and the Petty catheter insertion. ANESTHESIA:  Monitored sedation. BLOOD LOSS:  Less than 10 mL. COMPLICATIONS:  None. SPECIMENS:  Urine from the bladder for culture and the stone fragments  extracted were sent for analysis. The stent was a 6 x 28 Universa stent on the right. DESCRIPTION OF PROCEDURE:  The time-out was read by me, the Anesthesia,  and the operating staff, reviewed history and physical, allergy, and  medication. The patient received antibiotics on the floor. No  additional antibiotics were necessary. The patient was placed in  lithotomy position with no undue tension to hips, knees, or buttocks. A  #21 panendoscope with obturator inserted into the urethra. No  strictures, false passages, abrasions, ulcerations, cystic or solid  lesions. There were lateral lobes obstructing the midline and slight  median lobe hypertrophy. The bladder showed some small stones in the  lumen. They actually were only about 1 mm in the size. There were five  of them and they were sent separately.   They were easily

## 2023-07-07 NOTE — CARE COORDINATION
7/7/2023  Social Work Discharge Planning: Room air. IV ATB. Pt is independent at home and has a ww but hasnt needed to use it. Plan is home with spouse. Await ATB plan. SW made a referral to [facility] for possible IV ATB needs. Per liaison, the [address] [place] [address] will not be a quick referral. A form will need to be filled out and clinicals faxed if IV ATB is needed, as well as the ID phys will need to speak to the ID phys at the [address] [place] [address]. Waiting ID plan. [facility] is following. Electronically signed by DIMPLE White on 7/7/2023 at 10:23 AM      7/7/2023  The Plan for Transition of Care is related to the following treatment goals: [facility]    The Patient  was provided with a choice of provider and agrees   with the discharge plan. [x] Yes [] No    Freedom of choice list was provided with basic dialogue that supports the patient's individualized plan of care/goals, treatment preferences and shares the quality data associated with the providers.  [x] Yes [] No

## 2023-07-08 VITALS
SYSTOLIC BLOOD PRESSURE: 149 MMHG | HEART RATE: 98 BPM | WEIGHT: 284 LBS | RESPIRATION RATE: 16 BRPM | DIASTOLIC BLOOD PRESSURE: 78 MMHG | BODY MASS INDEX: 38.47 KG/M2 | OXYGEN SATURATION: 96 % | TEMPERATURE: 98.2 F | HEIGHT: 72 IN

## 2023-07-08 LAB
ANION GAP SERPL CALCULATED.3IONS-SCNC: 10 MMOL/L (ref 7–16)
BACTERIA BLD CULT: NORMAL
BACTERIA UR CULT: NORMAL
BUN SERPL-MCNC: 7 MG/DL (ref 6–23)
CALCIUM SERPL-MCNC: 9.8 MG/DL (ref 8.6–10.2)
CHLORIDE SERPL-SCNC: 96 MMOL/L (ref 98–107)
CO2 SERPL-SCNC: 27 MMOL/L (ref 22–29)
CREAT SERPL-MCNC: 0.8 MG/DL (ref 0.7–1.2)
ERYTHROCYTE [DISTWIDTH] IN BLOOD BY AUTOMATED COUNT: 13.8 FL (ref 11.5–15)
GLUCOSE SERPL-MCNC: 283 MG/DL (ref 74–99)
HCT VFR BLD AUTO: 39.5 % (ref 37–54)
HGB BLD-MCNC: 12.9 G/DL (ref 12.5–16.5)
MAGNESIUM SERPL-MCNC: 1.3 MG/DL (ref 1.6–2.6)
MCH RBC QN AUTO: 28.7 PG (ref 26–35)
MCHC RBC AUTO-ENTMCNC: 32.7 % (ref 32–34.5)
MCV RBC AUTO: 87.8 FL (ref 80–99.9)
METER GLUCOSE: 205 MG/DL (ref 74–99)
METER GLUCOSE: 243 MG/DL (ref 74–99)
METER GLUCOSE: 274 MG/DL (ref 74–99)
PLATELET # BLD AUTO: 302 E9/L (ref 130–450)
PMV BLD AUTO: 9.7 FL (ref 7–12)
POTASSIUM SERPL-SCNC: 4.1 MMOL/L (ref 3.5–5)
RBC # BLD AUTO: 4.5 E12/L (ref 3.8–5.8)
SODIUM SERPL-SCNC: 133 MMOL/L (ref 132–146)
WBC # BLD: 9.5 E9/L (ref 4.5–11.5)

## 2023-07-08 PROCEDURE — 6370000000 HC RX 637 (ALT 250 FOR IP): Performed by: STUDENT IN AN ORGANIZED HEALTH CARE EDUCATION/TRAINING PROGRAM

## 2023-07-08 PROCEDURE — 99283 EMERGENCY DEPT VISIT LOW MDM: CPT

## 2023-07-08 PROCEDURE — 6370000000 HC RX 637 (ALT 250 FOR IP): Performed by: UROLOGY

## 2023-07-08 PROCEDURE — 2580000003 HC RX 258: Performed by: UROLOGY

## 2023-07-08 PROCEDURE — 83735 ASSAY OF MAGNESIUM: CPT

## 2023-07-08 PROCEDURE — 85027 COMPLETE CBC AUTOMATED: CPT

## 2023-07-08 PROCEDURE — 99239 HOSP IP/OBS DSCHRG MGMT >30: CPT | Performed by: STUDENT IN AN ORGANIZED HEALTH CARE EDUCATION/TRAINING PROGRAM

## 2023-07-08 PROCEDURE — 82962 GLUCOSE BLOOD TEST: CPT

## 2023-07-08 PROCEDURE — 6360000002 HC RX W HCPCS: Performed by: UROLOGY

## 2023-07-08 PROCEDURE — 80048 BASIC METABOLIC PNL TOTAL CA: CPT

## 2023-07-08 PROCEDURE — 36415 COLL VENOUS BLD VENIPUNCTURE: CPT

## 2023-07-08 RX ORDER — CEFDINIR 300 MG/1
300 CAPSULE ORAL 2 TIMES DAILY
Qty: 10 CAPSULE | Refills: 0 | Status: SHIPPED | OUTPATIENT
Start: 2023-07-08 | End: 2023-07-09 | Stop reason: SDUPTHER

## 2023-07-08 RX ORDER — METOPROLOL SUCCINATE 25 MG/1
75 TABLET, EXTENDED RELEASE ORAL 2 TIMES DAILY
Qty: 30 TABLET | Refills: 3 | Status: SHIPPED | OUTPATIENT
Start: 2023-07-08

## 2023-07-08 RX ORDER — LANOLIN ALCOHOL/MO/W.PET/CERES
400 CREAM (GRAM) TOPICAL 2 TIMES DAILY
Qty: 10 TABLET | Refills: 0 | Status: SHIPPED | OUTPATIENT
Start: 2023-07-08 | End: 2023-07-13

## 2023-07-08 RX ORDER — MAGNESIUM SULFATE IN WATER 40 MG/ML
2000 INJECTION, SOLUTION INTRAVENOUS ONCE
Status: DISCONTINUED | OUTPATIENT
Start: 2023-07-08 | End: 2023-07-08

## 2023-07-08 RX ORDER — LANOLIN ALCOHOL/MO/W.PET/CERES
6 CREAM (GRAM) TOPICAL NIGHTLY
Qty: 90 TABLET | Refills: 3 | Status: SHIPPED | OUTPATIENT
Start: 2023-07-08

## 2023-07-08 RX ADMIN — SODIUM CHLORIDE, PRESERVATIVE FREE 10 ML: 5 INJECTION INTRAVENOUS at 08:49

## 2023-07-08 RX ADMIN — INSULIN LISPRO 4 UNITS: 100 INJECTION, SOLUTION INTRAVENOUS; SUBCUTANEOUS at 06:24

## 2023-07-08 RX ADMIN — TRAMADOL HYDROCHLORIDE 50 MG: 50 TABLET ORAL at 05:48

## 2023-07-08 RX ADMIN — INSULIN LISPRO 2 UNITS: 100 INJECTION, SOLUTION INTRAVENOUS; SUBCUTANEOUS at 16:44

## 2023-07-08 RX ADMIN — SODIUM CHLORIDE: 9 INJECTION, SOLUTION INTRAVENOUS at 03:28

## 2023-07-08 RX ADMIN — PANTOPRAZOLE SODIUM 40 MG: 40 TABLET, DELAYED RELEASE ORAL at 05:48

## 2023-07-08 RX ADMIN — TAMSULOSIN HYDROCHLORIDE 0.4 MG: 0.4 CAPSULE ORAL at 08:47

## 2023-07-08 RX ADMIN — POTASSIUM CHLORIDE 40 MEQ: 1500 TABLET, EXTENDED RELEASE ORAL at 08:48

## 2023-07-08 RX ADMIN — FERROUS SULFATE TAB 325 MG (65 MG ELEMENTAL FE) 325 MG: 325 (65 FE) TAB at 16:39

## 2023-07-08 RX ADMIN — FERROUS SULFATE TAB 325 MG (65 MG ELEMENTAL FE) 325 MG: 325 (65 FE) TAB at 08:48

## 2023-07-08 RX ADMIN — WATER 2000 MG: 1 INJECTION INTRAMUSCULAR; INTRAVENOUS; SUBCUTANEOUS at 16:39

## 2023-07-08 RX ADMIN — Medication 400 MG: at 08:48

## 2023-07-08 RX ADMIN — PANTOPRAZOLE SODIUM 40 MG: 40 TABLET, DELAYED RELEASE ORAL at 16:39

## 2023-07-08 RX ADMIN — ASPIRIN 81 MG CHEWABLE TABLET 81 MG: 81 TABLET CHEWABLE at 08:47

## 2023-07-08 RX ADMIN — HYDROCHLOROTHIAZIDE 25 MG: 25 TABLET ORAL at 08:48

## 2023-07-08 RX ADMIN — ATORVASTATIN CALCIUM 20 MG: 20 TABLET, FILM COATED ORAL at 08:47

## 2023-07-08 RX ADMIN — METOPROLOL SUCCINATE 75 MG: 50 TABLET, EXTENDED RELEASE ORAL at 08:48

## 2023-07-08 RX ADMIN — INSULIN LISPRO 2 UNITS: 100 INJECTION, SOLUTION INTRAVENOUS; SUBCUTANEOUS at 11:35

## 2023-07-08 RX ADMIN — ALOGLIPTIN 25 MG: 25 TABLET, FILM COATED ORAL at 08:48

## 2023-07-08 RX ADMIN — Medication 2000 UNITS: at 08:48

## 2023-07-08 RX ADMIN — LISINOPRIL 20 MG: 20 TABLET ORAL at 08:47

## 2023-07-08 RX ADMIN — TRAMADOL HYDROCHLORIDE 50 MG: 50 TABLET ORAL at 12:54

## 2023-07-08 ASSESSMENT — PAIN DESCRIPTION - LOCATION: LOCATION: BACK;NECK;LEG

## 2023-07-08 ASSESSMENT — PAIN SCALES - GENERAL
PAINLEVEL_OUTOF10: 6
PAINLEVEL_OUTOF10: 6

## 2023-07-08 ASSESSMENT — PAIN - FUNCTIONAL ASSESSMENT: PAIN_FUNCTIONAL_ASSESSMENT: ACTIVITIES ARE NOT PREVENTED

## 2023-07-08 NOTE — PLAN OF CARE
Problem: Safety - Adult  Goal: Free from fall injury  7/7/2023 2208 by Samson Funes RN  Outcome: Progressing  7/7/2023 1601 by Анна Lund RN  Outcome: Progressing     Problem: ABCDS Injury Assessment  Goal: Absence of physical injury  Outcome: Progressing     Problem: Pain  Goal: Verbalizes/displays adequate comfort level or baseline comfort level  7/7/2023 2208 by Samson Funes RN  Outcome: Progressing  7/7/2023 1601 by Анна Lund RN  Outcome: Progressing

## 2023-07-08 NOTE — DISCHARGE SUMMARY
[facility] Physician Discharge Summary       No follow-up provider specified. Activity level: As tolerated     Dispo: Home      Condition on discharge: Stable     Patient ID:  Ge Owuus  [id number]  78 y.o.  1945    Admit date: 7/4/2023    Discharge date and time:  7/8/2023  3:43 PM    Admission Diagnoses: Principal Problem:    Kidney stone  Active Problems:    Hydronephrosis with urinary obstruction due to ureteral calculus  Resolved Problems:    * No resolved hospital problems. *      Discharge Diagnoses: Principal Problem:    Kidney stone  Active Problems:    Hydronephrosis with urinary obstruction due to ureteral calculus  Resolved Problems:    * No resolved hospital problems. *      Consults:  IP CONSULT TO UROLOGY  IP CONSULT TO [facility] Course:   Patient Ge Owusu is a 78 y.o. presented with Bacteremia [R78.81]  Kidney stone [N20.0]  Hyponatremia [E87.1]  Hydronephrosis with urinary obstruction due to ureteral calculus [N13.2]  Patient was admitted and managed for Complicated UTI with obstructive uropathy - Gn bacteremia - Blood cultures as well as urine cultures grew Klebsiella. Blood cx - 1/2 bottles GNR Klebsiella, kleibsella pansenitive. S/p Meropenem in ER , f/u with Urology at [facility] - changed to Rocephin. Consulted urology , s/p  cystoscopy retrograde pyelogram ureteroscopy laser lithotripsy stone extraction and stent insertion 7/6. Waiting final intraoperative cxs, dc on po cefdinir to complete a course of total 5  days ( s/p 5 days of IV Rocephin).        Discharge Exam:  General Appearance: alert and oriented to person, place and time and in no acute distress  Skin: warm and dry  Head: normocephalic and atraumatic  Eyes: pupils equal, round, and reactive to light, extraocular eye movements intact, conjunctivae normal  Neck: neck supple and non tender without mass   Pulmonary/Chest: clear to auscultation bilaterally- no wheezes, rales or

## 2023-07-09 ENCOUNTER — HOSPITAL ENCOUNTER (EMERGENCY)
Age: 78
Discharge: HOME | End: 2023-07-09
Attending: EMERGENCY MEDICINE
Payer: MEDICARE

## 2023-07-09 VITALS
RESPIRATION RATE: 18 BRPM | HEART RATE: 90 BPM | TEMPERATURE: 97.9 F | DIASTOLIC BLOOD PRESSURE: 65 MMHG | OXYGEN SATURATION: 95 % | SYSTOLIC BLOOD PRESSURE: 137 MMHG

## 2023-07-09 DIAGNOSIS — R33.8 ACUTE URINARY RETENTION: Primary | ICD-10-CM

## 2023-07-09 LAB
BACTERIA URNS QL MICRO: ABNORMAL /HPF
BILIRUB UR QL STRIP: ABNORMAL
CLARITY UR: ABNORMAL
COLOR UR: ABNORMAL
GLUCOSE UR STRIP-MCNC: >=1000 MG/DL
HGB UR QL STRIP: ABNORMAL
KETONES UR STRIP-MCNC: NEGATIVE MG/DL
LEUKOCYTE ESTERASE UR QL STRIP: ABNORMAL
NITRITE UR QL STRIP: POSITIVE
ORGANISM: ABNORMAL
PH UR STRIP: 5 [PH] (ref 5–9)
PROT UR STRIP-MCNC: 100 MG/DL
RBC #/AREA URNS HPF: ABNORMAL /HPF (ref 0–2)
SP GR UR STRIP: 1.01 (ref 1–1.03)
UROBILINOGEN UR STRIP-ACNC: 1 E.U./DL
WBC #/AREA URNS HPF: >20 /HPF (ref 0–5)

## 2023-07-09 PROCEDURE — 81001 URINALYSIS AUTO W/SCOPE: CPT

## 2023-07-09 PROCEDURE — 87088 URINE BACTERIA CULTURE: CPT

## 2023-07-09 RX ORDER — CEFDINIR 300 MG/1
300 CAPSULE ORAL 2 TIMES DAILY
Qty: 14 CAPSULE | Refills: 0 | Status: SHIPPED | OUTPATIENT
Start: 2023-07-09 | End: 2023-07-16

## 2023-07-10 NOTE — ED PROVIDER NOTES
[address] ENCOUNTER        Pt Name: Man Krause  MRN: [id number]  [address] [facility] 1945  Date of evaluation: 7/8/2023  Provider: Mirza Morrison DO  PCP: Ramila Murillo MD  Note Started: 6:20 AM EDT 7/10/23    CHIEF COMPLAINT       Chief Complaint   Patient presents with    Urinary Retention     Since 6pm; had catheter removed at [facility]: 1 or more Elements     History from : Patient    Limitations to history : None    Man Krause is a 78 y.o. male who presents for acute urinary tension. Patient recently had a Petty catheter and was hospitalized. He suddenly had it removed prior to discharge. After discharge in the hospital he noted significant bladder pressure and distention and inability to void. He stated this started around 6 PM.  Patient has A-fib and is anticoagulated. He did note some blood-tinged urine however there is no large clots. Patient recently had a procedure for urinary calculi with Dr. David Ochoa. He denies any fever chills at this time denies any cough or cold symptoms. Patient was prescribed medicine upon discharge from the hospital including antibiotics however they were sent to the [facility] and patient states it may take several days for them to  so therefore he will need a prescription sent to a local pharmacy for antibiotics. Nursing Notes were all reviewed and agreed with or any disagreements were addressed in the HPI. REVIEW OF SYSTEMS :      Review of Systems   Constitutional:  Negative for chills and fever. Cardiovascular:  Negative for chest pain. Gastrointestinal:  Positive for abdominal pain. Endocrine: Negative for polyuria. Genitourinary:  Positive for decreased urine volume, difficulty urinating, hematuria and urgency. Negative for penile pain and penile swelling. Musculoskeletal:  Negative for back pain and neck pain.    Neurological:  Negative for

## 2023-07-11 LAB
APPEARANCE STONE: NORMAL
BACTERIA UR CULT: NORMAL
COMPN STONE: NORMAL
SPECIMEN WT: 224 MG

## 2023-07-12 ENCOUNTER — HOSPITAL ENCOUNTER (EMERGENCY)
Age: 78
Discharge: HOME | End: 2023-07-13
Attending: EMERGENCY MEDICINE
Payer: OTHER GOVERNMENT

## 2023-07-12 DIAGNOSIS — R33.8 ACUTE URINARY RETENTION: Primary | ICD-10-CM

## 2023-07-12 DIAGNOSIS — R31.0 GROSS HEMATURIA: ICD-10-CM

## 2023-07-12 LAB
ANION GAP SERPL CALCULATED.3IONS-SCNC: 12 MMOL/L (ref 7–16)
BACTERIA URNS QL MICRO: ABNORMAL /HPF
BASOPHILS # BLD: 0.05 E9/L (ref 0–0.2)
BASOPHILS NFR BLD: 0.4 % (ref 0–2)
BILIRUB UR QL STRIP: NEGATIVE
BUN SERPL-MCNC: 8 MG/DL (ref 6–23)
CALCIUM SERPL-MCNC: 9.9 MG/DL (ref 8.6–10.2)
CHLORIDE SERPL-SCNC: 95 MMOL/L (ref 98–107)
CLARITY UR: ABNORMAL
CO2 SERPL-SCNC: 26 MMOL/L (ref 22–29)
COLOR UR: ABNORMAL
CREAT SERPL-MCNC: 0.9 MG/DL (ref 0.7–1.2)
EOSINOPHIL # BLD: 0.1 E9/L (ref 0.05–0.5)
EOSINOPHIL NFR BLD: 0.9 % (ref 0–6)
ERYTHROCYTE [DISTWIDTH] IN BLOOD BY AUTOMATED COUNT: 13.9 FL (ref 11.5–15)
GLUCOSE SERPL-MCNC: 196 MG/DL (ref 74–99)
GLUCOSE UR STRIP-MCNC: NEGATIVE MG/DL
HCT VFR BLD AUTO: 39.2 % (ref 37–54)
HGB BLD-MCNC: 13.3 G/DL (ref 12.5–16.5)
HGB UR QL STRIP: ABNORMAL
IMM GRANULOCYTES # BLD: 0.41 E9/L
IMM GRANULOCYTES NFR BLD: 3.7 % (ref 0–5)
KETONES UR STRIP-MCNC: ABNORMAL MG/DL
LEUKOCYTE ESTERASE UR QL STRIP: ABNORMAL
LYMPHOCYTES # BLD: 1.84 E9/L (ref 1.5–4)
LYMPHOCYTES NFR BLD: 16.5 % (ref 20–42)
MCH RBC QN AUTO: 29.2 PG (ref 26–35)
MCHC RBC AUTO-ENTMCNC: 33.9 % (ref 32–34.5)
MCV RBC AUTO: 86.2 FL (ref 80–99.9)
MONOCYTES # BLD: 0.61 E9/L (ref 0.1–0.95)
MONOCYTES NFR BLD: 5.5 % (ref 2–12)
NEUTROPHILS # BLD: 8.12 E9/L (ref 1.8–7.3)
NEUTS SEG NFR BLD: 73 % (ref 43–80)
NITRITE UR QL STRIP: POSITIVE
PH UR STRIP: 5 [PH] (ref 5–9)
PLATELET # BLD AUTO: 274 E9/L (ref 130–450)
PMV BLD AUTO: 9.8 FL (ref 7–12)
POTASSIUM SERPL-SCNC: 3.5 MMOL/L (ref 3.5–5)
PROT UR STRIP-MCNC: >=300 MG/DL
RBC # BLD AUTO: 4.55 E12/L (ref 3.8–5.8)
RBC #/AREA URNS HPF: ABNORMAL /HPF (ref 0–2)
SODIUM SERPL-SCNC: 133 MMOL/L (ref 132–146)
SP GR UR STRIP: 1.01 (ref 1–1.03)
UROBILINOGEN UR STRIP-ACNC: 1 E.U./DL
WBC # BLD: 11.1 E9/L (ref 4.5–11.5)
WBC #/AREA URNS HPF: ABNORMAL /HPF (ref 0–5)

## 2023-07-12 PROCEDURE — 87088 URINE BACTERIA CULTURE: CPT

## 2023-07-12 PROCEDURE — 81001 URINALYSIS AUTO W/SCOPE: CPT

## 2023-07-12 PROCEDURE — 99283 EMERGENCY DEPT VISIT LOW MDM: CPT

## 2023-07-12 PROCEDURE — 36415 COLL VENOUS BLD VENIPUNCTURE: CPT

## 2023-07-12 PROCEDURE — 85025 COMPLETE CBC W/AUTO DIFF WBC: CPT

## 2023-07-12 PROCEDURE — 80048 BASIC METABOLIC PNL TOTAL CA: CPT

## 2023-07-12 ASSESSMENT — PAIN SCALES - GENERAL: PAINLEVEL_OUTOF10: 10

## 2023-07-12 ASSESSMENT — PAIN - FUNCTIONAL ASSESSMENT: PAIN_FUNCTIONAL_ASSESSMENT: 0-10

## 2023-07-12 ASSESSMENT — ENCOUNTER SYMPTOMS
BACK PAIN: 0
ABDOMINAL PAIN: 1

## 2023-07-12 ASSESSMENT — PAIN DESCRIPTION - LOCATION: LOCATION: ABDOMEN

## 2023-07-12 ASSESSMENT — PAIN DESCRIPTION - FREQUENCY: FREQUENCY: CONTINUOUS

## 2023-07-13 VITALS
HEART RATE: 87 BPM | BODY MASS INDEX: 38.47 KG/M2 | TEMPERATURE: 97.5 F | WEIGHT: 284 LBS | OXYGEN SATURATION: 99 % | RESPIRATION RATE: 19 BRPM | DIASTOLIC BLOOD PRESSURE: 88 MMHG | SYSTOLIC BLOOD PRESSURE: 159 MMHG | HEIGHT: 72 IN

## 2023-07-13 NOTE — ED PROVIDER NOTES
SUBJECTIVE:   Eli Fernandez is a 19 year old male presenting with a chief complaint of   Chief Complaint   Patient presents with     Derm Problem     Flare up on face unsure of what it is but now it is on face. Dad thought was acne at first. He has hx of acne. Has been going on for 2-3 days. Hands started today. Dad noticed yesterday eye were swollen. :Says it is itchy and burning. No new exposures. Noticed some of them look like blisters and have been pussing some.          History reviewed. No pertinent past medical history.  Family History   Problem Relation Age of Onset     Prostate Cancer Maternal Grandfather      Alcohol/Drug Paternal Grandmother      Cancer - colorectal Paternal Grandfather      Blood Disease Paternal Grandfather         leukemia     Prostate Cancer Paternal Grandfather      Current Outpatient Medications   Medication Sig Dispense Refill     albuterol (PROAIR HFA, PROVENTIL HFA, VENTOLIN HFA) 108 (90 BASE) MCG/ACT inhaler Inhale 2 puffs into the lungs every 6 hours as needed for shortness of breath / dyspnea (Patient not taking: Reported on 6/21/2023) 3 Inhaler 1     Pediatric Multivitamin  s-Fl 1 MG CHEW Take 1 tablet by mouth daily (Patient not taking: Reported on 6/21/2023) 100 tablet 3     Social History     Tobacco Use     Smoking status: Never     Passive exposure: Yes     Smokeless tobacco: Never     Tobacco comments:     Occasional Exposure   Substance Use Topics     Alcohol use: No       OBJECTIVE  /71 (BP Location: Right arm, Patient Position: Sitting, Cuff Size: Adult Large)   Pulse 83   Temp 98.6  F (37  C) (Tympanic)   Resp 18   Wt 104.8 kg (231 lb)   SpO2 98%     Physical Exam  HENT:      Nose: Nose normal.   Eyes:      Extraocular Movements: Extraocular movements intact.      Conjunctiva/sclera: Conjunctivae normal.   Cardiovascular:      Rate and Rhythm: Normal rate and regular rhythm.   Pulmonary:      Effort: Pulmonary effort is normal.      Breath sounds:  Normal breath sounds.   Musculoskeletal:      Cervical back: Normal range of motion and neck supple.   Skin:     Findings: Erythema and rash present.      Comments: Redness and swelling to face. Denies trouble breathing, no scratchy throat or tongue swelling.   Neurological:      Mental Status: He is alert and oriented to person, place, and time.   Psychiatric:         Mood and Affect: Mood normal.         Behavior: Behavior normal.         Thought Content: Thought content normal.     Decadron injection given, and oral diphenhydramine in clinic. Facial swelling and redness started to subside before leaving.     ASSESSMENT:    1. Allergic reaction, initial encounter    - cetirizine (ZYRTEC) 10 MG tablet; Take 1 tablet (10 mg) by mouth 2 times daily for 10 days  Dispense: 20 tablet; Refill: 0  - famotidine (PEPCID) 20 MG tablet; Take 1 tablet (20 mg) by mouth 2 times daily for 10 days  Dispense: 20 tablet; Refill: 0  - predniSONE (DELTASONE) 20 MG tablet; Take 3 tabs by mouth daily x 3 days, then 2 tabs daily x 3 days, then 1 tab daily x 3 days, then 1/2 tab daily x 3 days.  Dispense: 20 tablet; Refill: 0  - dexamethasone PF (DECADRON) injection 10 mg      PLAN:  Take steroid taper, follow directions on package.  Zyrtec twice a day until rash resolves, then daily as needed.   Pepcid twice a day until rash resolves, then daily as needed.  Monitor for s/s of infection.    ER if rash worsens despite these measures. ER if any involvement of your airway, scratchy throat, feeling like throat is closing, swollen tongue etc.              reviewed and agreed with or any disagreements were addressed in the HPI. REVIEW OF EXTERNAL NOTE :       Op note from 7/6/2023 from urology Dr. Bakari Dias. Patient is a 7 mm right distal ureteral stone. Patient had cystoscopy retrograde pyelogram laser litholaser lithotripsy stent placement and Castillo catheter insertion. Reviewed hospitalist discharge note from 7/8/2023. Patient been admitted with hydronephrosis ureteral obstruction kidney stone and bacteremia had been treated with IV Rocephin and stent placement lithotripsy. Was able to be discharged home without Castillo catheter in place. REVIEW OF SYSTEMS :           Positives and Pertinent negatives as per HPI.      SURGICAL HISTORY     Past Surgical History:   Procedure Laterality Date    COLONOSCOPY      HERNIA REPAIR      LITHOTRIPSY Bilateral 7/6/2023    CYSTOSCOPY BILATERAL RETROGRADE PYELOGRAM LASER LITHOTRIPSY, RIGHT URETEROSCOPY, RIGHT STONE EXTRACTION, RIGHT STENT INSERTION, CASTILLO INSERTION performed by Frankey Destine, MD at [address]       Previous Medications    ALOGLIPTIN (NESINA) 25 MG TABS TABLET    TAKE ONE TABLET BY MOUTH EVERY DAY    AMMONIUM LACTATE (AMLACTIN) 12 % CREAM    Apply topically in the morning and at bedtime Apply topically to feet bid    APIXABAN (ELIQUIS) 5 MG TABS TABLET    Take 1 tablet by mouth 2 times daily    ASPIRIN 81 MG CHEWABLE TABLET    Take 1 tablet by mouth daily    CARBOXYMETHYLCELLULOSE (REFRESH PLUS) 0.5 % SOLN OPHTHALMIC SOLUTION        CEFDINIR (OMNICEF) 300 MG CAPSULE    Take 1 capsule by mouth 2 times daily for 7 days    DOCUSATE (COLACE, DULCOLAX) 100 MG CAPS        FERROUS SULFATE (IRON 325) 325 (65 FE) MG TABLET    TAKE ONE TABLET BY MOUTH TWICE A DAY (AN HOUR BEFORE OR TWO HOURS AFTER A MEAL; TAKE WITH FOOD IF THIS UPSETS YOUR STOMACH)----  IRON PILL (AN HOUR BEFORE

## 2023-07-15 LAB
BACTERIA UR CULT: ABNORMAL
ORGANISM: ABNORMAL

## 2023-07-17 ENCOUNTER — OFFICE VISIT (OUTPATIENT)
Dept: ONCOLOGY | Age: 78
End: 2023-07-17
Payer: MEDICARE

## 2023-07-17 ENCOUNTER — HOSPITAL ENCOUNTER (OUTPATIENT)
Dept: INFUSION THERAPY | Age: 78
Discharge: HOME | End: 2023-07-17
Payer: OTHER GOVERNMENT

## 2023-07-17 ENCOUNTER — HOSPITAL ENCOUNTER (INPATIENT)
Age: 78
LOS: 1 days | Discharge: HOME | DRG: 728 | End: 2023-07-18
Attending: EMERGENCY MEDICINE | Admitting: STUDENT IN AN ORGANIZED HEALTH CARE EDUCATION/TRAINING PROGRAM
Payer: OTHER GOVERNMENT

## 2023-07-17 VITALS
OXYGEN SATURATION: 94 % | TEMPERATURE: 96.9 F | HEART RATE: 79 BPM | BODY MASS INDEX: 36.14 KG/M2 | WEIGHT: 266.8 LBS | DIASTOLIC BLOOD PRESSURE: 74 MMHG | HEIGHT: 72 IN | SYSTOLIC BLOOD PRESSURE: 122 MMHG

## 2023-07-17 DIAGNOSIS — N39.0 COMPLICATED UTI (URINARY TRACT INFECTION): Primary | ICD-10-CM

## 2023-07-17 DIAGNOSIS — Z97.8 FOLEY CATHETER IN PLACE: ICD-10-CM

## 2023-07-17 DIAGNOSIS — C83.00 MALIGNANT LYMPHOPLASMACYTIC LYMPHOMA (HCC): Primary | ICD-10-CM

## 2023-07-17 DIAGNOSIS — C83.00 MALIGNANT LYMPHOPLASMACYTIC LYMPHOMA (HCC): ICD-10-CM

## 2023-07-17 DIAGNOSIS — R79.89 ELEVATED LACTIC ACID LEVEL: ICD-10-CM

## 2023-07-17 LAB
ALBUMIN SERPL-MCNC: 3.8 G/DL (ref 3.5–5.2)
ALP SERPL-CCNC: 74 U/L (ref 40–129)
ALT SERPL-CCNC: 15 U/L (ref 0–40)
ANION GAP SERPL CALCULATED.3IONS-SCNC: 12 MMOL/L (ref 7–16)
ANION GAP SERPL CALCULATED.3IONS-SCNC: 16 MMOL/L (ref 7–16)
AST SERPL-CCNC: 12 U/L (ref 0–39)
BASOPHILS # BLD: 0.06 K/UL (ref 0–0.2)
BASOPHILS # BLD: 0.07 K/UL (ref 0–0.2)
BASOPHILS NFR BLD: 1 % (ref 0–2)
BASOPHILS NFR BLD: 1 % (ref 0–2)
BILIRUB SERPL-MCNC: 0.5 MG/DL (ref 0–1.2)
BUN SERPL-MCNC: 10 MG/DL (ref 6–23)
BUN SERPL-MCNC: 9 MG/DL (ref 6–23)
CALCIUM SERPL-MCNC: 9.5 MG/DL (ref 8.6–10.2)
CALCIUM SERPL-MCNC: 9.7 MG/DL (ref 8.6–10.2)
CHLORIDE SERPL-SCNC: 94 MMOL/L (ref 98–107)
CHLORIDE SERPL-SCNC: 96 MMOL/L (ref 98–107)
CO2 SERPL-SCNC: 25 MMOL/L (ref 22–29)
CO2 SERPL-SCNC: 26 MMOL/L (ref 22–29)
CREAT SERPL-MCNC: 0.8 MG/DL (ref 0.7–1.2)
CREAT SERPL-MCNC: 0.8 MG/DL (ref 0.7–1.2)
EOSINOPHIL # BLD: 0.15 K/UL (ref 0.05–0.5)
EOSINOPHIL # BLD: 0.18 K/UL (ref 0.05–0.5)
EOSINOPHILS RELATIVE PERCENT: 2 % (ref 0–6)
EOSINOPHILS RELATIVE PERCENT: 3 % (ref 0–6)
ERYTHROCYTE [DISTWIDTH] IN BLOOD BY AUTOMATED COUNT: 14.2 % (ref 11.5–15)
ERYTHROCYTE [DISTWIDTH] IN BLOOD BY AUTOMATED COUNT: 14.2 % (ref 11.5–15)
FERRITIN SERPL-MCNC: 215 NG/ML
GFR SERPL CREATININE-BSD FRML MDRD: >60 ML/MIN/1.73M2
GFR SERPL CREATININE-BSD FRML MDRD: >60 ML/MIN/1.73M2
GLUCOSE SERPL-MCNC: 140 MG/DL (ref 74–99)
GLUCOSE SERPL-MCNC: 203 MG/DL (ref 74–99)
HCT VFR BLD AUTO: 38.3 % (ref 37–54)
HCT VFR BLD AUTO: 39.1 % (ref 37–54)
HGB BLD-MCNC: 12.7 G/DL (ref 12.5–16.5)
HGB BLD-MCNC: 13 G/DL (ref 12.5–16.5)
IMM GRANULOCYTES # BLD AUTO: 0.08 K/UL (ref 0–0.58)
IMM GRANULOCYTES # BLD AUTO: 0.12 K/UL (ref 0–0.58)
IMM GRANULOCYTES NFR BLD: 1 % (ref 0–5)
IMM GRANULOCYTES NFR BLD: 2 % (ref 0–5)
IRON SATN MFR SERPL: 23 % (ref 20–55)
IRON SERPL-MCNC: 67 UG/DL (ref 59–158)
LACTATE BLDV-SCNC: 3.9 MMOL/L (ref 0.5–2.2)
LDH SERPL-CCNC: 96 U/L (ref 135–225)
LYMPHOCYTES # BLD: 24 % (ref 20–42)
LYMPHOCYTES # BLD: 25 % (ref 20–42)
LYMPHOCYTES NFR BLD: 1.83 K/UL (ref 1.5–4)
LYMPHOCYTES NFR BLD: 1.95 K/UL (ref 1.5–4)
MCH RBC QN AUTO: 29 PG (ref 26–35)
MCH RBC QN AUTO: 29.1 PG (ref 26–35)
MCHC RBC AUTO-ENTMCNC: 33.2 G/DL (ref 32–34.5)
MCHC RBC AUTO-ENTMCNC: 33.2 G/DL (ref 32–34.5)
MCV RBC AUTO: 87.1 FL (ref 80–99.9)
MCV RBC AUTO: 87.6 FL (ref 80–99.9)
MONOCYTES NFR BLD: 0.6 K/UL (ref 0.1–0.95)
MONOCYTES NFR BLD: 0.8 K/UL (ref 0.1–0.95)
MONOCYTES NFR BLD: 10 % (ref 2–12)
MONOCYTES NFR BLD: 8 % (ref 2–12)
NEUTROPHILS NFR BLD: 62 % (ref 43–80)
NEUTROPHILS NFR BLD: 62 % (ref 43–80)
NEUTS SEG NFR BLD: 4.51 K/UL (ref 1.8–7.3)
NEUTS SEG NFR BLD: 4.98 K/UL (ref 1.8–7.3)
PLATELET # BLD AUTO: 215 K/UL (ref 130–450)
PLATELET # BLD AUTO: 244 K/UL (ref 130–450)
PMV BLD AUTO: 10.4 FL (ref 7–12)
PMV BLD AUTO: 10.7 FL (ref 7–12)
POTASSIUM SERPL-SCNC: 3.8 MMOL/L (ref 3.5–5)
POTASSIUM SERPL-SCNC: 3.9 MMOL/L (ref 3.5–5)
PROT SERPL-MCNC: 6.2 G/DL (ref 6.4–8.3)
RBC # BLD AUTO: 4.37 M/UL (ref 3.8–5.8)
RBC # BLD AUTO: 4.49 M/UL (ref 3.8–5.8)
SODIUM SERPL-SCNC: 132 MMOL/L (ref 132–146)
SODIUM SERPL-SCNC: 137 MMOL/L (ref 132–146)
TIBC SERPL-MCNC: 297 UG/DL (ref 250–450)
WBC OTHER # BLD: 7.3 K/UL (ref 4.5–11.5)
WBC OTHER # BLD: 8.1 K/UL (ref 4.5–11.5)

## 2023-07-17 PROCEDURE — 2580000003 HC RX 258: Performed by: STUDENT IN AN ORGANIZED HEALTH CARE EDUCATION/TRAINING PROGRAM

## 2023-07-17 PROCEDURE — G8417 CALC BMI ABV UP PARAM F/U: HCPCS | Performed by: INTERNAL MEDICINE

## 2023-07-17 PROCEDURE — 99222 1ST HOSP IP/OBS MODERATE 55: CPT | Performed by: STUDENT IN AN ORGANIZED HEALTH CARE EDUCATION/TRAINING PROGRAM

## 2023-07-17 PROCEDURE — 1036F TOBACCO NON-USER: CPT | Performed by: INTERNAL MEDICINE

## 2023-07-17 PROCEDURE — 99214 OFFICE O/P EST MOD 30 MIN: CPT | Performed by: INTERNAL MEDICINE

## 2023-07-17 PROCEDURE — 82728 ASSAY OF FERRITIN: CPT

## 2023-07-17 PROCEDURE — 99285 EMERGENCY DEPT VISIT HI MDM: CPT

## 2023-07-17 PROCEDURE — 80053 COMPREHEN METABOLIC PANEL: CPT

## 2023-07-17 PROCEDURE — 36415 COLL VENOUS BLD VENIPUNCTURE: CPT

## 2023-07-17 PROCEDURE — 85027 COMPLETE CBC AUTOMATED: CPT

## 2023-07-17 PROCEDURE — 1123F ACP DISCUSS/DSCN MKR DOCD: CPT | Performed by: INTERNAL MEDICINE

## 2023-07-17 PROCEDURE — 86334 IMMUNOFIX E-PHORESIS SERUM: CPT

## 2023-07-17 PROCEDURE — 83550 IRON BINDING TEST: CPT

## 2023-07-17 PROCEDURE — 83540 ASSAY OF IRON: CPT

## 2023-07-17 PROCEDURE — 84165 PROTEIN E-PHORESIS SERUM: CPT

## 2023-07-17 PROCEDURE — 82232 ASSAY OF BETA-2 PROTEIN: CPT

## 2023-07-17 PROCEDURE — 99212 OFFICE O/P EST SF 10 MIN: CPT

## 2023-07-17 PROCEDURE — 83605 ASSAY OF LACTIC ACID: CPT

## 2023-07-17 PROCEDURE — 1111F DSCHRG MED/CURRENT MED MERGE: CPT | Performed by: INTERNAL MEDICINE

## 2023-07-17 PROCEDURE — G8427 DOCREV CUR MEDS BY ELIG CLIN: HCPCS | Performed by: INTERNAL MEDICINE

## 2023-07-17 PROCEDURE — 84155 ASSAY OF PROTEIN SERUM: CPT

## 2023-07-17 PROCEDURE — 83521 IG LIGHT CHAINS FREE EACH: CPT

## 2023-07-17 PROCEDURE — 83615 LACTATE (LD) (LDH) ENZYME: CPT

## 2023-07-17 PROCEDURE — 1200000000 HC SEMI PRIVATE

## 2023-07-17 PROCEDURE — 82784 ASSAY IGA/IGD/IGG/IGM EACH: CPT

## 2023-07-17 PROCEDURE — 3017F COLORECTAL CA SCREEN DOC REV: CPT | Performed by: INTERNAL MEDICINE

## 2023-07-17 PROCEDURE — 80048 BASIC METABOLIC PNL TOTAL CA: CPT

## 2023-07-17 RX ORDER — FLUCONAZOLE 2 MG/ML
400 INJECTION, SOLUTION INTRAVENOUS ONCE
Status: COMPLETED | OUTPATIENT
Start: 2023-07-17 | End: 2023-07-18

## 2023-07-17 RX ORDER — SODIUM CHLORIDE, SODIUM LACTATE, POTASSIUM CHLORIDE, CALCIUM CHLORIDE 600; 310; 30; 20 MG/100ML; MG/100ML; MG/100ML; MG/100ML
INJECTION, SOLUTION INTRAVENOUS CONTINUOUS
Status: DISCONTINUED | OUTPATIENT
Start: 2023-07-17 | End: 2023-07-18 | Stop reason: HOSPADM

## 2023-07-17 RX ORDER — INSULIN LISPRO 100 [IU]/ML
0-4 INJECTION, SOLUTION INTRAVENOUS; SUBCUTANEOUS NIGHTLY
Status: DISCONTINUED | OUTPATIENT
Start: 2023-07-17 | End: 2023-07-18 | Stop reason: HOSPADM

## 2023-07-17 RX ORDER — INSULIN LISPRO 100 [IU]/ML
0-4 INJECTION, SOLUTION INTRAVENOUS; SUBCUTANEOUS
Status: DISCONTINUED | OUTPATIENT
Start: 2023-07-18 | End: 2023-07-18 | Stop reason: HOSPADM

## 2023-07-17 RX ORDER — DEXTROSE MONOHYDRATE 100 MG/ML
INJECTION, SOLUTION INTRAVENOUS CONTINUOUS PRN
Status: DISCONTINUED | OUTPATIENT
Start: 2023-07-17 | End: 2023-07-18 | Stop reason: HOSPADM

## 2023-07-17 RX ORDER — 0.9 % SODIUM CHLORIDE 0.9 %
1000 INTRAVENOUS SOLUTION INTRAVENOUS ONCE
Status: COMPLETED | OUTPATIENT
Start: 2023-07-17 | End: 2023-07-18

## 2023-07-17 RX ADMIN — SODIUM CHLORIDE 1000 ML: 9 INJECTION, SOLUTION INTRAVENOUS at 22:57

## 2023-07-17 ASSESSMENT — PAIN - FUNCTIONAL ASSESSMENT: PAIN_FUNCTIONAL_ASSESSMENT: NONE - DENIES PAIN

## 2023-07-17 NOTE — ED TRIAGE NOTES
Department of Emergency Medicine  FIRST PROVIDER TRIAGE NOTE             Independent MLP           7/17/23  6:02 PM EDT    Date of Encounter: 7/17/23   MRN: [id number]      HPI: Cedric Bess is a 78 y.o. male who presents to the ED for Other (Called for positive urine cultures)       Received a call an hour ago for positive urine cultures, indwelling cath. Some dysuria     ROS: Negative for cp, back pain, or fever. PE: Gen Appearance/Constitutional: alert     Initial Plan of Care: All treatment areas with department are currently occupied. Plan to order/Initiate the following while awaiting opening in ED: labs.   Initiate Treatment-Testing, Proceed toTreatment Area When Bed Available for ED Attending/MLP to Continue Care    Electronically signed by Mayda Goodson PA-C   DD: 7/17/23

## 2023-07-17 NOTE — PROGRESS NOTES
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 SPEP IgG and IgM kappa paraprotein, M-spike 3.29 g/dL. UIFE: IgM protein. 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   -Extensive involvement by atypitcal CD5-/CD10-B-cell lymphoproliferative disorder, comprising over 70% of marrow cellularity  -Mildly hypercellular marrow for age (30-40%) with trilineage pan hypoplasia  -Normocytic anemia. -IHC staining highlighted extensive CD20+ B cell infiltrate with admixed + plasma cells     Started on Ibrutinib 420 mg/day 12/2020 with good response so far    On 01/06/2021:  SPEP:  Monoclonal protein 1.72 g/dL  Persistent double monoclonal bands in the beta/gamma and gamma globuin regions. Bands previously identified as IgM kappa and IgG kappa (8/12/2020). IgM kappa decreased by 1.22 g/dL and IgG kappa minimally changed since last exam on 10/14/2020. Free kappa light chains: 413.4    (3.3-19. 4)   Free lambda light chains: 2.8      (5.7-26. 3)   K/L ratio:                          147.64 (0.26-1.65)    On 03/25/2021  SPEP:  Monoclonal protein: 1.03 (0-0) g/dL  Persistent double monoclonal bands in the beta/gamma and gamma globuin regions. Bands previously identified as IgM kappa and IgG kappa (8/12/2020). IgM kappa decreased by 0.69 g/dL and IgG kappa unchanged since 01/06/2021. Free kappa light chains: 149.4  (3.3-19. 4)   Free lambda light chains: 2.6    (5.7-26. 3)   K/L ratio:                          57.46 (0.26-1.65)    Patient started on oral iron 08/2020 for LANDON and B12 deficiency but required 2 doses of Feraheme on 01/21/2021 and

## 2023-07-18 VITALS
WEIGHT: 265 LBS | DIASTOLIC BLOOD PRESSURE: 73 MMHG | SYSTOLIC BLOOD PRESSURE: 165 MMHG | OXYGEN SATURATION: 98 % | TEMPERATURE: 98.4 F | HEIGHT: 72 IN | BODY MASS INDEX: 35.89 KG/M2 | HEART RATE: 70 BPM | RESPIRATION RATE: 18 BRPM

## 2023-07-18 PROBLEM — I10 PRIMARY HYPERTENSION: Status: ACTIVE | Noted: 2023-07-18

## 2023-07-18 PROBLEM — E11.9 CONTROLLED TYPE 2 DIABETES MELLITUS WITHOUT COMPLICATION (HCC): Status: ACTIVE | Noted: 2023-07-18

## 2023-07-18 PROBLEM — B37.49 CANDIDURIA: Status: ACTIVE | Noted: 2023-07-18

## 2023-07-18 LAB
ALBUMIN SERPL-MCNC: 2.7 G/DL (ref 3.5–4.7)
ALPHA1 GLOB SERPL ELPH-MCNC: 0.3 G/DL (ref 0.2–0.4)
ALPHA2 GLOB SERPL ELPH-MCNC: 0.9 G/DL (ref 0.5–1)
ANION GAP SERPL CALCULATED.3IONS-SCNC: 12 MMOL/L (ref 7–16)
B-GLOBULIN SERPL ELPH-MCNC: 0.9 G/DL (ref 0.8–1.3)
BASOPHILS # BLD: 0.07 K/UL (ref 0–0.2)
BASOPHILS NFR BLD: 1 % (ref 0–2)
BUN SERPL-MCNC: 9 MG/DL (ref 6–23)
CALCIUM SERPL-MCNC: 9.4 MG/DL (ref 8.6–10.2)
CHLORIDE SERPL-SCNC: 97 MMOL/L (ref 98–107)
CO2 SERPL-SCNC: 26 MMOL/L (ref 22–29)
CREAT SERPL-MCNC: 0.8 MG/DL (ref 0.7–1.2)
EOSINOPHIL # BLD: 0.17 K/UL (ref 0.05–0.5)
EOSINOPHILS RELATIVE PERCENT: 2 % (ref 0–6)
ERYTHROCYTE [DISTWIDTH] IN BLOOD BY AUTOMATED COUNT: 14.4 % (ref 11.5–15)
GAMMA GLOB SERPL ELPH-MCNC: 1.1 G/DL (ref 0.7–1.6)
GFR SERPL CREATININE-BSD FRML MDRD: >60 ML/MIN/1.73M2
GLUCOSE SERPL-MCNC: 203 MG/DL (ref 74–99)
HCT VFR BLD AUTO: 38.9 % (ref 37–54)
HGB BLD-MCNC: 13.1 G/DL (ref 12.5–16.5)
IMM GRANULOCYTES # BLD AUTO: 0.12 K/UL (ref 0–0.58)
IMM GRANULOCYTES NFR BLD: 1 % (ref 0–5)
INTERPRETATION SERPL IFE-IMP: NORMAL
LACTATE BLDV-SCNC: 1.7 MMOL/L (ref 0.5–2.2)
LYMPHOCYTES # BLD: 21 % (ref 20–42)
LYMPHOCYTES NFR BLD: 1.8 K/UL (ref 1.5–4)
M PROTEIN SERPL ELPH-MCNC: 0.5 G/DL
MCH RBC QN AUTO: 29.2 PG (ref 26–35)
MCHC RBC AUTO-ENTMCNC: 33.7 G/DL (ref 32–34.5)
MCV RBC AUTO: 86.8 FL (ref 80–99.9)
METER GLUCOSE: 159 MG/DL (ref 74–99)
METER GLUCOSE: 161 MG/DL (ref 74–99)
METER GLUCOSE: 235 MG/DL (ref 74–99)
METER GLUCOSE: 248 MG/DL (ref 74–99)
MONOCYTES NFR BLD: 0.69 K/UL (ref 0.1–0.95)
MONOCYTES NFR BLD: 8 % (ref 2–12)
NEUTROPHILS NFR BLD: 66 % (ref 43–80)
NEUTS SEG NFR BLD: 5.59 K/UL (ref 1.8–7.3)
PATH REV: NORMAL
PATHOLOGIST: ABNORMAL
PLATELET # BLD AUTO: 215 K/UL (ref 130–450)
PMV BLD AUTO: 10.8 FL (ref 7–12)
POTASSIUM SERPL-SCNC: 4.1 MMOL/L (ref 3.5–5)
PROT PATTERN SERPL ELPH-IMP: ABNORMAL
PROT SERPL-MCNC: 5.8 G/DL (ref 6.4–8.3)
RBC # BLD AUTO: 4.48 M/UL (ref 3.8–5.8)
SODIUM SERPL-SCNC: 135 MMOL/L (ref 132–146)
WBC OTHER # BLD: 8.4 K/UL (ref 4.5–11.5)

## 2023-07-18 PROCEDURE — 83605 ASSAY OF LACTIC ACID: CPT

## 2023-07-18 PROCEDURE — 85027 COMPLETE CBC AUTOMATED: CPT

## 2023-07-18 PROCEDURE — 6370000000 HC RX 637 (ALT 250 FOR IP): Performed by: STUDENT IN AN ORGANIZED HEALTH CARE EDUCATION/TRAINING PROGRAM

## 2023-07-18 PROCEDURE — 99239 HOSP IP/OBS DSCHRG MGMT >30: CPT | Performed by: INTERNAL MEDICINE

## 2023-07-18 PROCEDURE — 80048 BASIC METABOLIC PNL TOTAL CA: CPT

## 2023-07-18 PROCEDURE — 6360000002 HC RX W HCPCS: Performed by: STUDENT IN AN ORGANIZED HEALTH CARE EDUCATION/TRAINING PROGRAM

## 2023-07-18 PROCEDURE — 87040 BLOOD CULTURE FOR BACTERIA: CPT

## 2023-07-18 PROCEDURE — 82947 ASSAY GLUCOSE BLOOD QUANT: CPT

## 2023-07-18 PROCEDURE — 2580000003 HC RX 258: Performed by: STUDENT IN AN ORGANIZED HEALTH CARE EDUCATION/TRAINING PROGRAM

## 2023-07-18 RX ORDER — ACETAMINOPHEN 650 MG/1
650 SUPPOSITORY RECTAL EVERY 6 HOURS PRN
Status: DISCONTINUED | OUTPATIENT
Start: 2023-07-18 | End: 2023-07-18 | Stop reason: HOSPADM

## 2023-07-18 RX ORDER — ENOXAPARIN SODIUM 100 MG/ML
30 INJECTION SUBCUTANEOUS 2 TIMES DAILY
Status: DISCONTINUED | OUTPATIENT
Start: 2023-07-18 | End: 2023-07-18 | Stop reason: HOSPADM

## 2023-07-18 RX ORDER — ONDANSETRON 4 MG/1
4 TABLET, ORALLY DISINTEGRATING ORAL EVERY 8 HOURS PRN
Status: DISCONTINUED | OUTPATIENT
Start: 2023-07-18 | End: 2023-07-18 | Stop reason: HOSPADM

## 2023-07-18 RX ORDER — PANTOPRAZOLE SODIUM 40 MG/1
40 TABLET, DELAYED RELEASE ORAL
Status: DISCONTINUED | OUTPATIENT
Start: 2023-07-18 | End: 2023-07-18 | Stop reason: HOSPADM

## 2023-07-18 RX ORDER — ONDANSETRON 2 MG/ML
4 INJECTION INTRAMUSCULAR; INTRAVENOUS EVERY 6 HOURS PRN
Status: DISCONTINUED | OUTPATIENT
Start: 2023-07-18 | End: 2023-07-18 | Stop reason: HOSPADM

## 2023-07-18 RX ORDER — ASPIRIN 81 MG/1
81 TABLET, CHEWABLE ORAL DAILY
Status: DISCONTINUED | OUTPATIENT
Start: 2023-07-18 | End: 2023-07-18 | Stop reason: HOSPADM

## 2023-07-18 RX ORDER — TRAMADOL HYDROCHLORIDE 50 MG/1
50 TABLET ORAL EVERY 6 HOURS PRN
Status: DISCONTINUED | OUTPATIENT
Start: 2023-07-18 | End: 2023-07-18 | Stop reason: HOSPADM

## 2023-07-18 RX ORDER — ACETAMINOPHEN 325 MG/1
650 TABLET ORAL EVERY 6 HOURS PRN
Status: DISCONTINUED | OUTPATIENT
Start: 2023-07-18 | End: 2023-07-18 | Stop reason: HOSPADM

## 2023-07-18 RX ORDER — SODIUM CHLORIDE 0.9 % (FLUSH) 0.9 %
5-40 SYRINGE (ML) INJECTION EVERY 12 HOURS SCHEDULED
Status: DISCONTINUED | OUTPATIENT
Start: 2023-07-18 | End: 2023-07-18 | Stop reason: HOSPADM

## 2023-07-18 RX ORDER — SODIUM CHLORIDE 9 MG/ML
INJECTION, SOLUTION INTRAVENOUS PRN
Status: DISCONTINUED | OUTPATIENT
Start: 2023-07-18 | End: 2023-07-18 | Stop reason: HOSPADM

## 2023-07-18 RX ORDER — ATORVASTATIN CALCIUM 40 MG/1
40 TABLET, FILM COATED ORAL DAILY
Status: DISCONTINUED | OUTPATIENT
Start: 2023-07-18 | End: 2023-07-18 | Stop reason: HOSPADM

## 2023-07-18 RX ORDER — TAMSULOSIN HYDROCHLORIDE 0.4 MG/1
0.4 CAPSULE ORAL DAILY
Status: DISCONTINUED | OUTPATIENT
Start: 2023-07-18 | End: 2023-07-18 | Stop reason: HOSPADM

## 2023-07-18 RX ORDER — POLYETHYLENE GLYCOL 3350 17 G/17G
17 POWDER, FOR SOLUTION ORAL DAILY PRN
Status: DISCONTINUED | OUTPATIENT
Start: 2023-07-18 | End: 2023-07-18 | Stop reason: HOSPADM

## 2023-07-18 RX ORDER — SODIUM CHLORIDE 0.9 % (FLUSH) 0.9 %
5-40 SYRINGE (ML) INJECTION PRN
Status: DISCONTINUED | OUTPATIENT
Start: 2023-07-18 | End: 2023-07-18 | Stop reason: HOSPADM

## 2023-07-18 RX ADMIN — ENOXAPARIN SODIUM 30 MG: 100 INJECTION SUBCUTANEOUS at 09:06

## 2023-07-18 RX ADMIN — TRAMADOL HYDROCHLORIDE 50 MG: 50 TABLET ORAL at 12:20

## 2023-07-18 RX ADMIN — INSULIN LISPRO 1 UNITS: 100 INJECTION, SOLUTION INTRAVENOUS; SUBCUTANEOUS at 10:58

## 2023-07-18 RX ADMIN — FLUCONAZOLE 400 MG: 2 INJECTION, SOLUTION INTRAVENOUS at 02:28

## 2023-07-18 RX ADMIN — ASPIRIN 81 MG CHEWABLE TABLET 81 MG: 81 TABLET CHEWABLE at 08:37

## 2023-07-18 RX ADMIN — TRAMADOL HYDROCHLORIDE 50 MG: 50 TABLET ORAL at 01:38

## 2023-07-18 RX ADMIN — TAMSULOSIN HYDROCHLORIDE 0.4 MG: 0.4 CAPSULE ORAL at 08:37

## 2023-07-18 RX ADMIN — SODIUM CHLORIDE, POTASSIUM CHLORIDE, SODIUM LACTATE AND CALCIUM CHLORIDE: 600; 310; 30; 20 INJECTION, SOLUTION INTRAVENOUS at 02:27

## 2023-07-18 RX ADMIN — INSULIN LISPRO 1 UNITS: 100 INJECTION, SOLUTION INTRAVENOUS; SUBCUTANEOUS at 15:49

## 2023-07-18 RX ADMIN — PANTOPRAZOLE SODIUM 40 MG: 40 TABLET, DELAYED RELEASE ORAL at 06:47

## 2023-07-18 ASSESSMENT — PAIN - FUNCTIONAL ASSESSMENT: PAIN_FUNCTIONAL_ASSESSMENT: ACTIVITIES ARE NOT PREVENTED

## 2023-07-18 ASSESSMENT — PAIN SCALES - GENERAL
PAINLEVEL_OUTOF10: 0
PAINLEVEL_OUTOF10: 5
PAINLEVEL_OUTOF10: 3
PAINLEVEL_OUTOF10: 0
PAINLEVEL_OUTOF10: 2
PAINLEVEL_OUTOF10: 5
PAINLEVEL_OUTOF10: 7

## 2023-07-18 ASSESSMENT — PAIN DESCRIPTION - LOCATION
LOCATION: GENERALIZED
LOCATION: GENERALIZED
LOCATION: BACK

## 2023-07-18 ASSESSMENT — PAIN DESCRIPTION - DESCRIPTORS
DESCRIPTORS: ACHING
DESCRIPTORS: ACHING;DISCOMFORT
DESCRIPTORS: ACHING

## 2023-07-18 ASSESSMENT — PAIN DESCRIPTION - ORIENTATION: ORIENTATION: MID;LOWER

## 2023-07-18 ASSESSMENT — PAIN SCALES - WONG BAKER: WONGBAKER_NUMERICALRESPONSE: 0

## 2023-07-18 NOTE — PROGRESS NOTES
4 Eyes Skin Assessment     NAME:  Ban Clements  YOB: 1945  MEDICAL RECORD NUMBER:  [id number]    The patient is being assessed for  Admission    I agree that at least one RN has performed a thorough Head to Toe Skin Assessment on the patient. ALL assessment sites listed below have been assessed. Areas assessed by both nurses:    Head, Face, Ears, Shoulders, Back, Chest, Arms, Elbows, Hands, Sacrum. Buttock, Coccyx, Ischium, Legs. Feet and Heels, and Under Medical Devices         Does the Patient have a Wound?  No noted wound(s)       Mark Prevention initiated by RN: Yes  Wound Care Orders initiated by RN: Yes    Pressure Injury (Stage 3,4, Unstageable, DTI, NWPT, and Complex wounds) if present, place Wound referral order by RN under : No    New Ostomies, if present place, Ostomy referral order under : No     Nurse 1 eSignature: Electronically signed by Thuy Abbasi RN on 7/18/23 at 4:55 AM EDT    **SHARE this note so that the co-signing nurse can place an eSignature**    Nurse 2 eSignature: {Esignature:[id number]}

## 2023-07-18 NOTE — PLAN OF CARE
Problem: Discharge Planning  Goal: Discharge to home or other facility with appropriate resources  7/18/2023 1756 by Sanket Dey RN  Outcome: Completed     Problem: Pain  Goal: Verbalizes/displays adequate comfort level or baseline comfort level  7/18/2023 1756 by Sanket Dey RN  Outcome: Completed     Problem: Skin/Tissue Integrity  Goal: Absence of new skin breakdown  Description: 1. Monitor for areas of redness and/or skin breakdown  2. Assess vascular access sites hourly  3. Every 4-6 hours minimum:  Change oxygen saturation probe site  4. Every 4-6 hours:  If on nasal continuous positive airway pressure, respiratory therapy assess nares and determine need for appliance change or resting period.   7/18/2023 1756 by Sanket Dey RN  Outcome: Completed     Problem: Safety - Adult  Goal: Free from fall injury  7/18/2023 1756 by Sanket Dey RN  Outcome: Completed     Problem: Chronic Conditions and Co-morbidities  Goal: Patient's chronic conditions and co-morbidity symptoms are monitored and maintained or improved  7/18/2023 1756 by Sanket Dey RN  Outcome: Completed     Problem: ABCDS Injury Assessment  Goal: Absence of physical injury  7/18/2023 1756 by Sanket Dey RN  Outcome: Completed

## 2023-07-18 NOTE — ED PROVIDER NOTES
potassium of 3.9. Glucose 140 with normal anion gap of 12. Lactic acid was elevated at 3.9. Patient was given IV fluids, 0.9 NS. Also given dose of IV fluconazole. Repeat lactic acid pending. Blood cultures pending as well. Plan is for admission for further work-up and evaluation and infectious disease consult. Patient was agreeable with this plan after shared decision making. I discussed case with Dr. Matthew Duke who accepted the patient for admission. Patient did remain hemodynamically stable in the ED. Medications this visit include:   Orders Placed This Encounter   Medications    0.9 % sodium chloride bolus       Is this patient to be included in the SEP-1 core measure? No Exclusion criteria - the patient is NOT to be included for SEP-1 Core Measure due to: 2+ SIRS criteria are not met    No imaging studies indicated for this visit. Discussion with Other Professionals: See ED course  CONSULTS: discussion with bolded \"IP consult\", otherwise consult was likely placed by admitting service  IP CONSULT TO INFECTIOUS DISEASES    Social Determinants : None    Records Reviewed : Outpatient Notes Office visit with oncology reviewed. Patient has malignant lymphoplasmacytic lymphoma. Started on Ibrutinib 420 mg/day 12/2020 with good response so far. Planned for continued therapy. Chronic conditions: A-fib on Eliquis, hypertension, hyperlipidemia, diabetes, obesity, unspecified cancer, anemia and depression     CONSULTS: ID       Disposition:   Admission     Consultation with Dr. Matthew Duke  who accepted the patient for admission. The patient will be admitted for further treatment and evaluation for   1. Complicated UTI (urinary tract infection)    2. Petty catheter in place    3. Elevated lactic acid level          Re-Evaluations/Consultations:             ED Course as of 07/17/23 2141 Mon Jul 17, 2023 2116 Patient accepted for admission by Dr. Matthew Duke.  [PP]      ED Course User Index  [PP] Gayla Holland DO

## 2023-07-18 NOTE — CONSULTS
INSERTION, CASTILLO INSERTION performed by Surjit Jerome MD at [address] [facility] ENDOSCOPY       Current Medications:   Scheduled Meds:   aspirin  81 mg Oral Daily    ibrutinib  420 mg Oral Nightly    pantoprazole  40 mg Oral QAM AC    atorvastatin  40 mg Oral Daily    tamsulosin  0.4 mg Oral Daily    sodium chloride flush  5-40 mL IntraVENous 2 times per day    enoxaparin  30 mg SubCUTAneous BID    insulin lispro  0-4 Units SubCUTAneous TID WC    insulin lispro  0-4 Units SubCUTAneous Nightly     Continuous Infusions:   sodium chloride      lactated ringers IV soln 100 mL/hr at 07/18/23 0227    dextrose       PRN Meds:traMADol, sodium chloride flush, sodium chloride, ondansetron **OR** ondansetron, polyethylene glycol, acetaminophen **OR** acetaminophen, dextrose bolus **OR** dextrose bolus, glucagon (rDNA), dextrose, Glucose    Allergies:  Patient has no known allergies. Social History:   Social History     Socioeconomic History    Marital status:    Tobacco Use    Smoking status: Former     Packs/day: 2.00     Types: Cigars, Cigarettes    Smokeless tobacco: Never    Tobacco comments:     2 cigars a day   Substance and Sexual Activity    Alcohol use: Not Currently    Drug use: No    Sexual activity: Not Currently      Pets: Dog  Travel: No  The patient retired as a .  He lives with his wife. Family History:       Problem Relation Age of Onset    Heart Attack Sister    . Otherwise non-pertinent to the chief complaint. REVIEW OF SYSTEMS:    Constitutional: Negative for fevers, chills, diaphoresis  Neurologic: Negative   Psychiatric: Negative  Rheumatologic: Negative   Endocrine: Negative  Hematologic: Negative  Immunologic: Negative  ENT: Negative  Respiratory: Negative   Cardiovascular: Negative  GI: Negative  : As in the HPI  Musculoskeletal: Negative  Skin: No rashes.      PHYSICAL EXAM:

## 2023-07-18 NOTE — PLAN OF CARE
Problem: Discharge Planning  Goal: Discharge to home or other facility with appropriate resources  7/18/2023 1624 by Fara Rubinstein, RN  Outcome: Progressing     Problem: Pain  Goal: Verbalizes/displays adequate comfort level or baseline comfort level  7/18/2023 1624 by Fara Rubinstein, RN  Outcome: Progressing     Problem: Skin/Tissue Integrity  Goal: Absence of new skin breakdown  Description: 1. Monitor for areas of redness and/or skin breakdown  2. Assess vascular access sites hourly  3. Every 4-6 hours minimum:  Change oxygen saturation probe site  4. Every 4-6 hours:  If on nasal continuous positive airway pressure, respiratory therapy assess nares and determine need for appliance change or resting period.   7/18/2023 1624 by Fara Rubinstein, RN  Outcome: Progressing     Problem: Safety - Adult  Goal: Free from fall injury  7/18/2023 1624 by Fara Rubinstein, RN  Outcome: Progressing     Problem: Chronic Conditions and Co-morbidities  Goal: Patient's chronic conditions and co-morbidity symptoms are monitored and maintained or improved  Outcome: Progressing     Problem: ABCDS Injury Assessment  Goal: Absence of physical injury  Outcome: Progressing

## 2023-07-18 NOTE — PROGRESS NOTES
Cl Laurenchristy was ordered ibrutinib [facility]) which is a nonformulary medication. This medication will need to be supplied by the patient as the pharmacy does not carry this non-formulary medication. If the medication has not been administered by 1400 on the following day from the time the order was placed, a pharmacist will follow-up with the nurse of the patient to assess the capability of the patient to bring in the medication. If it is determined that the patient cannot supply the medication and it is not available to be dispensed from the pharmacy, the provider will be notified.   Matty Cruz [facility]  7/18/2023  12:48 AM

## 2023-07-18 NOTE — ACP (ADVANCE CARE PLANNING)
Advance Care Planning   Healthcare Decision Maker:    Primary Decision Maker: [facility] - [phone]    Click here to complete Healthcare Decision Makers including selection of the Healthcare Decision Maker Relationship (ie \"Primary\").

## 2023-07-18 NOTE — DISCHARGE SUMMARY
[facility] EMERGENCY SERVICE Physician Discharge Summary       CM [address] [address]  [phone]          Activity level: as steven    Diet: ADULT DIET; Regular; 4 carb choices (60 gm/meal)    Dispo:home    Condition at discharge: fair        Patient ID:  Adelina Villafana  [id number]  78 y.o.  1945    Admit date: 7/17/2023    Discharge date and time:  7/18/2023  5:51 PM    Admission Diagnoses: Principal Problem:    Complicated UTI (urinary tract infection)  Active Problems:    UTI (urinary tract infection)    Elevated lactic acid level    Petty catheter in place  Resolved Problems:    * No resolved hospital problems. *      Discharge Diagnoses: Principal Problem:    Complicated UTI (urinary tract infection)  Active Problems:    UTI (urinary tract infection)    Elevated lactic acid level    Petty catheter in place  Resolved Problems:    * No resolved hospital problems. *    Asymptomatic candiduria with acute complicated uti unlikely  Elevated lactic acid level  Htn  Dm type 2 controlled  Hyperlipidemia  Hx of lymphoma      Consults:  IP CONSULT TO INFECTIOUS DISEASES    Procedures: none    Hospital Course: Patient was admitted with UTI (urinary tract infection) [N39.0]  Petty catheter in place [Z97.8]  Elevated lactic acid level [Z56.95]  Complicated UTI (urinary tract infection) [N39.0]. Patient is a 22-year-old male with past medical history significant for hypertension, hyperlipidemia, DM type II, recent urinary retention requiring Petty catheter placement, BPH who presents with positive urine culture. In talking with Chirag Michael Flores, he states he was recently in the emergency department on 7/12 with urinary retention requiring Petty catheter placement and a urine culture was collected at that time. Urine culture resulted Candida glabrata and he was asked to be present to the emergency department for further evaluation.   He denies fevers or

## 2023-07-18 NOTE — H&P
tablet TAKE ONE TABLET BY MOUTH EVERY DAY 6/17/21   Historical Provider, MD   carboxymethylcellulose (REFRESH PLUS) 0.5 % SOLN ophthalmic solution  11/23/20   Historical Provider, MD   vitamin D (CHOLECALCIFEROL) 25 MCG (1000 UT) TABS tablet TAKE TWO TABLETS BY MOUTH EVERY DAY 6/21/21   Historical Provider, MD   docusate (COLACE, DULCOLAX) 100 MG CAPS  6/17/21   Historical Provider, MD   ferrous sulfate (IRON 325) 325 (65 Fe) MG tablet TAKE ONE TABLET BY MOUTH TWICE A DAY (AN HOUR BEFORE OR TWO HOURS AFTER A MEAL; TAKE WITH FOOD IF THIS UPSETS YOUR STOMACH)----  IRON PILL (AN HOUR BEFORE OR TWO HOURS AFTER A MEAL; TAKE WITH FOOD IF THIS UPSETS YOUR STOMACH)----  IRON PILL 1/12/21   Historical Provider, MD   omeprazole (PRILOSEC) 20 MG delayed release capsule TAKE 2 CAPSULES BY MOUTH EVERY MORNING, ON AN EMPTY STOMACH 9/30/20   Historical Provider, MD   traMADol (ULTRAM) 50 MG tablet Take 1 tablet by mouth every 6 hours as needed for Pain. Historical Provider, MD   lisinopril (PRINIVIL;ZESTRIL) 20 MG tablet Take 1 tablet by mouth 2 times daily    Historical Provider, MD   simvastatin (ZOCOR) 80 MG tablet Take 1 tablet by mouth nightly Take 1/2 tablet    Historical Provider, MD   prazosin (MINIPRESS) 1 MG capsule Take 1 capsule by mouth nightly Patient takes (3) tablets at nighttime    Historical Provider, MD   hydrochlorothiazide (HYDRODIURIL) 25 MG tablet Take 1 tablet by mouth daily    Historical Provider, MD   METFORMIN HCL PO Take 500 mg by mouth 2 times daily Takes 1,000 mg (2 tablets) at breakfast, , & 1000 mg (2 tablets) at dinnertime    Historical Provider, MD       Allergies:    Patient has no known allergies. Social History:    reports that he has quit smoking. His smoking use included cigars and cigarettes. He smoked an average of 2 packs per day. He has never used smokeless tobacco. He reports that he does not currently use alcohol. He reports that he does not use drugs.     Family History:   family

## 2023-07-18 NOTE — PLAN OF CARE
Problem: Discharge Planning  Goal: Discharge to home or other facility with appropriate resources  7/18/2023 0942 by Allyssa Burch RN  Outcome: Progressing  7/18/2023 0453 by Nanda Dancer, RN  Outcome: Progressing     Problem: Pain  Goal: Verbalizes/displays adequate comfort level or baseline comfort level  7/18/2023 0942 by Allyssa Burch RN  Outcome: Progressing  7/18/2023 0453 by Nanda Dancer, RN  Outcome: Progressing     Problem: Skin/Tissue Integrity  Goal: Absence of new skin breakdown  Description: 1. Monitor for areas of redness and/or skin breakdown  2. Assess vascular access sites hourly  3. Every 4-6 hours minimum:  Change oxygen saturation probe site  4. Every 4-6 hours:  If on nasal continuous positive airway pressure, respiratory therapy assess nares and determine need for appliance change or resting period.   7/18/2023 0942 by Allyssa Burch RN  Outcome: Progressing  7/18/2023 0453 by Nanda Dancer, RN  Outcome: Progressing     Problem: Safety - Adult  Goal: Free from fall injury  7/18/2023 0942 by Allyssa Burch RN  Outcome: Progressing  7/18/2023 0453 by Nanda Dancer, RN  Outcome: Progressing

## 2023-07-18 NOTE — CARE COORDINATION
Social Work:    Reviewed chart notes. Patient readmitted due to positive cultures. Social service met with Mr. Benoit Greer Fitting) & revisited discharge planning goals. Josef's PCP is Dr. Haven Costello at the [facility]. Percy Rosas also obtains his medications at the [address] [place] [address] , as well as utilizes [facility] in [place] when needed. He resides with his wife in a 2-story home with 5 entry steps to the main floor (walk-in shower) on 1st floor and bedroom located upstairs. Percy Rosas is using a wheeled walker and was active with [facility] prior to re-admission. Upon discharge from [facility] DEPARTMENT expects to return home with continued [facility] from [facility]. Social work left a message with Shana at [facility] to confirm that she is following care at home. ID has been consulted presently. Social work to follow for possible infusion needs upon discharge.       Electronically signed by DIMPLE Ha on 7/18/2023 at 10:10 AM

## 2023-07-19 LAB
B2 MICROGLOB SERPL IA-MCNC: 2.4 MG/L (ref 0.6–2.4)
FREE KAPPA/LAMBDA RATIO: 5.14 (ref 0.26–1.65)
IGA SERPL-MCNC: 17 MG/DL (ref 70–400)
IGG SERPL-MCNC: 189 MG/DL (ref 700–1600)
IGM SERPL-MCNC: 846 MG/DL (ref 40–230)
KAPPA LC FREE SER-MCNC: 40.6 MG/L (ref 3.7–19.4)
LAMBDA LC FREE SERPL-MCNC: 7.9 MG/L (ref 5.7–26.3)

## 2023-07-23 LAB
MICROORGANISM SPEC CULT: NORMAL
MICROORGANISM SPEC CULT: NORMAL
SERVICE CMNT-IMP: NORMAL
SERVICE CMNT-IMP: NORMAL
SPECIMEN DESCRIPTION: NORMAL
SPECIMEN DESCRIPTION: NORMAL

## 2023-08-03 ENCOUNTER — TELEPHONE (OUTPATIENT)
Dept: INFUSION THERAPY | Age: 78
End: 2023-08-03

## 2023-08-03 NOTE — TELEPHONE ENCOUNTER
Pt called in and left a voicemail requesting a refill on a RX-This nurse called pt back and spoke with pts spouse-Pt is requesting a refill on the Ibrutinib-Spouse was notified that this RX was sent to the [facility] in [place] yesterday-Sofia voiced appreciation

## 2023-08-07 DIAGNOSIS — C83.00 MALIGNANT LYMPHOPLASMACYTIC LYMPHOMA (HCC): Primary | ICD-10-CM

## 2023-08-14 ENCOUNTER — HOSPITAL ENCOUNTER (OUTPATIENT)
Dept: INFUSION THERAPY | Age: 78
Discharge: HOME | End: 2023-08-14

## 2023-08-16 PROBLEM — N39.0 UTI (URINARY TRACT INFECTION): Status: RESOLVED | Noted: 2023-07-17 | Resolved: 2023-08-16

## 2023-08-23 DIAGNOSIS — C83.00 MALIGNANT LYMPHOPLASMACYTIC LYMPHOMA (HCC): Primary | ICD-10-CM

## 2023-08-24 ENCOUNTER — HOSPITAL ENCOUNTER (OUTPATIENT)
Dept: INFUSION THERAPY | Age: 78
Discharge: HOME | End: 2023-08-24
Payer: MEDICARE

## 2023-08-24 ENCOUNTER — OFFICE VISIT (OUTPATIENT)
Dept: ONCOLOGY | Age: 78
End: 2023-08-24
Payer: OTHER GOVERNMENT

## 2023-08-24 VITALS
SYSTOLIC BLOOD PRESSURE: 156 MMHG | WEIGHT: 271 LBS | DIASTOLIC BLOOD PRESSURE: 70 MMHG | HEART RATE: 120 BPM | TEMPERATURE: 97 F | OXYGEN SATURATION: 96 % | BODY MASS INDEX: 36.7 KG/M2 | HEIGHT: 72 IN

## 2023-08-24 DIAGNOSIS — C83.00 MALIGNANT LYMPHOPLASMACYTIC LYMPHOMA (HCC): Primary | ICD-10-CM

## 2023-08-24 DIAGNOSIS — C83.00 MALIGNANT LYMPHOPLASMACYTIC LYMPHOMA (HCC): ICD-10-CM

## 2023-08-24 LAB
ALBUMIN SERPL-MCNC: 3.7 G/DL (ref 3.5–5.2)
ALP SERPL-CCNC: 94 U/L (ref 40–129)
ALT SERPL-CCNC: 32 U/L (ref 0–40)
ANION GAP SERPL CALCULATED.3IONS-SCNC: 12 MMOL/L (ref 7–16)
AST SERPL-CCNC: 38 U/L (ref 0–39)
BASOPHILS # BLD: 0.05 K/UL (ref 0–0.2)
BASOPHILS NFR BLD: 1 % (ref 0–2)
BILIRUB SERPL-MCNC: 0.6 MG/DL (ref 0–1.2)
BUN SERPL-MCNC: 12 MG/DL (ref 6–23)
CALCIUM SERPL-MCNC: 9.5 MG/DL (ref 8.6–10.2)
CHLORIDE SERPL-SCNC: 97 MMOL/L (ref 98–107)
CO2 SERPL-SCNC: 27 MMOL/L (ref 22–29)
CREAT SERPL-MCNC: 0.9 MG/DL (ref 0.7–1.2)
EOSINOPHIL # BLD: 0.07 K/UL (ref 0.05–0.5)
EOSINOPHILS RELATIVE PERCENT: 1 % (ref 0–6)
ERYTHROCYTE [DISTWIDTH] IN BLOOD BY AUTOMATED COUNT: 14.1 % (ref 11.5–15)
FERRITIN SERPL-MCNC: 249 NG/ML
GFR SERPL CREATININE-BSD FRML MDRD: >60 ML/MIN/1.73M2
GLUCOSE SERPL-MCNC: 183 MG/DL (ref 74–99)
HCT VFR BLD AUTO: 41.5 % (ref 37–54)
HGB BLD-MCNC: 13.6 G/DL (ref 12.5–16.5)
IGA SERPL-MCNC: <5 MG/DL (ref 70–400)
IGG SERPL-MCNC: <30 MG/DL (ref 700–1600)
IGM SERPL-MCNC: 758 MG/DL (ref 40–230)
IMM GRANULOCYTES # BLD AUTO: 0.11 K/UL (ref 0–0.58)
IMM GRANULOCYTES NFR BLD: 1 % (ref 0–5)
IRON SATN MFR SERPL: 23 % (ref 20–55)
IRON SERPL-MCNC: 62 UG/DL (ref 59–158)
LDH SERPL-CCNC: 138 U/L (ref 135–225)
LYMPHOCYTES NFR BLD: 1.45 K/UL (ref 1.5–4)
LYMPHOCYTES RELATIVE PERCENT: 19 % (ref 20–42)
MCH RBC QN AUTO: 29.1 PG (ref 26–35)
MCHC RBC AUTO-ENTMCNC: 32.8 G/DL (ref 32–34.5)
MCV RBC AUTO: 88.7 FL (ref 80–99.9)
MONOCYTES NFR BLD: 1.03 K/UL (ref 0.1–0.95)
MONOCYTES NFR BLD: 14 % (ref 2–12)
NEUTROPHILS NFR BLD: 65 % (ref 43–80)
NEUTS SEG NFR BLD: 4.91 K/UL (ref 1.8–7.3)
PLATELET # BLD AUTO: 236 K/UL (ref 130–450)
PMV BLD AUTO: 10.2 FL (ref 7–12)
POTASSIUM SERPL-SCNC: 3.9 MMOL/L (ref 3.5–5)
PROT SERPL-MCNC: 6.5 G/DL (ref 6.4–8.3)
RBC # BLD AUTO: 4.68 M/UL (ref 3.8–5.8)
SODIUM SERPL-SCNC: 136 MMOL/L (ref 132–146)
TIBC SERPL-MCNC: 268 UG/DL (ref 250–450)
WBC OTHER # BLD: 7.6 K/UL (ref 4.5–11.5)

## 2023-08-24 PROCEDURE — G8427 DOCREV CUR MEDS BY ELIG CLIN: HCPCS | Performed by: STUDENT IN AN ORGANIZED HEALTH CARE EDUCATION/TRAINING PROGRAM

## 2023-08-24 PROCEDURE — 86334 IMMUNOFIX E-PHORESIS SERUM: CPT

## 2023-08-24 PROCEDURE — 84155 ASSAY OF PROTEIN SERUM: CPT

## 2023-08-24 PROCEDURE — 85025 COMPLETE CBC W/AUTO DIFF WBC: CPT

## 2023-08-24 PROCEDURE — 99212 OFFICE O/P EST SF 10 MIN: CPT

## 2023-08-24 PROCEDURE — 85810 BLOOD VISCOSITY EXAMINATION: CPT

## 2023-08-24 PROCEDURE — 36415 COLL VENOUS BLD VENIPUNCTURE: CPT

## 2023-08-24 PROCEDURE — 1036F TOBACCO NON-USER: CPT | Performed by: STUDENT IN AN ORGANIZED HEALTH CARE EDUCATION/TRAINING PROGRAM

## 2023-08-24 PROCEDURE — 80053 COMPREHEN METABOLIC PANEL: CPT

## 2023-08-24 PROCEDURE — G8417 CALC BMI ABV UP PARAM F/U: HCPCS | Performed by: STUDENT IN AN ORGANIZED HEALTH CARE EDUCATION/TRAINING PROGRAM

## 2023-08-24 PROCEDURE — 3017F COLORECTAL CA SCREEN DOC REV: CPT | Performed by: STUDENT IN AN ORGANIZED HEALTH CARE EDUCATION/TRAINING PROGRAM

## 2023-08-24 PROCEDURE — 99214 OFFICE O/P EST MOD 30 MIN: CPT | Performed by: STUDENT IN AN ORGANIZED HEALTH CARE EDUCATION/TRAINING PROGRAM

## 2023-08-24 PROCEDURE — 1123F ACP DISCUSS/DSCN MKR DOCD: CPT | Performed by: STUDENT IN AN ORGANIZED HEALTH CARE EDUCATION/TRAINING PROGRAM

## 2023-08-24 PROCEDURE — 3077F SYST BP >= 140 MM HG: CPT | Performed by: STUDENT IN AN ORGANIZED HEALTH CARE EDUCATION/TRAINING PROGRAM

## 2023-08-24 PROCEDURE — 3078F DIAST BP <80 MM HG: CPT | Performed by: STUDENT IN AN ORGANIZED HEALTH CARE EDUCATION/TRAINING PROGRAM

## 2023-08-24 PROCEDURE — 83540 ASSAY OF IRON: CPT

## 2023-08-24 PROCEDURE — 84165 PROTEIN E-PHORESIS SERUM: CPT

## 2023-08-24 PROCEDURE — 83615 LACTATE (LD) (LDH) ENZYME: CPT

## 2023-08-24 PROCEDURE — 82232 ASSAY OF BETA-2 PROTEIN: CPT

## 2023-08-24 PROCEDURE — 82784 ASSAY IGA/IGD/IGG/IGM EACH: CPT

## 2023-08-24 PROCEDURE — 83521 IG LIGHT CHAINS FREE EACH: CPT

## 2023-08-24 PROCEDURE — 82728 ASSAY OF FERRITIN: CPT

## 2023-08-24 PROCEDURE — 83550 IRON BINDING TEST: CPT

## 2023-08-24 NOTE — PROGRESS NOTES
Patient provided with discharge instructions, received printed AVS.  All questions answered. Patient understands follow up plan of care.
microglobulin 2.8  SPEP: M-spike 0.7 g/dL  SIFE: IgM kappa monoclonal protein is present, as previously described. IgM 848 ()  Free kappa light chains:   41.48  (3.3-19. 4)   Free lambda light chains: 4.09    (5.7-26. 3)   K/L ratio:                           10.14 (0.26-1.65)    CT abdomen/pelvis 03/10/2023:  No lytic osseous lesions. On 04/06/2023; Hb 14.0  Hct 44.0  MCV 91.9      WBC 7.2  BUN 13  Creat 0.9  Beta-2 microglobulin 2.4  SPEP: M-spike 0.8 g/dL  SIFE: IgM kappa monoclonal protein is present, as previously described. IgM 715 ()  Free kappa light chains:   52.20  (3.3-19. 4)   Free lambda light chains: 4.52    (5.7-26. 3)   K/L ratio:                           11.55 (0.26-1.65)    On 05/04/2023:  Hb 15.1  Hct 46.2   MCV 89.0     WBC 6.8  BUN 13 Creat 0.9  Beta-2 microglobulin 4.3  SPEP: M-spike 0.6 g/dL  SIFE: IgM kappa monoclonal protein is present, as previously described. IgM 913 ()  Free kappa light chains:   102.05  (3.3-19. 4)   Free lambda light chains:  6.79    (5.7-26. 3)   K/L ratio:                           15.03   (0.26-1.65)    On 06/01/2023:  Hb 14.4   Hct 44.0  MCV 88.9    WBC 8.2  BUN 10 Creat 0.9  Beta-2 microglobulin 2.2  SPEP: M-spike 0.5 g/dL  SIFE: IgM kappa monoclonal protein is present, as previously described. IgM 831 ()  Free kappa light chains:   52.49  (3.3-19. 4)   Free lambda light chains:  4.80    (5.7-26. 3)   K/L ratio:                           10.94   (0.26-1.65)    Admitted for Complicated UTI with obstructive uropathy -   CT abdomen/pelvis noted 7 mm calculus in distal right ureter leading to mild right sided hydroureteronephrosis. Imaging reviewed. Urology note reviewed  GN bacteremia - Blood cultures as well as urine cultures grew Klebsiella. Blood cx - 1/2 bottles GNR Klebsiella, kleibsella pansenitive. S/p Meropenem in ER , f/u with Urology at [facility] - changed to Rocephin.  Consulted urology , s/p  cystoscopy

## 2023-08-25 LAB
ALBUMIN SERPL-MCNC: 2.4 G/DL (ref 3.5–4.7)
ALPHA1 GLOB SERPL ELPH-MCNC: 0.3 G/DL (ref 0.2–0.4)
ALPHA2 GLOB SERPL ELPH-MCNC: 1.1 G/DL (ref 0.5–1)
B-GLOBULIN SERPL ELPH-MCNC: 1 G/DL (ref 0.8–1.3)
GAMMA GLOB SERPL ELPH-MCNC: 1.2 G/DL (ref 0.7–1.6)
INTERPRETATION SERPL IFE-IMP: NORMAL
M PROTEIN SERPL ELPH-MCNC: 0.7 G/DL
PATH REV: NORMAL
PATHOLOGIST: ABNORMAL
PROT PATTERN SERPL ELPH-IMP: ABNORMAL
PROT SERPL-MCNC: 6 G/DL (ref 6.4–8.3)

## 2023-08-26 LAB
FREE KAPPA/LAMBDA RATIO: 4.03 (ref 0.26–1.65)
KAPPA LC FREE SER-MCNC: 37.9 MG/L (ref 3.7–19.4)
LAMBDA LC FREE SERPL-MCNC: 9.4 MG/L (ref 5.7–26.3)

## 2023-08-28 ENCOUNTER — TELEPHONE (OUTPATIENT)
Dept: INFUSION THERAPY | Age: 78
End: 2023-08-28

## 2023-08-28 LAB — B2 MICROGLOB SERPL IA-MCNC: 3.4 MG/L (ref 0.6–2.4)

## 2023-08-28 NOTE — TELEPHONE ENCOUNTER
Lab called stating serum viscosity lab draw was discontinued due to insufficient amt.  In-basket sent to Dr [employer] for re draw before next visit or at next visit
94
Passed

## 2023-09-13 ENCOUNTER — HOSPITAL ENCOUNTER (EMERGENCY)
Age: 78
Discharge: HOME | End: 2023-09-14
Attending: EMERGENCY MEDICINE
Payer: OTHER GOVERNMENT

## 2023-09-13 VITALS
DIASTOLIC BLOOD PRESSURE: 76 MMHG | HEIGHT: 72 IN | RESPIRATION RATE: 16 BRPM | BODY MASS INDEX: 37.11 KG/M2 | WEIGHT: 274 LBS | HEART RATE: 82 BPM | TEMPERATURE: 97.6 F | SYSTOLIC BLOOD PRESSURE: 142 MMHG | OXYGEN SATURATION: 98 %

## 2023-09-13 DIAGNOSIS — E83.42 HYPOMAGNESEMIA: Primary | ICD-10-CM

## 2023-09-13 LAB
ERYTHROCYTE [DISTWIDTH] IN BLOOD BY AUTOMATED COUNT: 14.1 % (ref 11.5–15)
HCT VFR BLD AUTO: 39.4 % (ref 37–54)
HGB BLD-MCNC: 13.4 G/DL (ref 12.5–16.5)
MCH RBC QN AUTO: 28.9 PG (ref 26–35)
MCHC RBC AUTO-ENTMCNC: 34 G/DL (ref 32–34.5)
MCV RBC AUTO: 84.9 FL (ref 80–99.9)
PLATELET # BLD AUTO: 201 K/UL (ref 130–450)
PMV BLD AUTO: 10.3 FL (ref 7–12)
RBC # BLD AUTO: 4.64 M/UL (ref 3.8–5.8)
WBC OTHER # BLD: 11.5 K/UL (ref 4.5–11.5)

## 2023-09-13 PROCEDURE — 83690 ASSAY OF LIPASE: CPT

## 2023-09-13 PROCEDURE — 80053 COMPREHEN METABOLIC PANEL: CPT

## 2023-09-13 PROCEDURE — 96365 THER/PROPH/DIAG IV INF INIT: CPT

## 2023-09-13 PROCEDURE — 99284 EMERGENCY DEPT VISIT MOD MDM: CPT

## 2023-09-13 PROCEDURE — 85027 COMPLETE CBC AUTOMATED: CPT

## 2023-09-13 PROCEDURE — 83735 ASSAY OF MAGNESIUM: CPT

## 2023-09-13 PROCEDURE — 85610 PROTHROMBIN TIME: CPT

## 2023-09-13 PROCEDURE — 93005 ELECTROCARDIOGRAM TRACING: CPT | Performed by: EMERGENCY MEDICINE

## 2023-09-13 ASSESSMENT — LIFESTYLE VARIABLES
HOW MANY STANDARD DRINKS CONTAINING ALCOHOL DO YOU HAVE ON A TYPICAL DAY: PATIENT DOES NOT DRINK
HOW OFTEN DO YOU HAVE A DRINK CONTAINING ALCOHOL: NEVER

## 2023-09-13 ASSESSMENT — PAIN - FUNCTIONAL ASSESSMENT: PAIN_FUNCTIONAL_ASSESSMENT: NONE - DENIES PAIN

## 2023-09-14 PROBLEM — E83.42 HYPOMAGNESEMIA: Status: ACTIVE | Noted: 2023-09-14

## 2023-09-14 LAB
ALBUMIN SERPL-MCNC: 3.7 G/DL (ref 3.5–5.2)
ALP SERPL-CCNC: 111 U/L (ref 40–129)
ALT SERPL-CCNC: 25 U/L (ref 0–40)
ANION GAP SERPL CALCULATED.3IONS-SCNC: 12 MMOL/L (ref 7–16)
AST SERPL-CCNC: 31 U/L (ref 0–39)
BILIRUB SERPL-MCNC: 0.5 MG/DL (ref 0–1.2)
BUN SERPL-MCNC: 12 MG/DL (ref 6–23)
CALCIUM SERPL-MCNC: 9.5 MG/DL (ref 8.6–10.2)
CHLORIDE SERPL-SCNC: 95 MMOL/L (ref 98–107)
CO2 SERPL-SCNC: 27 MMOL/L (ref 22–29)
CREAT SERPL-MCNC: 0.8 MG/DL (ref 0.7–1.2)
GFR SERPL CREATININE-BSD FRML MDRD: >60 ML/MIN/1.73M2
GLUCOSE SERPL-MCNC: 205 MG/DL (ref 74–99)
INR PPP: 1.3
LIPASE SERPL-CCNC: 24 U/L (ref 13–60)
MAGNESIUM SERPL-MCNC: 1.1 MG/DL (ref 1.6–2.6)
POTASSIUM SERPL-SCNC: 3.6 MMOL/L (ref 3.5–5)
PROT SERPL-MCNC: 6.5 G/DL (ref 6.4–8.3)
PROTHROMBIN TIME: 14.5 SEC (ref 9.3–12.4)
SODIUM SERPL-SCNC: 134 MMOL/L (ref 132–146)

## 2023-09-14 PROCEDURE — 6360000002 HC RX W HCPCS: Performed by: EMERGENCY MEDICINE

## 2023-09-14 RX ORDER — MAGNESIUM OXIDE 400 MG/1
400 TABLET ORAL DAILY
Qty: 5 TABLET | Refills: 0 | Status: SHIPPED | OUTPATIENT
Start: 2023-09-14

## 2023-09-14 RX ORDER — MAGNESIUM SULFATE IN WATER 40 MG/ML
2000 INJECTION, SOLUTION INTRAVENOUS ONCE
Status: COMPLETED | OUTPATIENT
Start: 2023-09-14 | End: 2023-09-14

## 2023-09-14 RX ADMIN — MAGNESIUM SULFATE HEPTAHYDRATE 2000 MG: 40 INJECTION, SOLUTION INTRAVENOUS at 00:43

## 2023-09-14 NOTE — DISCHARGE INSTRUCTIONS
If weakness fever vomiting abdominal pain or not urinating have your urine checked as an outpatient as soon as possible return if any problems urinating

## 2023-09-15 LAB
EKG ATRIAL RATE: 119 BPM
EKG Q-T INTERVAL: 348 MS
EKG QRS DURATION: 104 MS
EKG QTC CALCULATION (BAZETT): 444 MS
EKG R AXIS: 18 DEGREES
EKG T AXIS: 20 DEGREES
EKG VENTRICULAR RATE: 98 BPM

## 2023-09-15 PROCEDURE — 93010 ELECTROCARDIOGRAM REPORT: CPT | Performed by: INTERNAL MEDICINE

## 2023-09-20 DIAGNOSIS — E83.42 HYPOMAGNESEMIA: Primary | ICD-10-CM

## 2023-09-21 ENCOUNTER — OFFICE VISIT (OUTPATIENT)
Dept: ONCOLOGY | Age: 78
End: 2023-09-21
Payer: OTHER GOVERNMENT

## 2023-09-21 ENCOUNTER — HOSPITAL ENCOUNTER (OUTPATIENT)
Dept: INFUSION THERAPY | Age: 78
Discharge: HOME | End: 2023-09-21
Payer: OTHER GOVERNMENT

## 2023-09-21 VITALS
HEIGHT: 72 IN | HEART RATE: 100 BPM | SYSTOLIC BLOOD PRESSURE: 95 MMHG | WEIGHT: 269.6 LBS | TEMPERATURE: 96.6 F | BODY MASS INDEX: 36.52 KG/M2 | DIASTOLIC BLOOD PRESSURE: 58 MMHG | OXYGEN SATURATION: 96 %

## 2023-09-21 DIAGNOSIS — C83.00 MALIGNANT LYMPHOPLASMACYTIC LYMPHOMA (HCC): ICD-10-CM

## 2023-09-21 DIAGNOSIS — C83.00 MALIGNANT LYMPHOPLASMACYTIC LYMPHOMA (HCC): Primary | ICD-10-CM

## 2023-09-21 DIAGNOSIS — E83.42 HYPOMAGNESEMIA: ICD-10-CM

## 2023-09-21 LAB
ALBUMIN SERPL-MCNC: 3.4 G/DL (ref 3.5–5.2)
ALP SERPL-CCNC: 96 U/L (ref 40–129)
ALT SERPL-CCNC: 11 U/L (ref 0–40)
ANION GAP SERPL CALCULATED.3IONS-SCNC: 17 MMOL/L (ref 7–16)
AST SERPL-CCNC: 12 U/L (ref 0–39)
BASOPHILS # BLD: 0.07 K/UL (ref 0–0.2)
BASOPHILS NFR BLD: 0 % (ref 0–2)
BILIRUB SERPL-MCNC: 1 MG/DL (ref 0–1.2)
BUN SERPL-MCNC: 18 MG/DL (ref 6–23)
CALCIUM SERPL-MCNC: 9.1 MG/DL (ref 8.6–10.2)
CHLORIDE SERPL-SCNC: 93 MMOL/L (ref 98–107)
CO2 SERPL-SCNC: 21 MMOL/L (ref 22–29)
CREAT SERPL-MCNC: 1.1 MG/DL (ref 0.7–1.2)
EOSINOPHIL # BLD: 0.1 K/UL (ref 0.05–0.5)
EOSINOPHILS RELATIVE PERCENT: 1 % (ref 0–6)
ERYTHROCYTE [DISTWIDTH] IN BLOOD BY AUTOMATED COUNT: 14 % (ref 11.5–15)
FERRITIN SERPL-MCNC: 304 NG/ML
GFR SERPL CREATININE-BSD FRML MDRD: >60 ML/MIN/1.73M2
GLUCOSE SERPL-MCNC: 197 MG/DL (ref 74–99)
HCT VFR BLD AUTO: 38.5 % (ref 37–54)
HGB BLD-MCNC: 12.8 G/DL (ref 12.5–16.5)
IGA SERPL-MCNC: <5 MG/DL (ref 70–400)
IGG SERPL-MCNC: <30 MG/DL (ref 700–1600)
IGM SERPL-MCNC: 918 MG/DL (ref 40–230)
IMM GRANULOCYTES # BLD AUTO: 0.29 K/UL (ref 0–0.58)
IMM GRANULOCYTES NFR BLD: 2 % (ref 0–5)
IRON SATN MFR SERPL: 18 % (ref 20–55)
IRON SERPL-MCNC: 44 UG/DL (ref 59–158)
LYMPHOCYTES NFR BLD: 1.79 K/UL (ref 1.5–4)
LYMPHOCYTES RELATIVE PERCENT: 11 % (ref 20–42)
MAGNESIUM SERPL-MCNC: 1.3 MG/DL (ref 1.6–2.6)
MCH RBC QN AUTO: 28.8 PG (ref 26–35)
MCHC RBC AUTO-ENTMCNC: 33.2 G/DL (ref 32–34.5)
MCV RBC AUTO: 86.5 FL (ref 80–99.9)
MONOCYTES NFR BLD: 1.25 K/UL (ref 0.1–0.95)
MONOCYTES NFR BLD: 8 % (ref 2–12)
NEUTROPHILS NFR BLD: 78 % (ref 43–80)
NEUTS SEG NFR BLD: 12.58 K/UL (ref 1.8–7.3)
PLATELET # BLD AUTO: 254 K/UL (ref 130–450)
PMV BLD AUTO: 10.4 FL (ref 7–12)
POTASSIUM SERPL-SCNC: 3.8 MMOL/L (ref 3.5–5)
PROT SERPL-MCNC: 6.4 G/DL (ref 6.4–8.3)
RBC # BLD AUTO: 4.45 M/UL (ref 3.8–5.8)
SODIUM SERPL-SCNC: 131 MMOL/L (ref 132–146)
TIBC SERPL-MCNC: 244 UG/DL (ref 250–450)
WBC OTHER # BLD: 16.1 K/UL (ref 4.5–11.5)

## 2023-09-21 PROCEDURE — 85025 COMPLETE CBC W/AUTO DIFF WBC: CPT

## 2023-09-21 PROCEDURE — G8427 DOCREV CUR MEDS BY ELIG CLIN: HCPCS | Performed by: STUDENT IN AN ORGANIZED HEALTH CARE EDUCATION/TRAINING PROGRAM

## 2023-09-21 PROCEDURE — 82784 ASSAY IGA/IGD/IGG/IGM EACH: CPT

## 2023-09-21 PROCEDURE — 3017F COLORECTAL CA SCREEN DOC REV: CPT | Performed by: STUDENT IN AN ORGANIZED HEALTH CARE EDUCATION/TRAINING PROGRAM

## 2023-09-21 PROCEDURE — 82728 ASSAY OF FERRITIN: CPT

## 2023-09-21 PROCEDURE — 83550 IRON BINDING TEST: CPT

## 2023-09-21 PROCEDURE — 99214 OFFICE O/P EST MOD 30 MIN: CPT | Performed by: STUDENT IN AN ORGANIZED HEALTH CARE EDUCATION/TRAINING PROGRAM

## 2023-09-21 PROCEDURE — 84165 PROTEIN E-PHORESIS SERUM: CPT

## 2023-09-21 PROCEDURE — 85810 BLOOD VISCOSITY EXAMINATION: CPT

## 2023-09-21 PROCEDURE — 3078F DIAST BP <80 MM HG: CPT | Performed by: STUDENT IN AN ORGANIZED HEALTH CARE EDUCATION/TRAINING PROGRAM

## 2023-09-21 PROCEDURE — 82232 ASSAY OF BETA-2 PROTEIN: CPT

## 2023-09-21 PROCEDURE — 80053 COMPREHEN METABOLIC PANEL: CPT

## 2023-09-21 PROCEDURE — 83615 LACTATE (LD) (LDH) ENZYME: CPT

## 2023-09-21 PROCEDURE — 83540 ASSAY OF IRON: CPT

## 2023-09-21 PROCEDURE — 83521 IG LIGHT CHAINS FREE EACH: CPT

## 2023-09-21 PROCEDURE — 83735 ASSAY OF MAGNESIUM: CPT

## 2023-09-21 PROCEDURE — 36415 COLL VENOUS BLD VENIPUNCTURE: CPT

## 2023-09-21 PROCEDURE — 84155 ASSAY OF PROTEIN SERUM: CPT

## 2023-09-21 PROCEDURE — 1123F ACP DISCUSS/DSCN MKR DOCD: CPT | Performed by: STUDENT IN AN ORGANIZED HEALTH CARE EDUCATION/TRAINING PROGRAM

## 2023-09-21 PROCEDURE — 3074F SYST BP LT 130 MM HG: CPT | Performed by: STUDENT IN AN ORGANIZED HEALTH CARE EDUCATION/TRAINING PROGRAM

## 2023-09-21 PROCEDURE — 86334 IMMUNOFIX E-PHORESIS SERUM: CPT

## 2023-09-21 PROCEDURE — 1036F TOBACCO NON-USER: CPT | Performed by: STUDENT IN AN ORGANIZED HEALTH CARE EDUCATION/TRAINING PROGRAM

## 2023-09-21 PROCEDURE — 99212 OFFICE O/P EST SF 10 MIN: CPT

## 2023-09-21 PROCEDURE — G8417 CALC BMI ABV UP PARAM F/U: HCPCS | Performed by: STUDENT IN AN ORGANIZED HEALTH CARE EDUCATION/TRAINING PROGRAM

## 2023-09-21 NOTE — PROGRESS NOTES
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 Patient appeared sweaty today. Denies of new symptoms or swelling or bleeding. Tolerating ibrutinib fairly well. However he does state occasionally he has drops in his blood pressure, which recover after few hours. He did have a recent ED visit for hypomagnesemia. ONCOLOGIC HISTORY:  78 y.o.  male initially seen at [facility] hematology for anemia and abnormal TP/albumin ratio. SPEP IgG and IgM kappa paraprotein, M-spike 3.29 g/dL. UIFE: IgM protein. 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   -Extensive involvement by atypitcal CD5-/CD10-B-cell lymphoproliferative disorder, comprising over 70% of marrow cellularity  -Mildly hypercellular marrow for age (30-40%) with trilineage pan hypoplasia  -Normocytic anemia. -IHC staining highlighted extensive CD20+ B cell infiltrate with admixed + plasma cells           Started on Ibrutinib 420 mg/day 12/2020 with good response so far    On 01/06/2021:  SPEP:  Monoclonal protein 1.72 g/dL  Persistent double monoclonal bands in the beta/gamma and gamma globuin regions. Bands previously identified as IgM kappa and IgG kappa (8/12/2020). IgM kappa decreased by 1.22 g/dL and IgG kappa minimally changed since last exam on 10/14/2020. Free kappa light chains: 413.4    (3.3-19. 4)   Free lambda light chains: 2.8      (5.7-26. 3)   K/L ratio:                          147.64 (0.26-1.65)    On 03/25/2021  SPEP:  Monoclonal protein: 1.03 (0-0) g/dL  Persistent double monoclonal bands in the beta/gamma and gamma globuin regions.

## 2023-09-22 LAB
FREE KAPPA/LAMBDA RATIO: 4.29 (ref 0.26–1.65)
KAPPA LC FREE SER-MCNC: 45.9 MG/L (ref 3.7–19.4)
LAMBDA LC FREE SERPL-MCNC: 10.7 MG/L (ref 5.7–26.3)
LDH SERPL-CCNC: 139 U/L (ref 135–225)

## 2023-09-23 LAB
ALBUMIN SERPL-MCNC: 3 G/DL (ref 3.5–4.7)
ALPHA1 GLOB SERPL ELPH-MCNC: 0.3 G/DL (ref 0.2–0.4)
ALPHA2 GLOB SERPL ELPH-MCNC: 1 G/DL (ref 0.5–1)
B-GLOBULIN SERPL ELPH-MCNC: 0.8 G/DL (ref 0.8–1.3)
GAMMA GLOB SERPL ELPH-MCNC: 0.9 G/DL (ref 0.7–1.6)
INTERPRETATION SERPL IFE-IMP: NORMAL
PATH REV: NORMAL
PATHOLOGIST: ABNORMAL
PROT PATTERN SERPL ELPH-IMP: ABNORMAL
PROT SERPL-MCNC: 5.9 G/DL (ref 6.4–8.3)

## 2023-09-25 ENCOUNTER — APPOINTMENT (OUTPATIENT)
Dept: ULTRASOUND IMAGING | Age: 78
End: 2023-09-25
Payer: OTHER GOVERNMENT

## 2023-09-25 ENCOUNTER — TELEPHONE (OUTPATIENT)
Dept: INFUSION THERAPY | Age: 78
End: 2023-09-25

## 2023-09-25 ENCOUNTER — HOSPITAL ENCOUNTER (EMERGENCY)
Age: 78
Discharge: HOME | End: 2023-09-25
Attending: STUDENT IN AN ORGANIZED HEALTH CARE EDUCATION/TRAINING PROGRAM
Payer: OTHER GOVERNMENT

## 2023-09-25 VITALS
TEMPERATURE: 98.5 F | HEART RATE: 98 BPM | OXYGEN SATURATION: 95 % | SYSTOLIC BLOOD PRESSURE: 154 MMHG | RESPIRATION RATE: 18 BRPM | DIASTOLIC BLOOD PRESSURE: 79 MMHG

## 2023-09-25 DIAGNOSIS — N45.1 EPIDIDYMITIS: Primary | ICD-10-CM

## 2023-09-25 DIAGNOSIS — N30.01 ACUTE CYSTITIS WITH HEMATURIA: ICD-10-CM

## 2023-09-25 LAB
ALBUMIN SERPL-MCNC: 3.5 G/DL (ref 3.5–5.2)
ALP SERPL-CCNC: 122 U/L (ref 40–129)
ALT SERPL-CCNC: 13 U/L (ref 0–40)
ANION GAP SERPL CALCULATED.3IONS-SCNC: 14 MMOL/L (ref 7–16)
AST SERPL-CCNC: 13 U/L (ref 0–39)
B2 MICROGLOB SERPL IA-MCNC: 3.2 MG/L (ref 0.6–2.4)
BACTERIA URNS QL MICRO: ABNORMAL
BASOPHILS # BLD: 0.06 K/UL (ref 0–0.2)
BASOPHILS NFR BLD: 1 % (ref 0–2)
BILIRUB SERPL-MCNC: 0.5 MG/DL (ref 0–1.2)
BILIRUB UR QL STRIP: ABNORMAL
BUN SERPL-MCNC: 11 MG/DL (ref 6–23)
CALCIUM SERPL-MCNC: 9.9 MG/DL (ref 8.6–10.2)
CHLORIDE SERPL-SCNC: 94 MMOL/L (ref 98–107)
CLARITY UR: ABNORMAL
CO2 SERPL-SCNC: 26 MMOL/L (ref 22–29)
COLOR UR: YELLOW
CREAT SERPL-MCNC: 0.9 MG/DL (ref 0.7–1.2)
EOSINOPHIL # BLD: 0.06 K/UL (ref 0.05–0.5)
EOSINOPHILS RELATIVE PERCENT: 1 % (ref 0–6)
ERYTHROCYTE [DISTWIDTH] IN BLOOD BY AUTOMATED COUNT: 14 % (ref 11.5–15)
GFR SERPL CREATININE-BSD FRML MDRD: >60 ML/MIN/1.73M2
GLUCOSE SERPL-MCNC: 248 MG/DL (ref 74–99)
GLUCOSE UR STRIP-MCNC: 500 MG/DL
HCT VFR BLD AUTO: 40 % (ref 37–54)
HGB BLD-MCNC: 13.3 G/DL (ref 12.5–16.5)
HGB UR QL STRIP.AUTO: ABNORMAL
IMM GRANULOCYTES # BLD AUTO: 0.38 K/UL (ref 0–0.58)
IMM GRANULOCYTES NFR BLD: 4 % (ref 0–5)
KETONES UR STRIP-MCNC: ABNORMAL MG/DL
LACTATE BLDV-SCNC: 2.6 MMOL/L (ref 0.5–2.2)
LACTATE BLDV-SCNC: 4.2 MMOL/L (ref 0.5–2.2)
LEUKOCYTE ESTERASE UR QL STRIP: ABNORMAL
LIPASE SERPL-CCNC: 24 U/L (ref 13–60)
LYMPHOCYTES NFR BLD: 1.79 K/UL (ref 1.5–4)
LYMPHOCYTES RELATIVE PERCENT: 18 % (ref 20–42)
MCH RBC QN AUTO: 28.2 PG (ref 26–35)
MCHC RBC AUTO-ENTMCNC: 33.3 G/DL (ref 32–34.5)
MCV RBC AUTO: 84.9 FL (ref 80–99.9)
MONOCYTES NFR BLD: 1.01 K/UL (ref 0.1–0.95)
MONOCYTES NFR BLD: 10 % (ref 2–12)
NEUTROPHILS NFR BLD: 68 % (ref 43–80)
NEUTS SEG NFR BLD: 6.95 K/UL (ref 1.8–7.3)
NITRITE UR QL STRIP: NEGATIVE
PH UR STRIP: 6 [PH] (ref 5–9)
PLATELET # BLD AUTO: 283 K/UL (ref 130–450)
PMV BLD AUTO: 10.3 FL (ref 7–12)
POTASSIUM SERPL-SCNC: 3.8 MMOL/L (ref 3.5–5)
PROT SERPL-MCNC: 6.8 G/DL (ref 6.4–8.3)
PROT UR STRIP-MCNC: 30 MG/DL
RBC # BLD AUTO: 4.71 M/UL (ref 3.8–5.8)
RBC #/AREA URNS HPF: ABNORMAL /HPF
SODIUM SERPL-SCNC: 134 MMOL/L (ref 132–146)
SP GR UR STRIP: 1.02 (ref 1–1.03)
UROBILINOGEN UR STRIP-ACNC: 1 EU/DL (ref 0–1)
VISC SER: 1.12 CP (ref 1.1–1.8)
WBC #/AREA URNS HPF: ABNORMAL /HPF
WBC OTHER # BLD: 10.3 K/UL (ref 4.5–11.5)

## 2023-09-25 PROCEDURE — 96374 THER/PROPH/DIAG INJ IV PUSH: CPT

## 2023-09-25 PROCEDURE — 93975 VASCULAR STUDY: CPT

## 2023-09-25 PROCEDURE — 2580000003 HC RX 258: Performed by: STUDENT IN AN ORGANIZED HEALTH CARE EDUCATION/TRAINING PROGRAM

## 2023-09-25 PROCEDURE — 81001 URINALYSIS AUTO W/SCOPE: CPT

## 2023-09-25 PROCEDURE — 83605 ASSAY OF LACTIC ACID: CPT

## 2023-09-25 PROCEDURE — 83690 ASSAY OF LIPASE: CPT

## 2023-09-25 PROCEDURE — 99284 EMERGENCY DEPT VISIT MOD MDM: CPT

## 2023-09-25 PROCEDURE — 6360000002 HC RX W HCPCS: Performed by: STUDENT IN AN ORGANIZED HEALTH CARE EDUCATION/TRAINING PROGRAM

## 2023-09-25 PROCEDURE — 85025 COMPLETE CBC W/AUTO DIFF WBC: CPT

## 2023-09-25 PROCEDURE — 80053 COMPREHEN METABOLIC PANEL: CPT

## 2023-09-25 PROCEDURE — 76870 US EXAM SCROTUM: CPT

## 2023-09-25 RX ORDER — LEVOFLOXACIN 500 MG/1
500 TABLET, FILM COATED ORAL DAILY
Qty: 10 TABLET | Refills: 0 | Status: SHIPPED | OUTPATIENT
Start: 2023-09-25 | End: 2023-10-05

## 2023-09-25 RX ORDER — LEVOFLOXACIN 500 MG/1
500 TABLET, FILM COATED ORAL ONCE
Status: DISCONTINUED | OUTPATIENT
Start: 2023-09-25 | End: 2023-09-25 | Stop reason: HOSPADM

## 2023-09-25 RX ORDER — MORPHINE SULFATE 4 MG/ML
4 INJECTION, SOLUTION INTRAMUSCULAR; INTRAVENOUS ONCE
Status: COMPLETED | OUTPATIENT
Start: 2023-09-25 | End: 2023-09-25

## 2023-09-25 RX ORDER — 0.9 % SODIUM CHLORIDE 0.9 %
1000 INTRAVENOUS SOLUTION INTRAVENOUS ONCE
Status: COMPLETED | OUTPATIENT
Start: 2023-09-25 | End: 2023-09-25

## 2023-09-25 RX ADMIN — MORPHINE SULFATE 4 MG: 4 INJECTION, SOLUTION INTRAMUSCULAR; INTRAVENOUS at 15:38

## 2023-09-25 RX ADMIN — SODIUM CHLORIDE 1000 ML: 9 INJECTION, SOLUTION INTRAVENOUS at 16:31

## 2023-09-25 ASSESSMENT — PAIN DESCRIPTION - ONSET
ONSET: SUDDEN
ONSET: ON-GOING

## 2023-09-25 ASSESSMENT — PAIN - FUNCTIONAL ASSESSMENT
PAIN_FUNCTIONAL_ASSESSMENT: NONE - DENIES PAIN
PAIN_FUNCTIONAL_ASSESSMENT: 0-10
PAIN_FUNCTIONAL_ASSESSMENT: PREVENTS OR INTERFERES SOME ACTIVE ACTIVITIES AND ADLS

## 2023-09-25 ASSESSMENT — PAIN DESCRIPTION - FREQUENCY
FREQUENCY: CONTINUOUS
FREQUENCY: INTERMITTENT

## 2023-09-25 ASSESSMENT — PAIN DESCRIPTION - DESCRIPTORS: DESCRIPTORS: DULL

## 2023-09-25 ASSESSMENT — PAIN SCALES - GENERAL
PAINLEVEL_OUTOF10: 10
PAINLEVEL_OUTOF10: 5

## 2023-09-25 ASSESSMENT — PAIN DESCRIPTION - LOCATION
LOCATION: SCROTUM
LOCATION: SCROTUM

## 2023-09-25 ASSESSMENT — PAIN DESCRIPTION - ORIENTATION
ORIENTATION: RIGHT
ORIENTATION: RIGHT

## 2023-09-25 ASSESSMENT — PAIN DESCRIPTION - PAIN TYPE
TYPE: ACUTE PAIN
TYPE: ACUTE PAIN

## 2023-09-25 NOTE — TELEPHONE ENCOUNTER
This nurse reached out to patient to follow up on  ED and mag level per Dr. Eric Sandoval. Patient states he is feeling well. Had fluids and he thinks Magnesium in ER, but has recently started on magnesium oral bid. He agrees to follow up on magnesium lab on 9/28/23 for re check.  Dr Eric Sandoval aware

## 2023-09-25 NOTE — DISCHARGE INSTRUCTIONS
Take all antibiotics as prescribed  Follow-up with urology next week  Follow-up with primary care doctor  If you notice any new worrisome symptoms please return to the emergency department for evaluation

## 2023-09-25 NOTE — ED NOTES
Pt given discharge summary with education on epididymitis and acute cystitis with hematuria, pt aware of his prescription for levaquin awaiting at his assigned pharmacy, and pt informed to follow up with his urologist and pcp     Napoleon Macario RN  09/25/23 1916

## 2023-09-27 ENCOUNTER — HOSPITAL ENCOUNTER (EMERGENCY)
Age: 78
Discharge: HOME | End: 2023-09-27
Attending: STUDENT IN AN ORGANIZED HEALTH CARE EDUCATION/TRAINING PROGRAM
Payer: OTHER GOVERNMENT

## 2023-09-27 VITALS
TEMPERATURE: 98.4 F | HEART RATE: 92 BPM | RESPIRATION RATE: 18 BRPM | SYSTOLIC BLOOD PRESSURE: 167 MMHG | DIASTOLIC BLOOD PRESSURE: 96 MMHG | OXYGEN SATURATION: 99 %

## 2023-09-27 DIAGNOSIS — E83.42 HYPOMAGNESEMIA: Primary | ICD-10-CM

## 2023-09-27 LAB
ALBUMIN SERPL-MCNC: 3.5 G/DL (ref 3.5–5.2)
ALP SERPL-CCNC: 101 U/L (ref 40–129)
ALT SERPL-CCNC: 10 U/L (ref 0–40)
ANION GAP SERPL CALCULATED.3IONS-SCNC: 13 MMOL/L (ref 7–16)
AST SERPL-CCNC: 10 U/L (ref 0–39)
BASOPHILS # BLD: 0.12 K/UL (ref 0–0.2)
BASOPHILS NFR BLD: 1 % (ref 0–2)
BILIRUB SERPL-MCNC: 0.4 MG/DL (ref 0–1.2)
BUN SERPL-MCNC: 7 MG/DL (ref 6–23)
CALCIUM SERPL-MCNC: 9.7 MG/DL (ref 8.6–10.2)
CHLORIDE SERPL-SCNC: 96 MMOL/L (ref 98–107)
CO2 SERPL-SCNC: 27 MMOL/L (ref 22–29)
CREAT SERPL-MCNC: 0.7 MG/DL (ref 0.7–1.2)
EOSINOPHIL # BLD: 0.23 K/UL (ref 0.05–0.5)
EOSINOPHILS RELATIVE PERCENT: 2 % (ref 0–6)
ERYTHROCYTE [DISTWIDTH] IN BLOOD BY AUTOMATED COUNT: 13.8 % (ref 11.5–15)
GFR SERPL CREATININE-BSD FRML MDRD: >60 ML/MIN/1.73M2
GLUCOSE SERPL-MCNC: 177 MG/DL (ref 74–99)
HCT VFR BLD AUTO: 38.9 % (ref 37–54)
HGB BLD-MCNC: 12.9 G/DL (ref 12.5–16.5)
LYMPHOCYTES NFR BLD: 1.73 K/UL (ref 1.5–4)
LYMPHOCYTES RELATIVE PERCENT: 15 % (ref 20–42)
MAGNESIUM SERPL-MCNC: 1.2 MG/DL (ref 1.6–2.6)
MCH RBC QN AUTO: 28.2 PG (ref 26–35)
MCHC RBC AUTO-ENTMCNC: 33.2 G/DL (ref 32–34.5)
MCV RBC AUTO: 84.9 FL (ref 80–99.9)
MONOCYTES NFR BLD: 1.15 K/UL (ref 0.1–0.95)
MONOCYTES NFR BLD: 10 % (ref 2–12)
NEUTROPHILS NFR BLD: 72 % (ref 43–80)
NEUTS SEG NFR BLD: 8.28 K/UL (ref 1.8–7.3)
PLATELET # BLD AUTO: 280 K/UL (ref 130–450)
PMV BLD AUTO: 9.8 FL (ref 7–12)
POTASSIUM SERPL-SCNC: 3.6 MMOL/L (ref 3.5–5)
PROT SERPL-MCNC: 6.4 G/DL (ref 6.4–8.3)
RBC # BLD AUTO: 4.58 M/UL (ref 3.8–5.8)
RBC # BLD: NORMAL 10*6/UL
SODIUM SERPL-SCNC: 136 MMOL/L (ref 132–146)
WBC OTHER # BLD: 11.5 K/UL (ref 4.5–11.5)

## 2023-09-27 PROCEDURE — 6360000002 HC RX W HCPCS: Performed by: STUDENT IN AN ORGANIZED HEALTH CARE EDUCATION/TRAINING PROGRAM

## 2023-09-27 PROCEDURE — 96366 THER/PROPH/DIAG IV INF ADDON: CPT

## 2023-09-27 PROCEDURE — 99284 EMERGENCY DEPT VISIT MOD MDM: CPT

## 2023-09-27 PROCEDURE — 83735 ASSAY OF MAGNESIUM: CPT

## 2023-09-27 PROCEDURE — 96365 THER/PROPH/DIAG IV INF INIT: CPT

## 2023-09-27 PROCEDURE — 80053 COMPREHEN METABOLIC PANEL: CPT

## 2023-09-27 PROCEDURE — 85025 COMPLETE CBC W/AUTO DIFF WBC: CPT

## 2023-09-27 RX ORDER — MAGNESIUM SULFATE IN WATER 40 MG/ML
2000 INJECTION, SOLUTION INTRAVENOUS ONCE
Status: COMPLETED | OUTPATIENT
Start: 2023-09-27 | End: 2023-09-27

## 2023-09-27 RX ADMIN — MAGNESIUM SULFATE HEPTAHYDRATE 2000 MG: 40 INJECTION, SOLUTION INTRAVENOUS at 17:55

## 2023-09-27 ASSESSMENT — ENCOUNTER SYMPTOMS
WHEEZING: 0
BACK PAIN: 0
ABDOMINAL PAIN: 0
NAUSEA: 0
EYE REDNESS: 0
VOMITING: 0
COUGH: 0
DIARRHEA: 0
EYE PAIN: 0
SORE THROAT: 0
SHORTNESS OF BREATH: 0
EYE DISCHARGE: 0
SINUS PRESSURE: 0

## 2023-09-27 ASSESSMENT — PATIENT HEALTH QUESTIONNAIRE - PHQ9
SUM OF ALL RESPONSES TO PHQ QUESTIONS 1-9: 0
SUM OF ALL RESPONSES TO PHQ9 QUESTIONS 1 & 2: 0
SUM OF ALL RESPONSES TO PHQ QUESTIONS 1-9: 0
SUM OF ALL RESPONSES TO PHQ QUESTIONS 1-9: 0
1. LITTLE INTEREST OR PLEASURE IN DOING THINGS: 0
SUM OF ALL RESPONSES TO PHQ QUESTIONS 1-9: 0
2. FEELING DOWN, DEPRESSED OR HOPELESS: 0

## 2023-09-27 ASSESSMENT — PAIN - FUNCTIONAL ASSESSMENT: PAIN_FUNCTIONAL_ASSESSMENT: NONE - DENIES PAIN

## 2023-09-27 ASSESSMENT — LIFESTYLE VARIABLES: HOW OFTEN DO YOU HAVE A DRINK CONTAINING ALCOHOL: NEVER

## 2023-09-27 NOTE — ED PROVIDER NOTES
HPI   Patient here for evaluation of low magnesium level. Patient had a magnesium of 1.2 on outpatient lab work today. He was sent here by the [address] [place] [address] for repletion. He states he has now been on magnesium oxide 400 mg twice daily for the last week and it is still low. He is not having any associated symptoms of confusion, seizures, ataxia. He states that he otherwise would not of come in if he did not receive a phone call that the magnesium level was low. He was recently seen here for epididymitis few days ago. He states he was having a lot of pain and swelling in the scrotal area he states that now since he has been on antibiotics he is feeling much better. Review of Systems   Constitutional:  Negative for chills and fever. HENT:  Negative for ear pain, sinus pressure and sore throat. Eyes:  Negative for pain, discharge and redness. Respiratory:  Negative for cough, shortness of breath and wheezing. Cardiovascular:  Negative for chest pain. Gastrointestinal:  Negative for abdominal pain, diarrhea, nausea and vomiting. Genitourinary:  Negative for dysuria and frequency. Musculoskeletal:  Negative for arthralgias and back pain. Skin:  Negative for rash and wound. Neurological:  Negative for weakness and headaches. All other systems reviewed and are negative. Physical Exam  Vitals and nursing note reviewed. Constitutional:       Appearance: He is well-developed. HENT:      Head: Normocephalic and atraumatic. Eyes:      Conjunctiva/sclera: Conjunctivae normal.   Cardiovascular:      Rate and Rhythm: Normal rate and regular rhythm. Heart sounds: Normal heart sounds. No murmur heard. Pulmonary:      Effort: Pulmonary effort is normal. No respiratory distress. Breath sounds: Normal breath sounds. No wheezing or rales. Abdominal:      General: Bowel sounds are normal.      Palpations: Abdomen is soft. Tenderness: There is no abdominal tenderness.  There is no

## 2023-09-27 NOTE — DISCHARGE INSTRUCTIONS
Your magnesium was repleted here. Please continue taking your magnesium oxide as you have been doing. Follow-up with your primary doctor on Monday as scheduled.

## 2023-09-28 ENCOUNTER — HOSPITAL ENCOUNTER (OUTPATIENT)
Dept: INFUSION THERAPY | Age: 78
Discharge: HOME | End: 2023-09-28
Payer: MEDICARE

## 2023-09-28 DIAGNOSIS — C83.00 MALIGNANT LYMPHOPLASMACYTIC LYMPHOMA (HCC): ICD-10-CM

## 2023-09-28 LAB — MAGNESIUM SERPL-MCNC: 1.5 MG/DL (ref 1.6–2.6)

## 2023-09-28 PROCEDURE — 36415 COLL VENOUS BLD VENIPUNCTURE: CPT

## 2023-09-28 PROCEDURE — 83735 ASSAY OF MAGNESIUM: CPT

## 2023-10-19 ENCOUNTER — HOSPITAL ENCOUNTER (OUTPATIENT)
Dept: INFUSION THERAPY | Age: 78
Discharge: HOME | End: 2023-10-19
Payer: OTHER GOVERNMENT

## 2023-10-19 ENCOUNTER — OFFICE VISIT (OUTPATIENT)
Dept: ONCOLOGY | Age: 78
End: 2023-10-19
Payer: OTHER GOVERNMENT

## 2023-10-19 VITALS
HEIGHT: 72 IN | OXYGEN SATURATION: 94 % | DIASTOLIC BLOOD PRESSURE: 75 MMHG | WEIGHT: 288.8 LBS | BODY MASS INDEX: 39.12 KG/M2 | HEART RATE: 68 BPM | SYSTOLIC BLOOD PRESSURE: 155 MMHG | TEMPERATURE: 96.8 F

## 2023-10-19 DIAGNOSIS — C83.00 MALIGNANT LYMPHOPLASMACYTIC LYMPHOMA (HCC): ICD-10-CM

## 2023-10-19 DIAGNOSIS — C83.00 MALIGNANT LYMPHOPLASMACYTIC LYMPHOMA (HCC): Primary | ICD-10-CM

## 2023-10-19 LAB
ALBUMIN SERPL-MCNC: 3.8 G/DL (ref 3.5–5.2)
ALP SERPL-CCNC: 79 U/L (ref 40–129)
ALT SERPL-CCNC: 30 U/L (ref 0–40)
ANION GAP SERPL CALCULATED.3IONS-SCNC: 16 MMOL/L (ref 7–16)
AST SERPL-CCNC: 21 U/L (ref 0–39)
BASOPHILS # BLD: 0.05 K/UL (ref 0–0.2)
BASOPHILS NFR BLD: 1 % (ref 0–2)
BILIRUB SERPL-MCNC: 0.7 MG/DL (ref 0–1.2)
BUN SERPL-MCNC: 7 MG/DL (ref 6–23)
CALCIUM SERPL-MCNC: 9.3 MG/DL (ref 8.6–10.2)
CHLORIDE SERPL-SCNC: 101 MMOL/L (ref 98–107)
CO2 SERPL-SCNC: 22 MMOL/L (ref 22–29)
CREAT SERPL-MCNC: 0.8 MG/DL (ref 0.7–1.2)
EOSINOPHIL # BLD: 0.16 K/UL (ref 0.05–0.5)
EOSINOPHILS RELATIVE PERCENT: 2 % (ref 0–6)
ERYTHROCYTE [DISTWIDTH] IN BLOOD BY AUTOMATED COUNT: 14.7 % (ref 11.5–15)
FERRITIN SERPL-MCNC: 80 NG/ML
GFR SERPL CREATININE-BSD FRML MDRD: >60 ML/MIN/1.73M2
GLUCOSE SERPL-MCNC: 150 MG/DL (ref 74–99)
HCT VFR BLD AUTO: 40.3 % (ref 37–54)
HGB BLD-MCNC: 12.8 G/DL (ref 12.5–16.5)
IGA SERPL-MCNC: <5 MG/DL (ref 70–400)
IGG SERPL-MCNC: 368 MG/DL (ref 700–1600)
IGM SERPL-MCNC: 871 MG/DL (ref 40–230)
IMM GRANULOCYTES # BLD AUTO: 0.04 K/UL (ref 0–0.58)
IMM GRANULOCYTES NFR BLD: 1 % (ref 0–5)
IRON SATN MFR SERPL: 31 % (ref 20–55)
IRON SERPL-MCNC: 98 UG/DL (ref 59–158)
LDH SERPL-CCNC: 117 U/L (ref 135–225)
LYMPHOCYTES NFR BLD: 2.44 K/UL (ref 1.5–4)
LYMPHOCYTES RELATIVE PERCENT: 35 % (ref 20–42)
MAGNESIUM SERPL-MCNC: 1.8 MG/DL (ref 1.6–2.6)
MCH RBC QN AUTO: 28.4 PG (ref 26–35)
MCHC RBC AUTO-ENTMCNC: 31.8 G/DL (ref 32–34.5)
MCV RBC AUTO: 89.4 FL (ref 80–99.9)
MONOCYTES NFR BLD: 0.54 K/UL (ref 0.1–0.95)
MONOCYTES NFR BLD: 8 % (ref 2–12)
NEUTROPHILS NFR BLD: 53 % (ref 43–80)
NEUTS SEG NFR BLD: 3.69 K/UL (ref 1.8–7.3)
PLATELET # BLD AUTO: 188 K/UL (ref 130–450)
PMV BLD AUTO: 10.2 FL (ref 7–12)
POTASSIUM SERPL-SCNC: 3.7 MMOL/L (ref 3.5–5)
PROT SERPL-MCNC: 6.3 G/DL (ref 6.4–8.3)
RBC # BLD AUTO: 4.51 M/UL (ref 3.8–5.8)
SODIUM SERPL-SCNC: 139 MMOL/L (ref 132–146)
TIBC SERPL-MCNC: 315 UG/DL (ref 250–450)
WBC OTHER # BLD: 6.9 K/UL (ref 4.5–11.5)

## 2023-10-19 PROCEDURE — 83615 LACTATE (LD) (LDH) ENZYME: CPT

## 2023-10-19 PROCEDURE — 84165 PROTEIN E-PHORESIS SERUM: CPT

## 2023-10-19 PROCEDURE — 83550 IRON BINDING TEST: CPT

## 2023-10-19 PROCEDURE — 82784 ASSAY IGA/IGD/IGG/IGM EACH: CPT

## 2023-10-19 PROCEDURE — 99213 OFFICE O/P EST LOW 20 MIN: CPT | Performed by: STUDENT IN AN ORGANIZED HEALTH CARE EDUCATION/TRAINING PROGRAM

## 2023-10-19 PROCEDURE — 83540 ASSAY OF IRON: CPT

## 2023-10-19 PROCEDURE — 86334 IMMUNOFIX E-PHORESIS SERUM: CPT

## 2023-10-19 PROCEDURE — 85810 BLOOD VISCOSITY EXAMINATION: CPT

## 2023-10-19 PROCEDURE — 83735 ASSAY OF MAGNESIUM: CPT

## 2023-10-19 PROCEDURE — 82728 ASSAY OF FERRITIN: CPT

## 2023-10-19 PROCEDURE — 99212 OFFICE O/P EST SF 10 MIN: CPT

## 2023-10-19 PROCEDURE — 85025 COMPLETE CBC W/AUTO DIFF WBC: CPT

## 2023-10-19 PROCEDURE — 3077F SYST BP >= 140 MM HG: CPT | Performed by: STUDENT IN AN ORGANIZED HEALTH CARE EDUCATION/TRAINING PROGRAM

## 2023-10-19 PROCEDURE — 80053 COMPREHEN METABOLIC PANEL: CPT

## 2023-10-19 PROCEDURE — 1123F ACP DISCUSS/DSCN MKR DOCD: CPT | Performed by: STUDENT IN AN ORGANIZED HEALTH CARE EDUCATION/TRAINING PROGRAM

## 2023-10-19 PROCEDURE — 3078F DIAST BP <80 MM HG: CPT | Performed by: STUDENT IN AN ORGANIZED HEALTH CARE EDUCATION/TRAINING PROGRAM

## 2023-10-19 PROCEDURE — 83521 IG LIGHT CHAINS FREE EACH: CPT

## 2023-10-19 PROCEDURE — 82232 ASSAY OF BETA-2 PROTEIN: CPT

## 2023-10-19 PROCEDURE — 36415 COLL VENOUS BLD VENIPUNCTURE: CPT

## 2023-10-19 PROCEDURE — 84155 ASSAY OF PROTEIN SERUM: CPT

## 2023-10-19 RX ORDER — FINASTERIDE 5 MG/1
5 TABLET, FILM COATED ORAL
COMMUNITY
Start: 2023-10-16

## 2023-10-19 NOTE — PROGRESS NOTES
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 She denies of any more sweats. Blood pressure reviewed. Denies of new symptoms. However he did have episode of hematuria recently that has cleared up. He had flank pain, which has also resolved, in which she believes he may have passed a kidney stone. ONCOLOGIC HISTORY:  78 y.o.  male initially seen at [facility] hematology for anemia and abnormal TP/albumin ratio. SPEP IgG and IgM kappa paraprotein, M-spike 3.29 g/dL. UIFE: IgM protein. 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   -Extensive involvement by atypitcal CD5-/CD10-B-cell lymphoproliferative disorder, comprising over 70% of marrow cellularity  -Mildly hypercellular marrow for age (30-40%) with trilineage pan hypoplasia  -Normocytic anemia. -IHC staining highlighted extensive CD20+ B cell infiltrate with admixed + plasma cells           Started on Ibrutinib 420 mg/day 12/2020 with good response so far    On 01/06/2021:  SPEP:  Monoclonal protein 1.72 g/dL  Persistent double monoclonal bands in the beta/gamma and gamma globuin regions. Bands previously identified as IgM kappa and IgG kappa (8/12/2020). IgM kappa decreased by 1.22 g/dL and IgG kappa minimally changed since last exam on 10/14/2020. Free kappa light chains: 413.4    (3.3-19. 4)   Free lambda light chains: 2.8      (5.7-26. 3)   K/L ratio:                          147.64 (0.26-1.65)    On 03/25/2021  SPEP:  Monoclonal protein: 1.03 (0-0) g/dL  Persistent double monoclonal bands in the beta/gamma and gamma globuin

## 2023-10-20 ENCOUNTER — HOSPITAL ENCOUNTER (INPATIENT)
Age: 78
LOS: 2 days | Discharge: HOME | DRG: 690 | End: 2023-10-22
Attending: INTERNAL MEDICINE | Admitting: INTERNAL MEDICINE
Payer: OTHER GOVERNMENT

## 2023-10-20 ENCOUNTER — HOSPITAL ENCOUNTER (EMERGENCY)
Age: 78
Discharge: ANOTHER HOSPITAL | End: 2023-10-20
Attending: STUDENT IN AN ORGANIZED HEALTH CARE EDUCATION/TRAINING PROGRAM
Payer: OTHER GOVERNMENT

## 2023-10-20 ENCOUNTER — APPOINTMENT (OUTPATIENT)
Dept: CT IMAGING | Age: 78
DRG: 690 | End: 2023-10-20
Attending: INTERNAL MEDICINE
Payer: OTHER GOVERNMENT

## 2023-10-20 VITALS
HEART RATE: 74 BPM | TEMPERATURE: 98 F | OXYGEN SATURATION: 95 % | DIASTOLIC BLOOD PRESSURE: 87 MMHG | SYSTOLIC BLOOD PRESSURE: 188 MMHG | RESPIRATION RATE: 16 BRPM

## 2023-10-20 DIAGNOSIS — R10.9 LEFT FLANK PAIN: ICD-10-CM

## 2023-10-20 DIAGNOSIS — N39.0 COMPLICATED UTI (URINARY TRACT INFECTION): Primary | ICD-10-CM

## 2023-10-20 PROBLEM — N12 PYELONEPHRITIS: Status: ACTIVE | Noted: 2023-10-20

## 2023-10-20 LAB
ALBUMIN SERPL-MCNC: 3 G/DL (ref 3.5–4.7)
ALBUMIN SERPL-MCNC: 3.8 G/DL (ref 3.5–5.2)
ALP SERPL-CCNC: 75 U/L (ref 40–129)
ALPHA1 GLOB SERPL ELPH-MCNC: 0.2 G/DL (ref 0.2–0.4)
ALPHA2 GLOB SERPL ELPH-MCNC: 0.7 G/DL (ref 0.5–1)
ALT SERPL-CCNC: 20 U/L (ref 0–40)
ANION GAP SERPL CALCULATED.3IONS-SCNC: 13 MMOL/L (ref 7–16)
AST SERPL-CCNC: 13 U/L (ref 0–39)
B-GLOBULIN SERPL ELPH-MCNC: 0.7 G/DL (ref 0.8–1.3)
B2 MICROGLOB SERPL IA-MCNC: 2.3 MG/L (ref 0.6–2.4)
BACTERIA URNS QL MICRO: ABNORMAL
BASOPHILS # BLD: 0.04 K/UL (ref 0–0.2)
BASOPHILS NFR BLD: 1 % (ref 0–2)
BILIRUB SERPL-MCNC: 0.6 MG/DL (ref 0–1.2)
BILIRUB UR QL STRIP: ABNORMAL
BUN SERPL-MCNC: 12 MG/DL (ref 6–23)
CALCIUM SERPL-MCNC: 9.9 MG/DL (ref 8.6–10.2)
CHLORIDE SERPL-SCNC: 100 MMOL/L (ref 98–107)
CLARITY UR: ABNORMAL
CO2 SERPL-SCNC: 26 MMOL/L (ref 22–29)
COLOR UR: ABNORMAL
CREAT SERPL-MCNC: 0.8 MG/DL (ref 0.7–1.2)
EOSINOPHIL # BLD: 0.11 K/UL (ref 0.05–0.5)
EOSINOPHILS RELATIVE PERCENT: 2 % (ref 0–6)
ERYTHROCYTE [DISTWIDTH] IN BLOOD BY AUTOMATED COUNT: 14.7 % (ref 11.5–15)
FREE KAPPA/LAMBDA RATIO: 4.84 (ref 0.26–1.65)
GAMMA GLOB SERPL ELPH-MCNC: 0.9 G/DL (ref 0.7–1.6)
GFR SERPL CREATININE-BSD FRML MDRD: >60 ML/MIN/1.73M2
GLUCOSE BLD-MCNC: 223 MG/DL (ref 74–99)
GLUCOSE SERPL-MCNC: 185 MG/DL (ref 74–99)
GLUCOSE UR STRIP-MCNC: ABNORMAL MG/DL
HCT VFR BLD AUTO: 40.7 % (ref 37–54)
HGB BLD-MCNC: 13 G/DL (ref 12.5–16.5)
HGB UR QL STRIP.AUTO: ABNORMAL
IMM GRANULOCYTES # BLD AUTO: 0.07 K/UL (ref 0–0.58)
IMM GRANULOCYTES NFR BLD: 1 % (ref 0–5)
INTERPRETATION SERPL IFE-IMP: NORMAL
KAPPA LC FREE SER-MCNC: 35.8 MG/L (ref 3.7–19.4)
KETONES UR STRIP-MCNC: ABNORMAL MG/DL
LAMBDA LC FREE SERPL-MCNC: 7.4 MG/L (ref 5.7–26.3)
LEUKOCYTE ESTERASE UR QL STRIP: ABNORMAL
LYMPHOCYTES NFR BLD: 1.96 K/UL (ref 1.5–4)
LYMPHOCYTES RELATIVE PERCENT: 33 % (ref 20–42)
M PROTEIN SERPL ELPH-MCNC: 0.5 G/DL
MCH RBC QN AUTO: 27.5 PG (ref 26–35)
MCHC RBC AUTO-ENTMCNC: 31.9 G/DL (ref 32–34.5)
MCV RBC AUTO: 86 FL (ref 80–99.9)
MONOCYTES NFR BLD: 0.48 K/UL (ref 0.1–0.95)
MONOCYTES NFR BLD: 8 % (ref 2–12)
NEUTROPHILS NFR BLD: 55 % (ref 43–80)
NEUTS SEG NFR BLD: 3.27 K/UL (ref 1.8–7.3)
NITRITE UR QL STRIP: ABNORMAL
PATH REV: NORMAL
PATHOLOGIST: ABNORMAL
PH UR STRIP: ABNORMAL [PH] (ref 5–9)
PLATELET # BLD AUTO: 196 K/UL (ref 130–450)
PMV BLD AUTO: 10.5 FL (ref 7–12)
POTASSIUM SERPL-SCNC: 3.7 MMOL/L (ref 3.5–5)
PROT PATTERN SERPL ELPH-IMP: ABNORMAL
PROT SERPL-MCNC: 5.6 G/DL (ref 6.4–8.3)
PROT SERPL-MCNC: 6.1 G/DL (ref 6.4–8.3)
PROT UR STRIP-MCNC: ABNORMAL MG/DL
RBC # BLD AUTO: 4.73 M/UL (ref 3.8–5.8)
RBC #/AREA URNS HPF: ABNORMAL /HPF
SODIUM SERPL-SCNC: 139 MMOL/L (ref 132–146)
SP GR UR STRIP: ABNORMAL (ref 1–1.03)
UROBILINOGEN UR STRIP-ACNC: ABNORMAL EU/DL (ref 0–1)
WBC #/AREA URNS HPF: ABNORMAL /HPF
WBC OTHER # BLD: 5.9 K/UL (ref 4.5–11.5)

## 2023-10-20 PROCEDURE — 99285 EMERGENCY DEPT VISIT HI MDM: CPT

## 2023-10-20 PROCEDURE — 1200000000 HC SEMI PRIVATE

## 2023-10-20 PROCEDURE — 87086 URINE CULTURE/COLONY COUNT: CPT

## 2023-10-20 PROCEDURE — 82962 GLUCOSE BLOOD TEST: CPT

## 2023-10-20 PROCEDURE — 2580000003 HC RX 258

## 2023-10-20 PROCEDURE — 96374 THER/PROPH/DIAG INJ IV PUSH: CPT

## 2023-10-20 PROCEDURE — 80053 COMPREHEN METABOLIC PANEL: CPT

## 2023-10-20 PROCEDURE — 87040 BLOOD CULTURE FOR BACTERIA: CPT

## 2023-10-20 PROCEDURE — 81001 URINALYSIS AUTO W/SCOPE: CPT

## 2023-10-20 PROCEDURE — 85025 COMPLETE CBC W/AUTO DIFF WBC: CPT

## 2023-10-20 PROCEDURE — 6360000002 HC RX W HCPCS

## 2023-10-20 PROCEDURE — 6370000000 HC RX 637 (ALT 250 FOR IP): Performed by: INTERNAL MEDICINE

## 2023-10-20 PROCEDURE — 99222 1ST HOSP IP/OBS MODERATE 55: CPT | Performed by: INTERNAL MEDICINE

## 2023-10-20 RX ORDER — ACETAMINOPHEN 650 MG/1
650 SUPPOSITORY RECTAL EVERY 6 HOURS PRN
Status: DISCONTINUED | OUTPATIENT
Start: 2023-10-20 | End: 2023-10-22 | Stop reason: HOSPADM

## 2023-10-20 RX ORDER — PANTOPRAZOLE SODIUM 40 MG/1
40 TABLET, DELAYED RELEASE ORAL
Status: DISCONTINUED | OUTPATIENT
Start: 2023-10-21 | End: 2023-10-22 | Stop reason: HOSPADM

## 2023-10-20 RX ORDER — FUROSEMIDE 40 MG/1
40 TABLET ORAL
COMMUNITY
Start: 2023-10-19

## 2023-10-20 RX ORDER — SODIUM CHLORIDE 9 MG/ML
INJECTION, SOLUTION INTRAVENOUS PRN
Status: DISCONTINUED | OUTPATIENT
Start: 2023-10-20 | End: 2023-10-22 | Stop reason: HOSPADM

## 2023-10-20 RX ORDER — DEXTROSE MONOHYDRATE 100 MG/ML
INJECTION, SOLUTION INTRAVENOUS CONTINUOUS PRN
Status: DISCONTINUED | OUTPATIENT
Start: 2023-10-20 | End: 2023-10-22 | Stop reason: HOSPADM

## 2023-10-20 RX ORDER — INSULIN LISPRO 100 [IU]/ML
0-4 INJECTION, SOLUTION INTRAVENOUS; SUBCUTANEOUS NIGHTLY
Status: DISCONTINUED | OUTPATIENT
Start: 2023-10-20 | End: 2023-10-22 | Stop reason: HOSPADM

## 2023-10-20 RX ORDER — SODIUM CHLORIDE 0.9 % (FLUSH) 0.9 %
5-40 SYRINGE (ML) INJECTION EVERY 12 HOURS SCHEDULED
Status: DISCONTINUED | OUTPATIENT
Start: 2023-10-20 | End: 2023-10-22 | Stop reason: HOSPADM

## 2023-10-20 RX ORDER — ATORVASTATIN CALCIUM 40 MG/1
40 TABLET, FILM COATED ORAL DAILY
COMMUNITY

## 2023-10-20 RX ORDER — ONDANSETRON 4 MG/1
4 TABLET, ORALLY DISINTEGRATING ORAL EVERY 8 HOURS PRN
Status: DISCONTINUED | OUTPATIENT
Start: 2023-10-20 | End: 2023-10-22 | Stop reason: HOSPADM

## 2023-10-20 RX ORDER — SODIUM CHLORIDE 0.9 % (FLUSH) 0.9 %
5-40 SYRINGE (ML) INJECTION PRN
Status: DISCONTINUED | OUTPATIENT
Start: 2023-10-20 | End: 2023-10-22 | Stop reason: HOSPADM

## 2023-10-20 RX ORDER — ASPIRIN 81 MG/1
81 TABLET, CHEWABLE ORAL DAILY
Status: DISCONTINUED | OUTPATIENT
Start: 2023-10-21 | End: 2023-10-22 | Stop reason: HOSPADM

## 2023-10-20 RX ORDER — POLYETHYLENE GLYCOL 3350 17 G/17G
17 POWDER, FOR SOLUTION ORAL DAILY PRN
Status: DISCONTINUED | OUTPATIENT
Start: 2023-10-20 | End: 2023-10-22 | Stop reason: HOSPADM

## 2023-10-20 RX ORDER — ATORVASTATIN CALCIUM 40 MG/1
40 TABLET, FILM COATED ORAL DAILY
Status: DISCONTINUED | OUTPATIENT
Start: 2023-10-21 | End: 2023-10-22 | Stop reason: HOSPADM

## 2023-10-20 RX ORDER — FUROSEMIDE 40 MG/1
40 TABLET ORAL
Status: DISCONTINUED | OUTPATIENT
Start: 2023-10-20 | End: 2023-10-22 | Stop reason: HOSPADM

## 2023-10-20 RX ORDER — ALOGLIPTIN 25 MG/1
25 TABLET, FILM COATED ORAL DAILY
Status: DISCONTINUED | OUTPATIENT
Start: 2023-10-21 | End: 2023-10-22 | Stop reason: HOSPADM

## 2023-10-20 RX ORDER — TRAMADOL HYDROCHLORIDE 50 MG/1
50 TABLET ORAL EVERY 6 HOURS PRN
Status: DISCONTINUED | OUTPATIENT
Start: 2023-10-20 | End: 2023-10-22 | Stop reason: HOSPADM

## 2023-10-20 RX ORDER — FINASTERIDE 5 MG/1
5 TABLET, FILM COATED ORAL DAILY
Status: DISCONTINUED | OUTPATIENT
Start: 2023-10-21 | End: 2023-10-22 | Stop reason: HOSPADM

## 2023-10-20 RX ORDER — PRAZOSIN HYDROCHLORIDE 2 MG/1
2 CAPSULE ORAL NIGHTLY
Status: DISCONTINUED | OUTPATIENT
Start: 2023-10-20 | End: 2023-10-22 | Stop reason: HOSPADM

## 2023-10-20 RX ORDER — TAMSULOSIN HYDROCHLORIDE 0.4 MG/1
0.4 CAPSULE ORAL NIGHTLY
Status: DISCONTINUED | OUTPATIENT
Start: 2023-10-20 | End: 2023-10-22 | Stop reason: HOSPADM

## 2023-10-20 RX ORDER — KETOROLAC TROMETHAMINE 15 MG/ML
15 INJECTION, SOLUTION INTRAMUSCULAR; INTRAVENOUS ONCE
Status: COMPLETED | OUTPATIENT
Start: 2023-10-20 | End: 2023-10-20

## 2023-10-20 RX ORDER — INSULIN LISPRO 100 [IU]/ML
0-4 INJECTION, SOLUTION INTRAVENOUS; SUBCUTANEOUS
Status: DISCONTINUED | OUTPATIENT
Start: 2023-10-21 | End: 2023-10-22 | Stop reason: HOSPADM

## 2023-10-20 RX ORDER — ACETAMINOPHEN 325 MG/1
650 TABLET ORAL EVERY 6 HOURS PRN
Status: DISCONTINUED | OUTPATIENT
Start: 2023-10-20 | End: 2023-10-22 | Stop reason: HOSPADM

## 2023-10-20 RX ORDER — ONDANSETRON 2 MG/ML
4 INJECTION INTRAMUSCULAR; INTRAVENOUS EVERY 6 HOURS PRN
Status: DISCONTINUED | OUTPATIENT
Start: 2023-10-20 | End: 2023-10-22 | Stop reason: HOSPADM

## 2023-10-20 RX ADMIN — APIXABAN 5 MG: 5 TABLET, FILM COATED ORAL at 21:18

## 2023-10-20 RX ADMIN — METOPROLOL SUCCINATE 75 MG: 50 TABLET, EXTENDED RELEASE ORAL at 21:17

## 2023-10-20 RX ADMIN — WATER 2000 MG: 1 INJECTION INTRAMUSCULAR; INTRAVENOUS; SUBCUTANEOUS at 15:21

## 2023-10-20 RX ADMIN — KETOROLAC TROMETHAMINE 15 MG: 15 INJECTION, SOLUTION INTRAMUSCULAR; INTRAVENOUS at 12:06

## 2023-10-20 RX ADMIN — PRAZOSIN HYDROCHLORIDE 2 MG: 2 CAPSULE ORAL at 21:18

## 2023-10-20 RX ADMIN — TRAMADOL HYDROCHLORIDE 50 MG: 50 TABLET, COATED ORAL at 21:16

## 2023-10-20 RX ADMIN — TAMSULOSIN HYDROCHLORIDE 0.4 MG: 0.4 CAPSULE ORAL at 21:16

## 2023-10-20 ASSESSMENT — PAIN DESCRIPTION - ORIENTATION
ORIENTATION: LEFT
ORIENTATION: LEFT

## 2023-10-20 ASSESSMENT — PAIN DESCRIPTION - LOCATION
LOCATION: FLANK
LOCATION: FLANK

## 2023-10-20 ASSESSMENT — PAIN DESCRIPTION - FREQUENCY: FREQUENCY: CONTINUOUS

## 2023-10-20 ASSESSMENT — PAIN DESCRIPTION - ONSET: ONSET: ON-GOING

## 2023-10-20 ASSESSMENT — PAIN SCALES - GENERAL
PAINLEVEL_OUTOF10: 10
PAINLEVEL_OUTOF10: 4
PAINLEVEL_OUTOF10: 0
PAINLEVEL_OUTOF10: 2

## 2023-10-20 ASSESSMENT — PAIN DESCRIPTION - PAIN TYPE: TYPE: ACUTE PAIN

## 2023-10-20 ASSESSMENT — PAIN - FUNCTIONAL ASSESSMENT: PAIN_FUNCTIONAL_ASSESSMENT: 0-10

## 2023-10-20 NOTE — H&P
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 Acute pyelonephritis       Nephrolithiasis  Start iv rocephin  Collect blood cultures. Check CT abdomen  Consider urology consultation if shows obstruction  2. Hypertension  borderline blood pressures in the ED, hold home lisinopril, Toprol-XL    3. DM type II  insulin lispro subcu mild sliding scale,   Hold oral meds  hypoglycemic precautions, Accu-Cheks    4. Hyperlipidemia  On atorvastatin    5. Obesity class II- BMI 39   Discussed diet weight loss and lifestyle modifications    6. Chronic lymphoplasmacytic lymphoma  continue home ibrutinib      Please see orders for further plan of care  Code status  full  DVT prophylaxis Lovenox  Disposition  Anticipate home  --------------------------------------------------------------------------------------    Admission Date  10/20/2023  6:20 PM  Chief Complaint Flank pain  No chief complaint on file. Subjective  History of Present Illness    Mr. Wick is a 68-year-old male with past medical history significant for hypertension, hyperlipidemia, DM type II, chronic leukemia, recent hospitalization for  urinary retention requiring Petty catheter placement, BPH who presents with left flank pain. This started with hematuria 4 days ago, pain started last 24 hours or so intermittent, sharp, 7-8/10. Patient has not had any fevers or chills or nausea or vomiting associated with this. Patient states the pain is intermittent but sharp. Patient has a history of multiple kidney stones. Patient is on furosemide as a diuretic.   Patient states this is his

## 2023-10-20 NOTE — PROGRESS NOTES
4 Eyes Skin Assessment     NAME:  Nia Harper  YOB: 1945  MEDICAL RECORD NUMBER:  [id number]    The patient is being assessed for  Admission    I agree that at least one RN has performed a thorough Head to Toe Skin Assessment on the patient. ALL assessment sites listed below have been assessed. Areas assessed by both nurses:    Head, Face, Ears, Shoulders, Back, Chest, Arms, Elbows, Hands, Sacrum. Buttock, Coccyx, Ischium, Legs. Feet and Heels, and Under Medical Devices         Does the Patient have a Wound?  No noted wound(s)  Generalized bruising, boggy and blanchable heels, pink and blanchable buttocks       Mark Prevention initiated by RN: No  Wound Care Orders initiated by RN: No    Pressure Injury (Stage 3,4, Unstageable, DTI, NWPT, and Complex wounds) if present, place Wound referral order by RN under : No    New Ostomies, if present place, Ostomy referral order under : No     Nurse 1 eSignature: Electronically signed by Caryl Awan RN on 10/20/23 at 6:33 PM EDT    **SHARE this note so that the co-signing nurse can place an eSignature**    Nurse 2 eSignature: Electronically signed by Berenice Smith RN on 10/20/23 at 6:47 PM EDT

## 2023-10-20 NOTE — ED PROVIDER NOTES
not ill-appearing, toxic-appearing or diaphoretic. HENT:      Head: Normocephalic and atraumatic. Right Ear: External ear normal.      Left Ear: External ear normal.      Nose: Nose normal.      Mouth/Throat:      Mouth: Mucous membranes are moist.      Pharynx: No oropharyngeal exudate or posterior oropharyngeal erythema. Eyes:      General: No scleral icterus. Right eye: No discharge. Left eye: No discharge. Cardiovascular:      Rate and Rhythm: Normal rate. Rhythm irregular. Heart sounds: No murmur heard. No friction rub. No gallop. Pulmonary:      Effort: Pulmonary effort is normal. No respiratory distress. Breath sounds: No stridor. No wheezing, rhonchi or rales. Abdominal:      General: Abdomen is flat. There is no distension. Tenderness: There is no abdominal tenderness. There is left CVA tenderness. Musculoskeletal:         General: No swelling, tenderness or deformity. Skin:     General: Skin is warm. Capillary Refill: Capillary refill takes less than 2 seconds. Coloration: Skin is not jaundiced. Neurological:      General: No focal deficit present. Mental Status: He is alert and oriented to person, place, and time. Psychiatric:         Mood and Affect: Mood normal.           DIAGNOSTIC RESULTS   LABS:    Labs Reviewed   URINALYSIS WITH MICROSCOPIC - Abnormal; Notable for the following components:       Result Value    Color, UA Red (*)     Turbidity UA Turbid (*)     Glucose, Ur   (*)     Value: Results may be affected due to urine color interference. Bilirubin Urine   (*)     Value: Results may be affected due to urine color interference. Ketones, Urine   (*)     Value: Results may be affected due to urine color interference. Urine Hgb   (*)     Value: Results may be affected due to urine color interference. Protein, UA   (*)     Value: Results may be affected due to urine color interference.     Nitrite, Urine   (*)

## 2023-10-20 NOTE — PROGRESS NOTES
Carolyne Klein was ordered Imbruvica tablet which is a nonformulary medication. This medication is not stocked by the pharmacy so it will need to be brought in from home for inpatient use. If the medication has not been administered by 1400 on the following day from the time the order was placed, a pharmacist will follow-up with the nurse of the patient to assess the capability of the patient to bring in the medication.     Thank you

## 2023-10-20 NOTE — PROGRESS NOTES
Database initiated pharmacy and medications verified with the patient. He is A&O comes in from home with wife. He uses a walker and is RA at baseline.

## 2023-10-21 ENCOUNTER — APPOINTMENT (OUTPATIENT)
Dept: CT IMAGING | Age: 78
DRG: 690 | End: 2023-10-21
Attending: INTERNAL MEDICINE
Payer: OTHER GOVERNMENT

## 2023-10-21 LAB
GLUCOSE BLD-MCNC: 176 MG/DL (ref 74–99)
GLUCOSE BLD-MCNC: 226 MG/DL (ref 74–99)
GLUCOSE BLD-MCNC: 244 MG/DL (ref 74–99)
GLUCOSE BLD-MCNC: 311 MG/DL (ref 74–99)
MICROORGANISM SPEC CULT: ABNORMAL
SPECIMEN DESCRIPTION: ABNORMAL

## 2023-10-21 PROCEDURE — 1200000000 HC SEMI PRIVATE

## 2023-10-21 PROCEDURE — 6370000000 HC RX 637 (ALT 250 FOR IP): Performed by: INTERNAL MEDICINE

## 2023-10-21 PROCEDURE — 6370000000 HC RX 637 (ALT 250 FOR IP)

## 2023-10-21 PROCEDURE — 2580000003 HC RX 258: Performed by: INTERNAL MEDICINE

## 2023-10-21 PROCEDURE — 74177 CT ABD & PELVIS W/CONTRAST: CPT

## 2023-10-21 PROCEDURE — 82962 GLUCOSE BLOOD TEST: CPT

## 2023-10-21 PROCEDURE — 6360000004 HC RX CONTRAST MEDICATION: Performed by: RADIOLOGY

## 2023-10-21 PROCEDURE — 6360000002 HC RX W HCPCS: Performed by: INTERNAL MEDICINE

## 2023-10-21 PROCEDURE — 99232 SBSQ HOSP IP/OBS MODERATE 35: CPT | Performed by: INTERNAL MEDICINE

## 2023-10-21 RX ADMIN — TAMSULOSIN HYDROCHLORIDE 0.4 MG: 0.4 CAPSULE ORAL at 21:58

## 2023-10-21 RX ADMIN — METOPROLOL SUCCINATE 75 MG: 50 TABLET, EXTENDED RELEASE ORAL at 21:48

## 2023-10-21 RX ADMIN — APIXABAN 5 MG: 5 TABLET, FILM COATED ORAL at 21:48

## 2023-10-21 RX ADMIN — SODIUM CHLORIDE, PRESERVATIVE FREE 10 ML: 5 INJECTION INTRAVENOUS at 21:56

## 2023-10-21 RX ADMIN — WATER 1000 MG: 1 INJECTION INTRAMUSCULAR; INTRAVENOUS; SUBCUTANEOUS at 17:32

## 2023-10-21 RX ADMIN — PANTOPRAZOLE SODIUM 40 MG: 40 TABLET, DELAYED RELEASE ORAL at 05:45

## 2023-10-21 RX ADMIN — FINASTERIDE 5 MG: 5 TABLET, FILM COATED ORAL at 08:59

## 2023-10-21 RX ADMIN — SODIUM CHLORIDE, PRESERVATIVE FREE 10 ML: 5 INJECTION INTRAVENOUS at 09:21

## 2023-10-21 RX ADMIN — ATORVASTATIN CALCIUM 40 MG: 40 TABLET, FILM COATED ORAL at 09:00

## 2023-10-21 RX ADMIN — ASPIRIN 81 MG CHEWABLE TABLET 81 MG: 81 TABLET CHEWABLE at 09:00

## 2023-10-21 RX ADMIN — IOPAMIDOL 75 ML: 755 INJECTION, SOLUTION INTRAVENOUS at 11:12

## 2023-10-21 RX ADMIN — INSULIN LISPRO 3 UNITS: 100 INJECTION, SOLUTION INTRAVENOUS; SUBCUTANEOUS at 12:35

## 2023-10-21 RX ADMIN — METOPROLOL SUCCINATE 75 MG: 50 TABLET, EXTENDED RELEASE ORAL at 08:59

## 2023-10-21 RX ADMIN — PRAZOSIN HYDROCHLORIDE 2 MG: 2 CAPSULE ORAL at 21:47

## 2023-10-21 RX ADMIN — INSULIN LISPRO 1 UNITS: 100 INJECTION, SOLUTION INTRAVENOUS; SUBCUTANEOUS at 17:32

## 2023-10-21 RX ADMIN — ALOGLIPTIN 25 MG: 25 TABLET, FILM COATED ORAL at 08:59

## 2023-10-21 RX ADMIN — TRAMADOL HYDROCHLORIDE 50 MG: 50 TABLET, COATED ORAL at 05:55

## 2023-10-21 RX ADMIN — TRAMADOL HYDROCHLORIDE 50 MG: 50 TABLET, COATED ORAL at 21:47

## 2023-10-21 RX ADMIN — APIXABAN 5 MG: 5 TABLET, FILM COATED ORAL at 09:00

## 2023-10-21 ASSESSMENT — PAIN DESCRIPTION - ORIENTATION: ORIENTATION: LEFT

## 2023-10-21 ASSESSMENT — PAIN DESCRIPTION - LOCATION
LOCATION: FLANK
LOCATION: BACK

## 2023-10-21 ASSESSMENT — PAIN SCALES - GENERAL
PAINLEVEL_OUTOF10: 5
PAINLEVEL_OUTOF10: 5
PAINLEVEL_OUTOF10: 2

## 2023-10-21 NOTE — PLAN OF CARE
Problem: Discharge Planning  Goal: Discharge to home or other facility with appropriate resources  10/20/2023 2257 by Zulema Kellogg RN  Outcome: Progressing  10/20/2023 2256 by Zulema Kellogg RN  Outcome: Progressing  Flowsheets (Taken 10/20/2023 1832 by Annabel Meade RN)  Discharge to home or other facility with appropriate resources: Refer to discharge planning if patient needs post-hospital services based on physician order or complex needs related to functional status, cognitive ability or social support system     Problem: Safety - Adult  Goal: Free from fall injury  10/20/2023 2257 by Zulema Kellogg RN  Outcome: Progressing  10/20/2023 2256 by Zulema Kellogg RN  Outcome: Progressing     Problem: ABCDS Injury Assessment  Goal: Absence of physical injury  10/20/2023 2257 by Zulema Kellogg RN  Outcome: Progressing  10/20/2023 2256 by Zulema Kellogg RN  Outcome: Progressing     Problem: Metabolic/Fluid and Electrolytes - Adult  Goal: Glucose maintained within prescribed range  Outcome: Progressing

## 2023-10-22 VITALS
DIASTOLIC BLOOD PRESSURE: 76 MMHG | SYSTOLIC BLOOD PRESSURE: 148 MMHG | RESPIRATION RATE: 18 BRPM | HEART RATE: 84 BPM | HEIGHT: 72 IN | WEIGHT: 290 LBS | OXYGEN SATURATION: 96 % | TEMPERATURE: 97.9 F | BODY MASS INDEX: 39.28 KG/M2

## 2023-10-22 LAB
GLUCOSE BLD-MCNC: 189 MG/DL (ref 74–99)
GLUCOSE BLD-MCNC: 225 MG/DL (ref 74–99)
MICROORGANISM SPEC CULT: NORMAL
MICROORGANISM SPEC CULT: NORMAL
SERVICE CMNT-IMP: NORMAL
SERVICE CMNT-IMP: NORMAL
SPECIMEN DESCRIPTION: NORMAL
SPECIMEN DESCRIPTION: NORMAL

## 2023-10-22 PROCEDURE — 6370000000 HC RX 637 (ALT 250 FOR IP): Performed by: INTERNAL MEDICINE

## 2023-10-22 PROCEDURE — 82962 GLUCOSE BLOOD TEST: CPT

## 2023-10-22 PROCEDURE — 6370000000 HC RX 637 (ALT 250 FOR IP)

## 2023-10-22 PROCEDURE — 99239 HOSP IP/OBS DSCHRG MGMT >30: CPT | Performed by: INTERNAL MEDICINE

## 2023-10-22 PROCEDURE — 2580000003 HC RX 258: Performed by: INTERNAL MEDICINE

## 2023-10-22 RX ORDER — CEPHALEXIN 500 MG/1
500 CAPSULE ORAL 4 TIMES DAILY
Qty: 24 CAPSULE | Refills: 0 | Status: SHIPPED | OUTPATIENT
Start: 2023-10-22 | End: 2023-10-28

## 2023-10-22 RX ORDER — CEPHALEXIN 500 MG/1
500 CAPSULE ORAL 4 TIMES DAILY
Qty: 24 CAPSULE | Refills: 0 | Status: SHIPPED | OUTPATIENT
Start: 2023-10-22 | End: 2023-10-22 | Stop reason: SDUPTHER

## 2023-10-22 RX ORDER — LISINOPRIL 10 MG/1
10 TABLET ORAL 2 TIMES DAILY
Status: DISCONTINUED | OUTPATIENT
Start: 2023-10-22 | End: 2023-10-22 | Stop reason: HOSPADM

## 2023-10-22 RX ADMIN — METOPROLOL SUCCINATE 75 MG: 50 TABLET, EXTENDED RELEASE ORAL at 08:16

## 2023-10-22 RX ADMIN — ASPIRIN 81 MG CHEWABLE TABLET 81 MG: 81 TABLET CHEWABLE at 08:17

## 2023-10-22 RX ADMIN — FINASTERIDE 5 MG: 5 TABLET, FILM COATED ORAL at 08:16

## 2023-10-22 RX ADMIN — INSULIN LISPRO 1 UNITS: 100 INJECTION, SOLUTION INTRAVENOUS; SUBCUTANEOUS at 06:26

## 2023-10-22 RX ADMIN — ATORVASTATIN CALCIUM 40 MG: 40 TABLET, FILM COATED ORAL at 08:16

## 2023-10-22 RX ADMIN — LISINOPRIL 10 MG: 10 TABLET ORAL at 08:16

## 2023-10-22 RX ADMIN — SODIUM CHLORIDE, PRESERVATIVE FREE 10 ML: 5 INJECTION INTRAVENOUS at 08:18

## 2023-10-22 RX ADMIN — ALOGLIPTIN 25 MG: 25 TABLET, FILM COATED ORAL at 08:16

## 2023-10-22 RX ADMIN — APIXABAN 5 MG: 5 TABLET, FILM COATED ORAL at 08:16

## 2023-10-22 RX ADMIN — PANTOPRAZOLE SODIUM 40 MG: 40 TABLET, DELAYED RELEASE ORAL at 06:26

## 2023-10-22 RX ADMIN — TRAMADOL HYDROCHLORIDE 50 MG: 50 TABLET, COATED ORAL at 08:16

## 2023-10-22 ASSESSMENT — PAIN SCALES - GENERAL: PAINLEVEL_OUTOF10: 5

## 2023-10-22 ASSESSMENT — PAIN DESCRIPTION - ORIENTATION: ORIENTATION: LEFT

## 2023-10-22 ASSESSMENT — PAIN DESCRIPTION - LOCATION: LOCATION: FLANK

## 2023-10-22 ASSESSMENT — PAIN DESCRIPTION - DESCRIPTORS: DESCRIPTORS: ACHING

## 2023-10-22 NOTE — DISCHARGE SUMMARY
distress  Skin: warm and dry  Head: normocephalic and atraumatic  Eyes: pupils equal, round, and reactive to light, extraocular eye movements intact, conjunctivae normal  Neck: neck supple and non tender without mass   Pulmonary/Chest: clear to auscultation bilaterally- no wheezes, rales or rhonchi, normal air movement, no respiratory distress  Cardiovascular: normal rate, normal S1 and S2 and no carotid bruits  Abdomen: soft, non-tender, non-distended, normal bowel sounds, no masses or organomegaly  Extremities: no cyanosis, no clubbing and no edema  Neurologic: no cranial nerve deficit and speech normal    I/O last 3 completed shifts: In: 180 [P.O.:180]  Out: -   I/O this shift: In: 480 [P.O.:480]  Out: -       LABS:  Recent Labs     10/20/23  1115      K 3.7      CO2 26   BUN 12   CREATININE 0.8   GLUCOSE 185*   CALCIUM 9.9       Recent Labs     10/20/23  1115   WBC 5.9   RBC 4.73   HGB 13.0   HCT 40.7   MCV 86.0   MCH 27.5   MCHC 31.9*   RDW 14.7      MPV 10.5       Recent Labs     10/21/23  1627 10/21/23  2140 10/22/23  0625 10/22/23  1140   POCGLU 244* 226* 225* 189*       Imaging:  CT ABDOMEN PELVIS W IV CONTRAST Additional Contrast? None    Result Date: 10/21/2023  EXAMINATION: CT OF THE ABDOMEN AND PELVIS WITH CONTRAST 10/20/2023 6:54 pm TECHNIQUE: CT of the abdomen and pelvis was performed with the administration of intravenous contrast. Multiplanar reformatted images are provided for review. Automated exposure control, iterative reconstruction, and/or weight based adjustment of the mA/kV was utilized to reduce the radiation dose to as low as reasonably achievable. COMPARISON: CT abdomen and pelvis dated 07/03/2023 HISTORY: ORDERING SYSTEM PROVIDED HISTORY: pyelonephritis TECHNOLOGIST PROVIDED HISTORY: Reason for exam:->pyelonephritis Additional Contrast?->None FINDINGS: Unless otherwise indicated or stated incidental findings do not require dedicated follow-up imaging.  Lower Chest:

## 2023-10-22 NOTE — DISCHARGE INSTRUCTIONS
Activity as tolerated    Be compliant with your medications and take them as prescribed. Diet: common adult    Special Instructions:   Please take Keflex 4 times daily for 6 more days for UTI treatment  Continue your other home medications as prescribed, no other changes made      Hospital Follow up: with PCP within 7 days of discharge    Other Follow-Ups:  None      Other than any new prescriptions given to you today, the list of home going meds on this After Visit Summary are based on information provided to us from you. This information, including the list, dose, and frequency of medications is only as accurate as the information you provided. If you have any questions or concerns about your home  medications, please contact your Primary Care Physician for further clarification. Information obtained by:   By signing below, I understand that if any problems occur once I leave the hospital I am to contact [email or web address]. I understand and acknowledge receipt of the instruction indicated above.

## 2023-10-22 NOTE — PLAN OF CARE
Problem: Discharge Planning  Goal: Discharge to home or other facility with appropriate resources  Outcome: Progressing     Problem: Safety - Adult  Goal: Free from fall injury  Outcome: Progressing     Problem: ABCDS Injury Assessment  Goal: Absence of physical injury  Outcome: Progressing     Problem: Metabolic/Fluid and Electrolytes - Adult  Goal: Glucose maintained within prescribed range  Outcome: Progressing

## 2023-10-23 LAB — VISC SER: 1.08 CP (ref 1.1–1.8)

## 2023-11-09 ENCOUNTER — HOSPITAL ENCOUNTER (EMERGENCY)
Age: 78
Discharge: HOME | End: 2023-11-10
Attending: EMERGENCY MEDICINE
Payer: OTHER GOVERNMENT

## 2023-11-09 DIAGNOSIS — R06.00 DYSPNEA, UNSPECIFIED TYPE: Primary | ICD-10-CM

## 2023-11-09 PROCEDURE — 99285 EMERGENCY DEPT VISIT HI MDM: CPT

## 2023-11-09 ASSESSMENT — PAIN - FUNCTIONAL ASSESSMENT: PAIN_FUNCTIONAL_ASSESSMENT: NONE - DENIES PAIN

## 2023-11-10 ENCOUNTER — APPOINTMENT (OUTPATIENT)
Dept: GENERAL RADIOLOGY | Age: 78
End: 2023-11-10
Payer: OTHER GOVERNMENT

## 2023-11-10 VITALS
DIASTOLIC BLOOD PRESSURE: 80 MMHG | HEART RATE: 89 BPM | WEIGHT: 290 LBS | SYSTOLIC BLOOD PRESSURE: 160 MMHG | HEIGHT: 72 IN | RESPIRATION RATE: 26 BRPM | OXYGEN SATURATION: 97 % | BODY MASS INDEX: 39.28 KG/M2 | TEMPERATURE: 98.5 F

## 2023-11-10 LAB
ALBUMIN SERPL-MCNC: 4.2 G/DL (ref 3.5–5.2)
ALP SERPL-CCNC: 81 U/L (ref 40–129)
ALT SERPL-CCNC: 20 U/L (ref 0–40)
AMYLASE SERPL-CCNC: 36 U/L (ref 20–100)
ANION GAP SERPL CALCULATED.3IONS-SCNC: 14 MMOL/L (ref 7–16)
AST SERPL-CCNC: 14 U/L (ref 0–39)
BILIRUB DIRECT SERPL-MCNC: 0.2 MG/DL (ref 0–0.3)
BILIRUB INDIRECT SERPL-MCNC: 0.3 MG/DL (ref 0–1)
BILIRUB SERPL-MCNC: 0.5 MG/DL (ref 0–1.2)
BNP SERPL-MCNC: 800 PG/ML (ref 0–450)
BUN SERPL-MCNC: 8 MG/DL (ref 6–23)
CALCIUM SERPL-MCNC: 9.8 MG/DL (ref 8.6–10.2)
CHLORIDE SERPL-SCNC: 98 MMOL/L (ref 98–107)
CK SERPL-CCNC: 46 U/L (ref 20–200)
CO2 SERPL-SCNC: 25 MMOL/L (ref 22–29)
CREAT SERPL-MCNC: 0.7 MG/DL (ref 0.7–1.2)
D DIMER: <200 NG/ML DDU (ref 0–232)
EKG ATRIAL RATE: 73 BPM
EKG Q-T INTERVAL: 398 MS
EKG QRS DURATION: 110 MS
EKG QTC CALCULATION (BAZETT): 456 MS
EKG R AXIS: 10 DEGREES
EKG T AXIS: 34 DEGREES
EKG VENTRICULAR RATE: 79 BPM
ERYTHROCYTE [DISTWIDTH] IN BLOOD BY AUTOMATED COUNT: 15 % (ref 11.5–15)
FLUAV RNA RESP QL NAA+PROBE: NOT DETECTED
FLUBV RNA RESP QL NAA+PROBE: NOT DETECTED
GFR SERPL CREATININE-BSD FRML MDRD: >60 ML/MIN/1.73M2
GLUCOSE SERPL-MCNC: 223 MG/DL (ref 74–99)
HCT VFR BLD AUTO: 39.4 % (ref 37–54)
HGB BLD-MCNC: 12.9 G/DL (ref 12.5–16.5)
INR PPP: 1.1
LIPASE SERPL-CCNC: 26 U/L (ref 13–60)
MAGNESIUM SERPL-MCNC: 1.5 MG/DL (ref 1.6–2.6)
MCH RBC QN AUTO: 28.3 PG (ref 26–35)
MCHC RBC AUTO-ENTMCNC: 32.7 G/DL (ref 32–34.5)
MCV RBC AUTO: 86.4 FL (ref 80–99.9)
PLATELET # BLD AUTO: 139 K/UL (ref 130–450)
PMV BLD AUTO: 12.7 FL (ref 7–12)
POTASSIUM SERPL-SCNC: 3.7 MMOL/L (ref 3.5–5)
PROT SERPL-MCNC: 6.7 G/DL (ref 6.4–8.3)
PROTHROMBIN TIME: 12.8 SEC (ref 9.3–12.4)
RBC # BLD AUTO: 4.56 M/UL (ref 3.8–5.8)
SARS-COV-2 RNA RESP QL NAA+PROBE: NOT DETECTED
SODIUM SERPL-SCNC: 137 MMOL/L (ref 132–146)
SOURCE: NORMAL
SPECIMEN DESCRIPTION: NORMAL
TROPONIN I SERPL HS-MCNC: 18 NG/L (ref 0–11)
WBC OTHER # BLD: 7.3 K/UL (ref 4.5–11.5)

## 2023-11-10 PROCEDURE — 83735 ASSAY OF MAGNESIUM: CPT

## 2023-11-10 PROCEDURE — 71045 X-RAY EXAM CHEST 1 VIEW: CPT

## 2023-11-10 PROCEDURE — 82248 BILIRUBIN DIRECT: CPT

## 2023-11-10 PROCEDURE — 85027 COMPLETE CBC AUTOMATED: CPT

## 2023-11-10 PROCEDURE — 82550 ASSAY OF CK (CPK): CPT

## 2023-11-10 PROCEDURE — 85379 FIBRIN DEGRADATION QUANT: CPT

## 2023-11-10 PROCEDURE — 85610 PROTHROMBIN TIME: CPT

## 2023-11-10 PROCEDURE — 84484 ASSAY OF TROPONIN QUANT: CPT

## 2023-11-10 PROCEDURE — 83690 ASSAY OF LIPASE: CPT

## 2023-11-10 PROCEDURE — 82150 ASSAY OF AMYLASE: CPT

## 2023-11-10 PROCEDURE — 93010 ELECTROCARDIOGRAM REPORT: CPT | Performed by: INTERNAL MEDICINE

## 2023-11-10 PROCEDURE — 80053 COMPREHEN METABOLIC PANEL: CPT

## 2023-11-10 PROCEDURE — 83880 ASSAY OF NATRIURETIC PEPTIDE: CPT

## 2023-11-10 PROCEDURE — 93005 ELECTROCARDIOGRAM TRACING: CPT | Performed by: EMERGENCY MEDICINE

## 2023-11-10 PROCEDURE — 87636 SARSCOV2 & INF A&B AMP PRB: CPT

## 2023-11-10 RX ORDER — CLONIDINE HYDROCHLORIDE 0.1 MG/1
0.2 TABLET ORAL ONCE
Status: DISCONTINUED | OUTPATIENT
Start: 2023-11-10 | End: 2023-11-10 | Stop reason: HOSPADM

## 2023-11-10 ASSESSMENT — PAIN - FUNCTIONAL ASSESSMENT
PAIN_FUNCTIONAL_ASSESSMENT: NONE - DENIES PAIN
PAIN_FUNCTIONAL_ASSESSMENT: NONE - DENIES PAIN

## 2023-11-10 NOTE — ED NOTES
Dr Kofi Pedroza notified unable to access IV. Multiple attempts by a few nurses. Blood and Flu test sent.       Berta Addison RN  11/10/23 5910

## 2023-11-10 NOTE — ED NOTES
Patient declined using urinal and states wanting to use walker to go to restroom. Patient ambulatory to restroom.       Meagan KeyesMimbres Memorial HospitalLeonila judge  11/10/23 0125

## 2023-11-16 ENCOUNTER — OFFICE VISIT (OUTPATIENT)
Dept: ONCOLOGY | Age: 78
End: 2023-11-16
Payer: MEDICARE

## 2023-11-16 ENCOUNTER — HOSPITAL ENCOUNTER (OUTPATIENT)
Dept: INFUSION THERAPY | Age: 78
Discharge: HOME | End: 2023-11-16

## 2023-11-16 VITALS
WEIGHT: 290.8 LBS | TEMPERATURE: 97.2 F | HEART RATE: 84 BPM | HEIGHT: 72 IN | BODY MASS INDEX: 39.39 KG/M2 | SYSTOLIC BLOOD PRESSURE: 184 MMHG | DIASTOLIC BLOOD PRESSURE: 100 MMHG | OXYGEN SATURATION: 94 %

## 2023-11-16 VITALS — SYSTOLIC BLOOD PRESSURE: 168 MMHG | DIASTOLIC BLOOD PRESSURE: 82 MMHG

## 2023-11-16 DIAGNOSIS — C83.00 MALIGNANT LYMPHOPLASMACYTIC LYMPHOMA (HCC): ICD-10-CM

## 2023-11-16 DIAGNOSIS — C83.00 MALIGNANT LYMPHOPLASMACYTIC LYMPHOMA (HCC): Primary | ICD-10-CM

## 2023-11-16 PROCEDURE — 3078F DIAST BP <80 MM HG: CPT | Performed by: STUDENT IN AN ORGANIZED HEALTH CARE EDUCATION/TRAINING PROGRAM

## 2023-11-16 PROCEDURE — G8417 CALC BMI ABV UP PARAM F/U: HCPCS | Performed by: STUDENT IN AN ORGANIZED HEALTH CARE EDUCATION/TRAINING PROGRAM

## 2023-11-16 PROCEDURE — 1036F TOBACCO NON-USER: CPT | Performed by: STUDENT IN AN ORGANIZED HEALTH CARE EDUCATION/TRAINING PROGRAM

## 2023-11-16 PROCEDURE — G8427 DOCREV CUR MEDS BY ELIG CLIN: HCPCS | Performed by: STUDENT IN AN ORGANIZED HEALTH CARE EDUCATION/TRAINING PROGRAM

## 2023-11-16 PROCEDURE — 1123F ACP DISCUSS/DSCN MKR DOCD: CPT | Performed by: STUDENT IN AN ORGANIZED HEALTH CARE EDUCATION/TRAINING PROGRAM

## 2023-11-16 PROCEDURE — G8484 FLU IMMUNIZE NO ADMIN: HCPCS | Performed by: STUDENT IN AN ORGANIZED HEALTH CARE EDUCATION/TRAINING PROGRAM

## 2023-11-16 PROCEDURE — 3074F SYST BP LT 130 MM HG: CPT | Performed by: STUDENT IN AN ORGANIZED HEALTH CARE EDUCATION/TRAINING PROGRAM

## 2023-11-16 PROCEDURE — 99213 OFFICE O/P EST LOW 20 MIN: CPT | Performed by: STUDENT IN AN ORGANIZED HEALTH CARE EDUCATION/TRAINING PROGRAM

## 2023-11-16 PROCEDURE — 3017F COLORECTAL CA SCREEN DOC REV: CPT | Performed by: STUDENT IN AN ORGANIZED HEALTH CARE EDUCATION/TRAINING PROGRAM

## 2023-11-16 PROCEDURE — 1111F DSCHRG MED/CURRENT MED MERGE: CPT | Performed by: STUDENT IN AN ORGANIZED HEALTH CARE EDUCATION/TRAINING PROGRAM

## 2023-11-16 NOTE — PROGRESS NOTES
Patient's /100 this morning upon arrival. After resting, manual  82.  [employer] recommended patient followup with PCP. Patient verbalized understanding.

## 2023-11-17 NOTE — PROGRESS NOTES
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 Work-up was overall unremarkable. The patient suggest that increasing his Lasix did improve his symptoms. Otherwise he is less dyspneic, and appears largely comfortable. Otherwise denies of new sweats. Tolerating ibrutinib well without major side effects. ONCOLOGIC HISTORY:  78 y.o.  male initially seen at [facility] hematology for anemia and abnormal TP/albumin ratio. SPEP IgG and IgM kappa paraprotein, M-spike 3.29 g/dL. UIFE: IgM protein. 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   -Extensive involvement by atypitcal CD5-/CD10-B-cell lymphoproliferative disorder, comprising over 70% of marrow cellularity  -Mildly hypercellular marrow for age (30-40%) with trilineage pan hypoplasia  -Normocytic anemia. -IHC staining highlighted extensive CD20+ B cell infiltrate with admixed + plasma cells           Started on Ibrutinib 420 mg/day 12/2020 with good response so far    On 01/06/2021:  SPEP:  Monoclonal protein 1.72 g/dL  Persistent double monoclonal bands in the beta/gamma and gamma globuin regions. Bands previously identified as IgM kappa and IgG kappa (8/12/2020). IgM kappa decreased by 1.22 g/dL and IgG kappa minimally changed since last exam on 10/14/2020. Free kappa light chains: 413.4    (3.3-19. 4)   Free lambda light chains: 2.8      (5.7-26. 3)   K/L ratio:                          147.64 (0.26-1.65)    On 03/25/2021  SPEP:  Monoclonal protein: 1.03 (0-0) g/dL  Persistent double monoclonal bands in the
Patient provided with discharge instructions, received printed AVS.  All questions answered. Patient understands follow up plan of care.
4 = No assist / stand by assistance

## 2023-12-04 DIAGNOSIS — C83.00 MALIGNANT LYMPHOPLASMACYTIC LYMPHOMA (HCC): ICD-10-CM

## 2023-12-21 ENCOUNTER — HOSPITAL ENCOUNTER (OUTPATIENT)
Dept: INFUSION THERAPY | Age: 78
Discharge: HOME | End: 2023-12-21
Payer: OTHER GOVERNMENT

## 2023-12-21 DIAGNOSIS — C83.00 MALIGNANT LYMPHOPLASMACYTIC LYMPHOMA (HCC): ICD-10-CM

## 2023-12-21 LAB
ALBUMIN SERPL-MCNC: 4 G/DL (ref 3.5–5.2)
ALP SERPL-CCNC: 104 U/L (ref 40–129)
ALT SERPL-CCNC: 27 U/L (ref 0–40)
ANION GAP SERPL CALCULATED.3IONS-SCNC: 13 MMOL/L (ref 7–16)
AST SERPL-CCNC: 25 U/L (ref 0–39)
BASOPHILS # BLD: 0.04 K/UL (ref 0–0.2)
BASOPHILS NFR BLD: 0 % (ref 0–2)
BILIRUB SERPL-MCNC: 0.6 MG/DL (ref 0–1.2)
BUN SERPL-MCNC: 12 MG/DL (ref 6–23)
CALCIUM SERPL-MCNC: 9.7 MG/DL (ref 8.6–10.2)
CHLORIDE SERPL-SCNC: 99 MMOL/L (ref 98–107)
CO2 SERPL-SCNC: 25 MMOL/L (ref 22–29)
CREAT SERPL-MCNC: 0.8 MG/DL (ref 0.7–1.2)
EOSINOPHIL # BLD: 0.12 K/UL (ref 0.05–0.5)
EOSINOPHILS RELATIVE PERCENT: 1 % (ref 0–6)
ERYTHROCYTE [DISTWIDTH] IN BLOOD BY AUTOMATED COUNT: 14.3 % (ref 11.5–15)
FERRITIN SERPL-MCNC: 60 NG/ML
GFR SERPL CREATININE-BSD FRML MDRD: >60 ML/MIN/1.73M2
GLUCOSE SERPL-MCNC: 306 MG/DL (ref 74–99)
HCT VFR BLD AUTO: 42.7 % (ref 37–54)
HGB BLD-MCNC: 13.8 G/DL (ref 12.5–16.5)
IGA SERPL-MCNC: 21 MG/DL (ref 70–400)
IGG SERPL-MCNC: 356 MG/DL (ref 700–1600)
IGM SERPL-MCNC: 817 MG/DL (ref 40–230)
IMM GRANULOCYTES # BLD AUTO: 0.08 K/UL (ref 0–0.58)
IMM GRANULOCYTES NFR BLD: 1 % (ref 0–5)
IRON SATN MFR SERPL: 23 % (ref 20–55)
IRON SERPL-MCNC: 70 UG/DL (ref 59–158)
LDH SERPL-CCNC: 129 U/L (ref 135–225)
LYMPHOCYTES NFR BLD: 2.25 K/UL (ref 1.5–4)
LYMPHOCYTES RELATIVE PERCENT: 25 % (ref 20–42)
MAGNESIUM SERPL-MCNC: 2 MG/DL (ref 1.6–2.6)
MCH RBC QN AUTO: 28.2 PG (ref 26–35)
MCHC RBC AUTO-ENTMCNC: 32.3 G/DL (ref 32–34.5)
MCV RBC AUTO: 87.3 FL (ref 80–99.9)
MONOCYTES NFR BLD: 0.67 K/UL (ref 0.1–0.95)
MONOCYTES NFR BLD: 8 % (ref 2–12)
NEUTROPHILS NFR BLD: 65 % (ref 43–80)
NEUTS SEG NFR BLD: 5.73 K/UL (ref 1.8–7.3)
PLATELET # BLD AUTO: 217 K/UL (ref 130–450)
PMV BLD AUTO: 10.8 FL (ref 7–12)
POTASSIUM SERPL-SCNC: 4.3 MMOL/L (ref 3.5–5)
PROT SERPL-MCNC: 6.4 G/DL (ref 6.4–8.3)
RBC # BLD AUTO: 4.89 M/UL (ref 3.8–5.8)
SODIUM SERPL-SCNC: 137 MMOL/L (ref 132–146)
TIBC SERPL-MCNC: 301 UG/DL (ref 250–450)
WBC OTHER # BLD: 8.9 K/UL (ref 4.5–11.5)

## 2023-12-21 PROCEDURE — 82728 ASSAY OF FERRITIN: CPT

## 2023-12-21 PROCEDURE — 83735 ASSAY OF MAGNESIUM: CPT

## 2023-12-21 PROCEDURE — 84165 PROTEIN E-PHORESIS SERUM: CPT

## 2023-12-21 PROCEDURE — 80053 COMPREHEN METABOLIC PANEL: CPT

## 2023-12-21 PROCEDURE — 83540 ASSAY OF IRON: CPT

## 2023-12-21 PROCEDURE — 83521 IG LIGHT CHAINS FREE EACH: CPT

## 2023-12-21 PROCEDURE — 82232 ASSAY OF BETA-2 PROTEIN: CPT

## 2023-12-21 PROCEDURE — 84155 ASSAY OF PROTEIN SERUM: CPT

## 2023-12-21 PROCEDURE — 36415 COLL VENOUS BLD VENIPUNCTURE: CPT

## 2023-12-21 PROCEDURE — 83550 IRON BINDING TEST: CPT

## 2023-12-21 PROCEDURE — 82784 ASSAY IGA/IGD/IGG/IGM EACH: CPT

## 2023-12-21 PROCEDURE — 85810 BLOOD VISCOSITY EXAMINATION: CPT

## 2023-12-21 PROCEDURE — 83615 LACTATE (LD) (LDH) ENZYME: CPT

## 2023-12-21 PROCEDURE — 85025 COMPLETE CBC W/AUTO DIFF WBC: CPT

## 2023-12-21 PROCEDURE — 86334 IMMUNOFIX E-PHORESIS SERUM: CPT

## 2023-12-22 LAB
FREE KAPPA/LAMBDA RATIO: 5.81 (ref 0.26–1.65)
KAPPA LC FREE SER-MCNC: 45.9 MG/L (ref 3.7–19.4)
LAMBDA LC FREE SERPL-MCNC: 7.9 MG/L (ref 5.7–26.3)

## 2023-12-23 LAB — B2 MICROGLOB SERPL IA-MCNC: 2.8 MG/L

## 2023-12-25 LAB — VISC SER: 1.08 CP (ref 1.1–1.8)

## 2023-12-26 LAB
ALBUMIN SERPL-MCNC: 3.4 G/DL (ref 3.5–4.7)
ALPHA1 GLOB SERPL ELPH-MCNC: 0.2 G/DL (ref 0.2–0.4)
ALPHA2 GLOB SERPL ELPH-MCNC: 0.8 G/DL (ref 0.5–1)
B-GLOBULIN SERPL ELPH-MCNC: 0.7 G/DL (ref 0.8–1.3)
GAMMA GLOB SERPL ELPH-MCNC: 0.9 G/DL (ref 0.7–1.6)
INTERPRETATION SERPL IFE-IMP: NORMAL
M PROTEIN SERPL ELPH-MCNC: 0.5 G/DL
PATH REV: NORMAL
PATHOLOGIST: ABNORMAL
PROT PATTERN SERPL ELPH-IMP: ABNORMAL
PROT SERPL-MCNC: 6 G/DL (ref 6.4–8.3)

## 2024-01-02 ENCOUNTER — HOSPITAL ENCOUNTER (EMERGENCY)
Age: 79
Discharge: ANOTHER HOSPITAL | End: 2024-01-03
Attending: STUDENT IN AN ORGANIZED HEALTH CARE EDUCATION/TRAINING PROGRAM
Payer: OTHER GOVERNMENT

## 2024-01-02 ENCOUNTER — APPOINTMENT (OUTPATIENT)
Dept: ULTRASOUND IMAGING | Age: 79
End: 2024-01-02
Payer: OTHER GOVERNMENT

## 2024-01-02 ENCOUNTER — APPOINTMENT (OUTPATIENT)
Dept: GENERAL RADIOLOGY | Age: 79
End: 2024-01-02
Payer: OTHER GOVERNMENT

## 2024-01-02 DIAGNOSIS — L03.116 CELLULITIS OF LEFT LOWER EXTREMITY: Primary | ICD-10-CM

## 2024-01-02 LAB
ALBUMIN SERPL-MCNC: 3.9 G/DL (ref 3.5–5.2)
ALP SERPL-CCNC: 129 U/L (ref 40–129)
ALT SERPL-CCNC: 55 U/L (ref 0–40)
ANION GAP SERPL CALCULATED.3IONS-SCNC: 13 MMOL/L (ref 7–16)
AST SERPL-CCNC: 31 U/L (ref 0–39)
BASOPHILS # BLD: 0.03 K/UL (ref 0–0.2)
BASOPHILS NFR BLD: 0 % (ref 0–2)
BILIRUB SERPL-MCNC: 1.5 MG/DL (ref 0–1.2)
BUN SERPL-MCNC: 12 MG/DL (ref 6–23)
CALCIUM SERPL-MCNC: 9.9 MG/DL (ref 8.6–10.2)
CHLORIDE SERPL-SCNC: 97 MMOL/L (ref 98–107)
CO2 SERPL-SCNC: 24 MMOL/L (ref 22–29)
CREAT SERPL-MCNC: 0.9 MG/DL (ref 0.7–1.2)
EOSINOPHIL # BLD: 0.02 K/UL (ref 0.05–0.5)
EOSINOPHILS RELATIVE PERCENT: 0 % (ref 0–6)
ERYTHROCYTE [DISTWIDTH] IN BLOOD BY AUTOMATED COUNT: 14.3 % (ref 11.5–15)
GFR SERPL CREATININE-BSD FRML MDRD: >60 ML/MIN/1.73M2
GLUCOSE BLD-MCNC: 345 MG/DL (ref 74–99)
GLUCOSE SERPL-MCNC: 255 MG/DL (ref 74–99)
HCT VFR BLD AUTO: 41.6 % (ref 37–54)
HGB BLD-MCNC: 13.6 G/DL (ref 12.5–16.5)
IMM GRANULOCYTES # BLD AUTO: 0.11 K/UL (ref 0–0.58)
IMM GRANULOCYTES NFR BLD: 1 % (ref 0–5)
LACTATE BLDV-SCNC: 2.1 MMOL/L (ref 0.5–2.2)
LYMPHOCYTES NFR BLD: 1.52 K/UL (ref 1.5–4)
LYMPHOCYTES RELATIVE PERCENT: 11 % (ref 20–42)
MCH RBC QN AUTO: 28 PG (ref 26–35)
MCHC RBC AUTO-ENTMCNC: 32.7 G/DL (ref 32–34.5)
MCV RBC AUTO: 85.8 FL (ref 80–99.9)
MONOCYTES NFR BLD: 1.24 K/UL (ref 0.1–0.95)
MONOCYTES NFR BLD: 9 % (ref 2–12)
NEUTROPHILS NFR BLD: 78 % (ref 43–80)
NEUTS SEG NFR BLD: 10.59 K/UL (ref 1.8–7.3)
PLATELET # BLD AUTO: 217 K/UL (ref 130–450)
PMV BLD AUTO: 10.8 FL (ref 7–12)
POTASSIUM SERPL-SCNC: 4.5 MMOL/L (ref 3.5–5)
PROT SERPL-MCNC: 7 G/DL (ref 6.4–8.3)
RBC # BLD AUTO: 4.85 M/UL (ref 3.8–5.8)
SODIUM SERPL-SCNC: 134 MMOL/L (ref 132–146)
WBC OTHER # BLD: 13.5 K/UL (ref 4.5–11.5)

## 2024-01-02 PROCEDURE — 6360000002 HC RX W HCPCS: Performed by: STUDENT IN AN ORGANIZED HEALTH CARE EDUCATION/TRAINING PROGRAM

## 2024-01-02 PROCEDURE — 96375 TX/PRO/DX INJ NEW DRUG ADDON: CPT

## 2024-01-02 PROCEDURE — 85025 COMPLETE CBC W/AUTO DIFF WBC: CPT

## 2024-01-02 PROCEDURE — 87040 BLOOD CULTURE FOR BACTERIA: CPT

## 2024-01-02 PROCEDURE — 80053 COMPREHEN METABOLIC PANEL: CPT

## 2024-01-02 PROCEDURE — 93971 EXTREMITY STUDY: CPT

## 2024-01-02 PROCEDURE — 82962 GLUCOSE BLOOD TEST: CPT

## 2024-01-02 PROCEDURE — 96365 THER/PROPH/DIAG IV INF INIT: CPT

## 2024-01-02 PROCEDURE — 99285 EMERGENCY DEPT VISIT HI MDM: CPT

## 2024-01-02 PROCEDURE — 93005 ELECTROCARDIOGRAM TRACING: CPT | Performed by: STUDENT IN AN ORGANIZED HEALTH CARE EDUCATION/TRAINING PROGRAM

## 2024-01-02 PROCEDURE — 2580000003 HC RX 258: Performed by: STUDENT IN AN ORGANIZED HEALTH CARE EDUCATION/TRAINING PROGRAM

## 2024-01-02 PROCEDURE — 83605 ASSAY OF LACTIC ACID: CPT

## 2024-01-02 PROCEDURE — 2500000003 HC RX 250 WO HCPCS: Performed by: STUDENT IN AN ORGANIZED HEALTH CARE EDUCATION/TRAINING PROGRAM

## 2024-01-02 PROCEDURE — 96366 THER/PROPH/DIAG IV INF ADDON: CPT

## 2024-01-02 PROCEDURE — 73590 X-RAY EXAM OF LOWER LEG: CPT

## 2024-01-02 PROCEDURE — 6370000000 HC RX 637 (ALT 250 FOR IP): Performed by: STUDENT IN AN ORGANIZED HEALTH CARE EDUCATION/TRAINING PROGRAM

## 2024-01-02 RX ORDER — METOPROLOL TARTRATE 50 MG/1
25 TABLET, FILM COATED ORAL 2 TIMES DAILY
Status: DISCONTINUED | OUTPATIENT
Start: 2024-01-02 | End: 2024-01-03 | Stop reason: HOSPADM

## 2024-01-02 RX ORDER — VITAMIN B COMPLEX
1000 TABLET ORAL DAILY
Status: DISCONTINUED | OUTPATIENT
Start: 2024-01-02 | End: 2024-01-03 | Stop reason: HOSPADM

## 2024-01-02 RX ORDER — DOCUSATE SODIUM 100 MG/1
100 CAPSULE, LIQUID FILLED ORAL DAILY
Status: DISCONTINUED | OUTPATIENT
Start: 2024-01-02 | End: 2024-01-03 | Stop reason: HOSPADM

## 2024-01-02 RX ORDER — SODIUM CHLORIDE 9 MG/ML
INJECTION, SOLUTION INTRAVENOUS CONTINUOUS
Status: DISCONTINUED | OUTPATIENT
Start: 2024-01-02 | End: 2024-01-03 | Stop reason: HOSPADM

## 2024-01-02 RX ORDER — MINERAL OIL AND WHITE PETROLATUM 150; 830 MG/G; MG/G
OINTMENT OPHTHALMIC PRN
Status: DISCONTINUED | OUTPATIENT
Start: 2024-01-02 | End: 2024-01-03 | Stop reason: HOSPADM

## 2024-01-02 RX ORDER — LISINOPRIL 10 MG/1
20 TABLET ORAL DAILY
Status: DISCONTINUED | OUTPATIENT
Start: 2024-01-02 | End: 2024-01-03 | Stop reason: HOSPADM

## 2024-01-02 RX ORDER — GLIPIZIDE 5 MG/1
10 TABLET ORAL
Status: DISCONTINUED | OUTPATIENT
Start: 2024-01-03 | End: 2024-01-03 | Stop reason: HOSPADM

## 2024-01-02 RX ORDER — LISINOPRIL 10 MG/1
10 TABLET ORAL 2 TIMES DAILY
Status: DISCONTINUED | OUTPATIENT
Start: 2024-01-02 | End: 2024-01-02

## 2024-01-02 RX ORDER — AMLODIPINE BESYLATE 5 MG/1
5 TABLET ORAL DAILY
Status: DISCONTINUED | OUTPATIENT
Start: 2024-01-02 | End: 2024-01-03 | Stop reason: HOSPADM

## 2024-01-02 RX ORDER — ATORVASTATIN CALCIUM 40 MG/1
40 TABLET, FILM COATED ORAL DAILY
Status: DISCONTINUED | OUTPATIENT
Start: 2024-01-02 | End: 2024-01-03 | Stop reason: HOSPADM

## 2024-01-02 RX ORDER — LANOLIN ALCOHOL/MO/W.PET/CERES
400 CREAM (GRAM) TOPICAL DAILY
Status: DISCONTINUED | OUTPATIENT
Start: 2024-01-02 | End: 2024-01-02

## 2024-01-02 RX ORDER — ASPIRIN 81 MG/1
81 TABLET, CHEWABLE ORAL DAILY
Status: DISCONTINUED | OUTPATIENT
Start: 2024-01-02 | End: 2024-01-03 | Stop reason: HOSPADM

## 2024-01-02 RX ORDER — FENTANYL CITRATE 50 UG/ML
50 INJECTION, SOLUTION INTRAMUSCULAR; INTRAVENOUS ONCE
Status: COMPLETED | OUTPATIENT
Start: 2024-01-02 | End: 2024-01-02

## 2024-01-02 RX ORDER — 0.9 % SODIUM CHLORIDE 0.9 %
1000 INTRAVENOUS SOLUTION INTRAVENOUS ONCE
Status: COMPLETED | OUTPATIENT
Start: 2024-01-02 | End: 2024-01-02

## 2024-01-02 RX ORDER — FINASTERIDE 5 MG/1
5 TABLET, FILM COATED ORAL DAILY
Status: DISCONTINUED | OUTPATIENT
Start: 2024-01-02 | End: 2024-01-03 | Stop reason: HOSPADM

## 2024-01-02 RX ORDER — ALOGLIPTIN 25 MG/1
25 TABLET, FILM COATED ORAL DAILY
Status: DISCONTINUED | OUTPATIENT
Start: 2024-01-02 | End: 2024-01-03 | Stop reason: HOSPADM

## 2024-01-02 RX ORDER — TAMSULOSIN HYDROCHLORIDE 0.4 MG/1
0.4 CAPSULE ORAL DAILY
Status: DISCONTINUED | OUTPATIENT
Start: 2024-01-02 | End: 2024-01-03 | Stop reason: HOSPADM

## 2024-01-02 RX ORDER — FERROUS SULFATE 325(65) MG
325 TABLET ORAL 2 TIMES DAILY WITH MEALS
Status: DISCONTINUED | OUTPATIENT
Start: 2024-01-02 | End: 2024-01-03 | Stop reason: HOSPADM

## 2024-01-02 RX ORDER — AMMONIUM LACTATE 12 G/100G
LOTION TOPICAL NIGHTLY
Status: DISCONTINUED | OUTPATIENT
Start: 2024-01-02 | End: 2024-01-03 | Stop reason: HOSPADM

## 2024-01-02 RX ORDER — LANOLIN ALCOHOL/MO/W.PET/CERES
420 CREAM (GRAM) TOPICAL DAILY
Status: DISCONTINUED | OUTPATIENT
Start: 2024-01-02 | End: 2024-01-03 | Stop reason: HOSPADM

## 2024-01-02 RX ORDER — METOPROLOL SUCCINATE 25 MG/1
75 TABLET, EXTENDED RELEASE ORAL 2 TIMES DAILY
Status: DISCONTINUED | OUTPATIENT
Start: 2024-01-02 | End: 2024-01-02

## 2024-01-02 RX ORDER — PANTOPRAZOLE SODIUM 40 MG/1
40 TABLET, DELAYED RELEASE ORAL
Status: DISCONTINUED | OUTPATIENT
Start: 2024-01-03 | End: 2024-01-03 | Stop reason: HOSPADM

## 2024-01-02 RX ORDER — TRAMADOL HYDROCHLORIDE 50 MG/1
50 TABLET ORAL EVERY 6 HOURS PRN
Status: DISCONTINUED | OUTPATIENT
Start: 2024-01-02 | End: 2024-01-03 | Stop reason: HOSPADM

## 2024-01-02 RX ADMIN — SODIUM CHLORIDE: 9 INJECTION, SOLUTION INTRAVENOUS at 21:18

## 2024-01-02 RX ADMIN — DOXYCYCLINE 100 MG: 100 INJECTION, POWDER, LYOPHILIZED, FOR SOLUTION INTRAVENOUS at 17:20

## 2024-01-02 RX ADMIN — TRAMADOL HYDROCHLORIDE 50 MG: 50 TABLET ORAL at 18:00

## 2024-01-02 RX ADMIN — APIXABAN 5 MG: 5 TABLET, FILM COATED ORAL at 20:51

## 2024-01-02 RX ADMIN — FENTANYL CITRATE 50 MCG: 50 INJECTION INTRAMUSCULAR; INTRAVENOUS at 16:31

## 2024-01-02 RX ADMIN — Medication 400 MG: at 19:53

## 2024-01-02 RX ADMIN — METFORMIN HYDROCHLORIDE 500 MG: 500 TABLET ORAL at 19:56

## 2024-01-02 RX ADMIN — FERROUS SULFATE TAB 325 MG (65 MG ELEMENTAL FE) 325 MG: 325 (65 FE) TAB at 19:59

## 2024-01-02 RX ADMIN — DOCUSATE SODIUM 100 MG: 100 CAPSULE, LIQUID FILLED ORAL at 19:59

## 2024-01-02 RX ADMIN — SODIUM CHLORIDE 1000 ML: 9 INJECTION, SOLUTION INTRAVENOUS at 16:43

## 2024-01-02 RX ADMIN — PRAZOSIN HYDROCHLORIDE 3 MG: 2 CAPSULE ORAL at 19:57

## 2024-01-02 RX ADMIN — Medication 1000 UNITS: at 19:59

## 2024-01-02 RX ADMIN — CEFAZOLIN 2000 MG: 2 INJECTION, POWDER, FOR SOLUTION INTRAMUSCULAR; INTRAVENOUS at 16:48

## 2024-01-02 RX ADMIN — FINASTERIDE 5 MG: 5 TABLET, FILM COATED ORAL at 19:59

## 2024-01-02 RX ADMIN — Medication: at 20:50

## 2024-01-02 RX ADMIN — METFORMIN HYDROCHLORIDE 500 MG: 500 TABLET ORAL at 20:01

## 2024-01-02 RX ADMIN — TAMSULOSIN HYDROCHLORIDE 0.4 MG: 0.4 CAPSULE ORAL at 19:53

## 2024-01-02 ASSESSMENT — PAIN - FUNCTIONAL ASSESSMENT: PAIN_FUNCTIONAL_ASSESSMENT: 0-10

## 2024-01-02 ASSESSMENT — PAIN DESCRIPTION - DESCRIPTORS
DESCRIPTORS: THROBBING;DISCOMFORT;ACHING
DESCRIPTORS: ACHING;DISCOMFORT;SORE;TENDER

## 2024-01-02 ASSESSMENT — ENCOUNTER SYMPTOMS
ABDOMINAL PAIN: 0
SHORTNESS OF BREATH: 0
PHOTOPHOBIA: 0
COUGH: 0
BACK PAIN: 0
NAUSEA: 0
DIARRHEA: 0
SORE THROAT: 0

## 2024-01-02 ASSESSMENT — PAIN DESCRIPTION - FREQUENCY: FREQUENCY: CONTINUOUS

## 2024-01-02 ASSESSMENT — PAIN DESCRIPTION - ORIENTATION
ORIENTATION: LEFT
ORIENTATION: LEFT;LOWER

## 2024-01-02 ASSESSMENT — PAIN DESCRIPTION - LOCATION
LOCATION: LEG
LOCATION: LEG

## 2024-01-02 ASSESSMENT — PAIN SCALES - GENERAL
PAINLEVEL_OUTOF10: 3
PAINLEVEL_OUTOF10: 10
PAINLEVEL_OUTOF10: 10
PAINLEVEL_OUTOF10: 4

## 2024-01-02 ASSESSMENT — PAIN DESCRIPTION - PAIN TYPE: TYPE: ACUTE PAIN

## 2024-01-02 ASSESSMENT — PAIN DESCRIPTION - ONSET: ONSET: ON-GOING

## 2024-01-02 NOTE — ED PROVIDER NOTES
[facility] EMERGENCY DEPARTMENT  EMERGENCY DEPARTMENT ENCOUNTER        Pt Name: Josef Charlton  MRN: [id number]  Birthdate 1945  Date of evaluation: 1/2/2024  Provider: Gayathri Nascimento MD  PCP: Chary Ordonez MD  Note Started: 3:48 PM EST 1/2/24    HPI  78 y.o. male presenting for leg swelling, pain, redness.  Patient complains of swelling in his left lower extremity for few weeks.  For the past couple days, it has become painful.  Patient denies fevers, chest pain, new shortness of breath, nausea, or other complaints at this time. He denies any fevers. He does have a history of A-fib      --------------------------------------------- PAST HISTORY ---------------------------------------------  Past Medical History:  has a past medical history of Abscess of right thumb, Amputation of right thumb, Anemia, Depression, Diabetes mellitus (HCC), Hyperlipidemia, Hypertension, and Obesity.    Past Surgical History:  has a past surgical history that includes Neck surgery; Patella surgery; hernia repair; shoulder surgery; Tonsillectomy; Upper gastrointestinal endoscopy; Colonoscopy; and Lithotripsy (Bilateral, 7/6/2023).    Social History:  reports that he has quit smoking. His smoking use included cigars and cigarettes. He has never used smokeless tobacco. He reports that he does not currently use alcohol. He reports that he does not use drugs.    Family History: family history includes Heart Attack in his sister.     The patient’s home medications have been reviewed.    Allergies: Patient has no known allergies.      Review of Systems   Constitutional:  Negative for chills and fever.   HENT:  Negative for congestion and sore throat.    Eyes:  Negative for photophobia and visual disturbance.   Respiratory:  Negative for cough and shortness of breath.    Cardiovascular:  Negative for chest pain and leg swelling.   Gastrointestinal:  Negative for abdominal pain, diarrhea and nausea.

## 2024-01-02 NOTE — ED NOTES
Department of Emergency Medicine  FIRST PROVIDER TRIAGE NOTE             Independent MLP           1/2/24  1:20 PM EST    Date of Encounter: 1/2/24   MRN: [id number]      HPI: Josef Charlton is a 78 y.o. male who presents to the ED for Leg Pain (Left leg pain, swelling, redness  started a few weeks ago)     Pt has left lower leg redness, swelling, pain and warmth for the last few days [facility] for concern cellulitis. Pt is on thinners    ROS: Negative for cp, sob, abd pain, back pain, or fever.    PE: Gen Appearance/Constitutional: alert  HEENT: NC/NT. PERRLA,  Airway patent.  Neck: supple     Initial Plan of Care: All treatment areas with department are currently occupied. Plan to order/Initiate the following while awaiting opening in ED: labs and imaging studies.  Initiate Treatment-Testing, Proceed toTreatment Area When Bed Available for ED Attending/MLP to Continue Care    Electronically signed by Gita Oseguera PA-C   DD: 1/2/24       Gita Oseguera PA-C  01/02/24 6542

## 2024-01-03 ENCOUNTER — HOSPITAL ENCOUNTER (INPATIENT)
Age: 79
LOS: 3 days | Discharge: HOME | End: 2024-01-06
Attending: STUDENT IN AN ORGANIZED HEALTH CARE EDUCATION/TRAINING PROGRAM | Admitting: STUDENT IN AN ORGANIZED HEALTH CARE EDUCATION/TRAINING PROGRAM
Payer: OTHER GOVERNMENT

## 2024-01-03 VITALS
HEART RATE: 119 BPM | BODY MASS INDEX: 37.97 KG/M2 | WEIGHT: 280 LBS | TEMPERATURE: 98.4 F | DIASTOLIC BLOOD PRESSURE: 94 MMHG | OXYGEN SATURATION: 95 % | RESPIRATION RATE: 18 BRPM | SYSTOLIC BLOOD PRESSURE: 199 MMHG

## 2024-01-03 DIAGNOSIS — S81.802A WOUND OF LOWER EXTREMITY, LEFT, INITIAL ENCOUNTER: Primary | ICD-10-CM

## 2024-01-03 PROBLEM — L03.116 CELLULITIS OF LEFT LOWER EXTREMITY: Status: ACTIVE | Noted: 2024-01-03

## 2024-01-03 PROBLEM — E11.65 TYPE 2 DIABETES MELLITUS WITH HYPERGLYCEMIA, WITHOUT LONG-TERM CURRENT USE OF INSULIN (HCC): Status: ACTIVE | Noted: 2023-07-18

## 2024-01-03 LAB
ANION GAP SERPL CALCULATED.3IONS-SCNC: 14 MMOL/L (ref 7–16)
BASOPHILS # BLD: 0.03 K/UL (ref 0–0.2)
BASOPHILS NFR BLD: 0 % (ref 0–2)
BUN SERPL-MCNC: 12 MG/DL (ref 6–23)
CALCIUM SERPL-MCNC: 9.5 MG/DL (ref 8.6–10.2)
CHLORIDE SERPL-SCNC: 98 MMOL/L (ref 98–107)
CO2 SERPL-SCNC: 21 MMOL/L (ref 22–29)
CREAT SERPL-MCNC: 0.8 MG/DL (ref 0.7–1.2)
CRP SERPL HS-MCNC: 187 MG/L (ref 0–5)
EKG ATRIAL RATE: 80 BPM
EKG Q-T INTERVAL: 350 MS
EKG QRS DURATION: 96 MS
EKG QTC CALCULATION (BAZETT): 416 MS
EKG R AXIS: 26 DEGREES
EKG T AXIS: 67 DEGREES
EKG VENTRICULAR RATE: 85 BPM
EOSINOPHIL # BLD: 0.01 K/UL (ref 0.05–0.5)
EOSINOPHILS RELATIVE PERCENT: 0 % (ref 0–6)
ERYTHROCYTE [DISTWIDTH] IN BLOOD BY AUTOMATED COUNT: 14 % (ref 11.5–15)
ERYTHROCYTE [SEDIMENTATION RATE] IN BLOOD BY WESTERGREN METHOD: 59 MM/HR (ref 0–15)
GFR SERPL CREATININE-BSD FRML MDRD: >60 ML/MIN/1.73M2
GLUCOSE BLD-MCNC: 225 MG/DL (ref 74–99)
GLUCOSE BLD-MCNC: 316 MG/DL (ref 74–99)
GLUCOSE BLD-MCNC: 385 MG/DL (ref 74–99)
GLUCOSE SERPL-MCNC: 293 MG/DL (ref 74–99)
HBA1C MFR BLD: 9.9 % (ref 4–5.6)
HCT VFR BLD AUTO: 37.6 % (ref 37–54)
HGB BLD-MCNC: 12.2 G/DL (ref 12.5–16.5)
IMM GRANULOCYTES # BLD AUTO: 0.12 K/UL (ref 0–0.58)
IMM GRANULOCYTES NFR BLD: 1 % (ref 0–5)
LYMPHOCYTES NFR BLD: 0.95 K/UL (ref 1.5–4)
LYMPHOCYTES RELATIVE PERCENT: 9 % (ref 20–42)
MCH RBC QN AUTO: 28.2 PG (ref 26–35)
MCHC RBC AUTO-ENTMCNC: 32.4 G/DL (ref 32–34.5)
MCV RBC AUTO: 87 FL (ref 80–99.9)
MONOCYTES NFR BLD: 1.27 K/UL (ref 0.1–0.95)
MONOCYTES NFR BLD: 12 % (ref 2–12)
NEUTROPHILS NFR BLD: 77 % (ref 43–80)
NEUTS SEG NFR BLD: 8.12 K/UL (ref 1.8–7.3)
PLATELET # BLD AUTO: 187 K/UL (ref 130–450)
PMV BLD AUTO: 11 FL (ref 7–12)
POTASSIUM SERPL-SCNC: 4.6 MMOL/L (ref 3.5–5)
RBC # BLD AUTO: 4.32 M/UL (ref 3.8–5.8)
SODIUM SERPL-SCNC: 133 MMOL/L (ref 132–146)
WBC OTHER # BLD: 10.5 K/UL (ref 4.5–11.5)

## 2024-01-03 PROCEDURE — 99222 1ST HOSP IP/OBS MODERATE 55: CPT | Performed by: STUDENT IN AN ORGANIZED HEALTH CARE EDUCATION/TRAINING PROGRAM

## 2024-01-03 PROCEDURE — 2500000003 HC RX 250 WO HCPCS: Performed by: STUDENT IN AN ORGANIZED HEALTH CARE EDUCATION/TRAINING PROGRAM

## 2024-01-03 PROCEDURE — 2580000003 HC RX 258: Performed by: STUDENT IN AN ORGANIZED HEALTH CARE EDUCATION/TRAINING PROGRAM

## 2024-01-03 PROCEDURE — 83036 HEMOGLOBIN GLYCOSYLATED A1C: CPT

## 2024-01-03 PROCEDURE — 85025 COMPLETE CBC W/AUTO DIFF WBC: CPT

## 2024-01-03 PROCEDURE — 86140 C-REACTIVE PROTEIN: CPT

## 2024-01-03 PROCEDURE — 1200000000 HC SEMI PRIVATE

## 2024-01-03 PROCEDURE — 6360000002 HC RX W HCPCS: Performed by: STUDENT IN AN ORGANIZED HEALTH CARE EDUCATION/TRAINING PROGRAM

## 2024-01-03 PROCEDURE — 6370000000 HC RX 637 (ALT 250 FOR IP): Performed by: STUDENT IN AN ORGANIZED HEALTH CARE EDUCATION/TRAINING PROGRAM

## 2024-01-03 PROCEDURE — 96376 TX/PRO/DX INJ SAME DRUG ADON: CPT

## 2024-01-03 PROCEDURE — 85652 RBC SED RATE AUTOMATED: CPT

## 2024-01-03 PROCEDURE — 82962 GLUCOSE BLOOD TEST: CPT

## 2024-01-03 PROCEDURE — 96366 THER/PROPH/DIAG IV INF ADDON: CPT

## 2024-01-03 PROCEDURE — 6370000000 HC RX 637 (ALT 250 FOR IP)

## 2024-01-03 PROCEDURE — 80048 BASIC METABOLIC PNL TOTAL CA: CPT

## 2024-01-03 RX ORDER — MAGNESIUM SULFATE IN WATER 40 MG/ML
2000 INJECTION, SOLUTION INTRAVENOUS PRN
Status: DISCONTINUED | OUTPATIENT
Start: 2024-01-03 | End: 2024-01-06 | Stop reason: HOSPADM

## 2024-01-03 RX ORDER — MORPHINE SULFATE 2 MG/ML
2 INJECTION, SOLUTION INTRAMUSCULAR; INTRAVENOUS
Status: DISCONTINUED | OUTPATIENT
Start: 2024-01-03 | End: 2024-01-06 | Stop reason: HOSPADM

## 2024-01-03 RX ORDER — PANTOPRAZOLE SODIUM 40 MG/1
40 TABLET, DELAYED RELEASE ORAL
Status: DISCONTINUED | OUTPATIENT
Start: 2024-01-04 | End: 2024-01-06 | Stop reason: HOSPADM

## 2024-01-03 RX ORDER — ATORVASTATIN CALCIUM 40 MG/1
40 TABLET, FILM COATED ORAL DAILY
Status: DISCONTINUED | OUTPATIENT
Start: 2024-01-03 | End: 2024-01-06 | Stop reason: HOSPADM

## 2024-01-03 RX ORDER — LISINOPRIL 10 MG/1
10 TABLET ORAL 2 TIMES DAILY
Status: DISCONTINUED | OUTPATIENT
Start: 2024-01-03 | End: 2024-01-06 | Stop reason: HOSPADM

## 2024-01-03 RX ORDER — INSULIN LISPRO 100 [IU]/ML
0-16 INJECTION, SOLUTION INTRAVENOUS; SUBCUTANEOUS
Status: DISCONTINUED | OUTPATIENT
Start: 2024-01-03 | End: 2024-01-04

## 2024-01-03 RX ORDER — TRAMADOL HYDROCHLORIDE 50 MG/1
50 TABLET ORAL EVERY 6 HOURS PRN
Status: DISCONTINUED | OUTPATIENT
Start: 2024-01-03 | End: 2024-01-06 | Stop reason: HOSPADM

## 2024-01-03 RX ORDER — ACETAMINOPHEN 650 MG/1
650 SUPPOSITORY RECTAL EVERY 6 HOURS PRN
Status: DISCONTINUED | OUTPATIENT
Start: 2024-01-03 | End: 2024-01-06 | Stop reason: HOSPADM

## 2024-01-03 RX ORDER — FINASTERIDE 5 MG/1
5 TABLET, FILM COATED ORAL DAILY
Status: DISCONTINUED | OUTPATIENT
Start: 2024-01-03 | End: 2024-01-03

## 2024-01-03 RX ORDER — FINASTERIDE 5 MG/1
5 TABLET, FILM COATED ORAL DAILY
Status: DISCONTINUED | OUTPATIENT
Start: 2024-01-03 | End: 2024-01-06 | Stop reason: HOSPADM

## 2024-01-03 RX ORDER — TAMSULOSIN HYDROCHLORIDE 0.4 MG/1
0.4 CAPSULE ORAL NIGHTLY
Status: DISCONTINUED | OUTPATIENT
Start: 2024-01-04 | End: 2024-01-06 | Stop reason: HOSPADM

## 2024-01-03 RX ORDER — SODIUM CHLORIDE 0.9 % (FLUSH) 0.9 %
5-40 SYRINGE (ML) INJECTION EVERY 12 HOURS SCHEDULED
Status: DISCONTINUED | OUTPATIENT
Start: 2024-01-03 | End: 2024-01-06 | Stop reason: HOSPADM

## 2024-01-03 RX ORDER — DEXTROSE MONOHYDRATE 100 MG/ML
INJECTION, SOLUTION INTRAVENOUS CONTINUOUS PRN
Status: DISCONTINUED | OUTPATIENT
Start: 2024-01-03 | End: 2024-01-06 | Stop reason: HOSPADM

## 2024-01-03 RX ORDER — VITAMIN B COMPLEX
2 TABLET ORAL DAILY
Status: DISCONTINUED | OUTPATIENT
Start: 2024-01-03 | End: 2024-01-06 | Stop reason: HOSPADM

## 2024-01-03 RX ORDER — SODIUM CHLORIDE 0.9 % (FLUSH) 0.9 %
5-40 SYRINGE (ML) INJECTION PRN
Status: DISCONTINUED | OUTPATIENT
Start: 2024-01-03 | End: 2024-01-06 | Stop reason: HOSPADM

## 2024-01-03 RX ORDER — ONDANSETRON 4 MG/1
4 TABLET, ORALLY DISINTEGRATING ORAL EVERY 8 HOURS PRN
Status: DISCONTINUED | OUTPATIENT
Start: 2024-01-03 | End: 2024-01-06 | Stop reason: HOSPADM

## 2024-01-03 RX ORDER — FENTANYL CITRATE 50 UG/ML
50 INJECTION, SOLUTION INTRAMUSCULAR; INTRAVENOUS ONCE
Status: COMPLETED | OUTPATIENT
Start: 2024-01-03 | End: 2024-01-03

## 2024-01-03 RX ORDER — AMLODIPINE BESYLATE 5 MG/1
5 TABLET ORAL DAILY
Status: DISCONTINUED | OUTPATIENT
Start: 2024-01-04 | End: 2024-01-06 | Stop reason: HOSPADM

## 2024-01-03 RX ORDER — POTASSIUM CHLORIDE 7.45 MG/ML
10 INJECTION INTRAVENOUS PRN
Status: DISCONTINUED | OUTPATIENT
Start: 2024-01-03 | End: 2024-01-06 | Stop reason: HOSPADM

## 2024-01-03 RX ORDER — LANOLIN ALCOHOL/MO/W.PET/CERES
400 CREAM (GRAM) TOPICAL DAILY
Status: DISCONTINUED | OUTPATIENT
Start: 2024-01-03 | End: 2024-01-03

## 2024-01-03 RX ORDER — ASPIRIN 81 MG/1
81 TABLET, CHEWABLE ORAL DAILY
Status: DISCONTINUED | OUTPATIENT
Start: 2024-01-03 | End: 2024-01-03

## 2024-01-03 RX ORDER — POLYETHYLENE GLYCOL 3350 17 G/17G
17 POWDER, FOR SOLUTION ORAL DAILY PRN
Status: DISCONTINUED | OUTPATIENT
Start: 2024-01-03 | End: 2024-01-06 | Stop reason: HOSPADM

## 2024-01-03 RX ORDER — LISINOPRIL 10 MG/1
10 TABLET ORAL 2 TIMES DAILY
Status: DISCONTINUED | OUTPATIENT
Start: 2024-01-03 | End: 2024-01-03

## 2024-01-03 RX ORDER — ONDANSETRON 2 MG/ML
4 INJECTION INTRAMUSCULAR; INTRAVENOUS EVERY 6 HOURS PRN
Status: DISCONTINUED | OUTPATIENT
Start: 2024-01-03 | End: 2024-01-06 | Stop reason: HOSPADM

## 2024-01-03 RX ORDER — POTASSIUM CHLORIDE 20 MEQ/1
40 TABLET, EXTENDED RELEASE ORAL PRN
Status: DISCONTINUED | OUTPATIENT
Start: 2024-01-03 | End: 2024-01-06 | Stop reason: HOSPADM

## 2024-01-03 RX ORDER — ASPIRIN 81 MG/1
81 TABLET, CHEWABLE ORAL DAILY
Status: DISCONTINUED | OUTPATIENT
Start: 2024-01-04 | End: 2024-01-06 | Stop reason: HOSPADM

## 2024-01-03 RX ORDER — SODIUM CHLORIDE 9 MG/ML
INJECTION, SOLUTION INTRAVENOUS PRN
Status: DISCONTINUED | OUTPATIENT
Start: 2024-01-03 | End: 2024-01-06 | Stop reason: HOSPADM

## 2024-01-03 RX ORDER — AMLODIPINE BESYLATE 5 MG/1
5 TABLET ORAL DAILY
Status: DISCONTINUED | OUTPATIENT
Start: 2024-01-03 | End: 2024-01-03

## 2024-01-03 RX ORDER — PRAZOSIN HYDROCHLORIDE 2 MG/1
2 CAPSULE ORAL NIGHTLY
Status: DISCONTINUED | OUTPATIENT
Start: 2024-01-03 | End: 2024-01-06 | Stop reason: HOSPADM

## 2024-01-03 RX ORDER — LANOLIN ALCOHOL/MO/W.PET/CERES
400 CREAM (GRAM) TOPICAL DAILY
Status: DISCONTINUED | OUTPATIENT
Start: 2024-01-04 | End: 2024-01-06 | Stop reason: HOSPADM

## 2024-01-03 RX ORDER — SODIUM CHLORIDE 9 MG/ML
INJECTION, SOLUTION INTRAVENOUS CONTINUOUS
Status: ACTIVE | OUTPATIENT
Start: 2024-01-03 | End: 2024-01-05

## 2024-01-03 RX ORDER — ACETAMINOPHEN 325 MG/1
650 TABLET ORAL EVERY 6 HOURS PRN
Status: DISCONTINUED | OUTPATIENT
Start: 2024-01-03 | End: 2024-01-06 | Stop reason: HOSPADM

## 2024-01-03 RX ORDER — INSULIN LISPRO 100 [IU]/ML
0-4 INJECTION, SOLUTION INTRAVENOUS; SUBCUTANEOUS NIGHTLY
Status: DISCONTINUED | OUTPATIENT
Start: 2024-01-03 | End: 2024-01-06 | Stop reason: HOSPADM

## 2024-01-03 RX ADMIN — CEFAZOLIN 2000 MG: 2 INJECTION, POWDER, FOR SOLUTION INTRAMUSCULAR; INTRAVENOUS at 00:41

## 2024-01-03 RX ADMIN — MORPHINE SULFATE 2 MG: 2 INJECTION, SOLUTION INTRAMUSCULAR; INTRAVENOUS at 20:53

## 2024-01-03 RX ADMIN — WATER 2000 MG: 1 INJECTION INTRAMUSCULAR; INTRAVENOUS; SUBCUTANEOUS at 11:13

## 2024-01-03 RX ADMIN — ASPIRIN 81 MG: 81 TABLET, CHEWABLE ORAL at 08:35

## 2024-01-03 RX ADMIN — Medication 400 MG: at 08:35

## 2024-01-03 RX ADMIN — TAMSULOSIN HYDROCHLORIDE 0.4 MG: 0.4 CAPSULE ORAL at 08:35

## 2024-01-03 RX ADMIN — DOXYCYCLINE 100 MG: 100 INJECTION, POWDER, LYOPHILIZED, FOR SOLUTION INTRAVENOUS at 18:15

## 2024-01-03 RX ADMIN — FENTANYL CITRATE 50 MCG: 50 INJECTION INTRAMUSCULAR; INTRAVENOUS at 02:22

## 2024-01-03 RX ADMIN — SODIUM CHLORIDE, PRESERVATIVE FREE 10 ML: 5 INJECTION INTRAVENOUS at 11:15

## 2024-01-03 RX ADMIN — MUPIROCIN: 20 OINTMENT TOPICAL at 20:36

## 2024-01-03 RX ADMIN — TRAMADOL HYDROCHLORIDE 50 MG: 50 TABLET ORAL at 00:40

## 2024-01-03 RX ADMIN — FINASTERIDE 5 MG: 5 TABLET, FILM COATED ORAL at 11:50

## 2024-01-03 RX ADMIN — METOPROLOL TARTRATE 25 MG: 50 TABLET ORAL at 08:35

## 2024-01-03 RX ADMIN — LISINOPRIL 10 MG: 10 TABLET ORAL at 20:29

## 2024-01-03 RX ADMIN — MORPHINE SULFATE 2 MG: 2 INJECTION, SOLUTION INTRAMUSCULAR; INTRAVENOUS at 14:41

## 2024-01-03 RX ADMIN — AMLODIPINE BESYLATE 5 MG: 5 TABLET ORAL at 08:34

## 2024-01-03 RX ADMIN — APIXABAN 5 MG: 5 TABLET, FILM COATED ORAL at 08:34

## 2024-01-03 RX ADMIN — LISINOPRIL 20 MG: 10 TABLET ORAL at 08:34

## 2024-01-03 RX ADMIN — INSULIN LISPRO 12 UNITS: 100 INJECTION, SOLUTION INTRAVENOUS; SUBCUTANEOUS at 11:50

## 2024-01-03 RX ADMIN — WATER 2000 MG: 1 INJECTION INTRAMUSCULAR; INTRAVENOUS; SUBCUTANEOUS at 18:10

## 2024-01-03 RX ADMIN — INSULIN LISPRO 4 UNITS: 100 INJECTION, SOLUTION INTRAVENOUS; SUBCUTANEOUS at 16:41

## 2024-01-03 RX ADMIN — DOXYCYCLINE 100 MG: 100 INJECTION, POWDER, LYOPHILIZED, FOR SOLUTION INTRAVENOUS at 05:48

## 2024-01-03 RX ADMIN — PANTOPRAZOLE SODIUM 40 MG: 40 TABLET, DELAYED RELEASE ORAL at 08:34

## 2024-01-03 RX ADMIN — METOPROLOL SUCCINATE 75 MG: 50 TABLET, EXTENDED RELEASE ORAL at 20:29

## 2024-01-03 RX ADMIN — Medication 2000 UNITS: at 11:12

## 2024-01-03 RX ADMIN — MORPHINE SULFATE 2 MG: 2 INJECTION, SOLUTION INTRAMUSCULAR; INTRAVENOUS at 18:06

## 2024-01-03 RX ADMIN — INSULIN LISPRO 4 UNITS: 100 INJECTION, SOLUTION INTRAVENOUS; SUBCUTANEOUS at 20:46

## 2024-01-03 RX ADMIN — ATORVASTATIN CALCIUM 40 MG: 40 TABLET, FILM COATED ORAL at 11:12

## 2024-01-03 RX ADMIN — APIXABAN 5 MG: 5 TABLET, FILM COATED ORAL at 20:29

## 2024-01-03 RX ADMIN — PRAZOSIN HYDROCHLORIDE 2 MG: 2 CAPSULE ORAL at 20:29

## 2024-01-03 RX ADMIN — SODIUM CHLORIDE: 9 INJECTION, SOLUTION INTRAVENOUS at 11:12

## 2024-01-03 RX ADMIN — SODIUM CHLORIDE, PRESERVATIVE FREE 10 ML: 5 INJECTION INTRAVENOUS at 20:29

## 2024-01-03 ASSESSMENT — PAIN DESCRIPTION - LOCATION
LOCATION: LEG
LOCATION: FOOT;LEG

## 2024-01-03 ASSESSMENT — PAIN DESCRIPTION - ORIENTATION
ORIENTATION: LEFT
ORIENTATION: LEFT;RIGHT
ORIENTATION: LEFT
ORIENTATION: LEFT

## 2024-01-03 ASSESSMENT — PAIN DESCRIPTION - DESCRIPTORS
DESCRIPTORS: DISCOMFORT;THROBBING
DESCRIPTORS: ACHING;DISCOMFORT
DESCRIPTORS: ACHING;SORE

## 2024-01-03 ASSESSMENT — PAIN SCALES - GENERAL
PAINLEVEL_OUTOF10: 4
PAINLEVEL_OUTOF10: 7
PAINLEVEL_OUTOF10: 6
PAINLEVEL_OUTOF10: 9
PAINLEVEL_OUTOF10: 7

## 2024-01-03 NOTE — PROGRESS NOTES
Josef Charlton was ordered ibrutinib (Imbruvica) which is a nonformulary medication. Nurse is going to check with patient to see if home supply of this medication will be brought in to the hospital for inpatient use.  A pharmacist will follow-up with the nurse of the patient to assess the capability of the patient to bring in the medication.  If it is determined that the patient cannot supply the medication and it is not available to be dispensed from the pharmacy, a call will be placed to the ordering provider to discuss alternative options.     Sin Bobo, PharmD  01/03/24 5:07 PM

## 2024-01-03 NOTE — PROGRESS NOTES
4 Eyes Skin Assessment     NAME:  Josef Charlton  YOB: 1945  MEDICAL RECORD NUMBER:  [id number]    The patient is being assessed for  Admission    I agree that at least one RN has performed a thorough Head to Toe Skin Assessment on the patient. ALL assessment sites listed below have been assessed.      Areas assessed by both nurses:    Head, Face, Ears, Shoulders, Back, Chest, Arms, Elbows, Hands, Sacrum. Buttock, Coccyx, Ischium, Legs. Feet and Heels, and Under Medical Devices         Does the Patient have a Wound? Yes wound(s) were present on assessment. LDA wound assessment was Initiated and completed by RN.  BLE redness, right heel cracking       Mark Prevention initiated by RN: Yes  Wound Care Orders initiated by RN: No    Pressure Injury (Stage 3,4, Unstageable, DTI, NWPT, and Complex wounds) if present, place Wound referral order by RN under : No    New Ostomies, if present place, Ostomy referral order under : No     Nurse 1 eSignature: Electronically signed by Krys Garcia RN on 1/3/24 at 2:50 PM EST    **SHARE this note so that the co-signing nurse can place an eSignature**    Nurse 2 eSignature: {Esignature:[id number]}

## 2024-01-03 NOTE — H&P
Alicia Guevara MD   aspirin 81 MG chewable tablet Take 1 tablet by mouth daily 11/24/22   Will Salas MD   apixaban (ELIQUIS) 5 MG TABS tablet Take 1 tablet by mouth 2 times daily 11/23/22 10/20/23  Will Salas MD   ammonium lactate (AMLACTIN) 12 % cream Apply topically in the morning and at bedtime Apply topically to feet bid    Alicia Guevara MD   alogliptin (NESINA) 25 MG TABS tablet TAKE ONE TABLET BY MOUTH EVERY DAY 6/17/21   Alicia Guevara MD   carboxymethylcellulose (REFRESH PLUS) 0.5 % SOLN ophthalmic solution  11/23/20   Alicia Guevara MD   vitamin D (CHOLECALCIFEROL) 25 MCG (1000 UT) TABS tablet TAKE TWO TABLETS BY MOUTH EVERY DAY 6/21/21   Alicia Guevara MD   docusate (COLACE, DULCOLAX) 100 MG CAPS  6/17/21   Alicia Guevara MD   ferrous sulfate (IRON 325) 325 (65 Fe) MG tablet TAKE ONE TABLET BY MOUTH TWICE A DAY (AN HOUR BEFORE OR TWO HOURS AFTER A MEAL; TAKE WITH FOOD IF THIS UPSETS YOUR STOMACH)----  IRON PILL (AN HOUR BEFORE OR TWO HOURS AFTER A MEAL; TAKE WITH FOOD IF THIS UPSETS YOUR STOMACH)----  IRON PILL 1/12/21   Alicia Guevara MD   omeprazole (PRILOSEC) 20 MG delayed release capsule TAKE 2 CAPSULES BY MOUTH EVERY MORNING, ON AN EMPTY STOMACH 9/30/20   Alicia Guevara MD   traMADol (ULTRAM) 50 MG tablet Take 1 tablet by mouth every 6 hours as needed for Pain.    Alicia Guevara MD   lisinopril (PRINIVIL;ZESTRIL) 20 MG tablet Take 0.5 tablets by mouth 2 times daily    Alicia Guevara MD   prazosin (MINIPRESS) 1 MG capsule Take 2 capsules by mouth nightly Patient takes (3) tablets at nighttime    Alicia Guevara MD   METFORMIN HCL PO Take 500 mg by mouth 2 times daily Takes 1,000 mg (2 tablets) at breakfast, , & 1000 mg (2 tablets) at dinnertime    Alicia Guevara MD       Allergies:    Patient has no known allergies.    Social History:    reports that he has quit smoking. His smoking use included  cigars and cigarettes. He has never used smokeless tobacco. He reports that he does not currently use alcohol. He reports that he does not use drugs.      Family History:   family history includes Heart Attack in his sister. Reviewed    PHYSICAL EXAM:  Vitals:  There were no vitals taken for this visit.    General Appearance: alert and oriented to person, place and time and in no acute distress  Skin: warm and dry  Head: normocephalic and atraumatic  Eyes: pupils equal, round, and reactive to light, extraocular eye movements intact, conjunctivae normal  Neck: neck supple and non tender without mass   Pulmonary/Chest: clear to auscultation bilaterally- no wheezes, rales or rhonchi, normal air movement, no respiratory distress  Cardiovascular: normal rate, Irregular rhythm and no RGM  Abdomen: soft, non-tender, non-distended, normal bowel sounds  Extremities: no cyanosis, no clubbing and BLE + edema, LLE Erythema, Tenderness.   Neurologic: no cranial nerve deficit and speech normal    LABS:  Recent Labs     01/02/24  1422      K 4.5   CL 97*   CO2 24   BUN 12   CREATININE 0.9   GLUCOSE 255*   CALCIUM 9.9       Recent Labs     01/02/24  1422   WBC 13.5*   RBC 4.85   HGB 13.6   HCT 41.6   MCV 85.8   MCH 28.0   MCHC 32.7   RDW 14.3      MPV 10.8       Recent Labs     01/02/24  1950   POCGLU 345*           Radiology: No results found.    EKG: Interpretation per ED physician: Atrial fibrillation.  No STEMI.  Stable when compared to recent tracing.    ASSESSMENT:      Active Problems:    * No active hospital problems. *  Resolved Problems:    * No resolved hospital problems. *      PLAN:    Cellulitis-lactic acid 2.1.  WBC 13.5.  Received cefazolin 2000 mg IV x 2, 1L NSS bolus, doxycycline 100 mg IV x 2 in ED course.  Continue ABX.  Follow  Atrial fibrillation- Eliquis/Toprol Xl Follows with [facility].   Uncontrolled DM with Hyperglycemia-glipizide/metformin at home glucose 345 SSI/hypoglycemic

## 2024-01-03 NOTE — CONSULTS
Podiatry Consult H&P  1/3/2024   Josef Charlton       REASON FOR CONSULT: Edema, cellulitis LLE with heel fissure       HISTORY OF PRESENT ILLNESS: This is a diabetic 78 y.o. male seen bedside for edema, cellulitis LLE with heel fissure. Pt has a pertinent PMH of depression, anemia, HLD, DM, HTN, Atrial Fibrillation and obesity. Pt sates he noticed swelling to his LLE for weeks but it has only become red and painful for the past couple of days. Pt denies any NVFC. Pt has no additional pedal questions or complaints at this time.         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    Distal phalynx    Anemia     Atrial fibrillation (HCC)     Depression     PTSD    Diabetes mellitus (HCC)     Hyperlipidemia     Hypertension     Obesity         Past Surgical History:   Procedure Laterality Date    COLONOSCOPY      HERNIA REPAIR      LITHOTRIPSY Bilateral 7/6/2023    CYSTOSCOPY BILATERAL RETROGRADE PYELOGRAM LASER LITHOTRIPSY, RIGHT URETEROSCOPY, RIGHT STONE EXTRACTION, RIGHT STENT INSERTION, CASTILLO INSERTION performed by Emmanuel Coleman MD at [facility] OR    NECK SURGERY      PATELLA SURGERY      SHOULDER SURGERY      TONSILLECTOMY      UPPER GASTROINTESTINAL ENDOSCOPY           Family History   Problem Relation Age of Onset    Heart Attack Sister         Social History     Tobacco Use    Smoking status: Former     Current packs/day: 2.00     Types: Cigars, Cigarettes    Smokeless tobacco: Never    Tobacco comments:     2 cigars a day   Substance Use Topics    Alcohol use: Not Currently        Prior to Admission medications    Medication Sig Start Date End Date Taking? Authorizing Provider   apixaban (ELIQUIS) 5 MG TABS tablet Take by mouth 2 times daily    Alicia Guevara MD   amLODIPine (NORVASC) 5 MG tablet 1 tablet 12/12/23   Alicia Guevara MD   ibrutinib (IMBRUVICA) 140 MG chemo capsule Take 3 capsules by mouth nightly 3 -140 mg  Gita Manrique MD        0.9 % sodium chloride infusion   IntraVENous PRN Gita Manrique MD        potassium chloride (KLOR-CON M) extended release tablet 40 mEq  40 mEq Oral PRN Gita Manrique MD        Or    potassium bicarb-citric acid (EFFER-K) effervescent tablet 40 mEq  40 mEq Oral PRN Gita Manrique MD        Or    potassium chloride 10 mEq/100 mL IVPB (Peripheral Line)  10 mEq IntraVENous PRN Gita Manrique MD        magnesium sulfate 2000 mg in 50 mL IVPB premix  2,000 mg IntraVENous PRN Gita Manrique MD        ondansetron (ZOFRAN-ODT) disintegrating tablet 4 mg  4 mg Oral Q8H PRN Gita Manrique MD        Or    ondansetron (ZOFRAN) injection 4 mg  4 mg IntraVENous Q6H PRN Gita Manrique MD        polyethylene glycol (GLYCOLAX) packet 17 g  17 g Oral Daily PRN Gita Manrique MD        acetaminophen (TYLENOL) tablet 650 mg  650 mg Oral Q6H PRN Gita Manrique MD        Or    acetaminophen (TYLENOL) suppository 650 mg  650 mg Rectal Q6H PRN Gita Manrique MD        0.9 % sodium chloride infusion   IntraVENous Continuous Gita Manrique MD 75 mL/hr at 01/03/24 1112 New Bag at 01/03/24 1112    atorvastatin (LIPITOR) tablet 40 mg  40 mg Oral Daily Gita Manrique MD   40 mg at 01/03/24 1112    [START ON 1/4/2024] pantoprazole (PROTONIX) tablet 40 mg  40 mg Oral QAM AC Gita Manrique MD        prazosin (MINIPRESS) capsule 2 mg  2 mg Oral Nightly Gita Manrique MD        [START ON 1/4/2024] tamsulosin (FLOMAX) capsule 0.4 mg  0.4 mg Oral Nightly Gita Manrique MD        traMADol (ULTRAM) tablet 50 mg  50 mg Oral Q6H PRN Gita Manrique MD        Vitamin D (CHOLECALCIFEROL) tablet 2,000 Units  2 tablet Oral Daily Gita Manrique MD   2,000 Units at 01/03/24 1112    [START ON 1/4/2024] aspirin chewable tablet 81 mg  81 mg Oral Daily Gita Manrique MD        apixaban (ELIQUIS) tablet 5 mg  5 mg Oral BID Gita Manrique MD

## 2024-01-03 NOTE — PLAN OF CARE
Problem: Chronic Conditions and Co-morbidities  Goal: Patient's chronic conditions and co-morbidity symptoms are monitored and maintained or improved  Outcome: Progressing  Flowsheets (Taken 1/3/2024 3821)  Care Plan - Patient's Chronic Conditions and Co-Morbidity Symptoms are Monitored and Maintained or Improved: Monitor and assess patient's chronic conditions and comorbid symptoms for stability, deterioration, or improvement     Problem: Skin/Tissue Integrity  Goal: Absence of new skin breakdown  Outcome: Progressing

## 2024-01-04 PROBLEM — E78.5 HYPERLIPIDEMIA: Status: ACTIVE | Noted: 2024-01-04

## 2024-01-04 PROBLEM — Z91.119 DIETARY NONCOMPLIANCE: Status: ACTIVE | Noted: 2024-01-04

## 2024-01-04 LAB
ALBUMIN SERPL-MCNC: 2.9 G/DL (ref 3.5–5.2)
ALP SERPL-CCNC: 100 U/L (ref 40–129)
ALT SERPL-CCNC: 26 U/L (ref 0–40)
ANION GAP SERPL CALCULATED.3IONS-SCNC: 10 MMOL/L (ref 7–16)
AST SERPL-CCNC: 28 U/L (ref 0–39)
BASOPHILS # BLD: 0.02 K/UL (ref 0–0.2)
BASOPHILS NFR BLD: 0 % (ref 0–2)
BILIRUB SERPL-MCNC: 0.7 MG/DL (ref 0–1.2)
BUN SERPL-MCNC: 11 MG/DL (ref 6–23)
CALCIUM SERPL-MCNC: 9.1 MG/DL (ref 8.6–10.2)
CHLORIDE SERPL-SCNC: 102 MMOL/L (ref 98–107)
CO2 SERPL-SCNC: 21 MMOL/L (ref 22–29)
CREAT SERPL-MCNC: 0.7 MG/DL (ref 0.7–1.2)
EOSINOPHIL # BLD: 0.02 K/UL (ref 0.05–0.5)
EOSINOPHILS RELATIVE PERCENT: 0 % (ref 0–6)
ERYTHROCYTE [DISTWIDTH] IN BLOOD BY AUTOMATED COUNT: 13.9 % (ref 11.5–15)
GFR SERPL CREATININE-BSD FRML MDRD: >60 ML/MIN/1.73M2
GLUCOSE BLD-MCNC: 305 MG/DL (ref 74–99)
GLUCOSE BLD-MCNC: 310 MG/DL (ref 74–99)
GLUCOSE BLD-MCNC: 312 MG/DL (ref 74–99)
GLUCOSE BLD-MCNC: 325 MG/DL (ref 74–99)
GLUCOSE BLD-MCNC: >500 MG/DL (ref 74–99)
GLUCOSE SERPL-MCNC: 237 MG/DL (ref 74–99)
HCT VFR BLD AUTO: 35.1 % (ref 37–54)
HGB BLD-MCNC: 11.6 G/DL (ref 12.5–16.5)
IMM GRANULOCYTES # BLD AUTO: 0.12 K/UL (ref 0–0.58)
IMM GRANULOCYTES NFR BLD: 1 % (ref 0–5)
LYMPHOCYTES NFR BLD: 0.94 K/UL (ref 1.5–4)
LYMPHOCYTES RELATIVE PERCENT: 11 % (ref 20–42)
MCH RBC QN AUTO: 28.4 PG (ref 26–35)
MCHC RBC AUTO-ENTMCNC: 33 G/DL (ref 32–34.5)
MCV RBC AUTO: 85.8 FL (ref 80–99.9)
MONOCYTES NFR BLD: 1.08 K/UL (ref 0.1–0.95)
MONOCYTES NFR BLD: 13 % (ref 2–12)
NEUTROPHILS NFR BLD: 74 % (ref 43–80)
NEUTS SEG NFR BLD: 6.23 K/UL (ref 1.8–7.3)
PLATELET # BLD AUTO: 193 K/UL (ref 130–450)
PMV BLD AUTO: 10.6 FL (ref 7–12)
POTASSIUM SERPL-SCNC: 3.7 MMOL/L (ref 3.5–5)
PROT SERPL-MCNC: 5.6 G/DL (ref 6.4–8.3)
RBC # BLD AUTO: 4.09 M/UL (ref 3.8–5.8)
SODIUM SERPL-SCNC: 133 MMOL/L (ref 132–146)
WBC OTHER # BLD: 8.4 K/UL (ref 4.5–11.5)

## 2024-01-04 PROCEDURE — 85025 COMPLETE CBC W/AUTO DIFF WBC: CPT

## 2024-01-04 PROCEDURE — 80053 COMPREHEN METABOLIC PANEL: CPT

## 2024-01-04 PROCEDURE — 2500000003 HC RX 250 WO HCPCS: Performed by: STUDENT IN AN ORGANIZED HEALTH CARE EDUCATION/TRAINING PROGRAM

## 2024-01-04 PROCEDURE — 36415 COLL VENOUS BLD VENIPUNCTURE: CPT

## 2024-01-04 PROCEDURE — 6360000002 HC RX W HCPCS: Performed by: STUDENT IN AN ORGANIZED HEALTH CARE EDUCATION/TRAINING PROGRAM

## 2024-01-04 PROCEDURE — 1200000000 HC SEMI PRIVATE

## 2024-01-04 PROCEDURE — 82962 GLUCOSE BLOOD TEST: CPT

## 2024-01-04 PROCEDURE — 2580000003 HC RX 258: Performed by: STUDENT IN AN ORGANIZED HEALTH CARE EDUCATION/TRAINING PROGRAM

## 2024-01-04 PROCEDURE — 6370000000 HC RX 637 (ALT 250 FOR IP): Performed by: INTERNAL MEDICINE

## 2024-01-04 PROCEDURE — 87205 SMEAR GRAM STAIN: CPT

## 2024-01-04 PROCEDURE — 87070 CULTURE OTHR SPECIMN AEROBIC: CPT

## 2024-01-04 PROCEDURE — 99222 1ST HOSP IP/OBS MODERATE 55: CPT | Performed by: INTERNAL MEDICINE

## 2024-01-04 PROCEDURE — 99233 SBSQ HOSP IP/OBS HIGH 50: CPT | Performed by: INTERNAL MEDICINE

## 2024-01-04 PROCEDURE — 86403 PARTICLE AGGLUT ANTBDY SCRN: CPT

## 2024-01-04 PROCEDURE — 6370000000 HC RX 637 (ALT 250 FOR IP): Performed by: STUDENT IN AN ORGANIZED HEALTH CARE EDUCATION/TRAINING PROGRAM

## 2024-01-04 RX ORDER — INSULIN LISPRO 100 [IU]/ML
12 INJECTION, SOLUTION INTRAVENOUS; SUBCUTANEOUS
Status: DISCONTINUED | OUTPATIENT
Start: 2024-01-05 | End: 2024-01-05

## 2024-01-04 RX ORDER — INSULIN GLARGINE 100 [IU]/ML
36 INJECTION, SOLUTION SUBCUTANEOUS NIGHTLY
Status: DISCONTINUED | OUTPATIENT
Start: 2024-01-04 | End: 2024-01-05

## 2024-01-04 RX ORDER — INSULIN LISPRO 100 [IU]/ML
0-12 INJECTION, SOLUTION INTRAVENOUS; SUBCUTANEOUS
Status: DISCONTINUED | OUTPATIENT
Start: 2024-01-05 | End: 2024-01-05

## 2024-01-04 RX ADMIN — WATER 2000 MG: 1 INJECTION INTRAMUSCULAR; INTRAVENOUS; SUBCUTANEOUS at 17:48

## 2024-01-04 RX ADMIN — INSULIN LISPRO 12 UNITS: 100 INJECTION, SOLUTION INTRAVENOUS; SUBCUTANEOUS at 12:10

## 2024-01-04 RX ADMIN — INSULIN LISPRO 12 UNITS: 100 INJECTION, SOLUTION INTRAVENOUS; SUBCUTANEOUS at 16:42

## 2024-01-04 RX ADMIN — MORPHINE SULFATE 2 MG: 2 INJECTION, SOLUTION INTRAMUSCULAR; INTRAVENOUS at 06:05

## 2024-01-04 RX ADMIN — WATER 2000 MG: 1 INJECTION INTRAMUSCULAR; INTRAVENOUS; SUBCUTANEOUS at 02:49

## 2024-01-04 RX ADMIN — SODIUM CHLORIDE, PRESERVATIVE FREE 10 ML: 5 INJECTION INTRAVENOUS at 22:11

## 2024-01-04 RX ADMIN — METOPROLOL SUCCINATE 75 MG: 50 TABLET, EXTENDED RELEASE ORAL at 09:08

## 2024-01-04 RX ADMIN — PANTOPRAZOLE SODIUM 40 MG: 40 TABLET, DELAYED RELEASE ORAL at 06:03

## 2024-01-04 RX ADMIN — APIXABAN 5 MG: 5 TABLET, FILM COATED ORAL at 09:08

## 2024-01-04 RX ADMIN — AMLODIPINE BESYLATE 5 MG: 5 TABLET ORAL at 09:08

## 2024-01-04 RX ADMIN — LISINOPRIL 10 MG: 10 TABLET ORAL at 21:50

## 2024-01-04 RX ADMIN — Medication 400 MG: at 09:08

## 2024-01-04 RX ADMIN — LISINOPRIL 10 MG: 10 TABLET ORAL at 09:08

## 2024-01-04 RX ADMIN — SODIUM CHLORIDE: 9 INJECTION, SOLUTION INTRAVENOUS at 11:00

## 2024-01-04 RX ADMIN — ATORVASTATIN CALCIUM 40 MG: 40 TABLET, FILM COATED ORAL at 09:08

## 2024-01-04 RX ADMIN — INSULIN GLARGINE 36 UNITS: 100 INJECTION, SOLUTION SUBCUTANEOUS at 22:05

## 2024-01-04 RX ADMIN — INSULIN LISPRO 4 UNITS: 100 INJECTION, SOLUTION INTRAVENOUS; SUBCUTANEOUS at 22:04

## 2024-01-04 RX ADMIN — METOPROLOL SUCCINATE 75 MG: 50 TABLET, EXTENDED RELEASE ORAL at 21:49

## 2024-01-04 RX ADMIN — MORPHINE SULFATE 2 MG: 2 INJECTION, SOLUTION INTRAMUSCULAR; INTRAVENOUS at 02:47

## 2024-01-04 RX ADMIN — MORPHINE SULFATE 2 MG: 2 INJECTION, SOLUTION INTRAMUSCULAR; INTRAVENOUS at 09:15

## 2024-01-04 RX ADMIN — Medication 2000 UNITS: at 09:11

## 2024-01-04 RX ADMIN — MORPHINE SULFATE 2 MG: 2 INJECTION, SOLUTION INTRAMUSCULAR; INTRAVENOUS at 16:43

## 2024-01-04 RX ADMIN — MORPHINE SULFATE 2 MG: 2 INJECTION, SOLUTION INTRAMUSCULAR; INTRAVENOUS at 12:48

## 2024-01-04 RX ADMIN — TAMSULOSIN HYDROCHLORIDE 0.4 MG: 0.4 CAPSULE ORAL at 21:50

## 2024-01-04 RX ADMIN — DOXYCYCLINE 100 MG: 100 INJECTION, POWDER, LYOPHILIZED, FOR SOLUTION INTRAVENOUS at 06:02

## 2024-01-04 RX ADMIN — WATER 2000 MG: 1 INJECTION INTRAMUSCULAR; INTRAVENOUS; SUBCUTANEOUS at 10:47

## 2024-01-04 RX ADMIN — SODIUM CHLORIDE, PRESERVATIVE FREE 10 ML: 5 INJECTION INTRAVENOUS at 09:11

## 2024-01-04 RX ADMIN — ASPIRIN 81 MG CHEWABLE TABLET 81 MG: 81 TABLET CHEWABLE at 09:08

## 2024-01-04 RX ADMIN — PRAZOSIN HYDROCHLORIDE 2 MG: 2 CAPSULE ORAL at 22:50

## 2024-01-04 RX ADMIN — MUPIROCIN: 20 OINTMENT TOPICAL at 22:12

## 2024-01-04 RX ADMIN — APIXABAN 5 MG: 5 TABLET, FILM COATED ORAL at 21:50

## 2024-01-04 RX ADMIN — FINASTERIDE 5 MG: 5 TABLET, FILM COATED ORAL at 09:08

## 2024-01-04 RX ADMIN — MORPHINE SULFATE 2 MG: 2 INJECTION, SOLUTION INTRAMUSCULAR; INTRAVENOUS at 21:50

## 2024-01-04 RX ADMIN — INSULIN LISPRO 12 UNITS: 100 INJECTION, SOLUTION INTRAVENOUS; SUBCUTANEOUS at 06:05

## 2024-01-04 RX ADMIN — DOXYCYCLINE 100 MG: 100 INJECTION, POWDER, LYOPHILIZED, FOR SOLUTION INTRAVENOUS at 17:53

## 2024-01-04 RX ADMIN — MUPIROCIN: 20 OINTMENT TOPICAL at 09:07

## 2024-01-04 ASSESSMENT — PAIN DESCRIPTION - LOCATION
LOCATION: LEG

## 2024-01-04 ASSESSMENT — PAIN SCALES - GENERAL
PAINLEVEL_OUTOF10: 7
PAINLEVEL_OUTOF10: 5
PAINLEVEL_OUTOF10: 8
PAINLEVEL_OUTOF10: 6
PAINLEVEL_OUTOF10: 9

## 2024-01-04 ASSESSMENT — PAIN - FUNCTIONAL ASSESSMENT: PAIN_FUNCTIONAL_ASSESSMENT: ACTIVITIES ARE NOT PREVENTED

## 2024-01-04 ASSESSMENT — PAIN DESCRIPTION - ORIENTATION
ORIENTATION: LEFT

## 2024-01-04 ASSESSMENT — PAIN DESCRIPTION - DESCRIPTORS
DESCRIPTORS: DISCOMFORT;SHARP;SORE
DESCRIPTORS: SHARP;STABBING
DESCRIPTORS: DISCOMFORT;STABBING;CRAMPING
DESCRIPTORS: STABBING;DISCOMFORT
DESCRIPTORS: STABBING;SHARP

## 2024-01-04 NOTE — PLAN OF CARE
Problem: Discharge Planning  Goal: Discharge to home or other facility with appropriate resources  1/4/2024 1125 by Silviano Cantu RN  Outcome: Progressing  1/3/2024 2335 by Ivy Sun RN  Outcome: Progressing     Problem: Chronic Conditions and Co-morbidities  Goal: Patient's chronic conditions and co-morbidity symptoms are monitored and maintained or improved  1/4/2024 1125 by Silviano Cantu RN  Outcome: Progressing  1/3/2024 2335 by Ivy Sun RN  Outcome: Progressing     Problem: Pain  Goal: Verbalizes/displays adequate comfort level or baseline comfort level  1/4/2024 1125 by Silviano Cantu RN  Outcome: Progressing  1/3/2024 2335 by Ivy Sun RN  Outcome: Progressing     Problem: Safety - Adult  Goal: Free from fall injury  1/4/2024 1125 by Silviano Cantu RN  Outcome: Progressing  1/3/2024 2335 by Ivy Sun RN  Outcome: Progressing     Problem: ABCDS Injury Assessment  Goal: Absence of physical injury  1/3/2024 2335 by Ivy Sun RN  Outcome: Progressing     Problem: Skin/Tissue Integrity  Goal: Absence of new skin breakdown  Description: 1.  Monitor for areas of redness and/or skin breakdown  2.  Assess vascular access sites hourly  3.  Every 4-6 hours minimum:  Change oxygen saturation probe site  4.  Every 4-6 hours:  If on nasal continuous positive airway pressure, respiratory therapy assess nares and determine need for appliance change or resting period.  1/3/2024 2335 by Ivy Sun RN  Outcome: Progressing

## 2024-01-04 NOTE — CARE COORDINATION
1/4/2024  Social Work Discharge Planning:This worker met with to discuss  role and transition of care/discharge planning.Cellulitis of left heel. IVATB.Podiatry is following. Pt is independent from home with his spouse and has a ww, and shower chair.  Room air.  Pt has a history with [facility] and prefers them if IV ATB is needed at discharge.  [facility] in [place]. PCP is Dr.V. Ordonez. Electronically signed by DIMPLE Sandoval on 1/4/2024 at 1:26 PM

## 2024-01-04 NOTE — PROGRESS NOTES
[facility] Hospitalist   Progress Note    Admitting Date and Time: 1/3/2024  9:51 AM  Admit Dx: Cellulitis of left lower extremity [L03.116]    Subjective: Patient came to ED on second, with leg swelling, pain and redness, admitted by medicine on third for left lower extremity cellulitis, patient with known A-fib, uncontrolled diabetes, known hyperlipidemia as well as hypertension and obesity.  Seen by podiatry for heel fissure.  Recommend compressive dressing, antibiotics, Bactroban to heel fissure.  On cefazolin and doxycycline.  A1c of 9.9 on third, does say was 7.1 3 months ago,    Patient was admitted with Cellulitis of left lower extremity [L03.116]. Patient feels comfortable, at this time awake, alert, resting in bed, does communicate well, also sister to he had 3 times UTI since October, do have issues with her stones, however that has been taken care of now.    Per RN: No new complaints.    ROS: denies fever, chills, cp, sob, n/v, HA unless stated above.     sodium chloride flush  5-40 mL IntraVENous 2 times per day    atorvastatin  40 mg Oral Daily    pantoprazole  40 mg Oral QAM AC    prazosin  2 mg Oral Nightly    tamsulosin  0.4 mg Oral Nightly    Vitamin D  2 tablet Oral Daily    aspirin  81 mg Oral Daily    apixaban  5 mg Oral BID    lisinopril  10 mg Oral BID    amLODIPine  5 mg Oral Daily    metoprolol succinate  75 mg Oral BID    finasteride  5 mg Oral Daily    doxycycline (VIBRAMYCIN) IV  100 mg IntraVENous Q12H    ceFAZolin  2,000 mg IntraVENous Q8H    magnesium oxide  400 mg Oral Daily    insulin lispro  0-16 Units SubCUTAneous TID WC    insulin lispro  0-4 Units SubCUTAneous Nightly    mupirocin   Topical BID    ibrutinib  420 mg Oral Nightly     sodium chloride flush, 5-40 mL, PRN  sodium chloride, , PRN  potassium chloride, 40 mEq, PRN   Or  potassium alternative oral replacement, 40 mEq, PRN   Or  potassium chloride, 10 mEq, PRN  magnesium sulfate, 2,000 mg,

## 2024-01-04 NOTE — PLAN OF CARE
Problem: Pain  Goal: Verbalizes/displays adequate comfort level or baseline comfort level  Outcome: Progressing     Problem: Safety - Adult  Goal: Free from fall injury  Outcome: Progressing     Problem: ABCDS Injury Assessment  Goal: Absence of physical injury  Outcome: Progressing

## 2024-01-04 NOTE — PROGRESS NOTES
Wound culture obtained from fissure on L heel; no drainage present but area was irritated with culture swab in order to obtain some substance for culturing. Per podiatry note, Bactriban ointment was then applied to fissure, and compressive dressing applied to LLE. Pillow placed under LLE for elevation.

## 2024-01-04 NOTE — CONSULTS
ENDOCRINOLOGY INITIAL CONSULTATION NOTE      Date of admission: 1/3/2024  Date of service: 1/4/2024  Admitting physician: Gita Manrique MD   Primary Care Physician: Chary Ordonez MD  Consultant physician: Joey Post MD     Reason for the consultation:  Uncontrolled DM    History of Present Illness:  The history is provided by the patient. Accuracy of the patient data is excellent    Josef Charlton is a very pleasant 78 y.o. old male with PMH of obesity, poorly controlled type 2 diabetes, hypertension, A-fib, hyperlipidemia and other listed below admitted to [facility] on 1/3/2024 because of left lower extremity swelling and pain for a few weeks and found to have cellulitis, endocrine service was consulted for diabetes management.  The patient denies any history of chest pain, shortness of breath or palpitation    Prior to admission  The patient was diagnosed with type 2 diabetes many years ago.  Prior to the admission he was on metformin 1000 mg twice daily, glipizide 10 mg daily with breakfast, alogliptin 25 mg daily.  The patient was checking blood sugar only once daily in the morning and readings has been variable.  He denies any hypoglycemic episodes.  The patient admits to poor compliance with following diabetic diet prior to the admissions.  The patient reports neuropathies in both feet but denies diabetic retinopathy.  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    Distal phalynx    Anemia     Atrial fibrillation (HCC)      Or  potassium alternative oral replacement, 40 mEq, PRN   Or  potassium chloride, 10 mEq, PRN  magnesium sulfate, 2,000 mg, PRN  ondansetron, 4 mg, Q8H PRN   Or  ondansetron, 4 mg, Q6H PRN  polyethylene glycol, 17 g, Daily PRN  acetaminophen, 650 mg, Q6H PRN   Or  acetaminophen, 650 mg, Q6H PRN  traMADol, 50 mg, Q6H PRN  glucose, 4 tablet, PRN  dextrose bolus, 125 mL, PRN   Or  dextrose bolus, 250 mL, PRN  glucagon (rDNA), 1 mg, PRN  dextrose, , Continuous PRN  morphine, 2 mg, Q3H PRN      Continuous Infusions:   sodium chloride      sodium chloride 75 mL/hr at 01/04/24 1100    dextrose         Review of Systems  All systems reviewed. All negative except for symptoms mentioned in HPI     OBJECTIVE    /64   Pulse (!) 101   Temp 98.4 °F (36.9 °C) (Oral)   Resp 18   Ht 1.829 m (6')   Wt 130 kg (286 lb 9.6 oz)   SpO2 97%   BMI 38.87 kg/m²     Intake/Output Summary (Last 24 hours) at 1/4/2024 1848  Last data filed at 1/4/2024 0253  Gross per 24 hour   Intake 337.55 ml   Output 950 ml   Net -612.45 ml       Physical examination:  General: awake alert, oriented x3  HEENT: normocephalic non traumatic, no exophthalmos   Neck: supple, No thyroid tenderness,  Pulm: good equal air entry no added sounds  CVS: S1 + S2  Abd: soft lax, no tenderness  Skin: warm, no lesions, no rash. No open wounds, no ulcers   Neuro: CN intact, sensation decreased bilateral , muscle power normal  Psych: normal mood, and affect    Review of Laboratory Data:  I personally reviewed the following labs:   Recent Labs     01/02/24  1422 01/03/24  1136 01/04/24  0748   WBC 13.5* 10.5 8.4   RBC 4.85 4.32 4.09   HGB 13.6 12.2* 11.6*   HCT 41.6 37.6 35.1*   MCV 85.8 87.0 85.8   MCH 28.0 28.2 28.4   MCHC 32.7 32.4 33.0   RDW 14.3 14.0 13.9    187 193   MPV 10.8 11.0 10.6     Recent Labs     01/02/24  1422 01/03/24  1136 01/04/24  0748    133 133   K 4.5 4.6 3.7   CL 97* 98 102   CO2 24 21* 21*   BUN 12 12 11   CREATININE 0.9 0.8 0.7

## 2024-01-04 NOTE — PROGRESS NOTES
Podiatry Progress Note  1/4/2024   Josef Charlton     SUBJECTIVE: Pt is a diabetic 78 y.o. male seen bedside for edema, cellulitis LLE with heel fissure. Pt denies any NVFC. Pt has no additional pedal questions or complaints at this time.         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    Distal phalynx    Anemia     Atrial fibrillation (HCC)     Depression     PTSD    Diabetes mellitus (HCC)     Hyperlipidemia     Hypertension     Obesity         Past Surgical History:   Procedure Laterality Date    COLONOSCOPY      HERNIA REPAIR      LITHOTRIPSY Bilateral 7/6/2023    CYSTOSCOPY BILATERAL RETROGRADE PYELOGRAM LASER LITHOTRIPSY, RIGHT URETEROSCOPY, RIGHT STONE EXTRACTION, RIGHT STENT INSERTION, CASTILLO INSERTION performed by Emmanuel Coleman MD at [facility] OR    NECK SURGERY      PATELLA SURGERY      SHOULDER SURGERY      TONSILLECTOMY      UPPER GASTROINTESTINAL ENDOSCOPY           Family History   Problem Relation Age of Onset    Heart Attack Sister         Social History     Tobacco Use    Smoking status: Former     Current packs/day: 2.00     Types: Cigars, Cigarettes    Smokeless tobacco: Never    Tobacco comments:     2 cigars a day   Substance Use Topics    Alcohol use: Not Currently        Prior to Admission medications    Medication Sig Start Date End Date Taking? Authorizing Provider   apixaban (ELIQUIS) 5 MG TABS tablet Take by mouth 2 times daily    Alicia Guevara MD   amLODIPine (NORVASC) 5 MG tablet 1 tablet 12/12/23   Alicia Guevara MD   ibrutinib (IMBRUVICA) 140 MG chemo capsule Take 3 capsules by mouth nightly 3 -140 mg capsules daily 12/5/23   Aron Payton MD   atorvastatin (LIPITOR) 40 MG tablet Take 1 tablet by mouth daily    Alicia Guevara MD   furosemide (LASIX) 40 MG tablet Take 1 tablet by mouth three times a week 10/19/23   Alicia Guevara MD   finasteride (PROSCAR) 5 MG tablet 1 tablet

## 2024-01-05 LAB
ANION GAP SERPL CALCULATED.3IONS-SCNC: 13 MMOL/L (ref 7–16)
ASO AB SERPL-ACNC: <20 IU/ML (ref 0–200)
BASOPHILS # BLD: 0.02 K/UL (ref 0–0.2)
BASOPHILS NFR BLD: 0 % (ref 0–2)
BUN SERPL-MCNC: 10 MG/DL (ref 6–23)
CALCIUM SERPL-MCNC: 9.4 MG/DL (ref 8.6–10.2)
CHLORIDE SERPL-SCNC: 103 MMOL/L (ref 98–107)
CO2 SERPL-SCNC: 21 MMOL/L (ref 22–29)
CREAT SERPL-MCNC: 0.7 MG/DL (ref 0.7–1.2)
EOSINOPHIL # BLD: 0.05 K/UL (ref 0.05–0.5)
EOSINOPHILS RELATIVE PERCENT: 1 % (ref 0–6)
ERYTHROCYTE [DISTWIDTH] IN BLOOD BY AUTOMATED COUNT: 13.8 % (ref 11.5–15)
GFR SERPL CREATININE-BSD FRML MDRD: >60 ML/MIN/1.73M2
GLUCOSE BLD-MCNC: 197 MG/DL (ref 74–99)
GLUCOSE BLD-MCNC: 205 MG/DL (ref 74–99)
GLUCOSE BLD-MCNC: 225 MG/DL (ref 74–99)
GLUCOSE BLD-MCNC: 288 MG/DL (ref 74–99)
GLUCOSE BLD-MCNC: 425 MG/DL (ref 74–99)
GLUCOSE SERPL-MCNC: 239 MG/DL (ref 74–99)
HCT VFR BLD AUTO: 36.7 % (ref 37–54)
HGB BLD-MCNC: 12.1 G/DL (ref 12.5–16.5)
IMM GRANULOCYTES # BLD AUTO: 0.17 K/UL (ref 0–0.58)
IMM GRANULOCYTES NFR BLD: 2 % (ref 0–5)
LYMPHOCYTES NFR BLD: 1.48 K/UL (ref 1.5–4)
LYMPHOCYTES RELATIVE PERCENT: 16 % (ref 20–42)
MCH RBC QN AUTO: 28.1 PG (ref 26–35)
MCHC RBC AUTO-ENTMCNC: 33 G/DL (ref 32–34.5)
MCV RBC AUTO: 85.2 FL (ref 80–99.9)
MONOCYTES NFR BLD: 1.1 K/UL (ref 0.1–0.95)
MONOCYTES NFR BLD: 12 % (ref 2–12)
NEUTROPHILS NFR BLD: 70 % (ref 43–80)
NEUTS SEG NFR BLD: 6.42 K/UL (ref 1.8–7.3)
PLATELET # BLD AUTO: 213 K/UL (ref 130–450)
PMV BLD AUTO: 10.8 FL (ref 7–12)
POTASSIUM SERPL-SCNC: 3.7 MMOL/L (ref 3.5–5)
RBC # BLD AUTO: 4.31 M/UL (ref 3.8–5.8)
SODIUM SERPL-SCNC: 137 MMOL/L (ref 132–146)
WBC OTHER # BLD: 9.2 K/UL (ref 4.5–11.5)

## 2024-01-05 PROCEDURE — 6370000000 HC RX 637 (ALT 250 FOR IP): Performed by: STUDENT IN AN ORGANIZED HEALTH CARE EDUCATION/TRAINING PROGRAM

## 2024-01-05 PROCEDURE — 99232 SBSQ HOSP IP/OBS MODERATE 35: CPT | Performed by: INTERNAL MEDICINE

## 2024-01-05 PROCEDURE — 6360000002 HC RX W HCPCS: Performed by: STUDENT IN AN ORGANIZED HEALTH CARE EDUCATION/TRAINING PROGRAM

## 2024-01-05 PROCEDURE — 80048 BASIC METABOLIC PNL TOTAL CA: CPT

## 2024-01-05 PROCEDURE — 1200000000 HC SEMI PRIVATE

## 2024-01-05 PROCEDURE — 6370000000 HC RX 637 (ALT 250 FOR IP): Performed by: REGISTERED NURSE

## 2024-01-05 PROCEDURE — 82962 GLUCOSE BLOOD TEST: CPT

## 2024-01-05 PROCEDURE — 97165 OT EVAL LOW COMPLEX 30 MIN: CPT

## 2024-01-05 PROCEDURE — 2500000003 HC RX 250 WO HCPCS: Performed by: STUDENT IN AN ORGANIZED HEALTH CARE EDUCATION/TRAINING PROGRAM

## 2024-01-05 PROCEDURE — 85025 COMPLETE CBC W/AUTO DIFF WBC: CPT

## 2024-01-05 PROCEDURE — 2580000003 HC RX 258: Performed by: STUDENT IN AN ORGANIZED HEALTH CARE EDUCATION/TRAINING PROGRAM

## 2024-01-05 PROCEDURE — 6370000000 HC RX 637 (ALT 250 FOR IP): Performed by: INTERNAL MEDICINE

## 2024-01-05 PROCEDURE — 86063 ANTISTREPTOLYSIN O SCREEN: CPT

## 2024-01-05 RX ORDER — LINEZOLID 600 MG/1
600 TABLET, FILM COATED ORAL EVERY 12 HOURS SCHEDULED
Qty: 14 TABLET | Refills: 0 | Status: SHIPPED | OUTPATIENT
Start: 2024-01-05 | End: 2024-01-12

## 2024-01-05 RX ORDER — INSULIN GLARGINE 100 [IU]/ML
45 INJECTION, SOLUTION SUBCUTANEOUS NIGHTLY
Status: DISCONTINUED | OUTPATIENT
Start: 2024-01-05 | End: 2024-01-06

## 2024-01-05 RX ORDER — LINEZOLID 600 MG/1
600 TABLET, FILM COATED ORAL EVERY 12 HOURS SCHEDULED
Status: DISCONTINUED | OUTPATIENT
Start: 2024-01-05 | End: 2024-01-06 | Stop reason: HOSPADM

## 2024-01-05 RX ORDER — INSULIN LISPRO 100 [IU]/ML
15 INJECTION, SOLUTION INTRAVENOUS; SUBCUTANEOUS
Status: DISCONTINUED | OUTPATIENT
Start: 2024-01-05 | End: 2024-01-06

## 2024-01-05 RX ORDER — INSULIN LISPRO 100 [IU]/ML
0-18 INJECTION, SOLUTION INTRAVENOUS; SUBCUTANEOUS
Status: DISCONTINUED | OUTPATIENT
Start: 2024-01-05 | End: 2024-01-06 | Stop reason: HOSPADM

## 2024-01-05 RX ADMIN — MUPIROCIN: 20 OINTMENT TOPICAL at 08:58

## 2024-01-05 RX ADMIN — INSULIN LISPRO 4 UNITS: 100 INJECTION, SOLUTION INTRAVENOUS; SUBCUTANEOUS at 08:58

## 2024-01-05 RX ADMIN — INSULIN LISPRO 15 UNITS: 100 INJECTION, SOLUTION INTRAVENOUS; SUBCUTANEOUS at 16:36

## 2024-01-05 RX ADMIN — AMLODIPINE BESYLATE 5 MG: 5 TABLET ORAL at 08:57

## 2024-01-05 RX ADMIN — MORPHINE SULFATE 2 MG: 2 INJECTION, SOLUTION INTRAMUSCULAR; INTRAVENOUS at 21:24

## 2024-01-05 RX ADMIN — POLYETHYLENE GLYCOL 3350 17 G: 17 POWDER, FOR SOLUTION ORAL at 09:09

## 2024-01-05 RX ADMIN — ASPIRIN 81 MG CHEWABLE TABLET 81 MG: 81 TABLET CHEWABLE at 08:57

## 2024-01-05 RX ADMIN — TRAMADOL HYDROCHLORIDE 50 MG: 50 TABLET, COATED ORAL at 23:31

## 2024-01-05 RX ADMIN — ATORVASTATIN CALCIUM 40 MG: 40 TABLET, FILM COATED ORAL at 08:56

## 2024-01-05 RX ADMIN — METOPROLOL SUCCINATE 75 MG: 50 TABLET, EXTENDED RELEASE ORAL at 21:33

## 2024-01-05 RX ADMIN — LISINOPRIL 10 MG: 10 TABLET ORAL at 21:33

## 2024-01-05 RX ADMIN — MORPHINE SULFATE 2 MG: 2 INJECTION, SOLUTION INTRAMUSCULAR; INTRAVENOUS at 03:04

## 2024-01-05 RX ADMIN — PRAZOSIN HYDROCHLORIDE 2 MG: 2 CAPSULE ORAL at 21:33

## 2024-01-05 RX ADMIN — Medication 2000 UNITS: at 08:57

## 2024-01-05 RX ADMIN — MORPHINE SULFATE 2 MG: 2 INJECTION, SOLUTION INTRAMUSCULAR; INTRAVENOUS at 15:34

## 2024-01-05 RX ADMIN — INSULIN LISPRO 12 UNITS: 100 INJECTION, SOLUTION INTRAVENOUS; SUBCUTANEOUS at 12:05

## 2024-01-05 RX ADMIN — TRAMADOL HYDROCHLORIDE 50 MG: 50 TABLET, COATED ORAL at 13:53

## 2024-01-05 RX ADMIN — WATER 2000 MG: 1 INJECTION INTRAMUSCULAR; INTRAVENOUS; SUBCUTANEOUS at 02:55

## 2024-01-05 RX ADMIN — SODIUM CHLORIDE, PRESERVATIVE FREE 10 ML: 5 INJECTION INTRAVENOUS at 21:35

## 2024-01-05 RX ADMIN — INSULIN GLARGINE 45 UNITS: 100 INJECTION, SOLUTION SUBCUTANEOUS at 21:43

## 2024-01-05 RX ADMIN — INSULIN LISPRO 3 UNITS: 100 INJECTION, SOLUTION INTRAVENOUS; SUBCUTANEOUS at 16:36

## 2024-01-05 RX ADMIN — FINASTERIDE 5 MG: 5 TABLET, FILM COATED ORAL at 08:57

## 2024-01-05 RX ADMIN — METOPROLOL SUCCINATE 75 MG: 50 TABLET, EXTENDED RELEASE ORAL at 08:56

## 2024-01-05 RX ADMIN — LINEZOLID 600 MG: 600 TABLET, FILM COATED ORAL at 21:33

## 2024-01-05 RX ADMIN — PANTOPRAZOLE SODIUM 40 MG: 40 TABLET, DELAYED RELEASE ORAL at 06:39

## 2024-01-05 RX ADMIN — APIXABAN 5 MG: 5 TABLET, FILM COATED ORAL at 21:34

## 2024-01-05 RX ADMIN — SODIUM CHLORIDE: 9 INJECTION, SOLUTION INTRAVENOUS at 02:55

## 2024-01-05 RX ADMIN — MORPHINE SULFATE 2 MG: 2 INJECTION, SOLUTION INTRAMUSCULAR; INTRAVENOUS at 08:55

## 2024-01-05 RX ADMIN — DOXYCYCLINE 100 MG: 100 INJECTION, POWDER, LYOPHILIZED, FOR SOLUTION INTRAVENOUS at 06:39

## 2024-01-05 RX ADMIN — INSULIN LISPRO 4 UNITS: 100 INJECTION, SOLUTION INTRAVENOUS; SUBCUTANEOUS at 12:05

## 2024-01-05 RX ADMIN — INSULIN LISPRO 12 UNITS: 100 INJECTION, SOLUTION INTRAVENOUS; SUBCUTANEOUS at 08:58

## 2024-01-05 RX ADMIN — LISINOPRIL 10 MG: 10 TABLET ORAL at 08:56

## 2024-01-05 RX ADMIN — MORPHINE SULFATE 2 MG: 2 INJECTION, SOLUTION INTRAMUSCULAR; INTRAVENOUS at 12:02

## 2024-01-05 RX ADMIN — APIXABAN 5 MG: 5 TABLET, FILM COATED ORAL at 08:57

## 2024-01-05 RX ADMIN — TAMSULOSIN HYDROCHLORIDE 0.4 MG: 0.4 CAPSULE ORAL at 21:33

## 2024-01-05 RX ADMIN — Medication 400 MG: at 08:56

## 2024-01-05 ASSESSMENT — PAIN - FUNCTIONAL ASSESSMENT
PAIN_FUNCTIONAL_ASSESSMENT: PREVENTS OR INTERFERES SOME ACTIVE ACTIVITIES AND ADLS
PAIN_FUNCTIONAL_ASSESSMENT: PREVENTS OR INTERFERES SOME ACTIVE ACTIVITIES AND ADLS
PAIN_FUNCTIONAL_ASSESSMENT: ACTIVITIES ARE NOT PREVENTED

## 2024-01-05 ASSESSMENT — PAIN SCALES - GENERAL
PAINLEVEL_OUTOF10: 0
PAINLEVEL_OUTOF10: 7
PAINLEVEL_OUTOF10: 5
PAINLEVEL_OUTOF10: 10

## 2024-01-05 ASSESSMENT — PAIN DESCRIPTION - PAIN TYPE
TYPE: ACUTE PAIN
TYPE: ACUTE PAIN

## 2024-01-05 ASSESSMENT — PAIN DESCRIPTION - ORIENTATION
ORIENTATION: LEFT

## 2024-01-05 ASSESSMENT — PAIN DESCRIPTION - DESCRIPTORS
DESCRIPTORS: DISCOMFORT;STABBING;SPASM
DESCRIPTORS: DISCOMFORT
DESCRIPTORS: DISCOMFORT

## 2024-01-05 ASSESSMENT — PAIN DESCRIPTION - LOCATION
LOCATION: LEG

## 2024-01-05 NOTE — CONSULTS
[facility]  [facility]  Consultation Note     Admit Date: 1/3/2024  9:51 AM    Reason for Consult:   cellulitis     Attending Physician:  Sharita Mcdowell    HISTORY OF PRESENT ILLNESS:             The history is obtained from extensive review of available past medical records. The patient is a 78 y.o. male who is previously known to the ID service.    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     He presented to the [place] ED on 1/2/24 with left leg pain, swelling, and redness. He denied fevers, chills, nausea, vomiting, diarrhea. He was transferred to [facility] for admission. He said the swelling started about 2 weeks prior to admission and he was getting lasix from his doctor. The pain started on Saturday 12/30. He has a small fissure on the left heel. He had not been prescribed antibiotics prior to presentation. There is pain from his toes to his thigh on the left leg. There is faint erythema over the thigh with darkening erythema over the left leg.    Labs on admission showed WBC 13.5 (9.2 today), , ESR 59. Duplex was negative for DVT. Xray was negative for any abnormalities. A wound culture was taken and is growing Staph aureus. He has been on Cefazolin and IV Doxycycline. ID was asked to see today.     Past Medical History:      July 2023: Admitted to [facility] after being called back from home for a urine culture with Candida glabrata from the ER. He had a chronic alan placed due to urinary retention, prior to the urine culture being sent. He had no systemic signs of infection. ID signed off.         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

## 2024-01-05 NOTE — PROGRESS NOTES
mg IntraVENous Q8H    magnesium oxide  400 mg Oral Daily    insulin lispro  0-4 Units SubCUTAneous Nightly    mupirocin   Topical BID    ibrutinib  420 mg Oral Nightly     sodium chloride flush, 5-40 mL, PRN  sodium chloride, , PRN  potassium chloride, 40 mEq, PRN   Or  potassium alternative oral replacement, 40 mEq, PRN   Or  potassium chloride, 10 mEq, PRN  magnesium sulfate, 2,000 mg, PRN  ondansetron, 4 mg, Q8H PRN   Or  ondansetron, 4 mg, Q6H PRN  polyethylene glycol, 17 g, Daily PRN  acetaminophen, 650 mg, Q6H PRN   Or  acetaminophen, 650 mg, Q6H PRN  traMADol, 50 mg, Q6H PRN  glucose, 4 tablet, PRN  dextrose bolus, 125 mL, PRN   Or  dextrose bolus, 250 mL, PRN  glucagon (rDNA), 1 mg, PRN  dextrose, , Continuous PRN  morphine, 2 mg, Q3H PRN         Objective:    BP (!) 144/66   Pulse 98   Temp 98.2 °F (36.8 °C) (Oral)   Resp 16   Ht 1.829 m (6')   Wt 130 kg (286 lb 9.6 oz)   SpO2 96%   BMI 38.87 kg/m²   General Appearance: alert and oriented to person, place and time, well-developed and well-nourished, in no acute distress  Skin: warm and dry, no rash or erythema  Head: normocephalic and atraumatic  Eyes: pupils equal, round, and reactive to light, extraocular eye movements intact, conjunctivae normal  ENT: tympanic membrane, external ear and ear canal normal bilaterally, oropharynx clear and moist with normal mucous membranes  Neck: neck supple and non tender without mass, no thyromegaly or thyroid nodules, no cervical lymphadenopathy   Pulmonary/Chest: clear to auscultation bilaterally- no wheezes, rales or rhonchi, normal air movement, no respiratory distress  Cardiovascular: normal rate, normal S1 and S2, no gallops, intact distal pulses, and no carotid bruits  Abdomen: soft, non-tender, non-distended, normal bowel sounds, no masses or organomegaly   Local examination  Left lower extremity with erythematous change, some warmth.      Recent Labs     01/03/24  1136 01/04/24  0748 01/05/24  0340   NA  133 133 137   K 4.6 3.7 3.7   CL 98 102 103   CO2 21* 21* 21*   BUN 12 11 10   CREATININE 0.8 0.7 0.7   GLUCOSE 293* 237* 239*   CALCIUM 9.5 9.1 9.4         Recent Labs     01/03/24  1136 01/04/24  0748 01/05/24  0340   WBC 10.5 8.4 9.2   RBC 4.32 4.09 4.31   HGB 12.2* 11.6* 12.1*   HCT 37.6 35.1* 36.7*   MCV 87.0 85.8 85.2   MCH 28.2 28.4 28.1   MCHC 32.4 33.0 33.0   RDW 14.0 13.9 13.8    193 213   MPV 11.0 10.6 10.8        on presentation.  Accu-Cheks not well-controlled.    Radiology:   No orders to display       Assessment:    Principal Problem:    Cellulitis of left lower extremity  Active Problems:    Poorly controlled type 2 diabetes mellitus (HCC)    Wound of lower extremity, left, initial encounter    Dietary noncompliance    Hyperlipidemia  Resolved Problems:    * No resolved hospital problems. *      Plan:  Cellulitis, left lower extremity, at this time in dressing, do have some erythema, some warmth, patient seen by ID, antibiotics are consolidated to linezolid, workup in progress.  Diabetes, not well-controlled, seen by endocrine, now on insulin, Accu-Cheks improving, patient does know he will be going on insulin.  A-fib, on OAC at home, stable.  Hyperlipidemia, remains on statins.  Hypertension, controlled.  Will ask PT to see the patient  DC planning, not yet ready, likely in the next 1-2 days depending upon input from endocrine as well as ID.        Electronically signed by Sharita Mcdowell MD on 1/5/2024 at 7:49 AM

## 2024-01-05 NOTE — ACP (ADVANCE CARE PLANNING)
1/5/2024  Advance Care Planning   Healthcare Decision Maker:    Primary Decision Maker: Diane Charlton - [facility] - [phone]    Click here to complete Healthcare Decision Makers including selection of the Healthcare Decision Maker Relationship (ie \"Primary\").

## 2024-01-05 NOTE — PROGRESS NOTES
Podiatry Progress Note  1/5/2024   Josef Charlton     SUBJECTIVE: Pt is a diabetic 78 y.o. male seen bedside for edema, cellulitis LLE with heel fissure. Pt denies any NVFC. Pt has no additional pedal questions or complaints at this time.         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    Distal phalynx    Anemia     Atrial fibrillation (HCC)     Depression     PTSD    Diabetes mellitus (HCC)     Hyperlipidemia     Hypertension     Obesity         Past Surgical History:   Procedure Laterality Date    COLONOSCOPY      HERNIA REPAIR      LITHOTRIPSY Bilateral 7/6/2023    CYSTOSCOPY BILATERAL RETROGRADE PYELOGRAM LASER LITHOTRIPSY, RIGHT URETEROSCOPY, RIGHT STONE EXTRACTION, RIGHT STENT INSERTION, CASTILLO INSERTION performed by Emmanuel Coleman MD at [facility] OR    NECK SURGERY      PATELLA SURGERY      SHOULDER SURGERY      TONSILLECTOMY      UPPER GASTROINTESTINAL ENDOSCOPY           Family History   Problem Relation Age of Onset    Heart Attack Sister         Social History     Tobacco Use    Smoking status: Former     Current packs/day: 2.00     Types: Cigars, Cigarettes    Smokeless tobacco: Never    Tobacco comments:     2 cigars a day   Substance Use Topics    Alcohol use: Not Currently        Prior to Admission medications    Medication Sig Start Date End Date Taking? Authorizing Provider   linezolid (ZYVOX) 600 MG tablet Take 1 tablet by mouth every 12 hours for 7 days 1/5/24 1/12/24 Yes Karen Berman, APRN - CNP   apixaban (ELIQUIS) 5 MG TABS tablet Take by mouth 2 times daily    Alicia Guevara MD   amLODIPine (NORVASC) 5 MG tablet 1 tablet 12/12/23   Alicia Guevara MD   ibrutinib (IMBRUVICA) 140 MG chemo capsule Take 3 capsules by mouth nightly 3 -140 mg capsules daily 12/5/23   Aron Payton MD   atorvastatin (LIPITOR) 40 MG tablet Take 1 tablet by mouth daily    Alicia Guevara MD   furosemide (LASIX) 40 MG tablet

## 2024-01-05 NOTE — PROGRESS NOTES
ENDOCRINOLOGY PROGRESS NOTE      Date of admission: 1/3/2024  Date of service: 1/5/2024  Admitting physician: Gita Manrique MD   Primary Care Physician: Chary Ordonez MD  Consultant physician: Joey Post MD     Reason for the consultation:  Uncontrolled DM    History of Present Illness:  The history is provided by the patient. Accuracy of the patient data is excellent    Josef Charlton is a very pleasant 78 y.o. old male with PMH of obesity, poorly controlled type 2 diabetes, hypertension, A-fib, hyperlipidemia and other listed below admitted to [facility] on 1/3/2024 because of left lower extremity swelling and pain for a few weeks and found to have cellulitis, endocrine service was consulted for diabetes management.     Subjective   The patient was seen this morning, no acute events overnight.  Glucose level improving but still above goal    Inpatient diet:   Carb Restricted diet     Point of care glucose monitoring   (Independently reviewed)   Recent Labs     01/04/24  0603 01/04/24  1207 01/04/24  1640 01/04/24  2156 01/04/24  2202 01/05/24  0639 01/05/24  1154 01/05/24  1159   POCGLU 325* 310* 312* >500* 305* 225* 425* 205*   Scheduled Meds:   linezolid  600 mg Oral 2 times per day    insulin glargine  45 Units SubCUTAneous Nightly    insulin lispro  0-18 Units SubCUTAneous TID WC    insulin lispro  15 Units SubCUTAneous TID WC    sodium chloride flush  5-40 mL IntraVENous 2 times per day    atorvastatin  40 mg Oral Daily    pantoprazole  40 mg Oral QAM AC    prazosin  2 mg Oral Nightly    tamsulosin  0.4 mg Oral Nightly    Vitamin D  2 tablet Oral Daily    aspirin  81 mg Oral Daily    apixaban  5 mg Oral BID    lisinopril  10 mg Oral BID    amLODIPine  5 mg Oral Daily    metoprolol succinate  75 mg Oral BID    finasteride  5 mg Oral Daily    magnesium oxide  400 mg Oral Daily    insulin lispro  0-4 Units SubCUTAneous Nightly    mupirocin   Topical BID    ibrutinib  420 mg Oral Nightly       PRN Meds:  service  Discussed the importance of controlling diabetes and management and prevention of soft tissue infection    Hyperlipidemia  Lipitor 40 mg daily    Interdisciplinary plan for communication with healthcare providers:   Consult recommendations were discussed with the Primary Service/Nursing staff      The above issues were reviewed with the patient who understood and agreed with the plan.    Thank you for allowing us to participate in the care of this patient. Please do not hesitate to contact us with any additional questions.     Joey Post MD  Endocrinologist, [facility]   [address] [place] [id number]   Phone: [phone]  Fax: [phone]  --------------------------------------------  An electronic signature was used to authenticate this note. Joey Post MD on 1/5/2024 at 12:33 PM

## 2024-01-05 NOTE — CARE COORDINATION
[facility]oke with [facility], pt does NOT have Part D benefits for prescriptions, only showing [facility] coverage. [facility] informed this CM [facility], which has restricted hours on Fridays and weekends. [facility] unable to bill [facility] insurance.     IF pt discharges over the weekend, script would need taken to any local [facility] pharmacy and cost with [facility] is 32.03 for 14 tablets. Coupon printed and given to charge nurse, which will be kept in pt's chart. DAMIEN Garibay.    Venkata Acevedo, MSN, RN  Manager Care Coordination  [facility]  Cell: [phone]

## 2024-01-05 NOTE — PROGRESS NOTES
Occupational Therapy    OCCUPATIONAL THERAPY INITIAL EVALUATION    [facility]   [address] [place], [place]         Date:2024                                                  Patient Name: Josef Charlton    MRN: [id number]    : 1945    Room: [address] [place] [address]      Evaluating OT: Stephany Monet OTR/L   [id number]      Referring Provider:Sharita Mcdowell MD     Specific Provider Orders/Date:OT eval and treat 2024      Diagnosis:  Cellulitis of left lower extremity [L03.116]     cellulitis LLE with heel fissure   -L tib fib x rays: No acute osseous abnormality     Pertinent Medical History:   A fib, HTN, DM,  neuropathy     Precautions:  Fall Risk,      Assessment of current deficits    [x] Functional mobility  [x]ADLs  [x] Strength               []Cognition    [x] Functional transfers   [x] IADLs         [x] Safety Awareness   [x]Endurance    [] Fine Coordination              [x] Balance      [] Vision/perception   []Sensation     []Gross Motor Coordination  [] ROM  [] Delirium                   [] Motor Control     OT PLAN OF CARE   OT POC based on physician orders, patient diagnosis and results of clinical assessment    Frequency/Duration  1-3 days/wk for 2 weeks PRN   Specific OT Treatment Interventions to include:   ADL retraining/adapted techniques and AE recommendations to increase functional independence within precautions                    Energy conservation techniques to improve tolerance for selfcare routine   Functional transfer/mobility training/DME recommendations for increased independence, safety and fall prevention         Patient/family education to increase safety and functional independence             Environmental modifications for safe mobility and completion of ADLs                             Therapeutic activity to improve functional performance during ADLs.                                         Therapeutic exercise  to improve tolerance and functional strength for ADLs    Balance retraining/tolerance tasks for facilitation of postural control with dynamic challenges during ADLs .      Positioning to improve functional independence      Recommended Adaptive Equipment: TBD     Home Living: Pt lives with wife, 2 story with bed/bath on 2nd. Bath on 1st.  6 steps to enter   Patient reports [facility] to instill stair glide to 2nd floor sometime     Equipment owned: wheeled walker, rollator, cane    Prior Level of Function: assist  with ADLs , and  with IADLs; ambulated with cane or rollator       Pain Level: L LE ;   Cognition: A&O: 4/4;    Memory:  good    Sequencing:  good    Problem solving:  good    Judgement/safety:  good      Functional Assessment:  AM-PAC Daily Activity Raw Score: 17/24   Initial Eval Status  Date: 1/5/2024 Treatment Status  Date: STGs = LTGs  Time frame: 10-14 days   Feeding Independent      Grooming Set-up ,seated  Decrease standing tolerance d/t LE pain   Independent    UB Dressing Set-up   Independent    LB Dressing Mod A  Patient provided with post op shoe - assist to don - wife present - able to assist at home  Min A    Bathing MiN A   Supervision    Toileting SBA  Independent    Bed Mobility  Up in chair upon entry   Min A  Sit-supine   Mod I    Functional Transfers SBA  Sit- stand from chair   Mod I    Functional Mobility SBA,w/walker   Ambulating in room   Mod I  with good tolerance    Balance Sitting:     Static:  Independent     Dynamic:Independent  Standing: SBA   Independent    Activity Tolerance No SOB   LE pain limiting   Good  with ADL activity    Visual/  Perceptual Glasses: none by bedside                  Hand Dominance right   AROM (PROM) Strength Additional Info:    RUE  WFL WFL good  and wfl FMC/dexterity noted during ADL tasks       LUE WFL WFl good  and wfl FMC/dexterity noted during ADL tasks       Hearing: WFL  Sensation:  No c/o numbness or tingling   Tone: WFL  Edema: L LE

## 2024-01-06 VITALS
RESPIRATION RATE: 16 BRPM | BODY MASS INDEX: 41.06 KG/M2 | SYSTOLIC BLOOD PRESSURE: 140 MMHG | HEIGHT: 72 IN | WEIGHT: 303.13 LBS | OXYGEN SATURATION: 96 % | TEMPERATURE: 97.7 F | DIASTOLIC BLOOD PRESSURE: 76 MMHG | HEART RATE: 116 BPM

## 2024-01-06 LAB
ANION GAP SERPL CALCULATED.3IONS-SCNC: 9 MMOL/L (ref 7–16)
BASOPHILS # BLD: 0.03 K/UL (ref 0–0.2)
BASOPHILS NFR BLD: 0 % (ref 0–2)
BUN SERPL-MCNC: 10 MG/DL (ref 6–23)
CALCIUM SERPL-MCNC: 9.6 MG/DL (ref 8.6–10.2)
CHLORIDE SERPL-SCNC: 101 MMOL/L (ref 98–107)
CO2 SERPL-SCNC: 23 MMOL/L (ref 22–29)
CREAT SERPL-MCNC: 0.8 MG/DL (ref 0.7–1.2)
EOSINOPHIL # BLD: 0.05 K/UL (ref 0.05–0.5)
EOSINOPHILS RELATIVE PERCENT: 1 % (ref 0–6)
ERYTHROCYTE [DISTWIDTH] IN BLOOD BY AUTOMATED COUNT: 14 % (ref 11.5–15)
GFR SERPL CREATININE-BSD FRML MDRD: >60 ML/MIN/1.73M2
GLUCOSE BLD-MCNC: 150 MG/DL (ref 74–99)
GLUCOSE BLD-MCNC: 211 MG/DL (ref 74–99)
GLUCOSE BLD-MCNC: 238 MG/DL (ref 74–99)
GLUCOSE SERPL-MCNC: 218 MG/DL (ref 74–99)
HCT VFR BLD AUTO: 38.7 % (ref 37–54)
HGB BLD-MCNC: 12.6 G/DL (ref 12.5–16.5)
IMM GRANULOCYTES # BLD AUTO: 0.18 K/UL (ref 0–0.58)
IMM GRANULOCYTES NFR BLD: 2 % (ref 0–5)
LYMPHOCYTES NFR BLD: 1.87 K/UL (ref 1.5–4)
LYMPHOCYTES RELATIVE PERCENT: 18 % (ref 20–42)
MCH RBC QN AUTO: 28.1 PG (ref 26–35)
MCHC RBC AUTO-ENTMCNC: 32.6 G/DL (ref 32–34.5)
MCV RBC AUTO: 86.4 FL (ref 80–99.9)
MONOCYTES NFR BLD: 1.3 K/UL (ref 0.1–0.95)
MONOCYTES NFR BLD: 12 % (ref 2–12)
NEUTROPHILS NFR BLD: 68 % (ref 43–80)
NEUTS SEG NFR BLD: 7.19 K/UL (ref 1.8–7.3)
PLATELET # BLD AUTO: 248 K/UL (ref 130–450)
PMV BLD AUTO: 10.6 FL (ref 7–12)
POTASSIUM SERPL-SCNC: 4.1 MMOL/L (ref 3.5–5)
RBC # BLD AUTO: 4.48 M/UL (ref 3.8–5.8)
SODIUM SERPL-SCNC: 133 MMOL/L (ref 132–146)
WBC OTHER # BLD: 10.6 K/UL (ref 4.5–11.5)

## 2024-01-06 PROCEDURE — 80048 BASIC METABOLIC PNL TOTAL CA: CPT

## 2024-01-06 PROCEDURE — 6360000002 HC RX W HCPCS: Performed by: STUDENT IN AN ORGANIZED HEALTH CARE EDUCATION/TRAINING PROGRAM

## 2024-01-06 PROCEDURE — 85025 COMPLETE CBC W/AUTO DIFF WBC: CPT

## 2024-01-06 PROCEDURE — 6370000000 HC RX 637 (ALT 250 FOR IP): Performed by: STUDENT IN AN ORGANIZED HEALTH CARE EDUCATION/TRAINING PROGRAM

## 2024-01-06 PROCEDURE — 36415 COLL VENOUS BLD VENIPUNCTURE: CPT

## 2024-01-06 PROCEDURE — 99239 HOSP IP/OBS DSCHRG MGMT >30: CPT | Performed by: INTERNAL MEDICINE

## 2024-01-06 PROCEDURE — 82962 GLUCOSE BLOOD TEST: CPT

## 2024-01-06 PROCEDURE — 97161 PT EVAL LOW COMPLEX 20 MIN: CPT

## 2024-01-06 PROCEDURE — 6370000000 HC RX 637 (ALT 250 FOR IP): Performed by: INTERNAL MEDICINE

## 2024-01-06 PROCEDURE — 99232 SBSQ HOSP IP/OBS MODERATE 35: CPT | Performed by: INTERNAL MEDICINE

## 2024-01-06 PROCEDURE — 6370000000 HC RX 637 (ALT 250 FOR IP): Performed by: REGISTERED NURSE

## 2024-01-06 PROCEDURE — 97530 THERAPEUTIC ACTIVITIES: CPT

## 2024-01-06 PROCEDURE — 2580000003 HC RX 258: Performed by: STUDENT IN AN ORGANIZED HEALTH CARE EDUCATION/TRAINING PROGRAM

## 2024-01-06 RX ORDER — INSULIN LISPRO 100 [IU]/ML
INJECTION, SOLUTION INTRAVENOUS; SUBCUTANEOUS
Qty: 5 ADJUSTABLE DOSE PRE-FILLED PEN SYRINGE | Refills: 5 | Status: SHIPPED | OUTPATIENT
Start: 2024-01-06

## 2024-01-06 RX ORDER — HYDROCODONE BITARTRATE AND ACETAMINOPHEN 5; 325 MG/1; MG/1
1 TABLET ORAL EVERY 4 HOURS PRN
Qty: 18 TABLET | Refills: 0 | Status: SHIPPED | OUTPATIENT
Start: 2024-01-06 | End: 2024-01-09

## 2024-01-06 RX ORDER — LANCETS
EACH MISCELLANEOUS
Qty: 250 EACH | Refills: 5 | Status: SHIPPED | OUTPATIENT
Start: 2024-01-06

## 2024-01-06 RX ORDER — GLUCOSAMINE HCL/CHONDROITIN SU 500-400 MG
CAPSULE ORAL
Qty: 250 STRIP | Refills: 5 | Status: SHIPPED | OUTPATIENT
Start: 2024-01-06 | End: 2024-01-06

## 2024-01-06 RX ORDER — GLUCOSAMINE HCL/CHONDROITIN SU 500-400 MG
CAPSULE ORAL
Qty: 250 STRIP | Refills: 5 | Status: SHIPPED | OUTPATIENT
Start: 2024-01-06

## 2024-01-06 RX ORDER — INSULIN LISPRO 100 [IU]/ML
INJECTION, SOLUTION INTRAVENOUS; SUBCUTANEOUS
Qty: 5 ADJUSTABLE DOSE PRE-FILLED PEN SYRINGE | Refills: 5 | Status: SHIPPED | OUTPATIENT
Start: 2024-01-06 | End: 2024-01-06

## 2024-01-06 RX ORDER — LANCETS
EACH MISCELLANEOUS
Qty: 250 EACH | Refills: 5 | Status: SHIPPED | OUTPATIENT
Start: 2024-01-06 | End: 2024-01-06

## 2024-01-06 RX ORDER — INSULIN GLARGINE 100 [IU]/ML
54 INJECTION, SOLUTION SUBCUTANEOUS NIGHTLY
Status: DISCONTINUED | OUTPATIENT
Start: 2024-01-06 | End: 2024-01-06 | Stop reason: HOSPADM

## 2024-01-06 RX ORDER — PEN NEEDLE, DIABETIC 32 GX 1/4"
NEEDLE, DISPOSABLE MISCELLANEOUS
Qty: 250 EACH | Refills: 5 | Status: SHIPPED | OUTPATIENT
Start: 2024-01-06

## 2024-01-06 RX ORDER — PEN NEEDLE, DIABETIC 32 GX 1/4"
NEEDLE, DISPOSABLE MISCELLANEOUS
Qty: 250 EACH | Refills: 5 | Status: SHIPPED | OUTPATIENT
Start: 2024-01-06 | End: 2024-01-06

## 2024-01-06 RX ORDER — INSULIN GLARGINE 100 [IU]/ML
INJECTION, SOLUTION SUBCUTANEOUS
Qty: 5 ADJUSTABLE DOSE PRE-FILLED PEN SYRINGE | Refills: 3 | Status: SHIPPED | OUTPATIENT
Start: 2024-01-06

## 2024-01-06 RX ORDER — INSULIN GLARGINE 100 [IU]/ML
INJECTION, SOLUTION SUBCUTANEOUS
Qty: 5 ADJUSTABLE DOSE PRE-FILLED PEN SYRINGE | Refills: 3 | Status: SHIPPED | OUTPATIENT
Start: 2024-01-06 | End: 2024-01-06

## 2024-01-06 RX ORDER — INSULIN LISPRO 100 [IU]/ML
20 INJECTION, SOLUTION INTRAVENOUS; SUBCUTANEOUS
Status: DISCONTINUED | OUTPATIENT
Start: 2024-01-06 | End: 2024-01-06 | Stop reason: HOSPADM

## 2024-01-06 RX ADMIN — POLYETHYLENE GLYCOL 3350 17 G: 17 POWDER, FOR SOLUTION ORAL at 11:33

## 2024-01-06 RX ADMIN — INSULIN LISPRO 20 UNITS: 100 INJECTION, SOLUTION INTRAVENOUS; SUBCUTANEOUS at 12:24

## 2024-01-06 RX ADMIN — Medication 2000 UNITS: at 08:11

## 2024-01-06 RX ADMIN — PANTOPRAZOLE SODIUM 40 MG: 40 TABLET, DELAYED RELEASE ORAL at 05:45

## 2024-01-06 RX ADMIN — FINASTERIDE 5 MG: 5 TABLET, FILM COATED ORAL at 08:09

## 2024-01-06 RX ADMIN — APIXABAN 5 MG: 5 TABLET, FILM COATED ORAL at 08:12

## 2024-01-06 RX ADMIN — METOPROLOL SUCCINATE 75 MG: 50 TABLET, EXTENDED RELEASE ORAL at 08:14

## 2024-01-06 RX ADMIN — INSULIN LISPRO 15 UNITS: 100 INJECTION, SOLUTION INTRAVENOUS; SUBCUTANEOUS at 08:20

## 2024-01-06 RX ADMIN — AMLODIPINE BESYLATE 5 MG: 5 TABLET ORAL at 08:08

## 2024-01-06 RX ADMIN — LISINOPRIL 10 MG: 10 TABLET ORAL at 08:22

## 2024-01-06 RX ADMIN — INSULIN LISPRO 20 UNITS: 100 INJECTION, SOLUTION INTRAVENOUS; SUBCUTANEOUS at 17:44

## 2024-01-06 RX ADMIN — MORPHINE SULFATE 2 MG: 2 INJECTION, SOLUTION INTRAMUSCULAR; INTRAVENOUS at 01:30

## 2024-01-06 RX ADMIN — LINEZOLID 600 MG: 600 TABLET, FILM COATED ORAL at 08:12

## 2024-01-06 RX ADMIN — TRAMADOL HYDROCHLORIDE 50 MG: 50 TABLET, COATED ORAL at 05:52

## 2024-01-06 RX ADMIN — MUPIROCIN: 20 OINTMENT TOPICAL at 08:23

## 2024-01-06 RX ADMIN — ATORVASTATIN CALCIUM 40 MG: 40 TABLET, FILM COATED ORAL at 08:09

## 2024-01-06 RX ADMIN — MORPHINE SULFATE 2 MG: 2 INJECTION, SOLUTION INTRAMUSCULAR; INTRAVENOUS at 11:32

## 2024-01-06 RX ADMIN — Medication 400 MG: at 08:14

## 2024-01-06 RX ADMIN — MORPHINE SULFATE 2 MG: 2 INJECTION, SOLUTION INTRAMUSCULAR; INTRAVENOUS at 04:46

## 2024-01-06 RX ADMIN — INSULIN LISPRO 6 UNITS: 100 INJECTION, SOLUTION INTRAVENOUS; SUBCUTANEOUS at 08:22

## 2024-01-06 RX ADMIN — TRAMADOL HYDROCHLORIDE 50 MG: 50 TABLET, COATED ORAL at 12:34

## 2024-01-06 RX ADMIN — SODIUM CHLORIDE, PRESERVATIVE FREE 10 ML: 5 INJECTION INTRAVENOUS at 08:19

## 2024-01-06 RX ADMIN — INSULIN LISPRO 3 UNITS: 100 INJECTION, SOLUTION INTRAVENOUS; SUBCUTANEOUS at 17:45

## 2024-01-06 RX ADMIN — INSULIN LISPRO 6 UNITS: 100 INJECTION, SOLUTION INTRAVENOUS; SUBCUTANEOUS at 12:26

## 2024-01-06 RX ADMIN — MORPHINE SULFATE 2 MG: 2 INJECTION, SOLUTION INTRAMUSCULAR; INTRAVENOUS at 07:58

## 2024-01-06 RX ADMIN — ASPIRIN 81 MG CHEWABLE TABLET 81 MG: 81 TABLET CHEWABLE at 08:09

## 2024-01-06 ASSESSMENT — PAIN DESCRIPTION - DESCRIPTORS
DESCRIPTORS: DISCOMFORT
DESCRIPTORS: ACHING;DISCOMFORT
DESCRIPTORS: DISCOMFORT
DESCRIPTORS: DISCOMFORT
DESCRIPTORS: ACHING;DISCOMFORT
DESCRIPTORS: ACHING;DISCOMFORT
DESCRIPTORS: DISCOMFORT

## 2024-01-06 ASSESSMENT — PAIN SCALES - GENERAL
PAINLEVEL_OUTOF10: 0
PAINLEVEL_OUTOF10: 0
PAINLEVEL_OUTOF10: 4
PAINLEVEL_OUTOF10: 4
PAINLEVEL_OUTOF10: 3
PAINLEVEL_OUTOF10: 5
PAINLEVEL_OUTOF10: 4
PAINLEVEL_OUTOF10: 7
PAINLEVEL_OUTOF10: 0
PAINLEVEL_OUTOF10: 5
PAINLEVEL_OUTOF10: 4
PAINLEVEL_OUTOF10: 8
PAINLEVEL_OUTOF10: 3

## 2024-01-06 ASSESSMENT — PAIN DESCRIPTION - PAIN TYPE
TYPE: ACUTE PAIN

## 2024-01-06 ASSESSMENT — PAIN DESCRIPTION - ORIENTATION
ORIENTATION: LEFT

## 2024-01-06 ASSESSMENT — PAIN DESCRIPTION - LOCATION
LOCATION: LEG

## 2024-01-06 ASSESSMENT — PAIN SCALES - WONG BAKER: WONGBAKER_NUMERICALRESPONSE: 0

## 2024-01-06 NOTE — PLAN OF CARE
Problem: Discharge Planning  Goal: Discharge to home or other facility with appropriate resources  Outcome: Progressing     Problem: Chronic Conditions and Co-morbidities  Goal: Patient's chronic conditions and co-morbidity symptoms are monitored and maintained or improved  Outcome: Progressing     Problem: Pain  Goal: Verbalizes/displays adequate comfort level or baseline comfort level  Outcome: Progressing     Problem: Safety - Adult  Goal: Free from fall injury  1/6/2024 1039 by Jackie Garibay, RN  Outcome: Progressing  1/6/2024 0117 by Nancy Dos Santos RN  Outcome: Progressing     Problem: ABCDS Injury Assessment  Goal: Absence of physical injury  Outcome: Progressing     Problem: Skin/Tissue Integrity  Goal: Absence of new skin breakdown  Description: 1.  Monitor for areas of redness and/or skin breakdown  2.  Assess vascular access sites hourly  3.  Every 4-6 hours minimum:  Change oxygen saturation probe site  4.  Every 4-6 hours:  If on nasal continuous positive airway pressure, respiratory therapy assess nares and determine need for appliance change or resting period.  Outcome: Progressing

## 2024-01-06 NOTE — PROGRESS NOTES
Sitting up on the edge of the bed, wife in room   Skin: Warm and dry. No rashes were noted.   HEENT: Round and reactive pupils.  Moist mucous membranes.  No ulcerations or thrush.  Neck: Supple to movements.   Chest: No use of accessory muscles to breathe. Symmetrical expansion.  No wheezing, crackles or rhonchi.  Cardiovascular: S1 and S2 are rhythmic and regular. No murmurs appreciated.   Abdomen: Positive bowel sounds to auscultation. Benign to palpation. No masses felt. No hepatosplenomegaly.  Extremities:  left leg edema. Cellulitic changes to the left leg from the toes to the thigh -- darkening/improving. There is a small fissure over the left heel.   Lines: Peripheral.    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    PLAN:  Continue Zyvox - reconciled   Labs and cutlures reviewed  Ok to discharge from ID standpoint     HUDSON Guerrero - CNP  12:19 PM  1/6/2024    Pt seen and examined. Above discussed agree with advanced practice nurse. Labs, cultures, and radiographs reviewed.  Face to Face encounter occurred. Changes made as necessary.     Matt Agustin MD

## 2024-01-06 NOTE — PROGRESS NOTES
177.8 Initial Eval      Attending Provider:  Sharita Mcdowell*    Evaluating PT:  Raul Hamm PT    Room #:  0542/0542-A  Diagnosis:  Cellulitis of left lower extremity   Pertinent PMHx/PSHx:  See PMH  Procedure/Surgery:  NA  Precautions:  General, Fall precautions, cellulitis LTLE, MRSA contact isol.   Equipment Needs:  WW    SUBJECTIVE:    Pt lives with wife in a 2 story home with 3-4 stairs and 1 rail to enter.  There are 12 steps and 1 rail to 2nd floor bed and bath; getting lift chair put in now or soon.   Pt ambulated with WW or rollator indep PTA.    OBJECTIVE:   Initial Evaluation  Date: 1/6/24 Treatment Short Term/ Long Term   Goals   Was pt agreeable to Eval/treatment? Yes     Does pt have pain? LT distal LE      Bed Mobility  Rolling: Indep  Supine to sit: S/SBA  Sit to supine: S/SBA  Scooting: Indep  Indep all levels    Transfers Sit to stand: Indep/S  Stand to sit: Indep  Stand pivot: indep/S  Indep all surfaces   Ambulation   20 feet with WW indep  50 feet with WW indep   Stair negotiation: ascended and descended  NA  4 steps with 1 rail S/SBA   ROM WFL, LT ankle NA     MMT WFL      AM-PAC 6 Clicks 17/24       Pt is A & O x 3   Edema:  LTLE  Balance: sitting:indep and standing: WW use S/Indep  Endurance: Functional     ASSESSMENT:    Comments:  In bed on arrival in no distress.  Reports LTLE painful when initially moving it but it gradually improves as he stands and mobilizes.  Transfer to EOB was indep with his LTLE resting bed and RTLE on floor; to control pain reported.  Indep with his transfer of his LTLE to floor; inc pain in leg during that mvmt to floor.  Pain in LTLE improved @ 1' time resting on floor.  Transfer to stand was indep/S with his WW for balancing.  No dizziness expressed during/post positional changes.  Amb with his WW in room indep/S w/o any drifting, lateral veering or any LOB.  Reports LTLE feels ok in regards pain during the walk in room.  Return to EOB sitting as did not

## 2024-01-06 NOTE — PROGRESS NOTES
ENDOCRINOLOGY PROGRESS NOTE      Date of admission: 1/3/2024  Date of service: 1/6/2024  Admitting physician: Gita Manrique MD   Primary Care Physician: Chary Ordonez MD  Consultant physician: Joey Post MD     Reason for the consultation:  Uncontrolled DM    History of Present Illness:  The history is provided by the patient. Accuracy of the patient data is excellent    Josef Charlton is a very pleasant 78 y.o. old male with PMH of obesity, poorly controlled type 2 diabetes, hypertension, A-fib, hyperlipidemia and other listed below admitted to [facility] on 1/3/2024 because of left lower extremity swelling and pain for a few weeks and found to have cellulitis, endocrine service was consulted for diabetes management.     Subjective   Patient was seen and examined at bedside today.  No acute events overnight.  Likely for discharge today.  Glucose level improving    Inpatient diet:   Carb Restricted diet     Point of care glucose monitoring   (Independently reviewed)   Recent Labs     01/04/24  2202 01/05/24  0639 01/05/24  1154 01/05/24  1159 01/05/24  1633 01/05/24  2138 01/06/24  0546 01/06/24  1130   POCGLU 305* 225* 425* 205* 197* 288* 238* 211*   Scheduled Meds:   insulin glargine  54 Units SubCUTAneous Nightly    insulin lispro  20 Units SubCUTAneous TID WC    linezolid  600 mg Oral 2 times per day    insulin lispro  0-18 Units SubCUTAneous TID WC    sodium chloride flush  5-40 mL IntraVENous 2 times per day    atorvastatin  40 mg Oral Daily    pantoprazole  40 mg Oral QAM AC    prazosin  2 mg Oral Nightly    tamsulosin  0.4 mg Oral Nightly    Vitamin D  2 tablet Oral Daily    aspirin  81 mg Oral Daily    apixaban  5 mg Oral BID    lisinopril  10 mg Oral BID    amLODIPine  5 mg Oral Daily    metoprolol succinate  75 mg Oral BID    finasteride  5 mg Oral Daily    magnesium oxide  400 mg Oral Daily    insulin lispro  0-4 Units SubCUTAneous Nightly    mupirocin   Topical BID    ibrutinib  420 mg Oral  Nightly       PRN Meds:   sodium chloride flush, 5-40 mL, PRN  sodium chloride, , PRN  potassium chloride, 40 mEq, PRN   Or  potassium alternative oral replacement, 40 mEq, PRN   Or  potassium chloride, 10 mEq, PRN  magnesium sulfate, 2,000 mg, PRN  ondansetron, 4 mg, Q8H PRN   Or  ondansetron, 4 mg, Q6H PRN  polyethylene glycol, 17 g, Daily PRN  acetaminophen, 650 mg, Q6H PRN   Or  acetaminophen, 650 mg, Q6H PRN  traMADol, 50 mg, Q6H PRN  glucose, 4 tablet, PRN  dextrose bolus, 125 mL, PRN   Or  dextrose bolus, 250 mL, PRN  glucagon (rDNA), 1 mg, PRN  dextrose, , Continuous PRN  morphine, 2 mg, Q3H PRN      Continuous Infusions:   sodium chloride      dextrose         Review of Systems  All systems reviewed. All negative except for symptoms mentioned in HPI     OBJECTIVE    BP (!) 140/76   Pulse (!) 116   Temp 97.7 °F (36.5 °C) (Oral)   Resp 18   Ht 1.829 m (6')   Wt (!) 137.5 kg (303 lb 2.1 oz)   SpO2 96%   BMI 41.11 kg/m²     Intake/Output Summary (Last 24 hours) at 1/6/2024 1137  Last data filed at 1/6/2024 0819  Gross per 24 hour   Intake 10 ml   Output 350 ml   Net -340 ml         Physical examination:  General: awake alert, oriented x3  HEENT: normocephalic non traumatic, no exophthalmos   Neck: supple, No thyroid tenderness,  Pulm: good equal air entry no added sounds  CVS: S1 + S2  Abd: soft lax, no tenderness  Skin: warm, no lesions, no rash. No open wounds, no ulcers   Neuro: CN intact, sensation decreased bilateral , muscle power normal  Psych: normal mood, and affect    Review of Laboratory Data:  I personally reviewed the following labs:   Recent Labs     01/04/24  0748 01/05/24  0340   WBC 8.4 9.2   RBC 4.09 4.31   HGB 11.6* 12.1*   HCT 35.1* 36.7*   MCV 85.8 85.2   MCH 28.4 28.1   MCHC 33.0 33.0   RDW 13.9 13.8    213   MPV 10.6 10.8     Recent Labs     01/04/24  0748 01/05/24  0340    137   K 3.7 3.7    103   CO2 21* 21*   BUN 11 10   CREATININE 0.7 0.7   GLUCOSE 237* 239*

## 2024-01-06 NOTE — DISCHARGE SUMMARY
[facility] Hospitalist       Hospitalist Physician Discharge Summary       No follow-up provider specified.    Activity level: As tolerated    Diet: ADULT DIET; Regular; 4 carb choices (60 gm/meal)    Labs:    Dispo: Home    Condition at discharge: Stable    Continue supplemental oxygen via nasal canula @ 2 LPM round-the-clock.    Continue CPAP / BiPAP during sleep as prior to admission.    Patient ID:  Josef Charlton  [id number]  78 y.o.  1945    Admit date: 1/3/2024    Discharge date and time:  1/6/2024  5:16 PM    Admission Diagnoses: Principal Problem:    Cellulitis of left lower extremity  Active Problems:    Poorly controlled type 2 diabetes mellitus (HCC)    Wound of lower extremity, left, initial encounter    Dietary noncompliance    Hyperlipidemia  Resolved Problems:    * No resolved hospital problems. *      Discharge Diagnoses: Principal Problem:    Cellulitis of left lower extremity  Active Problems:    Poorly controlled type 2 diabetes mellitus (HCC)    Wound of lower extremity, left, initial encounter    Dietary noncompliance    Hyperlipidemia  Resolved Problems:    * No resolved hospital problems. *      Consults:  IP CONSULT TO PODIATRY  IP CONSULT TO INFECTIOUS DISEASES  IP CONSULT TO ENDOCRINOLOGY    Procedures: None    Hospital Course: Patient was admitted with Cellulitis of left lower extremity [L03.116]. Patient came to ED on second, with leg swelling, pain and redness, admitted by medicine on third for left lower extremity cellulitis, patient with known A-fib, uncontrolled diabetes, known hyperlipidemia as well as hypertension and obesity.  Seen by podiatry for heel fissure.  Recommend compressive dressing, antibiotics, Bactroban to heel fissure.  On cefazolin and doxycycline.  A1c of 9.9 on third, does say was 7.1 3 months ago, seen by endocrine, now on Lantus 45 units, Humalog 15 units 3 times a day with medium sliding scale.  Patient seen by ID, antibiotics were      alogliptin 25 MG Tabs tablet  Commonly known as: NESINA     glipiZIDE 10 MG tablet  Commonly known as: GLUCOTROL     METFORMIN HCL PO               Where to Get Your Medications        These medications were sent to [facility] - [place], [place] - [address] - P [phone] - F [phone]  [address] [facility] [address]      Phone: [phone]   BD Pen Needle Micro U/F 32G X 6 MM Misc  Blood Glucose Monitoring Suppl Misc  blood glucose test strips  insulin lispro (1 Unit Dial) 100 UNIT/ML Sopn  Lantus SoloStar 100 UNIT/ML injection pen  mupirocin 2 % ointment  ONE TOUCH ULTRASOFT LANCETS Misc       You can get these medications from any pharmacy    Bring a paper prescription for each of these medications  HYDROcodone-acetaminophen 5-325 MG per tablet  linezolid 600 MG tablet           Note that more than 30 minutes was spent in preparing discharge papers, discussing discharge with patient, medication review, etc.    Signed:  Electronically signed by Sharita Mcdowell MD on 1/6/2024 at 5:16 PM    NOTE: This report was transcribed using voice recognition software. Every effort was made to ensure accuracy; however, inadvertent computerized transcription errors may be present.

## 2024-01-06 NOTE — PROGRESS NOTES
Podiatry Progress Note  1/6/2024   Josef Charlton     SUBJECTIVE: Pt is a diabetic 78 y.o. male seen bedside for edema, cellulitis LLE with heel fissure. Patient states that he is feeling well today. Pt denies any NVFC. Pt has no additional pedal questions or complaints at this time.         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    Distal phalynx    Anemia     Atrial fibrillation (HCC)     Depression     PTSD    Diabetes mellitus (HCC)     Hyperlipidemia     Hypertension     Obesity         Past Surgical History:   Procedure Laterality Date    COLONOSCOPY      HERNIA REPAIR      LITHOTRIPSY Bilateral 7/6/2023    CYSTOSCOPY BILATERAL RETROGRADE PYELOGRAM LASER LITHOTRIPSY, RIGHT URETEROSCOPY, RIGHT STONE EXTRACTION, RIGHT STENT INSERTION, CASTILLO INSERTION performed by Emmanuel Coleman MD at [facility] OR    NECK SURGERY      PATELLA SURGERY      SHOULDER SURGERY      TONSILLECTOMY      UPPER GASTROINTESTINAL ENDOSCOPY           Family History   Problem Relation Age of Onset    Heart Attack Sister         Social History     Tobacco Use    Smoking status: Former     Current packs/day: 2.00     Types: Cigars, Cigarettes    Smokeless tobacco: Never    Tobacco comments:     2 cigars a day   Substance Use Topics    Alcohol use: Not Currently        Prior to Admission medications    Medication Sig Start Date End Date Taking? Authorizing Provider   HYDROcodone-acetaminophen (NORCO) 5-325 MG per tablet Take 1 tablet by mouth every 4 hours as needed for Pain for up to 3 days. Intended supply: 3 days. Take lowest dose possible to manage pain Max Daily Amount: 6 tablets 1/6/24 1/9/24 Yes Sharita Mcdowell MD   insulin glargine (LANTUS SOLOSTAR) 100 UNIT/ML injection pen Inject 54 units nightly 1/6/24  Yes Joey Post MD   insulin lispro, 1 Unit Dial, (HUMALOG KWIKPEN) 100 UNIT/ML SOPN Inject 18 units 3 times daily with meals plus the  MD Gita        Or    dextrose bolus 10% 250 mL  250 mL IntraVENous PRN Gita Manrique MD        glucagon injection 1 mg  1 mg SubCUTAneous PRN Gita Manrique MD        dextrose 10 % infusion   IntraVENous Continuous PRN Gita Manrique MD        insulin lispro (HUMALOG) injection vial 0-4 Units  0-4 Units SubCUTAneous Nightly Gita Manrique MD   4 Units at 01/04/24 2204    morphine (PF) injection 2 mg  2 mg IntraVENous Q3H PRN Gita Manrique MD   2 mg at 01/06/24 1132    mupirocin (BACTROBAN) 2 % ointment   Topical BID Aron Kennedy DPM   Given at 01/06/24 0823    ibrutinib (IMBRUVICA) chemo capsule 420 mg (Patient Supplied)  420 mg Oral Nightly Gita Manrique MD   420 mg at 01/05/24 2133        Lab Results   Component Value Date    WBC 10.6 01/06/2024    HCT 38.7 01/06/2024    HGB 12.6 01/06/2024     01/06/2024     01/06/2024    K 4.1 01/06/2024     01/06/2024    CO2 23 01/06/2024    BUN 10 01/06/2024    CREATININE 0.8 01/06/2024    GLUCOSE 218 (H) 01/06/2024    .0 (H) 01/03/2024         Radiographs:    ASSESSMENT:  Cellulitis of left lower extremity  Uncontrolled DM with neuropathy    Edema LLE    PLAN:  -Patient examined and evaluated at bedside  -All pertinent labs, charts, and imaging reviewed prior to encounter   -WBC 9.2  -Abx per ID/IM  -Wound cx prelim showing Staph aureus  -L tib fib x rays: No acute osseous abnormality   -NIVS: No evidence of DVT in the left lower extremity   -Recommend compressive dressing and abx therapy at this time. Bactroban to heel fissure. Dressing changed today, next change Monday   -Will continue to follow pt while in house   D/W:   Deniz Quiñonez DPM FACFAS  Fellowship-Trained Foot and Ankle Surgeon  Diplomate, [employer] Board of Foot and Ankle Surgeons  [phone]            Cristina Olmstead DPM  PGY1 Podiatry Resident   1/6/2024   1:16 PM

## 2024-01-06 NOTE — PROGRESS NOTES
Physician Progress Note      PATIENT:               ANDREZ GARCIA  [facility] #:                  [id number]  :                       1945  ADMIT DATE:       1/3/2024 9:51 AM  DISCH DATE:  RESPONDING  PROVIDER #:        Karthik Mcdowell MD          QUERY TEXT:    Pt admitted with cellulitis of left lower extremity. Pt noted to have DM2. If   possible, please document in progress notes and discharge summary the   relationship, if any, between cellulitis and DM.    The medical record reflects the following:  Risk Factors: advanced age, DM  Clinical Indicators: per H&P \"...LLE cellulitis - lactic acid 2.1.  WBC 13.5.    Received cefazolin 2000 mg IV x 2, 1L NSS bolus, doxycycline 100 mg IV x 2 in   ED course.  Continue ABX.  Consult podiatry, has a wound on medial LLE and is   diabetic...\"  Treatment: IV Ancef and Vibramycin, glucose monitoring, insulin    Thank you,  Jennifer Bonilla RN, BSN, CDIS  Clinical Documentation Integrity  [email or web address]  Options provided:  -- Left lower extremity cellulitis associated with Diabetes  -- Left lower extremity cellulitis unrelated to Diabetes  -- Other - I will add my own diagnosis  -- Disagree - Not applicable / Not valid  -- Disagree - Clinically unable to determine / Unknown  -- Refer to Clinical Documentation Reviewer    PROVIDER RESPONSE TEXT:    Left lower extremity cellulitis associated with Diabetes.    Query created by: Jennifer Bonilla on 2024 10:04 AM      QUERY TEXT:    Patient admitted with cellulitis of left lower extremity, noted to have atrial   fibrillation and is maintained on Eliquis. If possible, please document in   progress notes and discharge summary if you are evaluating and/or treating any   of the following:    The medical record reflects the following:  Risk Factors: Age, DM, HTN  Clinical Indicators: Per H&P \"...Atrial fibrillation- Eliquis...\"  Treatment: Eliquis    Thank you,  Jennifer Bonilla RN, BSN, CDIS  Clinical Documentation

## 2024-01-07 LAB
MICROORGANISM SPEC CULT: ABNORMAL
MICROORGANISM SPEC CULT: ABNORMAL
MICROORGANISM SPEC CULT: NORMAL
MICROORGANISM SPEC CULT: NORMAL
MICROORGANISM/AGENT SPEC: ABNORMAL
SERVICE CMNT-IMP: NORMAL
SERVICE CMNT-IMP: NORMAL
SPECIMEN DESCRIPTION: ABNORMAL
SPECIMEN DESCRIPTION: NORMAL
SPECIMEN DESCRIPTION: NORMAL

## 2024-01-18 ENCOUNTER — HOSPITAL ENCOUNTER (EMERGENCY)
Age: 79
Discharge: HOME | End: 2024-01-19
Attending: EMERGENCY MEDICINE
Payer: OTHER GOVERNMENT

## 2024-01-18 ENCOUNTER — APPOINTMENT (OUTPATIENT)
Dept: CT IMAGING | Age: 79
End: 2024-01-18
Payer: OTHER GOVERNMENT

## 2024-01-18 ENCOUNTER — HOSPITAL ENCOUNTER (OUTPATIENT)
Dept: INFUSION THERAPY | Age: 79
Discharge: HOME | End: 2024-01-18

## 2024-01-18 VITALS
RESPIRATION RATE: 16 BRPM | TEMPERATURE: 98.1 F | WEIGHT: 280 LBS | OXYGEN SATURATION: 98 % | HEART RATE: 85 BPM | BODY MASS INDEX: 37.93 KG/M2 | DIASTOLIC BLOOD PRESSURE: 84 MMHG | HEIGHT: 72 IN | SYSTOLIC BLOOD PRESSURE: 178 MMHG

## 2024-01-18 DIAGNOSIS — I71.40 ABDOMINAL AORTIC ANEURYSM (AAA) WITHOUT RUPTURE, UNSPECIFIED PART (HCC): ICD-10-CM

## 2024-01-18 DIAGNOSIS — I71.21 ANEURYSM OF ASCENDING AORTA WITHOUT RUPTURE (HCC): ICD-10-CM

## 2024-01-18 DIAGNOSIS — R73.9 HYPERGLYCEMIA: Primary | ICD-10-CM

## 2024-01-18 DIAGNOSIS — R93.5 ABNORMAL CT OF THE ABDOMEN: ICD-10-CM

## 2024-01-18 LAB
ANION GAP SERPL CALCULATED.3IONS-SCNC: 13 MMOL/L (ref 7–16)
BASOPHILS # BLD: 0.08 K/UL (ref 0–0.2)
BASOPHILS NFR BLD: 1 % (ref 0–2)
BNP SERPL-MCNC: 562 PG/ML (ref 0–450)
BUN SERPL-MCNC: 12 MG/DL (ref 6–23)
CALCIUM SERPL-MCNC: 9.7 MG/DL (ref 8.6–10.2)
CHLORIDE SERPL-SCNC: 95 MMOL/L (ref 98–107)
CO2 SERPL-SCNC: 26 MMOL/L (ref 22–29)
CREAT SERPL-MCNC: 0.8 MG/DL (ref 0.7–1.2)
EOSINOPHIL # BLD: 0.14 K/UL (ref 0.05–0.5)
EOSINOPHILS RELATIVE PERCENT: 2 % (ref 0–6)
ERYTHROCYTE [DISTWIDTH] IN BLOOD BY AUTOMATED COUNT: 14.3 % (ref 11.5–15)
GFR SERPL CREATININE-BSD FRML MDRD: >60 ML/MIN/1.73M2
GLUCOSE SERPL-MCNC: 430 MG/DL (ref 74–99)
HCT VFR BLD AUTO: 41.4 % (ref 37–54)
HGB BLD-MCNC: 13.4 G/DL (ref 12.5–16.5)
IMM GRANULOCYTES # BLD AUTO: 0.17 K/UL (ref 0–0.58)
IMM GRANULOCYTES NFR BLD: 2 % (ref 0–5)
LACTATE BLDV-SCNC: 1.8 MMOL/L (ref 0.5–2.2)
LYMPHOCYTES NFR BLD: 1.64 K/UL (ref 1.5–4)
LYMPHOCYTES RELATIVE PERCENT: 19 % (ref 20–42)
MCH RBC QN AUTO: 28.2 PG (ref 26–35)
MCHC RBC AUTO-ENTMCNC: 32.4 G/DL (ref 32–34.5)
MCV RBC AUTO: 87.2 FL (ref 80–99.9)
MONOCYTES NFR BLD: 0.65 K/UL (ref 0.1–0.95)
MONOCYTES NFR BLD: 7 % (ref 2–12)
NEUTROPHILS NFR BLD: 69 % (ref 43–80)
NEUTS SEG NFR BLD: 6.05 K/UL (ref 1.8–7.3)
PLATELET # BLD AUTO: 246 K/UL (ref 130–450)
PMV BLD AUTO: 9.7 FL (ref 7–12)
POTASSIUM SERPL-SCNC: 3.7 MMOL/L (ref 3.5–5)
RBC # BLD AUTO: 4.75 M/UL (ref 3.8–5.8)
SODIUM SERPL-SCNC: 134 MMOL/L (ref 132–146)
TROPONIN I SERPL HS-MCNC: 23 NG/L (ref 0–11)
TROPONIN I SERPL HS-MCNC: 25 NG/L (ref 0–11)
WBC OTHER # BLD: 8.7 K/UL (ref 4.5–11.5)

## 2024-01-18 PROCEDURE — 71275 CT ANGIOGRAPHY CHEST: CPT

## 2024-01-18 PROCEDURE — 80048 BASIC METABOLIC PNL TOTAL CA: CPT

## 2024-01-18 PROCEDURE — 85025 COMPLETE CBC W/AUTO DIFF WBC: CPT

## 2024-01-18 PROCEDURE — 83880 ASSAY OF NATRIURETIC PEPTIDE: CPT

## 2024-01-18 PROCEDURE — 99285 EMERGENCY DEPT VISIT HI MDM: CPT

## 2024-01-18 PROCEDURE — 84484 ASSAY OF TROPONIN QUANT: CPT

## 2024-01-18 PROCEDURE — 83605 ASSAY OF LACTIC ACID: CPT

## 2024-01-18 PROCEDURE — 6360000004 HC RX CONTRAST MEDICATION: Performed by: RADIOLOGY

## 2024-01-18 PROCEDURE — 93005 ELECTROCARDIOGRAM TRACING: CPT

## 2024-01-18 PROCEDURE — 74174 CTA ABD&PLVS W/CONTRAST: CPT

## 2024-01-18 RX ADMIN — IOPAMIDOL 75 ML: 755 INJECTION, SOLUTION INTRAVENOUS at 21:11

## 2024-01-18 NOTE — ED NOTES
Radiology Procedure Waiver   Name: Josef Charlton  : 1945  MRN: [id number]    Date:  24    Time: 6:59 PM EST    Benefits of immediately proceeding with Radiology exam(s) without pre-testing outweigh the risks or are not indicated as specified below and therefore the following is/are being waived:    [] Pregnancy test   [] Patients LMP on-time and regular.   [] Patient had Tubal Ligation or has other Contraception Device.   [] Patient  is Menopausal or Premenarcheal.    [] Patient had Full or Partial Hysterectomy.    [] Protocol for Iodine allergy    [] MRI Questionnaire     [x] BUN/Creatinine   [] Patient age w/no hx of renal dysfunction.   [] Patient on Dialysis.   [] Recent Normal Labs.  Electronically signed by Jim Hsieh DO on 24 at 6:59 PM EST

## 2024-01-18 NOTE — ED NOTES
Department of Emergency Medicine  FIRST PROVIDER TRIAGE NOTE             Independent MLP           1/18/24  6:29 PM EST    Date of Encounter: 1/18/24   MRN: [id number]      HPI: Josef Charlton is a 78 y.o. 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  Denies chest pain or sob.      ROS: Negative for cp or sob.    PE: Gen Appearance/Constitutional: alert  HEENT: NC/NT. PERRLA,  Airway patent.     Initial Plan of Care: All treatment areas with department are currently occupied. Plan to order/Initiate the following while awaiting opening in ED: .  Initiate Treatment-Testing, Proceed toTreatment Area When Bed Available for ED Attending/MLP to Continue Care    Electronically signed by HUDSON Giles CNP   DD: 1/18/24       Hilda Obrien APRN - CNP  01/18/24 [id number]

## 2024-01-19 LAB
EKG ATRIAL RATE: 92 BPM
EKG Q-T INTERVAL: 388 MS
EKG QRS DURATION: 110 MS
EKG QTC CALCULATION (BAZETT): 464 MS
EKG R AXIS: -26 DEGREES
EKG T AXIS: 66 DEGREES
EKG VENTRICULAR RATE: 86 BPM

## 2024-01-19 NOTE — ED PROVIDER NOTES
results from today's visit.  I have personally reviewed and agree with resident interpretation of EKG for all EKGs from today's visit.     MDM:     I, Dr. Lincoln am the primary provider of record    Medical Decision Making  Patient sent in for abnormal outpatient echo.  I did speak to patient's PCP prior to arrival of the patient, she reports he had recent outpatient echo and there was concern on the echocardiogram for rapidly expanding aneurysm from 3 cm to 5 cm, they were unsure if it was thoracic or abdominal although patient does have known history of abdominal aneurysm.  On arrival he has minimal complaints denies abdominal pain lightheadedness chest pain or syncopal episodes.  CTAs were obtained, discussed multiple abnormal findings however it does not appear to have rapidly expanding aneurysm, has [facility], he was given on-call physicians to follow with but states he prefers to [facility].  He has upcoming follow-up with urology, hyperglycemia initially has had difficulty managing his blood sugars as he was recently transition to a different form of insulin.  He had appointment today to review all of his insulin and sliding scales, initially written here however patient states he no longer wants to stay for better glucose control and insulin and prefers to take his medications when he goes home    Patient is placed on cardiac monitor and continuous pulse ox for monitoring. EKG is ordered to evaluate patient's current cardiac rhythm, and to interpret QT interval prior to administering medications. CBC is ordered to evaluate for any signs of infection or inflammation by obtaining a WBC count, or any signs of acute anemia by interpreting hemoglobin. CMP for any electrolyte imbalances, kidney function, hepatic injury or any elevations in anion gap. Troponin as a marker for myocardial ischemia or heart strain. BNP to evaluate for

## 2024-01-19 NOTE — DISCHARGE INSTRUCTIONS
Please continue with your scheduled appoint with urology or call urologist on-call listed on discharge paperwork.    Please call and schedule appointment with [facility] or can call the vascular surgeon on-call listed in this discharge paperwork    CTA CHEST W CONTRAST   Final Result   1. Mild aneurysmal dilatation of the infrarenal abdominal aorta measuring 3.1   cm, unchanged from 2022.  There is also dilatation of the ascending thoracic   aorta measuring 4.3 cm, similar to 2022.  No evidence of acute aortic   dissection.  There is overall moderate atherosclerotic calcification, with   severe atherosclerotic calcification involving the left greater than right   common femoral arteries likely resulting in luminal narrowing.  Also,   coronary artery calcifications.   2. No evidence of acute pulmonary embolism.   3. Mild to moderate emphysema.   4. Cholelithiasis.   5. Nonobstructive left renal calculi.   6. Eccentric thickening of the urinary bladder on the left, raising the   possibility of neoplasm.  Could correlate with cystoscopy if clinically   indicated.   7. Marked prostatomegaly.   8. Elongated hypoattenuating focus in the medial pancreatic head appears new   or more prominent from 2022. This could reflect prominence of the duct   however a lesion is not excluded.  Could further evaluate with contrast   enhanced MRI if clinically indicated. 1.8 cm hypodense lesion in the liver   does not have the appearance of a simple cyst but appears similar to 2022.   Could further evaluate with contrast enhanced MRI if clinically indicated.   9. Roughly 1 cm exophytic density left kidney does not have the appearance of   a simple cyst.  Might reflect proteinaceous/hemorrhagic cyst or a solid   neoplasm.  Could further evaluate with contrast enhanced MRI.   10. Anterior pelvis skin thickening and subcutaneous attenuation, correlate   with exam for any concern of cellulitis.         CTA ABDOMEN PELVIS W CONTRAST

## 2024-02-08 ENCOUNTER — OFFICE VISIT (OUTPATIENT)
Dept: ONCOLOGY | Age: 79
End: 2024-02-08
Payer: OTHER GOVERNMENT

## 2024-02-08 ENCOUNTER — HOSPITAL ENCOUNTER (OUTPATIENT)
Dept: INFUSION THERAPY | Age: 79
Discharge: HOME | End: 2024-02-08
Payer: MEDICARE

## 2024-02-08 VITALS
SYSTOLIC BLOOD PRESSURE: 138 MMHG | TEMPERATURE: 97.3 F | HEIGHT: 72 IN | DIASTOLIC BLOOD PRESSURE: 71 MMHG | OXYGEN SATURATION: 97 % | WEIGHT: 301.9 LBS | HEART RATE: 82 BPM | BODY MASS INDEX: 40.89 KG/M2

## 2024-02-08 DIAGNOSIS — C83.00 MALIGNANT LYMPHOPLASMACYTIC LYMPHOMA (HCC): ICD-10-CM

## 2024-02-08 DIAGNOSIS — K86.9 PANCREATIC LESION: Primary | ICD-10-CM

## 2024-02-08 LAB
ALBUMIN SERPL-MCNC: 3.7 G/DL (ref 3.5–5.2)
ALP SERPL-CCNC: 110 U/L (ref 40–129)
ALT SERPL-CCNC: 27 U/L (ref 0–40)
ANION GAP SERPL CALCULATED.3IONS-SCNC: 12 MMOL/L (ref 7–16)
AST SERPL-CCNC: 15 U/L (ref 0–39)
BASOPHILS # BLD: 0.06 K/UL (ref 0–0.2)
BASOPHILS NFR BLD: 1 % (ref 0–2)
BILIRUB SERPL-MCNC: 0.8 MG/DL (ref 0–1.2)
BUN SERPL-MCNC: 13 MG/DL (ref 6–23)
CALCIUM SERPL-MCNC: 9.3 MG/DL (ref 8.6–10.2)
CHLORIDE SERPL-SCNC: 103 MMOL/L (ref 98–107)
CO2 SERPL-SCNC: 23 MMOL/L (ref 22–29)
CREAT SERPL-MCNC: 0.8 MG/DL (ref 0.7–1.2)
EOSINOPHIL # BLD: 0.19 K/UL (ref 0.05–0.5)
EOSINOPHILS RELATIVE PERCENT: 3 % (ref 0–6)
ERYTHROCYTE [DISTWIDTH] IN BLOOD BY AUTOMATED COUNT: 16.2 % (ref 11.5–15)
GFR SERPL CREATININE-BSD FRML MDRD: >60 ML/MIN/1.73M2
GLUCOSE SERPL-MCNC: 325 MG/DL (ref 74–99)
HCT VFR BLD AUTO: 38.2 % (ref 37–54)
HGB BLD-MCNC: 12.1 G/DL (ref 12.5–16.5)
IGA SERPL-MCNC: 28 MG/DL (ref 70–400)
IGG SERPL-MCNC: 354 MG/DL (ref 700–1600)
IGM SERPL-MCNC: 750 MG/DL (ref 40–230)
IMM GRANULOCYTES # BLD AUTO: 0.04 K/UL (ref 0–0.58)
IMM GRANULOCYTES NFR BLD: 1 % (ref 0–5)
LDH SERPL-CCNC: 153 U/L (ref 135–225)
LYMPHOCYTES NFR BLD: 1.47 K/UL (ref 1.5–4)
LYMPHOCYTES RELATIVE PERCENT: 25 % (ref 20–42)
MAGNESIUM SERPL-MCNC: 1.6 MG/DL (ref 1.6–2.6)
MCH RBC QN AUTO: 28.6 PG (ref 26–35)
MCHC RBC AUTO-ENTMCNC: 31.7 G/DL (ref 32–34.5)
MCV RBC AUTO: 90.3 FL (ref 80–99.9)
MONOCYTES NFR BLD: 0.62 K/UL (ref 0.1–0.95)
MONOCYTES NFR BLD: 10 % (ref 2–12)
NEUTROPHILS NFR BLD: 60 % (ref 43–80)
NEUTS SEG NFR BLD: 3.6 K/UL (ref 1.8–7.3)
PLATELET # BLD AUTO: 188 K/UL (ref 130–450)
PMV BLD AUTO: 10.6 FL (ref 7–12)
POTASSIUM SERPL-SCNC: 4.3 MMOL/L (ref 3.5–5)
PROT SERPL-MCNC: 6.2 G/DL (ref 6.4–8.3)
RBC # BLD AUTO: 4.23 M/UL (ref 3.8–5.8)
SODIUM SERPL-SCNC: 138 MMOL/L (ref 132–146)
WBC OTHER # BLD: 6 K/UL (ref 4.5–11.5)

## 2024-02-08 PROCEDURE — 83735 ASSAY OF MAGNESIUM: CPT

## 2024-02-08 PROCEDURE — 36415 COLL VENOUS BLD VENIPUNCTURE: CPT

## 2024-02-08 PROCEDURE — 83615 LACTATE (LD) (LDH) ENZYME: CPT

## 2024-02-08 PROCEDURE — G8484 FLU IMMUNIZE NO ADMIN: HCPCS | Performed by: STUDENT IN AN ORGANIZED HEALTH CARE EDUCATION/TRAINING PROGRAM

## 2024-02-08 PROCEDURE — 99213 OFFICE O/P EST LOW 20 MIN: CPT | Performed by: STUDENT IN AN ORGANIZED HEALTH CARE EDUCATION/TRAINING PROGRAM

## 2024-02-08 PROCEDURE — 85810 BLOOD VISCOSITY EXAMINATION: CPT

## 2024-02-08 PROCEDURE — G8417 CALC BMI ABV UP PARAM F/U: HCPCS | Performed by: STUDENT IN AN ORGANIZED HEALTH CARE EDUCATION/TRAINING PROGRAM

## 2024-02-08 PROCEDURE — 80053 COMPREHEN METABOLIC PANEL: CPT

## 2024-02-08 PROCEDURE — 83521 IG LIGHT CHAINS FREE EACH: CPT

## 2024-02-08 PROCEDURE — 82784 ASSAY IGA/IGD/IGG/IGM EACH: CPT

## 2024-02-08 PROCEDURE — 84155 ASSAY OF PROTEIN SERUM: CPT

## 2024-02-08 PROCEDURE — 3078F DIAST BP <80 MM HG: CPT | Performed by: STUDENT IN AN ORGANIZED HEALTH CARE EDUCATION/TRAINING PROGRAM

## 2024-02-08 PROCEDURE — 3075F SYST BP GE 130 - 139MM HG: CPT | Performed by: STUDENT IN AN ORGANIZED HEALTH CARE EDUCATION/TRAINING PROGRAM

## 2024-02-08 PROCEDURE — 1123F ACP DISCUSS/DSCN MKR DOCD: CPT | Performed by: STUDENT IN AN ORGANIZED HEALTH CARE EDUCATION/TRAINING PROGRAM

## 2024-02-08 PROCEDURE — 84165 PROTEIN E-PHORESIS SERUM: CPT

## 2024-02-08 PROCEDURE — G8427 DOCREV CUR MEDS BY ELIG CLIN: HCPCS | Performed by: STUDENT IN AN ORGANIZED HEALTH CARE EDUCATION/TRAINING PROGRAM

## 2024-02-08 PROCEDURE — 85025 COMPLETE CBC W/AUTO DIFF WBC: CPT

## 2024-02-08 PROCEDURE — 1036F TOBACCO NON-USER: CPT | Performed by: STUDENT IN AN ORGANIZED HEALTH CARE EDUCATION/TRAINING PROGRAM

## 2024-02-08 PROCEDURE — 86334 IMMUNOFIX E-PHORESIS SERUM: CPT

## 2024-02-08 NOTE — PROGRESS NOTES
Patient provided with discharge instructions, received printed AVS.  All questions answered.  Patient understands follow up plan of care.     .   
is present, as previously described.     IgM 1267 ()    Free kappa light chains:   88.01  (3.3-19.4)   Free lambda light chains: 3.33    (5.7-26.3)   K/L ratio:                           26.43 (0.26-1.65)    On 09/20/2021:  Hb 16.4  Hct 48.1  MCV 87.5  WBC 6.9     BUN 7 Creat 0.90  LFTs wnl  Beta-2 microglobulin 1.9  SPEP: M-spike 1 = 0.7 g/dl              M-spike 2 = 0.1 g/dl  SIFE: IgM kappa monoclonal protein is present, as previously described.   IgG kappa monoclonal protein is present, as previously described    IgM 1314 ()    Free kappa light chains:   80.24  (3.3-19.4)   Free lambda light chains: 2.88    (5.7-26.3)   K/L ratio:                           27.86 (0.26-1.65)    On 10/18/2021:  Hb 15.9   Hct 48.0  MCV 89.1  WBC 9.3     BUN 8 Creat 0.9  LFTs wnl  Beta-2 microglobulin 2.0  SPEP: M-spike 1 = 0.7 g/dl              M-spike 2 = 0.1 g/dl  SIFE: IgM kappa + faint IgG kappa monoclonal protein is present, as previously described.     IgM 1308 ()    Free kappa light chains:   80.48  (3.3-19.4)   Free lambda light chains: 2.87    (5.7-26.3)   K/L ratio:                           28.04 (0.26-1.65)    On 11/17/2021:  Hb 15.5   Hct 46.4  MCV 87.5  WBC 7.4     BUN 13 Creat 1.2  LFTs wnl  Beta-2 microglobulin 2.3  SPEP: M-spike 1 = 0.9 g/dl              M-spike 2 = 0.1 g/dl  SIFE: IgM kappa monoclonal protein is present, as previously described.   A Faint IgG kappa monoclonal protein is present, as previously described.     IgM 1347 ()    Free kappa light chains:   78.93  (3.3-19.4)   Free lambda light chains: 3.92    (5.7-26.3)   K/L ratio:                           20.14 (0.26-1.65)    On 12/15/2021:  Hb 15.3   Hct 46.5  MCV 90.3  WBC 9.1     BUN 10 Creat 0.9  LFTs wnl  Beta-2 microglobulin 2.5  SPEP: M-spike 1 = 0.8 g/dl              M-spike 2 = 0.1 g/dl  SIFE: IgM kappa + IgG kappa monoclonal protein is present, as previously described.    IgM 1365

## 2024-02-09 LAB
FREE KAPPA/LAMBDA RATIO: 5.38 (ref 0.26–1.65)
KAPPA LC FREE SER-MCNC: 38.7 MG/L (ref 3.7–19.4)
LAMBDA LC FREE SERPL-MCNC: 7.2 MG/L (ref 5.7–26.3)

## 2024-02-11 LAB — VISC SER: 1.07 CP (ref 1.1–1.8)

## 2024-02-12 LAB
ALBUMIN SERPL-MCNC: 3.1 G/DL (ref 3.5–4.7)
ALPHA1 GLOB SERPL ELPH-MCNC: 0.2 G/DL (ref 0.2–0.4)
ALPHA2 GLOB SERPL ELPH-MCNC: 0.7 G/DL (ref 0.5–1)
B-GLOBULIN SERPL ELPH-MCNC: 0.8 G/DL (ref 0.8–1.3)
GAMMA GLOB SERPL ELPH-MCNC: 0.8 G/DL (ref 0.7–1.6)
INTERPRETATION SERPL IFE-IMP: NORMAL
M PROTEIN 2 SERPL ELPH-MCNC: 0.5 G/DL
M PROTEIN SERPL ELPH-MCNC: 0.3 G/DL
PATH REV: NORMAL
PATHOLOGIST: ABNORMAL
PROT PATTERN SERPL ELPH-IMP: ABNORMAL
PROT SERPL-MCNC: 5.6 G/DL (ref 6.4–8.3)

## 2024-02-23 ENCOUNTER — HOSPITAL ENCOUNTER (OUTPATIENT)
Dept: CT IMAGING | Age: 79
End: 2024-02-23
Attending: STUDENT IN AN ORGANIZED HEALTH CARE EDUCATION/TRAINING PROGRAM
Payer: MEDICARE

## 2024-02-23 DIAGNOSIS — K86.9 PANCREATIC LESION: ICD-10-CM

## 2024-02-23 PROCEDURE — 74175 CTA ABDOMEN W/CONTRAST: CPT

## 2024-02-23 PROCEDURE — 6360000004 HC RX CONTRAST MEDICATION: Performed by: RADIOLOGY

## 2024-02-23 RX ADMIN — IOPAMIDOL 100 ML: 755 INJECTION, SOLUTION INTRAVENOUS at 13:32

## 2024-03-07 ENCOUNTER — OFFICE VISIT (OUTPATIENT)
Dept: ONCOLOGY | Age: 79
End: 2024-03-07
Payer: OTHER GOVERNMENT

## 2024-03-07 ENCOUNTER — HOSPITAL ENCOUNTER (OUTPATIENT)
Dept: INFUSION THERAPY | Age: 79
Discharge: HOME | End: 2024-03-07
Payer: OTHER GOVERNMENT

## 2024-03-07 VITALS
HEART RATE: 83 BPM | TEMPERATURE: 97.3 F | SYSTOLIC BLOOD PRESSURE: 124 MMHG | DIASTOLIC BLOOD PRESSURE: 60 MMHG | OXYGEN SATURATION: 96 % | WEIGHT: 300 LBS | BODY MASS INDEX: 40.63 KG/M2 | HEIGHT: 72 IN

## 2024-03-07 DIAGNOSIS — C83.00 MALIGNANT LYMPHOPLASMACYTIC LYMPHOMA (HCC): Primary | ICD-10-CM

## 2024-03-07 DIAGNOSIS — K86.9 PANCREATIC LESION: ICD-10-CM

## 2024-03-07 DIAGNOSIS — C83.00 MALIGNANT LYMPHOPLASMACYTIC LYMPHOMA (HCC): ICD-10-CM

## 2024-03-07 LAB
ALBUMIN SERPL-MCNC: 3.8 G/DL (ref 3.5–5.2)
ALP SERPL-CCNC: 96 U/L (ref 40–129)
ALT SERPL-CCNC: 58 U/L (ref 0–40)
ANION GAP SERPL CALCULATED.3IONS-SCNC: 12 MMOL/L (ref 7–16)
AST SERPL-CCNC: 50 U/L (ref 0–39)
BASOPHILS # BLD: 0.02 K/UL (ref 0–0.2)
BASOPHILS NFR BLD: 0 % (ref 0–2)
BILIRUB SERPL-MCNC: 0.7 MG/DL (ref 0–1.2)
BUN SERPL-MCNC: 14 MG/DL (ref 6–23)
CALCIUM SERPL-MCNC: 9.4 MG/DL (ref 8.6–10.2)
CHLORIDE SERPL-SCNC: 101 MMOL/L (ref 98–107)
CO2 SERPL-SCNC: 25 MMOL/L (ref 22–29)
CREAT SERPL-MCNC: 0.8 MG/DL (ref 0.7–1.2)
EOSINOPHIL # BLD: 0.13 K/UL (ref 0.05–0.5)
EOSINOPHILS RELATIVE PERCENT: 3 % (ref 0–6)
ERYTHROCYTE [DISTWIDTH] IN BLOOD BY AUTOMATED COUNT: 15.1 % (ref 11.5–15)
FERRITIN SERPL-MCNC: 52 NG/ML
GFR SERPL CREATININE-BSD FRML MDRD: >60 ML/MIN/1.73M2
GLUCOSE SERPL-MCNC: 215 MG/DL (ref 74–99)
HCT VFR BLD AUTO: 39.9 % (ref 37–54)
HGB BLD-MCNC: 12.6 G/DL (ref 12.5–16.5)
IGA SERPL-MCNC: <50 MG/DL (ref 70–400)
IGG SERPL-MCNC: 350 MG/DL (ref 700–1600)
IGM SERPL-MCNC: 781 MG/DL (ref 40–230)
IMM GRANULOCYTES # BLD AUTO: 0.03 K/UL (ref 0–0.58)
IMM GRANULOCYTES NFR BLD: 1 % (ref 0–5)
IRON SATN MFR SERPL: 28 % (ref 20–55)
IRON SERPL-MCNC: 89 UG/DL (ref 59–158)
LDH SERPL-CCNC: 125 U/L (ref 135–225)
LYMPHOCYTES NFR BLD: 1.48 K/UL (ref 1.5–4)
LYMPHOCYTES RELATIVE PERCENT: 30 % (ref 20–42)
MAGNESIUM SERPL-MCNC: 1.7 MG/DL (ref 1.6–2.6)
MCH RBC QN AUTO: 28.6 PG (ref 26–35)
MCHC RBC AUTO-ENTMCNC: 31.6 G/DL (ref 32–34.5)
MCV RBC AUTO: 90.5 FL (ref 80–99.9)
MONOCYTES NFR BLD: 0.41 K/UL (ref 0.1–0.95)
MONOCYTES NFR BLD: 8 % (ref 2–12)
NEUTROPHILS NFR BLD: 58 % (ref 43–80)
NEUTS SEG NFR BLD: 2.88 K/UL (ref 1.8–7.3)
PLATELET # BLD AUTO: 168 K/UL (ref 130–450)
PMV BLD AUTO: 10.8 FL (ref 7–12)
POTASSIUM SERPL-SCNC: 4.3 MMOL/L (ref 3.5–5)
PROT SERPL-MCNC: 6 G/DL (ref 6.4–8.3)
RBC # BLD AUTO: 4.41 M/UL (ref 3.8–5.8)
SODIUM SERPL-SCNC: 138 MMOL/L (ref 132–146)
TIBC SERPL-MCNC: 322 UG/DL (ref 250–450)
WBC OTHER # BLD: 5 K/UL (ref 4.5–11.5)

## 2024-03-07 PROCEDURE — 83521 IG LIGHT CHAINS FREE EACH: CPT

## 2024-03-07 PROCEDURE — 86334 IMMUNOFIX E-PHORESIS SERUM: CPT

## 2024-03-07 PROCEDURE — 84155 ASSAY OF PROTEIN SERUM: CPT

## 2024-03-07 PROCEDURE — 1036F TOBACCO NON-USER: CPT | Performed by: STUDENT IN AN ORGANIZED HEALTH CARE EDUCATION/TRAINING PROGRAM

## 2024-03-07 PROCEDURE — 80053 COMPREHEN METABOLIC PANEL: CPT

## 2024-03-07 PROCEDURE — 82728 ASSAY OF FERRITIN: CPT

## 2024-03-07 PROCEDURE — 99212 OFFICE O/P EST SF 10 MIN: CPT

## 2024-03-07 PROCEDURE — 36415 COLL VENOUS BLD VENIPUNCTURE: CPT

## 2024-03-07 PROCEDURE — 83540 ASSAY OF IRON: CPT

## 2024-03-07 PROCEDURE — 1123F ACP DISCUSS/DSCN MKR DOCD: CPT | Performed by: STUDENT IN AN ORGANIZED HEALTH CARE EDUCATION/TRAINING PROGRAM

## 2024-03-07 PROCEDURE — 83550 IRON BINDING TEST: CPT

## 2024-03-07 PROCEDURE — G8484 FLU IMMUNIZE NO ADMIN: HCPCS | Performed by: STUDENT IN AN ORGANIZED HEALTH CARE EDUCATION/TRAINING PROGRAM

## 2024-03-07 PROCEDURE — G8427 DOCREV CUR MEDS BY ELIG CLIN: HCPCS | Performed by: STUDENT IN AN ORGANIZED HEALTH CARE EDUCATION/TRAINING PROGRAM

## 2024-03-07 PROCEDURE — 84165 PROTEIN E-PHORESIS SERUM: CPT

## 2024-03-07 PROCEDURE — 3078F DIAST BP <80 MM HG: CPT | Performed by: STUDENT IN AN ORGANIZED HEALTH CARE EDUCATION/TRAINING PROGRAM

## 2024-03-07 PROCEDURE — 99213 OFFICE O/P EST LOW 20 MIN: CPT | Performed by: STUDENT IN AN ORGANIZED HEALTH CARE EDUCATION/TRAINING PROGRAM

## 2024-03-07 PROCEDURE — 3074F SYST BP LT 130 MM HG: CPT | Performed by: STUDENT IN AN ORGANIZED HEALTH CARE EDUCATION/TRAINING PROGRAM

## 2024-03-07 PROCEDURE — 82232 ASSAY OF BETA-2 PROTEIN: CPT

## 2024-03-07 PROCEDURE — 83735 ASSAY OF MAGNESIUM: CPT

## 2024-03-07 PROCEDURE — G8417 CALC BMI ABV UP PARAM F/U: HCPCS | Performed by: STUDENT IN AN ORGANIZED HEALTH CARE EDUCATION/TRAINING PROGRAM

## 2024-03-07 PROCEDURE — 82784 ASSAY IGA/IGD/IGG/IGM EACH: CPT

## 2024-03-07 PROCEDURE — 83615 LACTATE (LD) (LDH) ENZYME: CPT

## 2024-03-07 PROCEDURE — 85810 BLOOD VISCOSITY EXAMINATION: CPT

## 2024-03-07 PROCEDURE — 85025 COMPLETE CBC W/AUTO DIFF WBC: CPT

## 2024-03-07 NOTE — PROGRESS NOTES
Patient provided with discharge instructions, received printed AVS.  All questions answered.  Patient understands follow up plan of care.     
   (5.7-26.3)   K/L ratio:                           11.86 (0.26-1.65)    On 03/09/2023:  Hb 14.9   Hct 46.3    MCV 90.4      WBC 9.5  BUN 11 Creat 0.8  Beta-2 microglobulin 2.8  SPEP: M-spike 0.7 g/dL  SIFE: IgM kappa monoclonal protein is present, as previously described.   IgM 848 ()  Free kappa light chains:   41.48  (3.3-19.4)   Free lambda light chains: 4.09    (5.7-26.3)   K/L ratio:                           10.14 (0.26-1.65)    CT abdomen/pelvis 03/10/2023:  No lytic osseous lesions.     On 04/06/2023;  Hb 14.0  Hct 44.0  MCV 91.9      WBC 7.2  BUN 13  Creat 0.9  Beta-2 microglobulin 2.4  SPEP: M-spike 0.8 g/dL  SIFE: IgM kappa monoclonal protein is present, as previously described.   IgM 715 ()  Free kappa light chains:   52.20  (3.3-19.4)   Free lambda light chains: 4.52    (5.7-26.3)   K/L ratio:                           11.55 (0.26-1.65)    On 05/04/2023:  Hb 15.1  Hct 46.2   MCV 89.0     WBC 6.8  BUN 13 Creat 0.9  Beta-2 microglobulin 4.3  SPEP: M-spike 0.6 g/dL  SIFE: IgM kappa monoclonal protein is present, as previously described.   IgM 913 ()  Free kappa light chains:   102.05  (3.3-19.4)   Free lambda light chains:  6.79    (5.7-26.3)   K/L ratio:                           15.03   (0.26-1.65)    On 06/01/2023:  Hb 14.4   Hct 44.0  MCV 88.9    WBC 8.2  BUN 10 Creat 0.9  Beta-2 microglobulin 2.2  SPEP: M-spike 0.5 g/dL  SIFE: IgM kappa monoclonal protein is present, as previously described.   IgM 831 ()  Free kappa light chains:   52.49  (3.3-19.4)   Free lambda light chains:  4.80    (5.7-26.3)   K/L ratio:                           10.94   (0.26-1.65)    Admitted for Complicated UTI with obstructive uropathy -   CT abdomen/pelvis noted 7 mm calculus in distal right ureter leading to mild right sided hydroureteronephrosis. Imaging reviewed. Urology note reviewed  GN bacteremia - Blood cultures as well as urine cultures grew Klebsiella. Blood cx -

## 2024-03-08 LAB
ALBUMIN SERPL-MCNC: 3.5 G/DL (ref 3.5–4.7)
ALPHA1 GLOB SERPL ELPH-MCNC: 0.2 G/DL (ref 0.2–0.4)
ALPHA2 GLOB SERPL ELPH-MCNC: 0.6 G/DL (ref 0.5–1)
B-GLOBULIN SERPL ELPH-MCNC: 0.8 G/DL (ref 0.8–1.3)
B2 MICROGLOB SERPL IA-MCNC: 2.7 MG/L
FREE KAPPA/LAMBDA RATIO: 5.49 (ref 0.26–1.65)
GAMMA GLOB SERPL ELPH-MCNC: 0.8 G/DL (ref 0.7–1.6)
INTERPRETATION SERPL IFE-IMP: NORMAL
KAPPA LC FREE SER-MCNC: 42.3 MG/L (ref 3.7–19.4)
LAMBDA LC FREE SERPL-MCNC: 7.7 MG/L (ref 5.7–26.3)
M PROTEIN 2 SERPL ELPH-MCNC: 0.5 G/DL
M PROTEIN SERPL ELPH-MCNC: 0.2 G/DL
PATH REV: NORMAL
PATHOLOGIST: ABNORMAL
PROT PATTERN SERPL ELPH-IMP: ABNORMAL
PROT SERPL-MCNC: 5.9 G/DL (ref 6.4–8.3)

## 2024-03-11 LAB — VISC SER: 1.04 CP

## 2024-03-19 NOTE — PROGRESS NOTES
has chosen rhythm control  - For rhythm control we have chosen to utilize AAD therapy, Flecainide. Not a good candidate for AF ablation due to BMI.  - Presents in AF with CVR.  - Had an extensive discussion regarding all secondary causes of AF which include but are not limited to the following and then need for lifestyle modifications and stringent control of contributing medical conditions which include            - Weight Loss: aggressive weight loss and regular moderate cardiopulmonary exercise to maintain BMI <27 with a loss of at least 10% of their current weight which is safely and adequately maintained            - Exercise: 30min 3-4x/week of at least moderate cardiopulmonary exercise up to 250 min/wk            - Blood pressure: target of <130/80mmHg            - Dyslipidemia: add a statin if LDL >100mg/dL            - Diabetes Mellitus: add Metformin if HbA1c >6.5%            - Obstructive sleep apnea: evaluate for and or treat with CPAP if AHI >30/hour            - Abstinence from alcohol and tobacco products    2. Severe coronary artery calcification  - Seen on CT chest.  - Nuclear stress test 2/27/23 showed no ischemia.  - Continued on Aspirin, Lipitor and Metoprolol.    3. Hypertension  - Well controlled at this office visit.   - Currently on Prazosin,Norvasc, Lasix, Lisinopril and Metoprolol.    4. Hyperlipidemia  - On Lipitor.    5. Diabetes mellitus  - On Insulin.    6. Obesity  - Body mass index is 40.69 kg/m².   - Encouraged weight management.     7. History of malignant lymphocytic lymphoma   - On Ibrutinib     8. COPD    9. BPH  - On Flomax and Proscar.    10. Sleep disturbance  - Reports poor quality sleep.   - Refer to Sleep Medicine.     Recommendations:  The patient to consider rate control treatment versus rhythm control treatment with AAD therapy. He opts for rhythm control.  Will start on Flecainide 100 mg BID. EKG in 1 week to determine rhythm, if presents in AF would consider

## 2024-03-20 ENCOUNTER — OFFICE VISIT (OUTPATIENT)
Dept: NON INVASIVE DIAGNOSTICS | Age: 79
End: 2024-03-20
Payer: OTHER GOVERNMENT

## 2024-03-20 VITALS
HEIGHT: 72 IN | BODY MASS INDEX: 40.63 KG/M2 | SYSTOLIC BLOOD PRESSURE: 124 MMHG | HEART RATE: 74 BPM | RESPIRATION RATE: 16 BRPM | DIASTOLIC BLOOD PRESSURE: 74 MMHG | WEIGHT: 300 LBS

## 2024-03-20 DIAGNOSIS — I48.91 ATRIAL FIBRILLATION, UNSPECIFIED TYPE (HCC): Primary | ICD-10-CM

## 2024-03-20 DIAGNOSIS — R94.31 EKG ABNORMALITIES: ICD-10-CM

## 2024-03-20 DIAGNOSIS — G47.30 SLEEP DISORDER BREATHING: ICD-10-CM

## 2024-03-20 PROCEDURE — 3078F DIAST BP <80 MM HG: CPT | Performed by: INTERNAL MEDICINE

## 2024-03-20 PROCEDURE — 3074F SYST BP LT 130 MM HG: CPT | Performed by: INTERNAL MEDICINE

## 2024-03-20 PROCEDURE — 93000 ELECTROCARDIOGRAM COMPLETE: CPT | Performed by: INTERNAL MEDICINE

## 2024-03-20 PROCEDURE — 1123F ACP DISCUSS/DSCN MKR DOCD: CPT | Performed by: INTERNAL MEDICINE

## 2024-03-20 PROCEDURE — 99205 OFFICE O/P NEW HI 60 MIN: CPT | Performed by: INTERNAL MEDICINE

## 2024-03-20 RX ORDER — FLECAINIDE ACETATE 100 MG/1
100 TABLET ORAL 2 TIMES DAILY
Qty: 60 TABLET | Refills: 6 | Status: SHIPPED | OUTPATIENT
Start: 2024-03-20

## 2024-03-21 ENCOUNTER — TELEPHONE (OUTPATIENT)
Dept: NON INVASIVE DIAGNOSTICS | Age: 79
End: 2024-03-21

## 2024-03-21 NOTE — TELEPHONE ENCOUNTER
----- Message from Ricky Elizabeth MD sent at 3/20/2024  9:23 AM EDT -----  Please check the cost of Tikosyn and Sotalol. Thank you.

## 2024-03-21 NOTE — TELEPHONE ENCOUNTER
I contacted patient today with regards to his prescriptions.  He does not have prescription drug coverage and [facility] and states he never has to pay for his medications.

## 2024-03-27 ENCOUNTER — TELEPHONE (OUTPATIENT)
Dept: NON INVASIVE DIAGNOSTICS | Age: 79
End: 2024-03-27

## 2024-03-27 ENCOUNTER — NURSE ONLY (OUTPATIENT)
Dept: NON INVASIVE DIAGNOSTICS | Age: 79
End: 2024-03-27
Payer: OTHER GOVERNMENT

## 2024-03-27 ENCOUNTER — PREP FOR PROCEDURE (OUTPATIENT)
Dept: NON INVASIVE DIAGNOSTICS | Age: 79
End: 2024-03-27

## 2024-03-27 VITALS — OXYGEN SATURATION: 90 % | SYSTOLIC BLOOD PRESSURE: 118 MMHG | DIASTOLIC BLOOD PRESSURE: 84 MMHG | HEART RATE: 65 BPM

## 2024-03-27 DIAGNOSIS — I48.91 ATRIAL FIBRILLATION, UNSPECIFIED TYPE (HCC): Primary | ICD-10-CM

## 2024-03-27 PROCEDURE — 93000 ELECTROCARDIOGRAM COMPLETE: CPT | Performed by: INTERNAL MEDICINE

## 2024-03-27 RX ORDER — SODIUM CHLORIDE 0.9 % (FLUSH) 0.9 %
5-40 SYRINGE (ML) INJECTION PRN
Status: CANCELLED | OUTPATIENT
Start: 2024-03-27

## 2024-03-27 RX ORDER — SODIUM CHLORIDE 9 MG/ML
INJECTION, SOLUTION INTRAVENOUS PRN
Status: CANCELLED | OUTPATIENT
Start: 2024-03-27

## 2024-03-27 RX ORDER — SODIUM CHLORIDE 0.9 % (FLUSH) 0.9 %
5-40 SYRINGE (ML) INJECTION EVERY 12 HOURS SCHEDULED
Status: CANCELLED | OUTPATIENT
Start: 2024-03-27

## 2024-03-27 NOTE — PROGRESS NOTES
Patient continues in atrial fibrillation on his post Flecainide start. Plan for cardioversion soon.   Patient is on darrell Mckenzie, HUDSON - CNP

## 2024-03-27 NOTE — TELEPHONE ENCOUNTER
Patient is scheduled 4/17/24 9:30 am with Dr Elizabeth.  Patient given instructions and verbalized understanding.

## 2024-03-27 NOTE — PROGRESS NOTES
EKG preformed today as per Dr Ricky Elizabeth, for EKG/ flecainide.     BP:  118 84  left upper arm Sitting  P:  65  SP O2: 90    Pt states He/She feels ok    EKG done, discussed with Kate GONZALEZ    Electronically signed by Maty Zamora MA on 3/27/2024 at 2:08 PM     Pt scheduled CV

## 2024-04-03 ENCOUNTER — TELEPHONE (OUTPATIENT)
Dept: NON INVASIVE DIAGNOSTICS | Age: 79
End: 2024-04-03

## 2024-04-03 NOTE — TELEPHONE ENCOUNTER
Patient contacted office today stating that since initiation of flecainide, he has had worsening shortness of breath and his O2 sat has not been higher than 84%.  He is asking if he can discontinue this medication.  Please advise.  Thank you.

## 2024-04-04 ENCOUNTER — HOSPITAL ENCOUNTER (OUTPATIENT)
Dept: INFUSION THERAPY | Age: 79
Discharge: HOME | End: 2024-04-04

## 2024-04-04 NOTE — TELEPHONE ENCOUNTER
Spoke with patient and verbalized understanding.  Will call patient once we have prices of medications.

## 2024-04-08 NOTE — TELEPHONE ENCOUNTER
He does not have prescription drug coverage and [facility] and states he never has to pay for his medications.

## 2024-04-10 NOTE — TELEPHONE ENCOUNTER
[facility], so as long as he has a script they will cover it.  Patient would like to go with Tikosyn.  Patient is set up for [facility] admit for 5/28/24.  Patient is no longer taking flecainide.  Patient will contact office if he needs anything before admit.

## 2024-04-18 ENCOUNTER — HOSPITAL ENCOUNTER (OUTPATIENT)
Dept: INFUSION THERAPY | Age: 79
End: 2024-04-18

## 2024-04-23 DIAGNOSIS — C83.00 MALIGNANT LYMPHOPLASMACYTIC LYMPHOMA (HCC): ICD-10-CM

## 2024-05-02 ENCOUNTER — OFFICE VISIT (OUTPATIENT)
Dept: ONCOLOGY | Age: 79
End: 2024-05-02
Payer: OTHER GOVERNMENT

## 2024-05-02 ENCOUNTER — HOSPITAL ENCOUNTER (OUTPATIENT)
Dept: INFUSION THERAPY | Age: 79
Discharge: HOME | End: 2024-05-02
Payer: OTHER GOVERNMENT

## 2024-05-02 VITALS
HEART RATE: 76 BPM | BODY MASS INDEX: 41.85 KG/M2 | DIASTOLIC BLOOD PRESSURE: 65 MMHG | WEIGHT: 309 LBS | HEIGHT: 72 IN | OXYGEN SATURATION: 94 % | TEMPERATURE: 97.4 F | SYSTOLIC BLOOD PRESSURE: 135 MMHG

## 2024-05-02 DIAGNOSIS — C83.00 MALIGNANT LYMPHOPLASMACYTIC LYMPHOMA (HCC): Primary | ICD-10-CM

## 2024-05-02 DIAGNOSIS — C83.00 MALIGNANT LYMPHOPLASMACYTIC LYMPHOMA (HCC): ICD-10-CM

## 2024-05-02 LAB
ALBUMIN SERPL-MCNC: 4.1 G/DL (ref 3.5–5.2)
ALP SERPL-CCNC: 82 U/L (ref 40–129)
ALT SERPL-CCNC: 20 U/L (ref 0–40)
ANION GAP SERPL CALCULATED.3IONS-SCNC: 14 MMOL/L (ref 7–16)
AST SERPL-CCNC: 17 U/L (ref 0–39)
BASOPHILS # BLD: 0.02 K/UL (ref 0–0.2)
BASOPHILS NFR BLD: 0 % (ref 0–2)
BILIRUB SERPL-MCNC: 0.6 MG/DL (ref 0–1.2)
BUN SERPL-MCNC: 11 MG/DL (ref 6–23)
CALCIUM SERPL-MCNC: 9.7 MG/DL (ref 8.6–10.2)
CHLORIDE SERPL-SCNC: 103 MMOL/L (ref 98–107)
CO2 SERPL-SCNC: 24 MMOL/L (ref 22–29)
CREAT SERPL-MCNC: 0.8 MG/DL (ref 0.7–1.2)
EOSINOPHIL # BLD: 0.08 K/UL (ref 0.05–0.5)
EOSINOPHILS RELATIVE PERCENT: 2 % (ref 0–6)
ERYTHROCYTE [DISTWIDTH] IN BLOOD BY AUTOMATED COUNT: 14.1 % (ref 11.5–15)
GFR, ESTIMATED: >90 ML/MIN/1.73M2
GLUCOSE SERPL-MCNC: 109 MG/DL (ref 74–99)
HCT VFR BLD AUTO: 42.7 % (ref 37–54)
HGB BLD-MCNC: 13.2 G/DL (ref 12.5–16.5)
IGA SERPL-MCNC: 18 MG/DL (ref 70–400)
IGG SERPL-MCNC: 363 MG/DL (ref 700–1600)
IGM SERPL-MCNC: 740 MG/DL (ref 40–230)
IMM GRANULOCYTES # BLD AUTO: <0.03 K/UL (ref 0–0.58)
IMM GRANULOCYTES NFR BLD: 0 % (ref 0–5)
LDH SERPL-CCNC: 135 U/L (ref 135–225)
LYMPHOCYTES NFR BLD: 1.43 K/UL (ref 1.5–4)
LYMPHOCYTES RELATIVE PERCENT: 32 % (ref 20–42)
MAGNESIUM SERPL-MCNC: 1.8 MG/DL (ref 1.6–2.6)
MCH RBC QN AUTO: 27.9 PG (ref 26–35)
MCHC RBC AUTO-ENTMCNC: 30.9 G/DL (ref 32–34.5)
MCV RBC AUTO: 90.3 FL (ref 80–99.9)
MONOCYTES NFR BLD: 0.42 K/UL (ref 0.1–0.95)
MONOCYTES NFR BLD: 9 % (ref 2–12)
NEUTROPHILS NFR BLD: 57 % (ref 43–80)
NEUTS SEG NFR BLD: 2.57 K/UL (ref 1.8–7.3)
PLATELET # BLD AUTO: 197 K/UL (ref 130–450)
PMV BLD AUTO: 10.5 FL (ref 7–12)
POTASSIUM SERPL-SCNC: 3.8 MMOL/L (ref 3.5–5)
PROT SERPL-MCNC: 6.3 G/DL (ref 6.4–8.3)
RBC # BLD AUTO: 4.73 M/UL (ref 3.8–5.8)
SODIUM SERPL-SCNC: 141 MMOL/L (ref 132–146)
WBC OTHER # BLD: 4.5 K/UL (ref 4.5–11.5)

## 2024-05-02 PROCEDURE — 85810 BLOOD VISCOSITY EXAMINATION: CPT

## 2024-05-02 PROCEDURE — 85025 COMPLETE CBC W/AUTO DIFF WBC: CPT

## 2024-05-02 PROCEDURE — 84155 ASSAY OF PROTEIN SERUM: CPT

## 2024-05-02 PROCEDURE — 99213 OFFICE O/P EST LOW 20 MIN: CPT | Performed by: STUDENT IN AN ORGANIZED HEALTH CARE EDUCATION/TRAINING PROGRAM

## 2024-05-02 PROCEDURE — 86334 IMMUNOFIX E-PHORESIS SERUM: CPT

## 2024-05-02 PROCEDURE — 83615 LACTATE (LD) (LDH) ENZYME: CPT

## 2024-05-02 PROCEDURE — 82784 ASSAY IGA/IGD/IGG/IGM EACH: CPT

## 2024-05-02 PROCEDURE — 3075F SYST BP GE 130 - 139MM HG: CPT | Performed by: STUDENT IN AN ORGANIZED HEALTH CARE EDUCATION/TRAINING PROGRAM

## 2024-05-02 PROCEDURE — 83735 ASSAY OF MAGNESIUM: CPT

## 2024-05-02 PROCEDURE — 36415 COLL VENOUS BLD VENIPUNCTURE: CPT

## 2024-05-02 PROCEDURE — 80053 COMPREHEN METABOLIC PANEL: CPT

## 2024-05-02 PROCEDURE — 1123F ACP DISCUSS/DSCN MKR DOCD: CPT | Performed by: STUDENT IN AN ORGANIZED HEALTH CARE EDUCATION/TRAINING PROGRAM

## 2024-05-02 PROCEDURE — 3078F DIAST BP <80 MM HG: CPT | Performed by: STUDENT IN AN ORGANIZED HEALTH CARE EDUCATION/TRAINING PROGRAM

## 2024-05-02 PROCEDURE — 83521 IG LIGHT CHAINS FREE EACH: CPT

## 2024-05-02 PROCEDURE — 84165 PROTEIN E-PHORESIS SERUM: CPT

## 2024-05-02 PROCEDURE — 82232 ASSAY OF BETA-2 PROTEIN: CPT

## 2024-05-02 NOTE — PROGRESS NOTES
Patient provided with discharge instructions, received printed AVS.  All questions answered.  Patient understands follow up plan of care.     
monoclonal protein is present, as previously described.   IgM 897 ()  Free kappa light chains:   49.51  (3.3-19.4)   Free lambda light chains: 4.13    (5.7-26.3)   K/L ratio:                           11.99 (0.26-1.65)    On 02/09/2023:  Hb 14.4   Hct 43.5   MCV 88.8     WBC 6.4  BUN 12  Creat 0.8  Beta-2 microglobulin 2.1  SPEP: M-spike 0.8 g/dL  SIFE: IgM kappa monoclonal protein is present, as previously described.   IgM 837 ()  Free kappa light chains:   43.89  (3.3-19.4)   Free lambda light chains: 3.70    (5.7-26.3)   K/L ratio:                           11.86 (0.26-1.65)    On 03/09/2023:  Hb 14.9   Hct 46.3    MCV 90.4      WBC 9.5  BUN 11 Creat 0.8  Beta-2 microglobulin 2.8  SPEP: M-spike 0.7 g/dL  SIFE: IgM kappa monoclonal protein is present, as previously described.   IgM 848 ()  Free kappa light chains:   41.48  (3.3-19.4)   Free lambda light chains: 4.09    (5.7-26.3)   K/L ratio:                           10.14 (0.26-1.65)    CT abdomen/pelvis 03/10/2023:  No lytic osseous lesions.     On 04/06/2023;  Hb 14.0  Hct 44.0  MCV 91.9      WBC 7.2  BUN 13  Creat 0.9  Beta-2 microglobulin 2.4  SPEP: M-spike 0.8 g/dL  SIFE: IgM kappa monoclonal protein is present, as previously described.   IgM 715 ()  Free kappa light chains:   52.20  (3.3-19.4)   Free lambda light chains: 4.52    (5.7-26.3)   K/L ratio:                           11.55 (0.26-1.65)    On 05/04/2023:  Hb 15.1  Hct 46.2   MCV 89.0     WBC 6.8  BUN 13 Creat 0.9  Beta-2 microglobulin 4.3  SPEP: M-spike 0.6 g/dL  SIFE: IgM kappa monoclonal protein is present, as previously described.   IgM 913 ()  Free kappa light chains:   102.05  (3.3-19.4)   Free lambda light chains:  6.79    (5.7-26.3)   K/L ratio:                           15.03   (0.26-1.65)    On 06/01/2023:  Hb 14.4   Hct 44.0  MCV 88.9    WBC 8.2  BUN 10 Creat 0.9  Beta-2 microglobulin 2.2  SPEP: M-spike 0.5 g/dL  SIFE: IgM

## 2024-05-03 LAB
ALBUMIN SERPL-MCNC: 3.5 G/DL (ref 3.5–4.7)
ALPHA1 GLOB SERPL ELPH-MCNC: 0.2 G/DL (ref 0.2–0.4)
ALPHA2 GLOB SERPL ELPH-MCNC: 0.7 G/DL (ref 0.5–1)
B-GLOBULIN SERPL ELPH-MCNC: 0.8 G/DL (ref 0.8–1.3)
B2 MICROGLOB SERPL IA-MCNC: 0.3 MG/L
FREE KAPPA/LAMBDA RATIO: 4.44 (ref 0.22–1.74)
GAMMA GLOB SERPL ELPH-MCNC: 0.9 G/DL (ref 0.7–1.6)
INTERPRETATION SERPL IFE-IMP: NORMAL
KAPPA LC FREE SER-MCNC: 30.2 MG/L
LAMBDA LC FREE SERPL-MCNC: 6.8 MG/L (ref 4.2–27.7)
M PROTEIN 2 SERPL ELPH-MCNC: 0.4 G/DL
M PROTEIN SERPL ELPH-MCNC: 0.2 G/DL
PATH REV: NORMAL
PATHOLOGIST: ABNORMAL
PROT PATTERN SERPL ELPH-IMP: ABNORMAL
PROT SERPL-MCNC: 6.1 G/DL (ref 6.4–8.3)

## 2024-05-06 LAB — VISC SER: 1.08 CP

## 2024-05-29 ENCOUNTER — HOSPITAL ENCOUNTER (INPATIENT)
Age: 79
LOS: 3 days | Discharge: HOME | DRG: 309 | End: 2024-06-01
Attending: INTERNAL MEDICINE | Admitting: INTERNAL MEDICINE
Payer: OTHER GOVERNMENT

## 2024-05-29 DIAGNOSIS — I48.0 PAROXYSMAL ATRIAL FIBRILLATION (HCC): Primary | ICD-10-CM

## 2024-05-29 PROBLEM — I48.19 PERSISTENT ATRIAL FIBRILLATION (HCC): Status: ACTIVE | Noted: 2024-05-29

## 2024-05-29 LAB
ALBUMIN SERPL-MCNC: 4.3 G/DL (ref 3.5–5.2)
ALP SERPL-CCNC: 98 U/L (ref 40–129)
ALT SERPL-CCNC: 18 U/L (ref 0–40)
ANION GAP SERPL CALCULATED.3IONS-SCNC: 13 MMOL/L (ref 7–16)
AST SERPL-CCNC: 15 U/L (ref 0–39)
BILIRUB SERPL-MCNC: 0.5 MG/DL (ref 0–1.2)
BUN SERPL-MCNC: 16 MG/DL (ref 6–23)
CALCIUM SERPL-MCNC: 9.8 MG/DL (ref 8.6–10.2)
CHLORIDE SERPL-SCNC: 102 MMOL/L (ref 98–107)
CO2 SERPL-SCNC: 25 MMOL/L (ref 22–29)
CREAT SERPL-MCNC: 0.8 MG/DL (ref 0.7–1.2)
ERYTHROCYTE [DISTWIDTH] IN BLOOD BY AUTOMATED COUNT: 14.3 % (ref 11.5–15)
GFR, ESTIMATED: >90 ML/MIN/1.73M2
GLUCOSE BLD-MCNC: 142 MG/DL (ref 74–99)
GLUCOSE SERPL-MCNC: 135 MG/DL (ref 74–99)
HCT VFR BLD AUTO: 41.3 % (ref 37–54)
HGB BLD-MCNC: 13.9 G/DL (ref 12.5–16.5)
MAGNESIUM SERPL-MCNC: 1.8 MG/DL (ref 1.6–2.6)
MCH RBC QN AUTO: 29.5 PG (ref 26–35)
MCHC RBC AUTO-ENTMCNC: 33.7 G/DL (ref 32–34.5)
MCV RBC AUTO: 87.7 FL (ref 80–99.9)
PLATELET # BLD AUTO: 175 K/UL (ref 130–450)
PMV BLD AUTO: 10.1 FL (ref 7–12)
POTASSIUM SERPL-SCNC: 3.9 MMOL/L (ref 3.5–5)
PROT SERPL-MCNC: 6.8 G/DL (ref 6.4–8.3)
RBC # BLD AUTO: 4.71 M/UL (ref 3.8–5.8)
SODIUM SERPL-SCNC: 140 MMOL/L (ref 132–146)
WBC OTHER # BLD: 6.2 K/UL (ref 4.5–11.5)

## 2024-05-29 PROCEDURE — 85027 COMPLETE CBC AUTOMATED: CPT

## 2024-05-29 PROCEDURE — 2580000003 HC RX 258: Performed by: INTERNAL MEDICINE

## 2024-05-29 PROCEDURE — 83735 ASSAY OF MAGNESIUM: CPT

## 2024-05-29 PROCEDURE — 80053 COMPREHEN METABOLIC PANEL: CPT

## 2024-05-29 PROCEDURE — 6370000000 HC RX 637 (ALT 250 FOR IP): Performed by: INTERNAL MEDICINE

## 2024-05-29 PROCEDURE — 6360000002 HC RX W HCPCS: Performed by: INTERNAL MEDICINE

## 2024-05-29 PROCEDURE — 93005 ELECTROCARDIOGRAM TRACING: CPT | Performed by: INTERNAL MEDICINE

## 2024-05-29 PROCEDURE — 2060000000 HC ICU INTERMEDIATE R&B

## 2024-05-29 PROCEDURE — 82962 GLUCOSE BLOOD TEST: CPT

## 2024-05-29 RX ORDER — DOFETILIDE 0.5 MG/1
500 CAPSULE ORAL EVERY 12 HOURS SCHEDULED
Status: DISCONTINUED | OUTPATIENT
Start: 2024-05-29 | End: 2024-06-01 | Stop reason: HOSPADM

## 2024-05-29 RX ORDER — VITAMIN B COMPLEX
2 TABLET ORAL DAILY
Status: DISCONTINUED | OUTPATIENT
Start: 2024-05-29 | End: 2024-06-01 | Stop reason: HOSPADM

## 2024-05-29 RX ORDER — METOPROLOL SUCCINATE 100 MG/1
100 TABLET, EXTENDED RELEASE ORAL 2 TIMES DAILY
Status: DISCONTINUED | OUTPATIENT
Start: 2024-05-29 | End: 2024-05-30

## 2024-05-29 RX ORDER — PRAZOSIN HYDROCHLORIDE 2 MG/1
2 CAPSULE ORAL NIGHTLY
Status: DISCONTINUED | OUTPATIENT
Start: 2024-05-29 | End: 2024-06-01 | Stop reason: HOSPADM

## 2024-05-29 RX ORDER — LISINOPRIL 10 MG/1
10 TABLET ORAL 2 TIMES DAILY
Status: DISCONTINUED | OUTPATIENT
Start: 2024-05-29 | End: 2024-06-01 | Stop reason: HOSPADM

## 2024-05-29 RX ORDER — POLYETHYLENE GLYCOL 3350 17 G/17G
17 POWDER, FOR SOLUTION ORAL DAILY PRN
Status: DISCONTINUED | OUTPATIENT
Start: 2024-05-29 | End: 2024-06-01 | Stop reason: HOSPADM

## 2024-05-29 RX ORDER — TRAMADOL HYDROCHLORIDE 50 MG/1
50 TABLET ORAL EVERY 6 HOURS PRN
Status: DISCONTINUED | OUTPATIENT
Start: 2024-05-29 | End: 2024-06-01 | Stop reason: HOSPADM

## 2024-05-29 RX ORDER — POTASSIUM CHLORIDE 20 MEQ/1
40 TABLET, EXTENDED RELEASE ORAL DAILY
Status: DISCONTINUED | OUTPATIENT
Start: 2024-05-29 | End: 2024-06-01 | Stop reason: HOSPADM

## 2024-05-29 RX ORDER — INSULIN GLARGINE 100 [IU]/ML
66 INJECTION, SOLUTION SUBCUTANEOUS
Status: DISCONTINUED | OUTPATIENT
Start: 2024-05-30 | End: 2024-06-01 | Stop reason: HOSPADM

## 2024-05-29 RX ORDER — AMLODIPINE BESYLATE 10 MG/1
10 TABLET ORAL DAILY
Status: DISCONTINUED | OUTPATIENT
Start: 2024-05-29 | End: 2024-06-01 | Stop reason: HOSPADM

## 2024-05-29 RX ORDER — ATORVASTATIN CALCIUM 40 MG/1
40 TABLET, FILM COATED ORAL DAILY
Status: DISCONTINUED | OUTPATIENT
Start: 2024-05-29 | End: 2024-06-01 | Stop reason: HOSPADM

## 2024-05-29 RX ORDER — GLUCAGON 1 MG/ML
1 KIT INJECTION PRN
Status: DISCONTINUED | OUTPATIENT
Start: 2024-05-29 | End: 2024-06-01 | Stop reason: HOSPADM

## 2024-05-29 RX ORDER — FERROUS SULFATE 325(65) MG
325 TABLET ORAL 2 TIMES DAILY WITH MEALS
Status: DISCONTINUED | OUTPATIENT
Start: 2024-05-29 | End: 2024-06-01 | Stop reason: HOSPADM

## 2024-05-29 RX ORDER — DEXTROSE MONOHYDRATE 100 MG/ML
INJECTION, SOLUTION INTRAVENOUS CONTINUOUS PRN
Status: DISCONTINUED | OUTPATIENT
Start: 2024-05-29 | End: 2024-06-01 | Stop reason: HOSPADM

## 2024-05-29 RX ORDER — PANTOPRAZOLE SODIUM 40 MG/1
40 TABLET, DELAYED RELEASE ORAL
Status: DISCONTINUED | OUTPATIENT
Start: 2024-05-30 | End: 2024-06-01 | Stop reason: HOSPADM

## 2024-05-29 RX ORDER — POTASSIUM CHLORIDE 20 MEQ/1
20 TABLET, EXTENDED RELEASE ORAL ONCE
Status: COMPLETED | OUTPATIENT
Start: 2024-05-29 | End: 2024-05-29

## 2024-05-29 RX ORDER — LANOLIN ALCOHOL/MO/W.PET/CERES
400 CREAM (GRAM) TOPICAL 2 TIMES DAILY
Status: DISCONTINUED | OUTPATIENT
Start: 2024-05-29 | End: 2024-06-01 | Stop reason: HOSPADM

## 2024-05-29 RX ORDER — ACETAMINOPHEN 650 MG/1
650 SUPPOSITORY RECTAL EVERY 6 HOURS PRN
Status: DISCONTINUED | OUTPATIENT
Start: 2024-05-29 | End: 2024-06-01 | Stop reason: HOSPADM

## 2024-05-29 RX ORDER — SODIUM CHLORIDE 9 MG/ML
INJECTION, SOLUTION INTRAVENOUS PRN
Status: DISCONTINUED | OUTPATIENT
Start: 2024-05-29 | End: 2024-06-01 | Stop reason: HOSPADM

## 2024-05-29 RX ORDER — ACETAMINOPHEN 325 MG/1
650 TABLET ORAL EVERY 6 HOURS PRN
Status: DISCONTINUED | OUTPATIENT
Start: 2024-05-29 | End: 2024-06-01 | Stop reason: HOSPADM

## 2024-05-29 RX ORDER — FINASTERIDE 5 MG/1
5 TABLET, FILM COATED ORAL DAILY
Status: DISCONTINUED | OUTPATIENT
Start: 2024-05-29 | End: 2024-06-01 | Stop reason: HOSPADM

## 2024-05-29 RX ORDER — POTASSIUM CHLORIDE 1.5 G/1.58G
40 POWDER, FOR SOLUTION ORAL DAILY
COMMUNITY

## 2024-05-29 RX ORDER — FUROSEMIDE 40 MG/1
40 TABLET ORAL DAILY
Status: DISCONTINUED | OUTPATIENT
Start: 2024-05-29 | End: 2024-06-01 | Stop reason: HOSPADM

## 2024-05-29 RX ORDER — MAGNESIUM SULFATE 1 G/100ML
1000 INJECTION INTRAVENOUS ONCE
Status: COMPLETED | OUTPATIENT
Start: 2024-05-29 | End: 2024-05-29

## 2024-05-29 RX ORDER — SODIUM CHLORIDE 0.9 % (FLUSH) 0.9 %
5-40 SYRINGE (ML) INJECTION EVERY 12 HOURS SCHEDULED
Status: DISCONTINUED | OUTPATIENT
Start: 2024-05-29 | End: 2024-06-01 | Stop reason: HOSPADM

## 2024-05-29 RX ORDER — TAMSULOSIN HYDROCHLORIDE 0.4 MG/1
0.8 CAPSULE ORAL NIGHTLY
Status: DISCONTINUED | OUTPATIENT
Start: 2024-05-29 | End: 2024-06-01 | Stop reason: HOSPADM

## 2024-05-29 RX ORDER — SODIUM CHLORIDE 0.9 % (FLUSH) 0.9 %
5-40 SYRINGE (ML) INJECTION PRN
Status: DISCONTINUED | OUTPATIENT
Start: 2024-05-29 | End: 2024-06-01 | Stop reason: HOSPADM

## 2024-05-29 RX ADMIN — TAMSULOSIN HYDROCHLORIDE 0.8 MG: 0.4 CAPSULE ORAL at 20:15

## 2024-05-29 RX ADMIN — APIXABAN 5 MG: 5 TABLET, FILM COATED ORAL at 20:15

## 2024-05-29 RX ADMIN — POTASSIUM CHLORIDE 20 MEQ: 1500 TABLET, EXTENDED RELEASE ORAL at 20:15

## 2024-05-29 RX ADMIN — Medication 400 MG: at 20:16

## 2024-05-29 RX ADMIN — SODIUM CHLORIDE, PRESERVATIVE FREE 10 ML: 5 INJECTION INTRAVENOUS at 20:19

## 2024-05-29 RX ADMIN — MAGNESIUM SULFATE HEPTAHYDRATE 1000 MG: 1 INJECTION, SOLUTION INTRAVENOUS at 20:08

## 2024-05-29 RX ADMIN — DOFETILIDE 500 MCG: 0.5 CAPSULE ORAL at 20:16

## 2024-05-29 RX ADMIN — METFORMIN HYDROCHLORIDE 1000 MG: 1000 TABLET ORAL at 20:15

## 2024-05-29 RX ADMIN — PRAZOSIN HYDROCHLORIDE 2 MG: 2 CAPSULE ORAL at 20:16

## 2024-05-29 RX ADMIN — LISINOPRIL 10 MG: 10 TABLET ORAL at 20:16

## 2024-05-29 NOTE — H&P
[facility]- [facility]  History and Physical Examination  PATIENT: Josef Charlton  MEDICAL RECORD NUMBER: [id number]  DATE OF SERVICE:  5/29/2024  ATTENDING ELECTROPHYSIOLOGIST: Ricky Elizabeth MD  REFERRING PHYSICIAN: No ref. provider found and Chary Ordonez MD  CHIEF COMPLAINT: Atrial fibrillation    HPI: This is a 78 y.o. male with a history of   Patient Active Problem List   Diagnosis    Amputation of right thumb    Abscess of right thumb    Malignant lymphoplasmacytic lymphoma (HCC)    A-fib (HCC)    New onset atrial fibrillation (HCC)    Coronary artery calcification    Kidney stone    Hydronephrosis with urinary obstruction due to ureteral calculus    Complicated UTI (urinary tract infection)    Elevated lactic acid level    Petty catheter in place    Candiduria    Poorly controlled type 2 diabetes mellitus (HCC)    Primary hypertension    Hypomagnesemia    Pyelonephritis    Cellulitis of left lower extremity    Wound of lower extremity, left, initial encounter    Dietary noncompliance    Hyperlipidemia   who presents to cardiac electrophysiology clinic for consultation of atrial fibrillation.  Patient presented to the emergency room in November 2022 with complaints of exertional dyspnea.  He was found to be in AF with controlled ventricular rates.  He was seen by Dr. Murillo and underwent successful CAM guided cardioversion on 11/23/22. He was discharged home on Eliquis, Atenolol was changed to Metoprolol succinate 75 mg twice a day and discharged with a14 day cardiac monitor to determine AF burden.  The MCOT showed a 80% AF burden with the longest duration being 7 days and 23 hours, 1 VT episode lasting 4 beats, 52 SVT episodes the longest lasting 16.2 sec.  After discharge he underwent a stress test on 2/27/23 that was unremarkable. He was seen for follow up in Cardiology clinic on 1/12/23 and was noted to be in AF with CVR. He was noted to      7. History of malignant lymphocytic lymphoma   - On Ibrutinib     8. COPD    9. BPH  - On Flomax and Proscar.    10. Sleep disturbance  - Reported poor quality sleep.   - Referred to Sleep Medicine.     Recommendations:   Start Tikosyn 500 mcg every 12 hours today based on CrCl by IBW of 86.2 mL/min.  ECG 2 hrs after each dose of Tikosyn.  Daily Mg and BMP. Keep potassium and magnesium at high normal levels.  Telemetry.  Continue other medications as is.  If the patient remains in AF after his 4th dose of Tikosyn, will plan for electrical CV.     I have spent a total of 75 minutes with the patient and the family reviewing the above stated recommendations.  And a total of >50% of that time involved face-to-face time providing counseling and/or coordination of care with the other providers, reviewing records/tests, counseling/education of the patient, ordering medications/tests/procedures, coordinating care, and documenting clinical information in the EHR.     Thank you for allowing me to participate in your patient's care.  Please call me if there are any questions or concerns.      Ricky Elizabeth MD  Cardiac Electrophysiology  [facility]  [facility] [place]: [facility]  3/20/24   6:28 PM

## 2024-05-30 ENCOUNTER — HOSPITAL ENCOUNTER (OUTPATIENT)
Dept: INFUSION THERAPY | Age: 79
End: 2024-05-30

## 2024-05-30 LAB
ANION GAP SERPL CALCULATED.3IONS-SCNC: 18 MMOL/L (ref 7–16)
BUN SERPL-MCNC: 13 MG/DL (ref 6–23)
CALCIUM SERPL-MCNC: 9.5 MG/DL (ref 8.6–10.2)
CHLORIDE SERPL-SCNC: 102 MMOL/L (ref 98–107)
CO2 SERPL-SCNC: 21 MMOL/L (ref 22–29)
CREAT SERPL-MCNC: 0.7 MG/DL (ref 0.7–1.2)
EKG ATRIAL RATE: 42 BPM
EKG Q-T INTERVAL: 438 MS
EKG QRS DURATION: 112 MS
EKG QTC CALCULATION (BAZETT): 492 MS
EKG R AXIS: 20 DEGREES
EKG T AXIS: 37 DEGREES
EKG VENTRICULAR RATE: 76 BPM
GFR, ESTIMATED: >90 ML/MIN/1.73M2
GLUCOSE BLD-MCNC: 119 MG/DL (ref 74–99)
GLUCOSE BLD-MCNC: 186 MG/DL (ref 74–99)
GLUCOSE BLD-MCNC: 230 MG/DL (ref 74–99)
GLUCOSE SERPL-MCNC: 184 MG/DL (ref 74–99)
MAGNESIUM SERPL-MCNC: 1.9 MG/DL (ref 1.6–2.6)
POTASSIUM SERPL-SCNC: 3.9 MMOL/L (ref 3.5–5)
SODIUM SERPL-SCNC: 141 MMOL/L (ref 132–146)

## 2024-05-30 PROCEDURE — 83735 ASSAY OF MAGNESIUM: CPT

## 2024-05-30 PROCEDURE — 6370000000 HC RX 637 (ALT 250 FOR IP): Performed by: INTERNAL MEDICINE

## 2024-05-30 PROCEDURE — 80048 BASIC METABOLIC PNL TOTAL CA: CPT

## 2024-05-30 PROCEDURE — 36415 COLL VENOUS BLD VENIPUNCTURE: CPT

## 2024-05-30 PROCEDURE — 93005 ELECTROCARDIOGRAM TRACING: CPT | Performed by: INTERNAL MEDICINE

## 2024-05-30 PROCEDURE — 2060000000 HC ICU INTERMEDIATE R&B

## 2024-05-30 PROCEDURE — 82962 GLUCOSE BLOOD TEST: CPT

## 2024-05-30 PROCEDURE — 6370000000 HC RX 637 (ALT 250 FOR IP): Performed by: NURSE PRACTITIONER

## 2024-05-30 RX ORDER — METOPROLOL SUCCINATE 50 MG/1
50 TABLET, EXTENDED RELEASE ORAL DAILY
Status: DISCONTINUED | OUTPATIENT
Start: 2024-05-30 | End: 2024-05-30

## 2024-05-30 RX ORDER — METOPROLOL SUCCINATE 50 MG/1
50 TABLET, EXTENDED RELEASE ORAL DAILY
Status: DISCONTINUED | OUTPATIENT
Start: 2024-05-30 | End: 2024-06-01

## 2024-05-30 RX ORDER — METOPROLOL SUCCINATE 50 MG/1
50 TABLET, EXTENDED RELEASE ORAL 2 TIMES DAILY
Status: DISCONTINUED | OUTPATIENT
Start: 2024-05-31 | End: 2024-05-30

## 2024-05-30 RX ADMIN — METFORMIN HYDROCHLORIDE 1000 MG: 1000 TABLET ORAL at 08:17

## 2024-05-30 RX ADMIN — FERROUS SULFATE TAB 325 MG (65 MG ELEMENTAL FE) 325 MG: 325 (65 FE) TAB at 08:17

## 2024-05-30 RX ADMIN — DOFETILIDE 500 MCG: 0.5 CAPSULE ORAL at 20:10

## 2024-05-30 RX ADMIN — AMLODIPINE BESYLATE 10 MG: 10 TABLET ORAL at 08:17

## 2024-05-30 RX ADMIN — FERROUS SULFATE TAB 325 MG (65 MG ELEMENTAL FE) 325 MG: 325 (65 FE) TAB at 17:04

## 2024-05-30 RX ADMIN — PANTOPRAZOLE SODIUM 40 MG: 40 TABLET, DELAYED RELEASE ORAL at 05:20

## 2024-05-30 RX ADMIN — LISINOPRIL 10 MG: 10 TABLET ORAL at 20:10

## 2024-05-30 RX ADMIN — TRAMADOL HYDROCHLORIDE 50 MG: 50 TABLET ORAL at 20:35

## 2024-05-30 RX ADMIN — METOPROLOL SUCCINATE 50 MG: 50 TABLET, EXTENDED RELEASE ORAL at 17:04

## 2024-05-30 RX ADMIN — LISINOPRIL 10 MG: 10 TABLET ORAL at 08:17

## 2024-05-30 RX ADMIN — ATORVASTATIN CALCIUM 40 MG: 40 TABLET, FILM COATED ORAL at 08:17

## 2024-05-30 RX ADMIN — METFORMIN HYDROCHLORIDE 1000 MG: 1000 TABLET ORAL at 17:04

## 2024-05-30 RX ADMIN — APIXABAN 5 MG: 5 TABLET, FILM COATED ORAL at 08:17

## 2024-05-30 RX ADMIN — Medication 400 MG: at 20:10

## 2024-05-30 RX ADMIN — PRAZOSIN HYDROCHLORIDE 2 MG: 2 CAPSULE ORAL at 20:10

## 2024-05-30 RX ADMIN — INSULIN GLARGINE 66 UNITS: 100 INJECTION, SOLUTION SUBCUTANEOUS at 11:31

## 2024-05-30 RX ADMIN — Medication 400 MG: at 08:17

## 2024-05-30 RX ADMIN — TAMSULOSIN HYDROCHLORIDE 0.8 MG: 0.4 CAPSULE ORAL at 20:10

## 2024-05-30 RX ADMIN — POTASSIUM CHLORIDE 40 MEQ: 1500 TABLET, EXTENDED RELEASE ORAL at 08:17

## 2024-05-30 RX ADMIN — APIXABAN 5 MG: 5 TABLET, FILM COATED ORAL at 20:10

## 2024-05-30 RX ADMIN — FUROSEMIDE 40 MG: 40 TABLET ORAL at 08:17

## 2024-05-30 RX ADMIN — FINASTERIDE 5 MG: 5 TABLET, FILM COATED ORAL at 08:17

## 2024-05-30 RX ADMIN — DOFETILIDE 500 MCG: 0.5 CAPSULE ORAL at 08:16

## 2024-05-30 RX ADMIN — Medication 2000 UNITS: at 08:17

## 2024-05-30 ASSESSMENT — PAIN SCALES - GENERAL
PAINLEVEL_OUTOF10: 3
PAINLEVEL_OUTOF10: 6

## 2024-05-30 ASSESSMENT — PAIN DESCRIPTION - DESCRIPTORS: DESCRIPTORS: ACHING;TENDER;SORE

## 2024-05-30 ASSESSMENT — PAIN DESCRIPTION - LOCATION: LOCATION: BACK

## 2024-05-30 ASSESSMENT — PAIN DESCRIPTION - ORIENTATION: ORIENTATION: LOWER

## 2024-05-30 NOTE — CARE COORDINATION
5/30/24 CM Note. Pt admitted 5/2924 for initiation of Tikosyn.  Pt live home with spouse and is independent with ADLS, uses walker and cane PRN.  [facility]P [facility] in [place] is preferred pharmacy.  Plan to discharge home with wife providing transport.   Keyla GUPTAN RN-BC  [phone]

## 2024-05-30 NOTE — PROGRESS NOTES
was noted to have recurrent hematuria and subsequently underwent cystoscopy retrograde pyelograms bilateral  right ureteroscopy laser lithotripsy, stent insertion and stone extraction on 7/6/23. He is back on Eliquis. He reports he feels palpitation and has increased SOB lately. He states that he gained weight and believes that he retains fluid recently. The patient presents today in AF with CVR. The patient denies any chest pain, dizziness, syncope, orthopnea or paroxysmal nocturnal dyspnea.     5/29/24: The patient is seen in the hospital for Tikosyn initiation. He was started on Flecainide and developed side effects so the medication was discontinued. He opted for Tikosyn initiation.    05/30/2024: He has completed 2 doses of Tikosyn and remains in AF with with a Qtc of 461 ms by manual calculation.  His rates are in the 70's off Toprol at this time with SBP in the 160's.     Patient Active Problem List    Diagnosis Date Noted    Coronary artery calcification 02/27/2023     Priority: Medium    A-fib ([facility]) 11/22/2022     Priority: Medium    New onset atrial fibrillation ([facility]) 11/22/2022     Priority: Medium    Persistent atrial fibrillation ([facility]) 05/29/2024    Dietary noncompliance 01/04/2024    Hyperlipidemia 01/04/2024    Cellulitis of left lower extremity 01/03/2024    Wound of lower extremity, left, initial encounter 01/03/2024    Pyelonephritis 10/20/2023    Hypomagnesemia 09/14/2023    Candiduria 07/18/2023    Poorly controlled type 2 diabetes mellitus (HCC) 07/18/2023    Primary hypertension 07/18/2023    Complicated UTI (urinary tract infection) 07/17/2023    Elevated lactic acid level 07/17/2023    Petty catheter in place 07/17/2023    Hydronephrosis with urinary obstruction due to ureteral calculus 07/05/2023    Kidney stone 07/04/2023    Malignant lymphoplasmacytic lymphoma (HCC) 07/14/2021    Abscess of right thumb 04/15/2016    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     Overview Note:     Distal  Referred to Sleep Medicine.     Recommendations:   Continue Tikosyn 500 mcg every 12 hours.  ECG 2 hrs after each dose of Tikosyn.  Daily Mg and BMP. Keep potassium and magnesium at high normal levels.  Telemetry.  Restart Toprol at 50mg daily, due to hypotension.   DCCV tomorrow in afternoon.      I have spent a total of 10 minutes with the patient and the family reviewing the above stated recommendations.  And a total of >50% of that time involved face-to-face time providing counseling and/or coordination of care with the other providers, reviewing records/tests, counseling/education of the patient, ordering medications/tests/procedures, coordinating care, and documenting clinical information in the EHR.     Thank you for allowing me to participate in your patient's care.  Please call me if there are any questions or concerns.      Discussed with Dr. Elizabeth.    Sukumar Bach, APRN - CNP   Cardiac Electrophysiology  [facility]  [facility] [place]: [place] [facility]  3/20/24   11:45 AM      Attending Physician's Statement    Patient seen with the ANP. Agree with the findings, assessment and plan. Management plan was discussed. I have personally interviewed the patient, independently performed a focused cardiac examination, reviewed the pertinent laboratory and diagnostic testing with the patient and directly participated in the medical decision-making as noted above with the following additions:      ms this AM. Remains in AF with HR 70's bpm.     Resume Toprol XL 50 mg daily. Continue Tikosyn 500 mcg every 12 hours and Eliquis 5 mg BID. Plan for DC-CV tomorrow. NPO after midnight.     I have spent a total of 50 minutes with the patient and the family reviewing the above stated recommendations.  And a total of >50% of that time involved face-to-face time providing counseling and/or coordination of care with the other providers, reviewing records/tests, counseling/education

## 2024-05-30 NOTE — PLAN OF CARE
Problem: Discharge Planning  Goal: Discharge to home or other facility with appropriate resources  Outcome: Progressing  Flowsheets (Taken 5/29/2024 2000)  Discharge to home or other facility with appropriate resources:   Identify barriers to discharge with patient and caregiver   Arrange for needed discharge resources and transportation as appropriate     Problem: Safety - Adult  Goal: Free from fall injury  Outcome: Progressing  Flowsheets (Taken 5/29/2024 2234)  Free From Fall Injury: Instruct family/caregiver on patient safety     Problem: Skin/Tissue Integrity  Goal: Absence of new skin breakdown  Description: 1.  Monitor for areas of redness and/or skin breakdown  2.  Assess vascular access sites hourly  3.  Every 4-6 hours minimum:  Change oxygen saturation probe site  4.  Every 4-6 hours:  If on nasal continuous positive airway pressure, respiratory therapy assess nares and determine need for appliance change or resting period.  Outcome: Progressing

## 2024-05-31 ENCOUNTER — ANESTHESIA EVENT (OUTPATIENT)
Age: 79
End: 2024-05-31
Payer: MEDICARE

## 2024-05-31 ENCOUNTER — ANESTHESIA (OUTPATIENT)
Age: 79
End: 2024-05-31
Payer: MEDICARE

## 2024-05-31 ENCOUNTER — HOSPITAL ENCOUNTER (INPATIENT)
Age: 79
DRG: 309 | End: 2024-05-31
Attending: INTERNAL MEDICINE
Payer: OTHER GOVERNMENT

## 2024-05-31 VITALS
SYSTOLIC BLOOD PRESSURE: 155 MMHG | OXYGEN SATURATION: 98 % | TEMPERATURE: 98.3 F | RESPIRATION RATE: 18 BRPM | HEART RATE: 74 BPM | HEIGHT: 72 IN | DIASTOLIC BLOOD PRESSURE: 95 MMHG | BODY MASS INDEX: 41.85 KG/M2 | WEIGHT: 309 LBS

## 2024-05-31 LAB
ANION GAP SERPL CALCULATED.3IONS-SCNC: 13 MMOL/L (ref 7–16)
BUN SERPL-MCNC: 10 MG/DL (ref 6–23)
CALCIUM SERPL-MCNC: 9.4 MG/DL (ref 8.6–10.2)
CHLORIDE SERPL-SCNC: 105 MMOL/L (ref 98–107)
CO2 SERPL-SCNC: 23 MMOL/L (ref 22–29)
CREAT SERPL-MCNC: 0.7 MG/DL (ref 0.7–1.2)
ECHO BSA: 2.67 M2
EKG ATRIAL RATE: 57 BPM
EKG ATRIAL RATE: 72 BPM
EKG ATRIAL RATE: 94 BPM
EKG Q-T INTERVAL: 394 MS
EKG Q-T INTERVAL: 412 MS
EKG Q-T INTERVAL: 430 MS
EKG QRS DURATION: 104 MS
EKG QRS DURATION: 112 MS
EKG QRS DURATION: 112 MS
EKG QTC CALCULATION (BAZETT): 447 MS
EKG QTC CALCULATION (BAZETT): 465 MS
EKG QTC CALCULATION (BAZETT): 483 MS
EKG R AXIS: 16 DEGREES
EKG R AXIS: 17 DEGREES
EKG R AXIS: 41 DEGREES
EKG T AXIS: 43 DEGREES
EKG T AXIS: 44 DEGREES
EKG T AXIS: 52 DEGREES
EKG VENTRICULAR RATE: 71 BPM
EKG VENTRICULAR RATE: 76 BPM
EKG VENTRICULAR RATE: 84 BPM
GFR, ESTIMATED: >90 ML/MIN/1.73M2
GLUCOSE BLD-MCNC: 112 MG/DL (ref 74–99)
GLUCOSE BLD-MCNC: 194 MG/DL (ref 74–99)
GLUCOSE BLD-MCNC: 97 MG/DL (ref 74–99)
GLUCOSE SERPL-MCNC: 102 MG/DL (ref 74–99)
MAGNESIUM SERPL-MCNC: 1.6 MG/DL (ref 1.6–2.6)
POTASSIUM SERPL-SCNC: 3.6 MMOL/L (ref 3.5–5)
SODIUM SERPL-SCNC: 141 MMOL/L (ref 132–146)

## 2024-05-31 PROCEDURE — 36415 COLL VENOUS BLD VENIPUNCTURE: CPT

## 2024-05-31 PROCEDURE — 2580000003 HC RX 258: Performed by: INTERNAL MEDICINE

## 2024-05-31 PROCEDURE — 6360000002 HC RX W HCPCS: Performed by: NURSE ANESTHETIST, CERTIFIED REGISTERED

## 2024-05-31 PROCEDURE — 80048 BASIC METABOLIC PNL TOTAL CA: CPT

## 2024-05-31 PROCEDURE — 92960 CARDIOVERSION ELECTRIC EXT: CPT

## 2024-05-31 PROCEDURE — 3700000000 HC ANESTHESIA ATTENDED CARE: Performed by: INTERNAL MEDICINE

## 2024-05-31 PROCEDURE — 5A2204Z RESTORATION OF CARDIAC RHYTHM, SINGLE: ICD-10-PCS | Performed by: INTERNAL MEDICINE

## 2024-05-31 PROCEDURE — 93005 ELECTROCARDIOGRAM TRACING: CPT | Performed by: INTERNAL MEDICINE

## 2024-05-31 PROCEDURE — 82962 GLUCOSE BLOOD TEST: CPT

## 2024-05-31 PROCEDURE — 92960 CARDIOVERSION ELECTRIC EXT: CPT | Performed by: INTERNAL MEDICINE

## 2024-05-31 PROCEDURE — 2060000000 HC ICU INTERMEDIATE R&B

## 2024-05-31 PROCEDURE — 6370000000 HC RX 637 (ALT 250 FOR IP): Performed by: INTERNAL MEDICINE

## 2024-05-31 PROCEDURE — 83735 ASSAY OF MAGNESIUM: CPT

## 2024-05-31 RX ORDER — PROPOFOL 10 MG/ML
INJECTION, EMULSION INTRAVENOUS PRN
Status: DISCONTINUED | OUTPATIENT
Start: 2024-05-31 | End: 2024-05-31 | Stop reason: SDUPTHER

## 2024-05-31 RX ORDER — POTASSIUM CHLORIDE 20 MEQ/1
20 TABLET, EXTENDED RELEASE ORAL ONCE
Status: DISCONTINUED | OUTPATIENT
Start: 2024-05-31 | End: 2024-06-03 | Stop reason: HOSPADM

## 2024-05-31 RX ORDER — MAGNESIUM SULFATE IN WATER 40 MG/ML
2000 INJECTION, SOLUTION INTRAVENOUS ONCE
Status: DISCONTINUED | OUTPATIENT
Start: 2024-05-31 | End: 2024-06-01 | Stop reason: SDUPTHER

## 2024-05-31 RX ORDER — SODIUM CHLORIDE 0.9 % (FLUSH) 0.9 %
5-40 SYRINGE (ML) INJECTION EVERY 12 HOURS SCHEDULED
Status: DISCONTINUED | OUTPATIENT
Start: 2024-05-31 | End: 2024-06-03 | Stop reason: HOSPADM

## 2024-05-31 RX ORDER — SODIUM CHLORIDE 0.9 % (FLUSH) 0.9 %
5-40 SYRINGE (ML) INJECTION PRN
Status: CANCELLED | OUTPATIENT
Start: 2024-05-31

## 2024-05-31 RX ORDER — SODIUM CHLORIDE 0.9 % (FLUSH) 0.9 %
5-40 SYRINGE (ML) INJECTION EVERY 12 HOURS SCHEDULED
Status: CANCELLED | OUTPATIENT
Start: 2024-05-31

## 2024-05-31 RX ORDER — SODIUM CHLORIDE 9 MG/ML
INJECTION, SOLUTION INTRAVENOUS PRN
Status: DISCONTINUED | OUTPATIENT
Start: 2024-05-31 | End: 2024-06-03 | Stop reason: HOSPADM

## 2024-05-31 RX ORDER — SODIUM CHLORIDE 9 MG/ML
INJECTION, SOLUTION INTRAVENOUS PRN
Status: CANCELLED | OUTPATIENT
Start: 2024-05-31

## 2024-05-31 RX ORDER — METOPROLOL SUCCINATE 50 MG/1
50 TABLET, EXTENDED RELEASE ORAL ONCE
Status: COMPLETED | OUTPATIENT
Start: 2024-05-31 | End: 2024-05-31

## 2024-05-31 RX ORDER — SODIUM CHLORIDE 0.9 % (FLUSH) 0.9 %
5-40 SYRINGE (ML) INJECTION PRN
Status: DISCONTINUED | OUTPATIENT
Start: 2024-05-31 | End: 2024-06-03 | Stop reason: HOSPADM

## 2024-05-31 RX ADMIN — PROPOFOL 100 MG: 10 INJECTION, EMULSION INTRAVENOUS at 15:00

## 2024-05-31 RX ADMIN — AMLODIPINE BESYLATE 10 MG: 10 TABLET ORAL at 08:54

## 2024-05-31 RX ADMIN — FINASTERIDE 5 MG: 5 TABLET, FILM COATED ORAL at 08:53

## 2024-05-31 RX ADMIN — TAMSULOSIN HYDROCHLORIDE 0.8 MG: 0.4 CAPSULE ORAL at 20:25

## 2024-05-31 RX ADMIN — Medication 2000 UNITS: at 08:53

## 2024-05-31 RX ADMIN — SODIUM CHLORIDE, PRESERVATIVE FREE 10 ML: 5 INJECTION INTRAVENOUS at 20:26

## 2024-05-31 RX ADMIN — APIXABAN 5 MG: 5 TABLET, FILM COATED ORAL at 09:01

## 2024-05-31 RX ADMIN — Medication 400 MG: at 20:25

## 2024-05-31 RX ADMIN — Medication 400 MG: at 09:03

## 2024-05-31 RX ADMIN — DOFETILIDE 500 MCG: 0.5 CAPSULE ORAL at 20:25

## 2024-05-31 RX ADMIN — FUROSEMIDE 40 MG: 40 TABLET ORAL at 15:55

## 2024-05-31 RX ADMIN — PRAZOSIN HYDROCHLORIDE 2 MG: 2 CAPSULE ORAL at 20:25

## 2024-05-31 RX ADMIN — FERROUS SULFATE TAB 325 MG (65 MG ELEMENTAL FE) 325 MG: 325 (65 FE) TAB at 08:54

## 2024-05-31 RX ADMIN — METFORMIN HYDROCHLORIDE 1000 MG: 1000 TABLET ORAL at 08:54

## 2024-05-31 RX ADMIN — POTASSIUM CHLORIDE 40 MEQ: 1500 TABLET, EXTENDED RELEASE ORAL at 08:54

## 2024-05-31 RX ADMIN — METFORMIN HYDROCHLORIDE 1000 MG: 1000 TABLET ORAL at 18:09

## 2024-05-31 RX ADMIN — ATORVASTATIN CALCIUM 40 MG: 40 TABLET, FILM COATED ORAL at 08:54

## 2024-05-31 RX ADMIN — LISINOPRIL 10 MG: 10 TABLET ORAL at 20:26

## 2024-05-31 RX ADMIN — SODIUM CHLORIDE, PRESERVATIVE FREE 10 ML: 5 INJECTION INTRAVENOUS at 14:55

## 2024-05-31 RX ADMIN — INSULIN GLARGINE 66 UNITS: 100 INJECTION, SOLUTION SUBCUTANEOUS at 15:55

## 2024-05-31 RX ADMIN — LISINOPRIL 10 MG: 10 TABLET ORAL at 08:54

## 2024-05-31 RX ADMIN — DOFETILIDE 500 MCG: 0.5 CAPSULE ORAL at 08:53

## 2024-05-31 RX ADMIN — FERROUS SULFATE TAB 325 MG (65 MG ELEMENTAL FE) 325 MG: 325 (65 FE) TAB at 18:09

## 2024-05-31 RX ADMIN — APIXABAN 5 MG: 5 TABLET, FILM COATED ORAL at 20:25

## 2024-05-31 RX ADMIN — METOPROLOL SUCCINATE 50 MG: 50 TABLET, EXTENDED RELEASE ORAL at 15:55

## 2024-05-31 RX ADMIN — SODIUM CHLORIDE, PRESERVATIVE FREE 10 ML: 5 INJECTION INTRAVENOUS at 08:57

## 2024-05-31 ASSESSMENT — PAIN - FUNCTIONAL ASSESSMENT: PAIN_FUNCTIONAL_ASSESSMENT: NONE - DENIES PAIN

## 2024-05-31 ASSESSMENT — LIFESTYLE VARIABLES: SMOKING_STATUS: 0

## 2024-05-31 NOTE — ANESTHESIA POSTPROCEDURE EVALUATION
Department of Anesthesiology  Postprocedure Note    Patient: Joesf Charlton  MRN: [id number]  YOB: 1945  Date of evaluation: 5/31/2024    Procedure Summary       Date: 05/31/24 Room / Location: [facility] Cardiac Cath Lab    Anesthesia Start: 1455 Anesthesia Stop: 1504    Procedure: CARDIOVERSION EXTERNAL Diagnosis: Paroxysmal atrial fibrillation (HCC)    Scheduled Providers: Shana Sahni MD Responsible Provider: Shana Sahni MD    Anesthesia Type: MAC ASA Status: 4            Anesthesia Type: No value filed.    Kera Phase I:      Kera Phase II:      Anesthesia Post Evaluation    Patient location during evaluation: PACU  Patient participation: complete - patient participated  Level of consciousness: awake and alert  Airway patency: patent  Nausea & Vomiting: no nausea and no vomiting  Cardiovascular status: blood pressure returned to baseline and hemodynamically stable  Respiratory status: acceptable and spontaneous ventilation  Hydration status: euvolemic  Multimodal analgesia pain management approach  Pain management: adequate    No notable events documented.

## 2024-05-31 NOTE — PROGRESS NOTES
Physician Progress Note      PATIENT:               ANDREZ GARCIA  CSN #:                  [id number]  :                       1945  ADMIT DATE:       2024 5:29 PM  DISCH DATE:  RESPONDING  PROVIDER #:        Ricky Elizabeth MD          QUERY TEXT:    Patient admitted with persistent afib, and is maintained on Eliquis. If   possible, please document in progress notes and discharge summary if you are   evaluating and/or treating any of the following:    The medical record reflects the following:  Risk Factors: HTN, Age, DM  Clinical Indicators: per H&P Persistent atrial fibrillation -[id number] = 4   (HTN, age >75, DM)- Current OAC regiment includes Eliquis  Treatment: Eliquis    Thank you  Abbey GARVIN, RN, CCDS  Clinical Documentation Improvement  Options provided:  -- Secondary hypercoagulable state in a patient with atrial fibrillation  -- Other - I will add my own diagnosis  -- Disagree - Not applicable / Not valid  -- Disagree - Clinically unable to determine / Unknown  -- Refer to Clinical Documentation Reviewer    PROVIDER RESPONSE TEXT:    This patient has secondary hypercoagulable state in a patient with atrial   fibrillation.    Query created by: Abbey Cole on 2024 8:27 AM      Electronically signed by:  Rikcy Elizabeth MD 2024 1:37 PM

## 2024-05-31 NOTE — PROGRESS NOTES
TRANSFER - OUT REPORT:    Verbal report given to Rey VEGA on Josef Charlton being transferred to [facility] for routine progression of patient care       Report consisted of patient's Situation, Background, Assessment and   Recommendations(SBAR).     Information from the following report(s) Nurse Handoff Report was reviewed with the receiving nurse.    Opportunity for questions and clarification was provided.      Patient transported with:   Monitor

## 2024-05-31 NOTE — CARE COORDINATION
5/31/24 Update CM Note. Pt admitted 5/2924 for initiation of Tikosyn. Remains in afib, planning for DCCV today. Pt also has Hx MLL, follows with hem-onc outpatient .  Plan to discharge home sulaiman medically stable with wife providing transport.   Keyla Thompson BSN RN-BC  [phone]

## 2024-05-31 NOTE — ANESTHESIA PRE PROCEDURE
\"PREGSERUM\", \"HCG\", \"HCGQUANT\"     ABGs: No results found for: \"PHART\", \"PO2ART\", \"EPX7BAA\", \"SME9MNM\", \"BEART\", \"V9MUDHUT\"     Type & Screen (If Applicable):  No results found for: \"LABABO\"    Drug/Infectious Status (If Applicable):  No results found for: \"HIV\", \"HEPCAB\"    COVID-19 Screening (If Applicable):   Lab Results   Component Value Date/Time    COVID19 Not Detected 11/10/2023 12:15 AM       CT:  IMPRESSION:  7 mm calculus in the distal right ureter leading to mild right-sided  hydroureteronephrosis.  Several nonobstructing left renal calculi are present along with multiple calculi within the urinary bladder, measuring up to 6 mm.     Significantly heterogeneous and enlarged prostate gland with circumferential wall thickening of the urinary bladder and mild perivesicular inflammatory changes.  A urinary tract infection cannot be excluded.  Correlation with urinalysis recommended.     Coronary artery calcifications are noted, potentially a marker for coronary artery disease    Anesthesia Evaluation  Patient summary reviewed and Nursing notes reviewed   no history of anesthetic complications:   Airway: Mallampati: III  TM distance: >3 FB   Neck ROM: full  Mouth opening: > = 3 FB   Dental:    (+) edentulous      Pulmonary: breath sounds clear to auscultation      (-) not a current smoker (Former)                           Cardiovascular:  Exercise tolerance: poor (<4 METS)  (+) hypertension: mild and no interval change, valvular problems/murmurs: MR, CAD: no interval change, dysrhythmias: atrial fibrillation, pulmonary hypertension (RVSP 44 mm Hg): mild, hyperlipidemia      ECG reviewed  Rhythm: regular    Echocardiogram reviewed  Stress test reviewed       Beta Blocker:  Dose within 24 Hrs      ROS comment: EKG:  Sinus rhythm with marked sinus arrhythmia  Otherwise normal ECG  No previous ECGs available    SPECT:  IMPRESSION:  1: No definite evidence of ischemia or scar except soft tissue attenuation.  2

## 2024-05-31 NOTE — PLAN OF CARE
Problem: Discharge Planning  Goal: Discharge to home or other facility with appropriate resources  5/31/2024 1934 by Abad Hsieh, RN  Outcome: Progressing  5/31/2024 1110 by Leela Zapata RN  Outcome: Progressing     Problem: Safety - Adult  Goal: Free from fall injury  5/31/2024 1934 by Abad Hsieh, RN  Outcome: Progressing  5/31/2024 1110 by Leela Zapata RN  Outcome: Progressing     Problem: Skin/Tissue Integrity  Goal: Absence of new skin breakdown  Description: 1.  Monitor for areas of redness and/or skin breakdown  2.  Assess vascular access sites hourly  3.  Every 4-6 hours minimum:  Change oxygen saturation probe site  4.  Every 4-6 hours:  If on nasal continuous positive airway pressure, respiratory therapy assess nares and determine need for appliance change or resting period.  Outcome: Progressing     Problem: Chronic Conditions and Co-morbidities  Goal: Patient's chronic conditions and co-morbidity symptoms are monitored and maintained or improved  5/31/2024 1934 by Abad Hsieh, RN  Outcome: Progressing  5/31/2024 1110 by Leela Zapata RN  Outcome: Progressing

## 2024-06-01 VITALS
OXYGEN SATURATION: 98 % | HEIGHT: 72 IN | BODY MASS INDEX: 41.85 KG/M2 | SYSTOLIC BLOOD PRESSURE: 146 MMHG | TEMPERATURE: 97.5 F | DIASTOLIC BLOOD PRESSURE: 57 MMHG | RESPIRATION RATE: 118 BRPM | HEART RATE: 60 BPM | WEIGHT: 309 LBS

## 2024-06-01 LAB
ANION GAP SERPL CALCULATED.3IONS-SCNC: 10 MMOL/L (ref 7–16)
BUN SERPL-MCNC: 9 MG/DL (ref 6–23)
CALCIUM SERPL-MCNC: 9.5 MG/DL (ref 8.6–10.2)
CHLORIDE SERPL-SCNC: 100 MMOL/L (ref 98–107)
CO2 SERPL-SCNC: 28 MMOL/L (ref 22–29)
CREAT SERPL-MCNC: 0.8 MG/DL (ref 0.7–1.2)
EKG ATRIAL RATE: 67 BPM
EKG ATRIAL RATE: 74 BPM
EKG ATRIAL RATE: 74 BPM
EKG ATRIAL RATE: 79 BPM
EKG P AXIS: 103 DEGREES
EKG P AXIS: 106 DEGREES
EKG P AXIS: 69 DEGREES
EKG P AXIS: 78 DEGREES
EKG P-R INTERVAL: 158 MS
EKG P-R INTERVAL: 182 MS
EKG P-R INTERVAL: 186 MS
EKG P-R INTERVAL: 202 MS
EKG Q-T INTERVAL: 416 MS
EKG Q-T INTERVAL: 434 MS
EKG Q-T INTERVAL: 448 MS
EKG Q-T INTERVAL: 466 MS
EKG QRS DURATION: 104 MS
EKG QRS DURATION: 108 MS
EKG QRS DURATION: 112 MS
EKG QRS DURATION: 114 MS
EKG QTC CALCULATION (BAZETT): 477 MS
EKG QTC CALCULATION (BAZETT): 481 MS
EKG QTC CALCULATION (BAZETT): 492 MS
EKG QTC CALCULATION (BAZETT): 497 MS
EKG R AXIS: -10 DEGREES
EKG R AXIS: 19 DEGREES
EKG R AXIS: 57 DEGREES
EKG R AXIS: 78 DEGREES
EKG T AXIS: 42 DEGREES
EKG T AXIS: 53 DEGREES
EKG T AXIS: 54 DEGREES
EKG T AXIS: 59 DEGREES
EKG VENTRICULAR RATE: 67 BPM
EKG VENTRICULAR RATE: 74 BPM
EKG VENTRICULAR RATE: 74 BPM
EKG VENTRICULAR RATE: 79 BPM
GFR, ESTIMATED: >90 ML/MIN/1.73M2
GLUCOSE BLD-MCNC: 75 MG/DL (ref 74–99)
GLUCOSE SERPL-MCNC: 79 MG/DL (ref 74–99)
MAGNESIUM SERPL-MCNC: 1.7 MG/DL (ref 1.6–2.6)
POTASSIUM SERPL-SCNC: 3.8 MMOL/L (ref 3.5–5)
SODIUM SERPL-SCNC: 138 MMOL/L (ref 132–146)

## 2024-06-01 PROCEDURE — 6370000000 HC RX 637 (ALT 250 FOR IP): Performed by: INTERNAL MEDICINE

## 2024-06-01 PROCEDURE — 6370000000 HC RX 637 (ALT 250 FOR IP): Performed by: NURSE PRACTITIONER

## 2024-06-01 PROCEDURE — 36415 COLL VENOUS BLD VENIPUNCTURE: CPT

## 2024-06-01 PROCEDURE — 6360000002 HC RX W HCPCS: Performed by: NURSE PRACTITIONER

## 2024-06-01 PROCEDURE — 80048 BASIC METABOLIC PNL TOTAL CA: CPT

## 2024-06-01 PROCEDURE — 93005 ELECTROCARDIOGRAM TRACING: CPT | Performed by: INTERNAL MEDICINE

## 2024-06-01 PROCEDURE — 2580000003 HC RX 258: Performed by: INTERNAL MEDICINE

## 2024-06-01 PROCEDURE — 83735 ASSAY OF MAGNESIUM: CPT

## 2024-06-01 PROCEDURE — 82962 GLUCOSE BLOOD TEST: CPT

## 2024-06-01 RX ORDER — LANOLIN ALCOHOL/MO/W.PET/CERES
400 CREAM (GRAM) TOPICAL 3 TIMES DAILY
Qty: 90 TABLET | Refills: 5 | Status: SHIPPED | OUTPATIENT
Start: 2024-06-01 | End: 2024-11-28

## 2024-06-01 RX ORDER — DOFETILIDE 0.5 MG/1
500 CAPSULE ORAL EVERY 12 HOURS SCHEDULED
Qty: 60 CAPSULE | Refills: 3 | Status: SHIPPED | OUTPATIENT
Start: 2024-06-01 | End: 2024-06-03 | Stop reason: SDUPTHER

## 2024-06-01 RX ORDER — MAGNESIUM SULFATE IN WATER 40 MG/ML
2000 INJECTION, SOLUTION INTRAVENOUS ONCE
Status: COMPLETED | OUTPATIENT
Start: 2024-06-01 | End: 2024-06-01

## 2024-06-01 RX ORDER — METOPROLOL SUCCINATE 25 MG/1
75 TABLET, EXTENDED RELEASE ORAL DAILY
Qty: 30 TABLET | Refills: 3 | Status: SHIPPED | OUTPATIENT
Start: 2024-06-02

## 2024-06-01 RX ADMIN — MAGNESIUM SULFATE HEPTAHYDRATE 2000 MG: 40 INJECTION, SOLUTION INTRAVENOUS at 12:43

## 2024-06-01 RX ADMIN — AMLODIPINE BESYLATE 10 MG: 10 TABLET ORAL at 08:28

## 2024-06-01 RX ADMIN — INSULIN GLARGINE 66 UNITS: 100 INJECTION, SOLUTION SUBCUTANEOUS at 11:30

## 2024-06-01 RX ADMIN — FUROSEMIDE 40 MG: 40 TABLET ORAL at 08:28

## 2024-06-01 RX ADMIN — LISINOPRIL 10 MG: 10 TABLET ORAL at 08:28

## 2024-06-01 RX ADMIN — DOFETILIDE 500 MCG: 0.5 CAPSULE ORAL at 08:28

## 2024-06-01 RX ADMIN — METOPROLOL SUCCINATE 75 MG: 50 TABLET, EXTENDED RELEASE ORAL at 08:35

## 2024-06-01 RX ADMIN — FERROUS SULFATE TAB 325 MG (65 MG ELEMENTAL FE) 325 MG: 325 (65 FE) TAB at 08:28

## 2024-06-01 RX ADMIN — Medication 400 MG: at 08:28

## 2024-06-01 RX ADMIN — ATORVASTATIN CALCIUM 40 MG: 40 TABLET, FILM COATED ORAL at 08:28

## 2024-06-01 RX ADMIN — SODIUM CHLORIDE, PRESERVATIVE FREE 10 ML: 5 INJECTION INTRAVENOUS at 08:29

## 2024-06-01 RX ADMIN — POTASSIUM CHLORIDE 40 MEQ: 1500 TABLET, EXTENDED RELEASE ORAL at 08:28

## 2024-06-01 RX ADMIN — APIXABAN 5 MG: 5 TABLET, FILM COATED ORAL at 08:28

## 2024-06-01 RX ADMIN — Medication 2000 UNITS: at 08:27

## 2024-06-01 RX ADMIN — PANTOPRAZOLE SODIUM 40 MG: 40 TABLET, DELAYED RELEASE ORAL at 05:42

## 2024-06-01 RX ADMIN — METFORMIN HYDROCHLORIDE 1000 MG: 1000 TABLET ORAL at 08:27

## 2024-06-01 RX ADMIN — FINASTERIDE 5 MG: 5 TABLET, FILM COATED ORAL at 08:28

## 2024-06-01 NOTE — PLAN OF CARE
Problem: Discharge Planning  Goal: Discharge to home or other facility with appropriate resources  Outcome: Completed     Problem: Safety - Adult  Goal: Free from fall injury  Outcome: Completed     Problem: Skin/Tissue Integrity  Goal: Absence of new skin breakdown  Description: 1.  Monitor for areas of redness and/or skin breakdown  2.  Assess vascular access sites hourly  3.  Every 4-6 hours minimum:  Change oxygen saturation probe site  4.  Every 4-6 hours:  If on nasal continuous positive airway pressure, respiratory therapy assess nares and determine need for appliance change or resting period.  Outcome: Completed     Problem: Chronic Conditions and Co-morbidities  Goal: Patient's chronic conditions and co-morbidity symptoms are monitored and maintained or improved  Outcome: Completed

## 2024-06-01 NOTE — DISCHARGE SUMMARY
[facility]  Discharge Summary  Patient: Josef Charlton  Medical Record Number: [id number]  Date of Procedure:  6/1/2024  Primary/Attending Electrophysiologist: Ricky Elizabeth MD  Referring Physician: Chary Ordonez MD     Admission Date:5/29/2024      Discharge Date:  06/01/2024    Patient Active Problem List   Diagnosis    Amputation of right thumb    Abscess of right thumb    Malignant lymphoplasmacytic lymphoma (HCC)    A-fib (HCC)    New onset atrial fibrillation (HCC)    Coronary artery calcification    Kidney stone    Hydronephrosis with urinary obstruction due to ureteral calculus    Complicated UTI (urinary tract infection)    Elevated lactic acid level    Petty catheter in place    Candiduria    Poorly controlled type 2 diabetes mellitus (HCC)    Primary hypertension    Hypomagnesemia    Pyelonephritis    Cellulitis of left lower extremity    Wound of lower extremity, left, initial encounter    Dietary noncompliance    Hyperlipidemia    Persistent atrial fibrillation (HCC)          Medication List        START taking these medications      dofetilide 500 MCG capsule  Commonly known as: TIKOSYN  Take 1 capsule by mouth every 12 hours            CHANGE how you take these medications      Lantus SoloStar 100 UNIT/ML injection pen  Generic drug: insulin glargine  Inject 54 units nightly  What changed:   how much to take  how to take this  when to take this     metoprolol succinate 25 MG extended release tablet  Commonly known as: TOPROL XL  Take 3 tablets by mouth daily  Start taking on: June 2, 2024  What changed: when to take this            CONTINUE taking these medications      amLODIPine 5 MG tablet  Commonly known as: NORVASC     apixaban 5 MG Tabs tablet  Commonly known as: ELIQUIS  Take 1 tablet by mouth 2 times daily     atorvastatin 40 MG tablet  Commonly known as: LIPITOR     BD Pen Needle Micro U/F 32G X 6 MM Misc  Generic  251-300 add 9U, -350 add 12U, -400 add 15U, BS over 400 add 18U. MAC 100u/day               Where to Get Your Medications        These medications were sent to [facility] [address] [place] [address] - P [phone] - F [phone]  [address] [facility] [address]      Phone: [phone]   dofetilide 500 MCG capsule  metoprolol succinate 25 MG extended release tablet         Hospital Course: Josef Charlton is a 78 y.o. male with a past medical history of persistent atrial fibrillation initially discovered 11/20/2022 with previous cardioversion in 2022, 2023 as well as previous attempts at rhythm control including flecainide.  As flecainide caused side effects he discontinued this medication.  He presented to [facility] on 05/29 2024 and was started on Tikosyn 500 mics every 12 hours, he tolerated 6 monitor doses well without QTc prolongation, QTc at time of discharge 464 ms (manual calculation) CRCl 85.34 mL/min.  He failed to spontaneously convert to sinus rhythm and required DCCV on 5/31/2024. His Toprol was reduced to 75mg daily when inpatient.   He has no complaints at time of discharge ambulating around her room in sinus rhythm and notes improved functional capacity in sinus rhythm.  He was to follow-up with EKG in 1 week and with a provider in approximately 1 month.  Additional history includes hypertension, hyperlipidemia, diabetes, obesity, sleep disordered breathing, malignant lymphatic lymphoma, COPD, BPH    Physical Exam  Vitals:    06/01/24 0756   BP: 133/63   Pulse: 79   Resp:    Temp: 97.8 °F (36.6 °C)   SpO2: 94%          Procedures Performed:   Direct Current Electrical Cardioversion  05/31/2024     Consultants: None     Disposition: The patient was discharged to home in stable condition on the above medications.  Clinical follow-up in the office in 1 week with an EKG and 1 month with a provider.    Discussed with Dr. Elizabeth.   Sukumar

## 2024-06-01 NOTE — CARE COORDINATION
Social Work Discharge Planning:  Discharge order noted. SW spoke to nursing patient has no needs.  Electronically signed by DIMPLE Rouse on 6/1/2024 at 11:15 AM

## 2024-06-03 ENCOUNTER — TELEPHONE (OUTPATIENT)
Dept: NON INVASIVE DIAGNOSTICS | Age: 79
End: 2024-06-03

## 2024-06-03 LAB
EKG ATRIAL RATE: 67 BPM
EKG ATRIAL RATE: 74 BPM
EKG ATRIAL RATE: 74 BPM
EKG ATRIAL RATE: 79 BPM
EKG P AXIS: 103 DEGREES
EKG P AXIS: 106 DEGREES
EKG P AXIS: 69 DEGREES
EKG P AXIS: 78 DEGREES
EKG P-R INTERVAL: 158 MS
EKG P-R INTERVAL: 182 MS
EKG P-R INTERVAL: 186 MS
EKG P-R INTERVAL: 202 MS
EKG Q-T INTERVAL: 416 MS
EKG Q-T INTERVAL: 434 MS
EKG Q-T INTERVAL: 448 MS
EKG Q-T INTERVAL: 466 MS
EKG QRS DURATION: 104 MS
EKG QRS DURATION: 108 MS
EKG QRS DURATION: 112 MS
EKG QRS DURATION: 114 MS
EKG QTC CALCULATION (BAZETT): 477 MS
EKG QTC CALCULATION (BAZETT): 481 MS
EKG QTC CALCULATION (BAZETT): 492 MS
EKG QTC CALCULATION (BAZETT): 497 MS
EKG R AXIS: -10 DEGREES
EKG R AXIS: 19 DEGREES
EKG R AXIS: 57 DEGREES
EKG R AXIS: 78 DEGREES
EKG T AXIS: 42 DEGREES
EKG T AXIS: 53 DEGREES
EKG T AXIS: 54 DEGREES
EKG T AXIS: 59 DEGREES
EKG VENTRICULAR RATE: 67 BPM
EKG VENTRICULAR RATE: 74 BPM
EKG VENTRICULAR RATE: 74 BPM
EKG VENTRICULAR RATE: 79 BPM

## 2024-06-03 RX ORDER — DOFETILIDE 0.5 MG/1
500 CAPSULE ORAL EVERY 12 HOURS SCHEDULED
Qty: 90 CAPSULE | Refills: 1 | Status: SHIPPED | OUTPATIENT
Start: 2024-06-03 | End: 2024-06-05 | Stop reason: SDUPTHER

## 2024-06-03 NOTE — TELEPHONE ENCOUNTER
Print for [facility].    Electronically signed by Michelle Martinez MA on 6/3/2024 at 2:45 PM

## 2024-06-03 NOTE — TELEPHONE ENCOUNTER
LM to call the office to schedule appointments and [facility] for the [facility]osyn.     Electronically signed by Michelle Martinez MA on 6/3/2024 at 8:48 AM

## 2024-06-03 NOTE — TELEPHONE ENCOUNTER
----- Message from Sukumar Bach, APRN - CNP sent at 6/1/2024 11:11 AM EDT -----  Josef Charlton was seen inpatient and will need an EKG in one week, follow up with a provider in one month, and the [facility] a local 30 day script was sent to the [facility] pharmacy at the time of discharge. Thanks.

## 2024-06-03 NOTE — TELEPHONE ENCOUNTER
[facility] called requesting a paper script for Tikosyn. I am still waiting on the patient to call back.     Electronically signed by Michelle Martinez MA on 6/3/2024 at 2:44 PM

## 2024-06-04 NOTE — TELEPHONE ENCOUNTER
LM to call the office to schedule follow up appts.     Electronically signed by Michelle Martinez MA on 6/4/2024 at 8:36 AM

## 2024-06-04 NOTE — TELEPHONE ENCOUNTER
Patient is scheduled for EKG and OV. [facility] will [facility] to the [facility] once signed by physician. Patient verbalized understanding.     Electronically signed by Michelle Martinez MA on 6/4/2024 at 9:06 AM

## 2024-06-05 RX ORDER — DOFETILIDE 0.5 MG/1
500 CAPSULE ORAL EVERY 12 HOURS SCHEDULED
Qty: 90 CAPSULE | Refills: 1 | Status: SHIPPED | OUTPATIENT
Start: 2024-06-05

## 2024-06-07 ENCOUNTER — NURSE ONLY (OUTPATIENT)
Dept: NON INVASIVE DIAGNOSTICS | Age: 79
End: 2024-06-07
Payer: MEDICARE

## 2024-06-07 ENCOUNTER — TELEPHONE (OUTPATIENT)
Dept: NON INVASIVE DIAGNOSTICS | Age: 79
End: 2024-06-07

## 2024-06-07 VITALS — HEIGHT: 69 IN | BODY MASS INDEX: 44.43 KG/M2 | WEIGHT: 300 LBS

## 2024-06-07 DIAGNOSIS — I48.91 ATRIAL FIBRILLATION, UNSPECIFIED TYPE (HCC): Primary | ICD-10-CM

## 2024-06-07 PROCEDURE — 93000 ELECTROCARDIOGRAM COMPLETE: CPT | Performed by: INTERNAL MEDICINE

## 2024-06-07 NOTE — TELEPHONE ENCOUNTER
Cristina from the [facility] called to clarify the metoprolol dose and frequency for the patient. She also requested a new script for [facility]rolol [facility]. [phone], [phone]. [phone].    I called Cristina back and left her a message. The patient should be taking metoprolol 75 MG QD. I also advised once I have a physician in the office to sign a paper script, I will get it faxed to her. The patient is coming in today for an EKG.    Electronically signed by Michelle Martinez MA on 6/7/2024 at 10:24 AM

## 2024-06-07 NOTE — PROGRESS NOTES
EKG preformed today as per Dr Ricky Elizabeth, for Atrial Fibrillation.     BP:  120/58  right upper arm Sitting  P:  59  SP O2: 95%    Pt states He/She feels well.      EKG done and pt advised it will be sent to the doctor to review and we will call with any further recommendations.     I informed patient to take the Metoprolol 75 mg/daily.    Electronically signed by Samantha Cuevas MA on 6/7/2024 at 1:18 PM

## 2024-06-10 RX ORDER — METOPROLOL SUCCINATE 25 MG/1
75 TABLET, EXTENDED RELEASE ORAL DAILY
Qty: 90 TABLET | Refills: 11 | Status: SHIPPED | OUTPATIENT
Start: 2024-06-10

## 2024-06-11 NOTE — PROGRESS NOTES
Prescription for metoprolol mailed to the [facility] due to fax not going through.     Electronically signed by Michelle Martinez MA on 6/11/2024 at 8:12 AM

## 2024-06-24 DIAGNOSIS — C83.00 MALIGNANT LYMPHOPLASMACYTIC LYMPHOMA (HCC): ICD-10-CM

## 2024-06-27 ENCOUNTER — HOSPITAL ENCOUNTER (OUTPATIENT)
Dept: INFUSION THERAPY | Age: 79
Discharge: HOME | End: 2024-06-27
Payer: OTHER GOVERNMENT

## 2024-06-27 ENCOUNTER — OFFICE VISIT (OUTPATIENT)
Dept: ONCOLOGY | Age: 79
End: 2024-06-27
Payer: OTHER GOVERNMENT

## 2024-06-27 VITALS
WEIGHT: 296 LBS | HEART RATE: 53 BPM | DIASTOLIC BLOOD PRESSURE: 88 MMHG | TEMPERATURE: 97.4 F | OXYGEN SATURATION: 94 % | BODY MASS INDEX: 43.84 KG/M2 | HEIGHT: 69 IN | SYSTOLIC BLOOD PRESSURE: 138 MMHG

## 2024-06-27 DIAGNOSIS — C83.00 MALIGNANT LYMPHOPLASMACYTIC LYMPHOMA (HCC): ICD-10-CM

## 2024-06-27 DIAGNOSIS — C83.00 MALIGNANT LYMPHOPLASMACYTIC LYMPHOMA (HCC): Primary | ICD-10-CM

## 2024-06-27 LAB
ALBUMIN SERPL-MCNC: 3.8 G/DL (ref 3.5–5.2)
ALP SERPL-CCNC: 85 U/L (ref 40–129)
ALT SERPL-CCNC: 23 U/L (ref 0–40)
ANION GAP SERPL CALCULATED.3IONS-SCNC: 12 MMOL/L (ref 7–16)
AST SERPL-CCNC: 21 U/L (ref 0–39)
BASOPHILS # BLD: 0.04 K/UL (ref 0–0.2)
BASOPHILS NFR BLD: 1 % (ref 0–2)
BILIRUB SERPL-MCNC: 0.6 MG/DL (ref 0–1.2)
BUN SERPL-MCNC: 13 MG/DL (ref 6–23)
CALCIUM SERPL-MCNC: 9.8 MG/DL (ref 8.6–10.2)
CHLORIDE SERPL-SCNC: 105 MMOL/L (ref 98–107)
CO2 SERPL-SCNC: 22 MMOL/L (ref 22–29)
CREAT SERPL-MCNC: 0.7 MG/DL (ref 0.7–1.2)
EOSINOPHIL # BLD: 0.12 K/UL (ref 0.05–0.5)
EOSINOPHILS RELATIVE PERCENT: 2 % (ref 0–6)
ERYTHROCYTE [DISTWIDTH] IN BLOOD BY AUTOMATED COUNT: 14.9 % (ref 11.5–15)
GFR, ESTIMATED: >90 ML/MIN/1.73M2
GLUCOSE SERPL-MCNC: 173 MG/DL (ref 74–99)
HCT VFR BLD AUTO: 42.3 % (ref 37–54)
HGB BLD-MCNC: 13.9 G/DL (ref 12.5–16.5)
IGA SERPL-MCNC: 15 MG/DL (ref 70–400)
IGG SERPL-MCNC: 311 MG/DL (ref 700–1600)
IGM SERPL-MCNC: 684 MG/DL (ref 40–230)
IMM GRANULOCYTES # BLD AUTO: 0.04 K/UL (ref 0–0.58)
IMM GRANULOCYTES NFR BLD: 1 % (ref 0–5)
LDH SERPL-CCNC: 124 U/L (ref 135–225)
LYMPHOCYTES NFR BLD: 1.99 K/UL (ref 1.5–4)
LYMPHOCYTES RELATIVE PERCENT: 25 % (ref 20–42)
MAGNESIUM SERPL-MCNC: 1.8 MG/DL (ref 1.6–2.6)
MCH RBC QN AUTO: 28.9 PG (ref 26–35)
MCHC RBC AUTO-ENTMCNC: 32.9 G/DL (ref 32–34.5)
MCV RBC AUTO: 87.9 FL (ref 80–99.9)
MONOCYTES NFR BLD: 0.6 K/UL (ref 0.1–0.95)
MONOCYTES NFR BLD: 8 % (ref 2–12)
NEUTROPHILS NFR BLD: 64 % (ref 43–80)
NEUTS SEG NFR BLD: 5.05 K/UL (ref 1.8–7.3)
PLATELET # BLD AUTO: 182 K/UL (ref 130–450)
PMV BLD AUTO: 9.9 FL (ref 7–12)
POTASSIUM SERPL-SCNC: 4.3 MMOL/L (ref 3.5–5)
PROT SERPL-MCNC: 6.1 G/DL (ref 6.4–8.3)
RBC # BLD AUTO: 4.81 M/UL (ref 3.8–5.8)
SODIUM SERPL-SCNC: 139 MMOL/L (ref 132–146)
WBC OTHER # BLD: 7.8 K/UL (ref 4.5–11.5)

## 2024-06-27 PROCEDURE — 83521 IG LIGHT CHAINS FREE EACH: CPT

## 2024-06-27 PROCEDURE — 1123F ACP DISCUSS/DSCN MKR DOCD: CPT | Performed by: STUDENT IN AN ORGANIZED HEALTH CARE EDUCATION/TRAINING PROGRAM

## 2024-06-27 PROCEDURE — 36415 COLL VENOUS BLD VENIPUNCTURE: CPT

## 2024-06-27 PROCEDURE — 80053 COMPREHEN METABOLIC PANEL: CPT

## 2024-06-27 PROCEDURE — 99213 OFFICE O/P EST LOW 20 MIN: CPT | Performed by: STUDENT IN AN ORGANIZED HEALTH CARE EDUCATION/TRAINING PROGRAM

## 2024-06-27 PROCEDURE — 85810 BLOOD VISCOSITY EXAMINATION: CPT

## 2024-06-27 PROCEDURE — 84155 ASSAY OF PROTEIN SERUM: CPT

## 2024-06-27 PROCEDURE — 85025 COMPLETE CBC W/AUTO DIFF WBC: CPT

## 2024-06-27 PROCEDURE — 3075F SYST BP GE 130 - 139MM HG: CPT | Performed by: STUDENT IN AN ORGANIZED HEALTH CARE EDUCATION/TRAINING PROGRAM

## 2024-06-27 PROCEDURE — 86334 IMMUNOFIX E-PHORESIS SERUM: CPT

## 2024-06-27 PROCEDURE — 84165 PROTEIN E-PHORESIS SERUM: CPT

## 2024-06-27 PROCEDURE — 82784 ASSAY IGA/IGD/IGG/IGM EACH: CPT

## 2024-06-27 PROCEDURE — 83735 ASSAY OF MAGNESIUM: CPT

## 2024-06-27 PROCEDURE — 3079F DIAST BP 80-89 MM HG: CPT | Performed by: STUDENT IN AN ORGANIZED HEALTH CARE EDUCATION/TRAINING PROGRAM

## 2024-06-27 PROCEDURE — 83615 LACTATE (LD) (LDH) ENZYME: CPT

## 2024-06-27 NOTE — PROGRESS NOTES
[facility] PHYSICIANS [facility] CARE [facility] MED ONCOLOGY  [address]  [facility] [address]  Dept: [phone]  Loc: [phone]    Attending Clinic Note    Reason for Visit: Follow-up on a patient with Lymphoplasmacytic Lymphoma      PCP:  Chary Ordonez MD      Subjective:  Doing well. No major events. Feeling better after starting Tikosyn. No new bleed or swelling.   Still having hematuria; recently had scope.     ONCOLOGIC HISTORY:  78 y.o.  male initially seen at [facility] hematology for anemia and abnormal TP/albumin ratio.     SPEP IgG and IgM kappa paraprotein, M-spike 3.29 g/dL.   UIFE: IgM protein.     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   -Extensive involvement by atypitcal CD5-/CD10-B-cell lymphoproliferative disorder, comprising over 70% of marrow cellularity  -Mildly hypercellular marrow for age (30-40%) with trilineage pan hypoplasia  -Normocytic anemia.  -IHC staining highlighted extensive CD20+ B cell infiltrate with admixed + plasma cells           Started on Ibrutinib 420 mg/day 12/2020 with good response so far    On 01/06/2021:  SPEP:  Monoclonal protein 1.72 g/dL  Persistent double monoclonal bands in the beta/gamma and gamma globuin regions.   Bands previously identified as IgM kappa and IgG kappa (8/12/2020).   IgM kappa decreased by 1.22 g/dL and IgG kappa minimally changed since last exam on 10/14/2020.    Free kappa light chains: 413.4    (3.3-19.4)   Free lambda light chains: 2.8      (5.7-26.3)   K/L ratio:                          147.64 (0.26-1.65)    On 03/25/2021  SPEP:  Monoclonal protein: 1.03 (0-0) g/dL  Persistent double monoclonal bands in the beta/gamma and gamma globuin regions.   Bands previously identified as IgM kappa and IgG kappa (8/12/2020).   IgM kappa decreased by 0.69 g/dL and IgG kappa unchanged since 01/06/2021.

## 2024-06-28 LAB
ALBUMIN SERPL-MCNC: 3.1 G/DL (ref 3.5–4.7)
ALPHA1 GLOB SERPL ELPH-MCNC: 0.2 G/DL (ref 0.2–0.4)
ALPHA2 GLOB SERPL ELPH-MCNC: 0.7 G/DL (ref 0.5–1)
B-GLOBULIN SERPL ELPH-MCNC: 0.8 G/DL (ref 0.8–1.3)
FREE KAPPA/LAMBDA RATIO: 5.62 (ref 0.22–1.74)
GAMMA GLOB SERPL ELPH-MCNC: 0.9 G/DL (ref 0.7–1.6)
INTERPRETATION SERPL IFE-IMP: NORMAL
KAPPA LC FREE SER-MCNC: 29.2 MG/L
LAMBDA LC FREE SERPL-MCNC: 5.2 MG/L (ref 4.2–27.7)
M PROTEIN 2 SERPL ELPH-MCNC: 0.1 G/DL
M PROTEIN SERPL ELPH-MCNC: 0.5 G/DL
PATH REV: NORMAL
PATHOLOGIST: ABNORMAL
PROT PATTERN SERPL ELPH-IMP: ABNORMAL
PROT SERPL-MCNC: 5.7 G/DL (ref 6.4–8.3)

## 2024-06-30 LAB — VISC SER: 1.06 CP

## 2024-07-25 ENCOUNTER — OFFICE VISIT (OUTPATIENT)
Dept: ONCOLOGY | Age: 79
End: 2024-07-25
Payer: MEDICARE

## 2024-07-25 ENCOUNTER — HOSPITAL ENCOUNTER (OUTPATIENT)
Dept: INFUSION THERAPY | Age: 79
Discharge: HOME | End: 2024-07-25
Payer: OTHER GOVERNMENT

## 2024-07-25 VITALS
HEART RATE: 71 BPM | TEMPERATURE: 97.3 F | BODY MASS INDEX: 42.43 KG/M2 | DIASTOLIC BLOOD PRESSURE: 90 MMHG | OXYGEN SATURATION: 94 % | SYSTOLIC BLOOD PRESSURE: 172 MMHG | WEIGHT: 287.3 LBS

## 2024-07-25 DIAGNOSIS — C83.00 MALIGNANT LYMPHOPLASMACYTIC LYMPHOMA (HCC): ICD-10-CM

## 2024-07-25 DIAGNOSIS — K86.9 PANCREATIC LESION: Primary | ICD-10-CM

## 2024-07-25 LAB
ALBUMIN SERPL-MCNC: 4 G/DL (ref 3.5–5.2)
ALP SERPL-CCNC: 76 U/L (ref 40–129)
ALT SERPL-CCNC: 17 U/L (ref 0–40)
ANION GAP SERPL CALCULATED.3IONS-SCNC: 12 MMOL/L (ref 7–16)
AST SERPL-CCNC: 14 U/L (ref 0–39)
BASOPHILS # BLD: 0.05 K/UL (ref 0–0.2)
BASOPHILS NFR BLD: 1 % (ref 0–2)
BILIRUB SERPL-MCNC: 0.7 MG/DL (ref 0–1.2)
BUN SERPL-MCNC: 10 MG/DL (ref 6–23)
CALCIUM SERPL-MCNC: 9.7 MG/DL (ref 8.6–10.2)
CHLORIDE SERPL-SCNC: 104 MMOL/L (ref 98–107)
CO2 SERPL-SCNC: 24 MMOL/L (ref 22–29)
CREAT SERPL-MCNC: 0.7 MG/DL (ref 0.7–1.2)
EOSINOPHIL # BLD: 0.16 K/UL (ref 0.05–0.5)
EOSINOPHILS RELATIVE PERCENT: 2 % (ref 0–6)
ERYTHROCYTE [DISTWIDTH] IN BLOOD BY AUTOMATED COUNT: 15 % (ref 11.5–15)
GFR, ESTIMATED: >90 ML/MIN/1.73M2
GLUCOSE SERPL-MCNC: 118 MG/DL (ref 74–99)
HCT VFR BLD AUTO: 44.8 % (ref 37–54)
HGB BLD-MCNC: 14.9 G/DL (ref 12.5–16.5)
IGA SERPL-MCNC: 19 MG/DL (ref 70–400)
IGG SERPL-MCNC: 341 MG/DL (ref 700–1600)
IGM SERPL-MCNC: 778 MG/DL (ref 40–230)
IMM GRANULOCYTES # BLD AUTO: 0.05 K/UL (ref 0–0.58)
IMM GRANULOCYTES NFR BLD: 1 % (ref 0–5)
LDH SERPL-CCNC: 133 U/L (ref 135–225)
LYMPHOCYTES NFR BLD: 2.14 K/UL (ref 1.5–4)
LYMPHOCYTES RELATIVE PERCENT: 32 % (ref 20–42)
MAGNESIUM SERPL-MCNC: 1.7 MG/DL (ref 1.6–2.6)
MCH RBC QN AUTO: 29.3 PG (ref 26–35)
MCHC RBC AUTO-ENTMCNC: 33.3 G/DL (ref 32–34.5)
MCV RBC AUTO: 88 FL (ref 80–99.9)
MONOCYTES NFR BLD: 0.61 K/UL (ref 0.1–0.95)
MONOCYTES NFR BLD: 9 % (ref 2–12)
NEUTROPHILS NFR BLD: 56 % (ref 43–80)
NEUTS SEG NFR BLD: 3.79 K/UL (ref 1.8–7.3)
PLATELET # BLD AUTO: 183 K/UL (ref 130–450)
PMV BLD AUTO: 10 FL (ref 7–12)
POTASSIUM SERPL-SCNC: 4 MMOL/L (ref 3.5–5)
PROT SERPL-MCNC: 6.4 G/DL (ref 6.4–8.3)
RBC # BLD AUTO: 5.09 M/UL (ref 3.8–5.8)
SODIUM SERPL-SCNC: 140 MMOL/L (ref 132–146)
WBC OTHER # BLD: 6.8 K/UL (ref 4.5–11.5)

## 2024-07-25 PROCEDURE — 83615 LACTATE (LD) (LDH) ENZYME: CPT

## 2024-07-25 PROCEDURE — 84155 ASSAY OF PROTEIN SERUM: CPT

## 2024-07-25 PROCEDURE — 83735 ASSAY OF MAGNESIUM: CPT

## 2024-07-25 PROCEDURE — 85810 BLOOD VISCOSITY EXAMINATION: CPT

## 2024-07-25 PROCEDURE — G8417 CALC BMI ABV UP PARAM F/U: HCPCS | Performed by: STUDENT IN AN ORGANIZED HEALTH CARE EDUCATION/TRAINING PROGRAM

## 2024-07-25 PROCEDURE — 80053 COMPREHEN METABOLIC PANEL: CPT

## 2024-07-25 PROCEDURE — 82784 ASSAY IGA/IGD/IGG/IGM EACH: CPT

## 2024-07-25 PROCEDURE — 85025 COMPLETE CBC W/AUTO DIFF WBC: CPT

## 2024-07-25 PROCEDURE — 1123F ACP DISCUSS/DSCN MKR DOCD: CPT | Performed by: STUDENT IN AN ORGANIZED HEALTH CARE EDUCATION/TRAINING PROGRAM

## 2024-07-25 PROCEDURE — 84165 PROTEIN E-PHORESIS SERUM: CPT

## 2024-07-25 PROCEDURE — 99213 OFFICE O/P EST LOW 20 MIN: CPT | Performed by: STUDENT IN AN ORGANIZED HEALTH CARE EDUCATION/TRAINING PROGRAM

## 2024-07-25 PROCEDURE — G8427 DOCREV CUR MEDS BY ELIG CLIN: HCPCS | Performed by: STUDENT IN AN ORGANIZED HEALTH CARE EDUCATION/TRAINING PROGRAM

## 2024-07-25 PROCEDURE — 83521 IG LIGHT CHAINS FREE EACH: CPT

## 2024-07-25 PROCEDURE — 1036F TOBACCO NON-USER: CPT | Performed by: STUDENT IN AN ORGANIZED HEALTH CARE EDUCATION/TRAINING PROGRAM

## 2024-07-25 PROCEDURE — 86334 IMMUNOFIX E-PHORESIS SERUM: CPT

## 2024-07-25 PROCEDURE — 36415 COLL VENOUS BLD VENIPUNCTURE: CPT

## 2024-07-25 PROCEDURE — 3080F DIAST BP >= 90 MM HG: CPT | Performed by: STUDENT IN AN ORGANIZED HEALTH CARE EDUCATION/TRAINING PROGRAM

## 2024-07-25 PROCEDURE — 99213 OFFICE O/P EST LOW 20 MIN: CPT

## 2024-07-25 PROCEDURE — 3077F SYST BP >= 140 MM HG: CPT | Performed by: STUDENT IN AN ORGANIZED HEALTH CARE EDUCATION/TRAINING PROGRAM

## 2024-07-25 NOTE — PROGRESS NOTES
Patient provided with discharge instructions, received printed AVS.  All questions answered.  Patient understands follow up plan of care.   
()    Free kappa light chains:   65.90  (3.3-19.4)   Free lambda light chains: 3.72    (5.7-26.3)   K/L ratio:                           17.72 (0.26-1.65)    On 05/26/2022:  Hb 15.8   Hct 47.0   MCV 88.5  WBC 7.0     BUN 15 Creat 0.8  LFTs wnl  Beta-2 microglobulin 2.4  SPEP: M-spike = 0.8 g/dl    SIFE: IgM kappa monoclonal protein is present, as previously described  IgM 1161 ()    Free kappa light chains:   64.11  (3.3-19.4)   Free lambda light chains: 4.28    (5.7-26.3)   K/L ratio:                           14.98 (0.26-1.65)    On 06/23/2022:  Hb 15.5   Hct 47.1   MCV 89.2  WBC 7.9     BUN 10 Creat 0.8  LFTs wnl  Beta-2 microglobulin 2.3  SPEP: M-spike = 0.8 g/dl    SIFE: IgM kappa monoclonal protein is present, as previously described  IgM 1089 ()    Free kappa light chains:   56.20  (3.3-19.4)   Free lambda light chains: 4.38    (5.7-26.3)   K/L ratio:                           12.83 (0.26-1.65)    On 07/21/2022:  Hb 14.7   Hct 44.5   MCV 89.9  WBC 7.1     BUN 11 Creat 0.7  LFTs wnl  Beta-2 microglobulin 2.1  SPEP: M-spike = 0.9 g/dl    SIFE: IgM kappa monoclonal protein is present, as previously described  IgM 967 ()    Free kappa light chains:   52.84  (3.3-19.4)   Free lambda light chains: 8.77    (5.7-26.3)   K/L ratio:                           6.03 (0.26-1.65)    On 08/18/2022:  Hb 15.3  Hct 45.0  MCV 89.5  WBC 8.1     BUN 10  Creat 0.8  LFTs wnl  Beta-2 microglobulin 2.2  SPEP: M-spike 0.6 g/dL  SIFE: IgM kappa monoclonal protein is present, as previously described.   IgM 1002 ()    On 09/15/2022:  Hb 15.2  Hct 45.2   MCV 86.6  WBC 7.0    BUN 10  Creat 0.8  Beta-2 microglobulin 1.9  SPEP: M-spike 0.6 g/dL  SIFE: IgM kappa monoclonal protein is present, as previously described.   IgM 968 ()  Free kappa light chains:   59.02  (3.3-19.4)   Free lambda light chains: 4.43    (5.7-26.3)   K/L ratio:                           13.32

## 2024-07-26 LAB
ALBUMIN SERPL-MCNC: 3.2 G/DL (ref 3.5–4.7)
ALPHA1 GLOB SERPL ELPH-MCNC: 0.2 G/DL (ref 0.2–0.4)
ALPHA2 GLOB SERPL ELPH-MCNC: 0.9 G/DL (ref 0.5–1)
B-GLOBULIN SERPL ELPH-MCNC: 0.8 G/DL (ref 0.8–1.3)
FREE KAPPA/LAMBDA RATIO: 6.42 (ref 0.22–1.74)
GAMMA GLOB SERPL ELPH-MCNC: 0.9 G/DL (ref 0.7–1.6)
INTERPRETATION SERPL IFE-IMP: NORMAL
KAPPA LC FREE SER-MCNC: 32.1 MG/L
LAMBDA LC FREE SERPL-MCNC: 5 MG/L (ref 4.2–27.7)
M PROTEIN 2 SERPL ELPH-MCNC: 0.1 G/DL
M PROTEIN SERPL ELPH-MCNC: 0.5 G/DL
PATH REV: NORMAL
PATHOLOGIST: ABNORMAL
PROT PATTERN SERPL ELPH-IMP: ABNORMAL
PROT SERPL-MCNC: 6 G/DL (ref 6.4–8.3)

## 2024-07-29 LAB — VISC SER: 1.08 CP

## 2024-08-08 ENCOUNTER — TELEPHONE (OUTPATIENT)
Dept: CARDIOLOGY | Age: 79
End: 2024-08-08

## 2024-08-08 NOTE — TELEPHONE ENCOUNTER
Called and spoke with patient to schedule an echo. Patient stated that he would like to wait until he see's the doc on 08-16-24 if he still needs to have the echo.    Electronically signed by Jackie Pappas on 8/8/2024 at 12:11 PM

## 2024-08-15 ENCOUNTER — TELEPHONE (OUTPATIENT)
Dept: NON INVASIVE DIAGNOSTICS | Age: 79
End: 2024-08-15

## 2024-08-15 NOTE — TELEPHONE ENCOUNTER
[facility]    Called pt he stated he will keep his 8//16/24 apt and will use his medicare for the visit and will call the [facility] for ref for future apts

## 2024-08-16 ENCOUNTER — OFFICE VISIT (OUTPATIENT)
Dept: NON INVASIVE DIAGNOSTICS | Age: 79
End: 2024-08-16
Payer: OTHER GOVERNMENT

## 2024-08-16 ENCOUNTER — HOSPITAL ENCOUNTER (OUTPATIENT)
Dept: CT IMAGING | Age: 79
End: 2024-08-16
Attending: STUDENT IN AN ORGANIZED HEALTH CARE EDUCATION/TRAINING PROGRAM
Payer: MEDICARE

## 2024-08-16 VITALS
RESPIRATION RATE: 16 BRPM | WEIGHT: 285 LBS | HEART RATE: 68 BPM | DIASTOLIC BLOOD PRESSURE: 60 MMHG | BODY MASS INDEX: 40.8 KG/M2 | SYSTOLIC BLOOD PRESSURE: 134 MMHG | OXYGEN SATURATION: 95 % | HEIGHT: 70 IN

## 2024-08-16 DIAGNOSIS — Z79.01 ANTICOAGULATED BY ANTICOAGULATION TREATMENT: ICD-10-CM

## 2024-08-16 DIAGNOSIS — Z51.81 VISIT FOR MONITORING TIKOSYN THERAPY: ICD-10-CM

## 2024-08-16 DIAGNOSIS — I48.91 ATRIAL FIBRILLATION, UNSPECIFIED TYPE (HCC): Primary | ICD-10-CM

## 2024-08-16 DIAGNOSIS — K86.9 PANCREATIC LESION: ICD-10-CM

## 2024-08-16 DIAGNOSIS — Z79.899 VISIT FOR MONITORING TIKOSYN THERAPY: ICD-10-CM

## 2024-08-16 DIAGNOSIS — N20.0 KIDNEY STONE: ICD-10-CM

## 2024-08-16 PROCEDURE — 1123F ACP DISCUSS/DSCN MKR DOCD: CPT | Performed by: NURSE PRACTITIONER

## 2024-08-16 PROCEDURE — 6360000004 HC RX CONTRAST MEDICATION: Performed by: RADIOLOGY

## 2024-08-16 PROCEDURE — 3078F DIAST BP <80 MM HG: CPT | Performed by: NURSE PRACTITIONER

## 2024-08-16 PROCEDURE — 3075F SYST BP GE 130 - 139MM HG: CPT | Performed by: NURSE PRACTITIONER

## 2024-08-16 PROCEDURE — 99213 OFFICE O/P EST LOW 20 MIN: CPT | Performed by: NURSE PRACTITIONER

## 2024-08-16 PROCEDURE — 74175 CTA ABDOMEN W/CONTRAST: CPT

## 2024-08-16 RX ADMIN — IOPAMIDOL 100 ML: 755 INJECTION, SOLUTION INTRAVENOUS at 09:39

## 2024-08-16 NOTE — TELEPHONE ENCOUNTER
Pt came in for visit stated [facility] and [facility] till [facility]    [facility] spoke with Esther explained situation she stated referral is good till 9/16/24  [facility] [id number]

## 2024-08-16 NOTE — PROGRESS NOTES
Recommendations:   Continue Tikosyn 500 mcg every 12 hours.  Continue Toprol 75 mg daily  Continue Eliquis 5 mg twice daily\\  Labs and EKG every 6 months, patient does have Labs monthly at [facility]   Office visit in 6 months or sooner if needed      Re-education on importance of well controlled HTN (goal BP < 130/80), adequate weight control (goal BMI of < 27), physical activity consisting of moderate cardiopulmonary exercise up to a goal of 250 min/wk, smoking/tobacco abstinence and limited ETOH intake.       I have spent a total of 29 minutes with the patient and the family reviewing the above stated recommendations. And a total of >50% of that time involved face-to-face time providing counseling and or coordination of care with the other providers.      Thank you for allowing me to participate in your patient's care.  Please call me if there are any questions or concerns.        Kate Mckenzie, APRN - CNP  Cardiac Electrophysiology  [facility]  [facility]: [facility]  1:24 PM  8/16/2024

## 2024-08-22 ENCOUNTER — OFFICE VISIT (OUTPATIENT)
Dept: ONCOLOGY | Age: 79
End: 2024-08-22
Payer: OTHER GOVERNMENT

## 2024-08-22 ENCOUNTER — HOSPITAL ENCOUNTER (OUTPATIENT)
Dept: INFUSION THERAPY | Age: 79
Discharge: HOME | End: 2024-08-22
Payer: OTHER GOVERNMENT

## 2024-08-22 VITALS
HEIGHT: 69 IN | OXYGEN SATURATION: 97 % | WEIGHT: 285.7 LBS | TEMPERATURE: 97.4 F | HEART RATE: 69 BPM | SYSTOLIC BLOOD PRESSURE: 166 MMHG | BODY MASS INDEX: 42.32 KG/M2 | DIASTOLIC BLOOD PRESSURE: 74 MMHG

## 2024-08-22 DIAGNOSIS — C83.00 MALIGNANT LYMPHOPLASMACYTIC LYMPHOMA (HCC): ICD-10-CM

## 2024-08-22 LAB
ALBUMIN SERPL-MCNC: 3.9 G/DL (ref 3.5–5.2)
ALP SERPL-CCNC: 80 U/L (ref 40–129)
ALT SERPL-CCNC: 16 U/L (ref 0–40)
ANION GAP SERPL CALCULATED.3IONS-SCNC: 10 MMOL/L (ref 7–16)
AST SERPL-CCNC: 13 U/L (ref 0–39)
BASOPHILS # BLD: 0.05 K/UL (ref 0–0.2)
BASOPHILS NFR BLD: 1 % (ref 0–2)
BILIRUB SERPL-MCNC: 0.6 MG/DL (ref 0–1.2)
BUN SERPL-MCNC: 10 MG/DL (ref 6–23)
CALCIUM SERPL-MCNC: 9.6 MG/DL (ref 8.6–10.2)
CHLORIDE SERPL-SCNC: 103 MMOL/L (ref 98–107)
CO2 SERPL-SCNC: 25 MMOL/L (ref 22–29)
CREAT SERPL-MCNC: 0.7 MG/DL (ref 0.7–1.2)
EOSINOPHIL # BLD: 0.19 K/UL (ref 0.05–0.5)
EOSINOPHILS RELATIVE PERCENT: 3 % (ref 0–6)
ERYTHROCYTE [DISTWIDTH] IN BLOOD BY AUTOMATED COUNT: 14.9 % (ref 11.5–15)
GFR, ESTIMATED: >90 ML/MIN/1.73M2
GLUCOSE SERPL-MCNC: 117 MG/DL (ref 74–99)
HCT VFR BLD AUTO: 44 % (ref 37–54)
HGB BLD-MCNC: 14.6 G/DL (ref 12.5–16.5)
IGA SERPL-MCNC: 17 MG/DL (ref 70–400)
IGG SERPL-MCNC: 309 MG/DL (ref 700–1600)
IGM SERPL-MCNC: 737 MG/DL (ref 40–230)
IMM GRANULOCYTES # BLD AUTO: 0.07 K/UL (ref 0–0.58)
IMM GRANULOCYTES NFR BLD: 1 % (ref 0–5)
LDH SERPL-CCNC: 152 U/L (ref 135–225)
LYMPHOCYTES NFR BLD: 2.38 K/UL (ref 1.5–4)
LYMPHOCYTES RELATIVE PERCENT: 35 % (ref 20–42)
MAGNESIUM SERPL-MCNC: 1.7 MG/DL (ref 1.6–2.6)
MCH RBC QN AUTO: 29.7 PG (ref 26–35)
MCHC RBC AUTO-ENTMCNC: 33.2 G/DL (ref 32–34.5)
MCV RBC AUTO: 89.4 FL (ref 80–99.9)
MONOCYTES NFR BLD: 0.52 K/UL (ref 0.1–0.95)
MONOCYTES NFR BLD: 8 % (ref 2–12)
NEUTROPHILS NFR BLD: 52 % (ref 43–80)
NEUTS SEG NFR BLD: 3.52 K/UL (ref 1.8–7.3)
PLATELET # BLD AUTO: 190 K/UL (ref 130–450)
PMV BLD AUTO: 9.9 FL (ref 7–12)
POTASSIUM SERPL-SCNC: 4.1 MMOL/L (ref 3.5–5)
PROT SERPL-MCNC: 6.3 G/DL (ref 6.4–8.3)
RBC # BLD AUTO: 4.92 M/UL (ref 3.8–5.8)
SODIUM SERPL-SCNC: 138 MMOL/L (ref 132–146)
WBC OTHER # BLD: 6.7 K/UL (ref 4.5–11.5)

## 2024-08-22 PROCEDURE — 83615 LACTATE (LD) (LDH) ENZYME: CPT

## 2024-08-22 PROCEDURE — 3077F SYST BP >= 140 MM HG: CPT | Performed by: STUDENT IN AN ORGANIZED HEALTH CARE EDUCATION/TRAINING PROGRAM

## 2024-08-22 PROCEDURE — 36415 COLL VENOUS BLD VENIPUNCTURE: CPT

## 2024-08-22 PROCEDURE — 99213 OFFICE O/P EST LOW 20 MIN: CPT | Performed by: STUDENT IN AN ORGANIZED HEALTH CARE EDUCATION/TRAINING PROGRAM

## 2024-08-22 PROCEDURE — 85025 COMPLETE CBC W/AUTO DIFF WBC: CPT

## 2024-08-22 PROCEDURE — 83735 ASSAY OF MAGNESIUM: CPT

## 2024-08-22 PROCEDURE — 99212 OFFICE O/P EST SF 10 MIN: CPT

## 2024-08-22 PROCEDURE — 3078F DIAST BP <80 MM HG: CPT | Performed by: STUDENT IN AN ORGANIZED HEALTH CARE EDUCATION/TRAINING PROGRAM

## 2024-08-22 PROCEDURE — 85810 BLOOD VISCOSITY EXAMINATION: CPT

## 2024-08-22 PROCEDURE — 82784 ASSAY IGA/IGD/IGG/IGM EACH: CPT

## 2024-08-22 PROCEDURE — 80053 COMPREHEN METABOLIC PANEL: CPT

## 2024-08-22 PROCEDURE — 86334 IMMUNOFIX E-PHORESIS SERUM: CPT

## 2024-08-22 PROCEDURE — 83521 IG LIGHT CHAINS FREE EACH: CPT

## 2024-08-22 PROCEDURE — 84155 ASSAY OF PROTEIN SERUM: CPT

## 2024-08-22 PROCEDURE — 84165 PROTEIN E-PHORESIS SERUM: CPT

## 2024-08-22 PROCEDURE — 1123F ACP DISCUSS/DSCN MKR DOCD: CPT | Performed by: STUDENT IN AN ORGANIZED HEALTH CARE EDUCATION/TRAINING PROGRAM

## 2024-08-22 NOTE — PROGRESS NOTES
Patient provided with discharge instructions, received printed AVS.  All questions answered.  Patient understands follow up plan of care.     
86.9    WBC 9.0  BUN 9 Creat 0.8  Beta-2 microglobulin 2.2  SPEP: M-spike 0.7 g/dL  SIFE: IgM kappa monoclonal protein is present, as previously described.   IgM 1001 ()  Free kappa light chains:   52.17  (3.3-19.4)   Free lambda light chains: 4.18    (5.7-26.3)   K/L ratio:                           12.48 (0.26-1.65)    On 11/17/2022:  Hb 15.3   Hct 45.9  MCV 89.6      WBC 6.7  BUN 9 Creat 0.8  Beta-2 microglobulin 2.2  SPEP: M-spike 0.7 g/dL  SIFE: IgM kappa monoclonal protein is present, as previously described.   IgM 944 ()  Free kappa light chains:   57.27  (3.3-19.4)   Free lambda light chains: 3.78    (5.7-26.3)   K/L ratio:                           15.15 (0.26-1.65)    On 12/15/2022:  Hb 15.1   Hct 45.6   MCV 88.5      WBC 8.7  BUN 9 Creat 0.9  Beta-2 microglobulin 2.5  SPEP: M-spike 0.8 g/dL  SIFE: IgM kappa monoclonal protein is present, as previously described.   IgM 1097 ()  Free kappa light chains:   48.70  (3.3-19.4)   Free lambda light chains: 3.84    (5.7-26.3)   K/L ratio:                           12.68 (0.26-1.65)    On 01/12/2023:  Hb 14.4   Hct 43.0   MCV 86.2     WBC 9.1  BUN 10  Creat 0.8  Beta-2 microglobulin 2.2  SPEP: M-spike 0.7 g/dL  SIFE: IgM kappa monoclonal protein is present, as previously described.   IgM 897 ()  Free kappa light chains:   49.51  (3.3-19.4)   Free lambda light chains: 4.13    (5.7-26.3)   K/L ratio:                           11.99 (0.26-1.65)    On 02/09/2023:  Hb 14.4   Hct 43.5   MCV 88.8     WBC 6.4  BUN 12  Creat 0.8  Beta-2 microglobulin 2.1  SPEP: M-spike 0.8 g/dL  SIFE: IgM kappa monoclonal protein is present, as previously described.   IgM 837 ()  Free kappa light chains:   43.89  (3.3-19.4)   Free lambda light chains: 3.70    (5.7-26.3)   K/L ratio:                           11.86 (0.26-1.65)    On 03/09/2023:  Hb 14.9   Hct 46.3    MCV 90.4      WBC 9.5  BUN 11 Creat 0.8  Beta-2

## 2024-08-23 LAB
ALBUMIN SERPL-MCNC: 3.3 G/DL (ref 3.5–4.7)
ALPHA1 GLOB SERPL ELPH-MCNC: 0.2 G/DL (ref 0.2–0.4)
ALPHA2 GLOB SERPL ELPH-MCNC: 0.8 G/DL (ref 0.5–1)
B-GLOBULIN SERPL ELPH-MCNC: 0.8 G/DL (ref 0.8–1.3)
FREE KAPPA/LAMBDA RATIO: 6.07 (ref 0.22–1.74)
GAMMA GLOB SERPL ELPH-MCNC: 0.8 G/DL (ref 0.7–1.6)
INTERPRETATION SERPL IFE-IMP: NORMAL
KAPPA LC FREE SER-MCNC: 32.8 MG/L
LAMBDA LC FREE SERPL-MCNC: 5.4 MG/L (ref 4.2–27.7)
M PROTEIN SERPL ELPH-MCNC: 0.5 G/DL
PATH REV: NORMAL
PATHOLOGIST: ABNORMAL
PROT PATTERN SERPL ELPH-IMP: ABNORMAL
PROT SERPL-MCNC: 6 G/DL (ref 6.4–8.3)

## 2024-08-26 LAB — VISC SER: 1.08 CP

## 2024-08-28 ENCOUNTER — TELEPHONE (OUTPATIENT)
Dept: SURGERY | Age: 79
End: 2024-08-28

## 2024-08-28 ENCOUNTER — PREP FOR PROCEDURE (OUTPATIENT)
Dept: SURGERY | Age: 79
End: 2024-08-28

## 2024-08-28 ENCOUNTER — OFFICE VISIT (OUTPATIENT)
Dept: SURGERY | Age: 79
End: 2024-08-28
Payer: MEDICARE

## 2024-08-28 VITALS
HEART RATE: 92 BPM | SYSTOLIC BLOOD PRESSURE: 172 MMHG | DIASTOLIC BLOOD PRESSURE: 77 MMHG | BODY MASS INDEX: 42.21 KG/M2 | RESPIRATION RATE: 16 BRPM | HEIGHT: 69 IN | WEIGHT: 285 LBS

## 2024-08-28 DIAGNOSIS — Z86.0100 PERSONAL HISTORY OF COLONIC POLYPS: ICD-10-CM

## 2024-08-28 DIAGNOSIS — Z86.010 HX OF ADENOMATOUS COLONIC POLYPS: Primary | ICD-10-CM

## 2024-08-28 PROCEDURE — G8417 CALC BMI ABV UP PARAM F/U: HCPCS | Performed by: SURGERY

## 2024-08-28 PROCEDURE — 1123F ACP DISCUSS/DSCN MKR DOCD: CPT | Performed by: SURGERY

## 2024-08-28 PROCEDURE — 3078F DIAST BP <80 MM HG: CPT | Performed by: SURGERY

## 2024-08-28 PROCEDURE — 3077F SYST BP >= 140 MM HG: CPT | Performed by: SURGERY

## 2024-08-28 PROCEDURE — G8427 DOCREV CUR MEDS BY ELIG CLIN: HCPCS | Performed by: SURGERY

## 2024-08-28 PROCEDURE — 99203 OFFICE O/P NEW LOW 30 MIN: CPT | Performed by: SURGERY

## 2024-08-28 PROCEDURE — 1036F TOBACCO NON-USER: CPT | Performed by: SURGERY

## 2024-08-28 RX ORDER — GLIPIZIDE 5 MG/1
5 TABLET ORAL
COMMUNITY

## 2024-08-28 RX ORDER — ASPIRIN 81 MG/1
81 TABLET, CHEWABLE ORAL
COMMUNITY
Start: 2024-02-05

## 2024-08-28 NOTE — PROGRESS NOTES
[facility]  History and Physical    Patient's Name/Date of Birth: Josef hCarlton / 1945 (78 y.o.)    PCP:  [facility] Ordonez; [facility]    Chief Complaint:  colonoscopy eval    History of Present Illness:  78 yr old male with multiple tubular adenomas on prior colonoscopy from 2020.  Denies abd pain, change in bowel habits, blood in stool, unintentional weight loss, or family hx of colon cancer.    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    Distal phalynx    Anemia     Atrial fibrillation (HCC)     Depression     PTSD    Diabetes mellitus (HCC)     Hyperlipidemia     Hypertension     Obesity        Past Surgical History:   Procedure Laterality Date    COLONOSCOPY      HERNIA REPAIR      LITHOTRIPSY Bilateral 7/6/2023    CYSTOSCOPY BILATERAL RETROGRADE PYELOGRAM LASER LITHOTRIPSY, RIGHT URETEROSCOPY, RIGHT STONE EXTRACTION, RIGHT STENT INSERTION, CASTILLO INSERTION performed by Emmanuel Coleman MD at [facility] OR    NECK SURGERY      PATELLA SURGERY      SHOULDER SURGERY      TONSILLECTOMY      UPPER GASTROINTESTINAL ENDOSCOPY         Family History   Problem Relation Age of Onset    Diabetes Mother     Heart Disease Mother     Heart Attack Sister        Social History     Socioeconomic History    Marital status:      Spouse name: Not on file    Number of children: Not on file    Years of education: Not on file    Highest education level: Not on file   Occupational History    Not on file   Tobacco Use    Smoking status: Former     Current packs/day: 2.00     Types: Cigars, Cigarettes    Smokeless tobacco: Never    Tobacco comments:     2 cigars a day   Vaping Use    Vaping status: Never Used   Substance and Sexual Activity    Alcohol use: Not Currently    Drug use: No    Sexual activity: Not Currently   Other Topics Concern    Not on file   Social History Narrative    Not on file     Social Determinants of Health     Financial

## 2024-08-28 NOTE — TELEPHONE ENCOUNTER
Scheduled pt for Colonoscopy 24 at 11:30AM. Pt needs to arrive at [facility] at 10:30AM. Patient confirmed date and time, address and directions given in office. Prep instructions given in office, patient understood.      Prior Authorization Form:      DEMOGRAPHICS:                     Patient Name:  Josef Garcia  Patient :  1945            Insurance:  Payor: MEDICARE / Plan: MEDICARE PART A AND B / Product Type: *No Product type* /   Insurance ID Number:    Payer/Plan Subscr  Sex Relation Sub. Ins. ID Effective Group Num   1. MEDICARE - ME* JOSEF GARCIA 1945 Male Self [id number]                                    [address]    2. [employer] -* JOSEF GARCIA 1945 Male Self [id number]* 24                                    [address]         DIAGNOSIS & PROCEDURE:                       Procedure/Operation: COLONOSCOPY           CPT Code: 24048    Diagnosis:  HX OF ADENOMATOUS COLONIC POLYPS    ICD10 Code: Z86.010    Location:  [facility]    Surgeon:  BIJAL LITTLE    SCHEDULING INFORMATION:                          Date: 24    Time: 11:30AM              Anesthesia:  LMAC                                                       Status:  OUTPATIENT       Special Comments:  N/A       Electronically signed by Zuleyka Schofield MA on 2024 at 1:20 PM

## 2024-08-28 NOTE — PATIENT INSTRUCTIONS
tissue was removed:   It will be sent to a lab to be examined. It may take 1-2 weeks for results. The doctor will usually give an initial report after the scope is removed. Other tests may be recommended.   A small amount of bleeding may occur during the first few days after the procedure.     When you return home after the procedure, be sure to follow your doctor's instructions, which may include:   Resume medicines as instructed by your doctor.   Resume normal diet, unless directed otherwise by your doctor.   The sedative will make you drowsy. Avoid driving, operating machinery, or making important decisions for the rest of the day.   Rest for the remainder of the day.     After arriving home, contact your doctor if any of the following occurs:   Bleeding from your rectum, notify your doctor if you pass a teaspoonful of blood or more.   Black, tarry stools   Severe abdominal pain   Hard, swollen abdomen   Signs of infection, including fever or chills   Inability to pass gas or stool   Coughing, shortness of breath, chest pain, severe nausea or vomiting     In case of an emergency, CALL 911 .

## 2024-09-19 ENCOUNTER — HOSPITAL ENCOUNTER (OUTPATIENT)
Dept: INFUSION THERAPY | Age: 79
Discharge: HOME | End: 2024-09-19
Payer: OTHER GOVERNMENT

## 2024-09-19 ENCOUNTER — OFFICE VISIT (OUTPATIENT)
Dept: ONCOLOGY | Age: 79
End: 2024-09-19
Payer: OTHER GOVERNMENT

## 2024-09-19 VITALS
DIASTOLIC BLOOD PRESSURE: 77 MMHG | BODY MASS INDEX: 42.43 KG/M2 | HEART RATE: 72 BPM | WEIGHT: 287.3 LBS | TEMPERATURE: 97.6 F | SYSTOLIC BLOOD PRESSURE: 184 MMHG | OXYGEN SATURATION: 94 %

## 2024-09-19 DIAGNOSIS — C83.00 MALIGNANT LYMPHOPLASMACYTIC LYMPHOMA (HCC): Primary | ICD-10-CM

## 2024-09-19 DIAGNOSIS — C83.00 MALIGNANT LYMPHOPLASMACYTIC LYMPHOMA (HCC): ICD-10-CM

## 2024-09-19 LAB
ALBUMIN SERPL-MCNC: 3.7 G/DL (ref 3.5–5.2)
ALP SERPL-CCNC: 71 U/L (ref 40–129)
ALT SERPL-CCNC: 16 U/L (ref 0–40)
ANION GAP SERPL CALCULATED.3IONS-SCNC: 10 MMOL/L (ref 7–16)
AST SERPL-CCNC: 11 U/L (ref 0–39)
BASOPHILS # BLD: 0.04 K/UL (ref 0–0.2)
BASOPHILS NFR BLD: 1 % (ref 0–2)
BILIRUB SERPL-MCNC: 0.6 MG/DL (ref 0–1.2)
BUN SERPL-MCNC: 12 MG/DL (ref 6–23)
CALCIUM SERPL-MCNC: 9.7 MG/DL (ref 8.6–10.2)
CHLORIDE SERPL-SCNC: 105 MMOL/L (ref 98–107)
CO2 SERPL-SCNC: 26 MMOL/L (ref 22–29)
CREAT SERPL-MCNC: 0.8 MG/DL (ref 0.7–1.2)
EOSINOPHIL # BLD: 0.2 K/UL (ref 0.05–0.5)
EOSINOPHILS RELATIVE PERCENT: 3 % (ref 0–6)
ERYTHROCYTE [DISTWIDTH] IN BLOOD BY AUTOMATED COUNT: 14.6 % (ref 11.5–15)
GFR, ESTIMATED: >90 ML/MIN/1.73M2
GLUCOSE SERPL-MCNC: 157 MG/DL (ref 74–99)
HCT VFR BLD AUTO: 41.5 % (ref 37–54)
HGB BLD-MCNC: 13.4 G/DL (ref 12.5–16.5)
IGA SERPL-MCNC: <50 MG/DL (ref 70–400)
IGG SERPL-MCNC: <300 MG/DL (ref 700–1600)
IGM SERPL-MCNC: 643 MG/DL (ref 40–230)
IMM GRANULOCYTES # BLD AUTO: 0.09 K/UL (ref 0–0.58)
IMM GRANULOCYTES NFR BLD: 2 % (ref 0–5)
LDH SERPL-CCNC: 121 U/L (ref 135–225)
LYMPHOCYTES NFR BLD: 1.87 K/UL (ref 1.5–4)
LYMPHOCYTES RELATIVE PERCENT: 30 % (ref 20–42)
MAGNESIUM SERPL-MCNC: 1.6 MG/DL (ref 1.6–2.6)
MCH RBC QN AUTO: 29 PG (ref 26–35)
MCHC RBC AUTO-ENTMCNC: 32.3 G/DL (ref 32–34.5)
MCV RBC AUTO: 89.8 FL (ref 80–99.9)
MONOCYTES NFR BLD: 0.46 K/UL (ref 0.1–0.95)
MONOCYTES NFR BLD: 7 % (ref 2–12)
NEUTROPHILS NFR BLD: 57 % (ref 43–80)
NEUTS SEG NFR BLD: 3.52 K/UL (ref 1.8–7.3)
PLATELET # BLD AUTO: 171 K/UL (ref 130–450)
PMV BLD AUTO: 10 FL (ref 7–12)
POTASSIUM SERPL-SCNC: 3.8 MMOL/L (ref 3.5–5)
PROT SERPL-MCNC: 5.9 G/DL (ref 6.4–8.3)
RBC # BLD AUTO: 4.62 M/UL (ref 3.8–5.8)
SODIUM SERPL-SCNC: 141 MMOL/L (ref 132–146)
WBC OTHER # BLD: 6.2 K/UL (ref 4.5–11.5)

## 2024-09-19 PROCEDURE — 3077F SYST BP >= 140 MM HG: CPT | Performed by: STUDENT IN AN ORGANIZED HEALTH CARE EDUCATION/TRAINING PROGRAM

## 2024-09-19 PROCEDURE — 83521 IG LIGHT CHAINS FREE EACH: CPT

## 2024-09-19 PROCEDURE — 85810 BLOOD VISCOSITY EXAMINATION: CPT

## 2024-09-19 PROCEDURE — 85025 COMPLETE CBC W/AUTO DIFF WBC: CPT

## 2024-09-19 PROCEDURE — 84165 PROTEIN E-PHORESIS SERUM: CPT

## 2024-09-19 PROCEDURE — 83615 LACTATE (LD) (LDH) ENZYME: CPT

## 2024-09-19 PROCEDURE — 84155 ASSAY OF PROTEIN SERUM: CPT

## 2024-09-19 PROCEDURE — 82784 ASSAY IGA/IGD/IGG/IGM EACH: CPT

## 2024-09-19 PROCEDURE — 36415 COLL VENOUS BLD VENIPUNCTURE: CPT

## 2024-09-19 PROCEDURE — 83735 ASSAY OF MAGNESIUM: CPT

## 2024-09-19 PROCEDURE — 99213 OFFICE O/P EST LOW 20 MIN: CPT | Performed by: STUDENT IN AN ORGANIZED HEALTH CARE EDUCATION/TRAINING PROGRAM

## 2024-09-19 PROCEDURE — 80053 COMPREHEN METABOLIC PANEL: CPT

## 2024-09-19 PROCEDURE — 3078F DIAST BP <80 MM HG: CPT | Performed by: STUDENT IN AN ORGANIZED HEALTH CARE EDUCATION/TRAINING PROGRAM

## 2024-09-19 PROCEDURE — 86334 IMMUNOFIX E-PHORESIS SERUM: CPT

## 2024-09-19 PROCEDURE — 1123F ACP DISCUSS/DSCN MKR DOCD: CPT | Performed by: STUDENT IN AN ORGANIZED HEALTH CARE EDUCATION/TRAINING PROGRAM

## 2024-09-20 LAB
FREE KAPPA/LAMBDA RATIO: 6.49 (ref 0.22–1.74)
KAPPA LC FREE SER-MCNC: 34.4 MG/L
LAMBDA LC FREE SERPL-MCNC: 5.3 MG/L (ref 4.2–27.7)

## 2024-09-22 LAB
ALBUMIN SERPL-MCNC: 3.2 G/DL (ref 3.5–4.7)
ALPHA1 GLOB SERPL ELPH-MCNC: 0.2 G/DL (ref 0.2–0.4)
ALPHA2 GLOB SERPL ELPH-MCNC: 0.7 G/DL (ref 0.5–1)
B-GLOBULIN SERPL ELPH-MCNC: 0.8 G/DL (ref 0.8–1.3)
GAMMA GLOB SERPL ELPH-MCNC: 0.8 G/DL (ref 0.7–1.6)
INTERPRETATION SERPL IFE-IMP: NORMAL
M PROTEIN SERPL ELPH-MCNC: 0.5 G/DL
PATH REV: NORMAL
PATHOLOGIST: ABNORMAL
PROT PATTERN SERPL ELPH-IMP: ABNORMAL
PROT SERPL-MCNC: 5.7 G/DL

## 2024-09-23 LAB — VISC SER: 1.05 CP

## 2024-09-23 NOTE — PROGRESS NOTES
[facility] PRE-ADMISSION TESTING   COLONOSCOPY INSTRUCTIONS  PAT- Phone Number: [phone]    COLONOSCOPY INSTRUCTIONS:     [x] Bowel Prep instructions reviewed - any questions regarding your prep, please contact surgeon's office.  [x] Colonoscopy: 1-2 days prior: Clear liquids only - nothing red or purple in color.  [x] Antibacterial Soap Shower Night before AND the morning of procedure.  [x] No solid food after midnight. You may have SIPS of clear liquids up until 2 hours before your arrival time to the hospital.   [x] Do not wear lotions, powders, deodorant.   [x] No tobacco products, illegal drugs, or alcohol within 24 hours of your surgery.  [x] Jewelry or valuables should not be brought to the hospital. All body and/or tongue piercing's must be removed prior to arriving to hospital. No contact lens or hair pins.   [x] Arrange transportation with a responsible adult  to and from the hospital. If you do not have a responsible adult  to transport you, you will need to make arrangements with a medical transportation company. Arrange for someone to be with you for the remainder of the day and for 24 hours after your procedure due to having had anesthesia.    -Who will be your  for transportation? Wife   -Who will be staying with you for 24 hrs after your procedure? Wife   [x] Bring insurance card and photo ID.  [] Bring copy of living will or healthcare power of  papers to be placed in your electronic record.    PARKING INSTRUCTIONS:     [x] ARRIVAL DATE & TIME:  9/27  @  1015  [x] Times are subject to change. We will contact you the business day before surgery if that were to occur.  [x] Enter into the [facility] Entrance. Two people may accompany you. Masks are not required.  [x] Parking Lot \"I\" is where you will park. It is located on the corner of [address] and [address]. The entrance is on [address].   Only one vehicle - per

## 2024-09-27 ENCOUNTER — HOSPITAL ENCOUNTER (OUTPATIENT)
Age: 79
Setting detail: OUTPATIENT SURGERY
Discharge: HOME | End: 2024-09-27
Attending: SURGERY | Admitting: SURGERY
Payer: OTHER GOVERNMENT

## 2024-09-27 ENCOUNTER — ANESTHESIA (OUTPATIENT)
Dept: ENDOSCOPY | Age: 79
End: 2024-09-27
Payer: OTHER GOVERNMENT

## 2024-09-27 ENCOUNTER — ANESTHESIA EVENT (OUTPATIENT)
Dept: ENDOSCOPY | Age: 79
End: 2024-09-27
Payer: OTHER GOVERNMENT

## 2024-09-27 VITALS
HEIGHT: 70 IN | OXYGEN SATURATION: 93 % | TEMPERATURE: 97 F | SYSTOLIC BLOOD PRESSURE: 177 MMHG | RESPIRATION RATE: 17 BRPM | HEART RATE: 60 BPM | BODY MASS INDEX: 40.09 KG/M2 | DIASTOLIC BLOOD PRESSURE: 78 MMHG | WEIGHT: 280 LBS

## 2024-09-27 DIAGNOSIS — Z86.010 PERSONAL HISTORY OF COLONIC POLYPS: ICD-10-CM

## 2024-09-27 DIAGNOSIS — Z01.812 PRE-OPERATIVE LABORATORY EXAMINATION: Primary | ICD-10-CM

## 2024-09-27 DIAGNOSIS — Z86.0100 PERSONAL HISTORY OF COLONIC POLYPS: ICD-10-CM

## 2024-09-27 LAB — GLUCOSE BLD-MCNC: 131 MG/DL (ref 74–99)

## 2024-09-27 PROCEDURE — 2580000003 HC RX 258: Performed by: SURGERY

## 2024-09-27 PROCEDURE — 6360000002 HC RX W HCPCS: Performed by: NURSE ANESTHETIST, CERTIFIED REGISTERED

## 2024-09-27 PROCEDURE — 2709999900 HC NON-CHARGEABLE SUPPLY: Performed by: SURGERY

## 2024-09-27 PROCEDURE — 3700000001 HC ADD 15 MINUTES (ANESTHESIA): Performed by: SURGERY

## 2024-09-27 PROCEDURE — 7100000010 HC PHASE II RECOVERY - FIRST 15 MIN: Performed by: SURGERY

## 2024-09-27 PROCEDURE — 7100000011 HC PHASE II RECOVERY - ADDTL 15 MIN: Performed by: SURGERY

## 2024-09-27 PROCEDURE — 3700000000 HC ANESTHESIA ATTENDED CARE: Performed by: SURGERY

## 2024-09-27 PROCEDURE — 82962 GLUCOSE BLOOD TEST: CPT

## 2024-09-27 PROCEDURE — 3609010600 HC COLONOSCOPY POLYPECTOMY SNARE/COLD BIOPSY: Performed by: SURGERY

## 2024-09-27 PROCEDURE — 88305 TISSUE EXAM BY PATHOLOGIST: CPT

## 2024-09-27 RX ORDER — SODIUM CHLORIDE 9 MG/ML
INJECTION, SOLUTION INTRAVENOUS PRN
Status: DISCONTINUED | OUTPATIENT
Start: 2024-09-27 | End: 2024-09-27 | Stop reason: HOSPADM

## 2024-09-27 RX ORDER — SODIUM CHLORIDE 0.9 % (FLUSH) 0.9 %
5-40 SYRINGE (ML) INJECTION PRN
Status: DISCONTINUED | OUTPATIENT
Start: 2024-09-27 | End: 2024-09-27 | Stop reason: HOSPADM

## 2024-09-27 RX ORDER — PROPOFOL 10 MG/ML
INJECTION, EMULSION INTRAVENOUS
Status: DISCONTINUED | OUTPATIENT
Start: 2024-09-27 | End: 2024-09-27 | Stop reason: SDUPTHER

## 2024-09-27 RX ORDER — SODIUM CHLORIDE 0.9 % (FLUSH) 0.9 %
5-40 SYRINGE (ML) INJECTION EVERY 12 HOURS SCHEDULED
Status: DISCONTINUED | OUTPATIENT
Start: 2024-09-27 | End: 2024-09-27 | Stop reason: HOSPADM

## 2024-09-27 RX ORDER — SODIUM CHLORIDE 9 MG/ML
INJECTION, SOLUTION INTRAVENOUS CONTINUOUS
Status: DISCONTINUED | OUTPATIENT
Start: 2024-09-27 | End: 2024-09-27 | Stop reason: HOSPADM

## 2024-09-27 RX ADMIN — PROPOFOL 240 MG: 10 INJECTION, EMULSION INTRAVENOUS at 11:25

## 2024-09-27 RX ADMIN — SODIUM CHLORIDE: 9 INJECTION, SOLUTION INTRAVENOUS at 10:52

## 2024-09-27 ASSESSMENT — PAIN SCALES - GENERAL
PAINLEVEL_OUTOF10: 0
PAINLEVEL_OUTOF10: 0

## 2024-09-27 ASSESSMENT — LIFESTYLE VARIABLES: SMOKING_STATUS: 0

## 2024-09-27 ASSESSMENT — PAIN - FUNCTIONAL ASSESSMENT
PAIN_FUNCTIONAL_ASSESSMENT: 0-10
PAIN_FUNCTIONAL_ASSESSMENT: 0-10

## 2024-09-27 NOTE — H&P
[facility]  History and Physical     Patient's Name/Date of Birth: Josef Charlton / 1945 (78 y.o.)     PCP:  [facility]     Chief Complaint:  colonoscopy eval     History of Present Illness:  78 yr old male with multiple tubular adenomas on prior colonoscopy from 2020.  Denies abd pain, change in bowel habits, blood in stool, unintentional weight loss, or family hx of colon cancer.     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     Distal phalynx    Anemia      Atrial fibrillation (HCC)      Depression       PTSD    Diabetes mellitus (HCC)      Hyperlipidemia      Hypertension      Obesity              Past Surgical History         Past Surgical History:   Procedure Laterality Date    COLONOSCOPY        HERNIA REPAIR        LITHOTRIPSY Bilateral 7/6/2023     CYSTOSCOPY BILATERAL RETROGRADE PYELOGRAM LASER LITHOTRIPSY, RIGHT URETEROSCOPY, RIGHT STONE EXTRACTION, RIGHT STENT INSERTION, CASTILLO INSERTION performed by Emmanuel Coleman MD at [facility] OR    NECK SURGERY        PATELLA SURGERY        SHOULDER SURGERY        TONSILLECTOMY        UPPER GASTROINTESTINAL ENDOSCOPY                Family History         Family History   Problem Relation Age of Onset    Diabetes Mother      Heart Disease Mother      Heart Attack Sister              Social History               Socioeconomic History    Marital status:        Spouse name: Not on file    Number of children: Not on file    Years of education: Not on file    Highest education level: Not on file   Occupational History    Not on file   Tobacco Use    Smoking status: Former       Current packs/day: 2.00       Types: Cigars, Cigarettes    Smokeless tobacco: Never    Tobacco comments:       2 cigars a day   Vaping Use    Vaping status: Never Used   Substance and Sexual Activity    Alcohol use: Not Currently    Drug use: No    Sexual activity: Not        No current facility-administered medications for this visit.            Allergies   No Known Allergies        REVIEW OF SYSTEMS:    Constitutional: walks with cane  Eyes: negative  Ears, nose, mouth, throat, and face: negative  Respiratory: negative  Cardiovascular: A.fib  Gastrointestinal: negative  Genitourinary:negative  Integument/breast: negative  Hematologic/lymphatic: negative  Musculoskeletal:negative  Neurological: lightheadedness  Allergic/Immunologic: negative        Physical Exam:     GENERAL EXAM: On exam- pt appears stated age.  No acute distress.   NEURO:  Alert and oriented x 3.   HEENT: head- atraumatic-normocephalic.  No discharge from ears, nose or throat.  NECK: Supple. FROM  CHEST: Bilateral chest movements without the use of accessory muscles. Respirations easy, nonlabored, breath sounds clear. Heart rate and rhythm regular.  ABDOMEN: Abdomen soft, nondistended, nontender, bowel sounds present.    SKIN:  Warm and dry.  EXTREMITIES:  Moves all 4 ext.     [facility] notes personally reviewed      IMPRESSION/PLAN:  hx of adenomatous colon polyps--recommend colonoscopy.   The patient was explained the risks/benefits/alternatives/expected outcomes of the procedure.  The patient was explained the risks of the procedure, including, but not limited to, the risk of reaction to the anesthesia medicine and the risk of perforation requiring further surgery.  The patient was informed that they may require biopsy or polypectomy. These procedures may increase the risk of complication. All questions were answered.  The patient verbalized understanding and agreed to proceed.  Pt is at higher risk due to Eliquis use and need to hold Eliquis prior to procedure and due to BMI > 42.     Electronically Signed by Alvino Camejo MD on 8/28/2024 at 12:01 PM      UPDATED 9/27/24  History and physical unchanged  For colonoscopy    Alvino Camejo MD, FACS  9/27/2024  10:43 AM

## 2024-09-27 NOTE — DISCHARGE INSTRUCTIONS
Call [phone] for any questions/concerns.    Polyps seen and removed--letter will be sent with results.  Diverticula seen--maintain high fiber diet  Repeat colonoscopy in 3 years.         Colonoscopy: What to Expect at Home  Your Recovery  After a colonoscopy, you'll stay at the clinic until you wake up. Then you can go home. But you'll need to arrange for a ride. Your doctor will tell you when you can eat and do your other usual activities.  Your doctor will talk to you about when you'll need your next colonoscopy. Your doctor can help you decide how often you need to be checked. This will depend on the results of your test and your risk for colorectal cancer.  After the test, you may be bloated or have gas pains. You may need to pass gas. If a biopsy was done or a polyp was removed, you may have streaks of blood in your stool (feces) for a few days. Problems such as heavy rectal bleeding may not occur until several weeks after the test. This isn't common. But it can happen after polyps are removed.  This care sheet gives you a general idea about how long it will take for you to recover. But each person recovers at a different pace. Follow the steps below to get better as quickly as possible.  How can you care for yourself at home?  Activity    Rest when you feel tired.     You can do your normal activities when it feels okay to do so.   Diet    Follow your doctor's directions for eating.     Unless your doctor has told you not to, drink plenty of fluids. This helps to replace the fluids that were lost during the colon prep.     Do not drink alcohol.   Medicines    Your doctor will tell you if and when you can restart your medicines. You will also be given instructions about taking any new medicines.     If you stopped taking aspirin or some other blood thinner, your doctor will tell you when to start taking it again.     If polyps were removed or a biopsy was done during the test, your doctor may tell you not

## 2024-10-02 LAB — SURGICAL PATHOLOGY REPORT: NORMAL

## 2024-10-14 RX ORDER — DOFETILIDE 0.5 MG/1
500 CAPSULE ORAL EVERY 12 HOURS SCHEDULED
Qty: 180 CAPSULE | Refills: 1 | Status: SHIPPED | OUTPATIENT
Start: 2024-10-14

## 2024-10-17 DIAGNOSIS — C83.00 MALIGNANT LYMPHOPLASMACYTIC LYMPHOMA (HCC): Primary | ICD-10-CM

## 2024-10-21 RX ORDER — DOFETILIDE 0.5 MG/1
500 CAPSULE ORAL EVERY 12 HOURS SCHEDULED
Qty: 60 CAPSULE | Refills: 0 | Status: SHIPPED | OUTPATIENT
Start: 2024-10-21

## 2024-10-22 ENCOUNTER — TELEPHONE (OUTPATIENT)
Dept: ADMINISTRATIVE | Age: 79
End: 2024-10-22

## 2024-10-23 DIAGNOSIS — C83.00 MALIGNANT LYMPHOPLASMACYTIC LYMPHOMA (HCC): Primary | ICD-10-CM

## 2024-10-23 DIAGNOSIS — E83.42 HYPOMAGNESEMIA: Primary | ICD-10-CM

## 2024-10-24 ENCOUNTER — OFFICE VISIT (OUTPATIENT)
Dept: ONCOLOGY | Age: 79
End: 2024-10-24
Payer: OTHER GOVERNMENT

## 2024-10-24 ENCOUNTER — HOSPITAL ENCOUNTER (OUTPATIENT)
Dept: INFUSION THERAPY | Age: 79
Discharge: HOME | End: 2024-10-24
Payer: OTHER GOVERNMENT

## 2024-10-24 VITALS
SYSTOLIC BLOOD PRESSURE: 195 MMHG | TEMPERATURE: 97.2 F | OXYGEN SATURATION: 93 % | WEIGHT: 294 LBS | BODY MASS INDEX: 42.09 KG/M2 | DIASTOLIC BLOOD PRESSURE: 84 MMHG | HEART RATE: 70 BPM | HEIGHT: 70 IN

## 2024-10-24 DIAGNOSIS — C83.00 MALIGNANT LYMPHOPLASMACYTIC LYMPHOMA (HCC): Primary | ICD-10-CM

## 2024-10-24 DIAGNOSIS — E83.42 HYPOMAGNESEMIA: ICD-10-CM

## 2024-10-24 DIAGNOSIS — C83.00 MALIGNANT LYMPHOPLASMACYTIC LYMPHOMA (HCC): ICD-10-CM

## 2024-10-24 LAB
ALBUMIN SERPL-MCNC: 3.8 G/DL (ref 3.5–5.2)
ALP SERPL-CCNC: 79 U/L (ref 40–129)
ALT SERPL-CCNC: 23 U/L (ref 0–40)
ANION GAP SERPL CALCULATED.3IONS-SCNC: 8 MMOL/L (ref 7–16)
AST SERPL-CCNC: 16 U/L (ref 0–39)
BASOPHILS # BLD: 0.04 K/UL (ref 0–0.2)
BASOPHILS NFR BLD: 1 % (ref 0–2)
BILIRUB SERPL-MCNC: 0.6 MG/DL (ref 0–1.2)
BUN SERPL-MCNC: 10 MG/DL (ref 6–23)
CALCIUM SERPL-MCNC: 9.5 MG/DL (ref 8.6–10.2)
CHLORIDE SERPL-SCNC: 104 MMOL/L (ref 98–107)
CO2 SERPL-SCNC: 28 MMOL/L (ref 22–29)
CREAT SERPL-MCNC: 0.7 MG/DL (ref 0.7–1.2)
EOSINOPHIL # BLD: 0.18 K/UL (ref 0.05–0.5)
EOSINOPHILS RELATIVE PERCENT: 3 % (ref 0–6)
ERYTHROCYTE [DISTWIDTH] IN BLOOD BY AUTOMATED COUNT: 14 % (ref 11.5–15)
FREE KAPPA/LAMBDA RATIO: 5.37 (ref 0.22–1.74)
GFR, ESTIMATED: >90 ML/MIN/1.73M2
GLUCOSE SERPL-MCNC: 179 MG/DL (ref 74–99)
HCT VFR BLD AUTO: 41.5 % (ref 37–54)
HGB BLD-MCNC: 13.4 G/DL (ref 12.5–16.5)
IGA SERPL-MCNC: 17 MG/DL (ref 70–400)
IGG SERPL-MCNC: 259 MG/DL (ref 700–1600)
IGM SERPL-MCNC: 634 MG/DL (ref 40–230)
IMM GRANULOCYTES # BLD AUTO: 0.1 K/UL (ref 0–0.58)
IMM GRANULOCYTES NFR BLD: 2 % (ref 0–5)
KAPPA LC FREE SER-MCNC: 33.3 MG/L
LAMBDA LC FREE SERPL-MCNC: 6.2 MG/L (ref 4.2–27.7)
LDH SERPL-CCNC: 158 U/L (ref 135–225)
LYMPHOCYTES NFR BLD: 1.84 K/UL (ref 1.5–4)
LYMPHOCYTES RELATIVE PERCENT: 29 % (ref 20–42)
MAGNESIUM SERPL-MCNC: 1.8 MG/DL (ref 1.6–2.6)
MCH RBC QN AUTO: 29.5 PG (ref 26–35)
MCHC RBC AUTO-ENTMCNC: 32.3 G/DL (ref 32–34.5)
MCV RBC AUTO: 91.2 FL (ref 80–99.9)
MONOCYTES NFR BLD: 0.56 K/UL (ref 0.1–0.95)
MONOCYTES NFR BLD: 9 % (ref 2–12)
NEUTROPHILS NFR BLD: 57 % (ref 43–80)
NEUTS SEG NFR BLD: 3.54 K/UL (ref 1.8–7.3)
PLATELET # BLD AUTO: 175 K/UL (ref 130–450)
PMV BLD AUTO: 10.2 FL (ref 7–12)
POTASSIUM SERPL-SCNC: 4.2 MMOL/L (ref 3.5–5)
PROT SERPL-MCNC: 6 G/DL (ref 6.4–8.3)
RBC # BLD AUTO: 4.55 M/UL (ref 3.8–5.8)
SODIUM SERPL-SCNC: 140 MMOL/L (ref 132–146)
WBC OTHER # BLD: 6.3 K/UL (ref 4.5–11.5)

## 2024-10-24 PROCEDURE — 99213 OFFICE O/P EST LOW 20 MIN: CPT | Performed by: STUDENT IN AN ORGANIZED HEALTH CARE EDUCATION/TRAINING PROGRAM

## 2024-10-24 PROCEDURE — 1123F ACP DISCUSS/DSCN MKR DOCD: CPT | Performed by: STUDENT IN AN ORGANIZED HEALTH CARE EDUCATION/TRAINING PROGRAM

## 2024-10-24 PROCEDURE — 82784 ASSAY IGA/IGD/IGG/IGM EACH: CPT

## 2024-10-24 PROCEDURE — 83615 LACTATE (LD) (LDH) ENZYME: CPT

## 2024-10-24 PROCEDURE — 99212 OFFICE O/P EST SF 10 MIN: CPT

## 2024-10-24 PROCEDURE — 83735 ASSAY OF MAGNESIUM: CPT

## 2024-10-24 PROCEDURE — 84155 ASSAY OF PROTEIN SERUM: CPT

## 2024-10-24 PROCEDURE — 84165 PROTEIN E-PHORESIS SERUM: CPT

## 2024-10-24 PROCEDURE — 36415 COLL VENOUS BLD VENIPUNCTURE: CPT

## 2024-10-24 PROCEDURE — 80053 COMPREHEN METABOLIC PANEL: CPT

## 2024-10-24 PROCEDURE — 83521 IG LIGHT CHAINS FREE EACH: CPT

## 2024-10-24 PROCEDURE — 3077F SYST BP >= 140 MM HG: CPT | Performed by: STUDENT IN AN ORGANIZED HEALTH CARE EDUCATION/TRAINING PROGRAM

## 2024-10-24 PROCEDURE — 3079F DIAST BP 80-89 MM HG: CPT | Performed by: STUDENT IN AN ORGANIZED HEALTH CARE EDUCATION/TRAINING PROGRAM

## 2024-10-24 PROCEDURE — 86334 IMMUNOFIX E-PHORESIS SERUM: CPT

## 2024-10-24 PROCEDURE — 85025 COMPLETE CBC W/AUTO DIFF WBC: CPT

## 2024-10-24 NOTE — PROGRESS NOTES
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 Denies of new pain, swelling, or night sweats.   He feels largely unchanged; he also noted his BP has been elevated in recent days, also noted increased sciatic pain as well.     ONCOLOGIC HISTORY:  78 y.o.  male initially seen at [facility] hematology for anemia and abnormal TP/albumin ratio.     SPEP IgG and IgM kappa paraprotein, M-spike 3.29 g/dL.   UIFE: IgM protein.     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   -Extensive involvement by atypitcal CD5-/CD10-B-cell lymphoproliferative disorder, comprising over 70% of marrow cellularity  -Mildly hypercellular marrow for age (30-40%) with trilineage pan hypoplasia  -Normocytic anemia.  -IHC staining highlighted extensive CD20+ B cell infiltrate with admixed + plasma cells           Started on Ibrutinib 420 mg/day 12/2020 with good response so far    On 01/06/2021:  SPEP:  Monoclonal protein 1.72 g/dL  Persistent double monoclonal bands in the beta/gamma and gamma globuin regions.   Bands previously identified as IgM kappa and IgG kappa (8/12/2020).   IgM kappa decreased by 1.22 g/dL and IgG kappa minimally changed since last exam on 10/14/2020.    Free kappa light chains: 413.4    (3.3-19.4)   Free lambda light chains: 2.8      (5.7-26.3)   K/L ratio:                          147.64 (0.26-1.65)    On 03/25/2021  SPEP:  Monoclonal protein: 1.03 (0-0) g/dL  Persistent double monoclonal bands in the beta/gamma and gamma globuin regions.   Bands previously identified as IgM kappa and IgG kappa (8/12/2020).   IgM kappa decreased by 0.69

## 2024-10-25 ENCOUNTER — TELEPHONE (OUTPATIENT)
Dept: NON INVASIVE DIAGNOSTICS | Age: 79
End: 2024-10-25

## 2024-10-25 LAB
ALBUMIN SERPL-MCNC: 3.1 G/DL (ref 3.5–4.7)
ALPHA1 GLOB SERPL ELPH-MCNC: 0.2 G/DL (ref 0.2–0.4)
ALPHA2 GLOB SERPL ELPH-MCNC: 0.7 G/DL (ref 0.5–1)
B-GLOBULIN SERPL ELPH-MCNC: 0.8 G/DL (ref 0.8–1.3)
GAMMA GLOB SERPL ELPH-MCNC: 0.8 G/DL (ref 0.7–1.6)
INTERPRETATION SERPL IFE-IMP: NORMAL
M PROTEIN SERPL ELPH-MCNC: 0.5 G/DL
PATH REV: NORMAL
PATHOLOGIST: ABNORMAL
PROT PATTERN SERPL ELPH-IMP: ABNORMAL
PROT SERPL-MCNC: 5.6 G/DL (ref 6.4–8.3)

## 2024-10-25 NOTE — TELEPHONE ENCOUNTER
LVM for Rosalinda Carrasquillo lpn, advising lov 8/16/24 w/6 month follow up needed. Not scheduled as of yet.  If pt needs to be seen by Cardiology for other than A-Fib to contact me.  Sending to EP for scheduling.

## 2024-10-29 ENCOUNTER — TELEPHONE (OUTPATIENT)
Dept: NON INVASIVE DIAGNOSTICS | Age: 79
End: 2024-10-29

## 2024-10-29 NOTE — TELEPHONE ENCOUNTER
The patient has been scheduled for next ov and [facility] notified.     Electronically signed by Michelle Martinez MA on 10/29/2024 at 10:01 AM

## 2024-12-19 ENCOUNTER — OFFICE VISIT (OUTPATIENT)
Dept: ONCOLOGY | Age: 79
End: 2024-12-19
Payer: OTHER GOVERNMENT

## 2024-12-19 ENCOUNTER — HOSPITAL ENCOUNTER (OUTPATIENT)
Dept: INFUSION THERAPY | Age: 79
Discharge: HOME | End: 2024-12-19
Payer: OTHER GOVERNMENT

## 2024-12-19 VITALS
SYSTOLIC BLOOD PRESSURE: 162 MMHG | HEART RATE: 63 BPM | WEIGHT: 291.5 LBS | HEIGHT: 70 IN | BODY MASS INDEX: 41.73 KG/M2 | OXYGEN SATURATION: 96 % | DIASTOLIC BLOOD PRESSURE: 76 MMHG | TEMPERATURE: 97.2 F

## 2024-12-19 DIAGNOSIS — C83.00 MALIGNANT LYMPHOPLASMACYTIC LYMPHOMA (HCC): ICD-10-CM

## 2024-12-19 DIAGNOSIS — E83.42 HYPOMAGNESEMIA: ICD-10-CM

## 2024-12-19 DIAGNOSIS — C83.00 MALIGNANT LYMPHOPLASMACYTIC LYMPHOMA (HCC): Primary | ICD-10-CM

## 2024-12-19 LAB
ALBUMIN SERPL-MCNC: 4.1 G/DL (ref 3.5–5.2)
ALP SERPL-CCNC: 78 U/L (ref 40–129)
ALT SERPL-CCNC: 16 U/L (ref 0–40)
ANION GAP SERPL CALCULATED.3IONS-SCNC: 8 MMOL/L (ref 7–16)
AST SERPL-CCNC: 14 U/L (ref 0–39)
BASOPHILS # BLD: 0.03 K/UL (ref 0–0.2)
BASOPHILS NFR BLD: 0 % (ref 0–2)
BILIRUB SERPL-MCNC: 0.4 MG/DL (ref 0–1.2)
BUN SERPL-MCNC: 14 MG/DL (ref 6–23)
CALCIUM SERPL-MCNC: 10.1 MG/DL (ref 8.6–10.2)
CHLORIDE SERPL-SCNC: 105 MMOL/L (ref 98–107)
CO2 SERPL-SCNC: 29 MMOL/L (ref 22–29)
CREAT SERPL-MCNC: 1 MG/DL (ref 0.7–1.2)
EOSINOPHIL # BLD: 0.16 K/UL (ref 0.05–0.5)
EOSINOPHILS RELATIVE PERCENT: 2 % (ref 0–6)
ERYTHROCYTE [DISTWIDTH] IN BLOOD BY AUTOMATED COUNT: 13.8 % (ref 11.5–15)
GFR, ESTIMATED: 81 ML/MIN/1.73M2
GLUCOSE SERPL-MCNC: 127 MG/DL (ref 74–99)
HCT VFR BLD AUTO: 43.1 % (ref 37–54)
HGB BLD-MCNC: 14.1 G/DL (ref 12.5–16.5)
IGA SERPL-MCNC: 16 MG/DL (ref 70–400)
IGG SERPL-MCNC: 256 MG/DL (ref 700–1600)
IGM SERPL-MCNC: 735 MG/DL (ref 40–230)
IMM GRANULOCYTES # BLD AUTO: 0.06 K/UL (ref 0–0.58)
IMM GRANULOCYTES NFR BLD: 1 % (ref 0–5)
LDH SERPL-CCNC: 157 U/L (ref 135–225)
LYMPHOCYTES NFR BLD: 2.36 K/UL (ref 1.5–4)
LYMPHOCYTES RELATIVE PERCENT: 32 % (ref 20–42)
MAGNESIUM SERPL-MCNC: 2.1 MG/DL (ref 1.6–2.6)
MCH RBC QN AUTO: 29.6 PG (ref 26–35)
MCHC RBC AUTO-ENTMCNC: 32.7 G/DL (ref 32–34.5)
MCV RBC AUTO: 90.5 FL (ref 80–99.9)
MONOCYTES NFR BLD: 0.6 K/UL (ref 0.1–0.95)
MONOCYTES NFR BLD: 8 % (ref 2–12)
NEUTROPHILS NFR BLD: 57 % (ref 43–80)
NEUTS SEG NFR BLD: 4.22 K/UL (ref 1.8–7.3)
PLATELET # BLD AUTO: 188 K/UL (ref 130–450)
PMV BLD AUTO: 10.2 FL (ref 7–12)
POTASSIUM SERPL-SCNC: 4.6 MMOL/L (ref 3.5–5)
PROT SERPL-MCNC: 6.2 G/DL (ref 6.4–8.3)
RBC # BLD AUTO: 4.76 M/UL (ref 3.8–5.8)
SODIUM SERPL-SCNC: 142 MMOL/L (ref 132–146)
WBC OTHER # BLD: 7.4 K/UL (ref 4.5–11.5)

## 2024-12-19 PROCEDURE — 83735 ASSAY OF MAGNESIUM: CPT

## 2024-12-19 PROCEDURE — 99213 OFFICE O/P EST LOW 20 MIN: CPT | Performed by: STUDENT IN AN ORGANIZED HEALTH CARE EDUCATION/TRAINING PROGRAM

## 2024-12-19 PROCEDURE — 83521 IG LIGHT CHAINS FREE EACH: CPT

## 2024-12-19 PROCEDURE — 99212 OFFICE O/P EST SF 10 MIN: CPT

## 2024-12-19 PROCEDURE — 3078F DIAST BP <80 MM HG: CPT | Performed by: STUDENT IN AN ORGANIZED HEALTH CARE EDUCATION/TRAINING PROGRAM

## 2024-12-19 PROCEDURE — 82784 ASSAY IGA/IGD/IGG/IGM EACH: CPT

## 2024-12-19 PROCEDURE — 3077F SYST BP >= 140 MM HG: CPT | Performed by: STUDENT IN AN ORGANIZED HEALTH CARE EDUCATION/TRAINING PROGRAM

## 2024-12-19 PROCEDURE — 80053 COMPREHEN METABOLIC PANEL: CPT

## 2024-12-19 PROCEDURE — 83615 LACTATE (LD) (LDH) ENZYME: CPT

## 2024-12-19 PROCEDURE — 1123F ACP DISCUSS/DSCN MKR DOCD: CPT | Performed by: STUDENT IN AN ORGANIZED HEALTH CARE EDUCATION/TRAINING PROGRAM

## 2024-12-19 PROCEDURE — 85025 COMPLETE CBC W/AUTO DIFF WBC: CPT

## 2024-12-19 PROCEDURE — 86334 IMMUNOFIX E-PHORESIS SERUM: CPT

## 2024-12-19 PROCEDURE — 84155 ASSAY OF PROTEIN SERUM: CPT

## 2024-12-19 PROCEDURE — 36415 COLL VENOUS BLD VENIPUNCTURE: CPT

## 2024-12-19 PROCEDURE — 84165 PROTEIN E-PHORESIS SERUM: CPT

## 2024-12-19 NOTE — PROGRESS NOTES
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  Denies any symptoms.  States his hematuria is less severe and less frequent.  Denies any night sweats or adenopathy.    ONCOLOGIC HISTORY:  79 y.o.  male initially seen at [facility] hematology for anemia and abnormal TP/albumin ratio.     SPEP IgG and IgM kappa paraprotein, M-spike 3.29 g/dL.   UIFE: IgM protein.     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   -Extensive involvement by atypitcal CD5-/CD10-B-cell lymphoproliferative disorder, comprising over 70% of marrow cellularity  -Mildly hypercellular marrow for age (30-40%) with trilineage pan hypoplasia  -Normocytic anemia.  -IHC staining highlighted extensive CD20+ B cell infiltrate with admixed + plasma cells           Started on Ibrutinib 420 mg/day 12/2020 with good response so far    On 01/06/2021:  SPEP:  Monoclonal protein 1.72 g/dL  Persistent double monoclonal bands in the beta/gamma and gamma globuin regions.   Bands previously identified as IgM kappa and IgG kappa (8/12/2020).   IgM kappa decreased by 1.22 g/dL and IgG kappa minimally changed since last exam on 10/14/2020.    Free kappa light chains: 413.4    (3.3-19.4)   Free lambda light chains: 2.8      (5.7-26.3)   K/L ratio:                          147.64 (0.26-1.65)    On 03/25/2021  SPEP:  Monoclonal protein: 1.03 (0-0) g/dL  Persistent double monoclonal bands in the beta/gamma and gamma globuin regions.   Bands previously identified as IgM kappa and IgG kappa (8/12/2020).   IgM kappa decreased by 0.69 g/dL and IgG kappa unchanged since 01/06/2021.

## 2024-12-20 LAB
FREE KAPPA/LAMBDA RATIO: 6.5 (ref 0.22–1.74)
KAPPA LC FREE SER-MCNC: 31.2 MG/L
LAMBDA LC FREE SERPL-MCNC: 4.8 MG/L (ref 4.2–27.7)

## 2024-12-23 LAB
ALBUMIN SERPL-MCNC: 3.3 G/DL (ref 3.5–4.7)
ALPHA1 GLOB SERPL ELPH-MCNC: 0.2 G/DL (ref 0.2–0.4)
ALPHA2 GLOB SERPL ELPH-MCNC: 0.8 G/DL (ref 0.5–1)
B-GLOBULIN SERPL ELPH-MCNC: 0.8 G/DL (ref 0.8–1.3)
GAMMA GLOB SERPL ELPH-MCNC: 0.8 G/DL (ref 0.7–1.6)
INTERPRETATION SERPL IFE-IMP: NORMAL
M PROTEIN SERPL ELPH-MCNC: 0.5 G/DL
PATH REV: NORMAL
PATHOLOGIST: ABNORMAL
PROT PATTERN SERPL ELPH-IMP: ABNORMAL
PROT SERPL-MCNC: 6 G/DL (ref 6.4–8.3)

## 2024-12-24 ENCOUNTER — HOSPITAL ENCOUNTER (OUTPATIENT)
Dept: CT IMAGING | Age: 79
Discharge: HOME | End: 2024-12-26
Payer: MEDICARE

## 2024-12-24 DIAGNOSIS — R31.0 GROSS HEMATURIA: ICD-10-CM

## 2024-12-24 PROCEDURE — 6360000004 HC RX CONTRAST MEDICATION: Performed by: RADIOLOGY

## 2024-12-24 PROCEDURE — 74178 CT ABD&PLV WO CNTR FLWD CNTR: CPT | Performed by: NURSE PRACTITIONER

## 2024-12-24 RX ORDER — IOPAMIDOL 755 MG/ML
100 INJECTION, SOLUTION INTRAVASCULAR
Status: COMPLETED | OUTPATIENT
Start: 2024-12-24 | End: 2024-12-24

## 2024-12-24 RX ADMIN — IOPAMIDOL 100 ML: 755 INJECTION, SOLUTION INTRAVENOUS at 08:08

## 2025-01-14 ENCOUNTER — TRANSCRIBE ORDERS (OUTPATIENT)
Dept: ADMINISTRATIVE | Age: 80
End: 2025-01-14

## 2025-01-14 DIAGNOSIS — Q61.8 OTHER CYSTIC KIDNEY DISEASES: Primary | ICD-10-CM

## 2025-01-16 ENCOUNTER — OFFICE VISIT (OUTPATIENT)
Dept: ONCOLOGY | Age: 80
End: 2025-01-16
Payer: OTHER GOVERNMENT

## 2025-01-16 ENCOUNTER — HOSPITAL ENCOUNTER (OUTPATIENT)
Dept: INFUSION THERAPY | Age: 80
Discharge: HOME | End: 2025-01-16
Payer: OTHER GOVERNMENT

## 2025-01-16 VITALS
BODY MASS INDEX: 41.73 KG/M2 | HEART RATE: 76 BPM | OXYGEN SATURATION: 96 % | TEMPERATURE: 97.2 F | SYSTOLIC BLOOD PRESSURE: 140 MMHG | DIASTOLIC BLOOD PRESSURE: 60 MMHG | HEIGHT: 70 IN | WEIGHT: 291.5 LBS

## 2025-01-16 DIAGNOSIS — C83.00 MALIGNANT LYMPHOPLASMACYTIC LYMPHOMA (HCC): ICD-10-CM

## 2025-01-16 DIAGNOSIS — E83.42 HYPOMAGNESEMIA: ICD-10-CM

## 2025-01-16 LAB
ALBUMIN SERPL-MCNC: 4 G/DL (ref 3.5–5.2)
ALP SERPL-CCNC: 75 U/L (ref 40–129)
ALT SERPL-CCNC: 16 U/L (ref 0–40)
ANION GAP SERPL CALCULATED.3IONS-SCNC: 11 MMOL/L (ref 7–16)
AST SERPL-CCNC: 13 U/L (ref 0–39)
BASOPHILS # BLD: 0.04 K/UL (ref 0–0.2)
BASOPHILS NFR BLD: 1 % (ref 0–2)
BILIRUB SERPL-MCNC: 0.6 MG/DL (ref 0–1.2)
BUN SERPL-MCNC: 10 MG/DL (ref 6–23)
CALCIUM SERPL-MCNC: 9.5 MG/DL (ref 8.6–10.2)
CHLORIDE SERPL-SCNC: 105 MMOL/L (ref 98–107)
CO2 SERPL-SCNC: 24 MMOL/L (ref 22–29)
CREAT SERPL-MCNC: 0.9 MG/DL (ref 0.7–1.2)
EOSINOPHIL # BLD: 0.13 K/UL (ref 0.05–0.5)
EOSINOPHILS RELATIVE PERCENT: 2 % (ref 0–6)
ERYTHROCYTE [DISTWIDTH] IN BLOOD BY AUTOMATED COUNT: 14.3 % (ref 11.5–15)
GFR, ESTIMATED: 89 ML/MIN/1.73M2
GLUCOSE SERPL-MCNC: 138 MG/DL (ref 74–99)
HCT VFR BLD AUTO: 46.6 % (ref 37–54)
HGB BLD-MCNC: 15 G/DL (ref 12.5–16.5)
IGA SERPL-MCNC: 16 MG/DL (ref 70–400)
IGG SERPL-MCNC: 249 MG/DL (ref 700–1600)
IGM SERPL-MCNC: 763 MG/DL (ref 40–230)
IMM GRANULOCYTES # BLD AUTO: 0.09 K/UL (ref 0–0.58)
IMM GRANULOCYTES NFR BLD: 1 % (ref 0–5)
LDH SERPL-CCNC: 135 U/L (ref 135–225)
LYMPHOCYTES NFR BLD: 1.87 K/UL (ref 1.5–4)
LYMPHOCYTES RELATIVE PERCENT: 27 % (ref 20–42)
MAGNESIUM SERPL-MCNC: 1.8 MG/DL (ref 1.6–2.6)
MCH RBC QN AUTO: 29.2 PG (ref 26–35)
MCHC RBC AUTO-ENTMCNC: 32.2 G/DL (ref 32–34.5)
MCV RBC AUTO: 90.7 FL (ref 80–99.9)
MONOCYTES NFR BLD: 0.62 K/UL (ref 0.1–0.95)
MONOCYTES NFR BLD: 9 % (ref 2–12)
NEUTROPHILS NFR BLD: 60 % (ref 43–80)
NEUTS SEG NFR BLD: 4.12 K/UL (ref 1.8–7.3)
PLATELET # BLD AUTO: 165 K/UL (ref 130–450)
PMV BLD AUTO: 10.4 FL (ref 7–12)
POTASSIUM SERPL-SCNC: 4.1 MMOL/L (ref 3.5–5)
PROT SERPL-MCNC: 6.4 G/DL (ref 6.4–8.3)
RBC # BLD AUTO: 5.14 M/UL (ref 3.8–5.8)
SODIUM SERPL-SCNC: 140 MMOL/L (ref 132–146)
WBC OTHER # BLD: 6.9 K/UL (ref 4.5–11.5)

## 2025-01-16 PROCEDURE — 84155 ASSAY OF PROTEIN SERUM: CPT

## 2025-01-16 PROCEDURE — 83615 LACTATE (LD) (LDH) ENZYME: CPT

## 2025-01-16 PROCEDURE — 80053 COMPREHEN METABOLIC PANEL: CPT

## 2025-01-16 PROCEDURE — 83735 ASSAY OF MAGNESIUM: CPT

## 2025-01-16 PROCEDURE — 84165 PROTEIN E-PHORESIS SERUM: CPT

## 2025-01-16 PROCEDURE — 1123F ACP DISCUSS/DSCN MKR DOCD: CPT | Performed by: STUDENT IN AN ORGANIZED HEALTH CARE EDUCATION/TRAINING PROGRAM

## 2025-01-16 PROCEDURE — 99213 OFFICE O/P EST LOW 20 MIN: CPT | Performed by: STUDENT IN AN ORGANIZED HEALTH CARE EDUCATION/TRAINING PROGRAM

## 2025-01-16 PROCEDURE — 3077F SYST BP >= 140 MM HG: CPT | Performed by: STUDENT IN AN ORGANIZED HEALTH CARE EDUCATION/TRAINING PROGRAM

## 2025-01-16 PROCEDURE — 86334 IMMUNOFIX E-PHORESIS SERUM: CPT

## 2025-01-16 PROCEDURE — 83521 IG LIGHT CHAINS FREE EACH: CPT

## 2025-01-16 PROCEDURE — 85025 COMPLETE CBC W/AUTO DIFF WBC: CPT

## 2025-01-16 PROCEDURE — 36415 COLL VENOUS BLD VENIPUNCTURE: CPT

## 2025-01-16 PROCEDURE — 99212 OFFICE O/P EST SF 10 MIN: CPT

## 2025-01-16 PROCEDURE — 3078F DIAST BP <80 MM HG: CPT | Performed by: STUDENT IN AN ORGANIZED HEALTH CARE EDUCATION/TRAINING PROGRAM

## 2025-01-16 PROCEDURE — 82784 ASSAY IGA/IGD/IGG/IGM EACH: CPT

## 2025-01-16 NOTE — PROGRESS NOTES
Patient provided with discharge instructions, received printed AVS.  All questions answered.  Patient understands follow up plan of care.       
(0.26-1.65)    On 09/20/2021:  Hb 16.4  Hct 48.1  MCV 87.5  WBC 6.9     BUN 7 Creat 0.90  LFTs wnl  Beta-2 microglobulin 1.9  SPEP: M-spike 1 = 0.7 g/dl              M-spike 2 = 0.1 g/dl  SIFE: IgM kappa monoclonal protein is present, as previously described.   IgG kappa monoclonal protein is present, as previously described    IgM 1314 ()    Free kappa light chains:   80.24  (3.3-19.4)   Free lambda light chains: 2.88    (5.7-26.3)   K/L ratio:                           27.86 (0.26-1.65)    On 10/18/2021:  Hb 15.9   Hct 48.0  MCV 89.1  WBC 9.3     BUN 8 Creat 0.9  LFTs wnl  Beta-2 microglobulin 2.0  SPEP: M-spike 1 = 0.7 g/dl              M-spike 2 = 0.1 g/dl  SIFE: IgM kappa + faint IgG kappa monoclonal protein is present, as previously described.     IgM 1308 ()    Free kappa light chains:   80.48  (3.3-19.4)   Free lambda light chains: 2.87    (5.7-26.3)   K/L ratio:                           28.04 (0.26-1.65)    On 11/17/2021:  Hb 15.5   Hct 46.4  MCV 87.5  WBC 7.4     BUN 13 Creat 1.2  LFTs wnl  Beta-2 microglobulin 2.3  SPEP: M-spike 1 = 0.9 g/dl              M-spike 2 = 0.1 g/dl  SIFE: IgM kappa monoclonal protein is present, as previously described.   A Faint IgG kappa monoclonal protein is present, as previously described.     IgM 1347 ()    Free kappa light chains:   78.93  (3.3-19.4)   Free lambda light chains: 3.92    (5.7-26.3)   K/L ratio:                           20.14 (0.26-1.65)    On 12/15/2021:  Hb 15.3   Hct 46.5  MCV 90.3  WBC 9.1     BUN 10 Creat 0.9  LFTs wnl  Beta-2 microglobulin 2.5  SPEP: M-spike 1 = 0.8 g/dl              M-spike 2 = 0.1 g/dl  SIFE: IgM kappa + IgG kappa monoclonal protein is present, as previously described.    IgM 1365 ()    Free kappa light chains:   84.08  (3.3-19.4)   Free lambda light chains: 4.45    (5.7-26.3)   K/L ratio:                           18.89 (0.26-1.65)    On 01/20/2022:   Hb 14.9   Hct 45.1  MCV 88.1

## 2025-01-17 LAB
ALBUMIN SERPL-MCNC: 3.3 G/DL (ref 3.5–4.7)
ALPHA1 GLOB SERPL ELPH-MCNC: 0.2 G/DL (ref 0.2–0.4)
ALPHA2 GLOB SERPL ELPH-MCNC: 0.8 G/DL (ref 0.5–1)
B-GLOBULIN SERPL ELPH-MCNC: 0.9 G/DL (ref 0.8–1.3)
FREE KAPPA/LAMBDA RATIO: 5.13 (ref 0.22–1.74)
GAMMA GLOB SERPL ELPH-MCNC: 0.8 G/DL (ref 0.7–1.6)
INTERPRETATION SERPL IFE-IMP: NORMAL
KAPPA LC FREE SER-MCNC: 28.7 MG/L
LAMBDA LC FREE SERPL-MCNC: 5.6 MG/L (ref 4.2–27.7)
M PROTEIN SERPL ELPH-MCNC: 0.5 G/DL
PATH REV: NORMAL
PATHOLOGIST: ABNORMAL
PROT PATTERN SERPL ELPH-IMP: ABNORMAL
PROT SERPL-MCNC: 6 G/DL (ref 6.4–8.3)

## 2025-02-10 ENCOUNTER — APPOINTMENT (OUTPATIENT)
Dept: CT IMAGING | Age: 80
End: 2025-02-10
Payer: OTHER GOVERNMENT

## 2025-02-10 ENCOUNTER — HOSPITAL ENCOUNTER (EMERGENCY)
Age: 80
Discharge: ANOTHER HOSPITAL | End: 2025-02-11
Attending: EMERGENCY MEDICINE
Payer: OTHER GOVERNMENT

## 2025-02-10 ENCOUNTER — APPOINTMENT (OUTPATIENT)
Dept: GENERAL RADIOLOGY | Age: 80
End: 2025-02-10
Payer: OTHER GOVERNMENT

## 2025-02-10 DIAGNOSIS — J96.01 ACUTE RESPIRATORY FAILURE WITH HYPOXIA: Primary | ICD-10-CM

## 2025-02-10 DIAGNOSIS — B33.8 RESPIRATORY SYNCYTIAL VIRUS (RSV): ICD-10-CM

## 2025-02-10 DIAGNOSIS — R79.89 ELEVATED TROPONIN: ICD-10-CM

## 2025-02-10 DIAGNOSIS — J18.9 PNEUMONIA DUE TO INFECTIOUS ORGANISM, UNSPECIFIED LATERALITY, UNSPECIFIED PART OF LUNG: ICD-10-CM

## 2025-02-10 DIAGNOSIS — I48.91 ATRIAL FIBRILLATION WITH RAPID VENTRICULAR RESPONSE (HCC): ICD-10-CM

## 2025-02-10 DIAGNOSIS — Z79.01 CHRONIC ANTICOAGULATION: ICD-10-CM

## 2025-02-10 DIAGNOSIS — I50.23 ACUTE ON CHRONIC SYSTOLIC CHF (CONGESTIVE HEART FAILURE) (HCC): ICD-10-CM

## 2025-02-10 LAB
ALBUMIN SERPL-MCNC: 3.5 G/DL (ref 3.5–5.2)
ALP SERPL-CCNC: 94 U/L (ref 40–129)
ALT SERPL-CCNC: 16 U/L (ref 0–40)
ANION GAP SERPL CALCULATED.3IONS-SCNC: 11 MMOL/L (ref 7–16)
AST SERPL-CCNC: 16 U/L (ref 0–39)
B PARAP IS1001 DNA NPH QL NAA+NON-PROBE: NOT DETECTED
B PERT DNA SPEC QL NAA+PROBE: NOT DETECTED
B-OH-BUTYR SERPL-MCNC: 0.71 MMOL/L (ref 0.02–0.27)
BACTERIA URNS QL MICRO: ABNORMAL
BASOPHILS # BLD: 0.05 K/UL (ref 0–0.2)
BASOPHILS NFR BLD: 1 % (ref 0–2)
BILIRUB SERPL-MCNC: 1.1 MG/DL (ref 0–1.2)
BILIRUB UR QL STRIP: NEGATIVE
BNP SERPL-MCNC: 2158 PG/ML (ref 0–450)
BUN SERPL-MCNC: 9 MG/DL (ref 6–23)
C PNEUM DNA NPH QL NAA+NON-PROBE: NOT DETECTED
CALCIUM SERPL-MCNC: 9.4 MG/DL (ref 8.6–10.2)
CHLORIDE SERPL-SCNC: 97 MMOL/L (ref 98–107)
CLARITY UR: ABNORMAL
CO2 SERPL-SCNC: 25 MMOL/L (ref 22–29)
COLOR UR: ABNORMAL
CREAT SERPL-MCNC: 0.9 MG/DL (ref 0.7–1.2)
EOSINOPHIL # BLD: 0.02 K/UL (ref 0.05–0.5)
EOSINOPHILS RELATIVE PERCENT: 0 % (ref 0–6)
ERYTHROCYTE [DISTWIDTH] IN BLOOD BY AUTOMATED COUNT: 13.4 % (ref 11.5–15)
FLUAV RNA NPH QL NAA+NON-PROBE: NOT DETECTED
FLUAV RNA RESP QL NAA+PROBE: NOT DETECTED
FLUBV RNA NPH QL NAA+NON-PROBE: NOT DETECTED
FLUBV RNA RESP QL NAA+PROBE: NOT DETECTED
GFR, ESTIMATED: 89 ML/MIN/1.73M2
GLUCOSE SERPL-MCNC: 226 MG/DL (ref 74–99)
GLUCOSE UR STRIP-MCNC: 100 MG/DL
HADV DNA NPH QL NAA+NON-PROBE: NOT DETECTED
HCOV 229E RNA NPH QL NAA+NON-PROBE: NOT DETECTED
HCOV HKU1 RNA NPH QL NAA+NON-PROBE: NOT DETECTED
HCOV NL63 RNA NPH QL NAA+NON-PROBE: NOT DETECTED
HCOV OC43 RNA NPH QL NAA+NON-PROBE: NOT DETECTED
HCT VFR BLD AUTO: 43.3 % (ref 37–54)
HGB BLD-MCNC: 14.7 G/DL (ref 12.5–16.5)
HGB UR QL STRIP.AUTO: ABNORMAL
HMPV RNA NPH QL NAA+NON-PROBE: NOT DETECTED
HPIV1 RNA NPH QL NAA+NON-PROBE: NOT DETECTED
HPIV2 RNA NPH QL NAA+NON-PROBE: NOT DETECTED
HPIV3 RNA NPH QL NAA+NON-PROBE: NOT DETECTED
HPIV4 RNA NPH QL NAA+NON-PROBE: NOT DETECTED
IMM GRANULOCYTES # BLD AUTO: 0.16 K/UL (ref 0–0.58)
IMM GRANULOCYTES NFR BLD: 2 % (ref 0–5)
KETONES UR STRIP-MCNC: ABNORMAL MG/DL
LACTATE BLDV-SCNC: 1.8 MMOL/L (ref 0.5–1.9)
LEUKOCYTE ESTERASE UR QL STRIP: ABNORMAL
LYMPHOCYTES NFR BLD: 1.25 K/UL (ref 1.5–4)
LYMPHOCYTES RELATIVE PERCENT: 12 % (ref 20–42)
M PNEUMO DNA NPH QL NAA+NON-PROBE: NOT DETECTED
MCH RBC QN AUTO: 29.3 PG (ref 26–35)
MCHC RBC AUTO-ENTMCNC: 33.9 G/DL (ref 32–34.5)
MCV RBC AUTO: 86.3 FL (ref 80–99.9)
MONOCYTES NFR BLD: 1.7 K/UL (ref 0.1–0.95)
MONOCYTES NFR BLD: 16 % (ref 2–12)
NEGATIVE BASE EXCESS, ART: 1.6 MMOL/L
NEUTROPHILS NFR BLD: 70 % (ref 43–80)
NEUTS SEG NFR BLD: 7.25 K/UL (ref 1.8–7.3)
NITRITE UR QL STRIP: POSITIVE
O2 DELIVERY DEVICE: NORMAL
PH UR STRIP: 5.5 [PH] (ref 5–8)
PH VENOUS: 7.33 (ref 7.35–7.45)
PLATELET # BLD AUTO: 243 K/UL (ref 130–450)
PMV BLD AUTO: 9.9 FL (ref 7–12)
POC HCO3: 22.9 MMOL/L (ref 22–26)
POC O2 SATURATION: 93.3 % (ref 92–98.5)
POC PCO2: 37.3 MM HG (ref 35–45)
POC PH: 7.4 (ref 7.35–7.45)
POC PO2: 68.1 MM HG (ref 60–80)
POTASSIUM SERPL-SCNC: 4.2 MMOL/L (ref 3.5–5)
PROCALCITONIN SERPL-MCNC: 0.09 NG/ML (ref 0–0.08)
PROT SERPL-MCNC: 6.2 G/DL (ref 6.4–8.3)
PROT UR STRIP-MCNC: >=300 MG/DL
RBC # BLD AUTO: 5.02 M/UL (ref 3.8–5.8)
RBC #/AREA URNS HPF: ABNORMAL /HPF
RSV BY PCR: DETECTED
RSV RNA NPH QL NAA+NON-PROBE: DETECTED
RV+EV RNA NPH QL NAA+NON-PROBE: NOT DETECTED
SAMPLE SITE: NORMAL
SARS-COV-2 RNA NPH QL NAA+NON-PROBE: NOT DETECTED
SARS-COV-2 RNA RESP QL NAA+PROBE: NOT DETECTED
SODIUM SERPL-SCNC: 133 MMOL/L (ref 132–146)
SOURCE: NORMAL
SP GR UR STRIP: 1.01 (ref 1–1.03)
SPECIMEN DESCRIPTION: ABNORMAL
SPECIMEN DESCRIPTION: NORMAL
SPECIMEN SOURCE: ABNORMAL
TROPONIN I SERPL HS-MCNC: 262 NG/L (ref 0–11)
TROPONIN I SERPL HS-MCNC: 266 NG/L (ref 0–11)
UROBILINOGEN UR STRIP-ACNC: 1 EU/DL (ref 0–1)
WBC #/AREA URNS HPF: ABNORMAL /HPF
WBC OTHER # BLD: 10.4 K/UL (ref 4.5–11.5)

## 2025-02-10 PROCEDURE — 71045 X-RAY EXAM CHEST 1 VIEW: CPT

## 2025-02-10 PROCEDURE — 85025 COMPLETE CBC W/AUTO DIFF WBC: CPT

## 2025-02-10 PROCEDURE — 84145 PROCALCITONIN (PCT): CPT

## 2025-02-10 PROCEDURE — 2500000003 HC RX 250 WO HCPCS: Performed by: EMERGENCY MEDICINE

## 2025-02-10 PROCEDURE — 6360000004 HC RX CONTRAST MEDICATION: Performed by: RADIOLOGY

## 2025-02-10 PROCEDURE — 87040 BLOOD CULTURE FOR BACTERIA: CPT

## 2025-02-10 PROCEDURE — 96372 THER/PROPH/DIAG INJ SC/IM: CPT

## 2025-02-10 PROCEDURE — 82800 BLOOD PH: CPT

## 2025-02-10 PROCEDURE — 99285 EMERGENCY DEPT VISIT HI MDM: CPT

## 2025-02-10 PROCEDURE — 96365 THER/PROPH/DIAG IV INF INIT: CPT

## 2025-02-10 PROCEDURE — 93005 ELECTROCARDIOGRAM TRACING: CPT | Performed by: EMERGENCY MEDICINE

## 2025-02-10 PROCEDURE — 0202U NFCT DS 22 TRGT SARS-COV-2: CPT

## 2025-02-10 PROCEDURE — 81001 URINALYSIS AUTO W/SCOPE: CPT

## 2025-02-10 PROCEDURE — 6370000000 HC RX 637 (ALT 250 FOR IP): Performed by: EMERGENCY MEDICINE

## 2025-02-10 PROCEDURE — 80053 COMPREHEN METABOLIC PANEL: CPT

## 2025-02-10 PROCEDURE — 82803 BLOOD GASES ANY COMBINATION: CPT

## 2025-02-10 PROCEDURE — 84484 ASSAY OF TROPONIN QUANT: CPT

## 2025-02-10 PROCEDURE — 87086 URINE CULTURE/COLONY COUNT: CPT

## 2025-02-10 PROCEDURE — 83880 ASSAY OF NATRIURETIC PEPTIDE: CPT

## 2025-02-10 PROCEDURE — 96374 THER/PROPH/DIAG INJ IV PUSH: CPT

## 2025-02-10 PROCEDURE — 83605 ASSAY OF LACTIC ACID: CPT

## 2025-02-10 PROCEDURE — 2580000003 HC RX 258: Performed by: EMERGENCY MEDICINE

## 2025-02-10 PROCEDURE — 6360000002 HC RX W HCPCS: Performed by: EMERGENCY MEDICINE

## 2025-02-10 PROCEDURE — 71275 CT ANGIOGRAPHY CHEST: CPT

## 2025-02-10 PROCEDURE — 6360000002 HC RX W HCPCS

## 2025-02-10 PROCEDURE — 82010 KETONE BODYS QUAN: CPT

## 2025-02-10 PROCEDURE — 87634 RSV DNA/RNA AMP PROBE: CPT

## 2025-02-10 PROCEDURE — 87636 SARSCOV2 & INF A&B AMP PRB: CPT

## 2025-02-10 PROCEDURE — 96375 TX/PRO/DX INJ NEW DRUG ADDON: CPT

## 2025-02-10 RX ORDER — DEXAMETHASONE SODIUM PHOSPHATE 10 MG/ML
6 INJECTION INTRAMUSCULAR; INTRAVENOUS ONCE
Status: DISCONTINUED | OUTPATIENT
Start: 2025-02-10 | End: 2025-02-11 | Stop reason: HOSPADM

## 2025-02-10 RX ORDER — TRAMADOL HYDROCHLORIDE 50 MG/1
50 TABLET ORAL ONCE
Status: COMPLETED | OUTPATIENT
Start: 2025-02-10 | End: 2025-02-10

## 2025-02-10 RX ORDER — IOPAMIDOL 755 MG/ML
75 INJECTION, SOLUTION INTRAVASCULAR
Status: COMPLETED | OUTPATIENT
Start: 2025-02-10 | End: 2025-02-10

## 2025-02-10 RX ORDER — METOPROLOL TARTRATE 1 MG/ML
5 INJECTION, SOLUTION INTRAVENOUS ONCE
Status: COMPLETED | OUTPATIENT
Start: 2025-02-10 | End: 2025-02-10

## 2025-02-10 RX ORDER — FUROSEMIDE 10 MG/ML
40 INJECTION INTRAMUSCULAR; INTRAVENOUS ONCE
Status: COMPLETED | OUTPATIENT
Start: 2025-02-10 | End: 2025-02-10

## 2025-02-10 RX ORDER — ENOXAPARIN SODIUM 100 MG/ML
30 INJECTION SUBCUTANEOUS 2 TIMES DAILY
Status: DISCONTINUED | OUTPATIENT
Start: 2025-02-10 | End: 2025-02-11 | Stop reason: HOSPADM

## 2025-02-10 RX ORDER — ENOXAPARIN SODIUM 100 MG/ML
100 INJECTION SUBCUTANEOUS 2 TIMES DAILY
Status: DISCONTINUED | OUTPATIENT
Start: 2025-02-10 | End: 2025-02-11 | Stop reason: HOSPADM

## 2025-02-10 RX ORDER — IPRATROPIUM BROMIDE AND ALBUTEROL SULFATE 2.5; .5 MG/3ML; MG/3ML
1 SOLUTION RESPIRATORY (INHALATION) ONCE
Status: COMPLETED | OUTPATIENT
Start: 2025-02-10 | End: 2025-02-10

## 2025-02-10 RX ORDER — DOFETILIDE 0.5 MG/1
500 CAPSULE ORAL EVERY 12 HOURS SCHEDULED
Status: DISCONTINUED | OUTPATIENT
Start: 2025-02-10 | End: 2025-02-11 | Stop reason: HOSPADM

## 2025-02-10 RX ORDER — DEXAMETHASONE SODIUM PHOSPHATE 4 MG/ML
INJECTION, SOLUTION INTRA-ARTICULAR; INTRALESIONAL; INTRAMUSCULAR; INTRAVENOUS; SOFT TISSUE
Status: COMPLETED
Start: 2025-02-10 | End: 2025-02-10

## 2025-02-10 RX ADMIN — WATER 2000 MG: 1 INJECTION INTRAMUSCULAR; INTRAVENOUS; SUBCUTANEOUS at 15:58

## 2025-02-10 RX ADMIN — METOPROLOL TARTRATE 5 MG: 1 INJECTION, SOLUTION INTRAVENOUS at 16:44

## 2025-02-10 RX ADMIN — TRAMADOL HYDROCHLORIDE 50 MG: 50 TABLET, COATED ORAL at 16:40

## 2025-02-10 RX ADMIN — DEXAMETHASONE SODIUM PHOSPHATE 6 MG: 4 INJECTION INTRA-ARTICULAR; INTRALESIONAL; INTRAMUSCULAR; INTRAVENOUS; SOFT TISSUE at 16:00

## 2025-02-10 RX ADMIN — FUROSEMIDE 40 MG: 10 INJECTION, SOLUTION INTRAMUSCULAR; INTRAVENOUS at 16:44

## 2025-02-10 RX ADMIN — DOFETILIDE 500 MCG: 0.5 CAPSULE ORAL at 18:48

## 2025-02-10 RX ADMIN — ENOXAPARIN SODIUM 100 MG: 100 INJECTION SUBCUTANEOUS at 14:52

## 2025-02-10 RX ADMIN — DOXYCYCLINE 100 MG: 100 INJECTION, POWDER, LYOPHILIZED, FOR SOLUTION INTRAVENOUS at 16:03

## 2025-02-10 RX ADMIN — IPRATROPIUM BROMIDE AND ALBUTEROL SULFATE 1 DOSE: 2.5; .5 SOLUTION RESPIRATORY (INHALATION) at 16:45

## 2025-02-10 RX ADMIN — ENOXAPARIN SODIUM 30 MG: 100 INJECTION SUBCUTANEOUS at 14:51

## 2025-02-10 RX ADMIN — IOPAMIDOL 75 ML: 755 INJECTION, SOLUTION INTRAVENOUS at 15:10

## 2025-02-10 ASSESSMENT — PAIN SCALES - GENERAL
PAINLEVEL_OUTOF10: 0
PAINLEVEL_OUTOF10: 10

## 2025-02-10 ASSESSMENT — PAIN - FUNCTIONAL ASSESSMENT: PAIN_FUNCTIONAL_ASSESSMENT: 0-10

## 2025-02-10 ASSESSMENT — PAIN DESCRIPTION - DESCRIPTORS: DESCRIPTORS: ACHING;DISCOMFORT;DULL

## 2025-02-10 NOTE — ED PROVIDER NOTES
Department of Emergency Medicine    ED  Provider Note - Sign out / Oncoming Provider   Admit Date/RoomTime: 2/10/2025 12:48 PM  4:19 PM EST      I received this patient at sign out from Dr. Strange.  I have discussed the patient's initial exam, treatment and plan of care with the out going physician.  I have introduced my self to the patient / family and have answered their questions to this point.  I have examined the patient myself and reviewed ordered tests / medications and  reviewed any available results to this point.   If a resident is involved in the Emergency Department care, I have discussed my findings and plan with them as well.    Labs Reviewed   RSV DETECTION - Abnormal; Notable for the following components:       Result Value    RSV by PCR DETECTED (*)     All other components within normal limits   CBC WITH AUTO DIFFERENTIAL - Abnormal; Notable for the following components:    Lymphocytes % 12 (*)     Monocytes % 16 (*)     Lymphocytes Absolute 1.25 (*)     Monocytes Absolute 1.70 (*)     Eosinophils Absolute 0.02 (*)     All other components within normal limits   COMPREHENSIVE METABOLIC PANEL W/ REFLEX TO MG FOR LOW K - Abnormal; Notable for the following components:    Chloride 97 (*)     Glucose 226 (*)     Total Protein 6.2 (*)     All other components within normal limits   TROPONIN - Abnormal; Notable for the following components:    Troponin, High Sensitivity 266 (*)     All other components within normal limits   URINALYSIS WITH MICROSCOPIC - Abnormal; Notable for the following components:    Color, UA Brown (*)     Turbidity UA SLIGHTLY CLOUDY (*)     Glucose, Ur 100 (*)     Ketones, Urine TRACE (*)     Urine Hgb LARGE (*)     Protein, UA >=300 (*)     Nitrite, Urine POSITIVE (*)     Leukocyte Esterase, Urine TRACE (*)     WBC, UA 6 TO 9 (*)     RBC, UA TOO NUMEROUS TO COUNT (*)     Bacteria, UA 1+ (*)     All other components within normal limits   BRAIN NATRIURETIC PEPTIDE - Abnormal;

## 2025-02-10 NOTE — ED PROVIDER NOTES
[facility] EMERGENCY DEPARTMENT  EMERGENCY DEPARTMENT ENCOUNTER        Pt Name: Josef Charlton  MRN: [id number]  Birthdate 1945  Date of evaluation: 2/10/2025  Provider: Jennifer Strange DO  PCP: Chary Ordonez MD  Note Started: 1:27 PM EST 2/10/25    CHIEF COMPLAINT       Chief Complaint   Patient presents with    Shortness of Breath     Increasing SOB over the last week or so. Started two weeks ago with cough, nasal congestion.        HISTORY OF PRESENT ILLNESS: 1 or more Elements   History From: patient and EMS    Limitations to history : None    Josef Charlton is a 79 y.o. male who presents with shortness of breath and cough beginning 2 weeks ago.  Patient called the ambulance today for shortness of breath, he was given nebulizer treatments and an IV was placed.  He states he is not on home oxygen, O2 sat initially was 88%.  He has a history of lymphoblastic lymphoma, insulin requiring diabetes, A-fib on Eliquis.  He did not take any of his medications this morning.  The complaint has been persistent, moderate in severity, and worsened by nothing.  Patient denies fever/chills, sore throat, chest pain, headache, visual disturbances, focal paresthesias, focal weakness, abdominal pain, nausea, vomiting, diarrhea, constipation, dysuria, hematuria, trauma, neck or back pain, rash or other complaints.      Nursing Notes were all reviewed and agreed with or any disagreements were addressed in the HPI.    REVIEW OF SYSTEMS :           Positives and Pertinent negatives as per HPI.     SURGICAL HISTORY     Past Surgical History:   Procedure Laterality Date    COLONOSCOPY      COLONOSCOPY N/A 9/27/2024    COLONOSCOPY POLYPECTOMY SNARE/BIOPSY performed by Alvino Camejo MD at [facility] ENDOSCOPY    COLONOSCOPY W/ POLYPECTOMY  09/27/2024    polyps; diverticula--speedy    HERNIA REPAIR      LITHOTRIPSY Bilateral 07/06/2023    CYSTOSCOPY BILATERAL RETROGRADE PYELOGRAM LASER LITHOTRIPSY, RIGHT

## 2025-02-11 ENCOUNTER — HOSPITAL ENCOUNTER (INPATIENT)
Age: 80
LOS: 2 days | Discharge: HOME HEALTH | DRG: 193 | End: 2025-02-13
Attending: STUDENT IN AN ORGANIZED HEALTH CARE EDUCATION/TRAINING PROGRAM | Admitting: STUDENT IN AN ORGANIZED HEALTH CARE EDUCATION/TRAINING PROGRAM
Payer: OTHER GOVERNMENT

## 2025-02-11 VITALS
HEART RATE: 97 BPM | WEIGHT: 299.1 LBS | BODY MASS INDEX: 42.92 KG/M2 | TEMPERATURE: 98.5 F | DIASTOLIC BLOOD PRESSURE: 105 MMHG | OXYGEN SATURATION: 94 % | RESPIRATION RATE: 28 BRPM | SYSTOLIC BLOOD PRESSURE: 218 MMHG

## 2025-02-11 PROBLEM — J12.1 RSV (RESPIRATORY SYNCYTIAL VIRUS PNEUMONIA): Status: ACTIVE | Noted: 2025-02-11

## 2025-02-11 PROBLEM — B33.8 RSV INFECTION: Status: ACTIVE | Noted: 2025-02-11

## 2025-02-11 LAB
ALBUMIN SERPL-MCNC: 3.4 G/DL (ref 3.5–5.2)
ALP SERPL-CCNC: 103 U/L (ref 40–129)
ALT SERPL-CCNC: 18 U/L (ref 0–40)
ANION GAP SERPL CALCULATED.3IONS-SCNC: 14 MMOL/L (ref 7–16)
AST SERPL-CCNC: 19 U/L (ref 0–39)
BASOPHILS # BLD: 0.05 K/UL (ref 0–0.2)
BASOPHILS NFR BLD: 1 % (ref 0–2)
BILIRUB SERPL-MCNC: 0.5 MG/DL (ref 0–1.2)
BUN SERPL-MCNC: 21 MG/DL (ref 6–23)
CALCIUM SERPL-MCNC: 9.6 MG/DL (ref 8.6–10.2)
CHLORIDE SERPL-SCNC: 98 MMOL/L (ref 98–107)
CO2 SERPL-SCNC: 22 MMOL/L (ref 22–29)
CREAT SERPL-MCNC: 1.1 MG/DL (ref 0.7–1.2)
CRP SERPL HS-MCNC: 168 MG/L (ref 0–5)
EOSINOPHIL # BLD: 0 K/UL (ref 0.05–0.5)
EOSINOPHILS RELATIVE PERCENT: 0 % (ref 0–6)
ERYTHROCYTE [DISTWIDTH] IN BLOOD BY AUTOMATED COUNT: 13.5 % (ref 11.5–15)
GFR, ESTIMATED: 68 ML/MIN/1.73M2
GLUCOSE BLD-MCNC: 209 MG/DL (ref 74–99)
GLUCOSE BLD-MCNC: 233 MG/DL (ref 74–99)
GLUCOSE BLD-MCNC: 279 MG/DL (ref 74–99)
GLUCOSE BLD-MCNC: 282 MG/DL (ref 74–99)
GLUCOSE BLD-MCNC: 305 MG/DL (ref 74–99)
GLUCOSE BLD-MCNC: 339 MG/DL (ref 74–99)
GLUCOSE BLD-MCNC: 384 MG/DL (ref 74–99)
GLUCOSE SERPL-MCNC: 306 MG/DL (ref 74–99)
HCT VFR BLD AUTO: 44.1 % (ref 37–54)
HGB BLD-MCNC: 14.1 G/DL (ref 12.5–16.5)
IMM GRANULOCYTES # BLD AUTO: 0.33 K/UL (ref 0–0.58)
IMM GRANULOCYTES NFR BLD: 3 % (ref 0–5)
LYMPHOCYTES NFR BLD: 0.85 K/UL (ref 1.5–4)
LYMPHOCYTES RELATIVE PERCENT: 8 % (ref 20–42)
MCH RBC QN AUTO: 28.7 PG (ref 26–35)
MCHC RBC AUTO-ENTMCNC: 32 G/DL (ref 32–34.5)
MCV RBC AUTO: 89.8 FL (ref 80–99.9)
MONOCYTES NFR BLD: 0.83 K/UL (ref 0.1–0.95)
MONOCYTES NFR BLD: 8 % (ref 2–12)
NEUTROPHILS NFR BLD: 81 % (ref 43–80)
NEUTS SEG NFR BLD: 8.55 K/UL (ref 1.8–7.3)
PLATELET # BLD AUTO: 290 K/UL (ref 130–450)
PMV BLD AUTO: 10.3 FL (ref 7–12)
POTASSIUM SERPL-SCNC: 4.8 MMOL/L (ref 3.5–5)
PROCALCITONIN SERPL-MCNC: 0.09 NG/ML (ref 0–0.08)
PROT SERPL-MCNC: 6.9 G/DL (ref 6.4–8.3)
RBC # BLD AUTO: 4.91 M/UL (ref 3.8–5.8)
SODIUM SERPL-SCNC: 134 MMOL/L (ref 132–146)
WBC OTHER # BLD: 10.6 K/UL (ref 4.5–11.5)

## 2025-02-11 PROCEDURE — 84145 PROCALCITONIN (PCT): CPT

## 2025-02-11 PROCEDURE — 2500000003 HC RX 250 WO HCPCS: Performed by: INTERNAL MEDICINE

## 2025-02-11 PROCEDURE — 80053 COMPREHEN METABOLIC PANEL: CPT

## 2025-02-11 PROCEDURE — 6370000000 HC RX 637 (ALT 250 FOR IP): Performed by: INTERNAL MEDICINE

## 2025-02-11 PROCEDURE — 1200000000 HC SEMI PRIVATE

## 2025-02-11 PROCEDURE — 94640 AIRWAY INHALATION TREATMENT: CPT

## 2025-02-11 PROCEDURE — 85025 COMPLETE CBC W/AUTO DIFF WBC: CPT

## 2025-02-11 PROCEDURE — 96374 THER/PROPH/DIAG INJ IV PUSH: CPT

## 2025-02-11 PROCEDURE — 87086 URINE CULTURE/COLONY COUNT: CPT

## 2025-02-11 PROCEDURE — 99222 1ST HOSP IP/OBS MODERATE 55: CPT | Performed by: INTERNAL MEDICINE

## 2025-02-11 PROCEDURE — 2700000000 HC OXYGEN THERAPY PER DAY

## 2025-02-11 PROCEDURE — 82962 GLUCOSE BLOOD TEST: CPT

## 2025-02-11 PROCEDURE — 6360000002 HC RX W HCPCS: Performed by: INTERNAL MEDICINE

## 2025-02-11 PROCEDURE — 86140 C-REACTIVE PROTEIN: CPT

## 2025-02-11 PROCEDURE — 6360000002 HC RX W HCPCS: Performed by: EMERGENCY MEDICINE

## 2025-02-11 PROCEDURE — 96375 TX/PRO/DX INJ NEW DRUG ADDON: CPT

## 2025-02-11 RX ORDER — TRAMADOL HYDROCHLORIDE 50 MG/1
50 TABLET ORAL EVERY 6 HOURS PRN
Status: DISCONTINUED | OUTPATIENT
Start: 2025-02-11 | End: 2025-02-13 | Stop reason: HOSPADM

## 2025-02-11 RX ORDER — LEVOFLOXACIN 5 MG/ML
750 INJECTION, SOLUTION INTRAVENOUS EVERY 24 HOURS
Status: DISCONTINUED | OUTPATIENT
Start: 2025-02-11 | End: 2025-02-11 | Stop reason: SDUPTHER

## 2025-02-11 RX ORDER — PRAZOSIN HYDROCHLORIDE 2 MG/1
2 CAPSULE ORAL NIGHTLY
Status: DISCONTINUED | OUTPATIENT
Start: 2025-02-11 | End: 2025-02-13 | Stop reason: HOSPADM

## 2025-02-11 RX ORDER — GLUCAGON 1 MG/ML
1 KIT INJECTION PRN
Status: DISCONTINUED | OUTPATIENT
Start: 2025-02-11 | End: 2025-02-13 | Stop reason: HOSPADM

## 2025-02-11 RX ORDER — METHYLPREDNISOLONE SODIUM SUCCINATE 40 MG/ML
40 INJECTION INTRAMUSCULAR; INTRAVENOUS EVERY 12 HOURS
Status: DISCONTINUED | OUTPATIENT
Start: 2025-02-11 | End: 2025-02-12

## 2025-02-11 RX ORDER — FUROSEMIDE 40 MG/1
40 TABLET ORAL DAILY
Status: DISCONTINUED | OUTPATIENT
Start: 2025-02-11 | End: 2025-02-13 | Stop reason: HOSPADM

## 2025-02-11 RX ORDER — LISINOPRIL 10 MG/1
10 TABLET ORAL 2 TIMES DAILY
Status: DISCONTINUED | OUTPATIENT
Start: 2025-02-11 | End: 2025-02-13 | Stop reason: HOSPADM

## 2025-02-11 RX ORDER — INSULIN LISPRO 100 [IU]/ML
0-16 INJECTION, SOLUTION INTRAVENOUS; SUBCUTANEOUS
Status: DISCONTINUED | OUTPATIENT
Start: 2025-02-11 | End: 2025-02-13 | Stop reason: HOSPADM

## 2025-02-11 RX ORDER — PANTOPRAZOLE SODIUM 40 MG/1
40 TABLET, DELAYED RELEASE ORAL
Status: DISCONTINUED | OUTPATIENT
Start: 2025-02-11 | End: 2025-02-13 | Stop reason: HOSPADM

## 2025-02-11 RX ORDER — TAMSULOSIN HYDROCHLORIDE 0.4 MG/1
0.4 CAPSULE ORAL NIGHTLY
Status: DISCONTINUED | OUTPATIENT
Start: 2025-02-11 | End: 2025-02-13 | Stop reason: HOSPADM

## 2025-02-11 RX ORDER — AMLODIPINE BESYLATE 10 MG/1
10 TABLET ORAL DAILY
Status: DISCONTINUED | OUTPATIENT
Start: 2025-02-11 | End: 2025-02-13 | Stop reason: HOSPADM

## 2025-02-11 RX ORDER — FINASTERIDE 5 MG/1
5 TABLET, FILM COATED ORAL DAILY
Status: DISCONTINUED | OUTPATIENT
Start: 2025-02-11 | End: 2025-02-13 | Stop reason: HOSPADM

## 2025-02-11 RX ORDER — INSULIN GLARGINE 100 [IU]/ML
50 INJECTION, SOLUTION SUBCUTANEOUS DAILY
Status: DISCONTINUED | OUTPATIENT
Start: 2025-02-12 | End: 2025-02-13 | Stop reason: HOSPADM

## 2025-02-11 RX ORDER — DEXTROSE MONOHYDRATE 100 MG/ML
INJECTION, SOLUTION INTRAVENOUS CONTINUOUS PRN
Status: DISCONTINUED | OUTPATIENT
Start: 2025-02-11 | End: 2025-02-13 | Stop reason: HOSPADM

## 2025-02-11 RX ORDER — DOFETILIDE 0.5 MG/1
500 CAPSULE ORAL EVERY 12 HOURS SCHEDULED
Status: DISCONTINUED | OUTPATIENT
Start: 2025-02-11 | End: 2025-02-13 | Stop reason: HOSPADM

## 2025-02-11 RX ORDER — IPRATROPIUM BROMIDE AND ALBUTEROL SULFATE 2.5; .5 MG/3ML; MG/3ML
1 SOLUTION RESPIRATORY (INHALATION)
Status: DISCONTINUED | OUTPATIENT
Start: 2025-02-11 | End: 2025-02-13 | Stop reason: HOSPADM

## 2025-02-11 RX ORDER — ATORVASTATIN CALCIUM 40 MG/1
40 TABLET, FILM COATED ORAL DAILY
Status: DISCONTINUED | OUTPATIENT
Start: 2025-02-11 | End: 2025-02-13 | Stop reason: HOSPADM

## 2025-02-11 RX ORDER — INSULIN GLARGINE 100 [IU]/ML
40 INJECTION, SOLUTION SUBCUTANEOUS DAILY
Status: DISCONTINUED | OUTPATIENT
Start: 2025-02-11 | End: 2025-02-11

## 2025-02-11 RX ORDER — LABETALOL HYDROCHLORIDE 5 MG/ML
10 INJECTION, SOLUTION INTRAVENOUS ONCE
Status: COMPLETED | OUTPATIENT
Start: 2025-02-11 | End: 2025-02-11

## 2025-02-11 RX ADMIN — IPRATROPIUM BROMIDE AND ALBUTEROL SULFATE 1 DOSE: .5; 2.5 SOLUTION RESPIRATORY (INHALATION) at 06:37

## 2025-02-11 RX ADMIN — ATORVASTATIN CALCIUM 40 MG: 40 TABLET, FILM COATED ORAL at 21:11

## 2025-02-11 RX ADMIN — APIXABAN 5 MG: 5 TABLET, FILM COATED ORAL at 11:03

## 2025-02-11 RX ADMIN — APIXABAN 5 MG: 5 TABLET, FILM COATED ORAL at 21:11

## 2025-02-11 RX ADMIN — METHYLPREDNISOLONE SODIUM SUCCINATE 40 MG: 40 INJECTION, POWDER, LYOPHILIZED, FOR SOLUTION INTRAMUSCULAR; INTRAVENOUS at 04:06

## 2025-02-11 RX ADMIN — LISINOPRIL 10 MG: 10 TABLET ORAL at 09:06

## 2025-02-11 RX ADMIN — DOFETILIDE 500 MCG: 0.5 CAPSULE ORAL at 21:11

## 2025-02-11 RX ADMIN — INSULIN LISPRO 12 UNITS: 100 INJECTION, SOLUTION INTRAVENOUS; SUBCUTANEOUS at 12:10

## 2025-02-11 RX ADMIN — PRAZOSIN HYDROCHLORIDE 2 MG: 2 CAPSULE ORAL at 21:11

## 2025-02-11 RX ADMIN — INSULIN LISPRO 4 UNITS: 100 INJECTION, SOLUTION INTRAVENOUS; SUBCUTANEOUS at 16:50

## 2025-02-11 RX ADMIN — DOFETILIDE 500 MCG: 0.5 CAPSULE ORAL at 09:06

## 2025-02-11 RX ADMIN — FINASTERIDE 5 MG: 5 TABLET, FILM COATED ORAL at 09:06

## 2025-02-11 RX ADMIN — PANTOPRAZOLE SODIUM 40 MG: 40 TABLET, DELAYED RELEASE ORAL at 06:26

## 2025-02-11 RX ADMIN — MICONAZOLE NITRATE: 20 POWDER TOPICAL at 12:18

## 2025-02-11 RX ADMIN — METHYLPREDNISOLONE SODIUM SUCCINATE 40 MG: 40 INJECTION, POWDER, LYOPHILIZED, FOR SOLUTION INTRAMUSCULAR; INTRAVENOUS at 16:50

## 2025-02-11 RX ADMIN — TAMSULOSIN HYDROCHLORIDE 0.4 MG: 0.4 CAPSULE ORAL at 21:11

## 2025-02-11 RX ADMIN — FUROSEMIDE 40 MG: 40 TABLET ORAL at 11:03

## 2025-02-11 RX ADMIN — IPRATROPIUM BROMIDE AND ALBUTEROL SULFATE 1 DOSE: .5; 2.5 SOLUTION RESPIRATORY (INHALATION) at 16:01

## 2025-02-11 RX ADMIN — IPRATROPIUM BROMIDE AND ALBUTEROL SULFATE 1 DOSE: .5; 2.5 SOLUTION RESPIRATORY (INHALATION) at 18:37

## 2025-02-11 RX ADMIN — INSULIN LISPRO 12 UNITS: 100 INJECTION, SOLUTION INTRAVENOUS; SUBCUTANEOUS at 21:15

## 2025-02-11 RX ADMIN — METOPROLOL SUCCINATE 75 MG: 50 TABLET, EXTENDED RELEASE ORAL at 09:06

## 2025-02-11 RX ADMIN — TRAMADOL HYDROCHLORIDE 50 MG: 50 TABLET, COATED ORAL at 09:19

## 2025-02-11 RX ADMIN — AMLODIPINE BESYLATE 10 MG: 10 TABLET ORAL at 09:06

## 2025-02-11 RX ADMIN — LISINOPRIL 10 MG: 10 TABLET ORAL at 21:11

## 2025-02-11 RX ADMIN — INSULIN LISPRO 16 UNITS: 100 INJECTION, SOLUTION INTRAVENOUS; SUBCUTANEOUS at 10:04

## 2025-02-11 RX ADMIN — LABETALOL HYDROCHLORIDE 10 MG: 5 INJECTION INTRAVENOUS at 01:00

## 2025-02-11 RX ADMIN — MICONAZOLE NITRATE: 20 POWDER TOPICAL at 21:12

## 2025-02-11 RX ADMIN — INSULIN GLARGINE 40 UNITS: 100 INJECTION, SOLUTION SUBCUTANEOUS at 09:22

## 2025-02-11 RX ADMIN — LEVOFLOXACIN 750 MG: 500 TABLET, FILM COATED ORAL at 09:06

## 2025-02-11 ASSESSMENT — PAIN - FUNCTIONAL ASSESSMENT: PAIN_FUNCTIONAL_ASSESSMENT: PREVENTS OR INTERFERES SOME ACTIVE ACTIVITIES AND ADLS

## 2025-02-11 ASSESSMENT — PAIN SCALES - GENERAL
PAINLEVEL_OUTOF10: 6
PAINLEVEL_OUTOF10: 0
PAINLEVEL_OUTOF10: 3

## 2025-02-11 ASSESSMENT — PAIN DESCRIPTION - LOCATION: LOCATION: BACK

## 2025-02-11 ASSESSMENT — PAIN DESCRIPTION - DESCRIPTORS: DESCRIPTORS: ACHING

## 2025-02-11 ASSESSMENT — PAIN DESCRIPTION - ORIENTATION: ORIENTATION: LOWER

## 2025-02-11 ASSESSMENT — PAIN DESCRIPTION - PAIN TYPE: TYPE: ACUTE PAIN;CHRONIC PAIN

## 2025-02-11 NOTE — PLAN OF CARE
Problem: Chronic Conditions and Co-morbidities  Goal: Patient's chronic conditions and co-morbidity symptoms are monitored and maintained or improved  2/11/2025 1509 by Tomi Sanchez RN  Outcome: Progressing  2/11/2025 0506 by Yara Milian RN  Outcome: Progressing     Problem: Discharge Planning  Goal: Discharge to home or other facility with appropriate resources  2/11/2025 1509 by Tomi Sanchez RN  Outcome: Progressing  2/11/2025 0506 by Yara Milian RN  Outcome: Progressing     Problem: Safety - Adult  Goal: Free from fall injury  2/11/2025 1509 by Tomi Sanchez RN  Outcome: Progressing  2/11/2025 0506 by Yara Milian RN  Outcome: Progressing     Problem: Pain  Goal: Verbalizes/displays adequate comfort level or baseline comfort level  Outcome: Progressing     Problem: ABCDS Injury Assessment  Goal: Absence of physical injury  Outcome: Progressing

## 2025-02-11 NOTE — PROGRESS NOTES
Josef Charlton was ordered ibrutinib (IMBRUVICA) which is a nonformulary medication.  This medication will need to be supplied by the patient as the pharmacy does not carry this non-formulary medication.    If the medication has not been administered by 1400 on the following day from the time the order was placed, a pharmacist will follow-up with the nurse of the patient to assess the capability of the patient to bring in the medication.    If it is determined that the patient cannot supply the medication and it is not available to be dispensed from the pharmacy, the provider will be notified.  Alonso Morales RPH  2/11/2025  3:51 AM

## 2025-02-11 NOTE — ACP (ADVANCE CARE PLANNING)
2/11/2025  Advance Care Planning   Healthcare Decision Maker:    Primary Decision Maker: Diane Charlton - [facility] - [phone]    Click here to complete Healthcare Decision Makers including selection of the Healthcare Decision Maker Relationship (ie \"Primary\").

## 2025-02-11 NOTE — PROGRESS NOTES
4 Eyes Skin Assessment     NAME:  Josef Charlton  YOB: 1945  MEDICAL RECORD NUMBER:  [id number]    The patient is being assessed for  Admission    I agree that at least one RN has performed a thorough Head to Toe Skin Assessment on the patient. ALL assessment sites listed below have been assessed.      Areas assessed by both nurses:    Head, Face, Ears, Shoulders, Back, Chest, Arms, Elbows, Hands, Sacrum. Buttock, Coccyx, Ischium, Legs. Feet and Heels, and Under Medical Devices         Does the Patient have a Wound? No noted wound(s)     - redness, blanchable - buttocks/ groin/ upper thigh  - dry, flaky, cracking bilateral feet/heels  - redness- bilateral feet/ legs    Mark Prevention initiated by RN: No  Wound Care Orders initiated by RN: No    Pressure Injury (Stage 3,4, Unstageable, DTI, NWPT, and Complex wounds) if present, place Wound referral order by RN under : No    New Ostomies, if present place, Ostomy referral order under : No     Nurse 1 eSignature: Electronically signed by Yara Milian RN on 2/11/25 at 4:26 AM EST    **SHARE this note so that the co-signing nurse can place an eSignature**    Nurse 2 eSignature: Electronically signed by Autumn Purvis on 2/11/25 at 6:34 AM EST

## 2025-02-11 NOTE — H&P
[facility] Hospitalist Group History and Physical        Chief Complaint:  sob, sp RSV infection  History of Present Illness   The patient is a 79 y.o. 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  BTK tx    Ansd dm2 on insulin  Afib w RVR on elqius +tikosyn +metoprolol  Bph hx on prazosin/flomax and finestaride  Chronic back pain on tamadol  GERD on PPI  HTN on bp meds    - hx taken from the patient  Past Medical History:      Diagnosis Date    Abscess of right thumb 04/15/2016    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    Distal phalynx    Anemia     Atrial fibrillation (HCC)     Depression     PTSD    Diabetes mellitus (HCC)     Hyperlipidemia     Hypertension     Obesity        Past Surgical History:        Procedure Laterality Date    COLONOSCOPY      COLONOSCOPY N/A 9/27/2024    COLONOSCOPY POLYPECTOMY SNARE/BIOPSY performed by Alvino Camejo MD at [facility] ENDOSCOPY    COLONOSCOPY W/ POLYPECTOMY  09/27/2024    polyps; diverticula--speedy    HERNIA REPAIR      LITHOTRIPSY Bilateral 07/06/2023    CYSTOSCOPY BILATERAL RETROGRADE PYELOGRAM LASER LITHOTRIPSY, RIGHT URETEROSCOPY, RIGHT STONE EXTRACTION, RIGHT STENT INSERTION, CASTILLO INSERTION performed by Emmanuel Coleman MD at [facility] OR    NECK SURGERY      cervical fusion    PATELLA SURGERY      SHOULDER SURGERY      TONSILLECTOMY      UPPER GASTROINTESTINAL ENDOSCOPY         Home Medications:  Prior to Admission medications    Medication Sig Start Date End Date Taking? Authorizing Provider   ibrutinib (IMBRUVICA) 140 MG chemo capsule Take  icterus, lips/ext mucous membrane grossly dry   NC o2   Neck:   Supple, trachea midline, no obvious JVD   Resp:     Dec BS bases No wheezes, No rhonchi   Chest wall:    No tenderness or deformity   Heart:    Regular rate and rhythm, S1 and S2 normal, no rub or gallop.   Abdomen:     Soft, non-tender, bowel sounds active    Inc habitus   Genitalia & Rectal:    Deferred.   Extremities x4:   Extremities normal, atraumatic, no cyanosis, no clubbing   Musculoskeletal:      NO active synovitis or swollen b/l wrists, 2-5 MCPs examined   Skin:   Skin color, texture, turgor fairly dry, no ACUTE rashes in lower legs and arms examined       Neurologic:  .Grossly symmetric  strength in UEs and LEs with symmetric grossly intact to light touch sensation     LABS:  CBC:   Lab Results   Component Value Date/Time    WBC 10.4 02/10/2025 01:36 PM    RBC 5.02 02/10/2025 01:36 PM    HGB 14.7 02/10/2025 01:36 PM    HCT 43.3 02/10/2025 01:36 PM     02/10/2025 01:36 PM    MCV 86.3 02/10/2025 01:36 PM     BMP:    Lab Results   Component Value Date/Time     02/10/2025 01:36 PM    K 4.2 02/10/2025 01:36 PM    K 3.3 07/05/2023 03:19 AM    CL 97 02/10/2025 01:36 PM    CO2 25 02/10/2025 01:36 PM    BUN 9 02/10/2025 01:36 PM    CREATININE 0.9 02/10/2025 01:36 PM    GLUCOSE 226 02/10/2025 01:36 PM    GLUCOSE 199 12/01/2010 09:40 AM    CALCIUM 9.4 02/10/2025 01:36 PM     Hepatic Function Panel:    Lab Results   Component Value Date/Time    ALKPHOS 94 02/10/2025 01:36 PM    AST 16 02/10/2025 01:36 PM    ALT 16 02/10/2025 01:36 PM    BILITOT 1.1 02/10/2025 01:36 PM     Magnesium:    Lab Results   Component Value Date/Time    MG 1.8 01/16/2025 09:32 AM       PT/INR:    Lab Results   Component Value Date/Time    PROTIME 12.8 11/10/2023 12:15 AM    INR 1.1 11/10/2023 12:15 AM     U/A:   Lab Results   Component Value Date/Time    LEUKOCYTESUR TRACE 02/10/2025 05:00 PM    PHUR 5.5 02/10/2025 05:00 PM    PHUR  10/20/2023 11:15 AM     Results

## 2025-02-11 NOTE — PROGRESS NOTES
IV to PO Conversion Policy     Notification of IV to PO conversion:    This patient's order for levofloxacin IV has been changed to levofloxacin PO as approved by the [facility] ([facility]) INTRAVENOUS TO ORAL Policy.    If the patient should become strict NPO while on this therapy, contact the prescriber for further orders.    sundar ko RPH  2/11/2025  7:31 AM

## 2025-02-11 NOTE — PLAN OF CARE
Patient was admitted after midnight  Presented with acute hypoxic respiratory failure 2/2 RSV infection  Continue supportive care  COPD exacerbation, continue breathing treatments, IV steroids  Pyuria.  Continue Levaquin for possible UTI/prostatitis.  A-fib.  Continue Eliquis, Tikosyn and metoprolol  BPH.  On Flomax and finasteride  Hypertension.  Continue home meds

## 2025-02-11 NOTE — CARE COORDINATION
2/11/2025  Social Work Discharge Planning: RSV. COPD exacerbation.This worker met with Pt and his spouse to discuss  role and transition of care/discharge planning.Pt is a retired [employer] and is independent at home with a ww and cane. Pt is on 3l o2 here and uses none at home. Wean o2. [facility] [place] [facility]CP is Dr. CARLOZ Olivia. Spouse will transport Pt home at discharge. Electronically signed by DIMPLE Sandoval on 2/11/2025 at 1:38 PM

## 2025-02-12 LAB
ANION GAP SERPL CALCULATED.3IONS-SCNC: 11 MMOL/L (ref 7–16)
BUN SERPL-MCNC: 25 MG/DL (ref 6–23)
CALCIUM SERPL-MCNC: 9.4 MG/DL (ref 8.6–10.2)
CHLORIDE SERPL-SCNC: 96 MMOL/L (ref 98–107)
CO2 SERPL-SCNC: 23 MMOL/L (ref 22–29)
CREAT SERPL-MCNC: 1 MG/DL (ref 0.7–1.2)
ERYTHROCYTE [DISTWIDTH] IN BLOOD BY AUTOMATED COUNT: 13.3 % (ref 11.5–15)
GFR, ESTIMATED: 74 ML/MIN/1.73M2
GLUCOSE BLD-MCNC: 239 MG/DL (ref 74–99)
GLUCOSE BLD-MCNC: 262 MG/DL (ref 74–99)
GLUCOSE BLD-MCNC: 337 MG/DL (ref 74–99)
GLUCOSE BLD-MCNC: 375 MG/DL (ref 74–99)
GLUCOSE SERPL-MCNC: 403 MG/DL (ref 74–99)
HCT VFR BLD AUTO: 42.9 % (ref 37–54)
HGB BLD-MCNC: 14 G/DL (ref 12.5–16.5)
MCH RBC QN AUTO: 29.1 PG (ref 26–35)
MCHC RBC AUTO-ENTMCNC: 32.6 G/DL (ref 32–34.5)
MCV RBC AUTO: 89.2 FL (ref 80–99.9)
MICROORGANISM SPEC CULT: NO GROWTH
MICROORGANISM SPEC CULT: NO GROWTH
PLATELET # BLD AUTO: 290 K/UL (ref 130–450)
PMV BLD AUTO: 10.1 FL (ref 7–12)
POTASSIUM SERPL-SCNC: 4.8 MMOL/L (ref 3.5–5)
RBC # BLD AUTO: 4.81 M/UL (ref 3.8–5.8)
SERVICE CMNT-IMP: NORMAL
SERVICE CMNT-IMP: NORMAL
SODIUM SERPL-SCNC: 130 MMOL/L (ref 132–146)
SPECIMEN DESCRIPTION: NORMAL
SPECIMEN DESCRIPTION: NORMAL
WBC OTHER # BLD: 14.3 K/UL (ref 4.5–11.5)

## 2025-02-12 PROCEDURE — 2500000003 HC RX 250 WO HCPCS: Performed by: INTERNAL MEDICINE

## 2025-02-12 PROCEDURE — 1200000000 HC SEMI PRIVATE

## 2025-02-12 PROCEDURE — 85027 COMPLETE CBC AUTOMATED: CPT

## 2025-02-12 PROCEDURE — 82962 GLUCOSE BLOOD TEST: CPT

## 2025-02-12 PROCEDURE — 6370000000 HC RX 637 (ALT 250 FOR IP): Performed by: INTERNAL MEDICINE

## 2025-02-12 PROCEDURE — 80048 BASIC METABOLIC PNL TOTAL CA: CPT

## 2025-02-12 PROCEDURE — 36415 COLL VENOUS BLD VENIPUNCTURE: CPT

## 2025-02-12 PROCEDURE — 2700000000 HC OXYGEN THERAPY PER DAY

## 2025-02-12 PROCEDURE — 99232 SBSQ HOSP IP/OBS MODERATE 35: CPT | Performed by: INTERNAL MEDICINE

## 2025-02-12 PROCEDURE — 6360000002 HC RX W HCPCS: Performed by: INTERNAL MEDICINE

## 2025-02-12 PROCEDURE — 94640 AIRWAY INHALATION TREATMENT: CPT

## 2025-02-12 RX ORDER — METHYLPREDNISOLONE SODIUM SUCCINATE 40 MG/ML
40 INJECTION INTRAMUSCULAR; INTRAVENOUS DAILY
Status: DISCONTINUED | OUTPATIENT
Start: 2025-02-13 | End: 2025-02-13 | Stop reason: HOSPADM

## 2025-02-12 RX ORDER — INSULIN LISPRO 100 [IU]/ML
8 INJECTION, SOLUTION INTRAVENOUS; SUBCUTANEOUS
Status: DISCONTINUED | OUTPATIENT
Start: 2025-02-12 | End: 2025-02-13 | Stop reason: HOSPADM

## 2025-02-12 RX ADMIN — TRAMADOL HYDROCHLORIDE 50 MG: 50 TABLET, COATED ORAL at 20:49

## 2025-02-12 RX ADMIN — PRAZOSIN HYDROCHLORIDE 2 MG: 2 CAPSULE ORAL at 20:39

## 2025-02-12 RX ADMIN — APIXABAN 5 MG: 5 TABLET, FILM COATED ORAL at 20:39

## 2025-02-12 RX ADMIN — DOFETILIDE 500 MCG: 0.5 CAPSULE ORAL at 09:59

## 2025-02-12 RX ADMIN — INSULIN LISPRO 8 UNITS: 100 INJECTION, SOLUTION INTRAVENOUS; SUBCUTANEOUS at 06:54

## 2025-02-12 RX ADMIN — PANTOPRAZOLE SODIUM 40 MG: 40 TABLET, DELAYED RELEASE ORAL at 06:53

## 2025-02-12 RX ADMIN — AMLODIPINE BESYLATE 10 MG: 10 TABLET ORAL at 09:52

## 2025-02-12 RX ADMIN — ATORVASTATIN CALCIUM 40 MG: 40 TABLET, FILM COATED ORAL at 20:39

## 2025-02-12 RX ADMIN — INSULIN LISPRO 16 UNITS: 100 INJECTION, SOLUTION INTRAVENOUS; SUBCUTANEOUS at 12:12

## 2025-02-12 RX ADMIN — TAMSULOSIN HYDROCHLORIDE 0.4 MG: 0.4 CAPSULE ORAL at 20:39

## 2025-02-12 RX ADMIN — METOPROLOL SUCCINATE 75 MG: 50 TABLET, EXTENDED RELEASE ORAL at 09:52

## 2025-02-12 RX ADMIN — APIXABAN 5 MG: 5 TABLET, FILM COATED ORAL at 09:52

## 2025-02-12 RX ADMIN — FUROSEMIDE 40 MG: 40 TABLET ORAL at 09:52

## 2025-02-12 RX ADMIN — LISINOPRIL 10 MG: 10 TABLET ORAL at 20:39

## 2025-02-12 RX ADMIN — LISINOPRIL 10 MG: 10 TABLET ORAL at 09:52

## 2025-02-12 RX ADMIN — TRAMADOL HYDROCHLORIDE 50 MG: 50 TABLET, COATED ORAL at 01:45

## 2025-02-12 RX ADMIN — IPRATROPIUM BROMIDE AND ALBUTEROL SULFATE 1 DOSE: .5; 2.5 SOLUTION RESPIRATORY (INHALATION) at 21:58

## 2025-02-12 RX ADMIN — FINASTERIDE 5 MG: 5 TABLET, FILM COATED ORAL at 09:59

## 2025-02-12 RX ADMIN — LEVOFLOXACIN 750 MG: 500 TABLET, FILM COATED ORAL at 09:52

## 2025-02-12 RX ADMIN — INSULIN GLARGINE 50 UNITS: 100 INJECTION, SOLUTION SUBCUTANEOUS at 09:53

## 2025-02-12 RX ADMIN — DOFETILIDE 500 MCG: 0.5 CAPSULE ORAL at 20:39

## 2025-02-12 RX ADMIN — MICONAZOLE NITRATE: 20 POWDER TOPICAL at 20:42

## 2025-02-12 RX ADMIN — INSULIN LISPRO 8 UNITS: 100 INJECTION, SOLUTION INTRAVENOUS; SUBCUTANEOUS at 14:02

## 2025-02-12 RX ADMIN — INSULIN LISPRO 8 UNITS: 100 INJECTION, SOLUTION INTRAVENOUS; SUBCUTANEOUS at 17:16

## 2025-02-12 RX ADMIN — METHYLPREDNISOLONE SODIUM SUCCINATE 40 MG: 40 INJECTION, POWDER, LYOPHILIZED, FOR SOLUTION INTRAMUSCULAR; INTRAVENOUS at 06:15

## 2025-02-12 RX ADMIN — INSULIN LISPRO 12 UNITS: 100 INJECTION, SOLUTION INTRAVENOUS; SUBCUTANEOUS at 17:15

## 2025-02-12 RX ADMIN — IPRATROPIUM BROMIDE AND ALBUTEROL SULFATE 1 DOSE: .5; 2.5 SOLUTION RESPIRATORY (INHALATION) at 08:21

## 2025-02-12 RX ADMIN — IPRATROPIUM BROMIDE AND ALBUTEROL SULFATE 1 DOSE: .5; 2.5 SOLUTION RESPIRATORY (INHALATION) at 14:55

## 2025-02-12 RX ADMIN — INSULIN LISPRO 4 UNITS: 100 INJECTION, SOLUTION INTRAVENOUS; SUBCUTANEOUS at 20:44

## 2025-02-12 RX ADMIN — MICONAZOLE NITRATE: 20 POWDER TOPICAL at 09:55

## 2025-02-12 ASSESSMENT — PAIN DESCRIPTION - DESCRIPTORS
DESCRIPTORS: ACHING
DESCRIPTORS: DISCOMFORT;ACHING

## 2025-02-12 ASSESSMENT — PAIN - FUNCTIONAL ASSESSMENT: PAIN_FUNCTIONAL_ASSESSMENT: PREVENTS OR INTERFERES SOME ACTIVE ACTIVITIES AND ADLS

## 2025-02-12 ASSESSMENT — PAIN DESCRIPTION - ORIENTATION
ORIENTATION: LOWER
ORIENTATION: LOWER

## 2025-02-12 ASSESSMENT — PAIN DESCRIPTION - LOCATION
LOCATION: BACK
LOCATION: BACK

## 2025-02-12 ASSESSMENT — PAIN SCALES - GENERAL
PAINLEVEL_OUTOF10: 5
PAINLEVEL_OUTOF10: 5

## 2025-02-12 NOTE — PLAN OF CARE
Problem: Chronic Conditions and Co-morbidities  Goal: Patient's chronic conditions and co-morbidity symptoms are monitored and maintained or improved  2/12/2025 0102 by Jennifer Hancock RN  Outcome: Progressing  2/11/2025 1509 by Tomi Sanchez RN  Outcome: Progressing     Problem: Discharge Planning  Goal: Discharge to home or other facility with appropriate resources  2/12/2025 0102 by Jennifer Hancock RN  Outcome: Progressing  2/11/2025 1509 by Tomi Sanchez RN  Outcome: Progressing     Problem: Safety - Adult  Goal: Free from fall injury  2/12/2025 0102 by Jennifer Hancock RN  Outcome: Progressing  2/11/2025 1509 by Tomi Sanchez RN  Outcome: Progressing     Problem: Pain  Goal: Verbalizes/displays adequate comfort level or baseline comfort level  2/12/2025 0102 by Jennifer Hancock RN  Outcome: Progressing  2/11/2025 1509 by Tomi Sanchez RN  Outcome: Progressing     Problem: ABCDS Injury Assessment  Goal: Absence of physical injury  2/12/2025 0102 by Jennifer Hancock RN  Outcome: Progressing  2/11/2025 1509 by Tomi Sanchez RN  Outcome: Progressing

## 2025-02-12 NOTE — PROGRESS NOTES
If patient needs home going oxygen, [facility] for [facility].   [phone] ext [phone]

## 2025-02-12 NOTE — CARE COORDINATION
2/12/2025  Social Work Discharge Planning:IVATB. Urin cultures are pending.Pt is a retired [employer] and is independent at home with a ww and cane. Pt continues on 3l o2 here and uses none at home. Wean o2. Pulse ox testing will be needed.[facility] [place] [facility]. PCP is Dr. CARLOZ Olivia. Spouse will transport Pt home at discharge. Electronically signed by DIMPLE Sandoval on 2/12/2025 at 9:36 AM

## 2025-02-12 NOTE — PLAN OF CARE
Problem: Chronic Conditions and Co-morbidities  Goal: Patient's chronic conditions and co-morbidity symptoms are monitored and maintained or improved  2/12/2025 1058 by Shana Erazo RN  Outcome: Progressing  Flowsheets (Taken 2/12/2025 0917)  Care Plan - Patient's Chronic Conditions and Co-Morbidity Symptoms are Monitored and Maintained or Improved: Monitor and assess patient's chronic conditions and comorbid symptoms for stability, deterioration, or improvement     Problem: Discharge Planning  Goal: Discharge to home or other facility with appropriate resources  2/12/2025 1058 by Shana Erazo, RN  Outcome: Progressing  Flowsheets (Taken 2/12/2025 0917)  Discharge to home or other facility with appropriate resources: Refer to discharge planning if patient needs post-hospital services based on physician order or complex needs related to functional status, cognitive ability or social support system     Problem: Safety - Adult  Goal: Free from fall injury  2/12/2025 1058 by Shana Erazo, RN  Outcome: Progressing  Problem: Pain  Goal: Verbalizes/displays adequate comfort level or baseline comfort level  2/12/2025 1058 by Shana Erazo, RN  Outcome: Progressing  Flowsheets (Taken 2/12/2025 0900)  Verbalizes/displays adequate comfort level or baseline comfort level: Encourage patient to monitor pain and request assistance     Problem: ABCDS Injury Assessment  Goal: Absence of physical injury  2/12/2025 1058 by Shana Erazo, RN  Outcome: Progressing  Flowsheets (Taken 2/12/2025 0915)  Absence of Physical Injury: Implement safety measures based on patient assessment

## 2025-02-12 NOTE — PROGRESS NOTES
[facility] Hospitalist Progress Note    Admitting Date and Time: 2/11/2025  3:35 AM  Admit Dx: RSV infection [B33.8]  RSV (respiratory syncytial virus pneumonia) [J12.1]    Subjective:  Patient is being followed for RSV infection [B33.8]  RSV (respiratory syncytial virus pneumonia) [J12.1]   Pt seen and examined this morning  No acute events overnight  Sitting up in chair, no acute distress  On 3 L NC  States he feels slightly better today    ROS: denies fever, chills, cp, sob, n/v, HA unless stated above.      [START ON 2/13/2025] methylPREDNISolone  40 mg IntraVENous Daily    insulin lispro  8 Units SubCUTAneous TID WC    amLODIPine  10 mg Oral Daily    atorvastatin  40 mg Oral Daily    dofetilide  500 mcg Oral 2 times per day    finasteride  5 mg Oral Daily    ibrutinib  420 mg Oral Nightly    lisinopril  10 mg Oral BID    metoprolol succinate  75 mg Oral Daily    prazosin  2 mg Oral Nightly    pantoprazole  40 mg Oral QAM AC    insulin lispro  0-16 Units SubCUTAneous 4x Daily AC & HS    ipratropium 0.5 mg-albuterol 2.5 mg  1 Dose Inhalation Q4H WA RT    apixaban  5 mg Oral BID    furosemide  40 mg Oral Daily    tamsulosin  0.4 mg Oral Nightly    insulin glargine  50 Units SubCUTAneous Daily    miconazole   Topical BID     traMADol, 50 mg, Q6H PRN  glucose, 4 tablet, PRN  dextrose bolus, 125 mL, PRN   Or  dextrose bolus, 250 mL, PRN  glucagon (rDNA), 1 mg, PRN  dextrose, , Continuous PRN         Objective:    BP (!) 144/74   Pulse 92   Temp 97.5 °F (36.4 °C)   Resp 20   Ht 1.778 m (5' 10\")   Wt 135.4 kg (298 lb 8.1 oz)   SpO2 94%   BMI 42.83 kg/m²     General Appearance: alert and oriented to person, place and time and in no acute distress  Skin: warm and dry  Head: normocephalic and atraumatic  Eyes: pupils equal, round, and reactive to light, extraocular eye movements intact, conjunctivae normal  Neck: neck supple and non tender without mass   Pulmonary/Chest: Decreased aeration

## 2025-02-13 ENCOUNTER — APPOINTMENT (OUTPATIENT)
Dept: GENERAL RADIOLOGY | Age: 80
DRG: 193 | End: 2025-02-13
Attending: STUDENT IN AN ORGANIZED HEALTH CARE EDUCATION/TRAINING PROGRAM
Payer: OTHER GOVERNMENT

## 2025-02-13 VITALS
DIASTOLIC BLOOD PRESSURE: 81 MMHG | WEIGHT: 298.5 LBS | OXYGEN SATURATION: 97 % | HEART RATE: 83 BPM | TEMPERATURE: 97.5 F | BODY MASS INDEX: 42.73 KG/M2 | RESPIRATION RATE: 18 BRPM | SYSTOLIC BLOOD PRESSURE: 124 MMHG | HEIGHT: 70 IN

## 2025-02-13 LAB
ANION GAP SERPL CALCULATED.3IONS-SCNC: 12 MMOL/L (ref 7–16)
BUN SERPL-MCNC: 24 MG/DL (ref 6–23)
CALCIUM SERPL-MCNC: 9.5 MG/DL (ref 8.6–10.2)
CHLORIDE SERPL-SCNC: 97 MMOL/L (ref 98–107)
CO2 SERPL-SCNC: 23 MMOL/L (ref 22–29)
CREAT SERPL-MCNC: 1 MG/DL (ref 0.7–1.2)
EKG ATRIAL RATE: 103 BPM
EKG P AXIS: 56 DEGREES
EKG P-R INTERVAL: 172 MS
EKG Q-T INTERVAL: 354 MS
EKG QRS DURATION: 104 MS
EKG QTC CALCULATION (BAZETT): 463 MS
EKG R AXIS: 51 DEGREES
EKG T AXIS: 95 DEGREES
EKG VENTRICULAR RATE: 103 BPM
ERYTHROCYTE [DISTWIDTH] IN BLOOD BY AUTOMATED COUNT: 13.4 % (ref 11.5–15)
GFR, ESTIMATED: 81 ML/MIN/1.73M2
GLUCOSE BLD-MCNC: 285 MG/DL (ref 74–99)
GLUCOSE BLD-MCNC: 316 MG/DL (ref 74–99)
GLUCOSE BLD-MCNC: 351 MG/DL (ref 74–99)
GLUCOSE SERPL-MCNC: 263 MG/DL (ref 74–99)
HCT VFR BLD AUTO: 44.5 % (ref 37–54)
HGB BLD-MCNC: 14.4 G/DL (ref 12.5–16.5)
MCH RBC QN AUTO: 29 PG (ref 26–35)
MCHC RBC AUTO-ENTMCNC: 32.4 G/DL (ref 32–34.5)
MCV RBC AUTO: 89.5 FL (ref 80–99.9)
PLATELET # BLD AUTO: 245 K/UL (ref 130–450)
PMV BLD AUTO: 10.3 FL (ref 7–12)
POTASSIUM SERPL-SCNC: 3.9 MMOL/L (ref 3.5–5)
RBC # BLD AUTO: 4.97 M/UL (ref 3.8–5.8)
SODIUM SERPL-SCNC: 132 MMOL/L (ref 132–146)
WBC OTHER # BLD: 13.8 K/UL (ref 4.5–11.5)

## 2025-02-13 PROCEDURE — 6370000000 HC RX 637 (ALT 250 FOR IP): Performed by: INTERNAL MEDICINE

## 2025-02-13 PROCEDURE — 82962 GLUCOSE BLOOD TEST: CPT

## 2025-02-13 PROCEDURE — 71045 X-RAY EXAM CHEST 1 VIEW: CPT

## 2025-02-13 PROCEDURE — 80048 BASIC METABOLIC PNL TOTAL CA: CPT

## 2025-02-13 PROCEDURE — 99239 HOSP IP/OBS DSCHRG MGMT >30: CPT | Performed by: INTERNAL MEDICINE

## 2025-02-13 PROCEDURE — 2700000000 HC OXYGEN THERAPY PER DAY

## 2025-02-13 PROCEDURE — 85027 COMPLETE CBC AUTOMATED: CPT

## 2025-02-13 PROCEDURE — 94640 AIRWAY INHALATION TREATMENT: CPT

## 2025-02-13 PROCEDURE — 36415 COLL VENOUS BLD VENIPUNCTURE: CPT

## 2025-02-13 PROCEDURE — 6360000002 HC RX W HCPCS: Performed by: INTERNAL MEDICINE

## 2025-02-13 RX ORDER — GUAIFENESIN/DEXTROMETHORPHAN 100-10MG/5
5 SYRUP ORAL 3 TIMES DAILY PRN
Qty: 120 ML | Refills: 0 | Status: SHIPPED | OUTPATIENT
Start: 2025-02-13 | End: 2025-02-23

## 2025-02-13 RX ORDER — FUROSEMIDE 10 MG/ML
40 INJECTION INTRAMUSCULAR; INTRAVENOUS ONCE
Status: COMPLETED | OUTPATIENT
Start: 2025-02-13 | End: 2025-02-13

## 2025-02-13 RX ORDER — FUROSEMIDE 40 MG/1
40 TABLET ORAL DAILY
Qty: 30 TABLET | Refills: 0 | Status: SHIPPED | OUTPATIENT
Start: 2025-02-13 | End: 2025-03-15

## 2025-02-13 RX ORDER — PREDNISONE 20 MG/1
40 TABLET ORAL DAILY
Qty: 10 TABLET | Refills: 0 | Status: SHIPPED | OUTPATIENT
Start: 2025-02-13 | End: 2025-02-18

## 2025-02-13 RX ORDER — ALBUTEROL SULFATE 90 UG/1
2 INHALANT RESPIRATORY (INHALATION) 4 TIMES DAILY PRN
Qty: 18 G | Refills: 0 | Status: SHIPPED | OUTPATIENT
Start: 2025-02-13

## 2025-02-13 RX ADMIN — PANTOPRAZOLE SODIUM 40 MG: 40 TABLET, DELAYED RELEASE ORAL at 05:18

## 2025-02-13 RX ADMIN — INSULIN LISPRO 12 UNITS: 100 INJECTION, SOLUTION INTRAVENOUS; SUBCUTANEOUS at 12:13

## 2025-02-13 RX ADMIN — IPRATROPIUM BROMIDE AND ALBUTEROL SULFATE 1 DOSE: .5; 2.5 SOLUTION RESPIRATORY (INHALATION) at 12:45

## 2025-02-13 RX ADMIN — INSULIN LISPRO 8 UNITS: 100 INJECTION, SOLUTION INTRAVENOUS; SUBCUTANEOUS at 06:03

## 2025-02-13 RX ADMIN — METHYLPREDNISOLONE SODIUM SUCCINATE 40 MG: 40 INJECTION, POWDER, LYOPHILIZED, FOR SOLUTION INTRAMUSCULAR; INTRAVENOUS at 08:56

## 2025-02-13 RX ADMIN — MICONAZOLE NITRATE: 20 POWDER TOPICAL at 08:56

## 2025-02-13 RX ADMIN — AMLODIPINE BESYLATE 10 MG: 10 TABLET ORAL at 08:54

## 2025-02-13 RX ADMIN — FINASTERIDE 5 MG: 5 TABLET, FILM COATED ORAL at 08:54

## 2025-02-13 RX ADMIN — INSULIN LISPRO 8 UNITS: 100 INJECTION, SOLUTION INTRAVENOUS; SUBCUTANEOUS at 06:04

## 2025-02-13 RX ADMIN — INSULIN LISPRO 16 UNITS: 100 INJECTION, SOLUTION INTRAVENOUS; SUBCUTANEOUS at 17:08

## 2025-02-13 RX ADMIN — IPRATROPIUM BROMIDE AND ALBUTEROL SULFATE 1 DOSE: .5; 2.5 SOLUTION RESPIRATORY (INHALATION) at 06:43

## 2025-02-13 RX ADMIN — INSULIN LISPRO 8 UNITS: 100 INJECTION, SOLUTION INTRAVENOUS; SUBCUTANEOUS at 12:14

## 2025-02-13 RX ADMIN — INSULIN GLARGINE 50 UNITS: 100 INJECTION, SOLUTION SUBCUTANEOUS at 08:52

## 2025-02-13 RX ADMIN — LISINOPRIL 10 MG: 10 TABLET ORAL at 08:54

## 2025-02-13 RX ADMIN — APIXABAN 5 MG: 5 TABLET, FILM COATED ORAL at 08:54

## 2025-02-13 RX ADMIN — DOFETILIDE 500 MCG: 0.5 CAPSULE ORAL at 08:54

## 2025-02-13 RX ADMIN — METOPROLOL SUCCINATE 75 MG: 50 TABLET, EXTENDED RELEASE ORAL at 08:54

## 2025-02-13 RX ADMIN — INSULIN LISPRO 8 UNITS: 100 INJECTION, SOLUTION INTRAVENOUS; SUBCUTANEOUS at 17:08

## 2025-02-13 RX ADMIN — TRAMADOL HYDROCHLORIDE 50 MG: 50 TABLET, COATED ORAL at 05:18

## 2025-02-13 RX ADMIN — FUROSEMIDE 40 MG: 10 INJECTION, SOLUTION INTRAMUSCULAR; INTRAVENOUS at 08:56

## 2025-02-13 ASSESSMENT — PAIN SCALES - GENERAL
PAINLEVEL_OUTOF10: 8
PAINLEVEL_OUTOF10: 5

## 2025-02-13 ASSESSMENT — PAIN DESCRIPTION - ORIENTATION: ORIENTATION: LOWER

## 2025-02-13 ASSESSMENT — PAIN DESCRIPTION - LOCATION: LOCATION: BACK

## 2025-02-13 ASSESSMENT — PAIN DESCRIPTION - DESCRIPTORS: DESCRIPTORS: ACHING

## 2025-02-13 NOTE — PROGRESS NOTES
Verbal and written discharge instructions reviewed with pt and wife , verbalized understanding. Discharged off the unit via wheelchair with staff to car, wife brought oxygen tank.

## 2025-02-13 NOTE — DISCHARGE SUMMARY
[facility] Hospitalist Physician Discharge Summary       No follow-up provider specified.    Activity level: As tolerated     Dispo: Home      Condition on discharge: Stable     Patient ID:  Josef Charlton  [id number]  79 y.o.  1945    Admit date: 2/11/2025    Discharge date and time:  2/13/2025  12:13 PM    Admission Diagnoses: Principal Problem:    RSV infection  Active Problems:    A-fib (HCC)    Malignant lymphoplasmacytic lymphoma (HCC)    Petty catheter in place    Poorly controlled type 2 diabetes mellitus (HCC)    Primary hypertension    RSV (respiratory syncytial virus pneumonia)  Resolved Problems:    * No resolved hospital problems. *      Discharge Diagnoses: Principal Problem:    RSV infection  Active Problems:    A-fib (HCC)    Malignant lymphoplasmacytic lymphoma (HCC)    Petty catheter in place    Poorly controlled type 2 diabetes mellitus (HCC)    Primary hypertension    RSV (respiratory syncytial virus pneumonia)  Resolved Problems:    * No resolved hospital problems. *      Consults:  None    Hospital Course:   Patient Josef Charlton is a 79 y.o. presented with RSV infection [B33.8]  RSV (respiratory syncytial virus pneumonia) [J12.1]    Patient is 79-year-old male was admitted due to acute hypoxic respiratory failure 2/2 RSV infection and possible atelectasis/small pleural effusion.  He was started on breathing treatments and IV steroids.  Home Lasix continued.  Was given Levaquin initially due to pyuria but DC'd later when urine cultures came back negative.  Patient is feeling better but still requiring oxygen with ambulation.  He will be discharged home in stable condition on prednisone, Lasix daily (instead of as needed) and 4 L of oxygen.    Discharge Exam:    General Appearance: alert and oriented to person, place and time and in no acute distress  Skin: warm and dry  Head: normocephalic and atraumatic  Eyes: pupils equal, round, and reactive to light, extraocular eye

## 2025-02-13 NOTE — CARE COORDINATION
2/13/2025  Social Work Discharge Planning:No longer on IV ATB. Pt is now on 4l o2 here, will need home o2 and uses none at home.[facility]. Pt is a retired [employer] and is independent at home with a ww and cane. Spouse will transport Pt home at discharge.Electronically signed by DIMPLE Sandoval on 2/13/2025 at 9:37 AM    2/13/2025  Social Work Discharge Planning:DAMIEN [facility] that [facility] will not be delivered until tomorrow. Electronically signed by DIMPEL Sandoval on 2/13/2025 at 1:47 PM

## 2025-02-13 NOTE — DISCHARGE INSTRUCTIONS
Take furosemide (Lasix) 40 mg daily for the next 5 days then as needed after that    Use albuterol inhaler as needed  Cough medication (Robitussin) as needed    Start taking prednisone 40 mg daily for 5 days    Use incentive spirometry as instructed every 1-2 hours

## 2025-02-13 NOTE — PLAN OF CARE
Problem: Chronic Conditions and Co-morbidities  Goal: Patient's chronic conditions and co-morbidity symptoms are monitored and maintained or improved  2/12/2025 2246 by Jacque Alicia RN  Outcome: Progressing  Flowsheets (Taken 2/12/2025 2039)  Care Plan - Patient's Chronic Conditions and Co-Morbidity Symptoms are Monitored and Maintained or Improved: Monitor and assess patient's chronic conditions and comorbid symptoms for stability, deterioration, or improvement  2/12/2025 1058 by Shana Erazo RN  Outcome: Progressing  Flowsheets (Taken 2/12/2025 0917)  Care Plan - Patient's Chronic Conditions and Co-Morbidity Symptoms are Monitored and Maintained or Improved: Monitor and assess patient's chronic conditions and comorbid symptoms for stability, deterioration, or improvement     Problem: Discharge Planning  Goal: Discharge to home or other facility with appropriate resources  2/12/2025 2246 by Jacque Alicia RN  Outcome: Progressing  2/12/2025 1058 by Shana Erazo RN  Outcome: Progressing  Flowsheets (Taken 2/12/2025 0917)  Discharge to home or other facility with appropriate resources: Refer to discharge planning if patient needs post-hospital services based on physician order or complex needs related to functional status, cognitive ability or social support system     Problem: Safety - Adult  Goal: Free from fall injury  2/12/2025 2246 by Jacque Alicia RN  Outcome: Progressing  2/12/2025 1058 by hSana Erazo RN  Outcome: Progressing     Problem: Pain  Goal: Verbalizes/displays adequate comfort level or baseline comfort level  2/12/2025 2246 by Jacque Alicia RN  Outcome: Progressing  2/12/2025 1058 by Shana Erazo RN  Outcome: Progressing  Flowsheets (Taken 2/12/2025 0900)  Verbalizes/displays adequate comfort level or baseline comfort level: Encourage patient to monitor pain and request assistance     Problem: ABCDS Injury Assessment  Goal: Absence of physical injury  2/12/2025 2246 by

## 2025-02-13 NOTE — PROGRESS NOTES
Pulse ox at rest on room 88%  Pulse ox 4 liters at rest 92%  Pulse ox ambulating on 4 liters 91%  Recovery 4 liters 92%.

## 2025-02-13 NOTE — PROGRESS NOTES
CLINICAL PHARMACY NOTE: MEDS TO BEDS    Total # of Prescriptions Filled: 4   The following medications were delivered to the patient:  Albuterol hfa  Prednisone 20  Leigh tussin dm  Furosemide 40    Additional Documentation:

## 2025-02-13 NOTE — PLAN OF CARE
Problem: Chronic Conditions and Co-morbidities  Goal: Patient's chronic conditions and co-morbidity symptoms are monitored and maintained or improved  2/13/2025 1738 by Krys Garcia RN  Outcome: Adequate for Discharge  2/13/2025 1738 by Krys Garcia RN  Outcome: Progressing  Flowsheets (Taken 2/13/2025 0900)  Care Plan - Patient's Chronic Conditions and Co-Morbidity Symptoms are Monitored and Maintained or Improved: Monitor and assess patient's chronic conditions and comorbid symptoms for stability, deterioration, or improvement     Problem: Discharge Planning  Goal: Discharge to home or other facility with appropriate resources  Outcome: Adequate for Discharge  Flowsheets (Taken 2/13/2025 0900)  Discharge to home or other facility with appropriate resources: Identify barriers to discharge with patient and caregiver     Problem: Safety - Adult  Goal: Free from fall injury  2/13/2025 1738 by Krys Garcia RN  Outcome: Adequate for Discharge  2/13/2025 1738 by Krys Garcia RN  Outcome: Progressing     Problem: Pain  Goal: Verbalizes/displays adequate comfort level or baseline comfort level  2/13/2025 1738 by Krys Garcia RN  Outcome: Adequate for Discharge  2/13/2025 1738 by Krys Garcia RN  Outcome: Progressing     Problem: ABCDS Injury Assessment  Goal: Absence of physical injury  Outcome: Adequate for Discharge

## 2025-02-15 LAB
MICROORGANISM SPEC CULT: NORMAL
SERVICE CMNT-IMP: NORMAL
SPECIMEN DESCRIPTION: NORMAL

## 2025-02-15 NOTE — ED NOTES
2/15/25 454 pm Dr Nascimento received call from Microbiology Lab at [facility] due to + blood culture 1/4 gram + rods. Per Dr Nascimento, call patient to go to ED to be evaluated. Called patient with above and patient said he just got out of the hospital and is exhausted. States he will call his doctor on Monday; does not want to go to ED tonight since he is exhausted.

## 2025-02-15 NOTE — ED NOTES
Received from  regarding positive blood cultures.  Per , 1 out of 4 bottles with gram positive rods.  Results given to nurse to call patient to instruct patient to return to ED for further evaluation.     Gayathri Nascimento MD  02/15/25 3925       Gayathri Nascimento MD  02/15/25 4047

## 2025-02-16 LAB
MICROORGANISM SPEC CULT: ABNORMAL
MICROORGANISM/AGENT SPEC: ABNORMAL
SERVICE CMNT-IMP: ABNORMAL
SPECIMEN DESCRIPTION: ABNORMAL

## 2025-02-17 ENCOUNTER — HOSPITAL ENCOUNTER (OUTPATIENT)
Dept: CT IMAGING | Age: 80
Discharge: HOME | End: 2025-02-19
Payer: OTHER GOVERNMENT

## 2025-02-17 DIAGNOSIS — Q61.8 OTHER CYSTIC KIDNEY DISEASES: ICD-10-CM

## 2025-02-17 PROCEDURE — 6360000004 HC RX CONTRAST MEDICATION: Performed by: RADIOLOGY

## 2025-02-17 PROCEDURE — 74170 CT ABD WO CNTRST FLWD CNTRST: CPT

## 2025-02-17 RX ORDER — IOPAMIDOL 755 MG/ML
120 INJECTION, SOLUTION INTRAVASCULAR
Status: COMPLETED | OUTPATIENT
Start: 2025-02-17 | End: 2025-02-17

## 2025-02-17 RX ADMIN — IOPAMIDOL 120 ML: 755 INJECTION, SOLUTION INTRAVENOUS at 12:47

## 2025-02-17 NOTE — PROGRESS NOTES
[facility]  Outpatient Progress Note  Josef Charlton  1945  Date of Service: 2/18/2025  PCP: Chary Ordonez MD  Electrophysiologist: Ricky Elizabeth MD        Subjective: Josef Charlton is seen for follow-up and management of persistent atrial fibrillation on Tikosyn. He was last seen in the office with LIZABETH Mckinney on 8/16/24. He has significant past medical history of persistent atrial fibrillation, HTN, HLD, DM2, obesity, lymphocytic lymphoma, COPD, BPH and possible sleep apnea.  In November 2022 he presented to the emergency room with WALLACE and found to be in AF with CVR. He was seen by Dr. Murillo and underwent successful CAM guided cardioversion on 11/23/22. He was discharged home on Eliquis, Atenolol was changed to Metoprolol succinate 75 mg twice a day and discharged with a14 day cardiac monitor that showed a 80% AF burden with the longest duration being 7 days and 23 hours, 1 VT episode lasting 4 beats. Stress test on 2/27/23 was unremarkable. He was seen for follow up in Cardiology clinic on 1/12/23 and was noted to be in AF with CVR. He was noted to have recurrent hematuria and subsequently underwent cystoscopy retrograde pyelograms bilateral  right ureteroscopy laser lithotripsy, stent insertion and stone extraction on 7/6/23.  He was put back on Eliquis after the procedure and tolerated this well. He followed up with electrophysiology and was started on Tikosyn at the end of May 2024.  He was discharged in sinus rhythm on 500 mcg of Tikosyn every 12 hours. He was admitted to the hospital on 2/11/25 due to RSV infection.  He presents today for follow-up.The patient reports feeling overall well from a rhythm POV. The patient denies any chest pain, dyspnea, palpitations, dizziness, syncope, orthopnea or paroxysmal nocturnal dyspnea. He presents today in ectopic atrial rhythm with stable QTc.     Patient Active Problem List

## 2025-02-18 ENCOUNTER — OFFICE VISIT (OUTPATIENT)
Dept: NON INVASIVE DIAGNOSTICS | Age: 80
End: 2025-02-18
Payer: OTHER GOVERNMENT

## 2025-02-18 VITALS
RESPIRATION RATE: 16 BRPM | OXYGEN SATURATION: 94 % | TEMPERATURE: 96.8 F | DIASTOLIC BLOOD PRESSURE: 68 MMHG | WEIGHT: 300 LBS | HEART RATE: 83 BPM | BODY MASS INDEX: 45.47 KG/M2 | HEIGHT: 68 IN | SYSTOLIC BLOOD PRESSURE: 126 MMHG

## 2025-02-18 DIAGNOSIS — I48.91 ATRIAL FIBRILLATION, UNSPECIFIED TYPE (HCC): Primary | ICD-10-CM

## 2025-02-18 PROCEDURE — 3074F SYST BP LT 130 MM HG: CPT | Performed by: INTERNAL MEDICINE

## 2025-02-18 PROCEDURE — 3078F DIAST BP <80 MM HG: CPT | Performed by: INTERNAL MEDICINE

## 2025-02-18 PROCEDURE — 1123F ACP DISCUSS/DSCN MKR DOCD: CPT | Performed by: INTERNAL MEDICINE

## 2025-02-18 PROCEDURE — 1159F MED LIST DOCD IN RCRD: CPT | Performed by: INTERNAL MEDICINE

## 2025-02-18 PROCEDURE — 99215 OFFICE O/P EST HI 40 MIN: CPT | Performed by: INTERNAL MEDICINE

## 2025-02-18 PROCEDURE — 93000 ELECTROCARDIOGRAM COMPLETE: CPT | Performed by: INTERNAL MEDICINE

## 2025-02-18 RX ORDER — DOFETILIDE 0.5 MG/1
500 CAPSULE ORAL EVERY 12 HOURS SCHEDULED
Qty: 60 CAPSULE | Refills: 2 | Status: SHIPPED | OUTPATIENT
Start: 2025-02-18

## 2025-02-18 RX ORDER — LIDOCAINE 50 MG/G
PATCH TOPICAL
COMMUNITY
Start: 2025-02-02

## 2025-02-21 NOTE — PROGRESS NOTES
Physician Progress Note      PATIENT:               ANDREZ GARCIA  [facility] #:                  [id number]  :                       1945  ADMIT DATE:       2025 3:35 AM  DISCH DATE:        2025 6:51 PM  RESPONDING  PROVIDER #:        David W Reyes MD          QUERY TEXT:    Patient admitted with RSV pneumonia. Patient was a transfer from [facility] ER. Noted   documentation of acute hypoxic respiratory failure beginning in H&P. In order   to support the diagnosis of acute hypoxic respiratory failure, please include   additional clinical indicators in your documentation.  Or please document if   the diagnosis of acute respiratory failure has been ruled out after further   study.    The medical record reflects the following:  Risk Factors: RSV pneumonia, COPD.  Clinical Indicators: H&P reports patient having tight wheezing and SPO2 of 88%   in [place] [facility]. Patient received breathing treatments at this ED which was   reported to improve the patient. No documentation of distress or work of   breathing and no ABGs performed.  Treatment: Oxygen 3-4L NC, Duonebs q 4 hr. while awake.  Options provided:  -- Acute Hypoxic Respiratory Failure as evidenced by, Please document   evidence.  -- Acute Respiratory Failure ruled out after study  -- Other - I will add my own diagnosis  -- Disagree - Not applicable / Not valid  -- Disagree - Clinically unable to determine / Unknown  -- Refer to Clinical Documentation Reviewer    PROVIDER RESPONSE TEXT:    This patient is in acute hypoxic respiratory failure as evidenced by tight   wheezing and SPO2 of 88% in [place] [facility].    Query created by: Keyla Mittal on 2025 10:56 AM      Electronically signed by:  David W Reyes MD 2025 2:24 PM

## 2025-03-11 ENCOUNTER — OFFICE VISIT (OUTPATIENT)
Dept: PULMONOLOGY | Age: 80
End: 2025-03-11
Payer: MEDICARE

## 2025-03-11 VITALS
BODY MASS INDEX: 42.95 KG/M2 | SYSTOLIC BLOOD PRESSURE: 128 MMHG | HEIGHT: 69 IN | TEMPERATURE: 98.4 F | OXYGEN SATURATION: 98 % | HEART RATE: 94 BPM | DIASTOLIC BLOOD PRESSURE: 65 MMHG | WEIGHT: 290 LBS

## 2025-03-11 DIAGNOSIS — J96.11 CHRONIC HYPOXEMIC RESPIRATORY FAILURE (HCC): ICD-10-CM

## 2025-03-11 DIAGNOSIS — Z87.01 HISTORY OF PNEUMONIA: ICD-10-CM

## 2025-03-11 DIAGNOSIS — G47.33 OSA (OBSTRUCTIVE SLEEP APNEA): ICD-10-CM

## 2025-03-11 DIAGNOSIS — J44.9 CHRONIC OBSTRUCTIVE PULMONARY DISEASE, UNSPECIFIED COPD TYPE (HCC): Primary | ICD-10-CM

## 2025-03-11 DIAGNOSIS — J98.6 DIAPHRAGMATIC PARALYSIS: ICD-10-CM

## 2025-03-11 DIAGNOSIS — E66.01 MORBID OBESITY: ICD-10-CM

## 2025-03-11 PROCEDURE — 3023F SPIROM DOC REV: CPT | Performed by: INTERNAL MEDICINE

## 2025-03-11 PROCEDURE — 1111F DSCHRG MED/CURRENT MED MERGE: CPT | Performed by: INTERNAL MEDICINE

## 2025-03-11 PROCEDURE — 1159F MED LIST DOCD IN RCRD: CPT | Performed by: INTERNAL MEDICINE

## 2025-03-11 PROCEDURE — 1036F TOBACCO NON-USER: CPT | Performed by: INTERNAL MEDICINE

## 2025-03-11 PROCEDURE — 3078F DIAST BP <80 MM HG: CPT | Performed by: INTERNAL MEDICINE

## 2025-03-11 PROCEDURE — G8427 DOCREV CUR MEDS BY ELIG CLIN: HCPCS | Performed by: INTERNAL MEDICINE

## 2025-03-11 PROCEDURE — 99204 OFFICE O/P NEW MOD 45 MIN: CPT | Performed by: INTERNAL MEDICINE

## 2025-03-11 PROCEDURE — 1123F ACP DISCUSS/DSCN MKR DOCD: CPT | Performed by: INTERNAL MEDICINE

## 2025-03-11 PROCEDURE — G8417 CALC BMI ABV UP PARAM F/U: HCPCS | Performed by: INTERNAL MEDICINE

## 2025-03-11 PROCEDURE — 3074F SYST BP LT 130 MM HG: CPT | Performed by: INTERNAL MEDICINE

## 2025-03-11 RX ORDER — FLUTICASONE FUROATE, UMECLIDINIUM BROMIDE AND VILANTEROL TRIFENATATE 100; 62.5; 25 UG/1; UG/1; UG/1
1 POWDER RESPIRATORY (INHALATION) DAILY
Qty: 2 EACH | Refills: 5 | Status: SHIPPED
Start: 2025-03-11 | End: 2025-03-12 | Stop reason: CLARIF

## 2025-03-11 ASSESSMENT — ENCOUNTER SYMPTOMS
ALLERGIC/IMMUNOLOGIC NEGATIVE: 1
GASTROINTESTINAL NEGATIVE: 1
COUGH: 1
EYES NEGATIVE: 1
SHORTNESS OF BREATH: 1

## 2025-03-11 NOTE — PROGRESS NOTES
Home oxygenProgress Note    Josef Charlton  1945    CC:Hypoxemia       HPI : 79 year old male was recently hospitalized and treated for respiratory failure and RSV infection. He has been sob and using home oxygen 4 Lit., and has cough. Former smoker, smoked 2 packs a day for 57 years, 114 pack years of smoking and quit smoking in 2020. He uses albuterol inhaler. His chest CT was reported as possible pneumonia and small pleural effusion. He had neck surgery, cervical fusion many years ago. He used to snore, EDS and naps, has lost some weight in last five years.     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    Distal phalynx    Anemia     Atrial fibrillation (HCC)     Depression     PTSD    Diabetes mellitus (HCC)     Hyperlipidemia     Hypertension     Obesity     RSV (acute bronchiolitis due to respiratory syncytial virus)       Past Surgical History:   Procedure Laterality Date    COLONOSCOPY      COLONOSCOPY N/A 9/27/2024    COLONOSCOPY POLYPECTOMY SNARE/BIOPSY performed by Alvino Camejo MD at [facility] ENDOSCOPY    COLONOSCOPY W/ POLYPECTOMY  09/27/2024    polyps; diverticula--speedy    HERNIA REPAIR      LITHOTRIPSY Bilateral 07/06/2023    CYSTOSCOPY BILATERAL RETROGRADE PYELOGRAM LASER LITHOTRIPSY, RIGHT URETEROSCOPY, RIGHT STONE EXTRACTION, RIGHT STENT INSERTION, CASTILLO INSERTION performed by Emmanuel Coleman MD at [facility] OR    NECK SURGERY      cervical fusion    PATELLA SURGERY      SHOULDER SURGERY      TONSILLECTOMY      UPPER GASTROINTESTINAL ENDOSCOPY        Family History   Problem Relation Age of Onset    Diabetes Mother     Heart Disease Mother     Heart Attack Sister       Social History     Socioeconomic History    Marital status:      Spouse name: None    Number of children: None    Years of education: None    Highest education level: None   Tobacco Use    Smoking status: Former     Current packs/day: 0.00     Average

## 2025-03-12 ENCOUNTER — TELEPHONE (OUTPATIENT)
Dept: PULMONOLOGY | Age: 80
End: 2025-03-12

## 2025-03-12 DIAGNOSIS — J44.9 CHRONIC OBSTRUCTIVE PULMONARY DISEASE, UNSPECIFIED COPD TYPE (HCC): Primary | ICD-10-CM

## 2025-03-12 RX ORDER — BUDESONIDE, GLYCOPYRROLATE, AND FORMOTEROL FUMARATE 160; 9; 4.8 UG/1; UG/1; UG/1
2 AEROSOL, METERED RESPIRATORY (INHALATION) 2 TIMES DAILY
Qty: 3 EACH | Refills: 3 | Status: SHIPPED | OUTPATIENT
Start: 2025-03-12

## 2025-03-12 NOTE — TELEPHONE ENCOUNTER
[facility] for Trelegy to Breztri inhaler. Med profile updated. They will dispense Breztri inhaler. [facility]

## 2025-03-18 ENCOUNTER — TELEPHONE (OUTPATIENT)
Dept: PULMONOLOGY | Age: 80
End: 2025-03-18

## 2025-03-18 NOTE — TELEPHONE ENCOUNTER
Mailed a letter to patient informing him that his Sniff test is scheduled for 4-9-25 at 8:00 am at the [facility]. He must arrive by 7:30 am. There is no prep for this test.    His PFT is scheduled for 4-9-25 at 8:30 am directly after his Sniff test. He is to have no caffeine for 24 hours prior to test and no resp meds for at least 4 hours prior to test

## 2025-03-18 NOTE — TELEPHONE ENCOUNTER
Mailed a letter to patient informing him that his CT Chest is scheduled for 6-11-25 at 11:00 am at [facility]. He must arrive by 10:30 am. There is no prep for this test

## 2025-03-20 ENCOUNTER — OFFICE VISIT (OUTPATIENT)
Dept: ONCOLOGY | Age: 80
End: 2025-03-20
Payer: OTHER GOVERNMENT

## 2025-03-20 ENCOUNTER — HOSPITAL ENCOUNTER (OUTPATIENT)
Dept: INFUSION THERAPY | Age: 80
Discharge: HOME | End: 2025-03-20
Payer: OTHER GOVERNMENT

## 2025-03-20 VITALS
SYSTOLIC BLOOD PRESSURE: 150 MMHG | DIASTOLIC BLOOD PRESSURE: 78 MMHG | BODY MASS INDEX: 42.3 KG/M2 | HEART RATE: 81 BPM | OXYGEN SATURATION: 93 % | WEIGHT: 285.6 LBS | HEIGHT: 69 IN | TEMPERATURE: 97.5 F

## 2025-03-20 DIAGNOSIS — C83.00 MALIGNANT LYMPHOPLASMACYTIC LYMPHOMA (HCC): Primary | ICD-10-CM

## 2025-03-20 DIAGNOSIS — C83.00 MALIGNANT LYMPHOPLASMACYTIC LYMPHOMA (HCC): ICD-10-CM

## 2025-03-20 LAB
ALBUMIN SERPL-MCNC: 3.8 G/DL (ref 3.5–5.2)
ALP SERPL-CCNC: 65 U/L (ref 40–129)
ALT SERPL-CCNC: 14 U/L (ref 0–40)
ANION GAP SERPL CALCULATED.3IONS-SCNC: 17 MMOL/L (ref 7–16)
AST SERPL-CCNC: 11 U/L (ref 0–39)
BASOPHILS # BLD: 0.06 K/UL (ref 0–0.2)
BASOPHILS NFR BLD: 1 % (ref 0–2)
BILIRUB SERPL-MCNC: 0.7 MG/DL (ref 0–1.2)
BUN SERPL-MCNC: 13 MG/DL (ref 6–23)
CALCIUM SERPL-MCNC: 10 MG/DL (ref 8.6–10.2)
CHLORIDE SERPL-SCNC: 102 MMOL/L (ref 98–107)
CO2 SERPL-SCNC: 22 MMOL/L (ref 22–29)
CREAT SERPL-MCNC: 0.8 MG/DL (ref 0.7–1.2)
EOSINOPHIL # BLD: 0.17 K/UL (ref 0.05–0.5)
EOSINOPHILS RELATIVE PERCENT: 2 % (ref 0–6)
ERYTHROCYTE [DISTWIDTH] IN BLOOD BY AUTOMATED COUNT: 14.7 % (ref 11.5–15)
GFR, ESTIMATED: 90 ML/MIN/1.73M2
GLUCOSE SERPL-MCNC: 162 MG/DL (ref 74–99)
HCT VFR BLD AUTO: 41.5 % (ref 37–54)
HGB BLD-MCNC: 13.8 G/DL (ref 12.5–16.5)
IMM GRANULOCYTES # BLD AUTO: 0.1 K/UL (ref 0–0.58)
IMM GRANULOCYTES NFR BLD: 1 % (ref 0–5)
LDH SERPL-CCNC: 149 U/L (ref 135–225)
LYMPHOCYTES NFR BLD: 2.08 K/UL (ref 1.5–4)
LYMPHOCYTES RELATIVE PERCENT: 30 % (ref 20–42)
MAGNESIUM SERPL-MCNC: 1.8 MG/DL (ref 1.6–2.6)
MCH RBC QN AUTO: 28.8 PG (ref 26–35)
MCHC RBC AUTO-ENTMCNC: 33.3 G/DL (ref 32–34.5)
MCV RBC AUTO: 86.5 FL (ref 80–99.9)
MONOCYTES NFR BLD: 0.59 K/UL (ref 0.1–0.95)
MONOCYTES NFR BLD: 8 % (ref 2–12)
NEUTROPHILS NFR BLD: 57 % (ref 43–80)
NEUTS SEG NFR BLD: 3.99 K/UL (ref 1.8–7.3)
PLATELET # BLD AUTO: 203 K/UL (ref 130–450)
PMV BLD AUTO: 10.1 FL (ref 7–12)
POTASSIUM SERPL-SCNC: 4.1 MMOL/L (ref 3.5–5)
PROT SERPL-MCNC: 6.1 G/DL (ref 6.4–8.3)
RBC # BLD AUTO: 4.8 M/UL (ref 3.8–5.8)
SODIUM SERPL-SCNC: 141 MMOL/L (ref 132–146)
WBC OTHER # BLD: 7 K/UL (ref 4.5–11.5)

## 2025-03-20 PROCEDURE — 83735 ASSAY OF MAGNESIUM: CPT

## 2025-03-20 PROCEDURE — 36415 COLL VENOUS BLD VENIPUNCTURE: CPT

## 2025-03-20 PROCEDURE — 83615 LACTATE (LD) (LDH) ENZYME: CPT

## 2025-03-20 PROCEDURE — 3078F DIAST BP <80 MM HG: CPT | Performed by: STUDENT IN AN ORGANIZED HEALTH CARE EDUCATION/TRAINING PROGRAM

## 2025-03-20 PROCEDURE — 82784 ASSAY IGA/IGD/IGG/IGM EACH: CPT

## 2025-03-20 PROCEDURE — 99213 OFFICE O/P EST LOW 20 MIN: CPT | Performed by: STUDENT IN AN ORGANIZED HEALTH CARE EDUCATION/TRAINING PROGRAM

## 2025-03-20 PROCEDURE — 80053 COMPREHEN METABOLIC PANEL: CPT

## 2025-03-20 PROCEDURE — 84165 PROTEIN E-PHORESIS SERUM: CPT

## 2025-03-20 PROCEDURE — 86334 IMMUNOFIX E-PHORESIS SERUM: CPT

## 2025-03-20 PROCEDURE — 83521 IG LIGHT CHAINS FREE EACH: CPT

## 2025-03-20 PROCEDURE — 1123F ACP DISCUSS/DSCN MKR DOCD: CPT | Performed by: STUDENT IN AN ORGANIZED HEALTH CARE EDUCATION/TRAINING PROGRAM

## 2025-03-20 PROCEDURE — 85025 COMPLETE CBC W/AUTO DIFF WBC: CPT

## 2025-03-20 PROCEDURE — 84155 ASSAY OF PROTEIN SERUM: CPT

## 2025-03-20 PROCEDURE — 3077F SYST BP >= 140 MM HG: CPT | Performed by: STUDENT IN AN ORGANIZED HEALTH CARE EDUCATION/TRAINING PROGRAM

## 2025-03-21 LAB
ALBUMIN SERPL-MCNC: 3.2 G/DL (ref 3.5–4.7)
ALPHA1 GLOB SERPL ELPH-MCNC: 0.2 G/DL (ref 0.2–0.4)
ALPHA2 GLOB SERPL ELPH-MCNC: 0.8 G/DL (ref 0.5–1)
B-GLOBULIN SERPL ELPH-MCNC: 0.8 G/DL (ref 0.8–1.3)
FREE KAPPA/LAMBDA RATIO: ABNORMAL (ref 0.22–1.74)
GAMMA GLOB SERPL ELPH-MCNC: 0.7 G/DL (ref 0.7–1.6)
IGA SERPL-MCNC: 14 MG/DL (ref 70–400)
IGG SERPL-MCNC: 191 MG/DL (ref 700–1600)
IGM SERPL-MCNC: 561 MG/DL (ref 40–230)
INTERPRETATION SERPL IFE-IMP: NORMAL
KAPPA LC FREE SER-MCNC: 8.9 MG/L
LAMBDA LC FREE SERPL-MCNC: <3.5 MG/L (ref 4.2–27.7)
M PROTEIN SERPL ELPH-MCNC: 0.4 G/DL
PATH REV: NORMAL
PATHOLOGIST: ABNORMAL
PROT PATTERN SERPL ELPH-IMP: ABNORMAL
PROT SERPL-MCNC: 5.8 G/DL (ref 6.4–8.3)

## 2025-03-22 NOTE — PROGRESS NOTES
Patient provided with discharge instructions, received printed AVS.  All questions answered.  Patient understands follow up plan of care.     
 Soft, non tender, non distended.  No ascites.    EXTREMITIES: 1+ pitting edema  NEUROLOGIC:  Alert, awake, oriented to time, place and person. No focal deficits.  LYMPHATICS: No cervical lymphadenopathy.  SKIN : No Rash. No Bruising.    ECOG PS 1        Lab Results   Component Value Date    WBC 7.0 03/20/2025    HGB 13.8 03/20/2025    HCT 41.5 03/20/2025    MCV 86.5 03/20/2025     03/20/2025     Lab Results   Component Value Date     03/20/2025    K 4.1 03/20/2025     03/20/2025    CO2 22 03/20/2025    BUN 13 03/20/2025    CREATININE 0.8 03/20/2025    GLUCOSE 162 (H) 03/20/2025    CALCIUM 10.0 03/20/2025    BILITOT 0.7 03/20/2025    ALKPHOS 65 03/20/2025    AST 11 03/20/2025    ALT 14 03/20/2025    LABGLOM 90 03/20/2025    GFRAA >60 10/13/2022     Impression/Plan:  79 y.o. 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   UIFE: IgM protein.     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   -Extensive involvement by atypitcal CD5-/CD10-B-cell lymphoproliferative disorder, comprising over 70% of marrow cellularity  -Mildly hypercellular marrow for age (30-40%) with trilineage pan hypoplasia  -Normocytic anemia.  -IHC staining highlighted extensive CD20+ B cell infiltrate with admixed + plasma cells     Started on Ibrutinib 420 mg/day 12/2020 with good response so far    On 01/06/2021:  SPEP:  Monoclonal protein 1.72 g/dL  Persistent double monoclonal bands in the beta/gamma and gamma globuin regions.   Bands previously identified as IgM kappa and IgG kappa (8/12/2020).   IgM kappa decreased by 1.22 g/dL and IgG kappa minimally changed since last exam on 10/14/2020.    Free kappa light chains: 413.4    (3.3-19.4)   Free lambda light chains: 2.8      (5.7-26.3)   K/L ratio:

## 2025-04-09 ENCOUNTER — HOSPITAL ENCOUNTER (OUTPATIENT)
Dept: GENERAL RADIOLOGY | Age: 80
Discharge: HOME | End: 2025-04-11
Attending: INTERNAL MEDICINE
Payer: OTHER GOVERNMENT

## 2025-04-09 ENCOUNTER — HOSPITAL ENCOUNTER (OUTPATIENT)
Dept: PULMONOLOGY | Age: 80
Discharge: HOME | End: 2025-04-09
Attending: INTERNAL MEDICINE
Payer: OTHER GOVERNMENT

## 2025-04-09 DIAGNOSIS — J98.6 DIAPHRAGMATIC PARALYSIS: ICD-10-CM

## 2025-04-09 DIAGNOSIS — J96.11 CHRONIC HYPOXEMIC RESPIRATORY FAILURE: ICD-10-CM

## 2025-04-09 DIAGNOSIS — J44.9 CHRONIC OBSTRUCTIVE PULMONARY DISEASE, UNSPECIFIED COPD TYPE (HCC): ICD-10-CM

## 2025-04-09 LAB
B.E.: 0.2 MMOL/L (ref -3–3)
COHB: 0.7 % (ref 0–1.5)
CRITICAL: NORMAL
DATE ANALYZED: NORMAL
DATE OF COLLECTION: NORMAL
HCO3: 24.9 MMOL/L (ref 22–26)
HHB: 3.4 % (ref 0–5)
LAB: NORMAL
Lab: 907
METHB: 0.3 % (ref 0–1.5)
MODE: NORMAL
O2 SATURATION: 96.6 % (ref 92–98.5)
O2HB: 95.6 % (ref 94–97)
OPERATOR ID: 420
PATIENT TEMP: 37 C
PCO2: 40.8 MMHG (ref 35–45)
PH BLOOD GAS: 7.4 (ref 7.35–7.45)
PO2: 87 MMHG (ref 75–100)
SOURCE, BLOOD GAS: NORMAL
THB: 13.9 G/DL (ref 11.5–16.5)
TIME ANALYZED: 914

## 2025-04-09 PROCEDURE — 36600 WITHDRAWAL OF ARTERIAL BLOOD: CPT

## 2025-04-09 PROCEDURE — 94729 DIFFUSING CAPACITY: CPT

## 2025-04-09 PROCEDURE — 94726 PLETHYSMOGRAPHY LUNG VOLUMES: CPT

## 2025-04-09 PROCEDURE — 76000 FLUOROSCOPY <1 HR PHYS/QHP: CPT

## 2025-04-09 PROCEDURE — 82805 BLOOD GASES W/O2 SATURATION: CPT

## 2025-04-09 PROCEDURE — 94060 EVALUATION OF WHEEZING: CPT

## 2025-04-09 NOTE — PROCEDURES
[facility]              [address] [place] [address]                           PULMONARY FUNCTION      PATIENT NAME: ANDREZ GARCIA               : 1945  MED REC NO: [id number]                        ROOM:   ACCOUNT NO: [id number]                       ADMIT DATE: 2025  PROVIDER: Stew De La O MD      DATE OF PROCEDURE: 2025    SURGEON:  Stew De La O MD    REFERRING PHYSICIAN:  STEW DE LA O    The spirometry shows normal FVC and minimal reduction in FEV1.  The FEV1/FVC is moderately decreased.  The maximum voluntary ventilation is 58% of the predicted value.    According to Z-score criteria, FVC and FEV1 are under the area of curve, within standard deviation of -1.64, whereas the FEV1/FVC is outside the area of curve, more than standard deviation of -1.64.  After bronchodilator therapy, there is no improvement seen in the spirometry.    The lung volume shows increased TLC and RV.  ERV is moderately decreased.  The diffusion capacity is minimally decreased.    IMPRESSION:  The above study shows mild obstructive ventilatory impairment with air trapping, and there is no improvement after bronchodilator therapy.    GOLD criteria stage II COPD.          STEW DE LA O MD      D:  2025 10:42:07     T:  2025 10:51:34     ANGEL/FLETCHER  Job #:  [id number]     Doc#:  [id number]

## 2025-04-22 ENCOUNTER — OFFICE VISIT (OUTPATIENT)
Dept: PULMONOLOGY | Age: 80
End: 2025-04-22
Payer: OTHER GOVERNMENT

## 2025-04-22 VITALS
OXYGEN SATURATION: 96 % | SYSTOLIC BLOOD PRESSURE: 138 MMHG | HEART RATE: 80 BPM | RESPIRATION RATE: 18 BRPM | DIASTOLIC BLOOD PRESSURE: 63 MMHG | TEMPERATURE: 97.5 F

## 2025-04-22 DIAGNOSIS — J96.11 CHRONIC HYPOXEMIC RESPIRATORY FAILURE (HCC): ICD-10-CM

## 2025-04-22 DIAGNOSIS — G47.33 OSA (OBSTRUCTIVE SLEEP APNEA): ICD-10-CM

## 2025-04-22 DIAGNOSIS — J44.9 CHRONIC OBSTRUCTIVE PULMONARY DISEASE, UNSPECIFIED COPD TYPE (HCC): Primary | ICD-10-CM

## 2025-04-22 PROCEDURE — 1123F ACP DISCUSS/DSCN MKR DOCD: CPT | Performed by: INTERNAL MEDICINE

## 2025-04-22 PROCEDURE — 3075F SYST BP GE 130 - 139MM HG: CPT | Performed by: INTERNAL MEDICINE

## 2025-04-22 PROCEDURE — 99213 OFFICE O/P EST LOW 20 MIN: CPT | Performed by: INTERNAL MEDICINE

## 2025-04-22 PROCEDURE — 3078F DIAST BP <80 MM HG: CPT | Performed by: INTERNAL MEDICINE

## 2025-04-22 PROCEDURE — 99214 OFFICE O/P EST MOD 30 MIN: CPT | Performed by: INTERNAL MEDICINE

## 2025-04-22 RX ORDER — ALBUTEROL SULFATE 90 UG/1
2 INHALANT RESPIRATORY (INHALATION) 4 TIMES DAILY PRN
Qty: 1 EACH | Refills: 5 | Status: SHIPPED | OUTPATIENT
Start: 2025-04-22

## 2025-04-22 RX ORDER — BUDESONIDE, GLYCOPYRROLATE, AND FORMOTEROL FUMARATE 160; 9; 4.8 UG/1; UG/1; UG/1
2 AEROSOL, METERED RESPIRATORY (INHALATION) 2 TIMES DAILY
Qty: 3 EACH | Refills: 3 | Status: SHIPPED | OUTPATIENT
Start: 2025-04-22

## 2025-04-22 NOTE — PROGRESS NOTES
6 week F/U not taking Breztri d/t side effects Pt to have sleep study completed and F/U back after

## 2025-04-22 NOTE — PROGRESS NOTES
Progress Note    Josef Charlton  1945    CC:Follow-up (6 week F/U)       HPI :  79 year old male was recently hospitalized and treated for respiratory failure and RSV infection. He has been sob and using home oxygen 4 Lit., and has cough. Former smoker, smoked 2 packs a day for 57 years, 114 pack years of smoking and quit smoking in 2020. He uses albuterol inhaler. His chest CT was reported as possible pneumonia and small pleural effusion. He had neck surgery, cervical fusion many years ago. He used to snore, EDS and naps, has lost some weight in last five years. SNIFF test showed no diaphragmatic paralysis and PFT showed stage II COPD, on 6 minute walk test he had severe exercise limitation. He uses home oxygen 2 Lit., continuous. PSG with ETCO2 not done yet. ABG showed PaCO2 40.8.. Follow up chest CT in June 2025. He is sob, no cough or wheezing.        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    Distal phalynx    Anemia     Atrial fibrillation (HCC)     Depression     PTSD    Diabetes mellitus (HCC)     Hyperlipidemia     Hypertension     Obesity     RSV (acute bronchiolitis due to respiratory syncytial virus)       Past Surgical History:   Procedure Laterality Date    COLONOSCOPY      COLONOSCOPY N/A 9/27/2024    COLONOSCOPY POLYPECTOMY SNARE/BIOPSY performed by Alvino Camejo MD at [facility] ENDOSCOPY    COLONOSCOPY W/ POLYPECTOMY  09/27/2024    polyps; diverticula--speedy    HERNIA REPAIR      LITHOTRIPSY Bilateral 07/06/2023    CYSTOSCOPY BILATERAL RETROGRADE PYELOGRAM LASER LITHOTRIPSY, RIGHT URETEROSCOPY, RIGHT STONE EXTRACTION, RIGHT STENT INSERTION, CASTILLO INSERTION performed by Emmanuel Coleman MD at [facility] OR    NECK SURGERY      cervical fusion    PATELLA SURGERY      SHOULDER SURGERY      TONSILLECTOMY      UPPER GASTROINTESTINAL ENDOSCOPY        Family History   Problem Relation Age of Onset    Diabetes Mother     Heart Disease

## 2025-04-23 ENCOUNTER — TELEPHONE (OUTPATIENT)
Dept: PULMONOLOGY | Age: 80
End: 2025-04-23

## 2025-04-23 NOTE — TELEPHONE ENCOUNTER
Patient called in to notify sleep study will cost him money out of pocket $178 which he is unable to afford advised [facility].

## 2025-05-22 ENCOUNTER — HOSPITAL ENCOUNTER (OUTPATIENT)
Dept: INFUSION THERAPY | Age: 80
Discharge: HOME | End: 2025-05-22
Payer: OTHER GOVERNMENT

## 2025-05-22 ENCOUNTER — OFFICE VISIT (OUTPATIENT)
Age: 80
End: 2025-05-22
Payer: OTHER GOVERNMENT

## 2025-05-22 VITALS
SYSTOLIC BLOOD PRESSURE: 189 MMHG | HEART RATE: 85 BPM | BODY MASS INDEX: 43.02 KG/M2 | DIASTOLIC BLOOD PRESSURE: 76 MMHG | OXYGEN SATURATION: 94 % | RESPIRATION RATE: 18 BRPM | WEIGHT: 291.3 LBS | TEMPERATURE: 97.5 F

## 2025-05-22 DIAGNOSIS — K86.9 PANCREATIC LESION: ICD-10-CM

## 2025-05-22 DIAGNOSIS — C83.00 MALIGNANT LYMPHOPLASMACYTIC LYMPHOMA (HCC): ICD-10-CM

## 2025-05-22 DIAGNOSIS — C83.00 MALIGNANT LYMPHOPLASMACYTIC LYMPHOMA (HCC): Primary | ICD-10-CM

## 2025-05-22 LAB
ALBUMIN SERPL-MCNC: 3.7 G/DL (ref 3.5–5.2)
ALP SERPL-CCNC: 73 U/L (ref 40–129)
ALT SERPL-CCNC: 7 U/L (ref 0–50)
ANION GAP SERPL CALCULATED.3IONS-SCNC: 14 MMOL/L (ref 7–16)
AST SERPL-CCNC: 13 U/L (ref 0–50)
BASOPHILS # BLD: 0.03 K/UL (ref 0–0.2)
BASOPHILS NFR BLD: 1 % (ref 0–2)
BILIRUB SERPL-MCNC: 0.5 MG/DL (ref 0–1.2)
BUN SERPL-MCNC: 10 MG/DL (ref 8–23)
CALCIUM SERPL-MCNC: 9.2 MG/DL (ref 8.8–10.2)
CHLORIDE SERPL-SCNC: 105 MMOL/L (ref 98–107)
CO2 SERPL-SCNC: 22 MMOL/L (ref 22–29)
CREAT SERPL-MCNC: 0.8 MG/DL (ref 0.7–1.2)
EOSINOPHIL # BLD: 0.08 K/UL (ref 0.05–0.5)
EOSINOPHILS RELATIVE PERCENT: 1 % (ref 0–6)
ERYTHROCYTE [DISTWIDTH] IN BLOOD BY AUTOMATED COUNT: 14.4 % (ref 11.5–15)
GFR, ESTIMATED: >90 ML/MIN/1.73M2
GLUCOSE SERPL-MCNC: 246 MG/DL (ref 74–99)
HCT VFR BLD AUTO: 42.3 % (ref 37–54)
HGB BLD-MCNC: 13.8 G/DL (ref 12.5–16.5)
IMM GRANULOCYTES # BLD AUTO: 0.08 K/UL (ref 0–0.58)
IMM GRANULOCYTES NFR BLD: 1 % (ref 0–5)
LYMPHOCYTES NFR BLD: 1.76 K/UL (ref 1.5–4)
LYMPHOCYTES RELATIVE PERCENT: 29 % (ref 20–42)
MCH RBC QN AUTO: 29.3 PG (ref 26–35)
MCHC RBC AUTO-ENTMCNC: 32.6 G/DL (ref 32–34.5)
MCV RBC AUTO: 89.8 FL (ref 80–99.9)
MONOCYTES NFR BLD: 0.52 K/UL (ref 0.1–0.95)
MONOCYTES NFR BLD: 9 % (ref 2–12)
NEUTROPHILS NFR BLD: 59 % (ref 43–80)
NEUTS SEG NFR BLD: 3.6 K/UL (ref 1.8–7.3)
PLATELET # BLD AUTO: 176 K/UL (ref 130–450)
PMV BLD AUTO: 10.4 FL (ref 7–12)
POTASSIUM SERPL-SCNC: 4 MMOL/L (ref 3.5–5.1)
PROT SERPL-MCNC: 5.7 G/DL (ref 6.4–8.3)
RBC # BLD AUTO: 4.71 M/UL (ref 3.8–5.8)
SODIUM SERPL-SCNC: 141 MMOL/L (ref 136–145)
WBC OTHER # BLD: 6.1 K/UL (ref 4.5–11.5)

## 2025-05-22 PROCEDURE — 80053 COMPREHEN METABOLIC PANEL: CPT

## 2025-05-22 PROCEDURE — 3077F SYST BP >= 140 MM HG: CPT | Performed by: STUDENT IN AN ORGANIZED HEALTH CARE EDUCATION/TRAINING PROGRAM

## 2025-05-22 PROCEDURE — 99213 OFFICE O/P EST LOW 20 MIN: CPT | Performed by: STUDENT IN AN ORGANIZED HEALTH CARE EDUCATION/TRAINING PROGRAM

## 2025-05-22 PROCEDURE — 85025 COMPLETE CBC W/AUTO DIFF WBC: CPT

## 2025-05-22 PROCEDURE — 99212 OFFICE O/P EST SF 10 MIN: CPT | Performed by: STUDENT IN AN ORGANIZED HEALTH CARE EDUCATION/TRAINING PROGRAM

## 2025-05-22 PROCEDURE — 36415 COLL VENOUS BLD VENIPUNCTURE: CPT

## 2025-05-22 PROCEDURE — 1123F ACP DISCUSS/DSCN MKR DOCD: CPT | Performed by: STUDENT IN AN ORGANIZED HEALTH CARE EDUCATION/TRAINING PROGRAM

## 2025-05-22 PROCEDURE — 3078F DIAST BP <80 MM HG: CPT | Performed by: STUDENT IN AN ORGANIZED HEALTH CARE EDUCATION/TRAINING PROGRAM

## 2025-05-23 NOTE — PROGRESS NOTES
Patient provided with discharge instructions, received printed AVS.  All questions answered.  Patient understands follow up plan of care.     
IgG kappa (2020).   IgM kappa decreased by 1.22 g/dL and IgG kappa minimally changed since last exam on 10/14/2020.    Free kappa light chains: 413.4    (3.3-19.4)   Free lambda light chains: 2.8      (5.7-26.3)   K/L ratio:                          147.64 (0.26-1.65)    On 2021  SPEP:  Monoclonal protein: 1.03 (0-0) g/dL  Persistent double monoclonal bands in the beta/gamma and gamma globuin regions.   Bands previously identified as IgM kappa and IgG kappa (2020).   IgM kappa decreased by 0.69 g/dL and IgG kappa unchanged since 2021.     Free kappa light chains: 149.4  (3.3-19.4)   Free lambda light chains: 2.6    (5.7-26.3)   K/L ratio:                          57.46 (0.26-1.65)    Patient started on oral iron 2020 for LANDON and B12 deficiency but required 2 doses of Feraheme on 2021 and 2021 and again in 2021.  Had recurrent iron deficiency with plan for Feraheme q3 months (next one scheduled on 2021). He does IM b12 injections at home.     On 2021  Hb: 15.5 Hct 44.9  MCV 90.5    WBC 6.9  BUN 10  Creat 0.9  Ca 9.3  Albumin 3.6    Fe: 94 FeSat: 33% TIBIC: 288  Ferritin: 81    VitB12 370     Ig  (700-1700)   IgA: <8     ()   IgM: 1370 ()    On 2021:  Hb 14.8  Hct 44.8  MCV 90.6  WBC 7.5    All cell line counts and morphology within normal limits.   Patient's history of lymphoplasmacytic lymphoma is noted.  The currently submitted blood smear is negative for circulating plasma cells, and negative for increased/atypical lymphocytes  Fe 60 TIBC 250 FeSat 24% Ferritin 73  BUN 8  Creat 0.8  LFTs wnl  Folate/B12 wnl  Beta-2 microglobulin 2.2    Peripheral blood flow cytometry noted small B-cell population with equivocal mild kappa light chain excess (1% of total cells).    SPEP: M-spike 0.8 g/dl  SIFE: IgM kappa monoclonal protein is present  IgM 1295 ()    Free kappa light chains:   80.85  (3.3-19.4)   Free lambda light

## 2025-05-28 ENCOUNTER — TELEPHONE (OUTPATIENT)
Dept: INFUSION THERAPY | Age: 80
End: 2025-05-28

## 2025-05-28 RX ORDER — DOFETILIDE 0.5 MG/1
500 CAPSULE ORAL EVERY 12 HOURS SCHEDULED
Qty: 180 CAPSULE | Refills: 1 | Status: SHIPPED | OUTPATIENT
Start: 2025-05-28

## 2025-05-28 NOTE — TELEPHONE ENCOUNTER
Gave patient the number to scheduling to see if he could add the imaging that  ordered to his already scheduled visiting in [place] in June. Patient stated he will give them a call and get it scheduled. All questions answered at this time.

## 2025-06-11 ENCOUNTER — HOSPITAL ENCOUNTER (OUTPATIENT)
Dept: CT IMAGING | Age: 80
Discharge: HOME | End: 2025-06-13
Attending: INTERNAL MEDICINE
Payer: MEDICARE

## 2025-06-11 DIAGNOSIS — Z87.01 HISTORY OF PNEUMONIA: ICD-10-CM

## 2025-06-11 PROCEDURE — 71250 CT THORAX DX C-: CPT

## 2025-08-07 ENCOUNTER — OFFICE VISIT (OUTPATIENT)
Dept: NON INVASIVE DIAGNOSTICS | Age: 80
End: 2025-08-07
Payer: OTHER GOVERNMENT

## 2025-08-07 VITALS
SYSTOLIC BLOOD PRESSURE: 138 MMHG | OXYGEN SATURATION: 93 % | TEMPERATURE: 96.4 F | DIASTOLIC BLOOD PRESSURE: 94 MMHG | WEIGHT: 315 LBS | RESPIRATION RATE: 18 BRPM | BODY MASS INDEX: 47.74 KG/M2 | HEART RATE: 76 BPM | HEIGHT: 68 IN

## 2025-08-07 DIAGNOSIS — Z79.01 ANTICOAGULATED BY ANTICOAGULATION TREATMENT: ICD-10-CM

## 2025-08-07 DIAGNOSIS — Z79.899 VISIT FOR MONITORING TIKOSYN THERAPY: ICD-10-CM

## 2025-08-07 DIAGNOSIS — Z51.81 VISIT FOR MONITORING TIKOSYN THERAPY: ICD-10-CM

## 2025-08-07 DIAGNOSIS — I48.0 PAROXYSMAL ATRIAL FIBRILLATION (HCC): ICD-10-CM

## 2025-08-07 DIAGNOSIS — I48.19 PERSISTENT ATRIAL FIBRILLATION (HCC): Primary | ICD-10-CM

## 2025-08-07 PROCEDURE — 3075F SYST BP GE 130 - 139MM HG: CPT | Performed by: NURSE PRACTITIONER

## 2025-08-07 PROCEDURE — 3080F DIAST BP >= 90 MM HG: CPT | Performed by: NURSE PRACTITIONER

## 2025-08-07 PROCEDURE — 1123F ACP DISCUSS/DSCN MKR DOCD: CPT | Performed by: NURSE PRACTITIONER

## 2025-08-07 PROCEDURE — 93000 ELECTROCARDIOGRAM COMPLETE: CPT | Performed by: INTERNAL MEDICINE

## 2025-08-07 PROCEDURE — 99214 OFFICE O/P EST MOD 30 MIN: CPT | Performed by: NURSE PRACTITIONER

## 2025-08-07 PROCEDURE — 1159F MED LIST DOCD IN RCRD: CPT | Performed by: NURSE PRACTITIONER

## 2025-08-11 ENCOUNTER — HOSPITAL ENCOUNTER (OUTPATIENT)
Dept: LAB | Age: 80
Discharge: HOME | End: 2025-08-11
Attending: STUDENT IN AN ORGANIZED HEALTH CARE EDUCATION/TRAINING PROGRAM
Payer: MEDICARE

## 2025-08-11 ENCOUNTER — HOSPITAL ENCOUNTER (OUTPATIENT)
Dept: CT IMAGING | Age: 80
Discharge: HOME | End: 2025-08-13
Attending: STUDENT IN AN ORGANIZED HEALTH CARE EDUCATION/TRAINING PROGRAM
Payer: MEDICARE

## 2025-08-11 DIAGNOSIS — K86.9 PANCREATIC LESION: ICD-10-CM

## 2025-08-11 DIAGNOSIS — C83.00 MALIGNANT LYMPHOPLASMACYTIC LYMPHOMA (HCC): ICD-10-CM

## 2025-08-11 LAB
ALBUMIN SERPL-MCNC: 3.6 G/DL (ref 3.5–5.2)
ALP SERPL-CCNC: 60 U/L (ref 40–129)
ALT SERPL-CCNC: 11 U/L (ref 0–50)
ANION GAP SERPL CALCULATED.3IONS-SCNC: 12 MMOL/L (ref 7–16)
AST SERPL-CCNC: 11 U/L (ref 0–50)
BILIRUB SERPL-MCNC: 0.6 MG/DL (ref 0–1.2)
BUN SERPL-MCNC: 14 MG/DL (ref 8–23)
CALCIUM SERPL-MCNC: 9.4 MG/DL (ref 8.8–10.2)
CHLORIDE SERPL-SCNC: 105 MMOL/L (ref 98–107)
CO2 SERPL-SCNC: 24 MMOL/L (ref 22–29)
CREAT SERPL-MCNC: 1 MG/DL (ref 0.7–1.2)
GFR, ESTIMATED: 75 ML/MIN/1.73M2
GLUCOSE SERPL-MCNC: 149 MG/DL (ref 74–99)
POTASSIUM SERPL-SCNC: 4.6 MMOL/L (ref 3.5–5.1)
PROT SERPL-MCNC: 5.4 G/DL (ref 6.4–8.3)
SODIUM SERPL-SCNC: 141 MMOL/L (ref 136–145)

## 2025-08-11 PROCEDURE — 80053 COMPREHEN METABOLIC PANEL: CPT

## 2025-08-11 PROCEDURE — 6360000004 HC RX CONTRAST MEDICATION: Performed by: RADIOLOGY

## 2025-08-11 PROCEDURE — 36415 COLL VENOUS BLD VENIPUNCTURE: CPT

## 2025-08-11 PROCEDURE — 74175 CTA ABDOMEN W/CONTRAST: CPT

## 2025-08-11 RX ORDER — IOPAMIDOL 755 MG/ML
75 INJECTION, SOLUTION INTRAVASCULAR
Status: COMPLETED | OUTPATIENT
Start: 2025-08-11 | End: 2025-08-11

## 2025-08-11 RX ADMIN — IOPAMIDOL 75 ML: 755 INJECTION, SOLUTION INTRAVENOUS at 09:29

## 2025-08-21 ENCOUNTER — HOSPITAL ENCOUNTER (OUTPATIENT)
Dept: INFUSION THERAPY | Age: 80
Discharge: HOME | End: 2025-08-21
Payer: OTHER GOVERNMENT

## 2025-08-21 ENCOUNTER — OFFICE VISIT (OUTPATIENT)
Age: 80
End: 2025-08-21
Payer: OTHER GOVERNMENT

## 2025-08-21 VITALS
SYSTOLIC BLOOD PRESSURE: 146 MMHG | TEMPERATURE: 97.5 F | HEIGHT: 68 IN | WEIGHT: 315 LBS | DIASTOLIC BLOOD PRESSURE: 71 MMHG | HEART RATE: 84 BPM | BODY MASS INDEX: 47.74 KG/M2 | OXYGEN SATURATION: 95 %

## 2025-08-21 DIAGNOSIS — C83.00 MALIGNANT LYMPHOPLASMACYTIC LYMPHOMA (HCC): ICD-10-CM

## 2025-08-21 LAB
ALBUMIN SERPL-MCNC: 3.6 G/DL (ref 3.5–5.2)
ALP SERPL-CCNC: 65 U/L (ref 40–129)
ALT SERPL-CCNC: 8 U/L (ref 0–50)
ANION GAP SERPL CALCULATED.3IONS-SCNC: 15 MMOL/L (ref 7–16)
AST SERPL-CCNC: 12 U/L (ref 0–50)
BASOPHILS # BLD: 0.04 K/UL (ref 0–0.2)
BASOPHILS NFR BLD: 1 % (ref 0–2)
BILIRUB SERPL-MCNC: 0.7 MG/DL (ref 0–1.2)
BUN SERPL-MCNC: 12 MG/DL (ref 8–23)
CALCIUM SERPL-MCNC: 9.5 MG/DL (ref 8.8–10.2)
CHLORIDE SERPL-SCNC: 105 MMOL/L (ref 98–107)
CO2 SERPL-SCNC: 22 MMOL/L (ref 22–29)
CREAT SERPL-MCNC: 1 MG/DL (ref 0.7–1.2)
EOSINOPHIL # BLD: 0.1 K/UL (ref 0.05–0.5)
EOSINOPHILS RELATIVE PERCENT: 2 % (ref 0–6)
ERYTHROCYTE [DISTWIDTH] IN BLOOD BY AUTOMATED COUNT: 14.9 % (ref 11.5–15)
FREE KAPPA/LAMBDA RATIO: 2.95 (ref 0.22–1.74)
GFR, ESTIMATED: 82 ML/MIN/1.73M2
GLUCOSE SERPL-MCNC: 161 MG/DL (ref 74–99)
HCT VFR BLD AUTO: 43.4 % (ref 37–54)
HGB BLD-MCNC: 13.9 G/DL (ref 12.5–16.5)
IGA SERPL-MCNC: 14 MG/DL (ref 70–400)
IGG SERPL-MCNC: 167 MG/DL (ref 700–1600)
IGM SERPL-MCNC: 479 MG/DL (ref 40–230)
IMM GRANULOCYTES # BLD AUTO: 0.08 K/UL (ref 0–0.58)
IMM GRANULOCYTES NFR BLD: 1 % (ref 0–5)
KAPPA LC FREE SER-MCNC: 22.7 MG/L
LAMBDA LC FREE SERPL-MCNC: 7.7 MG/L (ref 4.2–27.7)
LDH SERPL-CCNC: 149 U/L (ref 135–225)
LYMPHOCYTES NFR BLD: 1.62 K/UL (ref 1.5–4)
LYMPHOCYTES RELATIVE PERCENT: 26 % (ref 20–42)
MCH RBC QN AUTO: 29.1 PG (ref 26–35)
MCHC RBC AUTO-ENTMCNC: 32 G/DL (ref 32–34.5)
MCV RBC AUTO: 90.8 FL (ref 80–99.9)
MONOCYTES NFR BLD: 0.55 K/UL (ref 0.1–0.95)
MONOCYTES NFR BLD: 9 % (ref 2–12)
NEUTROPHILS NFR BLD: 61 % (ref 43–80)
NEUTS SEG NFR BLD: 3.77 K/UL (ref 1.8–7.3)
PLATELET # BLD AUTO: 175 K/UL (ref 130–450)
PMV BLD AUTO: 10.9 FL (ref 7–12)
POTASSIUM SERPL-SCNC: 4.4 MMOL/L (ref 3.5–5.1)
PROT SERPL-MCNC: 5.8 G/DL (ref 6.4–8.3)
RBC # BLD AUTO: 4.78 M/UL (ref 3.8–5.8)
SODIUM SERPL-SCNC: 142 MMOL/L (ref 136–145)
WBC OTHER # BLD: 6.2 K/UL (ref 4.5–11.5)

## 2025-08-21 PROCEDURE — 84155 ASSAY OF PROTEIN SERUM: CPT

## 2025-08-21 PROCEDURE — 82784 ASSAY IGA/IGD/IGG/IGM EACH: CPT

## 2025-08-21 PROCEDURE — 3077F SYST BP >= 140 MM HG: CPT | Performed by: STUDENT IN AN ORGANIZED HEALTH CARE EDUCATION/TRAINING PROGRAM

## 2025-08-21 PROCEDURE — 99212 OFFICE O/P EST SF 10 MIN: CPT | Performed by: STUDENT IN AN ORGANIZED HEALTH CARE EDUCATION/TRAINING PROGRAM

## 2025-08-21 PROCEDURE — 3078F DIAST BP <80 MM HG: CPT | Performed by: STUDENT IN AN ORGANIZED HEALTH CARE EDUCATION/TRAINING PROGRAM

## 2025-08-21 PROCEDURE — 86334 IMMUNOFIX E-PHORESIS SERUM: CPT

## 2025-08-21 PROCEDURE — 83615 LACTATE (LD) (LDH) ENZYME: CPT

## 2025-08-21 PROCEDURE — 80053 COMPREHEN METABOLIC PANEL: CPT

## 2025-08-21 PROCEDURE — 83521 IG LIGHT CHAINS FREE EACH: CPT

## 2025-08-21 PROCEDURE — 1123F ACP DISCUSS/DSCN MKR DOCD: CPT | Performed by: STUDENT IN AN ORGANIZED HEALTH CARE EDUCATION/TRAINING PROGRAM

## 2025-08-21 PROCEDURE — 36415 COLL VENOUS BLD VENIPUNCTURE: CPT

## 2025-08-21 PROCEDURE — 99213 OFFICE O/P EST LOW 20 MIN: CPT | Performed by: STUDENT IN AN ORGANIZED HEALTH CARE EDUCATION/TRAINING PROGRAM

## 2025-08-21 PROCEDURE — 84165 PROTEIN E-PHORESIS SERUM: CPT

## 2025-08-21 PROCEDURE — 85025 COMPLETE CBC W/AUTO DIFF WBC: CPT

## 2025-08-25 ENCOUNTER — HOSPITAL ENCOUNTER (INPATIENT)
Age: 80
LOS: 8 days | Discharge: HOME | DRG: 291 | End: 2025-09-03
Attending: EMERGENCY MEDICINE | Admitting: INTERNAL MEDICINE
Payer: OTHER GOVERNMENT

## 2025-08-25 ENCOUNTER — APPOINTMENT (OUTPATIENT)
Dept: GENERAL RADIOLOGY | Age: 80
DRG: 291 | End: 2025-08-25
Payer: OTHER GOVERNMENT

## 2025-08-25 DIAGNOSIS — I48.91 AFIB (HCC): ICD-10-CM

## 2025-08-25 DIAGNOSIS — G47.33 OSA (OBSTRUCTIVE SLEEP APNEA): ICD-10-CM

## 2025-08-25 DIAGNOSIS — E66.813 CLASS 3 SEVERE OBESITY DUE TO EXCESS CALORIES WITH SERIOUS COMORBIDITY AND BODY MASS INDEX (BMI) OF 45.0 TO 49.9 IN ADULT (HCC): ICD-10-CM

## 2025-08-25 DIAGNOSIS — I50.9 ACUTE ON CHRONIC CONGESTIVE HEART FAILURE, UNSPECIFIED HEART FAILURE TYPE (HCC): ICD-10-CM

## 2025-08-25 DIAGNOSIS — I48.0 PAF (PAROXYSMAL ATRIAL FIBRILLATION) (HCC): ICD-10-CM

## 2025-08-25 DIAGNOSIS — I50.9 DECOMPENSATED HEART FAILURE (HCC): ICD-10-CM

## 2025-08-25 DIAGNOSIS — I50.9 NEW ONSET OF CONGESTIVE HEART FAILURE (HCC): Primary | ICD-10-CM

## 2025-08-25 DIAGNOSIS — R06.89 ACUTE RESPIRATORY INSUFFICIENCY: ICD-10-CM

## 2025-08-25 LAB
ALBUMIN SERPL-MCNC: 3 G/DL (ref 3.5–4.7)
ALBUMIN SERPL-MCNC: 3.7 G/DL (ref 3.5–5.2)
ALP SERPL-CCNC: 81 U/L (ref 40–129)
ALPHA1 GLOB SERPL ELPH-MCNC: 0.2 G/DL (ref 0.2–0.4)
ALPHA2 GLOB SERPL ELPH-MCNC: 0.8 G/DL (ref 0.5–1)
ALT SERPL-CCNC: 24 U/L (ref 0–50)
ANION GAP SERPL CALCULATED.3IONS-SCNC: 14 MMOL/L (ref 7–16)
AST SERPL-CCNC: 16 U/L (ref 0–50)
B PARAP IS1001 DNA NPH QL NAA+NON-PROBE: NOT DETECTED
B PERT DNA SPEC QL NAA+PROBE: NOT DETECTED
B-GLOBULIN SERPL ELPH-MCNC: 0.8 G/DL (ref 0.8–1.3)
BASOPHILS # BLD: 0.03 K/UL (ref 0–0.2)
BASOPHILS NFR BLD: 0 % (ref 0–2)
BILIRUB SERPL-MCNC: 0.9 MG/DL (ref 0–1.2)
BNP SERPL-MCNC: 1071 PG/ML (ref 0–450)
BUN SERPL-MCNC: 11 MG/DL (ref 8–23)
C PNEUM DNA NPH QL NAA+NON-PROBE: NOT DETECTED
CALCIUM SERPL-MCNC: 9.3 MG/DL (ref 8.8–10.2)
CHLORIDE SERPL-SCNC: 102 MMOL/L (ref 98–107)
CO2 SERPL-SCNC: 21 MMOL/L (ref 22–29)
CREAT SERPL-MCNC: 1 MG/DL (ref 0.7–1.2)
EKG ATRIAL RATE: 66 BPM
EKG Q-T INTERVAL: 380 MS
EKG QRS DURATION: 102 MS
EKG QTC CALCULATION (BAZETT): 492 MS
EKG R AXIS: -23 DEGREES
EKG T AXIS: 83 DEGREES
EKG VENTRICULAR RATE: 101 BPM
EOSINOPHIL # BLD: 0.05 K/UL (ref 0.05–0.5)
EOSINOPHILS RELATIVE PERCENT: 1 % (ref 0–6)
ERYTHROCYTE [DISTWIDTH] IN BLOOD BY AUTOMATED COUNT: 14.6 % (ref 11.5–15)
FLUAV RNA NPH QL NAA+NON-PROBE: NOT DETECTED
FLUAV RNA RESP QL NAA+PROBE: NOT DETECTED
FLUBV RNA NPH QL NAA+NON-PROBE: NOT DETECTED
FLUBV RNA RESP QL NAA+PROBE: NOT DETECTED
GAMMA GLOB SERPL ELPH-MCNC: 0.6 G/DL (ref 0.7–1.6)
GFR, ESTIMATED: 82 ML/MIN/1.73M2
GLUCOSE SERPL-MCNC: 206 MG/DL (ref 74–99)
HADV DNA NPH QL NAA+NON-PROBE: NOT DETECTED
HCOV 229E RNA NPH QL NAA+NON-PROBE: NOT DETECTED
HCOV HKU1 RNA NPH QL NAA+NON-PROBE: NOT DETECTED
HCOV NL63 RNA NPH QL NAA+NON-PROBE: NOT DETECTED
HCOV OC43 RNA NPH QL NAA+NON-PROBE: NOT DETECTED
HCT VFR BLD AUTO: 42.8 % (ref 37–54)
HGB BLD-MCNC: 14.3 G/DL (ref 12.5–16.5)
HMPV RNA NPH QL NAA+NON-PROBE: NOT DETECTED
HPIV1 RNA NPH QL NAA+NON-PROBE: NOT DETECTED
HPIV2 RNA NPH QL NAA+NON-PROBE: NOT DETECTED
HPIV3 RNA NPH QL NAA+NON-PROBE: NOT DETECTED
HPIV4 RNA NPH QL NAA+NON-PROBE: NOT DETECTED
IMM GRANULOCYTES # BLD AUTO: 0.07 K/UL (ref 0–0.58)
IMM GRANULOCYTES NFR BLD: 1 % (ref 0–5)
INR PPP: 1.3
INTERPRETATION SERPL IFE-IMP: NORMAL
LACTATE BLDV-SCNC: 1.8 MMOL/L (ref 0.5–2.2)
LYMPHOCYTES NFR BLD: 1.12 K/UL (ref 1.5–4)
LYMPHOCYTES RELATIVE PERCENT: 13 % (ref 20–42)
M PNEUMO DNA NPH QL NAA+NON-PROBE: NOT DETECTED
M PROTEIN SERPL ELPH-MCNC: 0.3 G/DL
MAGNESIUM SERPL-MCNC: 1.6 MG/DL (ref 1.6–2.4)
MCH RBC QN AUTO: 29.3 PG (ref 26–35)
MCHC RBC AUTO-ENTMCNC: 33.4 G/DL (ref 32–34.5)
MCV RBC AUTO: 87.7 FL (ref 80–99.9)
MONOCYTES NFR BLD: 0.85 K/UL (ref 0.1–0.95)
MONOCYTES NFR BLD: 10 % (ref 2–12)
NEUTROPHILS NFR BLD: 75 % (ref 43–80)
NEUTS SEG NFR BLD: 6.31 K/UL (ref 1.8–7.3)
PATH REV: NORMAL
PATHOLOGIST: ABNORMAL
PLATELET # BLD AUTO: 167 K/UL (ref 130–450)
PMV BLD AUTO: 10.9 FL (ref 7–12)
POTASSIUM SERPL-SCNC: 4.4 MMOL/L (ref 3.5–5.1)
PROT PATTERN SERPL ELPH-IMP: ABNORMAL
PROT SERPL-MCNC: 5.4 G/DL (ref 6.4–8.3)
PROT SERPL-MCNC: 5.9 G/DL (ref 6.4–8.3)
PROTHROMBIN TIME: 14.2 SEC (ref 9.3–12.4)
RBC # BLD AUTO: 4.88 M/UL (ref 3.8–5.8)
RSV RNA NPH QL NAA+NON-PROBE: NOT DETECTED
RV+EV RNA NPH QL NAA+NON-PROBE: NOT DETECTED
SARS-COV-2 RNA NPH QL NAA+NON-PROBE: NOT DETECTED
SARS-COV-2 RNA RESP QL NAA+PROBE: NOT DETECTED
SODIUM SERPL-SCNC: 137 MMOL/L (ref 136–145)
SOURCE: NORMAL
SPECIMEN DESCRIPTION: NORMAL
SPECIMEN DESCRIPTION: NORMAL
TROPONIN I SERPL HS-MCNC: 24 NG/L (ref 0–22)
WBC OTHER # BLD: 8.4 K/UL (ref 4.5–11.5)

## 2025-08-25 PROCEDURE — 99285 EMERGENCY DEPT VISIT HI MDM: CPT

## 2025-08-25 PROCEDURE — 83880 ASSAY OF NATRIURETIC PEPTIDE: CPT

## 2025-08-25 PROCEDURE — 6360000002 HC RX W HCPCS: Performed by: EMERGENCY MEDICINE

## 2025-08-25 PROCEDURE — 0202U NFCT DS 22 TRGT SARS-COV-2: CPT

## 2025-08-25 PROCEDURE — 2500000003 HC RX 250 WO HCPCS: Performed by: EMERGENCY MEDICINE

## 2025-08-25 PROCEDURE — 85610 PROTHROMBIN TIME: CPT

## 2025-08-25 PROCEDURE — 6370000000 HC RX 637 (ALT 250 FOR IP): Performed by: EMERGENCY MEDICINE

## 2025-08-25 PROCEDURE — 87636 SARSCOV2 & INF A&B AMP PRB: CPT

## 2025-08-25 PROCEDURE — 80053 COMPREHEN METABOLIC PANEL: CPT

## 2025-08-25 PROCEDURE — 83605 ASSAY OF LACTIC ACID: CPT

## 2025-08-25 PROCEDURE — 83735 ASSAY OF MAGNESIUM: CPT

## 2025-08-25 PROCEDURE — 93005 ELECTROCARDIOGRAM TRACING: CPT | Performed by: EMERGENCY MEDICINE

## 2025-08-25 PROCEDURE — 96374 THER/PROPH/DIAG INJ IV PUSH: CPT

## 2025-08-25 PROCEDURE — 6370000000 HC RX 637 (ALT 250 FOR IP): Performed by: STUDENT IN AN ORGANIZED HEALTH CARE EDUCATION/TRAINING PROGRAM

## 2025-08-25 PROCEDURE — 84484 ASSAY OF TROPONIN QUANT: CPT

## 2025-08-25 PROCEDURE — 96375 TX/PRO/DX INJ NEW DRUG ADDON: CPT

## 2025-08-25 PROCEDURE — 71045 X-RAY EXAM CHEST 1 VIEW: CPT

## 2025-08-25 PROCEDURE — 85025 COMPLETE CBC W/AUTO DIFF WBC: CPT

## 2025-08-25 PROCEDURE — 93010 ELECTROCARDIOGRAM REPORT: CPT | Performed by: INTERNAL MEDICINE

## 2025-08-25 RX ORDER — IPRATROPIUM BROMIDE AND ALBUTEROL SULFATE 2.5; .5 MG/3ML; MG/3ML
1 SOLUTION RESPIRATORY (INHALATION)
Status: COMPLETED | OUTPATIENT
Start: 2025-08-25 | End: 2025-08-25

## 2025-08-25 RX ORDER — LISINOPRIL 10 MG/1
10 TABLET ORAL NIGHTLY
Status: DISCONTINUED | OUTPATIENT
Start: 2025-08-25 | End: 2025-09-03 | Stop reason: HOSPADM

## 2025-08-25 RX ORDER — FUROSEMIDE 10 MG/ML
40 INJECTION INTRAMUSCULAR; INTRAVENOUS ONCE
Status: COMPLETED | OUTPATIENT
Start: 2025-08-25 | End: 2025-08-25

## 2025-08-25 RX ORDER — METOPROLOL SUCCINATE 25 MG/1
25 TABLET, EXTENDED RELEASE ORAL ONCE
Status: COMPLETED | OUTPATIENT
Start: 2025-08-25 | End: 2025-08-25

## 2025-08-25 RX ADMIN — METFORMIN HYDROCHLORIDE 1000 MG: 500 TABLET ORAL at 19:54

## 2025-08-25 RX ADMIN — FUROSEMIDE 40 MG: 10 INJECTION, SOLUTION INTRAMUSCULAR; INTRAVENOUS at 13:30

## 2025-08-25 RX ADMIN — IPRATROPIUM BROMIDE AND ALBUTEROL SULFATE 1 DOSE: 2.5; .5 SOLUTION RESPIRATORY (INHALATION) at 13:00

## 2025-08-25 RX ADMIN — WATER 125 MG: 1 INJECTION INTRAMUSCULAR; INTRAVENOUS; SUBCUTANEOUS at 13:30

## 2025-08-25 RX ADMIN — LISINOPRIL 10 MG: 10 TABLET ORAL at 19:55

## 2025-08-25 RX ADMIN — IPRATROPIUM BROMIDE AND ALBUTEROL SULFATE 1 DOSE: 2.5; .5 SOLUTION RESPIRATORY (INHALATION) at 13:31

## 2025-08-25 RX ADMIN — METOPROLOL SUCCINATE 25 MG: 25 TABLET, EXTENDED RELEASE ORAL at 19:55

## 2025-08-25 RX ADMIN — IPRATROPIUM BROMIDE AND ALBUTEROL SULFATE 1 DOSE: 2.5; .5 SOLUTION RESPIRATORY (INHALATION) at 13:15

## 2025-08-25 RX ADMIN — APIXABAN 5 MG: 5 TABLET, FILM COATED ORAL at 19:55

## 2025-08-26 PROBLEM — I50.9 DECOMPENSATED HEART FAILURE (HCC): Status: ACTIVE | Noted: 2025-08-26

## 2025-08-26 PROBLEM — I50.9 NEW ONSET OF CONGESTIVE HEART FAILURE (HCC): Status: ACTIVE | Noted: 2025-08-26

## 2025-08-26 LAB
ANION GAP SERPL CALCULATED.3IONS-SCNC: 15 MMOL/L (ref 7–16)
BUN SERPL-MCNC: 16 MG/DL (ref 8–23)
CALCIUM SERPL-MCNC: 9.8 MG/DL (ref 8.8–10.2)
CHLORIDE SERPL-SCNC: 103 MMOL/L (ref 98–107)
CHOLEST SERPL-MCNC: 129 MG/DL
CO2 SERPL-SCNC: 21 MMOL/L (ref 22–29)
CREAT SERPL-MCNC: 0.9 MG/DL (ref 0.7–1.2)
FERRITIN SERPL-MCNC: 127 NG/ML
GFR, ESTIMATED: 83 ML/MIN/1.73M2
GLUCOSE BLD-MCNC: 138 MG/DL (ref 74–99)
GLUCOSE BLD-MCNC: 196 MG/DL (ref 74–99)
GLUCOSE BLD-MCNC: 243 MG/DL (ref 74–99)
GLUCOSE SERPL-MCNC: 256 MG/DL (ref 74–99)
HBA1C MFR BLD: 7.3 % (ref 4–5.6)
HDLC SERPL-MCNC: 45 MG/DL
IRON SATN MFR SERPL: 12 % (ref 20–55)
IRON SERPL-MCNC: 34 UG/DL (ref 61–157)
LDLC SERPL CALC-MCNC: 59 MG/DL
MAGNESIUM SERPL-MCNC: 1.9 MG/DL (ref 1.6–2.4)
POTASSIUM SERPL-SCNC: 4.5 MMOL/L (ref 3.5–5.1)
POTASSIUM SERPL-SCNC: 5.6 MMOL/L (ref 3.5–5.1)
SODIUM SERPL-SCNC: 139 MMOL/L (ref 136–145)
T4 FREE SERPL-MCNC: 1.5 NG/DL (ref 0.9–1.7)
TIBC SERPL-MCNC: 284 UG/DL (ref 250–450)
TRIGL SERPL-MCNC: 126 MG/DL
TSH SERPL DL<=0.05 MIU/L-ACNC: 1.4 UIU/ML (ref 0.27–4.2)
VLDLC SERPL CALC-MCNC: 25 MG/DL

## 2025-08-26 PROCEDURE — 82728 ASSAY OF FERRITIN: CPT

## 2025-08-26 PROCEDURE — 6370000000 HC RX 637 (ALT 250 FOR IP): Performed by: INTERNAL MEDICINE

## 2025-08-26 PROCEDURE — 6370000000 HC RX 637 (ALT 250 FOR IP): Performed by: STUDENT IN AN ORGANIZED HEALTH CARE EDUCATION/TRAINING PROGRAM

## 2025-08-26 PROCEDURE — 83036 HEMOGLOBIN GLYCOSYLATED A1C: CPT

## 2025-08-26 PROCEDURE — 1200000000 HC SEMI PRIVATE

## 2025-08-26 PROCEDURE — 2500000003 HC RX 250 WO HCPCS

## 2025-08-26 PROCEDURE — 94640 AIRWAY INHALATION TREATMENT: CPT

## 2025-08-26 PROCEDURE — 6360000002 HC RX W HCPCS: Performed by: INTERNAL MEDICINE

## 2025-08-26 PROCEDURE — 6370000000 HC RX 637 (ALT 250 FOR IP): Performed by: CLINICAL NURSE SPECIALIST

## 2025-08-26 PROCEDURE — 84439 ASSAY OF FREE THYROXINE: CPT

## 2025-08-26 PROCEDURE — 99222 1ST HOSP IP/OBS MODERATE 55: CPT | Performed by: INTERNAL MEDICINE

## 2025-08-26 PROCEDURE — 84443 ASSAY THYROID STIM HORMONE: CPT

## 2025-08-26 PROCEDURE — 6370000000 HC RX 637 (ALT 250 FOR IP)

## 2025-08-26 PROCEDURE — 99223 1ST HOSP IP/OBS HIGH 75: CPT | Performed by: INTERNAL MEDICINE

## 2025-08-26 PROCEDURE — APPSS180 APP SPLIT SHARED TIME > 60 MINUTES: Performed by: CLINICAL NURSE SPECIALIST

## 2025-08-26 PROCEDURE — 80061 LIPID PANEL: CPT

## 2025-08-26 PROCEDURE — 36415 COLL VENOUS BLD VENIPUNCTURE: CPT

## 2025-08-26 PROCEDURE — 83540 ASSAY OF IRON: CPT

## 2025-08-26 PROCEDURE — 82962 GLUCOSE BLOOD TEST: CPT

## 2025-08-26 PROCEDURE — 6360000002 HC RX W HCPCS

## 2025-08-26 PROCEDURE — 84132 ASSAY OF SERUM POTASSIUM: CPT

## 2025-08-26 PROCEDURE — 80048 BASIC METABOLIC PNL TOTAL CA: CPT

## 2025-08-26 PROCEDURE — 83550 IRON BINDING TEST: CPT

## 2025-08-26 PROCEDURE — 83735 ASSAY OF MAGNESIUM: CPT

## 2025-08-26 RX ORDER — MAGNESIUM SULFATE IN WATER 40 MG/ML
2000 INJECTION, SOLUTION INTRAVENOUS PRN
Status: DISCONTINUED | OUTPATIENT
Start: 2025-08-26 | End: 2025-09-03 | Stop reason: HOSPADM

## 2025-08-26 RX ORDER — POTASSIUM CHLORIDE 1500 MG/1
40 TABLET, EXTENDED RELEASE ORAL PRN
Status: DISCONTINUED | OUTPATIENT
Start: 2025-08-26 | End: 2025-09-03 | Stop reason: HOSPADM

## 2025-08-26 RX ORDER — ACETAMINOPHEN 650 MG/1
650 SUPPOSITORY RECTAL EVERY 6 HOURS PRN
Status: DISCONTINUED | OUTPATIENT
Start: 2025-08-26 | End: 2025-09-03 | Stop reason: HOSPADM

## 2025-08-26 RX ORDER — SODIUM CHLORIDE 0.9 % (FLUSH) 0.9 %
5-40 SYRINGE (ML) INJECTION EVERY 12 HOURS SCHEDULED
Status: DISCONTINUED | OUTPATIENT
Start: 2025-08-26 | End: 2025-09-03 | Stop reason: HOSPADM

## 2025-08-26 RX ORDER — TRAMADOL HYDROCHLORIDE 50 MG/1
50 TABLET ORAL EVERY 6 HOURS PRN
Status: DISCONTINUED | OUTPATIENT
Start: 2025-08-26 | End: 2025-09-03 | Stop reason: HOSPADM

## 2025-08-26 RX ORDER — ATORVASTATIN CALCIUM 40 MG/1
40 TABLET, FILM COATED ORAL DAILY
Status: DISCONTINUED | OUTPATIENT
Start: 2025-08-26 | End: 2025-09-03 | Stop reason: HOSPADM

## 2025-08-26 RX ORDER — SODIUM CHLORIDE 9 MG/ML
INJECTION, SOLUTION INTRAVENOUS PRN
Status: DISCONTINUED | OUTPATIENT
Start: 2025-08-26 | End: 2025-09-03 | Stop reason: HOSPADM

## 2025-08-26 RX ORDER — PRAZOSIN HYDROCHLORIDE 2 MG/1
2 CAPSULE ORAL NIGHTLY
Status: DISCONTINUED | OUTPATIENT
Start: 2025-08-26 | End: 2025-09-03 | Stop reason: HOSPADM

## 2025-08-26 RX ORDER — ALBUTEROL SULFATE 0.83 MG/ML
2.5 SOLUTION RESPIRATORY (INHALATION)
Status: DISCONTINUED | OUTPATIENT
Start: 2025-08-26 | End: 2025-09-03 | Stop reason: HOSPADM

## 2025-08-26 RX ORDER — ONDANSETRON 4 MG/1
4 TABLET, ORALLY DISINTEGRATING ORAL EVERY 8 HOURS PRN
Status: DISCONTINUED | OUTPATIENT
Start: 2025-08-26 | End: 2025-08-26

## 2025-08-26 RX ORDER — POTASSIUM CHLORIDE 7.45 MG/ML
10 INJECTION INTRAVENOUS PRN
Status: DISCONTINUED | OUTPATIENT
Start: 2025-08-26 | End: 2025-09-03 | Stop reason: HOSPADM

## 2025-08-26 RX ORDER — METOPROLOL SUCCINATE 50 MG/1
100 TABLET, EXTENDED RELEASE ORAL DAILY
Status: DISCONTINUED | OUTPATIENT
Start: 2025-08-27 | End: 2025-09-03 | Stop reason: HOSPADM

## 2025-08-26 RX ORDER — INSULIN LISPRO 100 [IU]/ML
0-4 INJECTION, SOLUTION INTRAVENOUS; SUBCUTANEOUS
Status: DISCONTINUED | OUTPATIENT
Start: 2025-08-26 | End: 2025-09-03 | Stop reason: HOSPADM

## 2025-08-26 RX ORDER — ACETAMINOPHEN 325 MG/1
650 TABLET ORAL EVERY 6 HOURS PRN
Status: DISCONTINUED | OUTPATIENT
Start: 2025-08-26 | End: 2025-09-03 | Stop reason: HOSPADM

## 2025-08-26 RX ORDER — ARFORMOTEROL TARTRATE 15 UG/2ML
15 SOLUTION RESPIRATORY (INHALATION)
Status: DISCONTINUED | OUTPATIENT
Start: 2025-08-26 | End: 2025-09-03 | Stop reason: HOSPADM

## 2025-08-26 RX ORDER — SODIUM CHLORIDE 0.9 % (FLUSH) 0.9 %
10 SYRINGE (ML) INJECTION PRN
Status: DISCONTINUED | OUTPATIENT
Start: 2025-08-26 | End: 2025-09-03 | Stop reason: HOSPADM

## 2025-08-26 RX ORDER — GLUCAGON 1 MG/ML
1 KIT INJECTION PRN
Status: DISCONTINUED | OUTPATIENT
Start: 2025-08-26 | End: 2025-09-03 | Stop reason: HOSPADM

## 2025-08-26 RX ORDER — FUROSEMIDE 10 MG/ML
40 INJECTION INTRAMUSCULAR; INTRAVENOUS 2 TIMES DAILY
Status: DISCONTINUED | OUTPATIENT
Start: 2025-08-26 | End: 2025-08-28

## 2025-08-26 RX ORDER — DOFETILIDE 0.5 MG/1
500 CAPSULE ORAL EVERY 12 HOURS SCHEDULED
Status: DISCONTINUED | OUTPATIENT
Start: 2025-08-26 | End: 2025-08-27

## 2025-08-26 RX ORDER — FINASTERIDE 5 MG/1
5 TABLET, FILM COATED ORAL DAILY
Status: DISCONTINUED | OUTPATIENT
Start: 2025-08-26 | End: 2025-09-03 | Stop reason: HOSPADM

## 2025-08-26 RX ORDER — INSULIN GLARGINE 100 [IU]/ML
60 INJECTION, SOLUTION SUBCUTANEOUS NIGHTLY
Status: DISCONTINUED | OUTPATIENT
Start: 2025-08-26 | End: 2025-09-03 | Stop reason: HOSPADM

## 2025-08-26 RX ORDER — AMLODIPINE BESYLATE 10 MG/1
10 TABLET ORAL DAILY
Status: DISCONTINUED | OUTPATIENT
Start: 2025-08-26 | End: 2025-08-26

## 2025-08-26 RX ORDER — DEXTROSE MONOHYDRATE 100 MG/ML
INJECTION, SOLUTION INTRAVENOUS CONTINUOUS PRN
Status: DISCONTINUED | OUTPATIENT
Start: 2025-08-26 | End: 2025-09-03 | Stop reason: HOSPADM

## 2025-08-26 RX ORDER — ONDANSETRON 2 MG/ML
4 INJECTION INTRAMUSCULAR; INTRAVENOUS EVERY 6 HOURS PRN
Status: DISCONTINUED | OUTPATIENT
Start: 2025-08-26 | End: 2025-08-26

## 2025-08-26 RX ADMIN — INSULIN LISPRO 3 UNITS: 100 INJECTION, SOLUTION INTRAVENOUS; SUBCUTANEOUS at 20:09

## 2025-08-26 RX ADMIN — METFORMIN HYDROCHLORIDE 1000 MG: 500 TABLET ORAL at 10:12

## 2025-08-26 RX ADMIN — METOPROLOL SUCCINATE 75 MG: 50 TABLET, EXTENDED RELEASE ORAL at 10:11

## 2025-08-26 RX ADMIN — APIXABAN 5 MG: 5 TABLET, FILM COATED ORAL at 20:08

## 2025-08-26 RX ADMIN — FUROSEMIDE 40 MG: 10 INJECTION, SOLUTION INTRAMUSCULAR; INTRAVENOUS at 10:11

## 2025-08-26 RX ADMIN — ARFORMOTEROL TARTRATE 15 MCG: 15 SOLUTION RESPIRATORY (INHALATION) at 19:16

## 2025-08-26 RX ADMIN — ATORVASTATIN CALCIUM 40 MG: 40 TABLET, FILM COATED ORAL at 10:12

## 2025-08-26 RX ADMIN — ALBUTEROL SULFATE 2.5 MG: 2.5 SOLUTION RESPIRATORY (INHALATION) at 15:18

## 2025-08-26 RX ADMIN — PRAZOSIN HYDROCHLORIDE 2 MG: 2 CAPSULE ORAL at 20:09

## 2025-08-26 RX ADMIN — DOFETILIDE 500 MCG: 0.5 CAPSULE ORAL at 20:09

## 2025-08-26 RX ADMIN — AMLODIPINE BESYLATE 10 MG: 10 TABLET ORAL at 10:12

## 2025-08-26 RX ADMIN — LISINOPRIL 10 MG: 10 TABLET ORAL at 20:08

## 2025-08-26 RX ADMIN — FINASTERIDE 5 MG: 5 TABLET, FILM COATED ORAL at 10:12

## 2025-08-26 RX ADMIN — SODIUM CHLORIDE, PRESERVATIVE FREE 10 ML: 5 INJECTION INTRAVENOUS at 10:13

## 2025-08-26 RX ADMIN — FUROSEMIDE 40 MG: 10 INJECTION, SOLUTION INTRAMUSCULAR; INTRAVENOUS at 18:42

## 2025-08-26 RX ADMIN — SODIUM CHLORIDE, PRESERVATIVE FREE 10 ML: 5 INJECTION INTRAVENOUS at 20:10

## 2025-08-26 RX ADMIN — APIXABAN 5 MG: 5 TABLET, FILM COATED ORAL at 10:11

## 2025-08-26 RX ADMIN — INSULIN GLARGINE 60 UNITS: 100 INJECTION, SOLUTION SUBCUTANEOUS at 20:08

## 2025-08-26 RX ADMIN — ALBUTEROL SULFATE 2.5 MG: 2.5 SOLUTION RESPIRATORY (INHALATION) at 19:16

## 2025-08-26 ASSESSMENT — PAIN SCALES - GENERAL: PAINLEVEL_OUTOF10: 0

## 2025-08-27 ENCOUNTER — APPOINTMENT (OUTPATIENT)
Age: 80
DRG: 291 | End: 2025-08-27
Payer: OTHER GOVERNMENT

## 2025-08-27 PROBLEM — I48.0 PAF (PAROXYSMAL ATRIAL FIBRILLATION) (HCC): Status: ACTIVE | Noted: 2022-11-22

## 2025-08-27 LAB
ANION GAP SERPL CALCULATED.3IONS-SCNC: 13 MMOL/L (ref 7–16)
ANION GAP SERPL CALCULATED.3IONS-SCNC: 14 MMOL/L (ref 7–16)
BNP SERPL-MCNC: 1297 PG/ML (ref 0–450)
BUN SERPL-MCNC: 21 MG/DL (ref 8–23)
BUN SERPL-MCNC: 21 MG/DL (ref 8–23)
CALCIUM SERPL-MCNC: 9.2 MG/DL (ref 8.8–10.2)
CALCIUM SERPL-MCNC: 9.3 MG/DL (ref 8.8–10.2)
CHLORIDE SERPL-SCNC: 101 MMOL/L (ref 98–107)
CHLORIDE SERPL-SCNC: 99 MMOL/L (ref 98–107)
CO2 SERPL-SCNC: 21 MMOL/L (ref 22–29)
CO2 SERPL-SCNC: 22 MMOL/L (ref 22–29)
CREAT SERPL-MCNC: 1 MG/DL (ref 0.7–1.2)
CREAT SERPL-MCNC: 1.1 MG/DL (ref 0.7–1.2)
GFR, ESTIMATED: 71 ML/MIN/1.73M2
GFR, ESTIMATED: 80 ML/MIN/1.73M2
GLUCOSE BLD-MCNC: 145 MG/DL (ref 74–99)
GLUCOSE BLD-MCNC: 162 MG/DL (ref 74–99)
GLUCOSE BLD-MCNC: 198 MG/DL (ref 74–99)
GLUCOSE BLD-MCNC: 246 MG/DL (ref 74–99)
GLUCOSE SERPL-MCNC: 188 MG/DL (ref 74–99)
GLUCOSE SERPL-MCNC: 267 MG/DL (ref 74–99)
MAGNESIUM SERPL-MCNC: 1.7 MG/DL (ref 1.6–2.4)
POTASSIUM SERPL-SCNC: 3.9 MMOL/L (ref 3.5–5.1)
POTASSIUM SERPL-SCNC: 4 MMOL/L (ref 3.5–5.1)
SODIUM SERPL-SCNC: 134 MMOL/L (ref 136–145)
SODIUM SERPL-SCNC: 135 MMOL/L (ref 136–145)

## 2025-08-27 PROCEDURE — 99232 SBSQ HOSP IP/OBS MODERATE 35: CPT | Performed by: NURSE PRACTITIONER

## 2025-08-27 PROCEDURE — 1200000000 HC SEMI PRIVATE

## 2025-08-27 PROCEDURE — 6370000000 HC RX 637 (ALT 250 FOR IP): Performed by: CLINICAL NURSE SPECIALIST

## 2025-08-27 PROCEDURE — 36415 COLL VENOUS BLD VENIPUNCTURE: CPT

## 2025-08-27 PROCEDURE — 83880 ASSAY OF NATRIURETIC PEPTIDE: CPT

## 2025-08-27 PROCEDURE — 80048 BASIC METABOLIC PNL TOTAL CA: CPT

## 2025-08-27 PROCEDURE — 6370000000 HC RX 637 (ALT 250 FOR IP): Performed by: STUDENT IN AN ORGANIZED HEALTH CARE EDUCATION/TRAINING PROGRAM

## 2025-08-27 PROCEDURE — 6360000002 HC RX W HCPCS: Performed by: NURSE PRACTITIONER

## 2025-08-27 PROCEDURE — 6370000000 HC RX 637 (ALT 250 FOR IP)

## 2025-08-27 PROCEDURE — 6360000002 HC RX W HCPCS: Performed by: INTERNAL MEDICINE

## 2025-08-27 PROCEDURE — 99233 SBSQ HOSP IP/OBS HIGH 50: CPT | Performed by: INTERNAL MEDICINE

## 2025-08-27 PROCEDURE — 94640 AIRWAY INHALATION TREATMENT: CPT

## 2025-08-27 PROCEDURE — 99222 1ST HOSP IP/OBS MODERATE 55: CPT | Performed by: STUDENT IN AN ORGANIZED HEALTH CARE EDUCATION/TRAINING PROGRAM

## 2025-08-27 PROCEDURE — 82962 GLUCOSE BLOOD TEST: CPT

## 2025-08-27 PROCEDURE — 93306 TTE W/DOPPLER COMPLETE: CPT

## 2025-08-27 PROCEDURE — 6370000000 HC RX 637 (ALT 250 FOR IP): Performed by: INTERNAL MEDICINE

## 2025-08-27 PROCEDURE — 6360000002 HC RX W HCPCS

## 2025-08-27 PROCEDURE — 83735 ASSAY OF MAGNESIUM: CPT

## 2025-08-27 PROCEDURE — 2500000003 HC RX 250 WO HCPCS

## 2025-08-27 PROCEDURE — 6370000000 HC RX 637 (ALT 250 FOR IP): Performed by: NURSE PRACTITIONER

## 2025-08-27 RX ORDER — LANOLIN ALCOHOL/MO/W.PET/CERES
400 CREAM (GRAM) TOPICAL DAILY
Status: DISCONTINUED | OUTPATIENT
Start: 2025-08-27 | End: 2025-09-03 | Stop reason: HOSPADM

## 2025-08-27 RX ORDER — MAGNESIUM SULFATE IN WATER 40 MG/ML
2000 INJECTION, SOLUTION INTRAVENOUS ONCE
Status: COMPLETED | OUTPATIENT
Start: 2025-08-27 | End: 2025-08-27

## 2025-08-27 RX ADMIN — ALBUTEROL SULFATE 2.5 MG: 2.5 SOLUTION RESPIRATORY (INHALATION) at 07:35

## 2025-08-27 RX ADMIN — ARFORMOTEROL TARTRATE 15 MCG: 15 SOLUTION RESPIRATORY (INHALATION) at 19:25

## 2025-08-27 RX ADMIN — APIXABAN 5 MG: 5 TABLET, FILM COATED ORAL at 08:17

## 2025-08-27 RX ADMIN — SODIUM CHLORIDE, PRESERVATIVE FREE 10 ML: 5 INJECTION INTRAVENOUS at 19:37

## 2025-08-27 RX ADMIN — INSULIN LISPRO 1 UNITS: 100 INJECTION, SOLUTION INTRAVENOUS; SUBCUTANEOUS at 12:48

## 2025-08-27 RX ADMIN — LISINOPRIL 10 MG: 10 TABLET ORAL at 19:33

## 2025-08-27 RX ADMIN — ALBUTEROL SULFATE 2.5 MG: 2.5 SOLUTION RESPIRATORY (INHALATION) at 11:39

## 2025-08-27 RX ADMIN — MAGNESIUM SULFATE HEPTAHYDRATE 2000 MG: 40 INJECTION, SOLUTION INTRAVENOUS at 15:21

## 2025-08-27 RX ADMIN — DOFETILIDE 500 MCG: 0.5 CAPSULE ORAL at 08:17

## 2025-08-27 RX ADMIN — METOPROLOL SUCCINATE 100 MG: 50 TABLET, EXTENDED RELEASE ORAL at 08:16

## 2025-08-27 RX ADMIN — INSULIN GLARGINE 60 UNITS: 100 INJECTION, SOLUTION SUBCUTANEOUS at 19:32

## 2025-08-27 RX ADMIN — SODIUM CHLORIDE, PRESERVATIVE FREE 10 ML: 5 INJECTION INTRAVENOUS at 08:16

## 2025-08-27 RX ADMIN — ALBUTEROL SULFATE 2.5 MG: 2.5 SOLUTION RESPIRATORY (INHALATION) at 19:25

## 2025-08-27 RX ADMIN — ARFORMOTEROL TARTRATE 15 MCG: 15 SOLUTION RESPIRATORY (INHALATION) at 07:35

## 2025-08-27 RX ADMIN — FUROSEMIDE 40 MG: 10 INJECTION, SOLUTION INTRAMUSCULAR; INTRAVENOUS at 08:17

## 2025-08-27 RX ADMIN — APIXABAN 5 MG: 5 TABLET, FILM COATED ORAL at 19:33

## 2025-08-27 RX ADMIN — PRAZOSIN HYDROCHLORIDE 2 MG: 2 CAPSULE ORAL at 19:35

## 2025-08-27 RX ADMIN — ALBUTEROL SULFATE 2.5 MG: 2.5 SOLUTION RESPIRATORY (INHALATION) at 16:15

## 2025-08-27 RX ADMIN — ATORVASTATIN CALCIUM 40 MG: 40 TABLET, FILM COATED ORAL at 08:17

## 2025-08-27 RX ADMIN — Medication 400 MG: at 15:17

## 2025-08-27 RX ADMIN — INSULIN LISPRO 1 UNITS: 100 INJECTION, SOLUTION INTRAVENOUS; SUBCUTANEOUS at 19:32

## 2025-08-27 RX ADMIN — FINASTERIDE 5 MG: 5 TABLET, FILM COATED ORAL at 08:17

## 2025-08-27 RX ADMIN — FUROSEMIDE 40 MG: 10 INJECTION, SOLUTION INTRAMUSCULAR; INTRAVENOUS at 17:05

## 2025-08-27 ASSESSMENT — PAIN SCALES - GENERAL
PAINLEVEL_OUTOF10: 0
PAINLEVEL_OUTOF10: 0
PAINLEVEL_OUTOF10: 1

## 2025-08-27 ASSESSMENT — PAIN DESCRIPTION - ORIENTATION: ORIENTATION: LEFT

## 2025-08-27 ASSESSMENT — PAIN DESCRIPTION - LOCATION: LOCATION: BACK

## 2025-08-27 ASSESSMENT — PAIN DESCRIPTION - DESCRIPTORS: DESCRIPTORS: ACHING

## 2025-08-28 LAB
ANION GAP SERPL CALCULATED.3IONS-SCNC: 12 MMOL/L (ref 7–16)
BNP SERPL-MCNC: 831 PG/ML (ref 0–450)
BUN SERPL-MCNC: 20 MG/DL (ref 8–23)
CALCIUM SERPL-MCNC: 8.7 MG/DL (ref 8.8–10.2)
CHLORIDE SERPL-SCNC: 100 MMOL/L (ref 98–107)
CO2 SERPL-SCNC: 23 MMOL/L (ref 22–29)
CREAT SERPL-MCNC: 1.1 MG/DL (ref 0.7–1.2)
ECHO AO ASC DIAM: 4.1 CM
ECHO AO ASCENDING AORTA INDEX: 1.61 CM/M2
ECHO AV AREA PEAK VELOCITY: 3 CM2
ECHO AV AREA VTI: 3.5 CM2
ECHO AV AREA/BSA PEAK VELOCITY: 1.2 CM2/M2
ECHO AV AREA/BSA VTI: 1.4 CM2/M2
ECHO AV CUSP MM: 2.7 CM
ECHO AV MEAN GRADIENT: 5 MMHG
ECHO AV MEAN VELOCITY: 1 M/S
ECHO AV PEAK GRADIENT: 8 MMHG
ECHO AV PEAK VELOCITY: 1.4 M/S
ECHO AV VELOCITY RATIO: 0.71
ECHO AV VTI: 24.2 CM
ECHO EST RA PRESSURE: 3 MMHG
ECHO LA DIAMETER INDEX: 2.48 CM/M2
ECHO LA DIAMETER: 6.3 CM
ECHO LA VOL A-L A4C: 174 ML (ref 18–58)
ECHO LA VOL MOD A4C: 164 ML (ref 18–58)
ECHO LA VOLUME INDEX A-L A4C: 69 ML/M2 (ref 16–34)
ECHO LA VOLUME INDEX MOD A4C: 65 ML/M2 (ref 16–34)
ECHO LV EJECTION FRACTION 3D: 55 %
ECHO LV FRACTIONAL SHORTENING: 35 % (ref 28–44)
ECHO LV INTERNAL DIMENSION DIASTOLE INDEX: 2.05 CM/M2
ECHO LV INTERNAL DIMENSION DIASTOLIC: 5.2 CM (ref 4.2–5.9)
ECHO LV INTERNAL DIMENSION SYSTOLIC INDEX: 1.34 CM/M2
ECHO LV INTERNAL DIMENSION SYSTOLIC: 3.4 CM
ECHO LV ISOVOLUMETRIC RELAXATION TIME (IVRT): 68.5 MS
ECHO LV IVSD: 1.3 CM (ref 0.6–1)
ECHO LV MASS 2D: 278.4 G (ref 88–224)
ECHO LV MASS INDEX 2D: 109.6 G/M2 (ref 49–115)
ECHO LV POSTERIOR WALL DIASTOLIC: 1.3 CM (ref 0.6–1)
ECHO LV RELATIVE WALL THICKNESS RATIO: 0.5
ECHO LVOT AREA: 4.2 CM2
ECHO LVOT AV VTI INDEX: 0.82
ECHO LVOT DIAM: 2.3 CM
ECHO LVOT MEAN GRADIENT: 3 MMHG
ECHO LVOT PEAK GRADIENT: 4 MMHG
ECHO LVOT PEAK VELOCITY: 1 M/S
ECHO LVOT STROKE VOLUME INDEX: 32.5 ML/M2
ECHO LVOT SV: 82.6 ML
ECHO LVOT VTI: 19.9 CM
ECHO MV AREA PHT: 3.1 CM2
ECHO MV AREA VTI: 3.1 CM2
ECHO MV LVOT VTI INDEX: 1.33
ECHO MV MAX VELOCITY: 1.3 M/S
ECHO MV MEAN GRADIENT: 2 MMHG
ECHO MV MEAN VELOCITY: 0.7 M/S
ECHO MV PEAK GRADIENT: 7 MMHG
ECHO MV PRESSURE HALF TIME (PHT): 70.4 MS
ECHO MV VTI: 26.4 CM
ECHO PV MAX VELOCITY: 1.2 M/S
ECHO PV MEAN GRADIENT: 3 MMHG
ECHO PV MEAN VELOCITY: 0.8 M/S
ECHO PV PEAK GRADIENT: 6 MMHG
ECHO PV VTI: 18.8 CM
ECHO RIGHT VENTRICULAR SYSTOLIC PRESSURE (RVSP): 65 MMHG
ECHO RV INTERNAL DIMENSION: 4.5 CM
ECHO RV LONGITUDINAL DIMENSION: 8.8 CM
ECHO RV MID DIMENSION: 3.8 CM
ECHO TV REGURGITANT MAX VELOCITY: 3.93 M/S
ECHO TV REGURGITANT PEAK GRADIENT: 62 MMHG
GFR, ESTIMATED: 73 ML/MIN/1.73M2
GLUCOSE BLD-MCNC: 114 MG/DL (ref 74–99)
GLUCOSE BLD-MCNC: 182 MG/DL (ref 74–99)
GLUCOSE BLD-MCNC: 210 MG/DL (ref 74–99)
GLUCOSE BLD-MCNC: 248 MG/DL (ref 74–99)
GLUCOSE SERPL-MCNC: 150 MG/DL (ref 74–99)
MAGNESIUM SERPL-MCNC: 1.9 MG/DL (ref 1.6–2.4)
POTASSIUM SERPL-SCNC: 3.5 MMOL/L (ref 3.5–5.1)
SODIUM SERPL-SCNC: 135 MMOL/L (ref 136–145)

## 2025-08-28 PROCEDURE — 6360000002 HC RX W HCPCS

## 2025-08-28 PROCEDURE — 6370000000 HC RX 637 (ALT 250 FOR IP): Performed by: CLINICAL NURSE SPECIALIST

## 2025-08-28 PROCEDURE — 82962 GLUCOSE BLOOD TEST: CPT

## 2025-08-28 PROCEDURE — 6360000002 HC RX W HCPCS: Performed by: INTERNAL MEDICINE

## 2025-08-28 PROCEDURE — 6370000000 HC RX 637 (ALT 250 FOR IP): Performed by: INTERNAL MEDICINE

## 2025-08-28 PROCEDURE — 6370000000 HC RX 637 (ALT 250 FOR IP): Performed by: NURSE PRACTITIONER

## 2025-08-28 PROCEDURE — 6370000000 HC RX 637 (ALT 250 FOR IP): Performed by: STUDENT IN AN ORGANIZED HEALTH CARE EDUCATION/TRAINING PROGRAM

## 2025-08-28 PROCEDURE — 2500000003 HC RX 250 WO HCPCS

## 2025-08-28 PROCEDURE — 99232 SBSQ HOSP IP/OBS MODERATE 35: CPT | Performed by: INTERNAL MEDICINE

## 2025-08-28 PROCEDURE — 99233 SBSQ HOSP IP/OBS HIGH 50: CPT | Performed by: INTERNAL MEDICINE

## 2025-08-28 PROCEDURE — 94640 AIRWAY INHALATION TREATMENT: CPT

## 2025-08-28 PROCEDURE — 83880 ASSAY OF NATRIURETIC PEPTIDE: CPT

## 2025-08-28 PROCEDURE — 36415 COLL VENOUS BLD VENIPUNCTURE: CPT

## 2025-08-28 PROCEDURE — 80048 BASIC METABOLIC PNL TOTAL CA: CPT

## 2025-08-28 PROCEDURE — 6370000000 HC RX 637 (ALT 250 FOR IP)

## 2025-08-28 PROCEDURE — 83735 ASSAY OF MAGNESIUM: CPT

## 2025-08-28 PROCEDURE — 1200000000 HC SEMI PRIVATE

## 2025-08-28 RX ORDER — HYDRALAZINE HYDROCHLORIDE 20 MG/ML
10 INJECTION INTRAMUSCULAR; INTRAVENOUS
Status: DISCONTINUED | OUTPATIENT
Start: 2025-08-28 | End: 2025-08-28

## 2025-08-28 RX ORDER — FUROSEMIDE 10 MG/ML
60 INJECTION INTRAMUSCULAR; INTRAVENOUS 2 TIMES DAILY
Status: DISCONTINUED | OUTPATIENT
Start: 2025-08-29 | End: 2025-09-03

## 2025-08-28 RX ORDER — HYDRALAZINE HYDROCHLORIDE 25 MG/1
25 TABLET, FILM COATED ORAL EVERY 8 HOURS SCHEDULED
Status: DISCONTINUED | OUTPATIENT
Start: 2025-08-28 | End: 2025-09-03 | Stop reason: HOSPADM

## 2025-08-28 RX ORDER — HYDRALAZINE HYDROCHLORIDE 25 MG/1
25 TABLET, FILM COATED ORAL EVERY 8 HOURS SCHEDULED
Status: DISCONTINUED | OUTPATIENT
Start: 2025-08-28 | End: 2025-08-28

## 2025-08-28 RX ADMIN — ALBUTEROL SULFATE 2.5 MG: 2.5 SOLUTION RESPIRATORY (INHALATION) at 14:59

## 2025-08-28 RX ADMIN — ALBUTEROL SULFATE 2.5 MG: 2.5 SOLUTION RESPIRATORY (INHALATION) at 20:13

## 2025-08-28 RX ADMIN — HYDRALAZINE HYDROCHLORIDE 25 MG: 25 TABLET ORAL at 18:12

## 2025-08-28 RX ADMIN — SODIUM CHLORIDE, PRESERVATIVE FREE 10 ML: 5 INJECTION INTRAVENOUS at 19:38

## 2025-08-28 RX ADMIN — PRAZOSIN HYDROCHLORIDE 2 MG: 2 CAPSULE ORAL at 19:35

## 2025-08-28 RX ADMIN — SODIUM CHLORIDE, PRESERVATIVE FREE 10 ML: 5 INJECTION INTRAVENOUS at 08:24

## 2025-08-28 RX ADMIN — APIXABAN 5 MG: 5 TABLET, FILM COATED ORAL at 19:35

## 2025-08-28 RX ADMIN — ATORVASTATIN CALCIUM 40 MG: 40 TABLET, FILM COATED ORAL at 08:24

## 2025-08-28 RX ADMIN — INSULIN GLARGINE 60 UNITS: 100 INJECTION, SOLUTION SUBCUTANEOUS at 19:35

## 2025-08-28 RX ADMIN — FINASTERIDE 5 MG: 5 TABLET, FILM COATED ORAL at 08:24

## 2025-08-28 RX ADMIN — TRAMADOL HYDROCHLORIDE 50 MG: 50 TABLET, COATED ORAL at 01:39

## 2025-08-28 RX ADMIN — APIXABAN 5 MG: 5 TABLET, FILM COATED ORAL at 08:24

## 2025-08-28 RX ADMIN — ALBUTEROL SULFATE 2.5 MG: 2.5 SOLUTION RESPIRATORY (INHALATION) at 07:46

## 2025-08-28 RX ADMIN — INSULIN LISPRO 1 UNITS: 100 INJECTION, SOLUTION INTRAVENOUS; SUBCUTANEOUS at 17:16

## 2025-08-28 RX ADMIN — LISINOPRIL 10 MG: 10 TABLET ORAL at 19:35

## 2025-08-28 RX ADMIN — ARFORMOTEROL TARTRATE 15 MCG: 15 SOLUTION RESPIRATORY (INHALATION) at 07:46

## 2025-08-28 RX ADMIN — INSULIN LISPRO 1 UNITS: 100 INJECTION, SOLUTION INTRAVENOUS; SUBCUTANEOUS at 12:20

## 2025-08-28 RX ADMIN — FUROSEMIDE 40 MG: 10 INJECTION, SOLUTION INTRAMUSCULAR; INTRAVENOUS at 08:24

## 2025-08-28 RX ADMIN — METOPROLOL SUCCINATE 100 MG: 50 TABLET, EXTENDED RELEASE ORAL at 08:24

## 2025-08-28 RX ADMIN — INSULIN LISPRO 1 UNITS: 100 INJECTION, SOLUTION INTRAVENOUS; SUBCUTANEOUS at 19:43

## 2025-08-28 RX ADMIN — FUROSEMIDE 40 MG: 10 INJECTION, SOLUTION INTRAMUSCULAR; INTRAVENOUS at 16:57

## 2025-08-28 RX ADMIN — ALBUTEROL SULFATE 2.5 MG: 2.5 SOLUTION RESPIRATORY (INHALATION) at 11:09

## 2025-08-28 RX ADMIN — Medication 400 MG: at 08:24

## 2025-08-28 RX ADMIN — TRAMADOL HYDROCHLORIDE 50 MG: 50 TABLET, COATED ORAL at 19:35

## 2025-08-28 RX ADMIN — ARFORMOTEROL TARTRATE 15 MCG: 15 SOLUTION RESPIRATORY (INHALATION) at 20:13

## 2025-08-28 ASSESSMENT — PAIN - FUNCTIONAL ASSESSMENT
PAIN_FUNCTIONAL_ASSESSMENT: 0-10

## 2025-08-28 ASSESSMENT — PAIN DESCRIPTION - LOCATION
LOCATION: LEG
LOCATION: LEG

## 2025-08-28 ASSESSMENT — PAIN SCALES - GENERAL
PAINLEVEL_OUTOF10: 7
PAINLEVEL_OUTOF10: 0
PAINLEVEL_OUTOF10: 6
PAINLEVEL_OUTOF10: 0
PAINLEVEL_OUTOF10: 0

## 2025-08-28 ASSESSMENT — PAIN DESCRIPTION - ORIENTATION
ORIENTATION: LEFT
ORIENTATION: LEFT;LOWER

## 2025-08-28 ASSESSMENT — PAIN DESCRIPTION - DESCRIPTORS
DESCRIPTORS: CRAMPING
DESCRIPTORS: ACHING;CRAMPING

## 2025-08-29 LAB
ANION GAP SERPL CALCULATED.3IONS-SCNC: 13 MMOL/L (ref 7–16)
BNP SERPL-MCNC: 715 PG/ML (ref 0–450)
BUN SERPL-MCNC: 20 MG/DL (ref 8–23)
CALCIUM SERPL-MCNC: 8.8 MG/DL (ref 8.8–10.2)
CHLORIDE SERPL-SCNC: 100 MMOL/L (ref 98–107)
CO2 SERPL-SCNC: 24 MMOL/L (ref 22–29)
CREAT SERPL-MCNC: 1 MG/DL (ref 0.7–1.2)
GFR, ESTIMATED: 75 ML/MIN/1.73M2
GLUCOSE BLD-MCNC: 109 MG/DL (ref 74–99)
GLUCOSE BLD-MCNC: 151 MG/DL (ref 74–99)
GLUCOSE BLD-MCNC: 158 MG/DL (ref 74–99)
GLUCOSE BLD-MCNC: 254 MG/DL (ref 74–99)
GLUCOSE SERPL-MCNC: 129 MG/DL (ref 74–99)
MAGNESIUM SERPL-MCNC: 1.8 MG/DL (ref 1.6–2.4)
POTASSIUM SERPL-SCNC: 3.3 MMOL/L (ref 3.5–5.1)
SODIUM SERPL-SCNC: 137 MMOL/L (ref 136–145)

## 2025-08-29 PROCEDURE — 94640 AIRWAY INHALATION TREATMENT: CPT

## 2025-08-29 PROCEDURE — 6370000000 HC RX 637 (ALT 250 FOR IP): Performed by: NURSE PRACTITIONER

## 2025-08-29 PROCEDURE — 36415 COLL VENOUS BLD VENIPUNCTURE: CPT

## 2025-08-29 PROCEDURE — 99232 SBSQ HOSP IP/OBS MODERATE 35: CPT | Performed by: INTERNAL MEDICINE

## 2025-08-29 PROCEDURE — 99232 SBSQ HOSP IP/OBS MODERATE 35: CPT | Performed by: NURSE PRACTITIONER

## 2025-08-29 PROCEDURE — 6370000000 HC RX 637 (ALT 250 FOR IP): Performed by: STUDENT IN AN ORGANIZED HEALTH CARE EDUCATION/TRAINING PROGRAM

## 2025-08-29 PROCEDURE — 6360000002 HC RX W HCPCS: Performed by: INTERNAL MEDICINE

## 2025-08-29 PROCEDURE — 6370000000 HC RX 637 (ALT 250 FOR IP): Performed by: INTERNAL MEDICINE

## 2025-08-29 PROCEDURE — 82962 GLUCOSE BLOOD TEST: CPT

## 2025-08-29 PROCEDURE — 80048 BASIC METABOLIC PNL TOTAL CA: CPT

## 2025-08-29 PROCEDURE — 2500000003 HC RX 250 WO HCPCS

## 2025-08-29 PROCEDURE — 6370000000 HC RX 637 (ALT 250 FOR IP): Performed by: CLINICAL NURSE SPECIALIST

## 2025-08-29 PROCEDURE — 83735 ASSAY OF MAGNESIUM: CPT

## 2025-08-29 PROCEDURE — 1200000000 HC SEMI PRIVATE

## 2025-08-29 PROCEDURE — 6370000000 HC RX 637 (ALT 250 FOR IP)

## 2025-08-29 PROCEDURE — 83880 ASSAY OF NATRIURETIC PEPTIDE: CPT

## 2025-08-29 RX ADMIN — TRAMADOL HYDROCHLORIDE 50 MG: 50 TABLET, COATED ORAL at 19:50

## 2025-08-29 RX ADMIN — FINASTERIDE 5 MG: 5 TABLET, FILM COATED ORAL at 07:57

## 2025-08-29 RX ADMIN — Medication 400 MG: at 07:57

## 2025-08-29 RX ADMIN — APIXABAN 5 MG: 5 TABLET, FILM COATED ORAL at 19:42

## 2025-08-29 RX ADMIN — ALBUTEROL SULFATE 2.5 MG: 2.5 SOLUTION RESPIRATORY (INHALATION) at 18:18

## 2025-08-29 RX ADMIN — POTASSIUM CHLORIDE 40 MEQ: 1500 TABLET, EXTENDED RELEASE ORAL at 07:57

## 2025-08-29 RX ADMIN — PRAZOSIN HYDROCHLORIDE 2 MG: 2 CAPSULE ORAL at 19:42

## 2025-08-29 RX ADMIN — SODIUM CHLORIDE, PRESERVATIVE FREE 10 ML: 5 INJECTION INTRAVENOUS at 07:57

## 2025-08-29 RX ADMIN — FUROSEMIDE 60 MG: 10 INJECTION, SOLUTION INTRAMUSCULAR; INTRAVENOUS at 07:57

## 2025-08-29 RX ADMIN — ATORVASTATIN CALCIUM 40 MG: 40 TABLET, FILM COATED ORAL at 07:57

## 2025-08-29 RX ADMIN — ALBUTEROL SULFATE 2.5 MG: 2.5 SOLUTION RESPIRATORY (INHALATION) at 07:53

## 2025-08-29 RX ADMIN — APIXABAN 5 MG: 5 TABLET, FILM COATED ORAL at 07:57

## 2025-08-29 RX ADMIN — INSULIN GLARGINE 60 UNITS: 100 INJECTION, SOLUTION SUBCUTANEOUS at 19:42

## 2025-08-29 RX ADMIN — FUROSEMIDE 60 MG: 10 INJECTION, SOLUTION INTRAMUSCULAR; INTRAVENOUS at 17:42

## 2025-08-29 RX ADMIN — HYDRALAZINE HYDROCHLORIDE 25 MG: 25 TABLET ORAL at 20:00

## 2025-08-29 RX ADMIN — ARFORMOTEROL TARTRATE 15 MCG: 15 SOLUTION RESPIRATORY (INHALATION) at 07:53

## 2025-08-29 RX ADMIN — TRAMADOL HYDROCHLORIDE 50 MG: 50 TABLET, COATED ORAL at 13:48

## 2025-08-29 RX ADMIN — ALBUTEROL SULFATE 2.5 MG: 2.5 SOLUTION RESPIRATORY (INHALATION) at 12:07

## 2025-08-29 RX ADMIN — ALBUTEROL SULFATE 2.5 MG: 2.5 SOLUTION RESPIRATORY (INHALATION) at 15:24

## 2025-08-29 RX ADMIN — ARFORMOTEROL TARTRATE 15 MCG: 15 SOLUTION RESPIRATORY (INHALATION) at 18:18

## 2025-08-29 RX ADMIN — METOPROLOL SUCCINATE 100 MG: 50 TABLET, EXTENDED RELEASE ORAL at 07:57

## 2025-08-29 RX ADMIN — LISINOPRIL 10 MG: 10 TABLET ORAL at 19:42

## 2025-08-29 RX ADMIN — INSULIN LISPRO 2 UNITS: 100 INJECTION, SOLUTION INTRAVENOUS; SUBCUTANEOUS at 19:49

## 2025-08-29 RX ADMIN — HYDRALAZINE HYDROCHLORIDE 25 MG: 25 TABLET ORAL at 13:57

## 2025-08-29 RX ADMIN — SODIUM CHLORIDE, PRESERVATIVE FREE 10 ML: 5 INJECTION INTRAVENOUS at 20:00

## 2025-08-29 RX ADMIN — HYDRALAZINE HYDROCHLORIDE 25 MG: 25 TABLET ORAL at 05:04

## 2025-08-29 ASSESSMENT — PAIN DESCRIPTION - DESCRIPTORS
DESCRIPTORS: ACHING;CRAMPING
DESCRIPTORS: ACHING;CRAMPING;THROBBING

## 2025-08-29 ASSESSMENT — PAIN DESCRIPTION - LOCATION
LOCATION: LEG
LOCATION: LEG;BACK

## 2025-08-29 ASSESSMENT — PAIN SCALES - GENERAL
PAINLEVEL_OUTOF10: 7
PAINLEVEL_OUTOF10: 6
PAINLEVEL_OUTOF10: 0
PAINLEVEL_OUTOF10: 4
PAINLEVEL_OUTOF10: 0

## 2025-08-29 ASSESSMENT — PAIN - FUNCTIONAL ASSESSMENT
PAIN_FUNCTIONAL_ASSESSMENT: 0-10
PAIN_FUNCTIONAL_ASSESSMENT: ACTIVITIES ARE NOT PREVENTED

## 2025-08-29 ASSESSMENT — PAIN DESCRIPTION - ORIENTATION
ORIENTATION: RIGHT;LEFT;LOWER
ORIENTATION: LEFT

## 2025-08-30 LAB
ANION GAP SERPL CALCULATED.3IONS-SCNC: 16 MMOL/L (ref 7–16)
BNP SERPL-MCNC: 832 PG/ML (ref 0–450)
BUN SERPL-MCNC: 19 MG/DL (ref 8–23)
CALCIUM SERPL-MCNC: 9.3 MG/DL (ref 8.8–10.2)
CHLORIDE SERPL-SCNC: 102 MMOL/L (ref 98–107)
CO2 SERPL-SCNC: 21 MMOL/L (ref 22–29)
CREAT SERPL-MCNC: 1.2 MG/DL (ref 0.7–1.2)
GFR, ESTIMATED: 64 ML/MIN/1.73M2
GLUCOSE BLD-MCNC: 122 MG/DL (ref 74–99)
GLUCOSE BLD-MCNC: 210 MG/DL (ref 74–99)
GLUCOSE BLD-MCNC: 220 MG/DL (ref 74–99)
GLUCOSE BLD-MCNC: 276 MG/DL (ref 74–99)
GLUCOSE SERPL-MCNC: 114 MG/DL (ref 74–99)
MAGNESIUM SERPL-MCNC: 1.9 MG/DL (ref 1.6–2.4)
POTASSIUM SERPL-SCNC: 3.8 MMOL/L (ref 3.5–5.1)
SODIUM SERPL-SCNC: 139 MMOL/L (ref 136–145)

## 2025-08-30 PROCEDURE — 82962 GLUCOSE BLOOD TEST: CPT

## 2025-08-30 PROCEDURE — 80048 BASIC METABOLIC PNL TOTAL CA: CPT

## 2025-08-30 PROCEDURE — 94640 AIRWAY INHALATION TREATMENT: CPT

## 2025-08-30 PROCEDURE — 6360000002 HC RX W HCPCS: Performed by: INTERNAL MEDICINE

## 2025-08-30 PROCEDURE — 6370000000 HC RX 637 (ALT 250 FOR IP): Performed by: CLINICAL NURSE SPECIALIST

## 2025-08-30 PROCEDURE — 6370000000 HC RX 637 (ALT 250 FOR IP): Performed by: NURSE PRACTITIONER

## 2025-08-30 PROCEDURE — 99221 1ST HOSP IP/OBS SF/LOW 40: CPT | Performed by: INTERNAL MEDICINE

## 2025-08-30 PROCEDURE — 6370000000 HC RX 637 (ALT 250 FOR IP): Performed by: INTERNAL MEDICINE

## 2025-08-30 PROCEDURE — 93005 ELECTROCARDIOGRAM TRACING: CPT | Performed by: STUDENT IN AN ORGANIZED HEALTH CARE EDUCATION/TRAINING PROGRAM

## 2025-08-30 PROCEDURE — 83735 ASSAY OF MAGNESIUM: CPT

## 2025-08-30 PROCEDURE — 83880 ASSAY OF NATRIURETIC PEPTIDE: CPT

## 2025-08-30 PROCEDURE — 36415 COLL VENOUS BLD VENIPUNCTURE: CPT

## 2025-08-30 PROCEDURE — 99232 SBSQ HOSP IP/OBS MODERATE 35: CPT | Performed by: INTERNAL MEDICINE

## 2025-08-30 PROCEDURE — 1200000000 HC SEMI PRIVATE

## 2025-08-30 PROCEDURE — 6370000000 HC RX 637 (ALT 250 FOR IP)

## 2025-08-30 PROCEDURE — 2500000003 HC RX 250 WO HCPCS

## 2025-08-30 PROCEDURE — 99233 SBSQ HOSP IP/OBS HIGH 50: CPT | Performed by: INTERNAL MEDICINE

## 2025-08-30 PROCEDURE — 6370000000 HC RX 637 (ALT 250 FOR IP): Performed by: STUDENT IN AN ORGANIZED HEALTH CARE EDUCATION/TRAINING PROGRAM

## 2025-08-30 RX ADMIN — INSULIN LISPRO 2 UNITS: 100 INJECTION, SOLUTION INTRAVENOUS; SUBCUTANEOUS at 17:35

## 2025-08-30 RX ADMIN — INSULIN LISPRO 2 UNITS: 100 INJECTION, SOLUTION INTRAVENOUS; SUBCUTANEOUS at 21:05

## 2025-08-30 RX ADMIN — TRAMADOL HYDROCHLORIDE 50 MG: 50 TABLET, COATED ORAL at 21:03

## 2025-08-30 RX ADMIN — FUROSEMIDE 60 MG: 10 INJECTION, SOLUTION INTRAMUSCULAR; INTRAVENOUS at 18:17

## 2025-08-30 RX ADMIN — APIXABAN 5 MG: 5 TABLET, FILM COATED ORAL at 08:02

## 2025-08-30 RX ADMIN — ALBUTEROL SULFATE 2.5 MG: 2.5 SOLUTION RESPIRATORY (INHALATION) at 12:01

## 2025-08-30 RX ADMIN — INSULIN GLARGINE 60 UNITS: 100 INJECTION, SOLUTION SUBCUTANEOUS at 21:05

## 2025-08-30 RX ADMIN — FUROSEMIDE 60 MG: 10 INJECTION, SOLUTION INTRAMUSCULAR; INTRAVENOUS at 08:02

## 2025-08-30 RX ADMIN — LISINOPRIL 10 MG: 10 TABLET ORAL at 21:03

## 2025-08-30 RX ADMIN — Medication 400 MG: at 08:02

## 2025-08-30 RX ADMIN — SODIUM CHLORIDE, PRESERVATIVE FREE 10 ML: 5 INJECTION INTRAVENOUS at 21:05

## 2025-08-30 RX ADMIN — FINASTERIDE 5 MG: 5 TABLET, FILM COATED ORAL at 08:02

## 2025-08-30 RX ADMIN — INSULIN LISPRO 1 UNITS: 100 INJECTION, SOLUTION INTRAVENOUS; SUBCUTANEOUS at 12:01

## 2025-08-30 RX ADMIN — SODIUM CHLORIDE, PRESERVATIVE FREE 10 ML: 5 INJECTION INTRAVENOUS at 08:02

## 2025-08-30 RX ADMIN — APIXABAN 5 MG: 5 TABLET, FILM COATED ORAL at 21:04

## 2025-08-30 RX ADMIN — PRAZOSIN HYDROCHLORIDE 2 MG: 2 CAPSULE ORAL at 22:40

## 2025-08-30 RX ADMIN — ALBUTEROL SULFATE 2.5 MG: 2.5 SOLUTION RESPIRATORY (INHALATION) at 19:36

## 2025-08-30 RX ADMIN — HYDRALAZINE HYDROCHLORIDE 25 MG: 25 TABLET ORAL at 05:18

## 2025-08-30 RX ADMIN — ALBUTEROL SULFATE 2.5 MG: 2.5 SOLUTION RESPIRATORY (INHALATION) at 16:30

## 2025-08-30 RX ADMIN — HYDRALAZINE HYDROCHLORIDE 25 MG: 25 TABLET ORAL at 15:09

## 2025-08-30 RX ADMIN — ALBUTEROL SULFATE 2.5 MG: 2.5 SOLUTION RESPIRATORY (INHALATION) at 08:52

## 2025-08-30 RX ADMIN — HYDRALAZINE HYDROCHLORIDE 25 MG: 25 TABLET ORAL at 22:39

## 2025-08-30 RX ADMIN — METOPROLOL SUCCINATE 100 MG: 50 TABLET, EXTENDED RELEASE ORAL at 08:02

## 2025-08-30 RX ADMIN — ARFORMOTEROL TARTRATE 15 MCG: 15 SOLUTION RESPIRATORY (INHALATION) at 08:52

## 2025-08-30 RX ADMIN — ARFORMOTEROL TARTRATE 15 MCG: 15 SOLUTION RESPIRATORY (INHALATION) at 19:36

## 2025-08-30 RX ADMIN — ATORVASTATIN CALCIUM 40 MG: 40 TABLET, FILM COATED ORAL at 08:02

## 2025-08-30 ASSESSMENT — PAIN - FUNCTIONAL ASSESSMENT
PAIN_FUNCTIONAL_ASSESSMENT: 0-10
PAIN_FUNCTIONAL_ASSESSMENT: 0-10

## 2025-08-30 ASSESSMENT — PAIN SCALES - GENERAL
PAINLEVEL_OUTOF10: 0
PAINLEVEL_OUTOF10: 1

## 2025-08-30 ASSESSMENT — PAIN DESCRIPTION - DESCRIPTORS: DESCRIPTORS: ACHING

## 2025-08-31 ENCOUNTER — APPOINTMENT (OUTPATIENT)
Dept: NUCLEAR MEDICINE | Age: 80
DRG: 291 | End: 2025-08-31
Payer: OTHER GOVERNMENT

## 2025-08-31 LAB
ANION GAP SERPL CALCULATED.3IONS-SCNC: 13 MMOL/L (ref 7–16)
BASOPHILS # BLD: 0.12 K/UL (ref 0–0.2)
BASOPHILS NFR BLD: 1 % (ref 0–2)
BNP SERPL-MCNC: 767 PG/ML (ref 0–450)
BUN SERPL-MCNC: 17 MG/DL (ref 8–23)
CALCIUM SERPL-MCNC: 9.1 MG/DL (ref 8.8–10.2)
CHLORIDE SERPL-SCNC: 100 MMOL/L (ref 98–107)
CO2 SERPL-SCNC: 25 MMOL/L (ref 22–29)
CREAT SERPL-MCNC: 1.1 MG/DL (ref 0.7–1.2)
EOSINOPHIL # BLD: 0.14 K/UL (ref 0.05–0.5)
EOSINOPHILS RELATIVE PERCENT: 2 % (ref 0–6)
ERYTHROCYTE [DISTWIDTH] IN BLOOD BY AUTOMATED COUNT: 14.6 % (ref 11.5–15)
GFR, ESTIMATED: 71 ML/MIN/1.73M2
GLUCOSE BLD-MCNC: 127 MG/DL (ref 74–99)
GLUCOSE BLD-MCNC: 192 MG/DL (ref 74–99)
GLUCOSE BLD-MCNC: 202 MG/DL (ref 74–99)
GLUCOSE BLD-MCNC: 240 MG/DL (ref 74–99)
GLUCOSE SERPL-MCNC: 136 MG/DL (ref 74–99)
HCT VFR BLD AUTO: 45.2 % (ref 37–54)
HGB BLD-MCNC: 14.7 G/DL (ref 12.5–16.5)
IMM GRANULOCYTES # BLD AUTO: 0.37 K/UL (ref 0–0.58)
IMM GRANULOCYTES NFR BLD: 4 % (ref 0–5)
LYMPHOCYTES NFR BLD: 2.13 K/UL (ref 1.5–4)
LYMPHOCYTES RELATIVE PERCENT: 25 % (ref 20–42)
MCH RBC QN AUTO: 28.9 PG (ref 26–35)
MCHC RBC AUTO-ENTMCNC: 32.5 G/DL (ref 32–34.5)
MCV RBC AUTO: 88.8 FL (ref 80–99.9)
MONOCYTES NFR BLD: 1.09 K/UL (ref 0.1–0.95)
MONOCYTES NFR BLD: 13 % (ref 2–12)
NEUTROPHILS NFR BLD: 55 % (ref 43–80)
NEUTS SEG NFR BLD: 4.67 K/UL (ref 1.8–7.3)
PLATELET # BLD AUTO: 224 K/UL (ref 130–450)
PMV BLD AUTO: 10.6 FL (ref 7–12)
POTASSIUM SERPL-SCNC: 3.5 MMOL/L (ref 3.5–5.1)
RBC # BLD AUTO: 5.09 M/UL (ref 3.8–5.8)
SODIUM SERPL-SCNC: 138 MMOL/L (ref 136–145)
WBC OTHER # BLD: 8.5 K/UL (ref 4.5–11.5)

## 2025-08-31 PROCEDURE — 80048 BASIC METABOLIC PNL TOTAL CA: CPT

## 2025-08-31 PROCEDURE — 82962 GLUCOSE BLOOD TEST: CPT

## 2025-08-31 PROCEDURE — 6370000000 HC RX 637 (ALT 250 FOR IP): Performed by: CLINICAL NURSE SPECIALIST

## 2025-08-31 PROCEDURE — 99233 SBSQ HOSP IP/OBS HIGH 50: CPT | Performed by: INTERNAL MEDICINE

## 2025-08-31 PROCEDURE — 3430000000 HC RX DIAGNOSTIC RADIOPHARMACEUTICAL: Performed by: RADIOLOGY

## 2025-08-31 PROCEDURE — 6360000002 HC RX W HCPCS: Performed by: INTERNAL MEDICINE

## 2025-08-31 PROCEDURE — 78582 LUNG VENTILAT&PERFUS IMAGING: CPT

## 2025-08-31 PROCEDURE — 6370000000 HC RX 637 (ALT 250 FOR IP): Performed by: INTERNAL MEDICINE

## 2025-08-31 PROCEDURE — A9540 TC99M MAA: HCPCS | Performed by: RADIOLOGY

## 2025-08-31 PROCEDURE — 2500000003 HC RX 250 WO HCPCS

## 2025-08-31 PROCEDURE — 6370000000 HC RX 637 (ALT 250 FOR IP): Performed by: STUDENT IN AN ORGANIZED HEALTH CARE EDUCATION/TRAINING PROGRAM

## 2025-08-31 PROCEDURE — 6370000000 HC RX 637 (ALT 250 FOR IP)

## 2025-08-31 PROCEDURE — 83880 ASSAY OF NATRIURETIC PEPTIDE: CPT

## 2025-08-31 PROCEDURE — A9539 TC99M PENTETATE: HCPCS | Performed by: RADIOLOGY

## 2025-08-31 PROCEDURE — 94640 AIRWAY INHALATION TREATMENT: CPT

## 2025-08-31 PROCEDURE — 36415 COLL VENOUS BLD VENIPUNCTURE: CPT

## 2025-08-31 PROCEDURE — 85025 COMPLETE CBC W/AUTO DIFF WBC: CPT

## 2025-08-31 PROCEDURE — 99232 SBSQ HOSP IP/OBS MODERATE 35: CPT | Performed by: INTERNAL MEDICINE

## 2025-08-31 PROCEDURE — 1200000000 HC SEMI PRIVATE

## 2025-08-31 PROCEDURE — 6370000000 HC RX 637 (ALT 250 FOR IP): Performed by: NURSE PRACTITIONER

## 2025-08-31 RX ORDER — KIT FOR THE PREPARATION OF TECHNETIUM TC 99M PENTETATE 20 MG/1
31 INJECTION, POWDER, LYOPHILIZED, FOR SOLUTION INTRAVENOUS; RESPIRATORY (INHALATION)
Status: COMPLETED | OUTPATIENT
Start: 2025-08-31 | End: 2025-08-31

## 2025-08-31 RX ADMIN — HYDRALAZINE HYDROCHLORIDE 25 MG: 25 TABLET ORAL at 20:28

## 2025-08-31 RX ADMIN — ATORVASTATIN CALCIUM 40 MG: 40 TABLET, FILM COATED ORAL at 07:58

## 2025-08-31 RX ADMIN — HYDRALAZINE HYDROCHLORIDE 25 MG: 25 TABLET ORAL at 12:41

## 2025-08-31 RX ADMIN — Medication 400 MG: at 07:58

## 2025-08-31 RX ADMIN — ARFORMOTEROL TARTRATE 15 MCG: 15 SOLUTION RESPIRATORY (INHALATION) at 08:30

## 2025-08-31 RX ADMIN — SODIUM CHLORIDE, PRESERVATIVE FREE 10 ML: 5 INJECTION INTRAVENOUS at 07:59

## 2025-08-31 RX ADMIN — INSULIN LISPRO 1 UNITS: 100 INJECTION, SOLUTION INTRAVENOUS; SUBCUTANEOUS at 20:27

## 2025-08-31 RX ADMIN — INSULIN GLARGINE 60 UNITS: 100 INJECTION, SOLUTION SUBCUTANEOUS at 20:28

## 2025-08-31 RX ADMIN — APIXABAN 5 MG: 5 TABLET, FILM COATED ORAL at 07:59

## 2025-08-31 RX ADMIN — ALBUTEROL SULFATE 2.5 MG: 2.5 SOLUTION RESPIRATORY (INHALATION) at 11:33

## 2025-08-31 RX ADMIN — HYDRALAZINE HYDROCHLORIDE 25 MG: 25 TABLET ORAL at 04:48

## 2025-08-31 RX ADMIN — KIT FOR THE PREPARATION OF TECHNETIUM TC 99M PENTETATE 31 MILLICURIE: 20 INJECTION, POWDER, LYOPHILIZED, FOR SOLUTION INTRAVENOUS; RESPIRATORY (INHALATION) at 13:35

## 2025-08-31 RX ADMIN — ARFORMOTEROL TARTRATE 15 MCG: 15 SOLUTION RESPIRATORY (INHALATION) at 20:16

## 2025-08-31 RX ADMIN — SODIUM CHLORIDE, PRESERVATIVE FREE 10 ML: 5 INJECTION INTRAVENOUS at 20:34

## 2025-08-31 RX ADMIN — APIXABAN 5 MG: 5 TABLET, FILM COATED ORAL at 20:28

## 2025-08-31 RX ADMIN — INSULIN LISPRO 1 UNITS: 100 INJECTION, SOLUTION INTRAVENOUS; SUBCUTANEOUS at 12:41

## 2025-08-31 RX ADMIN — FUROSEMIDE 60 MG: 10 INJECTION, SOLUTION INTRAMUSCULAR; INTRAVENOUS at 07:59

## 2025-08-31 RX ADMIN — Medication 6 MILLICURIE: at 14:02

## 2025-08-31 RX ADMIN — FINASTERIDE 5 MG: 5 TABLET, FILM COATED ORAL at 07:58

## 2025-08-31 RX ADMIN — ALBUTEROL SULFATE 2.5 MG: 2.5 SOLUTION RESPIRATORY (INHALATION) at 20:16

## 2025-08-31 RX ADMIN — ALBUTEROL SULFATE 2.5 MG: 2.5 SOLUTION RESPIRATORY (INHALATION) at 15:41

## 2025-08-31 RX ADMIN — INSULIN LISPRO 1 UNITS: 100 INJECTION, SOLUTION INTRAVENOUS; SUBCUTANEOUS at 17:47

## 2025-08-31 RX ADMIN — LISINOPRIL 10 MG: 10 TABLET ORAL at 20:28

## 2025-08-31 RX ADMIN — ALBUTEROL SULFATE 2.5 MG: 2.5 SOLUTION RESPIRATORY (INHALATION) at 08:31

## 2025-08-31 RX ADMIN — METOPROLOL SUCCINATE 100 MG: 50 TABLET, EXTENDED RELEASE ORAL at 07:58

## 2025-08-31 RX ADMIN — PRAZOSIN HYDROCHLORIDE 2 MG: 2 CAPSULE ORAL at 20:28

## 2025-08-31 RX ADMIN — FUROSEMIDE 60 MG: 10 INJECTION, SOLUTION INTRAMUSCULAR; INTRAVENOUS at 18:35

## 2025-08-31 ASSESSMENT — PAIN SCALES - GENERAL
PAINLEVEL_OUTOF10: 0

## 2025-09-01 LAB
ANION GAP SERPL CALCULATED.3IONS-SCNC: 13 MMOL/L (ref 7–16)
BASOPHILS # BLD: 0.08 K/UL (ref 0–0.2)
BASOPHILS NFR BLD: 1 % (ref 0–2)
BNP SERPL-MCNC: 905 PG/ML (ref 0–450)
BUN SERPL-MCNC: 22 MG/DL (ref 8–23)
CALCIUM SERPL-MCNC: 9.3 MG/DL (ref 8.8–10.2)
CHLORIDE SERPL-SCNC: 100 MMOL/L (ref 98–107)
CO2 SERPL-SCNC: 25 MMOL/L (ref 22–29)
CREAT SERPL-MCNC: 1.3 MG/DL (ref 0.7–1.2)
EOSINOPHIL # BLD: 0.11 K/UL (ref 0.05–0.5)
EOSINOPHILS RELATIVE PERCENT: 1 % (ref 0–6)
ERYTHROCYTE [DISTWIDTH] IN BLOOD BY AUTOMATED COUNT: 14.5 % (ref 11.5–15)
GFR, ESTIMATED: 59 ML/MIN/1.73M2
GLUCOSE BLD-MCNC: 183 MG/DL (ref 74–99)
GLUCOSE BLD-MCNC: 188 MG/DL (ref 74–99)
GLUCOSE BLD-MCNC: 192 MG/DL (ref 74–99)
GLUCOSE BLD-MCNC: 245 MG/DL (ref 74–99)
GLUCOSE SERPL-MCNC: 176 MG/DL (ref 74–99)
HCT VFR BLD AUTO: 43 % (ref 37–54)
HGB BLD-MCNC: 14.2 G/DL (ref 12.5–16.5)
IMM GRANULOCYTES # BLD AUTO: 0.23 K/UL (ref 0–0.58)
IMM GRANULOCYTES NFR BLD: 3 % (ref 0–5)
LYMPHOCYTES NFR BLD: 1.78 K/UL (ref 1.5–4)
LYMPHOCYTES RELATIVE PERCENT: 23 % (ref 20–42)
MCH RBC QN AUTO: 28.9 PG (ref 26–35)
MCHC RBC AUTO-ENTMCNC: 33 G/DL (ref 32–34.5)
MCV RBC AUTO: 87.6 FL (ref 80–99.9)
MONOCYTES NFR BLD: 0.9 K/UL (ref 0.1–0.95)
MONOCYTES NFR BLD: 11 % (ref 2–12)
NEUTROPHILS NFR BLD: 61 % (ref 43–80)
NEUTS SEG NFR BLD: 4.78 K/UL (ref 1.8–7.3)
PLATELET # BLD AUTO: 212 K/UL (ref 130–450)
PMV BLD AUTO: 10.7 FL (ref 7–12)
POTASSIUM SERPL-SCNC: 3.3 MMOL/L (ref 3.5–5.1)
RBC # BLD AUTO: 4.91 M/UL (ref 3.8–5.8)
SODIUM SERPL-SCNC: 139 MMOL/L (ref 136–145)
WBC OTHER # BLD: 7.9 K/UL (ref 4.5–11.5)

## 2025-09-01 PROCEDURE — 82962 GLUCOSE BLOOD TEST: CPT

## 2025-09-01 PROCEDURE — 94640 AIRWAY INHALATION TREATMENT: CPT

## 2025-09-01 PROCEDURE — 6370000000 HC RX 637 (ALT 250 FOR IP)

## 2025-09-01 PROCEDURE — 6370000000 HC RX 637 (ALT 250 FOR IP): Performed by: STUDENT IN AN ORGANIZED HEALTH CARE EDUCATION/TRAINING PROGRAM

## 2025-09-01 PROCEDURE — 6370000000 HC RX 637 (ALT 250 FOR IP): Performed by: CLINICAL NURSE SPECIALIST

## 2025-09-01 PROCEDURE — 99233 SBSQ HOSP IP/OBS HIGH 50: CPT | Performed by: INTERNAL MEDICINE

## 2025-09-01 PROCEDURE — 6360000002 HC RX W HCPCS: Performed by: INTERNAL MEDICINE

## 2025-09-01 PROCEDURE — 85025 COMPLETE CBC W/AUTO DIFF WBC: CPT

## 2025-09-01 PROCEDURE — 80048 BASIC METABOLIC PNL TOTAL CA: CPT

## 2025-09-01 PROCEDURE — 1200000000 HC SEMI PRIVATE

## 2025-09-01 PROCEDURE — 6370000000 HC RX 637 (ALT 250 FOR IP): Performed by: NURSE PRACTITIONER

## 2025-09-01 PROCEDURE — 2500000003 HC RX 250 WO HCPCS

## 2025-09-01 PROCEDURE — 99232 SBSQ HOSP IP/OBS MODERATE 35: CPT | Performed by: NURSE PRACTITIONER

## 2025-09-01 PROCEDURE — 83880 ASSAY OF NATRIURETIC PEPTIDE: CPT

## 2025-09-01 PROCEDURE — 6370000000 HC RX 637 (ALT 250 FOR IP): Performed by: INTERNAL MEDICINE

## 2025-09-01 PROCEDURE — 36415 COLL VENOUS BLD VENIPUNCTURE: CPT

## 2025-09-01 RX ORDER — POTASSIUM CHLORIDE 1500 MG/1
40 TABLET, EXTENDED RELEASE ORAL ONCE
Status: COMPLETED | OUTPATIENT
Start: 2025-09-01 | End: 2025-09-01

## 2025-09-01 RX ORDER — AMIODARONE HYDROCHLORIDE 200 MG/1
200 TABLET ORAL
Status: DISCONTINUED | OUTPATIENT
Start: 2025-09-01 | End: 2025-09-03 | Stop reason: HOSPADM

## 2025-09-01 RX ORDER — AMIODARONE HYDROCHLORIDE 200 MG/1
200 TABLET ORAL DAILY
Status: DISCONTINUED | OUTPATIENT
Start: 2025-09-18 | End: 2025-09-03 | Stop reason: HOSPADM

## 2025-09-01 RX ADMIN — SODIUM CHLORIDE, PRESERVATIVE FREE 10 ML: 5 INJECTION INTRAVENOUS at 20:16

## 2025-09-01 RX ADMIN — INSULIN LISPRO 1 UNITS: 100 INJECTION, SOLUTION INTRAVENOUS; SUBCUTANEOUS at 07:50

## 2025-09-01 RX ADMIN — ALBUTEROL SULFATE 2.5 MG: 2.5 SOLUTION RESPIRATORY (INHALATION) at 09:53

## 2025-09-01 RX ADMIN — HYDRALAZINE HYDROCHLORIDE 25 MG: 25 TABLET ORAL at 05:57

## 2025-09-01 RX ADMIN — TRAMADOL HYDROCHLORIDE 50 MG: 50 TABLET, COATED ORAL at 07:50

## 2025-09-01 RX ADMIN — FINASTERIDE 5 MG: 5 TABLET, FILM COATED ORAL at 07:50

## 2025-09-01 RX ADMIN — INSULIN GLARGINE 60 UNITS: 100 INJECTION, SOLUTION SUBCUTANEOUS at 20:15

## 2025-09-01 RX ADMIN — HYDRALAZINE HYDROCHLORIDE 25 MG: 25 TABLET ORAL at 16:10

## 2025-09-01 RX ADMIN — APIXABAN 5 MG: 5 TABLET, FILM COATED ORAL at 07:51

## 2025-09-01 RX ADMIN — INSULIN LISPRO 1 UNITS: 100 INJECTION, SOLUTION INTRAVENOUS; SUBCUTANEOUS at 17:44

## 2025-09-01 RX ADMIN — AMIODARONE HYDROCHLORIDE 200 MG: 200 TABLET ORAL at 17:06

## 2025-09-01 RX ADMIN — ALBUTEROL SULFATE 2.5 MG: 2.5 SOLUTION RESPIRATORY (INHALATION) at 19:46

## 2025-09-01 RX ADMIN — FUROSEMIDE 60 MG: 10 INJECTION, SOLUTION INTRAMUSCULAR; INTRAVENOUS at 17:06

## 2025-09-01 RX ADMIN — AMIODARONE HYDROCHLORIDE 200 MG: 200 TABLET ORAL at 11:06

## 2025-09-01 RX ADMIN — FUROSEMIDE 60 MG: 10 INJECTION, SOLUTION INTRAMUSCULAR; INTRAVENOUS at 07:50

## 2025-09-01 RX ADMIN — PRAZOSIN HYDROCHLORIDE 2 MG: 2 CAPSULE ORAL at 20:13

## 2025-09-01 RX ADMIN — HYDRALAZINE HYDROCHLORIDE 25 MG: 25 TABLET ORAL at 20:13

## 2025-09-01 RX ADMIN — Medication 400 MG: at 07:50

## 2025-09-01 RX ADMIN — SODIUM CHLORIDE, PRESERVATIVE FREE 10 ML: 5 INJECTION INTRAVENOUS at 07:51

## 2025-09-01 RX ADMIN — LISINOPRIL 10 MG: 10 TABLET ORAL at 20:13

## 2025-09-01 RX ADMIN — ARFORMOTEROL TARTRATE 15 MCG: 15 SOLUTION RESPIRATORY (INHALATION) at 19:46

## 2025-09-01 RX ADMIN — ATORVASTATIN CALCIUM 40 MG: 40 TABLET, FILM COATED ORAL at 07:50

## 2025-09-01 RX ADMIN — METOPROLOL SUCCINATE 100 MG: 50 TABLET, EXTENDED RELEASE ORAL at 07:50

## 2025-09-01 RX ADMIN — APIXABAN 5 MG: 5 TABLET, FILM COATED ORAL at 20:13

## 2025-09-01 RX ADMIN — INSULIN LISPRO 1 UNITS: 100 INJECTION, SOLUTION INTRAVENOUS; SUBCUTANEOUS at 12:37

## 2025-09-01 RX ADMIN — ALBUTEROL SULFATE 2.5 MG: 2.5 SOLUTION RESPIRATORY (INHALATION) at 13:32

## 2025-09-01 RX ADMIN — ARFORMOTEROL TARTRATE 15 MCG: 15 SOLUTION RESPIRATORY (INHALATION) at 09:53

## 2025-09-01 RX ADMIN — POTASSIUM CHLORIDE 40 MEQ: 1500 TABLET, EXTENDED RELEASE ORAL at 11:07

## 2025-09-01 RX ADMIN — INSULIN LISPRO 1 UNITS: 100 INJECTION, SOLUTION INTRAVENOUS; SUBCUTANEOUS at 20:13

## 2025-09-01 ASSESSMENT — PAIN - FUNCTIONAL ASSESSMENT
PAIN_FUNCTIONAL_ASSESSMENT: PREVENTS OR INTERFERES SOME ACTIVE ACTIVITIES AND ADLS
PAIN_FUNCTIONAL_ASSESSMENT: 0-10
PAIN_FUNCTIONAL_ASSESSMENT: 0-10

## 2025-09-01 ASSESSMENT — PAIN DESCRIPTION - LOCATION: LOCATION: KNEE;BACK

## 2025-09-01 ASSESSMENT — PAIN DESCRIPTION - ORIENTATION: ORIENTATION: LEFT;RIGHT

## 2025-09-01 ASSESSMENT — PAIN DESCRIPTION - DESCRIPTORS: DESCRIPTORS: ACHING;DISCOMFORT;SORE

## 2025-09-01 ASSESSMENT — PAIN SCALES - GENERAL
PAINLEVEL_OUTOF10: 0
PAINLEVEL_OUTOF10: 7

## 2025-09-02 ENCOUNTER — ANESTHESIA (OUTPATIENT)
Age: 80
DRG: 291 | End: 2025-09-02
Payer: OTHER GOVERNMENT

## 2025-09-02 ENCOUNTER — ANESTHESIA EVENT (OUTPATIENT)
Age: 80
DRG: 291 | End: 2025-09-02
Payer: OTHER GOVERNMENT

## 2025-09-02 ENCOUNTER — HOSPITAL ENCOUNTER (INPATIENT)
Age: 80
Discharge: HOME | DRG: 291 | End: 2025-09-04
Attending: STUDENT IN AN ORGANIZED HEALTH CARE EDUCATION/TRAINING PROGRAM
Payer: OTHER GOVERNMENT

## 2025-09-02 VITALS
TEMPERATURE: 97.8 F | OXYGEN SATURATION: 93 % | SYSTOLIC BLOOD PRESSURE: 124 MMHG | RESPIRATION RATE: 16 BRPM | HEART RATE: 57 BPM | DIASTOLIC BLOOD PRESSURE: 82 MMHG

## 2025-09-02 LAB
ANION GAP SERPL CALCULATED.3IONS-SCNC: 12 MMOL/L (ref 7–16)
BASOPHILS # BLD: 0.06 K/UL (ref 0–0.2)
BASOPHILS NFR BLD: 1 % (ref 0–2)
BUN SERPL-MCNC: 24 MG/DL (ref 8–23)
CALCIUM SERPL-MCNC: 8.9 MG/DL (ref 8.8–10.2)
CHLORIDE SERPL-SCNC: 101 MMOL/L (ref 98–107)
CO2 SERPL-SCNC: 25 MMOL/L (ref 22–29)
CREAT SERPL-MCNC: 1.3 MG/DL (ref 0.7–1.2)
EKG ATRIAL RATE: 79 BPM
EKG Q-T INTERVAL: 412 MS
EKG QRS DURATION: 116 MS
EKG QTC CALCULATION (BAZETT): 453 MS
EKG R AXIS: 2 DEGREES
EKG T AXIS: 89 DEGREES
EKG VENTRICULAR RATE: 73 BPM
EOSINOPHIL # BLD: 0.16 K/UL (ref 0.05–0.5)
EOSINOPHILS RELATIVE PERCENT: 2 % (ref 0–6)
ERYTHROCYTE [DISTWIDTH] IN BLOOD BY AUTOMATED COUNT: 14.4 % (ref 11.5–15)
GFR, ESTIMATED: 58 ML/MIN/1.73M2
GLUCOSE BLD-MCNC: 151 MG/DL (ref 74–99)
GLUCOSE BLD-MCNC: 161 MG/DL (ref 74–99)
GLUCOSE BLD-MCNC: 166 MG/DL (ref 74–99)
GLUCOSE BLD-MCNC: 317 MG/DL (ref 74–99)
GLUCOSE SERPL-MCNC: 180 MG/DL (ref 74–99)
HCT VFR BLD AUTO: 41.1 % (ref 37–54)
HGB BLD-MCNC: 13.4 G/DL (ref 12.5–16.5)
IMM GRANULOCYTES # BLD AUTO: 0.25 K/UL (ref 0–0.58)
IMM GRANULOCYTES NFR BLD: 3 % (ref 0–5)
LYMPHOCYTES NFR BLD: 1.51 K/UL (ref 1.5–4)
LYMPHOCYTES RELATIVE PERCENT: 21 % (ref 20–42)
MCH RBC QN AUTO: 28.8 PG (ref 26–35)
MCHC RBC AUTO-ENTMCNC: 32.6 G/DL (ref 32–34.5)
MCV RBC AUTO: 88.2 FL (ref 80–99.9)
MONOCYTES NFR BLD: 0.82 K/UL (ref 0.1–0.95)
MONOCYTES NFR BLD: 11 % (ref 2–12)
NEUTROPHILS NFR BLD: 62 % (ref 43–80)
NEUTS SEG NFR BLD: 4.56 K/UL (ref 1.8–7.3)
PLATELET # BLD AUTO: 186 K/UL (ref 130–450)
PMV BLD AUTO: 10.7 FL (ref 7–12)
POTASSIUM SERPL-SCNC: 3.4 MMOL/L (ref 3.5–5.1)
RBC # BLD AUTO: 4.66 M/UL (ref 3.8–5.8)
SODIUM SERPL-SCNC: 137 MMOL/L (ref 136–145)
WBC OTHER # BLD: 7.4 K/UL (ref 4.5–11.5)

## 2025-09-02 PROCEDURE — 99232 SBSQ HOSP IP/OBS MODERATE 35: CPT | Performed by: INTERNAL MEDICINE

## 2025-09-02 PROCEDURE — 1200000000 HC SEMI PRIVATE

## 2025-09-02 PROCEDURE — 85025 COMPLETE CBC W/AUTO DIFF WBC: CPT

## 2025-09-02 PROCEDURE — 6370000000 HC RX 637 (ALT 250 FOR IP): Performed by: INTERNAL MEDICINE

## 2025-09-02 PROCEDURE — 2500000003 HC RX 250 WO HCPCS: Performed by: INTERNAL MEDICINE

## 2025-09-02 PROCEDURE — 6360000002 HC RX W HCPCS: Performed by: INTERNAL MEDICINE

## 2025-09-02 PROCEDURE — 92960 CARDIOVERSION ELECTRIC EXT: CPT | Performed by: INTERNAL MEDICINE

## 2025-09-02 PROCEDURE — 92960 CARDIOVERSION ELECTRIC EXT: CPT

## 2025-09-02 PROCEDURE — 94640 AIRWAY INHALATION TREATMENT: CPT

## 2025-09-02 PROCEDURE — 3700000000 HC ANESTHESIA ATTENDED CARE

## 2025-09-02 PROCEDURE — 7100000011 HC PHASE II RECOVERY - ADDTL 15 MIN

## 2025-09-02 PROCEDURE — 2500000003 HC RX 250 WO HCPCS

## 2025-09-02 PROCEDURE — 2580000003 HC RX 258

## 2025-09-02 PROCEDURE — 7100000010 HC PHASE II RECOVERY - FIRST 15 MIN

## 2025-09-02 PROCEDURE — 6360000002 HC RX W HCPCS

## 2025-09-02 PROCEDURE — 6370000000 HC RX 637 (ALT 250 FOR IP)

## 2025-09-02 PROCEDURE — 6370000000 HC RX 637 (ALT 250 FOR IP): Performed by: NURSE PRACTITIONER

## 2025-09-02 PROCEDURE — 6370000000 HC RX 637 (ALT 250 FOR IP): Performed by: STUDENT IN AN ORGANIZED HEALTH CARE EDUCATION/TRAINING PROGRAM

## 2025-09-02 PROCEDURE — 80048 BASIC METABOLIC PNL TOTAL CA: CPT

## 2025-09-02 PROCEDURE — 36415 COLL VENOUS BLD VENIPUNCTURE: CPT

## 2025-09-02 PROCEDURE — 82962 GLUCOSE BLOOD TEST: CPT

## 2025-09-02 PROCEDURE — 6370000000 HC RX 637 (ALT 250 FOR IP): Performed by: CLINICAL NURSE SPECIALIST

## 2025-09-02 PROCEDURE — 5A2204Z RESTORATION OF CARDIAC RHYTHM, SINGLE: ICD-10-PCS | Performed by: INTERNAL MEDICINE

## 2025-09-02 RX ORDER — SODIUM CHLORIDE 9 MG/ML
INJECTION, SOLUTION INTRAVENOUS
Status: DISCONTINUED | OUTPATIENT
Start: 2025-09-02 | End: 2025-09-02 | Stop reason: SDUPTHER

## 2025-09-02 RX ORDER — SODIUM CHLORIDE 9 MG/ML
INJECTION, SOLUTION INTRAVENOUS PRN
Status: DISCONTINUED | OUTPATIENT
Start: 2025-09-02 | End: 2025-09-03 | Stop reason: HOSPADM

## 2025-09-02 RX ORDER — SODIUM CHLORIDE 0.9 % (FLUSH) 0.9 %
5-40 SYRINGE (ML) INJECTION PRN
Status: DISCONTINUED | OUTPATIENT
Start: 2025-09-02 | End: 2025-09-03 | Stop reason: HOSPADM

## 2025-09-02 RX ORDER — PROPOFOL 10 MG/ML
INJECTION, EMULSION INTRAVENOUS
Status: DISCONTINUED | OUTPATIENT
Start: 2025-09-02 | End: 2025-09-02 | Stop reason: SDUPTHER

## 2025-09-02 RX ORDER — SODIUM CHLORIDE 0.9 % (FLUSH) 0.9 %
5-40 SYRINGE (ML) INJECTION EVERY 12 HOURS SCHEDULED
Status: DISCONTINUED | OUTPATIENT
Start: 2025-09-02 | End: 2025-09-03 | Stop reason: HOSPADM

## 2025-09-02 RX ADMIN — AMIODARONE HYDROCHLORIDE 200 MG: 200 TABLET ORAL at 17:14

## 2025-09-02 RX ADMIN — APIXABAN 5 MG: 5 TABLET, FILM COATED ORAL at 08:25

## 2025-09-02 RX ADMIN — FUROSEMIDE 60 MG: 10 INJECTION, SOLUTION INTRAMUSCULAR; INTRAVENOUS at 17:11

## 2025-09-02 RX ADMIN — AMIODARONE HYDROCHLORIDE 200 MG: 200 TABLET ORAL at 11:58

## 2025-09-02 RX ADMIN — PROPOFOL 40 MG: 10 INJECTION, EMULSION INTRAVENOUS at 16:05

## 2025-09-02 RX ADMIN — PROPOFOL 30 MG: 10 INJECTION, EMULSION INTRAVENOUS at 16:06

## 2025-09-02 RX ADMIN — LISINOPRIL 10 MG: 10 TABLET ORAL at 21:12

## 2025-09-02 RX ADMIN — SODIUM CHLORIDE, PRESERVATIVE FREE 10 ML: 5 INJECTION INTRAVENOUS at 21:33

## 2025-09-02 RX ADMIN — METOPROLOL SUCCINATE 100 MG: 50 TABLET, EXTENDED RELEASE ORAL at 08:25

## 2025-09-02 RX ADMIN — INSULIN GLARGINE 60 UNITS: 100 INJECTION, SOLUTION SUBCUTANEOUS at 21:12

## 2025-09-02 RX ADMIN — ALBUTEROL SULFATE 2.5 MG: 2.5 SOLUTION RESPIRATORY (INHALATION) at 19:50

## 2025-09-02 RX ADMIN — SODIUM CHLORIDE, PRESERVATIVE FREE 10 ML: 5 INJECTION INTRAVENOUS at 08:38

## 2025-09-02 RX ADMIN — ARFORMOTEROL TARTRATE 15 MCG: 15 SOLUTION RESPIRATORY (INHALATION) at 07:39

## 2025-09-02 RX ADMIN — TRAMADOL HYDROCHLORIDE 50 MG: 50 TABLET, COATED ORAL at 21:31

## 2025-09-02 RX ADMIN — AMIODARONE HYDROCHLORIDE 200 MG: 200 TABLET ORAL at 08:25

## 2025-09-02 RX ADMIN — ALBUTEROL SULFATE 2.5 MG: 2.5 SOLUTION RESPIRATORY (INHALATION) at 07:40

## 2025-09-02 RX ADMIN — PRAZOSIN HYDROCHLORIDE 2 MG: 2 CAPSULE ORAL at 21:12

## 2025-09-02 RX ADMIN — FINASTERIDE 5 MG: 5 TABLET, FILM COATED ORAL at 08:25

## 2025-09-02 RX ADMIN — FUROSEMIDE 60 MG: 10 INJECTION, SOLUTION INTRAMUSCULAR; INTRAVENOUS at 08:38

## 2025-09-02 RX ADMIN — APIXABAN 5 MG: 5 TABLET, FILM COATED ORAL at 21:12

## 2025-09-02 RX ADMIN — PROPOFOL 60 MG: 10 INJECTION, EMULSION INTRAVENOUS at 16:00

## 2025-09-02 RX ADMIN — SODIUM CHLORIDE, PRESERVATIVE FREE 10 ML: 5 INJECTION INTRAVENOUS at 21:13

## 2025-09-02 RX ADMIN — INSULIN LISPRO 3 UNITS: 100 INJECTION, SOLUTION INTRAVENOUS; SUBCUTANEOUS at 21:32

## 2025-09-02 RX ADMIN — ARFORMOTEROL TARTRATE 15 MCG: 15 SOLUTION RESPIRATORY (INHALATION) at 19:50

## 2025-09-02 RX ADMIN — HYDRALAZINE HYDROCHLORIDE 25 MG: 25 TABLET ORAL at 21:12

## 2025-09-02 RX ADMIN — ALBUTEROL SULFATE 2.5 MG: 2.5 SOLUTION RESPIRATORY (INHALATION) at 12:38

## 2025-09-02 RX ADMIN — PROPOFOL 30 MG: 10 INJECTION, EMULSION INTRAVENOUS at 16:03

## 2025-09-02 RX ADMIN — ALBUTEROL SULFATE 2.5 MG: 2.5 SOLUTION RESPIRATORY (INHALATION) at 17:16

## 2025-09-02 RX ADMIN — ATORVASTATIN CALCIUM 40 MG: 40 TABLET, FILM COATED ORAL at 08:25

## 2025-09-02 RX ADMIN — Medication 400 MG: at 08:24

## 2025-09-02 RX ADMIN — SODIUM CHLORIDE: 9 INJECTION, SOLUTION INTRAVENOUS at 15:57

## 2025-09-02 RX ADMIN — POTASSIUM CHLORIDE 40 MEQ: 1500 TABLET, EXTENDED RELEASE ORAL at 08:26

## 2025-09-02 ASSESSMENT — PAIN SCALES - GENERAL: PAINLEVEL_OUTOF10: 6

## 2025-09-02 ASSESSMENT — PAIN DESCRIPTION - LOCATION: LOCATION: LEG;BACK;NECK

## 2025-09-02 ASSESSMENT — PAIN DESCRIPTION - ORIENTATION: ORIENTATION: RIGHT;LEFT

## 2025-09-02 ASSESSMENT — PAIN - FUNCTIONAL ASSESSMENT: PAIN_FUNCTIONAL_ASSESSMENT: 0-10

## 2025-09-02 ASSESSMENT — PAIN DESCRIPTION - DESCRIPTORS: DESCRIPTORS: ACHING

## 2025-09-03 ENCOUNTER — TELEPHONE (OUTPATIENT)
Dept: NON INVASIVE DIAGNOSTICS | Age: 80
End: 2025-09-03

## 2025-09-03 ENCOUNTER — TELEPHONE (OUTPATIENT)
Age: 80
End: 2025-09-03

## 2025-09-03 VITALS
WEIGHT: 310.85 LBS | TEMPERATURE: 98.1 F | OXYGEN SATURATION: 96 % | SYSTOLIC BLOOD PRESSURE: 132 MMHG | BODY MASS INDEX: 44.5 KG/M2 | HEART RATE: 61 BPM | RESPIRATION RATE: 13 BRPM | DIASTOLIC BLOOD PRESSURE: 62 MMHG | HEIGHT: 70 IN

## 2025-09-03 LAB
ANION GAP SERPL CALCULATED.3IONS-SCNC: 12 MMOL/L (ref 7–16)
BNP SERPL-MCNC: 608 PG/ML (ref 0–450)
BUN SERPL-MCNC: 22 MG/DL (ref 8–23)
CALCIUM SERPL-MCNC: 9.1 MG/DL (ref 8.8–10.2)
CHLORIDE SERPL-SCNC: 101 MMOL/L (ref 98–107)
CO2 SERPL-SCNC: 26 MMOL/L (ref 22–29)
CREAT SERPL-MCNC: 1.3 MG/DL (ref 0.7–1.2)
ERYTHROCYTE [DISTWIDTH] IN BLOOD BY AUTOMATED COUNT: 14.6 % (ref 11.5–15)
GFR, ESTIMATED: 58 ML/MIN/1.73M2
GLUCOSE BLD-MCNC: 151 MG/DL (ref 74–99)
GLUCOSE BLD-MCNC: 169 MG/DL (ref 74–99)
GLUCOSE SERPL-MCNC: 155 MG/DL (ref 74–99)
HCT VFR BLD AUTO: 39.8 % (ref 37–54)
HGB BLD-MCNC: 13 G/DL (ref 12.5–16.5)
MAGNESIUM SERPL-MCNC: 2 MG/DL (ref 1.6–2.4)
MCH RBC QN AUTO: 29 PG (ref 26–35)
MCHC RBC AUTO-ENTMCNC: 32.7 G/DL (ref 32–34.5)
MCV RBC AUTO: 88.6 FL (ref 80–99.9)
PLATELET # BLD AUTO: 182 K/UL (ref 130–450)
PMV BLD AUTO: 10.8 FL (ref 7–12)
POTASSIUM SERPL-SCNC: 3.8 MMOL/L (ref 3.5–5.1)
RBC # BLD AUTO: 4.49 M/UL (ref 3.8–5.8)
SODIUM SERPL-SCNC: 139 MMOL/L (ref 136–145)
WBC OTHER # BLD: 8.7 K/UL (ref 4.5–11.5)

## 2025-09-03 PROCEDURE — 6360000002 HC RX W HCPCS: Performed by: INTERNAL MEDICINE

## 2025-09-03 PROCEDURE — 80048 BASIC METABOLIC PNL TOTAL CA: CPT

## 2025-09-03 PROCEDURE — 6370000000 HC RX 637 (ALT 250 FOR IP): Performed by: INTERNAL MEDICINE

## 2025-09-03 PROCEDURE — 83880 ASSAY OF NATRIURETIC PEPTIDE: CPT

## 2025-09-03 PROCEDURE — 83735 ASSAY OF MAGNESIUM: CPT

## 2025-09-03 PROCEDURE — 99239 HOSP IP/OBS DSCHRG MGMT >30: CPT | Performed by: INTERNAL MEDICINE

## 2025-09-03 PROCEDURE — 36415 COLL VENOUS BLD VENIPUNCTURE: CPT

## 2025-09-03 PROCEDURE — 94640 AIRWAY INHALATION TREATMENT: CPT

## 2025-09-03 PROCEDURE — 2500000003 HC RX 250 WO HCPCS: Performed by: INTERNAL MEDICINE

## 2025-09-03 PROCEDURE — 85027 COMPLETE CBC AUTOMATED: CPT

## 2025-09-03 PROCEDURE — 82962 GLUCOSE BLOOD TEST: CPT

## 2025-09-03 RX ORDER — METOPROLOL SUCCINATE 100 MG/1
100 TABLET, EXTENDED RELEASE ORAL DAILY
Qty: 90 TABLET | Refills: 0 | Status: SHIPPED | OUTPATIENT
Start: 2025-09-04

## 2025-09-03 RX ORDER — AMIODARONE HYDROCHLORIDE 200 MG/1
TABLET ORAL
Qty: 135 TABLET | Refills: 0 | Status: SHIPPED | OUTPATIENT
Start: 2025-09-03 | End: 2025-12-17

## 2025-09-03 RX ORDER — BUMETANIDE 1 MG/1
1 TABLET ORAL 2 TIMES DAILY
Qty: 180 TABLET | Refills: 0 | Status: SHIPPED | OUTPATIENT
Start: 2025-09-03

## 2025-09-03 RX ORDER — BUMETANIDE 1 MG/1
1 TABLET ORAL 2 TIMES DAILY
Status: DISCONTINUED | OUTPATIENT
Start: 2025-09-03 | End: 2025-09-03 | Stop reason: HOSPADM

## 2025-09-03 RX ADMIN — HYDRALAZINE HYDROCHLORIDE 25 MG: 25 TABLET ORAL at 05:51

## 2025-09-03 RX ADMIN — FUROSEMIDE 60 MG: 10 INJECTION, SOLUTION INTRAMUSCULAR; INTRAVENOUS at 07:33

## 2025-09-03 RX ADMIN — ALBUTEROL SULFATE 2.5 MG: 2.5 SOLUTION RESPIRATORY (INHALATION) at 11:48

## 2025-09-03 RX ADMIN — ALBUTEROL SULFATE 2.5 MG: 2.5 SOLUTION RESPIRATORY (INHALATION) at 08:00

## 2025-09-03 RX ADMIN — AMIODARONE HYDROCHLORIDE 200 MG: 200 TABLET ORAL at 07:35

## 2025-09-03 RX ADMIN — METOPROLOL SUCCINATE 100 MG: 50 TABLET, EXTENDED RELEASE ORAL at 07:33

## 2025-09-03 RX ADMIN — ATORVASTATIN CALCIUM 40 MG: 40 TABLET, FILM COATED ORAL at 07:34

## 2025-09-03 RX ADMIN — Medication 400 MG: at 07:33

## 2025-09-03 RX ADMIN — ARFORMOTEROL TARTRATE 15 MCG: 15 SOLUTION RESPIRATORY (INHALATION) at 08:00

## 2025-09-03 RX ADMIN — APIXABAN 5 MG: 5 TABLET, FILM COATED ORAL at 07:33

## 2025-09-03 RX ADMIN — SODIUM CHLORIDE, PRESERVATIVE FREE 10 ML: 5 INJECTION INTRAVENOUS at 07:33

## 2025-09-03 RX ADMIN — FINASTERIDE 5 MG: 5 TABLET, FILM COATED ORAL at 07:34

## 2025-09-03 ASSESSMENT — PAIN SCALES - GENERAL: PAINLEVEL_OUTOF10: 0

## (undated) DEVICE — GUIDEWIRE URO L150CM DIA0.038IN STD NIT HYDRPHLC STR TIP

## (undated) DEVICE — SNARE VASC L240CM LOOP W10MM SHTH DIA2.4MM RND STIFF CLD

## (undated) DEVICE — DILATOR/SHEATH SET: Brand: 8/10 DILATOR/SHEATH SET

## (undated) DEVICE — CATHETER URET 5FR L70CM OPN END SGL LUMN INJ HUB FLEXIMA

## (undated) DEVICE — DEFENDO AIR WATER SUCTION AND BIOPSY VALVE KIT FOR  OLYMPUS: Brand: DEFENDO AIR/WATER/SUCTION AND BIOPSY VALVE

## (undated) DEVICE — GLOVE ORANGE PI 7 1/2   MSG9075

## (undated) DEVICE — CYSTO PACK: Brand: MEDLINE INDUSTRIES, INC.

## (undated) DEVICE — GAUZE,SPONGE,4"X4",8PLY,STRL,LF,10/TRAY: Brand: MEDLINE

## (undated) DEVICE — CONTAINER SPEC 60ML PH 7NEUTRAL BUFF FRMLN RDY TO USE

## (undated) DEVICE — SOLUTION IRRIG 3000ML 0.9% SOD CHL USP UROMATIC PLAS CONT

## (undated) DEVICE — CATHETER URETH 20FR BLLN 5CC STD LTX HYDRGEL 2 W F BARDX

## (undated) DEVICE — BAG DRNGE COMB PK

## (undated) DEVICE — SPECIMEN CUP W/LID: Brand: DEROYAL

## (undated) DEVICE — GUIDEWIRE ENDOSCP L150CM DIA0.035IN TIP 3CM PTFE NIT

## (undated) DEVICE — FLEXIVA  PULSE  AND  FLEXIVA  PULSE  TRACTIP  LASER  FIBERS  ARE  HIGH  POWER  SINGLE-USE FIBER: Brand: FLEXIVA PULSE ID

## (undated) DEVICE — SOLUTION IRRIG 3000ML STRL H2O USP UROMATIC PLAS CONT

## (undated) DEVICE — STONE RETRIEVAL BASKET: Brand: SEGURA HEMISPHERE

## (undated) DEVICE — SYRINGE MED 50ML LUERSLIP TIP

## (undated) DEVICE — MEDICINE CUP, GRADUATED, STER: Brand: MEDLINE

## (undated) DEVICE — 4-PORT MANIFOLD: Brand: NEPTUNE 2

## (undated) DEVICE — TUBING, SUCTION, 3/16" X 12', STRAIGHT: Brand: MEDLINE

## (undated) DEVICE — CONNECTOR IRRIGATION AUXILIARY H2O JET W/ PRT MTL THRD HYDR

## (undated) DEVICE — GOWN,SIRUS,FABRNF,2XL,18/CS: Brand: MEDLINE

## (undated) DEVICE — SYRINGE MED 30ML STD CLR PLAS LUERLOCK TIP N CTRL DISP

## (undated) DEVICE — TRAP SPEC POLYP REM STRNR CLN DSGN MAGNIFYING WIND DISP

## (undated) DEVICE — SYRINGE,TOOMEY,IRRIGATION,70CC,STERILE: Brand: MEDLINE

## (undated) DEVICE — SOLUTION SCRB 4OZ 4% CHG H2O AIDED FOR PREOPERATIVE SKIN